# Patient Record
Sex: FEMALE | Race: WHITE | NOT HISPANIC OR LATINO
[De-identification: names, ages, dates, MRNs, and addresses within clinical notes are randomized per-mention and may not be internally consistent; named-entity substitution may affect disease eponyms.]

---

## 2020-03-04 ENCOUNTER — RESULT REVIEW (OUTPATIENT)
Age: 60
End: 2020-03-04

## 2020-03-10 ENCOUNTER — OUTPATIENT (OUTPATIENT)
Dept: OUTPATIENT SERVICES | Facility: HOSPITAL | Age: 60
LOS: 1 days | End: 2020-03-10
Payer: COMMERCIAL

## 2020-03-10 ENCOUNTER — TRANSCRIPTION ENCOUNTER (OUTPATIENT)
Age: 60
End: 2020-03-10

## 2020-03-10 VITALS
WEIGHT: 156.97 LBS | TEMPERATURE: 98 F | HEART RATE: 67 BPM | OXYGEN SATURATION: 99 % | HEIGHT: 63 IN | SYSTOLIC BLOOD PRESSURE: 130 MMHG | RESPIRATION RATE: 16 BRPM | DIASTOLIC BLOOD PRESSURE: 60 MMHG

## 2020-03-10 DIAGNOSIS — Z98.890 OTHER SPECIFIED POSTPROCEDURAL STATES: Chronic | ICD-10-CM

## 2020-03-10 DIAGNOSIS — Z90.89 ACQUIRED ABSENCE OF OTHER ORGANS: Chronic | ICD-10-CM

## 2020-03-10 DIAGNOSIS — Z01.818 ENCOUNTER FOR OTHER PREPROCEDURAL EXAMINATION: ICD-10-CM

## 2020-03-10 DIAGNOSIS — Z90.711 ACQUIRED ABSENCE OF UTERUS WITH REMAINING CERVICAL STUMP: Chronic | ICD-10-CM

## 2020-03-10 DIAGNOSIS — K21.9 GASTRO-ESOPHAGEAL REFLUX DISEASE WITHOUT ESOPHAGITIS: ICD-10-CM

## 2020-03-10 LAB
ANION GAP SERPL CALC-SCNC: 6 MMOL/L — SIGNIFICANT CHANGE UP (ref 5–17)
APTT BLD: 32 SEC — SIGNIFICANT CHANGE UP (ref 27.5–36.3)
BASOPHILS # BLD AUTO: 0.13 K/UL — SIGNIFICANT CHANGE UP (ref 0–0.2)
BASOPHILS NFR BLD AUTO: 1.5 % — SIGNIFICANT CHANGE UP (ref 0–2)
BUN SERPL-MCNC: 15 MG/DL — SIGNIFICANT CHANGE UP (ref 7–23)
CALCIUM SERPL-MCNC: 9.7 MG/DL — SIGNIFICANT CHANGE UP (ref 8.5–10.1)
CHLORIDE SERPL-SCNC: 104 MMOL/L — SIGNIFICANT CHANGE UP (ref 96–108)
CO2 SERPL-SCNC: 29 MMOL/L — SIGNIFICANT CHANGE UP (ref 22–31)
CREAT SERPL-MCNC: 0.67 MG/DL — SIGNIFICANT CHANGE UP (ref 0.5–1.3)
EOSINOPHIL # BLD AUTO: 0.19 K/UL — SIGNIFICANT CHANGE UP (ref 0–0.5)
EOSINOPHIL NFR BLD AUTO: 2.1 % — SIGNIFICANT CHANGE UP (ref 0–6)
GLUCOSE SERPL-MCNC: 91 MG/DL — SIGNIFICANT CHANGE UP (ref 70–99)
HCT VFR BLD CALC: 38.2 % — SIGNIFICANT CHANGE UP (ref 34.5–45)
HGB BLD-MCNC: 12.4 G/DL — SIGNIFICANT CHANGE UP (ref 11.5–15.5)
IMM GRANULOCYTES NFR BLD AUTO: 1.6 % — HIGH (ref 0–1.5)
INR BLD: 1.15 RATIO — SIGNIFICANT CHANGE UP (ref 0.88–1.16)
LYMPHOCYTES # BLD AUTO: 1.95 K/UL — SIGNIFICANT CHANGE UP (ref 1–3.3)
LYMPHOCYTES # BLD AUTO: 21.8 % — SIGNIFICANT CHANGE UP (ref 13–44)
MCHC RBC-ENTMCNC: 29 PG — SIGNIFICANT CHANGE UP (ref 27–34)
MCHC RBC-ENTMCNC: 32.5 GM/DL — SIGNIFICANT CHANGE UP (ref 32–36)
MCV RBC AUTO: 89.5 FL — SIGNIFICANT CHANGE UP (ref 80–100)
MONOCYTES # BLD AUTO: 0.7 K/UL — SIGNIFICANT CHANGE UP (ref 0–0.9)
MONOCYTES NFR BLD AUTO: 7.8 % — SIGNIFICANT CHANGE UP (ref 2–14)
NEUTROPHILS # BLD AUTO: 5.85 K/UL — SIGNIFICANT CHANGE UP (ref 1.8–7.4)
NEUTROPHILS NFR BLD AUTO: 65.2 % — SIGNIFICANT CHANGE UP (ref 43–77)
PLATELET # BLD AUTO: 725 K/UL — HIGH (ref 150–400)
POTASSIUM SERPL-MCNC: 3.7 MMOL/L — SIGNIFICANT CHANGE UP (ref 3.5–5.3)
POTASSIUM SERPL-SCNC: 3.7 MMOL/L — SIGNIFICANT CHANGE UP (ref 3.5–5.3)
PROTHROM AB SERPL-ACNC: 12.8 SEC — SIGNIFICANT CHANGE UP (ref 10–12.9)
RBC # BLD: 4.27 M/UL — SIGNIFICANT CHANGE UP (ref 3.8–5.2)
RBC # FLD: 15.3 % — HIGH (ref 10.3–14.5)
SODIUM SERPL-SCNC: 139 MMOL/L — SIGNIFICANT CHANGE UP (ref 135–145)
WBC # BLD: 8.96 K/UL — SIGNIFICANT CHANGE UP (ref 3.8–10.5)
WBC # FLD AUTO: 8.96 K/UL — SIGNIFICANT CHANGE UP (ref 3.8–10.5)

## 2020-03-10 PROCEDURE — 85610 PROTHROMBIN TIME: CPT

## 2020-03-10 PROCEDURE — 85730 THROMBOPLASTIN TIME PARTIAL: CPT

## 2020-03-10 PROCEDURE — 80048 BASIC METABOLIC PNL TOTAL CA: CPT

## 2020-03-10 PROCEDURE — G0463: CPT | Mod: 25

## 2020-03-10 PROCEDURE — 93010 ELECTROCARDIOGRAM REPORT: CPT

## 2020-03-10 PROCEDURE — 85025 COMPLETE CBC W/AUTO DIFF WBC: CPT

## 2020-03-10 PROCEDURE — 93005 ELECTROCARDIOGRAM TRACING: CPT

## 2020-03-10 PROCEDURE — 36415 COLL VENOUS BLD VENIPUNCTURE: CPT

## 2020-03-10 NOTE — H&P PST ADULT - HISTORY OF PRESENT ILLNESS
59 year old female with GERD c/o abdominal pain, blood in stool, n/v, weight gain and bloating; denies fever and chills; she presents to PST for planned upper endoscopy

## 2020-03-10 NOTE — H&P PST ADULT - RESPIRATORY
Patient asked questions Verbalized Understanding/Simple: Patient demonstrates quick and easy understanding detailed exam
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
## 2020-03-10 NOTE — H&P PST ADULT - NSICDXPASTSURGICALHX_GEN_ALL_CORE_FT
PAST SURGICAL HISTORY:  H/O cardiac radiofrequency ablation 2001    H/O colonoscopy 3/4/2020    H/O endoscopy 2/24/2020 3/4/2020    History of partial hysterectomy due to fibroids 2001    History of tonsillectomy 1970's

## 2020-03-10 NOTE — H&P PST ADULT - HEALTH CARE MAINTENANCE
flu vaccine  current   pt denies traveling out of the country  for the past 14 days denies fever cough

## 2020-03-10 NOTE — H&P PST ADULT - ASSESSMENT
59 year old female presents to [facility], [place] for planned upper endoscopy  1.  Dr Rees   office  will instruct patient regarding bowel prep and  medication regimen on day of procedure.  2. Endoscopy booking will call patient the day before surgery for arrival instructions   3. NPO post midnight  4. CBC BMP EKG as per Dr Rees   5. Patient instructed to have responsible adult accompany/ drive pt home after procedure

## 2020-03-11 DIAGNOSIS — Z01.818 ENCOUNTER FOR OTHER PREPROCEDURAL EXAMINATION: ICD-10-CM

## 2020-03-11 DIAGNOSIS — K21.9 GASTRO-ESOPHAGEAL REFLUX DISEASE WITHOUT ESOPHAGITIS: ICD-10-CM

## 2020-03-13 ENCOUNTER — OUTPATIENT (OUTPATIENT)
Dept: OUTPATIENT SERVICES | Facility: HOSPITAL | Age: 60
LOS: 1 days | Discharge: HOME | End: 2020-03-13
Payer: COMMERCIAL

## 2020-03-13 ENCOUNTER — RESULT REVIEW (OUTPATIENT)
Age: 60
End: 2020-03-13

## 2020-03-13 VITALS
WEIGHT: 156.97 LBS | RESPIRATION RATE: 22 BRPM | DIASTOLIC BLOOD PRESSURE: 67 MMHG | HEIGHT: 63 IN | SYSTOLIC BLOOD PRESSURE: 150 MMHG | TEMPERATURE: 98 F | OXYGEN SATURATION: 99 % | HEART RATE: 72 BPM

## 2020-03-13 DIAGNOSIS — Z90.89 ACQUIRED ABSENCE OF OTHER ORGANS: Chronic | ICD-10-CM

## 2020-03-13 DIAGNOSIS — E66.01 MORBID (SEVERE) OBESITY DUE TO EXCESS CALORIES: ICD-10-CM

## 2020-03-13 DIAGNOSIS — Z98.890 OTHER SPECIFIED POSTPROCEDURAL STATES: Chronic | ICD-10-CM

## 2020-03-13 DIAGNOSIS — K21.9 GASTRO-ESOPHAGEAL REFLUX DISEASE WITHOUT ESOPHAGITIS: ICD-10-CM

## 2020-03-13 DIAGNOSIS — Z90.711 ACQUIRED ABSENCE OF UTERUS WITH REMAINING CERVICAL STUMP: Chronic | ICD-10-CM

## 2020-03-13 PROCEDURE — 88305 TISSUE EXAM BY PATHOLOGIST: CPT | Mod: 26

## 2020-03-13 PROCEDURE — 88312 SPECIAL STAINS GROUP 1: CPT | Mod: 26

## 2020-03-13 PROCEDURE — 88312 SPECIAL STAINS GROUP 1: CPT

## 2020-03-13 PROCEDURE — 88305 TISSUE EXAM BY PATHOLOGIST: CPT

## 2020-03-13 NOTE — ASU PATIENT PROFILE, ADULT - PSH
H/O cardiac radiofrequency ablation  2001  H/O colonoscopy  3/4/2020  H/O endoscopy  2/24/2020 3/4/2020  History of partial hysterectomy  due to fibroids 2001  History of tonsillectomy  1970's

## 2020-03-13 NOTE — ASU PREOP CHECKLIST - ANTIBIOTIC
Adherence to aseptic technique: hand hygiene prior to donning barriers (gown, gloves), don cap and mask, sterile drape over patient/chlorhexidine chlorhexidine n/a
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
## 2020-03-19 DIAGNOSIS — E03.9 HYPOTHYROIDISM, UNSPECIFIED: ICD-10-CM

## 2020-03-19 DIAGNOSIS — M50.20 OTHER CERVICAL DISC DISPLACEMENT, UNSPECIFIED CERVICAL REGION: ICD-10-CM

## 2020-03-19 DIAGNOSIS — R13.10 DYSPHAGIA, UNSPECIFIED: ICD-10-CM

## 2020-03-19 DIAGNOSIS — D68.0 VON WILLEBRAND DISEASE: ICD-10-CM

## 2020-03-19 DIAGNOSIS — Z88.2 ALLERGY STATUS TO SULFONAMIDES: ICD-10-CM

## 2020-03-19 DIAGNOSIS — F32.9 MAJOR DEPRESSIVE DISORDER, SINGLE EPISODE, UNSPECIFIED: ICD-10-CM

## 2020-03-19 DIAGNOSIS — K28.7 CHRONIC GASTROJEJUNAL ULCER WITHOUT HEMORRHAGE OR PERFORATION: ICD-10-CM

## 2020-03-19 DIAGNOSIS — K21.9 GASTRO-ESOPHAGEAL REFLUX DISEASE WITHOUT ESOPHAGITIS: ICD-10-CM

## 2020-03-19 DIAGNOSIS — Z86.718 PERSONAL HISTORY OF OTHER VENOUS THROMBOSIS AND EMBOLISM: ICD-10-CM
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
## 2020-03-19 DIAGNOSIS — Z98.84 BARIATRIC SURGERY STATUS: ICD-10-CM

## 2020-03-19 DIAGNOSIS — K76.0 FATTY (CHANGE OF) LIVER, NOT ELSEWHERE CLASSIFIED: ICD-10-CM

## 2020-03-19 DIAGNOSIS — K91.89 OTHER POSTPROCEDURAL COMPLICATIONS AND DISORDERS OF DIGESTIVE SYSTEM: ICD-10-CM

## 2020-03-19 DIAGNOSIS — Z91.041 RADIOGRAPHIC DYE ALLERGY STATUS: ICD-10-CM

## 2020-03-19 DIAGNOSIS — Z90.711 ACQUIRED ABSENCE OF UTERUS WITH REMAINING CERVICAL STUMP: ICD-10-CM

## 2020-03-19 DIAGNOSIS — K29.50 UNSPECIFIED CHRONIC GASTRITIS WITHOUT BLEEDING: ICD-10-CM

## 2020-03-19 DIAGNOSIS — Z86.711 PERSONAL HISTORY OF PULMONARY EMBOLISM: ICD-10-CM
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
## 2020-04-28 NOTE — ASU PATIENT PROFILE, ADULT - PAIN CHRONIC, PROFILE
no Mart-1 - Negative Histology Text: MART-1 staining demonstrates a normal density and pattern of melanocytes along the dermal-epidermal junction. The surgical margins are negative for tumor cells.

## 2020-06-01 PROBLEM — K21.9 GASTRO-ESOPHAGEAL REFLUX DISEASE WITHOUT ESOPHAGITIS: Chronic | Status: ACTIVE | Noted: 2020-03-10

## 2020-06-01 PROBLEM — K44.9 DIAPHRAGMATIC HERNIA WITHOUT OBSTRUCTION OR GANGRENE: Chronic | Status: ACTIVE | Noted: 2020-03-10

## 2020-06-01 PROBLEM — R42 DIZZINESS AND GIDDINESS: Chronic | Status: ACTIVE | Noted: 2020-03-10

## 2020-06-01 PROBLEM — F31.9 BIPOLAR DISORDER, UNSPECIFIED: Chronic | Status: ACTIVE | Noted: 2020-03-10

## 2020-06-01 PROBLEM — K76.0 FATTY (CHANGE OF) LIVER, NOT ELSEWHERE CLASSIFIED: Chronic | Status: ACTIVE | Noted: 2020-03-10

## 2020-06-01 PROBLEM — M50.20 OTHER CERVICAL DISC DISPLACEMENT, UNSPECIFIED CERVICAL REGION: Chronic | Status: ACTIVE | Noted: 2020-03-10

## 2020-06-01 PROBLEM — I10 ESSENTIAL (PRIMARY) HYPERTENSION: Chronic | Status: ACTIVE | Noted: 2020-03-10

## 2020-06-01 PROBLEM — D47.3 ESSENTIAL (HEMORRHAGIC) THROMBOCYTHEMIA: Chronic | Status: ACTIVE | Noted: 2020-03-10

## 2020-06-01 PROBLEM — R01.1 CARDIAC MURMUR, UNSPECIFIED: Chronic | Status: ACTIVE | Noted: 2020-03-10

## 2020-06-01 PROBLEM — D68.0 VON WILLEBRAND DISEASE: Chronic | Status: ACTIVE | Noted: 2020-03-10

## 2020-06-01 PROBLEM — Z87.81 PERSONAL HISTORY OF (HEALED) TRAUMATIC FRACTURE: Chronic | Status: ACTIVE | Noted: 2020-03-10

## 2020-06-01 PROBLEM — E03.9 HYPOTHYROIDISM, UNSPECIFIED: Chronic | Status: ACTIVE | Noted: 2020-03-10

## 2020-06-01 PROBLEM — I82.409 ACUTE EMBOLISM AND THROMBOSIS OF UNSPECIFIED DEEP VEINS OF UNSPECIFIED LOWER EXTREMITY: Chronic | Status: ACTIVE | Noted: 2020-03-10

## 2020-06-01 PROBLEM — I26.99 OTHER PULMONARY EMBOLISM WITHOUT ACUTE COR PULMONALE: Chronic | Status: ACTIVE | Noted: 2020-03-10

## 2020-06-16 ENCOUNTER — APPOINTMENT (OUTPATIENT)
Dept: INTERNAL MEDICINE | Facility: CLINIC | Age: 60
End: 2020-06-16
Payer: COMMERCIAL

## 2020-06-16 ENCOUNTER — OUTPATIENT (OUTPATIENT)
Dept: OUTPATIENT SERVICES | Facility: HOSPITAL | Age: 60
LOS: 1 days | End: 2020-06-16
Payer: COMMERCIAL

## 2020-06-16 VITALS
DIASTOLIC BLOOD PRESSURE: 62 MMHG | HEIGHT: 63 IN | HEART RATE: 68 BPM | RESPIRATION RATE: 18 BRPM | WEIGHT: 165 LBS | BODY MASS INDEX: 29.23 KG/M2 | TEMPERATURE: 97.6 F | OXYGEN SATURATION: 98 % | SYSTOLIC BLOOD PRESSURE: 146 MMHG

## 2020-06-16 VITALS
HEIGHT: 63 IN | TEMPERATURE: 97 F | DIASTOLIC BLOOD PRESSURE: 62 MMHG | OXYGEN SATURATION: 100 % | HEART RATE: 68 BPM | SYSTOLIC BLOOD PRESSURE: 129 MMHG | RESPIRATION RATE: 20 BRPM | WEIGHT: 164.91 LBS

## 2020-06-16 DIAGNOSIS — Z98.890 OTHER SPECIFIED POSTPROCEDURAL STATES: Chronic | ICD-10-CM

## 2020-06-16 DIAGNOSIS — K21.9 GASTRO-ESOPHAGEAL REFLUX DISEASE W/OUT ESOPHAGITIS: ICD-10-CM

## 2020-06-16 DIAGNOSIS — Z86.2 PERSONAL HISTORY OF DISEASES OF THE BLOOD AND BLOOD-FORMING ORGANS AND CERTAIN DISORDERS INVOLVING THE IMMUNE MECHANISM: ICD-10-CM

## 2020-06-16 DIAGNOSIS — Z87.81 PERSONAL HISTORY OF (HEALED) TRAUMATIC FRACTURE: ICD-10-CM

## 2020-06-16 DIAGNOSIS — Z86.718 PERSONAL HISTORY OF OTHER VENOUS THROMBOSIS AND EMBOLISM: ICD-10-CM

## 2020-06-16 DIAGNOSIS — Z01.811 ENCOUNTER FOR PREPROCEDURAL RESPIRATORY EXAMINATION: ICD-10-CM

## 2020-06-16 DIAGNOSIS — Z90.89 ACQUIRED ABSENCE OF OTHER ORGANS: Chronic | ICD-10-CM

## 2020-06-16 DIAGNOSIS — D47.3 ESSENTIAL (HEMORRHAGIC) THROMBOCYTHEMIA: ICD-10-CM

## 2020-06-16 DIAGNOSIS — Z01.818 ENCOUNTER FOR OTHER PREPROCEDURAL EXAMINATION: ICD-10-CM

## 2020-06-16 DIAGNOSIS — Z29.9 ENCOUNTER FOR PROPHYLACTIC MEASURES, UNSPECIFIED: ICD-10-CM

## 2020-06-16 DIAGNOSIS — Z90.711 ACQUIRED ABSENCE OF UTERUS WITH REMAINING CERVICAL STUMP: Chronic | ICD-10-CM

## 2020-06-16 DIAGNOSIS — Z86.711 PERSONAL HISTORY OF PULMONARY EMBOLISM: ICD-10-CM

## 2020-06-16 DIAGNOSIS — E03.9 HYPOTHYROIDISM, UNSPECIFIED: ICD-10-CM

## 2020-06-16 DIAGNOSIS — Z98.84 BARIATRIC SURGERY STATUS: Chronic | ICD-10-CM

## 2020-06-16 LAB
ALBUMIN SERPL ELPH-MCNC: 4.1 G/DL — SIGNIFICANT CHANGE UP (ref 3.3–5)
ANION GAP SERPL CALC-SCNC: 1 MMOL/L — LOW (ref 5–17)
APPEARANCE UR: CLEAR — SIGNIFICANT CHANGE UP
APTT BLD: 36 SEC — SIGNIFICANT CHANGE UP (ref 27.5–36.3)
BASOPHILS # BLD AUTO: 0.16 K/UL — SIGNIFICANT CHANGE UP (ref 0–0.2)
BASOPHILS NFR BLD AUTO: 2.2 % — HIGH (ref 0–2)
BILIRUB UR-MCNC: NEGATIVE — SIGNIFICANT CHANGE UP
BUN SERPL-MCNC: 19 MG/DL — SIGNIFICANT CHANGE UP (ref 7–23)
CALCIUM SERPL-MCNC: 9.4 MG/DL — SIGNIFICANT CHANGE UP (ref 8.5–10.1)
CHLORIDE SERPL-SCNC: 103 MMOL/L — SIGNIFICANT CHANGE UP (ref 96–108)
CO2 SERPL-SCNC: 29 MMOL/L — SIGNIFICANT CHANGE UP (ref 22–31)
COHGB MFR BLDV: 1.7 % — HIGH (ref 0–1.5)
COLOR SPEC: YELLOW — SIGNIFICANT CHANGE UP
CREAT SERPL-MCNC: 0.72 MG/DL — SIGNIFICANT CHANGE UP (ref 0.5–1.3)
DIFF PNL FLD: ABNORMAL
EOSINOPHIL # BLD AUTO: 0.17 K/UL — SIGNIFICANT CHANGE UP (ref 0–0.5)
EOSINOPHIL NFR BLD AUTO: 2.4 % — SIGNIFICANT CHANGE UP (ref 0–6)
GLUCOSE SERPL-MCNC: 97 MG/DL — SIGNIFICANT CHANGE UP (ref 70–99)
GLUCOSE UR QL: NEGATIVE MG/DL — SIGNIFICANT CHANGE UP
HCT VFR BLD CALC: 44.8 % — SIGNIFICANT CHANGE UP (ref 34.5–45)
HGB BLD-MCNC: 14.3 G/DL — SIGNIFICANT CHANGE UP (ref 11.5–15.5)
IMM GRANULOCYTES NFR BLD AUTO: 1 % — SIGNIFICANT CHANGE UP (ref 0–1.5)
INR BLD: 1.25 RATIO — HIGH (ref 0.88–1.16)
KETONES UR-MCNC: NEGATIVE — SIGNIFICANT CHANGE UP
LEUKOCYTE ESTERASE UR-ACNC: ABNORMAL
LYMPHOCYTES # BLD AUTO: 1.95 K/UL — SIGNIFICANT CHANGE UP (ref 1–3.3)
LYMPHOCYTES # BLD AUTO: 27.4 % — SIGNIFICANT CHANGE UP (ref 13–44)
MCHC RBC-ENTMCNC: 26.2 PG — LOW (ref 27–34)
MCHC RBC-ENTMCNC: 31.9 GM/DL — LOW (ref 32–36)
MCV RBC AUTO: 82.2 FL — SIGNIFICANT CHANGE UP (ref 80–100)
MONOCYTES # BLD AUTO: 0.74 K/UL — SIGNIFICANT CHANGE UP (ref 0–0.9)
MONOCYTES NFR BLD AUTO: 10.4 % — SIGNIFICANT CHANGE UP (ref 2–14)
NEUTROPHILS # BLD AUTO: 4.03 K/UL — SIGNIFICANT CHANGE UP (ref 1.8–7.4)
NEUTROPHILS NFR BLD AUTO: 56.6 % — SIGNIFICANT CHANGE UP (ref 43–77)
NITRITE UR-MCNC: NEGATIVE — SIGNIFICANT CHANGE UP
PH UR: 5 — SIGNIFICANT CHANGE UP (ref 5–8)
PLATELET # BLD AUTO: 673 K/UL — HIGH (ref 150–400)
POTASSIUM SERPL-MCNC: 3.9 MMOL/L — SIGNIFICANT CHANGE UP (ref 3.5–5.3)
POTASSIUM SERPL-SCNC: 3.9 MMOL/L — SIGNIFICANT CHANGE UP (ref 3.5–5.3)
PROT UR-MCNC: NEGATIVE MG/DL — SIGNIFICANT CHANGE UP
PROTHROM AB SERPL-ACNC: 14 SEC — HIGH (ref 10–12.9)
RBC # BLD: 5.45 M/UL — HIGH (ref 3.8–5.2)
RBC # FLD: 23.1 % — HIGH (ref 10.3–14.5)
SODIUM SERPL-SCNC: 133 MMOL/L — LOW (ref 135–145)
SP GR SPEC: 1.01 — SIGNIFICANT CHANGE UP (ref 1.01–1.02)
UROBILINOGEN FLD QL: NEGATIVE MG/DL — SIGNIFICANT CHANGE UP
WBC # BLD: 7.12 K/UL — SIGNIFICANT CHANGE UP (ref 3.8–10.5)
WBC # FLD AUTO: 7.12 K/UL — SIGNIFICANT CHANGE UP (ref 3.8–10.5)

## 2020-06-16 PROCEDURE — 85730 THROMBOPLASTIN TIME PARTIAL: CPT

## 2020-06-16 PROCEDURE — 81001 URINALYSIS AUTO W/SCOPE: CPT

## 2020-06-16 PROCEDURE — 82040 ASSAY OF SERUM ALBUMIN: CPT

## 2020-06-16 PROCEDURE — 36415 COLL VENOUS BLD VENIPUNCTURE: CPT

## 2020-06-16 PROCEDURE — 71046 X-RAY EXAM CHEST 2 VIEWS: CPT | Mod: 26

## 2020-06-16 PROCEDURE — 86900 BLOOD TYPING SEROLOGIC ABO: CPT

## 2020-06-16 PROCEDURE — 82375 ASSAY CARBOXYHB QUANT: CPT

## 2020-06-16 PROCEDURE — 71046 X-RAY EXAM CHEST 2 VIEWS: CPT

## 2020-06-16 PROCEDURE — G0463: CPT | Mod: 25

## 2020-06-16 PROCEDURE — 86901 BLOOD TYPING SEROLOGIC RH(D): CPT

## 2020-06-16 PROCEDURE — 99204 OFFICE O/P NEW MOD 45 MIN: CPT

## 2020-06-16 PROCEDURE — 86850 RBC ANTIBODY SCREEN: CPT

## 2020-06-16 PROCEDURE — 80048 BASIC METABOLIC PNL TOTAL CA: CPT

## 2020-06-16 PROCEDURE — 85025 COMPLETE CBC W/AUTO DIFF WBC: CPT

## 2020-06-16 PROCEDURE — 85610 PROTHROMBIN TIME: CPT

## 2020-06-16 RX ORDER — MULTIVIT-MIN/FERROUS GLUCONATE 9 MG/15 ML
1 LIQUID (ML) ORAL
Qty: 0 | Refills: 0 | DISCHARGE

## 2020-06-16 RX ORDER — CALCIUM CARBONATE 500(1250)
0 TABLET ORAL
Qty: 0 | Refills: 0 | DISCHARGE

## 2020-06-16 RX ORDER — BISMUTH SUBSALICYLATE 525 MG/1
TABLET ORAL
Refills: 0 | Status: ACTIVE | COMMUNITY

## 2020-06-16 RX ORDER — MULTIVITAMIN
TABLET ORAL
Refills: 0 | Status: ACTIVE | COMMUNITY

## 2020-06-16 RX ORDER — LAMOTRIGINE 150 MG/1
150 TABLET ORAL
Refills: 0 | Status: ACTIVE | COMMUNITY

## 2020-06-16 RX ORDER — MODAFINIL 200 MG/1
200 TABLET ORAL TWICE DAILY
Refills: 0 | Status: ACTIVE | COMMUNITY

## 2020-06-16 RX ORDER — BUPROPION HYDROCHLORIDE 300 MG/1
300 TABLET, EXTENDED RELEASE ORAL
Refills: 0 | Status: ACTIVE | COMMUNITY

## 2020-06-16 NOTE — H&P PST ADULT - HISTORY OF PRESENT ILLNESS
59 year old female with GERD c/o abdominal pain, blood in stool, n/v, weight gain and bloating; denies fever and chills; she presents to PST for planned upper endoscopy 59 year old female with PMH of Obesity, Ventricular tachycardia, DVT, PE, GERD. Pt reports history of gastric bypass in 2015 with weight loss of about 80 lbs. Pt c/o abdominal pain, blood in stool, n/v, bloating and weight gain. Pt reports she had upper endoscopy 3 months ago that showed ulceration in previous gastric bypass. She is here for PST for planned Revision of gastric bypass.

## 2020-06-16 NOTE — PHYSICAL EXAM
[No Acute Distress] : no acute distress [Normal Oropharynx] : normal oropharynx [Normal Rate/Rhythm] : normal rate/rhythm [No Neck Mass] : no neck mass [Normal Appearance] : normal appearance [No Murmurs] : no murmurs [Normal S1, S2] : normal s1, s2 [No Resp Distress] : no resp distress [Clear to Auscultation Bilaterally] : clear to auscultation bilaterally [No Abnormalities] : no abnormalities [Benign] : benign [Normal Gait] : normal gait [No Clubbing] : no clubbing [No Cyanosis] : no cyanosis [No Edema] : no edema [Normal Color/ Pigmentation] : normal color/ pigmentation [Oriented x3] : oriented x3 [No Focal Deficits] : no focal deficits [Normal Affect] : normal affect

## 2020-06-16 NOTE — H&P PST ADULT - HEALTH CARE MAINTENANCE
flu vaccine  current Any history of  international or out of state travel in the last 21 days?   NO  Any close contact with a person who is under investigation fo COVID-19 or had close contact with a confirmed COVID -19 person in the last 14 days? NO  Any fever, cough, SOB? NO

## 2020-06-16 NOTE — H&P PST ADULT - NSICDXPASTSURGICALHX_GEN_ALL_CORE_FT
PAST SURGICAL HISTORY:  H/O cardiac radiofrequency ablation 2001    H/O colonoscopy 3/4/2020    H/O endoscopy 2/24/2020 3/4/2020    History of partial hysterectomy due to fibroids 2001    History of tonsillectomy 1970's    S/P gastric bypass 2015, Laparoscopic
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
## 2020-06-16 NOTE — H&P PST ADULT - ASSESSMENT
59 year old female with PMH of Obesity, Ventricular tachycardia, DVT, PE, GERD. Pt reports history of gastric bypass in 2015 with weight loss of about 80 lbs. Pt c/o abdominal pain, blood in stool, n/v, bloating and weight gain. Pt reports she had upper endoscopy 3 months ago that showed ulceration in previous gastric bypass. She is scheduled for Revision of gastric bypass with Dr. Rees.    Plan  1. Stop all NSAIDS, herbal supplements and vitamins for 7 days.  2. NPO as per ASU instructions.  3. Take the following medications ( Lamictal, Levothyroxine, Omeprazole, Wellbutrin, Enalapril ) with small sips of water on the morning of your procedure/surgery.  4. Use EZ sponges as directed  5. Labs, EKG, CXR as per surgeon. COVID test on 2020, pt instructed.  6. PMD/cardio/Pulmo/Hemtologist visit for optimization prior to surgery as per surgeon  7. Advised to quarantine prior to surgery    CAPRINI SCORE [CLOT]    AGE RELATED RISK FACTORS                                                       MOBILITY RELATED FACTORS  [x ] Age 41-60 years                                            (1 Point)                  [ ] Bed rest                                                        (1 Point)  [ ] Age: 61-74 years                                           (2 Points)                 [ ] Plaster cast                                                   (2 Points)  [ ] Age= 75 years                                              (3 Points)                 [ ] Bed bound for more than 72 hours                 (2 Points)    DISEASE RELATED RISK FACTORS                                               GENDER SPECIFIC FACTORS  [ ] Edema in the lower extremities                       (1 Point)                  [ ] Pregnancy                                                     (1 Point)  [ ] Varicose veins                                               (1 Point)                  [ ] Post-partum < 6 weeks                                   (1 Point)             [ x] BMI > 25 Kg/m2                                            (1 Point)                  [ ] Hormonal therapy  or oral contraception          (1 Point)                 [ ] Sepsis (in the previous month)                        (1 Point)                  [ ] History of pregnancy complications                 (1 point)  [ ] Pneumonia or serious lung disease                                               [ ] Unexplained or recurrent                     (1 Point)           (in the previous month)                               (1 Point)  [ ] Abnormal pulmonary function test                     (1 Point)                 SURGERY RELATED RISK FACTORS  [ ] Acute myocardial infarction                              (1 Point)                 [ ]  Section                                             (1 Point)  [ ] Congestive heart failure (in the previous month)  (1 Point)               [ ] Minor surgery                                                  (1 Point)   [ ] Inflammatory bowel disease                             (1 Point)                 [ ] Arthroscopic surgery                                        (2 Points)  [ ] Central venous access                                      (2 Points)                [ x ] General surgery lasting more than 45 minutes   (2 Points)       [ ] Stroke (in the previous month)                          (5 Points)               [ ] Elective arthroplasty                                         (5 Points)     ()  malignancy                                                             (2 points )                                                                                                                                      HEMATOLOGY RELATED FACTORS                                                 TRAUMA RELATED RISK FACTORS  [x ] Prior episodes of VTE                                     (3 Points)                 [ ] Fracture of the hip, pelvis, or leg                       (5 Points)  [ ] Positive family history for VTE                         (3 Points)                 [ ] Acute spinal cord injury (in the previous month)  (5 Points)  [ ] Prothrombin 01358 A                                     (3 Points)                 [ ] Paralysis  (less than 1 month)                             (5 Points)  [ ] Factor V Leiden                                             (3 Points)                  [ ] Multiple Trauma within 1 month                        (5 Points)  [ ] Lupus anticoagulants                                     (3 Points)                                                           [ ] Anticardiolipin antibodies                               (3 Points)                                                       [ ] High homocysteine in the blood                      (3 Points)                                             [ ] Other congenital or acquired thrombophilia      (3 Points)                                                [ ] Heparin induced thrombocytopenia                  (3 Points)    (  ) Malignancy                                        Total Score [     7    ]  The Caprini score indicates that this patient is at high risk for a VTE event (score => 6).    Ssurgical patients in this group will benefit from both pharmacologic prophylaxis and intermittent compression devices.    The surgical team will determine the balance between VTE risk and bleeding risk, and other clinical considerations.

## 2020-06-17 DIAGNOSIS — Z01.818 ENCOUNTER FOR OTHER PREPROCEDURAL EXAMINATION: ICD-10-CM

## 2020-06-17 NOTE — REVIEW OF SYSTEMS
[Fever] : no fever [Chills] : no chills [Cough] : no cough [Wheezing] : no wheezing [Sputum] : no sputum [Hemoptysis] : no hemoptysis [Chest Discomfort] : no chest discomfort [Palpitations] : no palpitations [Syncope] : no syncope [Dizziness] : no dizziness

## 2020-06-17 NOTE — ASSESSMENT
[FreeTextEntry1] : #1.  There is no pulmonary contraindication to the patient having planned revision of bariatric surgery on June 23, 2020. The patient will also have hematology and cardiology preoperative clearances.  I recommend close monitoring of the patient intraoperatively and postoperatively.   Given her history of essential thrombocytosis and von Willebrand's disease, the patient will have hematology clearance and instructions from her hematologist Dr. Zach Urbina.  She will also have cardiology clearance from her cardiologist Dr. Juan Weston.
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
## 2020-06-21 ENCOUNTER — OUTPATIENT (OUTPATIENT)
Dept: OUTPATIENT SERVICES | Facility: HOSPITAL | Age: 60
LOS: 1 days | End: 2020-06-21
Payer: COMMERCIAL

## 2020-06-21 DIAGNOSIS — Z85.9 PERSONAL HISTORY OF MALIGNANT NEOPLASM, UNSPECIFIED: ICD-10-CM

## 2020-06-21 DIAGNOSIS — Z98.84 BARIATRIC SURGERY STATUS: Chronic | ICD-10-CM

## 2020-06-21 DIAGNOSIS — Z98.890 OTHER SPECIFIED POSTPROCEDURAL STATES: Chronic | ICD-10-CM

## 2020-06-21 DIAGNOSIS — Z90.711 ACQUIRED ABSENCE OF UTERUS WITH REMAINING CERVICAL STUMP: Chronic | ICD-10-CM

## 2020-06-21 DIAGNOSIS — Z90.89 ACQUIRED ABSENCE OF OTHER ORGANS: Chronic | ICD-10-CM

## 2020-06-21 DIAGNOSIS — Z11.59 ENCOUNTER FOR SCREENING FOR OTHER VIRAL DISEASES: ICD-10-CM

## 2020-06-21 PROBLEM — I47.2 VENTRICULAR TACHYCARDIA: Chronic | Status: ACTIVE | Noted: 2020-03-10

## 2020-06-21 PROBLEM — E66.9 OBESITY, UNSPECIFIED: Chronic | Status: ACTIVE | Noted: 2020-06-16

## 2020-06-21 PROCEDURE — 83615 LACTATE (LD) (LDH) ENZYME: CPT

## 2020-06-21 PROCEDURE — 83540 ASSAY OF IRON: CPT

## 2020-06-21 PROCEDURE — 85045 AUTOMATED RETICULOCYTE COUNT: CPT

## 2020-06-21 PROCEDURE — 83550 IRON BINDING TEST: CPT

## 2020-06-21 PROCEDURE — U0003: CPT

## 2020-06-21 PROCEDURE — 82728 ASSAY OF FERRITIN: CPT

## 2020-06-21 PROCEDURE — 85027 COMPLETE CBC AUTOMATED: CPT

## 2020-06-21 PROCEDURE — 36415 COLL VENOUS BLD VENIPUNCTURE: CPT

## 2020-06-22 DIAGNOSIS — Z85.9 PERSONAL HISTORY OF MALIGNANT NEOPLASM, UNSPECIFIED: ICD-10-CM

## 2020-06-22 DIAGNOSIS — Z11.59 ENCOUNTER FOR SCREENING FOR OTHER VIRAL DISEASES: ICD-10-CM

## 2020-06-22 LAB — SARS-COV-2 RNA SPEC QL NAA+PROBE: SIGNIFICANT CHANGE UP

## 2020-06-23 ENCOUNTER — INPATIENT (INPATIENT)
Facility: HOSPITAL | Age: 60
LOS: 1 days | Discharge: HOME | DRG: 327 | End: 2020-06-25
Attending: SURGERY | Admitting: SURGERY
Payer: COMMERCIAL

## 2020-06-23 ENCOUNTER — RESULT REVIEW (OUTPATIENT)
Age: 60
End: 2020-06-23

## 2020-06-23 VITALS
HEART RATE: 68 BPM | HEIGHT: 63 IN | WEIGHT: 164.91 LBS | TEMPERATURE: 98 F | SYSTOLIC BLOOD PRESSURE: 138 MMHG | OXYGEN SATURATION: 99 % | RESPIRATION RATE: 16 BRPM | DIASTOLIC BLOOD PRESSURE: 47 MMHG

## 2020-06-23 DIAGNOSIS — Z98.890 OTHER SPECIFIED POSTPROCEDURAL STATES: Chronic | ICD-10-CM

## 2020-06-23 DIAGNOSIS — Z98.84 BARIATRIC SURGERY STATUS: Chronic | ICD-10-CM

## 2020-06-23 DIAGNOSIS — R13.10 DYSPHAGIA, UNSPECIFIED: ICD-10-CM

## 2020-06-23 DIAGNOSIS — Z90.711 ACQUIRED ABSENCE OF UTERUS WITH REMAINING CERVICAL STUMP: Chronic | ICD-10-CM

## 2020-06-23 DIAGNOSIS — Z90.89 ACQUIRED ABSENCE OF OTHER ORGANS: Chronic | ICD-10-CM

## 2020-06-23 PROCEDURE — C9290: CPT

## 2020-06-23 PROCEDURE — 88307 TISSUE EXAM BY PATHOLOGIST: CPT

## 2020-06-23 PROCEDURE — 86769 SARS-COV-2 COVID-19 ANTIBODY: CPT

## 2020-06-23 PROCEDURE — 36415 COLL VENOUS BLD VENIPUNCTURE: CPT

## 2020-06-23 PROCEDURE — C1889: CPT

## 2020-06-23 PROCEDURE — 99253 IP/OBS CNSLTJ NEW/EST LOW 45: CPT

## 2020-06-23 PROCEDURE — 81001 URINALYSIS AUTO W/SCOPE: CPT

## 2020-06-23 PROCEDURE — 82962 GLUCOSE BLOOD TEST: CPT

## 2020-06-23 PROCEDURE — 80048 BASIC METABOLIC PNL TOTAL CA: CPT

## 2020-06-23 PROCEDURE — C9113: CPT

## 2020-06-23 PROCEDURE — 88307 TISSUE EXAM BY PATHOLOGIST: CPT | Mod: 26

## 2020-06-23 PROCEDURE — 85027 COMPLETE CBC AUTOMATED: CPT

## 2020-06-23 PROCEDURE — 85025 COMPLETE CBC W/AUTO DIFF WBC: CPT

## 2020-06-23 RX ORDER — PANTOPRAZOLE SODIUM 20 MG/1
40 TABLET, DELAYED RELEASE ORAL EVERY 24 HOURS
Refills: 0 | Status: DISCONTINUED | OUTPATIENT
Start: 2020-06-23 | End: 2020-06-25

## 2020-06-23 RX ORDER — ONDANSETRON 8 MG/1
4 TABLET, FILM COATED ORAL EVERY 6 HOURS
Refills: 0 | Status: DISCONTINUED | OUTPATIENT
Start: 2020-06-23 | End: 2020-06-25

## 2020-06-23 RX ORDER — OXYCODONE HYDROCHLORIDE 5 MG/1
10 TABLET ORAL EVERY 4 HOURS
Refills: 0 | Status: DISCONTINUED | OUTPATIENT
Start: 2020-06-23 | End: 2020-06-25

## 2020-06-23 RX ORDER — DESMOPRESSIN ACETATE 0.1 MG/1
18 TABLET ORAL ONCE
Refills: 0 | Status: COMPLETED | OUTPATIENT
Start: 2020-06-23 | End: 2020-06-23

## 2020-06-23 RX ORDER — OXYCODONE HYDROCHLORIDE 5 MG/1
10 TABLET ORAL ONCE
Refills: 0 | Status: DISCONTINUED | OUTPATIENT
Start: 2020-06-23 | End: 2020-06-23

## 2020-06-23 RX ORDER — KETOROLAC TROMETHAMINE 30 MG/ML
30 SYRINGE (ML) INJECTION EVERY 6 HOURS
Refills: 0 | Status: DISCONTINUED | OUTPATIENT
Start: 2020-06-23 | End: 2020-06-25

## 2020-06-23 RX ORDER — APREPITANT 80 MG/1
80 CAPSULE ORAL ONCE
Refills: 0 | Status: COMPLETED | OUTPATIENT
Start: 2020-06-23 | End: 2020-06-23

## 2020-06-23 RX ORDER — ACETAMINOPHEN 500 MG
1000 TABLET ORAL EVERY 6 HOURS
Refills: 0 | Status: COMPLETED | OUTPATIENT
Start: 2020-06-23 | End: 2020-06-24

## 2020-06-23 RX ORDER — ENOXAPARIN SODIUM 100 MG/ML
40 INJECTION SUBCUTANEOUS DAILY
Refills: 0 | Status: DISCONTINUED | OUTPATIENT
Start: 2020-06-23 | End: 2020-06-25

## 2020-06-23 RX ORDER — OXYCODONE HYDROCHLORIDE 5 MG/1
5 TABLET ORAL EVERY 4 HOURS
Refills: 0 | Status: DISCONTINUED | OUTPATIENT
Start: 2020-06-23 | End: 2020-06-25

## 2020-06-23 RX ORDER — FENTANYL CITRATE 50 UG/ML
50 INJECTION INTRAVENOUS
Refills: 0 | Status: DISCONTINUED | OUTPATIENT
Start: 2020-06-23 | End: 2020-06-23

## 2020-06-23 RX ORDER — OXYCODONE HYDROCHLORIDE 5 MG/1
5 TABLET ORAL ONCE
Refills: 0 | Status: DISCONTINUED | OUTPATIENT
Start: 2020-06-23 | End: 2020-06-23

## 2020-06-23 RX ORDER — ONDANSETRON 8 MG/1
4 TABLET, FILM COATED ORAL ONCE
Refills: 0 | Status: DISCONTINUED | OUTPATIENT
Start: 2020-06-23 | End: 2020-06-23

## 2020-06-23 RX ORDER — HEPARIN SODIUM 5000 [USP'U]/ML
5000 INJECTION INTRAVENOUS; SUBCUTANEOUS ONCE
Refills: 0 | Status: COMPLETED | OUTPATIENT
Start: 2020-06-23 | End: 2020-06-23

## 2020-06-23 RX ORDER — SODIUM CHLORIDE 9 MG/ML
1000 INJECTION, SOLUTION INTRAVENOUS
Refills: 0 | Status: DISCONTINUED | OUTPATIENT
Start: 2020-06-23 | End: 2020-06-23

## 2020-06-23 RX ORDER — SODIUM CHLORIDE 9 MG/ML
1000 INJECTION, SOLUTION INTRAVENOUS
Refills: 0 | Status: DISCONTINUED | OUTPATIENT
Start: 2020-06-23 | End: 2020-06-25

## 2020-06-23 RX ORDER — ENOXAPARIN SODIUM 100 MG/ML
40 INJECTION SUBCUTANEOUS
Qty: 14 | Refills: 0
Start: 2020-06-23 | End: 2020-07-06

## 2020-06-23 RX ADMIN — Medication 400 MILLIGRAM(S): at 22:47

## 2020-06-23 RX ADMIN — APREPITANT 80 MILLIGRAM(S): 80 CAPSULE ORAL at 06:43

## 2020-06-23 RX ADMIN — DESMOPRESSIN ACETATE 109 MICROGRAM(S): 0.1 TABLET ORAL at 07:26

## 2020-06-23 RX ADMIN — OXYCODONE HYDROCHLORIDE 10 MILLIGRAM(S): 5 TABLET ORAL at 17:57

## 2020-06-23 RX ADMIN — Medication 30 MILLIGRAM(S): at 22:47

## 2020-06-23 RX ADMIN — SODIUM CHLORIDE 150 MILLILITER(S): 9 INJECTION, SOLUTION INTRAVENOUS at 17:07

## 2020-06-23 RX ADMIN — SODIUM CHLORIDE 150 MILLILITER(S): 9 INJECTION, SOLUTION INTRAVENOUS at 12:08

## 2020-06-23 RX ADMIN — HEPARIN SODIUM 5000 UNIT(S): 5000 INJECTION INTRAVENOUS; SUBCUTANEOUS at 06:43

## 2020-06-23 RX ADMIN — PANTOPRAZOLE SODIUM 40 MILLIGRAM(S): 20 TABLET, DELAYED RELEASE ORAL at 11:05

## 2020-06-23 RX ADMIN — SODIUM CHLORIDE 125 MILLILITER(S): 9 INJECTION, SOLUTION INTRAVENOUS at 11:04

## 2020-06-23 RX ADMIN — Medication 400 MILLIGRAM(S): at 17:10

## 2020-06-23 RX ADMIN — FENTANYL CITRATE 50 MICROGRAM(S): 50 INJECTION INTRAVENOUS at 12:00

## 2020-06-23 RX ADMIN — Medication 30 MILLIGRAM(S): at 17:09

## 2020-06-23 RX ADMIN — ENOXAPARIN SODIUM 40 MILLIGRAM(S): 100 INJECTION SUBCUTANEOUS at 17:19

## 2020-06-23 RX ADMIN — OXYCODONE HYDROCHLORIDE 10 MILLIGRAM(S): 5 TABLET ORAL at 23:21

## 2020-06-23 NOTE — CONSULT NOTE ADULT - ASSESSMENT
This is a 59 year old female s/p gastric bypass revision with high risk for VTE due to history of DVT and PE as well as essential thrombocytosis. She is moderate risk for bleeding due to Von Willebrand disease history, watch HH. Recommend starting Lovenox 40- mg Sq daily tonight at 2200 and continue for 7-10 days depending on ambulatory status.     Plan:  ::Lovenox 40mg SQ tonight at 2200 then daily  ::Daily CBC/BMP  ::Venodynes b/l  ::Enc ambulation    Thank you for this consult, will continue to follow. This is a 59 year old female s/p gastric bypass revision with high risk for VTE due to history of DVT and PE as well as essential thrombocytosis. She is moderate risk for bleeding due to Von Willebrand disease history, watch HH. Recommend starting Lovenox 40- mg Sq daily tonight at 2200 and continue for 14 days post-op.    Plan:  ::Lovenox 40mg SQ tonight at 2200 then daily x 14 days  ::Daily CBC/BMP  ::Venodynes b/l  ::Enc ambulation    Thank you for this consult, will continue to follow.    Dispo: Home
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
## 2020-06-23 NOTE — ASU PREOP CHECKLIST - AS BP NONINV SITE
[address] 
[facility] EMERGENCY DEPT 
[address] [id number] 
[phone] Work/School Note Date: 6/28/2019 To Whom It May concern: 
 
Alexandre Frankel was seen and treated today in the emergency room by the following provider(s): 
Attending Provider: Dana Harris MD. Joya Beard {Return to school/sport/work:[id number]} Sincerely, Sonal Velásquez left upper arm

## 2020-06-23 NOTE — BRIEF OPERATIVE NOTE - NSICDXBRIEFPROCEDURE_GEN_ALL_CORE_FT
PROCEDURES:  Bilateral injection of anesthetic agent into tissue plane defined by transversus abdominis muscle 23-Jun-2020 10:57:17  Stephen Brennan  Laparoscopic revision of gastric bypass with esophagogastroduodenoscopy (EGD) 23-Jun-2020 10:57:12  Stephen Brennan

## 2020-06-23 NOTE — BRIEF OPERATIVE NOTE - OPERATION/FINDINGS
Patient underwent laparoscopic of gastro-jejunal anastomosis without any complications. Intraoperative EGD confirmed intact GJ anastomosis that was easily transversed with negative leak test,

## 2020-06-23 NOTE — ASU PREOP CHECKLIST - WEIGHT IN LBS
Date: 2018    To: Rachel Briseno  : 1948    I hope this letter finds you doing well. I am writing to inform you of the following:       The results of your recent lab tests were normal.            Please call the office at [phone] if t [phone]

## 2020-06-24 DIAGNOSIS — D47.3 ESSENTIAL (HEMORRHAGIC) THROMBOCYTHEMIA: ICD-10-CM

## 2020-06-24 DIAGNOSIS — Z86.718 PERSONAL HISTORY OF OTHER VENOUS THROMBOSIS AND EMBOLISM: ICD-10-CM

## 2020-06-24 DIAGNOSIS — D68.0 VON WILLEBRAND DISEASE: ICD-10-CM

## 2020-06-24 LAB
ANION GAP SERPL CALC-SCNC: 6 MMOL/L — SIGNIFICANT CHANGE UP (ref 5–17)
APPEARANCE UR: ABNORMAL
BASOPHILS # BLD AUTO: 0.1 K/UL — SIGNIFICANT CHANGE UP (ref 0–0.2)
BASOPHILS NFR BLD AUTO: 0.9 % — SIGNIFICANT CHANGE UP (ref 0–2)
BILIRUB UR-MCNC: NEGATIVE — SIGNIFICANT CHANGE UP
BUN SERPL-MCNC: 8 MG/DL — SIGNIFICANT CHANGE UP (ref 7–23)
CALCIUM SERPL-MCNC: 8 MG/DL — LOW (ref 8.5–10.1)
CHLORIDE SERPL-SCNC: 100 MMOL/L — SIGNIFICANT CHANGE UP (ref 96–108)
CO2 SERPL-SCNC: 27 MMOL/L — SIGNIFICANT CHANGE UP (ref 22–31)
COLOR SPEC: YELLOW — SIGNIFICANT CHANGE UP
CREAT SERPL-MCNC: 0.6 MG/DL — SIGNIFICANT CHANGE UP (ref 0.5–1.3)
DIFF PNL FLD: ABNORMAL
EOSINOPHIL # BLD AUTO: 0.05 K/UL — SIGNIFICANT CHANGE UP (ref 0–0.5)
EOSINOPHIL NFR BLD AUTO: 0.4 % — SIGNIFICANT CHANGE UP (ref 0–6)
GLUCOSE SERPL-MCNC: 101 MG/DL — HIGH (ref 70–99)
GLUCOSE UR QL: NEGATIVE MG/DL — SIGNIFICANT CHANGE UP
HCT VFR BLD CALC: 35.5 % — SIGNIFICANT CHANGE UP (ref 34.5–45)
HGB BLD-MCNC: 11.8 G/DL — SIGNIFICANT CHANGE UP (ref 11.5–15.5)
IMM GRANULOCYTES NFR BLD AUTO: 0.4 % — SIGNIFICANT CHANGE UP (ref 0–1.5)
KETONES UR-MCNC: ABNORMAL
LEUKOCYTE ESTERASE UR-ACNC: ABNORMAL
LYMPHOCYTES # BLD AUTO: 1.21 K/UL — SIGNIFICANT CHANGE UP (ref 1–3.3)
LYMPHOCYTES # BLD AUTO: 10.7 % — LOW (ref 13–44)
MCHC RBC-ENTMCNC: 27.1 PG — SIGNIFICANT CHANGE UP (ref 27–34)
MCHC RBC-ENTMCNC: 33.2 GM/DL — SIGNIFICANT CHANGE UP (ref 32–36)
MCV RBC AUTO: 81.4 FL — SIGNIFICANT CHANGE UP (ref 80–100)
MONOCYTES # BLD AUTO: 1.04 K/UL — HIGH (ref 0–0.9)
MONOCYTES NFR BLD AUTO: 9.2 % — SIGNIFICANT CHANGE UP (ref 2–14)
NEUTROPHILS # BLD AUTO: 8.82 K/UL — HIGH (ref 1.8–7.4)
NEUTROPHILS NFR BLD AUTO: 78.4 % — HIGH (ref 43–77)
NITRITE UR-MCNC: NEGATIVE — SIGNIFICANT CHANGE UP
PH UR: 7 — SIGNIFICANT CHANGE UP (ref 5–8)
PLATELET # BLD AUTO: 524 K/UL — HIGH (ref 150–400)
POTASSIUM SERPL-MCNC: 4.1 MMOL/L — SIGNIFICANT CHANGE UP (ref 3.5–5.3)
POTASSIUM SERPL-SCNC: 4.1 MMOL/L — SIGNIFICANT CHANGE UP (ref 3.5–5.3)
PROT UR-MCNC: 15 MG/DL
RBC # BLD: 4.36 M/UL — SIGNIFICANT CHANGE UP (ref 3.8–5.2)
RBC # FLD: 23 % — HIGH (ref 10.3–14.5)
SODIUM SERPL-SCNC: 133 MMOL/L — LOW (ref 135–145)
SP GR SPEC: 1.01 — SIGNIFICANT CHANGE UP (ref 1.01–1.02)
UROBILINOGEN FLD QL: NEGATIVE MG/DL — SIGNIFICANT CHANGE UP
WBC # BLD: 11.27 K/UL — HIGH (ref 3.8–10.5)
WBC # FLD AUTO: 11.27 K/UL — HIGH (ref 3.8–10.5)

## 2020-06-24 PROCEDURE — 99231 SBSQ HOSP IP/OBS SF/LOW 25: CPT

## 2020-06-24 RX ORDER — PHENOL/SODIUM PHENOLATE
2 AEROSOL, SPRAY (ML) MUCOUS MEMBRANE
Refills: 0 | Status: DISCONTINUED | OUTPATIENT
Start: 2020-06-24 | End: 2020-06-25

## 2020-06-24 RX ORDER — HYDROXYUREA 500 MG/1
500 CAPSULE ORAL DAILY
Refills: 0 | Status: DISCONTINUED | OUTPATIENT
Start: 2020-06-25 | End: 2020-06-25

## 2020-06-24 RX ORDER — HYDROCHLOROTHIAZIDE 25 MG
12.5 TABLET ORAL ONCE
Refills: 0 | Status: COMPLETED | OUTPATIENT
Start: 2020-06-24 | End: 2020-06-25

## 2020-06-24 RX ADMIN — ENOXAPARIN SODIUM 40 MILLIGRAM(S): 100 INJECTION SUBCUTANEOUS at 10:30

## 2020-06-24 RX ADMIN — OXYCODONE HYDROCHLORIDE 10 MILLIGRAM(S): 5 TABLET ORAL at 03:22

## 2020-06-24 RX ADMIN — Medication 30 MILLIGRAM(S): at 05:40

## 2020-06-24 RX ADMIN — SODIUM CHLORIDE 150 MILLILITER(S): 9 INJECTION, SOLUTION INTRAVENOUS at 20:34

## 2020-06-24 RX ADMIN — Medication 30 MILLIGRAM(S): at 16:28

## 2020-06-24 RX ADMIN — SODIUM CHLORIDE 150 MILLILITER(S): 9 INJECTION, SOLUTION INTRAVENOUS at 00:47

## 2020-06-24 RX ADMIN — Medication 30 MILLIGRAM(S): at 21:46

## 2020-06-24 RX ADMIN — OXYCODONE HYDROCHLORIDE 10 MILLIGRAM(S): 5 TABLET ORAL at 14:19

## 2020-06-24 RX ADMIN — Medication 400 MILLIGRAM(S): at 10:28

## 2020-06-24 RX ADMIN — Medication 30 MILLIGRAM(S): at 10:30

## 2020-06-24 RX ADMIN — Medication 400 MILLIGRAM(S): at 05:39

## 2020-06-24 RX ADMIN — SODIUM CHLORIDE 150 MILLILITER(S): 9 INJECTION, SOLUTION INTRAVENOUS at 07:26

## 2020-06-24 RX ADMIN — PANTOPRAZOLE SODIUM 40 MILLIGRAM(S): 20 TABLET, DELAYED RELEASE ORAL at 10:30

## 2020-06-24 RX ADMIN — OXYCODONE HYDROCHLORIDE 10 MILLIGRAM(S): 5 TABLET ORAL at 21:46

## 2020-06-24 NOTE — CONSULT NOTE ADULT - PROBLEM SELECTOR RECOMMENDATION 9
Platelets somewhat above normal  Recommendation:  Resume hydroxyurea 500 mg daily once the patient can start taking orals.  Follow up with primary hematologist 2 weeks after discharge
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
## 2020-06-24 NOTE — CONSULT NOTE ADULT - PROBLEM SELECTOR RECOMMENDATION 3
I would agree that the patient should be given Lovenox prophylaxis 40 g daily for 14 days as recommended by anti-coagulation team.

## 2020-06-24 NOTE — PROGRESS NOTE ADULT - ASSESSMENT
s/p lap gastric bypass revision  The patient is status post laparoscopic sleeve gastrectomy and doing very well.    The patient will have an upper G.I. series this morning  gastric bypass clears.  Ambulation.  Pain control.  Incentive spirometer.

## 2020-06-24 NOTE — PROGRESS NOTE ADULT - SUBJECTIVE AND OBJECTIVE BOX
Post Op Day#: 1  Procedure:  Laparoscopic Revision of Gastric Bypass    The patient is doing well without complaints.    Vital Signs Last 24 Hrs  T(C): 36.5 (24 Jun 2020 03:34), Max: 37.2 (23 Jun 2020 12:41)  T(F): 97.7 (24 Jun 2020 03:34), Max: 98.9 (23 Jun 2020 12:41)  HR: 66 (24 Jun 2020 03:34) (61 - 70)  BP: 155/48 (24 Jun 2020 03:34) (125/49 - 155/48)  BP(mean): --  RR: 16 (24 Jun 2020 03:34) (14 - 22)  SpO2: 100% (24 Jun 2020 03:34) (97% - 100%)    PHYSICAL EXAM:  General: NAD.  HEENT: no JVD, no jaundice.  LUNGS: CTAB.  Heart: S1 S2 RRR  Abd: soft nt/nd   Wounds: clean dry and intact                          11.8   11.27 )-----------( 524      ( 24 Jun 2020 06:51 )             35.5       06-24    133<L>  |  100  |  8   ----------------------------<  101<H>  4.1   |  27  |  0.60    Ca    8.0<L>      24 Jun 2020 06:51

## 2020-06-24 NOTE — PROGRESS NOTE ADULT - ASSESSMENT
This is a 59 year old female s/p gastric bypass revision with high risk for VTE due to history of DVT and PE as well as essential thrombocytosis. She is moderate risk for bleeding due to Von Willebrand disease history, watch HH. Recommend starting Lovenox 40- mg Sq daily tonight at 2200 and continue for 14 days post-op.    Plan:  ::Lovenox 40mg SQ daily x 14 days, script sent to pt's pharmacy  ::Daily CBC/BMP  ::Venodynes b/l  ::Enc ambulation      Dispo: Home This is a 59 year old female s/p gastric bypass revision with high risk for VTE due to history of DVT and PE as well as essential thrombocytosis. She is moderate risk for bleeding due to Von Willebrand disease history, watch HH. Recommend starting Lovenox 40- mg Sq daily tonight at 2200 and continue for 14 days post-op.    Plan: PLTS 524: to start Hydroxyurea per Heme Dr Donis  ::cont Lovenox 40mg SQ daily x 14 days, script sent to pt's pharmacy  ::Daily CBC/BMP  ::Venodynes b/l  ::Enc ambulation      Dispo: Home

## 2020-06-25 ENCOUNTER — TRANSCRIPTION ENCOUNTER (OUTPATIENT)
Age: 60
End: 2020-06-25

## 2020-06-25 VITALS
DIASTOLIC BLOOD PRESSURE: 62 MMHG | HEART RATE: 91 BPM | OXYGEN SATURATION: 97 % | TEMPERATURE: 99 F | SYSTOLIC BLOOD PRESSURE: 136 MMHG | RESPIRATION RATE: 16 BRPM

## 2020-06-25 DIAGNOSIS — E66.9 OBESITY, UNSPECIFIED: ICD-10-CM

## 2020-06-25 LAB
ANION GAP SERPL CALC-SCNC: 8 MMOL/L — SIGNIFICANT CHANGE UP (ref 5–17)
BUN SERPL-MCNC: 5 MG/DL — LOW (ref 7–23)
CALCIUM SERPL-MCNC: 8.3 MG/DL — LOW (ref 8.5–10.1)
CHLORIDE SERPL-SCNC: 102 MMOL/L — SIGNIFICANT CHANGE UP (ref 96–108)
CO2 SERPL-SCNC: 25 MMOL/L — SIGNIFICANT CHANGE UP (ref 22–31)
CREAT SERPL-MCNC: 0.41 MG/DL — LOW (ref 0.5–1.3)
GLUCOSE SERPL-MCNC: 72 MG/DL — SIGNIFICANT CHANGE UP (ref 70–99)
HCT VFR BLD CALC: 33.2 % — LOW (ref 34.5–45)
HGB BLD-MCNC: 10.9 G/DL — LOW (ref 11.5–15.5)
MCHC RBC-ENTMCNC: 26.7 PG — LOW (ref 27–34)
MCHC RBC-ENTMCNC: 32.8 GM/DL — SIGNIFICANT CHANGE UP (ref 32–36)
MCV RBC AUTO: 81.4 FL — SIGNIFICANT CHANGE UP (ref 80–100)
PLATELET # BLD AUTO: 518 K/UL — HIGH (ref 150–400)
POTASSIUM SERPL-MCNC: 3.8 MMOL/L — SIGNIFICANT CHANGE UP (ref 3.5–5.3)
POTASSIUM SERPL-SCNC: 3.8 MMOL/L — SIGNIFICANT CHANGE UP (ref 3.5–5.3)
RBC # BLD: 4.08 M/UL — SIGNIFICANT CHANGE UP (ref 3.8–5.2)
RBC # FLD: 23.5 % — HIGH (ref 10.3–14.5)
SODIUM SERPL-SCNC: 135 MMOL/L — SIGNIFICANT CHANGE UP (ref 135–145)
WBC # BLD: 7.8 K/UL — SIGNIFICANT CHANGE UP (ref 3.8–10.5)
WBC # FLD AUTO: 7.8 K/UL — SIGNIFICANT CHANGE UP (ref 3.8–10.5)

## 2020-06-25 PROCEDURE — 99231 SBSQ HOSP IP/OBS SF/LOW 25: CPT

## 2020-06-25 RX ADMIN — Medication 30 MILLIGRAM(S): at 05:01

## 2020-06-25 RX ADMIN — HYDROXYUREA 500 MILLIGRAM(S): 500 CAPSULE ORAL at 09:28

## 2020-06-25 RX ADMIN — Medication 30 MILLIGRAM(S): at 09:29

## 2020-06-25 RX ADMIN — Medication 12.5 MILLIGRAM(S): at 00:09

## 2020-06-25 RX ADMIN — ENOXAPARIN SODIUM 40 MILLIGRAM(S): 100 INJECTION SUBCUTANEOUS at 09:25

## 2020-06-25 RX ADMIN — SODIUM CHLORIDE 150 MILLILITER(S): 9 INJECTION, SOLUTION INTRAVENOUS at 02:36

## 2020-06-25 RX ADMIN — PANTOPRAZOLE SODIUM 40 MILLIGRAM(S): 20 TABLET, DELAYED RELEASE ORAL at 09:26

## 2020-06-25 NOTE — DISCHARGE NOTE PROVIDER - NSDCMRMEDTOKEN_GEN_ALL_CORE_FT
enoxaparin 40 mg/0.4 mL injectable solution: Inject syringe into abdomen once daily as directed by [facility] [phone] MDD:40mg  hydroxyurea 500 mg oral capsule: 1  orally once a day  levothyroxine 88 mcg (0.088 mg) oral tablet: 1 tab(s) orally once a day  omeprazole 20 mg oral delayed release capsule: 1 cap(s) orally 2 times a day  Provigil 100 mg oral tablet: 1 tab(s) orally 2x /day   Therapeutic Multiple Vitamins oral tablet: 1 tab(s) orally once a day  Wellbutrin 100 mg oral tablet: 1 tab(s) orally 2 times a day
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
## 2020-06-25 NOTE — DISCHARGE NOTE NURSING/CASE MANAGEMENT/SOCIAL WORK - PATIENT PORTAL LINK FT
You can access the FollowMyHealth Patient Portal offered by [facility] by registering at the following website: [email or web address]. By joining [facility]’s FollowMyHealth portal, you will also be able to view your health information using other applications (apps) compatible with our system.
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
## 2020-06-25 NOTE — PROGRESS NOTE ADULT - ASSESSMENT
This is a 59 year old female s/p gastric bypass revision with high risk for VTE due to history of DVT and PE as well as essential thrombocytosis. She is moderate risk for bleeding due to Von Willebrand disease history, watch HH. Recommend starting Lovenox 40- mg Sq daily tonight at 2200 and continue for 14 days post-op.    Plan :going home today  ::cont Lovenox 40mg SQ daily x 14 days, script sent to pt's pharmacy, rn to instruct and observe pt self inject, lovenox teaching video provided to pt  cbc/bmp next week, script givent o pt for labs  will f/u with pt weekly while on lovenox  ::Enc ambulation      Dispo: Home

## 2020-06-25 NOTE — DISCHARGE NOTE PROVIDER - HOSPITAL COURSE
Patient is an 61 yo F that underwent laparoscopic gastric bypass revision without any complications. Patient is currently doing very well, pain controlled, and is tolerating phase I bariatric diet. Patient has had uncomplicated hospital course and is stable for discharge home with follow-up in the office in 2 weeks.

## 2020-06-25 NOTE — DISCHARGE NOTE PROVIDER - CARE PROVIDER_API CALL
Praful Rees  SURGERY  [address] [place] [address]  Phone: [phone]  Fax: [phone]  Follow Up Time:

## 2020-06-25 NOTE — DISCHARGE NOTE PROVIDER - NSDCCPCAREPLAN_GEN_ALL_CORE_FT
PRINCIPAL DISCHARGE DIAGNOSIS  Diagnosis: Anastomotic ulcer S/P gastric bypass  Assessment and Plan of Treatment:

## 2020-06-25 NOTE — PROGRESS NOTE ADULT - SUBJECTIVE AND OBJECTIVE BOX
Post Op Day#: 2  Procedure:  Laparoscopic gastric bypass revision     The patient is doing well without complaints.    Vital Signs Last 24 Hrs  T(C): 37 (25 Jun 2020 09:25), Max: 37 (25 Jun 2020 09:25)  T(F): 98.6 (25 Jun 2020 09:25), Max: 98.6 (25 Jun 2020 09:25)  HR: 91 (25 Jun 2020 09:25) (77 - 837)  BP: 136/62 (25 Jun 2020 09:25) (136/62 - 151/51)  BP(mean): 79 (25 Jun 2020 09:25) (79 - 79)  RR: 16 (25 Jun 2020 09:25) (16 - 16)  SpO2: 97% (25 Jun 2020 09:25) (97% - 98%)    PHYSICAL EXAM:  General: NAD.  HEENT: no JVD, no jaundice.  LUNGS: CTAB.  Heart: S1 S2 RRR  Abd: soft nt/nd   Wounds: clean dry and intact                          10.9   7.80  )-----------( 518      ( 25 Jun 2020 06:37 )             33.2       06-25    135  |  102  |  5<L>  ----------------------------<  72  3.8   |  25  |  0.41<L>    Ca    8.3<L>      25 Jun 2020 06:37

## 2020-06-25 NOTE — PROGRESS NOTE ADULT - PROBLEM SELECTOR PLAN 1
The patient is s/p lap gastric bypass revision and doing very well.  All discharge instructions were given to the patient, as well as potential signs of complications.  The patient will follow up in 2 weeks.  [facility]

## 2020-06-29 DIAGNOSIS — Z11.59 ENCOUNTER FOR SCREENING FOR OTHER VIRAL DISEASES: ICD-10-CM

## 2020-06-29 DIAGNOSIS — R13.10 DYSPHAGIA, UNSPECIFIED: ICD-10-CM

## 2020-06-29 DIAGNOSIS — D68.0 VON WILLEBRAND DISEASE: ICD-10-CM

## 2020-06-29 DIAGNOSIS — K21.9 GASTRO-ESOPHAGEAL REFLUX DISEASE WITHOUT ESOPHAGITIS: ICD-10-CM

## 2020-06-29 DIAGNOSIS — F31.9 BIPOLAR DISORDER, UNSPECIFIED: ICD-10-CM

## 2020-06-29 DIAGNOSIS — D47.3 ESSENTIAL (HEMORRHAGIC) THROMBOCYTHEMIA: ICD-10-CM

## 2020-06-29 DIAGNOSIS — E66.9 OBESITY, UNSPECIFIED: ICD-10-CM

## 2020-06-29 DIAGNOSIS — K76.0 FATTY (CHANGE OF) LIVER, NOT ELSEWHERE CLASSIFIED: ICD-10-CM

## 2020-06-29 DIAGNOSIS — K28.7 CHRONIC GASTROJEJUNAL ULCER WITHOUT HEMORRHAGE OR PERFORATION: ICD-10-CM

## 2020-06-29 DIAGNOSIS — E03.9 HYPOTHYROIDISM, UNSPECIFIED: ICD-10-CM

## 2020-06-29 DIAGNOSIS — I10 ESSENTIAL (PRIMARY) HYPERTENSION: ICD-10-CM

## 2020-07-11 ENCOUNTER — INPATIENT (INPATIENT)
Facility: HOSPITAL | Age: 60
LOS: 2 days | Discharge: HOME | DRG: 336 | End: 2020-07-14
Attending: SURGERY | Admitting: SURGERY
Payer: COMMERCIAL

## 2020-07-11 VITALS — HEIGHT: 63 IN | WEIGHT: 147.93 LBS

## 2020-07-11 DIAGNOSIS — Z98.890 OTHER SPECIFIED POSTPROCEDURAL STATES: Chronic | ICD-10-CM

## 2020-07-11 DIAGNOSIS — Z90.711 ACQUIRED ABSENCE OF UTERUS WITH REMAINING CERVICAL STUMP: Chronic | ICD-10-CM

## 2020-07-11 DIAGNOSIS — K56.609 UNSPECIFIED INTESTINAL OBSTRUCTION, UNSPECIFIED AS TO PARTIAL VERSUS COMPLETE OBSTRUCTION: ICD-10-CM

## 2020-07-11 DIAGNOSIS — Z90.89 ACQUIRED ABSENCE OF OTHER ORGANS: Chronic | ICD-10-CM

## 2020-07-11 DIAGNOSIS — Z98.84 BARIATRIC SURGERY STATUS: Chronic | ICD-10-CM

## 2020-07-11 LAB
ALBUMIN SERPL ELPH-MCNC: 4.2 G/DL — SIGNIFICANT CHANGE UP (ref 3.3–5)
ALP SERPL-CCNC: 129 U/L — HIGH (ref 40–120)
ALT FLD-CCNC: 27 U/L — SIGNIFICANT CHANGE UP (ref 12–78)
ANION GAP SERPL CALC-SCNC: 6 MMOL/L — SIGNIFICANT CHANGE UP (ref 5–17)
APTT BLD: 38.8 SEC — HIGH (ref 27.5–35.5)
AST SERPL-CCNC: 11 U/L — LOW (ref 15–37)
BASOPHILS # BLD AUTO: 0.22 K/UL — HIGH (ref 0–0.2)
BASOPHILS NFR BLD AUTO: 1.8 % — SIGNIFICANT CHANGE UP (ref 0–2)
BILIRUB SERPL-MCNC: 0.4 MG/DL — SIGNIFICANT CHANGE UP (ref 0.2–1.2)
BUN SERPL-MCNC: 14 MG/DL — SIGNIFICANT CHANGE UP (ref 7–23)
CALCIUM SERPL-MCNC: 9.6 MG/DL — SIGNIFICANT CHANGE UP (ref 8.5–10.1)
CHLORIDE SERPL-SCNC: 104 MMOL/L — SIGNIFICANT CHANGE UP (ref 96–108)
CO2 SERPL-SCNC: 28 MMOL/L — SIGNIFICANT CHANGE UP (ref 22–31)
CREAT SERPL-MCNC: 0.7 MG/DL — SIGNIFICANT CHANGE UP (ref 0.5–1.3)
EOSINOPHIL # BLD AUTO: 0.42 K/UL — SIGNIFICANT CHANGE UP (ref 0–0.5)
EOSINOPHIL NFR BLD AUTO: 3.3 % — SIGNIFICANT CHANGE UP (ref 0–6)
GLUCOSE SERPL-MCNC: 115 MG/DL — HIGH (ref 70–99)
HCT VFR BLD CALC: 46.5 % — HIGH (ref 34.5–45)
HGB BLD-MCNC: 15.1 G/DL — SIGNIFICANT CHANGE UP (ref 11.5–15.5)
IMM GRANULOCYTES NFR BLD AUTO: 1.2 % — SIGNIFICANT CHANGE UP (ref 0–1.5)
INR BLD: 1.25 RATIO — HIGH (ref 0.88–1.16)
LIDOCAIN IGE QN: 126 U/L — SIGNIFICANT CHANGE UP (ref 73–393)
LYMPHOCYTES # BLD AUTO: 17.4 % — SIGNIFICANT CHANGE UP (ref 13–44)
LYMPHOCYTES # BLD AUTO: 2.18 K/UL — SIGNIFICANT CHANGE UP (ref 1–3.3)
MCHC RBC-ENTMCNC: 27.2 PG — SIGNIFICANT CHANGE UP (ref 27–34)
MCHC RBC-ENTMCNC: 32.5 GM/DL — SIGNIFICANT CHANGE UP (ref 32–36)
MCV RBC AUTO: 83.8 FL — SIGNIFICANT CHANGE UP (ref 80–100)
MONOCYTES # BLD AUTO: 0.91 K/UL — HIGH (ref 0–0.9)
MONOCYTES NFR BLD AUTO: 7.3 % — SIGNIFICANT CHANGE UP (ref 2–14)
NEUTROPHILS # BLD AUTO: 8.66 K/UL — HIGH (ref 1.8–7.4)
NEUTROPHILS NFR BLD AUTO: 69 % — SIGNIFICANT CHANGE UP (ref 43–77)
PLATELET # BLD AUTO: 922 K/UL — HIGH (ref 150–400)
POTASSIUM SERPL-MCNC: 3.8 MMOL/L — SIGNIFICANT CHANGE UP (ref 3.5–5.3)
POTASSIUM SERPL-SCNC: 3.8 MMOL/L — SIGNIFICANT CHANGE UP (ref 3.5–5.3)
PROT SERPL-MCNC: 8 GM/DL — SIGNIFICANT CHANGE UP (ref 6–8.3)
PROTHROM AB SERPL-ACNC: 14.5 SEC — HIGH (ref 10.6–13.6)
RBC # BLD: 5.55 M/UL — HIGH (ref 3.8–5.2)
RBC # FLD: 24.5 % — HIGH (ref 10.3–14.5)
SODIUM SERPL-SCNC: 138 MMOL/L — SIGNIFICANT CHANGE UP (ref 135–145)
TROPONIN I SERPL-MCNC: <0.015 NG/ML — SIGNIFICANT CHANGE UP (ref 0.01–0.04)
WBC # BLD: 12.54 K/UL — HIGH (ref 3.8–10.5)
WBC # FLD AUTO: 12.54 K/UL — HIGH (ref 3.8–10.5)

## 2020-07-11 PROCEDURE — 36415 COLL VENOUS BLD VENIPUNCTURE: CPT

## 2020-07-11 PROCEDURE — 93010 ELECTROCARDIOGRAM REPORT: CPT

## 2020-07-11 PROCEDURE — 71045 X-RAY EXAM CHEST 1 VIEW: CPT | Mod: 26

## 2020-07-11 PROCEDURE — C9113: CPT

## 2020-07-11 PROCEDURE — 86769 SARS-COV-2 COVID-19 ANTIBODY: CPT

## 2020-07-11 PROCEDURE — 85027 COMPLETE CBC AUTOMATED: CPT

## 2020-07-11 PROCEDURE — 74176 CT ABD & PELVIS W/O CONTRAST: CPT | Mod: 26

## 2020-07-11 PROCEDURE — 84100 ASSAY OF PHOSPHORUS: CPT

## 2020-07-11 PROCEDURE — 81001 URINALYSIS AUTO W/SCOPE: CPT

## 2020-07-11 PROCEDURE — 86803 HEPATITIS C AB TEST: CPT

## 2020-07-11 PROCEDURE — 80048 BASIC METABOLIC PNL TOTAL CA: CPT

## 2020-07-11 PROCEDURE — 83735 ASSAY OF MAGNESIUM: CPT

## 2020-07-11 RX ORDER — HYDROMORPHONE HYDROCHLORIDE 2 MG/ML
1 INJECTION INTRAMUSCULAR; INTRAVENOUS; SUBCUTANEOUS ONCE
Refills: 0 | Status: DISCONTINUED | OUTPATIENT
Start: 2020-07-11 | End: 2020-07-11

## 2020-07-11 RX ORDER — ONDANSETRON 8 MG/1
8 TABLET, FILM COATED ORAL ONCE
Refills: 0 | Status: COMPLETED | OUTPATIENT
Start: 2020-07-11 | End: 2020-07-11

## 2020-07-11 RX ORDER — SODIUM CHLORIDE 9 MG/ML
2100 INJECTION, SOLUTION INTRAVENOUS ONCE
Refills: 0 | Status: COMPLETED | OUTPATIENT
Start: 2020-07-11 | End: 2020-07-11

## 2020-07-11 RX ORDER — PANTOPRAZOLE SODIUM 20 MG/1
40 TABLET, DELAYED RELEASE ORAL ONCE
Refills: 0 | Status: COMPLETED | OUTPATIENT
Start: 2020-07-11 | End: 2020-07-11

## 2020-07-11 RX ADMIN — SODIUM CHLORIDE 2100 MILLILITER(S): 9 INJECTION, SOLUTION INTRAVENOUS at 21:53

## 2020-07-11 RX ADMIN — ONDANSETRON 8 MILLIGRAM(S): 8 TABLET, FILM COATED ORAL at 21:51

## 2020-07-11 RX ADMIN — HYDROMORPHONE HYDROCHLORIDE 1 MILLIGRAM(S): 2 INJECTION INTRAMUSCULAR; INTRAVENOUS; SUBCUTANEOUS at 22:46

## 2020-07-11 RX ADMIN — PANTOPRAZOLE SODIUM 40 MILLIGRAM(S): 20 TABLET, DELAYED RELEASE ORAL at 21:52

## 2020-07-11 RX ADMIN — HYDROMORPHONE HYDROCHLORIDE 1 MILLIGRAM(S): 2 INJECTION INTRAMUSCULAR; INTRAVENOUS; SUBCUTANEOUS at 21:48

## 2020-07-11 NOTE — ED ADULT NURSE NOTE - NSIMPLEMENTINTERV_GEN_ALL_ED
Implemented All Universal Safety Interventions:  [place] to call system. Call bell, personal items and telephone within reach. Instruct patient to call for assistance. Room bathroom lighting operational. Non-slip footwear when patient is off stretcher. Physically safe environment: no spills, clutter or unnecessary equipment. Stretcher in lowest position, wheels locked, appropriate side rails in place.

## 2020-07-11 NOTE — ED STATDOCS - PSH
H/O cardiac radiofrequency ablation  2001  H/O colonoscopy  3/4/2020  H/O endoscopy  2/24/2020 3/4/2020  History of partial hysterectomy  due to fibroids 2001  History of tonsillectomy  1970's  S/P gastric bypass  2015, Laparoscopic

## 2020-07-11 NOTE — ED ADULT NURSE REASSESSMENT NOTE - NS ED NURSE REASSESS COMMENT FT1
Verbal order received for 1mg Dilaudid administration due to increased pain as per pt's request with oral solution for ct abd, verbal order received and administered as per , safety maintained. will continue to monitor.

## 2020-07-11 NOTE — ED ADULT NURSE REASSESSMENT NOTE - NS ED NURSE REASSESS COMMENT FT1
Pt unable to finish 2nd drink while in ct, pt started vomiting multiple times, ct performed and sent back to pt room,  at bedside and all recommendations considered with pt, plan for OR this evening, safety maintained, will continue to monitor.

## 2020-07-11 NOTE — ED STATDOCS - PROGRESS NOTE DETAILS
Catina ANDERSON for ED attending, Dr. Garza: 61 y/o female with PMHx of gastric bypass in 2015 now post op 2 weeks s/p revision of gastric bypass with Dr. Rees presents to the ED c/o 10/10 +abd pain described as pressure today. Was able to have a BM. Denies SOB or chest pain. Has been experiencing +abd bloating and +nausea the past few days as well. Called Dr. Rees, was aware she was coming to ED. Pt drove herself to the ER. Took Zofran and Ativan today without relief. Will send pt to main ED for further evaluation. JG:  Spoke to Dr. Rees who wants CT abd/pel ordered.  Aware patient is allergic to IV contrast.  Wants patient to take oral contrast; 1/2 a cup 30 min prior to CT and then 1/2 cup on CT table.  Order placed.  Dr. Rees coming to see patient shortly.

## 2020-07-11 NOTE — ED PROVIDER NOTE - CLINICAL SUMMARY MEDICAL DECISION MAKING FREE TEXT BOX
61 y/o female presents with abd pain. Plan: IV with oral contrast, pain control, anti-emetics, labs, CXR, EKG.

## 2020-07-11 NOTE — ED PROVIDER NOTE - OBJECTIVE STATEMENT
59 y/o female with PMHx of obesity, HTN, vertigo, cervical herniated disc, manic depression, fatty liver, GERD, heart murmur, DVT, PE, Von Willebrand disease, hypothyroidism, gastric bypass presents to the ED c/o abd pain. Pt states she took Zofran PTA. Pain described as 10/10, pressure in general abd region. Endorses nausea, bloating, vomiting. GI: Dr. Rees, performed bypass, 6/23/20. Yesterday, pt c/o stomach spasms, vomiting. + bowel movement this morning, pt states she has been having black stools x6 months. 61 y/o female with PMHx of obesity, HTN, vertigo, cervical herniated disc, manic depression, fatty liver, GERD, heart murmur, DVT, PE, Von Willebrand disease, hypothyroidism, ventricular tachycardia, s/p gastric bypass presents to the ED c/o abd pain. Pt states she took Zofran PTA. Pain described as 10/10, pressure in general abd region. Endorses nausea, bloating, vomiting. GI: Dr. Rees, performed bypass, 6/23/20. Yesterday, pt c/o stomach spasms, vomiting. + bowel movement this morning, pt states she has been having black stools x6 months.

## 2020-07-11 NOTE — ED ADULT NURSE NOTE - OBJECTIVE STATEMENT
Pt presents to er with complaints of 10/10 abd pain, pain started yesterday and became significantly worse today, states she had gastric bypass in 2015 and had a revision June 23rd with , pt found in fetal position upon entrance into pt's room, Dilaudid 1mg, [id number] and [id number] administered as per md order, pt states significant relief of pain at this time with administration of pain meds with 0/10 pain to abd, states she was unable to tolerate fluids or food intake throughout the day, safety maintained,  at bedside at this time for pt assessment, will continue to monitor.

## 2020-07-11 NOTE — ED ADULT TRIAGE NOTE - CHIEF COMPLAINT QUOTE
Pt presents to ED c/o abd pain. Pt not willing to give much information in triage due to pain. +NVD. States she took Zofran PTA
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
## 2020-07-12 DIAGNOSIS — K56.609 UNSPECIFIED INTESTINAL OBSTRUCTION, UNSPECIFIED AS TO PARTIAL VERSUS COMPLETE OBSTRUCTION: ICD-10-CM

## 2020-07-12 LAB
ANION GAP SERPL CALC-SCNC: 4 MMOL/L — LOW (ref 5–17)
BUN SERPL-MCNC: 12 MG/DL — SIGNIFICANT CHANGE UP (ref 7–23)
CALCIUM SERPL-MCNC: 8.4 MG/DL — LOW (ref 8.5–10.1)
CHLORIDE SERPL-SCNC: 105 MMOL/L — SIGNIFICANT CHANGE UP (ref 96–108)
CO2 SERPL-SCNC: 27 MMOL/L — SIGNIFICANT CHANGE UP (ref 22–31)
CREAT SERPL-MCNC: 0.67 MG/DL — SIGNIFICANT CHANGE UP (ref 0.5–1.3)
GLUCOSE SERPL-MCNC: 120 MG/DL — HIGH (ref 70–99)
HCT VFR BLD CALC: 39 % — SIGNIFICANT CHANGE UP (ref 34.5–45)
HCV AB S/CO SERPL IA: 0.15 S/CO — SIGNIFICANT CHANGE UP (ref 0–0.99)
HCV AB SERPL-IMP: SIGNIFICANT CHANGE UP
HGB BLD-MCNC: 12.8 G/DL — SIGNIFICANT CHANGE UP (ref 11.5–15.5)
MCHC RBC-ENTMCNC: 27.6 PG — SIGNIFICANT CHANGE UP (ref 27–34)
MCHC RBC-ENTMCNC: 32.8 GM/DL — SIGNIFICANT CHANGE UP (ref 32–36)
MCV RBC AUTO: 84.1 FL — SIGNIFICANT CHANGE UP (ref 80–100)
PLATELET # BLD AUTO: 688 K/UL — HIGH (ref 150–400)
POTASSIUM SERPL-MCNC: 4.3 MMOL/L — SIGNIFICANT CHANGE UP (ref 3.5–5.3)
POTASSIUM SERPL-SCNC: 4.3 MMOL/L — SIGNIFICANT CHANGE UP (ref 3.5–5.3)
RBC # BLD: 4.64 M/UL — SIGNIFICANT CHANGE UP (ref 3.8–5.2)
RBC # FLD: 24 % — HIGH (ref 10.3–14.5)
SARS-COV-2 IGG SERPL QL IA: NEGATIVE — SIGNIFICANT CHANGE UP
SARS-COV-2 IGM SERPL IA-ACNC: 0.09 INDEX — SIGNIFICANT CHANGE UP
SARS-COV-2 RNA SPEC QL NAA+PROBE: SIGNIFICANT CHANGE UP
SODIUM SERPL-SCNC: 136 MMOL/L — SIGNIFICANT CHANGE UP (ref 135–145)
WBC # BLD: 11.66 K/UL — HIGH (ref 3.8–10.5)
WBC # FLD AUTO: 11.66 K/UL — HIGH (ref 3.8–10.5)

## 2020-07-12 PROCEDURE — 99223 1ST HOSP IP/OBS HIGH 75: CPT

## 2020-07-12 RX ORDER — ACETAMINOPHEN 500 MG
1000 TABLET ORAL ONCE
Refills: 0 | Status: COMPLETED | OUTPATIENT
Start: 2020-07-12 | End: 2020-07-12

## 2020-07-12 RX ORDER — SODIUM CHLORIDE 9 MG/ML
1000 INJECTION, SOLUTION INTRAVENOUS
Refills: 0 | Status: DISCONTINUED | OUTPATIENT
Start: 2020-07-12 | End: 2020-07-12

## 2020-07-12 RX ORDER — OXYCODONE AND ACETAMINOPHEN 5; 325 MG/1; MG/1
2 TABLET ORAL EVERY 4 HOURS
Refills: 0 | Status: DISCONTINUED | OUTPATIENT
Start: 2020-07-12 | End: 2020-07-14

## 2020-07-12 RX ORDER — PANTOPRAZOLE SODIUM 20 MG/1
40 TABLET, DELAYED RELEASE ORAL DAILY
Refills: 0 | Status: DISCONTINUED | OUTPATIENT
Start: 2020-07-12 | End: 2020-07-14

## 2020-07-12 RX ORDER — ENOXAPARIN SODIUM 100 MG/ML
40 INJECTION SUBCUTANEOUS
Qty: 14 | Refills: 0
Start: 2020-07-12 | End: 2020-07-25

## 2020-07-12 RX ORDER — FENTANYL CITRATE 50 UG/ML
50 INJECTION INTRAVENOUS
Refills: 0 | Status: DISCONTINUED | OUTPATIENT
Start: 2020-07-12 | End: 2020-07-12

## 2020-07-12 RX ORDER — MORPHINE SULFATE 50 MG/1
2 CAPSULE, EXTENDED RELEASE ORAL EVERY 4 HOURS
Refills: 0 | Status: DISCONTINUED | OUTPATIENT
Start: 2020-07-12 | End: 2020-07-14

## 2020-07-12 RX ORDER — ONDANSETRON 8 MG/1
4 TABLET, FILM COATED ORAL EVERY 6 HOURS
Refills: 0 | Status: DISCONTINUED | OUTPATIENT
Start: 2020-07-12 | End: 2020-07-14

## 2020-07-12 RX ORDER — ACETAMINOPHEN 500 MG
1000 TABLET ORAL ONCE
Refills: 0 | Status: DISCONTINUED | OUTPATIENT
Start: 2020-07-12 | End: 2020-07-12

## 2020-07-12 RX ORDER — PROCHLORPERAZINE MALEATE 5 MG
5 TABLET ORAL ONCE
Refills: 0 | Status: DISCONTINUED | OUTPATIENT
Start: 2020-07-12 | End: 2020-07-12

## 2020-07-12 RX ORDER — SODIUM CHLORIDE 9 MG/ML
1000 INJECTION, SOLUTION INTRAVENOUS
Refills: 0 | Status: DISCONTINUED | OUTPATIENT
Start: 2020-07-12 | End: 2020-07-14

## 2020-07-12 RX ORDER — HYDROMORPHONE HYDROCHLORIDE 2 MG/ML
1 INJECTION INTRAMUSCULAR; INTRAVENOUS; SUBCUTANEOUS
Refills: 0 | Status: DISCONTINUED | OUTPATIENT
Start: 2020-07-12 | End: 2020-07-12

## 2020-07-12 RX ORDER — PROCHLORPERAZINE MALEATE 5 MG
5 TABLET ORAL ONCE
Refills: 0 | Status: COMPLETED | OUTPATIENT
Start: 2020-07-12 | End: 2020-07-12

## 2020-07-12 RX ORDER — CALCIUM GLUCONATE 100 MG/ML
2 VIAL (ML) INTRAVENOUS ONCE
Refills: 0 | Status: COMPLETED | OUTPATIENT
Start: 2020-07-12 | End: 2020-07-12

## 2020-07-12 RX ORDER — BENZOCAINE AND MENTHOL 5; 1 G/100ML; G/100ML
1 LIQUID ORAL
Refills: 0 | Status: DISCONTINUED | OUTPATIENT
Start: 2020-07-12 | End: 2020-07-14

## 2020-07-12 RX ORDER — DESMOPRESSIN ACETATE 0.1 MG/1
20 TABLET ORAL ONCE
Refills: 0 | Status: COMPLETED | OUTPATIENT
Start: 2020-07-12 | End: 2020-07-12

## 2020-07-12 RX ORDER — OXYCODONE AND ACETAMINOPHEN 5; 325 MG/1; MG/1
1 TABLET ORAL EVERY 4 HOURS
Refills: 0 | Status: DISCONTINUED | OUTPATIENT
Start: 2020-07-12 | End: 2020-07-14

## 2020-07-12 RX ORDER — ONDANSETRON 8 MG/1
4 TABLET, FILM COATED ORAL ONCE
Refills: 0 | Status: COMPLETED | OUTPATIENT
Start: 2020-07-12 | End: 2020-07-12

## 2020-07-12 RX ORDER — ACETAMINOPHEN 500 MG
1000 TABLET ORAL EVERY 6 HOURS
Refills: 0 | Status: COMPLETED | OUTPATIENT
Start: 2020-07-12 | End: 2020-07-13

## 2020-07-12 RX ORDER — LEVOTHYROXINE SODIUM 125 MCG
44 TABLET ORAL AT BEDTIME
Refills: 0 | Status: DISCONTINUED | OUTPATIENT
Start: 2020-07-12 | End: 2020-07-14

## 2020-07-12 RX ORDER — HYDROMORPHONE HYDROCHLORIDE 2 MG/ML
1 INJECTION INTRAMUSCULAR; INTRAVENOUS; SUBCUTANEOUS ONCE
Refills: 0 | Status: DISCONTINUED | OUTPATIENT
Start: 2020-07-12 | End: 2020-07-12

## 2020-07-12 RX ORDER — ENOXAPARIN SODIUM 100 MG/ML
40 INJECTION SUBCUTANEOUS DAILY
Refills: 0 | Status: DISCONTINUED | OUTPATIENT
Start: 2020-07-12 | End: 2020-07-14

## 2020-07-12 RX ORDER — MORPHINE SULFATE 50 MG/1
2 CAPSULE, EXTENDED RELEASE ORAL EVERY 6 HOURS
Refills: 0 | Status: DISCONTINUED | OUTPATIENT
Start: 2020-07-12 | End: 2020-07-12

## 2020-07-12 RX ADMIN — BENZOCAINE AND MENTHOL 1 LOZENGE: 5; 1 LIQUID ORAL at 16:00

## 2020-07-12 RX ADMIN — SODIUM CHLORIDE 125 MILLILITER(S): 9 INJECTION, SOLUTION INTRAVENOUS at 12:23

## 2020-07-12 RX ADMIN — SODIUM CHLORIDE 100 MILLILITER(S): 9 INJECTION, SOLUTION INTRAVENOUS at 00:51

## 2020-07-12 RX ADMIN — MORPHINE SULFATE 2 MILLIGRAM(S): 50 CAPSULE, EXTENDED RELEASE ORAL at 04:27

## 2020-07-12 RX ADMIN — ONDANSETRON 4 MILLIGRAM(S): 8 TABLET, FILM COATED ORAL at 00:50

## 2020-07-12 RX ADMIN — HYDROMORPHONE HYDROCHLORIDE 1 MILLIGRAM(S): 2 INJECTION INTRAMUSCULAR; INTRAVENOUS; SUBCUTANEOUS at 00:50

## 2020-07-12 RX ADMIN — Medication 200 GRAM(S): at 12:23

## 2020-07-12 RX ADMIN — Medication 400 MILLIGRAM(S): at 19:38

## 2020-07-12 RX ADMIN — Medication 400 MILLIGRAM(S): at 00:51

## 2020-07-12 RX ADMIN — OXYCODONE AND ACETAMINOPHEN 1 TABLET(S): 5; 325 TABLET ORAL at 23:22

## 2020-07-12 RX ADMIN — ONDANSETRON 4 MILLIGRAM(S): 8 TABLET, FILM COATED ORAL at 03:52

## 2020-07-12 RX ADMIN — ENOXAPARIN SODIUM 40 MILLIGRAM(S): 100 INJECTION SUBCUTANEOUS at 12:22

## 2020-07-12 RX ADMIN — SODIUM CHLORIDE 75 MILLILITER(S): 9 INJECTION, SOLUTION INTRAVENOUS at 23:24

## 2020-07-12 RX ADMIN — Medication 5 MILLIGRAM(S): at 16:16

## 2020-07-12 RX ADMIN — Medication 400 MILLIGRAM(S): at 05:17

## 2020-07-12 RX ADMIN — Medication 44 MICROGRAM(S): at 23:24

## 2020-07-12 RX ADMIN — Medication 400 MILLIGRAM(S): at 12:23

## 2020-07-12 RX ADMIN — MORPHINE SULFATE 2 MILLIGRAM(S): 50 CAPSULE, EXTENDED RELEASE ORAL at 14:19

## 2020-07-12 RX ADMIN — DESMOPRESSIN ACETATE 220 MICROGRAM(S): 0.1 TABLET ORAL at 01:12

## 2020-07-12 RX ADMIN — PANTOPRAZOLE SODIUM 40 MILLIGRAM(S): 20 TABLET, DELAYED RELEASE ORAL at 12:23

## 2020-07-12 RX ADMIN — ONDANSETRON 4 MILLIGRAM(S): 8 TABLET, FILM COATED ORAL at 14:15
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
## 2020-07-12 NOTE — BRIEF OPERATIVE NOTE - OPERATION/FINDINGS
Band adhesion noted between maria g and BP limb  Band freed with Endo cheo   Mesenteric hematoma noted and evacuated   Ran bowel without any other pathology noted  PACU in stable condition

## 2020-07-12 NOTE — ED ADULT NURSE REASSESSMENT NOTE - NS ED NURSE REASSESS COMMENT FT1
Order clarified with  and  for 3rd mg of Dilaudid at this time, both physicians state to give 3rdmg of Dilaudid at this time, safety maintained, respiratory status prior to administration stable, will continue to monitor.

## 2020-07-12 NOTE — H&P ADULT - NSHPPHYSICALEXAM_GEN_ALL_CORE
PHYSICAL EXAM:  Constitutional: NAD, GCS: 15/15  AOX3  Eyes:  WNL  ENMT:  WNL  Neck:  WNL, non tender  Back: Non tender  Respiratory: CTABL  Cardiovascular:  S1+S2+0  Gastrointestinal: Soft, tender to palpation, no rgr  Genitourinary:  WNL  Extremities: NV intact  Vascular:  Intact  Neurological: No focal neurological deficit,  CN, motor and sensory system grossly intact.  Skin: WNL  Musculoskeletal: WNL  Psychiatric: Grossly WNL                          15.1   12.54 )-----------( 922      ( 11 Jul 2020 22:01 )             46.5     07-11    138  |  104  |  14  ----------------------------<  115<H>  3.8   |  28  |  0.70    Ca    9.6      11 Jul 2020 22:01    TPro  8.0  /  Alb  4.2  /  TBili  0.4  /  DBili  x   /  AST  11<L>  /  ALT  27  /  AlkPhos  129<H>  07-11

## 2020-07-12 NOTE — H&P ADULT - ASSESSMENT
Pt is a 60 year old female with multiple medical comorbidities s/p robotic gastric bypass and revision now with likely omega loop obstruction     Admit to Dr. Rees, surgery attending for Dx laparoscopy tonight   Diet npo  ivf  pain control prn  anti emetic prn  obtain pre operative labs  xr chest and ekg  obtain rapid COVID19 PCR  dvt ppx    Discussed plan with Dr. Rees, surgery attending

## 2020-07-12 NOTE — ED ADULT NURSE REASSESSMENT NOTE - NS ED NURSE REASSESS COMMENT FT1
All belongings collected and counted by myself and Elyssa tech, sent to security and labels placed on all belongings.

## 2020-07-12 NOTE — BRIEF OPERATIVE NOTE - NSICDXBRIEFPROCEDURE_GEN_ALL_CORE_FT
PROCEDURES:  Laparoscopic lysis of abdominal adhesions 12-Jul-2020 03:00:25  CO[facility]IN [facility]  Diagnostic laparoscopy 12-Jul-2020 03:00:07  COLLIN HOLCOM[facility]

## 2020-07-12 NOTE — PROGRESS NOTE ADULT - PROBLEM SELECTOR PLAN 1
- Adv. diet to clears  - Pain control  - Anticoagulation consult pending  - Ca repletion   - ambulate  - GI ppx  - DVT ppx

## 2020-07-12 NOTE — CONSULT NOTE ADULT - ASSESSMENT
This is a 60 year old female with pmh of obesity, HTN, vertigo, cervical herniated disc, manic depression, fatty liver, GERD, heart murmur, DVT, PE, Von Willebrand disease, Essential thrombocytosis, hypothyroidism, ventricular tachycardia, s/p REVISION OF gastric bypass ON 6- presents to the ED c/o abd pain.   Pt found to have a Small bowel obstruction with transition point in the left upper quadrant, just inferior to surgical anastomotic sutures, probably related to postoperative adhesions. Mild mesenteric edema and interloop fluid. PT now s/p Laparoscopic lysis of abdominal adhesions  with a Diagnostic laparoscopy on 12-Jul-2020.  Pt has high thrombosis risk  and requires anticoagulation prophylaxis.     7- Spoke with pt and she is agreeable to use Lovenox as her anticoagulation medication.  I discussed case with Dr Kowalski and he agrees with using Lovenox      Plan:  :Lovenox 40mg sq daily for 2 weeks post procedure.  Will monitor h/h  :daily cbc/bmp  :LE Venodynes  : increase mobility as tolerated  :Thanks for consult will f/u
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
## 2020-07-13 LAB
APPEARANCE UR: CLEAR — SIGNIFICANT CHANGE UP
BILIRUB UR-MCNC: NEGATIVE — SIGNIFICANT CHANGE UP
COLOR SPEC: YELLOW — SIGNIFICANT CHANGE UP
DIFF PNL FLD: ABNORMAL
GLUCOSE UR QL: NEGATIVE MG/DL — SIGNIFICANT CHANGE UP
HCT VFR BLD CALC: 35.8 % — SIGNIFICANT CHANGE UP (ref 34.5–45)
HGB BLD-MCNC: 11.8 G/DL — SIGNIFICANT CHANGE UP (ref 11.5–15.5)
KETONES UR-MCNC: ABNORMAL
LEUKOCYTE ESTERASE UR-ACNC: ABNORMAL
MCHC RBC-ENTMCNC: 27.5 PG — SIGNIFICANT CHANGE UP (ref 27–34)
MCHC RBC-ENTMCNC: 33 GM/DL — SIGNIFICANT CHANGE UP (ref 32–36)
MCV RBC AUTO: 83.4 FL — SIGNIFICANT CHANGE UP (ref 80–100)
NITRITE UR-MCNC: NEGATIVE — SIGNIFICANT CHANGE UP
PH UR: 7 — SIGNIFICANT CHANGE UP (ref 5–8)
PLATELET # BLD AUTO: 586 K/UL — HIGH (ref 150–400)
PROT UR-MCNC: NEGATIVE MG/DL — SIGNIFICANT CHANGE UP
RBC # BLD: 4.29 M/UL — SIGNIFICANT CHANGE UP (ref 3.8–5.2)
RBC # FLD: 23.5 % — HIGH (ref 10.3–14.5)
SP GR SPEC: 1.01 — SIGNIFICANT CHANGE UP (ref 1.01–1.02)
UROBILINOGEN FLD QL: NEGATIVE MG/DL — SIGNIFICANT CHANGE UP
WBC # BLD: 11.6 K/UL — HIGH (ref 3.8–10.5)
WBC # FLD AUTO: 11.6 K/UL — HIGH (ref 3.8–10.5)

## 2020-07-13 PROCEDURE — 99231 SBSQ HOSP IP/OBS SF/LOW 25: CPT

## 2020-07-13 RX ORDER — ACETAMINOPHEN 500 MG
1000 TABLET ORAL ONCE
Refills: 0 | Status: COMPLETED | OUTPATIENT
Start: 2020-07-13 | End: 2020-07-13

## 2020-07-13 RX ORDER — KETOROLAC TROMETHAMINE 30 MG/ML
30 SYRINGE (ML) INJECTION EVERY 6 HOURS
Refills: 0 | Status: DISCONTINUED | OUTPATIENT
Start: 2020-07-13 | End: 2020-07-14

## 2020-07-13 RX ORDER — MORPHINE SULFATE 50 MG/1
2 CAPSULE, EXTENDED RELEASE ORAL ONCE
Refills: 0 | Status: DISCONTINUED | OUTPATIENT
Start: 2020-07-13 | End: 2020-07-13

## 2020-07-13 RX ADMIN — MORPHINE SULFATE 2 MILLIGRAM(S): 50 CAPSULE, EXTENDED RELEASE ORAL at 04:20

## 2020-07-13 RX ADMIN — Medication 400 MILLIGRAM(S): at 01:24

## 2020-07-13 RX ADMIN — MORPHINE SULFATE 2 MILLIGRAM(S): 50 CAPSULE, EXTENDED RELEASE ORAL at 05:44

## 2020-07-13 RX ADMIN — MORPHINE SULFATE 2 MILLIGRAM(S): 50 CAPSULE, EXTENDED RELEASE ORAL at 22:06

## 2020-07-13 RX ADMIN — ENOXAPARIN SODIUM 40 MILLIGRAM(S): 100 INJECTION SUBCUTANEOUS at 09:25

## 2020-07-13 RX ADMIN — Medication 400 MILLIGRAM(S): at 06:39

## 2020-07-13 RX ADMIN — PANTOPRAZOLE SODIUM 40 MILLIGRAM(S): 20 TABLET, DELAYED RELEASE ORAL at 09:26

## 2020-07-13 RX ADMIN — Medication 30 MILLIGRAM(S): at 17:35

## 2020-07-13 RX ADMIN — Medication 30 MILLIGRAM(S): at 23:45

## 2020-07-13 RX ADMIN — Medication 44 MICROGRAM(S): at 22:07

## 2020-07-13 RX ADMIN — MORPHINE SULFATE 2 MILLIGRAM(S): 50 CAPSULE, EXTENDED RELEASE ORAL at 09:25

## 2020-07-13 RX ADMIN — ONDANSETRON 4 MILLIGRAM(S): 8 TABLET, FILM COATED ORAL at 04:21

## 2020-07-13 RX ADMIN — SODIUM CHLORIDE 75 MILLILITER(S): 9 INJECTION, SOLUTION INTRAVENOUS at 23:46

## 2020-07-13 RX ADMIN — Medication 30 MILLIGRAM(S): at 11:06

## 2020-07-14 ENCOUNTER — TRANSCRIPTION ENCOUNTER (OUTPATIENT)
Age: 60
End: 2020-07-14

## 2020-07-14 VITALS
SYSTOLIC BLOOD PRESSURE: 137 MMHG | RESPIRATION RATE: 18 BRPM | TEMPERATURE: 97 F | DIASTOLIC BLOOD PRESSURE: 47 MMHG | HEART RATE: 62 BPM | OXYGEN SATURATION: 96 %

## 2020-07-14 LAB
ANION GAP SERPL CALC-SCNC: 7 MMOL/L — SIGNIFICANT CHANGE UP (ref 5–17)
BUN SERPL-MCNC: 6 MG/DL — LOW (ref 7–23)
CALCIUM SERPL-MCNC: 8.7 MG/DL — SIGNIFICANT CHANGE UP (ref 8.5–10.1)
CHLORIDE SERPL-SCNC: 109 MMOL/L — HIGH (ref 96–108)
CO2 SERPL-SCNC: 26 MMOL/L — SIGNIFICANT CHANGE UP (ref 22–31)
CREAT SERPL-MCNC: 0.46 MG/DL — LOW (ref 0.5–1.3)
GLUCOSE SERPL-MCNC: 84 MG/DL — SIGNIFICANT CHANGE UP (ref 70–99)
HCT VFR BLD CALC: 37.6 % — SIGNIFICANT CHANGE UP (ref 34.5–45)
HGB BLD-MCNC: 12.4 G/DL — SIGNIFICANT CHANGE UP (ref 11.5–15.5)
MAGNESIUM SERPL-MCNC: 2.1 MG/DL — SIGNIFICANT CHANGE UP (ref 1.6–2.6)
MCHC RBC-ENTMCNC: 27.9 PG — SIGNIFICANT CHANGE UP (ref 27–34)
MCHC RBC-ENTMCNC: 33 GM/DL — SIGNIFICANT CHANGE UP (ref 32–36)
MCV RBC AUTO: 84.7 FL — SIGNIFICANT CHANGE UP (ref 80–100)
PHOSPHATE SERPL-MCNC: 3.3 MG/DL — SIGNIFICANT CHANGE UP (ref 2.5–4.5)
PLATELET # BLD AUTO: 631 K/UL — HIGH (ref 150–400)
POTASSIUM SERPL-MCNC: 3.7 MMOL/L — SIGNIFICANT CHANGE UP (ref 3.5–5.3)
POTASSIUM SERPL-SCNC: 3.7 MMOL/L — SIGNIFICANT CHANGE UP (ref 3.5–5.3)
RBC # BLD: 4.44 M/UL — SIGNIFICANT CHANGE UP (ref 3.8–5.2)
RBC # FLD: 23.9 % — HIGH (ref 10.3–14.5)
SODIUM SERPL-SCNC: 142 MMOL/L — SIGNIFICANT CHANGE UP (ref 135–145)
WBC # BLD: 6.06 K/UL — SIGNIFICANT CHANGE UP (ref 3.8–10.5)
WBC # FLD AUTO: 6.06 K/UL — SIGNIFICANT CHANGE UP (ref 3.8–10.5)

## 2020-07-14 PROCEDURE — 99231 SBSQ HOSP IP/OBS SF/LOW 25: CPT

## 2020-07-14 RX ORDER — ACETAMINOPHEN 500 MG
1000 TABLET ORAL ONCE
Refills: 0 | Status: COMPLETED | OUTPATIENT
Start: 2020-07-14 | End: 2020-07-14

## 2020-07-14 RX ADMIN — ENOXAPARIN SODIUM 40 MILLIGRAM(S): 100 INJECTION SUBCUTANEOUS at 09:09

## 2020-07-14 RX ADMIN — OXYCODONE AND ACETAMINOPHEN 1 TABLET(S): 5; 325 TABLET ORAL at 09:12

## 2020-07-14 RX ADMIN — Medication 400 MILLIGRAM(S): at 01:04

## 2020-07-14 RX ADMIN — Medication 30 MILLIGRAM(S): at 05:33

## 2020-07-14 RX ADMIN — OXYCODONE AND ACETAMINOPHEN 2 TABLET(S): 5; 325 TABLET ORAL at 13:22

## 2020-07-14 RX ADMIN — OXYCODONE AND ACETAMINOPHEN 1 TABLET(S): 5; 325 TABLET ORAL at 09:08

## 2020-07-14 NOTE — DISCHARGE NOTE PROVIDER - CARE PROVIDER_API CALL
Praful Rees  SURGERY  [address] [place] [address]  Phone: [phone]  Fax: [phone]  Follow Up Time:

## 2020-07-14 NOTE — PROGRESS NOTE ADULT - SUBJECTIVE AND OBJECTIVE BOX
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 24 Hrs  T(C): 36.4 (13 Jul 2020 08:12), Max: 36.7 (12 Jul 2020 16:28)  T(F): 97.6 (13 Jul 2020 08:12), Max: 98 (12 Jul 2020 16:28)  HR: 66 (13 Jul 2020 08:12) (61 - 66)  BP: 146/52 (13 Jul 2020 08:12) (137/47 - 153/51)  BP(mean): --  RR: 16 (13 Jul 2020 08:12) (16 - 18)  SpO2: 95% (13 Jul 2020 08:12) (95% - 98%)    PHYSICAL EXAM:  General: NAD.  HEENT: no JVD, no jaundice.  LUNGS: CTAB.  Heart: S1 S2 RRR  Abd: soft nt/nd   Wounds: clean dry and intact                          12.8   11.66 )-----------( 688      ( 12 Jul 2020 07:12 )             39.0       07-12    136  |  105  |  12  ----------------------------<  120<H>  4.3   |  27  |  0.67    Ca    8.4<L>      12 Jul 2020 07:12    TPro  8.0  /  Alb  4.2  /  TBili  0.4  /  DBili  x   /  AST  11<L>  /  ALT  27  /  AlkPhos  129<H>  07-11      PT/INR - ( 11 Jul 2020 22:01 )   PT: 14.5 sec;   INR: 1.25 ratio         PTT - ( 11 Jul 2020 22:01 )  PTT:38.8 sec
The patient is doing well without complaints.    Tolerating soft food diet very well.    Vital Signs Last 24 Hrs  T(C): 36.2 (14 Jul 2020 08:16), Max: 37.1 (13 Jul 2020 21:37)  T(F): 97.2 (14 Jul 2020 08:16), Max: 98.7 (13 Jul 2020 21:37)  HR: 62 (14 Jul 2020 08:16) (62 - 65)  BP: 137/47 (14 Jul 2020 08:16) (129/40 - 137/47)  BP(mean): --  RR: 18 (14 Jul 2020 08:16) (18 - 18)  SpO2: 96% (14 Jul 2020 08:16) (96% - 97%)    PHYSICAL EXAM:  General: NAD.  HEENT: no JVD, no jaundice.  LUNGS: CTAB.  Heart: S1 S2 RRR  Abd: soft nt/nd   Wounds: clean dry and intact                          12.4   6.06  )-----------( 631      ( 14 Jul 2020 08:04 )             37.6       07-14    142  |  109<H>  |  6<L>  ----------------------------<  84  3.7   |  26  |  0.46<L>    Ca    8.7      14 Jul 2020 08:04  Phos  3.3     07-14  Mg     2.1     07-14
Post Op Day#: 1  Procedure:  Diagnostic laparoscopy with STEPHANY    The patient is doing well without complaints having flatus    Vital Signs Last 24 Hrs  T(C): 36.8 (12 Jul 2020 03:56), Max: 36.9 (12 Jul 2020 02:55)  T(F): 98.2 (12 Jul 2020 03:56), Max: 98.4 (12 Jul 2020 02:55)  HR: 79 (12 Jul 2020 03:56) (73 - 101)  BP: 149/38 (12 Jul 2020 03:56) (149/38 - 187/75)  BP(mean): 94 (12 Jul 2020 00:48) (94 - 105)  RR: 18 (12 Jul 2020 03:56) (13 - 21)  SpO2: 97% (12 Jul 2020 03:56) (90% - 99%)    PHYSICAL EXAM:  General: NAD.  HEENT: no JVD, no jaundice.  LUNGS: CTAB.  Heart: S1 S2 RRR  Abd: soft, nt, mild distention   Wounds: clean dry and intact                          12.8   11.66 )-----------( 688      ( 12 Jul 2020 07:12 )             39.0       07-12    136  |  105  |  12  ----------------------------<  120<H>  4.3   |  27  |  0.67    Ca    8.4<L>      12 Jul 2020 07:12    TPro  8.0  /  Alb  4.2  /  TBili  0.4  /  DBili  x   /  AST  11<L>  /  ALT  27  /  AlkPhos  129<H>  07-11      PT/INR - ( 11 Jul 2020 22:01 )   PT: 14.5 sec;   INR: 1.25 ratio         PTT - ( 11 Jul 2020 22:01 )  PTT:38.8 sec

## 2020-07-14 NOTE — PROGRESS NOTE ADULT - ASSESSMENT
Patient is an 61 yo F s/p Diagnostic laparoscopy with STEPHANY
All discharge instructions were given to the patient, as well as potential signs of complications.  The patient will follow up in 2 weeks.  [facility]
This is a 60 year old female with pmh of obesity, HTN, vertigo, cervical herniated disc, manic depression, fatty liver, GERD, heart murmur, DVT, PE, Von Willebrand disease, Essential thrombocytosis, hypothyroidism, ventricular tachycardia, s/p REVISION OF gastric bypass ON 6- presents to the ED c/o abd pain.   Pt found to have a Small bowel obstruction with transition point in the left upper quadrant, just inferior to surgical anastomotic sutures, probably related to postoperative adhesions. Mild mesenteric edema and interloop fluid. PT now s/p Laparoscopic lysis of abdominal adhesions  with a Diagnostic laparoscopy on 12-Jul-2020.  Pt has high thrombosis risk  and requires anticoagulation prophylaxis.     7- Spoke with pt and she is agreeable to use Lovenox as her anticoagulation medication.  I discussed case with Dr Kowalski and he agrees with using Lovenox      Plan:  ::Lovenox 40mg sq daily for 2 weeks post procedure.  Will monitor h/h  ::Daily cbc/bmp  :: B/l LE Venodynes  ::Increase mobility as tolerated    Will continue to follow.
This is a 60 year old female with pmh of obesity, HTN, vertigo, cervical herniated disc, manic depression, fatty liver, GERD, heart murmur, DVT, PE, Von Willebrand disease, Essential thrombocytosis, hypothyroidism, ventricular tachycardia, s/p REVISION OF gastric bypass ON 6- presents to the ED c/o abd pain.   Pt found to have a Small bowel obstruction with transition point in the left upper quadrant, just inferior to surgical anastomotic sutures, probably related to postoperative adhesions. Mild mesenteric edema and interloop fluid. PT now s/p Laparoscopic lysis of abdominal adhesions  with a Diagnostic laparoscopy on 12-Jul-2020.  Pt has high thrombosis risk  and requires anticoagulation prophylaxis.     7- Spoke with pt and she is agreeable to use Lovenox as her anticoagulation medication.  I discussed case with Dr Kowalski and he agrees with using Lovenox      Plan:  ::Lovenox 40mg sq daily for 2 weeks post procedure.  Will monitor h/h  ::Daily cbc/bmp  :: B/l LE Venodynes  ::Increase mobility as tolerated    Will continue to follow.
start soft food diet  OOB ambulate  pain control  check cbc

## 2020-07-14 NOTE — DISCHARGE NOTE NURSING/CASE MANAGEMENT/SOCIAL WORK - PATIENT PORTAL LINK FT
You can access the FollowMyHealth Patient Portal offered by [facility] by registering at the following website: [email or web address]. By joining [employer]’s FollowMyHealth portal, you will also be able to view your health information using other applications (apps) compatible with our system.

## 2020-07-14 NOTE — DISCHARGE NOTE PROVIDER - NSDCMRMEDTOKEN_GEN_ALL_CORE_FT
enoxaparin 40 mg/0.4 mL injectable solution: Inject syringe into abdomen once daily as directed by  Services [phone] MDD:40mg  hydroxyurea 500 mg oral capsule: 1  orally once a day  levothyroxine 88 mcg (0.088 mg) oral tablet: 1 tab(s) orally once a day  omeprazole 20 mg oral delayed release capsule: 1 cap(s) orally 2 times a day  Provigil 100 mg oral tablet: 1 tab(s) orally 2x /day   Therapeutic Multiple Vitamins oral tablet: 1 tab(s) orally once a day

## 2020-07-16 DIAGNOSIS — Z91.041 RADIOGRAPHIC DYE ALLERGY STATUS: ICD-10-CM

## 2020-07-16 DIAGNOSIS — Z88.2 ALLERGY STATUS TO SULFONAMIDES: ICD-10-CM

## 2020-07-16 DIAGNOSIS — E03.9 HYPOTHYROIDISM, UNSPECIFIED: ICD-10-CM
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
## 2020-07-16 DIAGNOSIS — Z86.711 PERSONAL HISTORY OF PULMONARY EMBOLISM: ICD-10-CM

## 2020-07-16 DIAGNOSIS — E66.9 OBESITY, UNSPECIFIED: ICD-10-CM

## 2020-07-16 DIAGNOSIS — F33.9 MAJOR DEPRESSIVE DISORDER, RECURRENT, UNSPECIFIED: ICD-10-CM

## 2020-07-16 DIAGNOSIS — K21.9 GASTRO-ESOPHAGEAL REFLUX DISEASE WITHOUT ESOPHAGITIS: ICD-10-CM

## 2020-07-16 DIAGNOSIS — Z86.718 PERSONAL HISTORY OF OTHER VENOUS THROMBOSIS AND EMBOLISM: ICD-10-CM

## 2020-07-16 DIAGNOSIS — K95.89 OTHER COMPLICATIONS OF OTHER BARIATRIC PROCEDURE: ICD-10-CM
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
## 2020-07-16 DIAGNOSIS — M50.20 OTHER CERVICAL DISC DISPLACEMENT, UNSPECIFIED CERVICAL REGION: ICD-10-CM

## 2020-07-16 DIAGNOSIS — R01.1 CARDIAC MURMUR, UNSPECIFIED: ICD-10-CM

## 2020-07-16 DIAGNOSIS — K91.30 POSTPROCEDURAL INTESTINAL OBSTRUCTION, UNSPECIFIED AS TO PARTIAL VERSUS COMPLETE: ICD-10-CM

## 2020-07-16 DIAGNOSIS — D68.0 VON WILLEBRAND DISEASE: ICD-10-CM

## 2020-07-16 DIAGNOSIS — K76.0 FATTY (CHANGE OF) LIVER, NOT ELSEWHERE CLASSIFIED: ICD-10-CM

## 2020-07-16 DIAGNOSIS — D47.3 ESSENTIAL (HEMORRHAGIC) THROMBOCYTHEMIA: ICD-10-CM

## 2020-07-16 DIAGNOSIS — I10 ESSENTIAL (PRIMARY) HYPERTENSION: ICD-10-CM

## 2020-12-29 ENCOUNTER — APPOINTMENT (OUTPATIENT)
Dept: DERMATOLOGY | Facility: CLINIC | Age: 60
End: 2020-12-29
Payer: COMMERCIAL

## 2020-12-29 DIAGNOSIS — L82.1 OTHER SEBORRHEIC KERATOSIS: ICD-10-CM

## 2020-12-29 DIAGNOSIS — L81.1 CHLOASMA: ICD-10-CM

## 2020-12-29 PROCEDURE — 99072 ADDL SUPL MATRL&STAF TM PHE: CPT

## 2020-12-29 PROCEDURE — 99202 OFFICE O/P NEW SF 15 MIN: CPT

## 2020-12-29 NOTE — HISTORY OF PRESENT ILLNESS
[FreeTextEntry1] : Growth on face [de-identified] : First visit for 52-year-old white female presenting with a lesion on the right lateral cheek. No history of skin cancer.\par Also complains of discoloration on the forearms.

## 2020-12-29 NOTE — PHYSICAL EXAM
[Alert] : alert [Oriented x 3] : ~L oriented x 3 [Well Nourished] : well nourished [FreeTextEntry3] : Patient declines full skin exam today\par \par Type II skin\par \par Right lateral zygoma: 3 mm tan verrucous papule\par Forearms: Symmetric splotchy faint hyperpigmentation

## 2021-01-19 ENCOUNTER — EMERGENCY (EMERGENCY)
Facility: HOSPITAL | Age: 61
LOS: 0 days | Discharge: HOME | End: 2021-01-19
Attending: STUDENT IN AN ORGANIZED HEALTH CARE EDUCATION/TRAINING PROGRAM
Payer: COMMERCIAL

## 2021-01-19 VITALS
OXYGEN SATURATION: 99 % | SYSTOLIC BLOOD PRESSURE: 140 MMHG | RESPIRATION RATE: 17 BRPM | TEMPERATURE: 98 F | HEART RATE: 67 BPM | DIASTOLIC BLOOD PRESSURE: 60 MMHG

## 2021-01-19 VITALS — HEIGHT: 63 IN | WEIGHT: 145.06 LBS

## 2021-01-19 DIAGNOSIS — R11.0 NAUSEA: ICD-10-CM

## 2021-01-19 DIAGNOSIS — R10.9 UNSPECIFIED ABDOMINAL PAIN: ICD-10-CM

## 2021-01-19 DIAGNOSIS — E03.9 HYPOTHYROIDISM, UNSPECIFIED: ICD-10-CM

## 2021-01-19 DIAGNOSIS — Z90.711 ACQUIRED ABSENCE OF UTERUS WITH REMAINING CERVICAL STUMP: ICD-10-CM

## 2021-01-19 DIAGNOSIS — Z98.890 OTHER SPECIFIED POSTPROCEDURAL STATES: Chronic | ICD-10-CM

## 2021-01-19 DIAGNOSIS — Z86.718 PERSONAL HISTORY OF OTHER VENOUS THROMBOSIS AND EMBOLISM: ICD-10-CM

## 2021-01-19 DIAGNOSIS — K21.9 GASTRO-ESOPHAGEAL REFLUX DISEASE WITHOUT ESOPHAGITIS: ICD-10-CM

## 2021-01-19 DIAGNOSIS — R19.7 DIARRHEA, UNSPECIFIED: ICD-10-CM

## 2021-01-19 DIAGNOSIS — Z86.711 PERSONAL HISTORY OF PULMONARY EMBOLISM: ICD-10-CM

## 2021-01-19 DIAGNOSIS — Z98.84 BARIATRIC SURGERY STATUS: Chronic | ICD-10-CM

## 2021-01-19 DIAGNOSIS — Z98.84 BARIATRIC SURGERY STATUS: ICD-10-CM

## 2021-01-19 DIAGNOSIS — I10 ESSENTIAL (PRIMARY) HYPERTENSION: ICD-10-CM

## 2021-01-19 DIAGNOSIS — Z90.89 ACQUIRED ABSENCE OF OTHER ORGANS: Chronic | ICD-10-CM

## 2021-01-19 DIAGNOSIS — Z88.2 ALLERGY STATUS TO SULFONAMIDES: ICD-10-CM

## 2021-01-19 DIAGNOSIS — Z90.711 ACQUIRED ABSENCE OF UTERUS WITH REMAINING CERVICAL STUMP: Chronic | ICD-10-CM

## 2021-01-19 DIAGNOSIS — Z91.041 RADIOGRAPHIC DYE ALLERGY STATUS: ICD-10-CM

## 2021-01-19 DIAGNOSIS — K52.9 NONINFECTIVE GASTROENTERITIS AND COLITIS, UNSPECIFIED: ICD-10-CM

## 2021-01-19 DIAGNOSIS — D68.0 VON WILLEBRAND DISEASE: ICD-10-CM

## 2021-01-19 LAB
ALBUMIN SERPL ELPH-MCNC: 3.5 G/DL — SIGNIFICANT CHANGE UP (ref 3.3–5)
ALP SERPL-CCNC: 123 U/L — HIGH (ref 40–120)
ALT FLD-CCNC: 38 U/L — SIGNIFICANT CHANGE UP (ref 12–78)
ANION GAP SERPL CALC-SCNC: 4 MMOL/L — LOW (ref 5–17)
APPEARANCE UR: CLEAR — SIGNIFICANT CHANGE UP
APTT BLD: 35.8 SEC — HIGH (ref 27.5–35.5)
AST SERPL-CCNC: 22 U/L — SIGNIFICANT CHANGE UP (ref 15–37)
BASOPHILS # BLD AUTO: 0.19 K/UL — SIGNIFICANT CHANGE UP (ref 0–0.2)
BASOPHILS NFR BLD AUTO: 1.4 % — SIGNIFICANT CHANGE UP (ref 0–2)
BILIRUB SERPL-MCNC: 0.3 MG/DL — SIGNIFICANT CHANGE UP (ref 0.2–1.2)
BILIRUB UR-MCNC: NEGATIVE — SIGNIFICANT CHANGE UP
BUN SERPL-MCNC: 20 MG/DL — SIGNIFICANT CHANGE UP (ref 7–23)
CALCIUM SERPL-MCNC: 9.3 MG/DL — SIGNIFICANT CHANGE UP (ref 8.5–10.1)
CHLORIDE SERPL-SCNC: 107 MMOL/L — SIGNIFICANT CHANGE UP (ref 96–108)
CO2 SERPL-SCNC: 28 MMOL/L — SIGNIFICANT CHANGE UP (ref 22–31)
COLOR SPEC: YELLOW — SIGNIFICANT CHANGE UP
CREAT SERPL-MCNC: 0.73 MG/DL — SIGNIFICANT CHANGE UP (ref 0.5–1.3)
DIFF PNL FLD: ABNORMAL
EOSINOPHIL # BLD AUTO: 0.18 K/UL — SIGNIFICANT CHANGE UP (ref 0–0.5)
EOSINOPHIL NFR BLD AUTO: 1.3 % — SIGNIFICANT CHANGE UP (ref 0–6)
GLUCOSE SERPL-MCNC: 76 MG/DL — SIGNIFICANT CHANGE UP (ref 70–99)
GLUCOSE UR QL: NEGATIVE MG/DL — SIGNIFICANT CHANGE UP
HCT VFR BLD CALC: 48.3 % — HIGH (ref 34.5–45)
HGB BLD-MCNC: 15.9 G/DL — HIGH (ref 11.5–15.5)
IMM GRANULOCYTES NFR BLD AUTO: 1.4 % — SIGNIFICANT CHANGE UP (ref 0–1.5)
INR BLD: 1.23 RATIO — HIGH (ref 0.88–1.16)
KETONES UR-MCNC: NEGATIVE — SIGNIFICANT CHANGE UP
LEUKOCYTE ESTERASE UR-ACNC: ABNORMAL
LYMPHOCYTES # BLD AUTO: 15.6 % — SIGNIFICANT CHANGE UP (ref 13–44)
LYMPHOCYTES # BLD AUTO: 2.16 K/UL — SIGNIFICANT CHANGE UP (ref 1–3.3)
MCHC RBC-ENTMCNC: 32.4 PG — SIGNIFICANT CHANGE UP (ref 27–34)
MCHC RBC-ENTMCNC: 32.9 GM/DL — SIGNIFICANT CHANGE UP (ref 32–36)
MCV RBC AUTO: 98.4 FL — SIGNIFICANT CHANGE UP (ref 80–100)
MONOCYTES # BLD AUTO: 0.99 K/UL — HIGH (ref 0–0.9)
MONOCYTES NFR BLD AUTO: 7.1 % — SIGNIFICANT CHANGE UP (ref 2–14)
NEUTROPHILS # BLD AUTO: 10.17 K/UL — HIGH (ref 1.8–7.4)
NEUTROPHILS NFR BLD AUTO: 73.2 % — SIGNIFICANT CHANGE UP (ref 43–77)
NITRITE UR-MCNC: NEGATIVE — SIGNIFICANT CHANGE UP
PH UR: 6 — SIGNIFICANT CHANGE UP (ref 5–8)
PLATELET # BLD AUTO: 845 K/UL — HIGH (ref 150–400)
POTASSIUM SERPL-MCNC: 4.2 MMOL/L — SIGNIFICANT CHANGE UP (ref 3.5–5.3)
POTASSIUM SERPL-SCNC: 4.2 MMOL/L — SIGNIFICANT CHANGE UP (ref 3.5–5.3)
PROT SERPL-MCNC: 7.1 GM/DL — SIGNIFICANT CHANGE UP (ref 6–8.3)
PROT UR-MCNC: NEGATIVE MG/DL — SIGNIFICANT CHANGE UP
PROTHROM AB SERPL-ACNC: 14.2 SEC — HIGH (ref 10.6–13.6)
RBC # BLD: 4.91 M/UL — SIGNIFICANT CHANGE UP (ref 3.8–5.2)
RBC # FLD: 13 % — SIGNIFICANT CHANGE UP (ref 10.3–14.5)
SODIUM SERPL-SCNC: 139 MMOL/L — SIGNIFICANT CHANGE UP (ref 135–145)
SP GR SPEC: 1.01 — SIGNIFICANT CHANGE UP (ref 1.01–1.02)
UROBILINOGEN FLD QL: NEGATIVE MG/DL — SIGNIFICANT CHANGE UP
WBC # BLD: 13.89 K/UL — HIGH (ref 3.8–10.5)
WBC # FLD AUTO: 13.89 K/UL — HIGH (ref 3.8–10.5)

## 2021-01-19 PROCEDURE — 96374 THER/PROPH/DIAG INJ IV PUSH: CPT

## 2021-01-19 PROCEDURE — 36415 COLL VENOUS BLD VENIPUNCTURE: CPT

## 2021-01-19 PROCEDURE — 87086 URINE CULTURE/COLONY COUNT: CPT

## 2021-01-19 PROCEDURE — 86900 BLOOD TYPING SEROLOGIC ABO: CPT

## 2021-01-19 PROCEDURE — 99285 EMERGENCY DEPT VISIT HI MDM: CPT

## 2021-01-19 PROCEDURE — 74176 CT ABD & PELVIS W/O CONTRAST: CPT

## 2021-01-19 PROCEDURE — 99284 EMERGENCY DEPT VISIT MOD MDM: CPT | Mod: 25

## 2021-01-19 PROCEDURE — 71045 X-RAY EXAM CHEST 1 VIEW: CPT | Mod: 26

## 2021-01-19 PROCEDURE — 85025 COMPLETE CBC W/AUTO DIFF WBC: CPT

## 2021-01-19 PROCEDURE — 80053 COMPREHEN METABOLIC PANEL: CPT

## 2021-01-19 PROCEDURE — 93005 ELECTROCARDIOGRAM TRACING: CPT

## 2021-01-19 PROCEDURE — 74176 CT ABD & PELVIS W/O CONTRAST: CPT | Mod: 26

## 2021-01-19 PROCEDURE — 86901 BLOOD TYPING SEROLOGIC RH(D): CPT

## 2021-01-19 PROCEDURE — 85610 PROTHROMBIN TIME: CPT

## 2021-01-19 PROCEDURE — 96375 TX/PRO/DX INJ NEW DRUG ADDON: CPT

## 2021-01-19 PROCEDURE — 71045 X-RAY EXAM CHEST 1 VIEW: CPT

## 2021-01-19 PROCEDURE — 93010 ELECTROCARDIOGRAM REPORT: CPT

## 2021-01-19 PROCEDURE — 81001 URINALYSIS AUTO W/SCOPE: CPT

## 2021-01-19 PROCEDURE — 85730 THROMBOPLASTIN TIME PARTIAL: CPT

## 2021-01-19 PROCEDURE — 86850 RBC ANTIBODY SCREEN: CPT

## 2021-01-19 RX ORDER — METRONIDAZOLE 500 MG
1 TABLET ORAL
Qty: 21 | Refills: 0
Start: 2021-01-19 | End: 2021-01-25

## 2021-01-19 RX ORDER — ONDANSETRON 8 MG/1
4 TABLET, FILM COATED ORAL ONCE
Refills: 0 | Status: COMPLETED | OUTPATIENT
Start: 2021-01-19 | End: 2021-01-19

## 2021-01-19 RX ORDER — MORPHINE SULFATE 50 MG/1
4 CAPSULE, EXTENDED RELEASE ORAL ONCE
Refills: 0 | Status: DISCONTINUED | OUTPATIENT
Start: 2021-01-19 | End: 2021-01-19

## 2021-01-19 RX ORDER — ONDANSETRON 8 MG/1
1 TABLET, FILM COATED ORAL
Qty: 28 | Refills: 0
Start: 2021-01-19 | End: 2021-01-25

## 2021-01-19 RX ORDER — IOHEXOL 300 MG/ML
30 INJECTION, SOLUTION INTRAVENOUS ONCE
Refills: 0 | Status: COMPLETED | OUTPATIENT
Start: 2021-01-19 | End: 2021-01-19

## 2021-01-19 RX ORDER — SODIUM CHLORIDE 9 MG/ML
1000 INJECTION INTRAMUSCULAR; INTRAVENOUS; SUBCUTANEOUS ONCE
Refills: 0 | Status: COMPLETED | OUTPATIENT
Start: 2021-01-19 | End: 2021-01-19

## 2021-01-19 RX ORDER — CIPROFLOXACIN LACTATE 400MG/40ML
400 VIAL (ML) INTRAVENOUS ONCE
Refills: 0 | Status: COMPLETED | OUTPATIENT
Start: 2021-01-19 | End: 2021-01-19

## 2021-01-19 RX ORDER — METRONIDAZOLE 500 MG
500 TABLET ORAL ONCE
Refills: 0 | Status: COMPLETED | OUTPATIENT
Start: 2021-01-19 | End: 2021-01-19

## 2021-01-19 RX ADMIN — Medication 200 MILLIGRAM(S): at 18:03

## 2021-01-19 RX ADMIN — MORPHINE SULFATE 4 MILLIGRAM(S): 50 CAPSULE, EXTENDED RELEASE ORAL at 13:23

## 2021-01-19 RX ADMIN — IOHEXOL 30 MILLILITER(S): 300 INJECTION, SOLUTION INTRAVENOUS at 13:35

## 2021-01-19 RX ADMIN — SODIUM CHLORIDE 2000 MILLILITER(S): 9 INJECTION INTRAMUSCULAR; INTRAVENOUS; SUBCUTANEOUS at 13:19

## 2021-01-19 RX ADMIN — Medication 100 MILLIGRAM(S): at 16:45

## 2021-01-19 RX ADMIN — SODIUM CHLORIDE 1000 MILLILITER(S): 9 INJECTION INTRAMUSCULAR; INTRAVENOUS; SUBCUTANEOUS at 16:44

## 2021-01-19 RX ADMIN — ONDANSETRON 4 MILLIGRAM(S): 8 TABLET, FILM COATED ORAL at 13:19

## 2021-01-19 NOTE — ED ADULT TRIAGE NOTE - CHIEF COMPLAINT QUOTE
pt presents to ED with complaitns of lower abdominal pain since last night. pt also endorsing n/v/d.

## 2021-01-19 NOTE — ED ADULT NURSE REASSESSMENT NOTE - NS ED NURSE REASSESS COMMENT FT1
Assumed care of patient from RN MIKE Arellano.  Patient A&o, resting in stretcher.  Patient receiving iv abx cipro at this time.  Will reevaluate.  Call bell within reach, safety maintained.

## 2021-01-19 NOTE — ED PROVIDER NOTE - NSFOLLOWUPINSTRUCTIONS_ED_ALL_ED_FT
Colitis    WHAT YOU NEED TO KNOW:    Colitis is swelling and irritation of your colon. Colitis may be caused by ulcers or a problem with your immune system. Bacteria, a virus, or a parasite may also cause colitis. The cause may not be known. You may have diarrhea, abdominal pain, fever, or blood or mucus in your bowel movement.    DISCHARGE INSTRUCTIONS:    Return to the emergency department if:   •You have sudden trouble breathing.      •Your bowel movements are black or have blood in them.      •You have blood in your vomit.      •You have severe abdominal pain or your abdomen is swollen and feels hard.      •You have any of the following signs of dehydration: ?Dizziness or weakness      ?Dry mouth, cracked lips, or severe thirst      ?Fast heartbeat or breathing      ?Urinating very little or not at all        Call your doctor if:   •Your symptoms get worse or do not go away.      •You have a fever, chills, cough, or feel weak and achy.      •You suddenly lose weight without trying.      •You have questions or concerns about your condition or care.      Medicines:   •Medicines may be given to decrease inflammation in your colon and treat diarrhea.      •Take your medicine as directed. Contact your healthcare provider if you think your medicine is not helping or if you have side effects. Tell him of her if you are allergic to any medicine. Keep a list of the medicines, vitamins, and herbs you take. Include the amounts, and when and why you take them. Bring the list or the pill bottles to follow-up visits. Carry your medicine list with you in case of an emergency.      Manage your symptoms:   •Drink liquids as directed to help prevent dehydration. Good liquids to drink include water, juice, and broth. Ask how much liquid to drink each day. You may need to drink an oral rehydration solution (ORS). An ORS contains a balance of water, salt, and sugar to replace body fluids lost during diarrhea.      •Eat a variety of healthy foods. Healthy foods include fruits, vegetables, whole-grain breads, beans, low-fat dairy products, lean meats, and fish. You may need to eat several small meals throughout the day instead of large meals. Avoid spicy foods, caffeine, chocolate, and foods high in fat.      •Talk to your healthcare provider before you take NSAIDs. NSAIDs can cause worsen your symptoms if ulcers are causing your colitis.      •Start to exercise when you feel better. Regular exercise helps your bowels work normally. Ask about the best exercise plan for you.      Prevent the spread of germs:          •Wash your hands often. Wash your hands several times each day. Wash after you use the bathroom, change a child's diaper, and before you prepare or eat food. Use soap and water every time. Rub your soapy hands together, lacing your fingers. Wash the front and back of your hands, and in between your fingers. Use the fingers of one hand to scrub under the fingernails of the other hand. Wash for at least 20 seconds. Rinse with warm, running water for several seconds. Then dry your hands with a clean towel or paper towel. Use hand  that contains alcohol if soap and water are not available. Do not touch your eyes, nose, or mouth without washing your hands first.  Handwashing           •Cover a sneeze or cough. Use a tissue that covers your mouth and nose. Throw the tissue away in a trash can right away. Use the bend of your arm if a tissue is not available. Wash your hands well with soap and water or use a hand .      •Clean surfaces often. Clean doorknobs, countertops, cell phones, and other surfaces that are touched often. Use a disinfecting wipe, a single-use sponge, or a cloth you can wash and reuse. Use disinfecting  if you do not have wipes. You can create a disinfecting  by mixing 1 part bleach with 10 parts water.      •Ask about vaccines you may need. Vaccines help prevent disease caused by some viruses and bacteria. Get the influenza (flu) vaccine as soon as recommended each year. The flu vaccine is usually available starting in September or October. Flu viruses change, so it is important to get a flu vaccine every year. Get the pneumonia vaccine if recommended. This vaccine is usually recommended every 5 years. Your provider will tell you when to get this vaccine, if needed. Your healthcare provider can tell you if you should get other vaccines, and when to get them.      Follow up with your doctor as directed: You may need to return for a colonoscopy or other tests. Write down how often you have a bowel movements and what they look like. Bring this to your follow-up visits. Write down your questions so you remember to ask them during your visits.       © Copyright [employer] 2021           back to top                          © Copyright [employer] 2021

## 2021-01-19 NOTE — ED PROVIDER NOTE - OBJECTIVE STATEMENT
59 y/o F with PMHx of HTN, DVT, PE, Von Willebrand disease, Vtach s/p ablation, intestinal obstruction, GERD, hypothyroid, fatty liver, s/p partial hysterectomy, and s/p gastric bypass presents to the ED c/o +nausea, +abd pain, and +diarrhea beginning last night. Now as of this morning, diarrhea has become bloody so came to the ED for evaluation. No fever. Never smoker. Allergic to IV contrast and sulfa drugs. PCP: Dr. Adama Jeffries.

## 2021-01-19 NOTE — ED PROVIDER NOTE - PATIENT PORTAL LINK FT
You can access the FollowMyHealth Patient Portal offered by [facility] by registering at the following website: [email or web address]. By joining [employer]’s FollowMyHealth portal, you will also be able to view your health information using other applications (apps) compatible with our system.

## 2021-01-19 NOTE — ED PROVIDER NOTE - PROGRESS NOTE DETAILS
Catina ANDERSON for ED attending, Dr. Silva: Stool guaiac performed by Dr. Silva, Lot #: [id number]; Expiration: 2/28/21; Result: brown stool, guaiac POSITIVE ; QC passed. Catina ANDERSON for ED attending, Dr. Silva: Sign out to Dr. Mercado, pending labs, CT, and reeval. Catina ANDERSON for ED attending, Dr. Silva: Pt with hx of thrombocytosis, reports last platelet count in 800s which is consistent with labs from today. Pt with left sided colitis.  Given IV antibiotics.  Feeling well on reeval and wants to go home.  D/c with antibiotics.  Return precautions given.  Discussed with Dr. Rees.  Augustine Mercado D.O.

## 2021-01-19 NOTE — ED ADULT NURSE NOTE - SUICIDE SCREENING DEPRESSION
Discharged to home/self care.    - Condition at discharge: Good  - Mode of Discharge: Ambulatory  - The patient left the ED accompanied by a family member.  - The discharge instructions were discussed with the patient's mother.  - She states an understanding of the discharge instructions.  - Walked pt to the discharge station.    
Negative

## 2021-01-19 NOTE — ED ADULT NURSE REASSESSMENT NOTE - NS ED NURSE REASSESS COMMENT FT1
Pt tolerated eating food and drinking at this time, pt without emesis, pt observed sitting up in stretcher without distress, states her stomach hurts a little, safety maintained, will continue to monitor.

## 2021-01-19 NOTE — ED ADULT NURSE NOTE - OBJECTIVE STATEMENT
Pt presents to er with complaints of lower pelvic pain and states 4 bought of rectal bleeding, pt does not recall if it is bright or dark red in color, denies dizziness, chest pain, sob, skin color appropriate for ethnicity, pink, warm, dry, denies fevers respirations unlabored, tenderness upon light palpation to pelvic region, reports history of bowel obstruction and surgical removal of adhesions status post gastric bypass 6 years ago, nsr on cm, medicated as per md order, reports relief of pain with morphine, safety maintained, call bell at side, instructed to drink oral contrast at this time and will go to ct at 15:00, will continue to monitor.

## 2021-01-19 NOTE — ED STATDOCS - PROGRESS NOTE DETAILS
Catina Luo for attending Dr. Calvillo: 59 y/o female with a PMHx of bowel obstruction, cervical herniated disc, DVT, essential thrombocytosis, fatty liver, GERD, heart murmur, hiatal hernia, HTN, hypothyroid, manic depression, obesity, PE, ventricular tachycardia, vertigo, Von Willebrand disease, h/o gastric bypass, h/o partial hysterectomy presents to the ED c/o abd pain and rectal bleeding since last night. No other complaints at this time. Will send pt to main ED for further evaluation. Caitna Luo for attending Dr. Calvillo: 59 y/o female with a PMHx of bowel obstruction, cervical herniated disc, DVT, essential thrombocytosis, fatty liver, GERD, heart murmur, hiatal hernia, HTN, hypothyroid, manic depression, obesity, PE, ventricular tachycardia, vertigo, Von Willebrand disease, h/o gastric bypass, h/o partial hysterectomy presents to the ED c/o abd pain and rectal bleeding since last night. Allergies: IV contrast, sulfa drugs. No other complaints at this time. Will send pt to main ED for further evaluation.
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
## 2021-01-19 NOTE — ED ADULT NURSE NOTE - SUICIDE SCREENING QUESTION 2
NIMO DE LA ROSA  : 1946  ACCOUNT:  [id number]  [phone]  PCP: Dr. Jerry Singh     03/30/17 *Atorvastatin Calcium 20MG      1 TABLET  DAILY AT BEDTIME               03/30/17 *CloNIDine HCl        0.3MG     1 TABLET EVERY DAY                       03/30/17 *Metoprolol Succinate 50MG      1 TABLET TWICE DAILY                     03/03/17 *Triamterene-HCTZ     75-50MG   1/2 TABLET EVERY DAY IN THE MORNING      01/03/17 *AmLODIPine Besylate  10MG      1 TABLET DAILY.                          01/03/17 *Valsartan            320MG     1 TABLET DAILY.                          04/28/17 Aspirin               81MG      daily                                    04/28/17 Xarelto               20MG      daily                                    12/08/16 Meloxicam             15MG      daily                                    12/08/16 Potassium Chloride [id number]     daily                                    06/02/16 Alpha-Lipoic Acid     100MG     one by mouth daily                       06/02/16 Aspirin Adult Low [id number]      one by mouth daily                       06/02/16 MetFORMIN HCl         500MG     one by mouth twice daily                 10/23/14 Melatonin             1MG       as needed                                10/23/14 Vitamin D3            5000UNIT  1 capsule daily                          08/06/14 Tamsulosin HCl        0.4MG     2 CAPSULES DAILY     MD LIZ Vivar/kirsten D: 04/28/2017 T: 05/01/2017 Job ID [id number]  C: Dr. Jerry Singh               No

## 2021-01-19 NOTE — ED ADULT NURSE NOTE - TEMPLATE LIST FOR HEAD TO TOE ASSESSMENT
ALFIE STEINBERG     General

## 2021-01-19 NOTE — ED PROVIDER NOTE - CLINICAL SUMMARY MEDICAL DECISION MAKING FREE TEXT BOX
61 y/o F with vomiting, diarrhea, now rectal bleeding. PMHx of multiple abd surgeries and SBO. Will get EKG, CXR, CT, urine, reeval .

## 2021-01-20 LAB
CULTURE RESULTS: SIGNIFICANT CHANGE UP
SPECIMEN SOURCE: SIGNIFICANT CHANGE UP

## 2021-02-10 ENCOUNTER — RESULT REVIEW (OUTPATIENT)
Age: 61
End: 2021-02-10

## 2021-02-17 ENCOUNTER — NON-APPOINTMENT (OUTPATIENT)
Age: 61
End: 2021-02-17

## 2021-03-12 PROBLEM — K56.609 UNSPECIFIED INTESTINAL OBSTRUCTION, UNSPECIFIED AS TO PARTIAL VERSUS COMPLETE OBSTRUCTION: Chronic | Status: ACTIVE | Noted: 2021-01-19

## 2021-03-19 ENCOUNTER — APPOINTMENT (OUTPATIENT)
Dept: VASCULAR SURGERY | Facility: CLINIC | Age: 61
End: 2021-03-19
Payer: COMMERCIAL

## 2021-03-19 VITALS
WEIGHT: 143 LBS | OXYGEN SATURATION: 98 % | HEART RATE: 70 BPM | DIASTOLIC BLOOD PRESSURE: 79 MMHG | HEIGHT: 62 IN | BODY MASS INDEX: 26.31 KG/M2 | SYSTOLIC BLOOD PRESSURE: 146 MMHG

## 2021-03-19 DIAGNOSIS — I87.2 VENOUS INSUFFICIENCY (CHRONIC) (PERIPHERAL): ICD-10-CM

## 2021-03-19 DIAGNOSIS — K52.9 NONINFECTIVE GASTROENTERITIS AND COLITIS, UNSPECIFIED: ICD-10-CM

## 2021-03-19 PROCEDURE — 93978 VASCULAR STUDY: CPT

## 2021-03-19 PROCEDURE — 99072 ADDL SUPL MATRL&STAF TM PHE: CPT

## 2021-03-19 PROCEDURE — 93970 EXTREMITY STUDY: CPT

## 2021-03-19 PROCEDURE — 99203 OFFICE O/P NEW LOW 30 MIN: CPT

## 2021-04-18 ENCOUNTER — APPOINTMENT (OUTPATIENT)
Dept: DISASTER EMERGENCY | Facility: CLINIC | Age: 61
End: 2021-04-18

## 2021-04-18 DIAGNOSIS — Z01.818 ENCOUNTER FOR OTHER PREPROCEDURAL EXAMINATION: ICD-10-CM

## 2021-04-19 LAB — SARS-COV-2 N GENE NPH QL NAA+PROBE: NOT DETECTED

## 2021-04-23 ENCOUNTER — APPOINTMENT (OUTPATIENT)
Dept: VASCULAR SURGERY | Facility: CLINIC | Age: 61
End: 2021-04-23
Payer: COMMERCIAL

## 2021-04-23 PROCEDURE — 36475 ENDOVENOUS RF 1ST VEIN: CPT | Mod: RT

## 2021-04-23 PROCEDURE — 99072 ADDL SUPL MATRL&STAF TM PHE: CPT

## 2021-04-26 ENCOUNTER — APPOINTMENT (OUTPATIENT)
Dept: VASCULAR SURGERY | Facility: CLINIC | Age: 61
End: 2021-04-26
Payer: COMMERCIAL

## 2021-04-26 PROCEDURE — 93971 EXTREMITY STUDY: CPT

## 2021-04-26 PROCEDURE — 99072 ADDL SUPL MATRL&STAF TM PHE: CPT

## 2021-05-07 ENCOUNTER — APPOINTMENT (OUTPATIENT)
Dept: VASCULAR SURGERY | Facility: CLINIC | Age: 61
End: 2021-05-07
Payer: COMMERCIAL

## 2021-05-07 VITALS
OXYGEN SATURATION: 98 % | SYSTOLIC BLOOD PRESSURE: 174 MMHG | DIASTOLIC BLOOD PRESSURE: 68 MMHG | HEART RATE: 66 BPM | TEMPERATURE: 97.1 F

## 2021-05-07 PROCEDURE — 99072 ADDL SUPL MATRL&STAF TM PHE: CPT

## 2021-05-07 PROCEDURE — 36471 NJX SCLRSNT MLT INCMPTNT VN: CPT

## 2021-05-07 PROCEDURE — 36470 NJX SCLRSNT 1 INCMPTNT VEIN: CPT | Mod: LT

## 2021-05-07 PROCEDURE — 93970 EXTREMITY STUDY: CPT

## 2021-05-14 ENCOUNTER — APPOINTMENT (OUTPATIENT)
Age: 61
End: 2021-05-14
Payer: COMMERCIAL

## 2021-05-14 VITALS — HEART RATE: 75 BPM | DIASTOLIC BLOOD PRESSURE: 68 MMHG | OXYGEN SATURATION: 97 % | SYSTOLIC BLOOD PRESSURE: 147 MMHG

## 2021-05-14 DIAGNOSIS — I87.2 VENOUS INSUFFICIENCY (CHRONIC) (PERIPHERAL): ICD-10-CM

## 2021-05-14 DIAGNOSIS — Z72.3 LACK OF PHYSICAL EXERCISE: ICD-10-CM

## 2021-05-14 PROCEDURE — 99072 ADDL SUPL MATRL&STAF TM PHE: CPT

## 2021-05-14 PROCEDURE — 99212 OFFICE O/P EST SF 10 MIN: CPT

## 2021-05-15 PROBLEM — Z72.3 DOES NOT EXERCISE: Status: ACTIVE | Noted: 2021-05-15

## 2021-05-15 PROBLEM — I87.2 SAPHENOFEMORAL VENOUS REFLUX: Status: RESOLVED | Noted: 2021-03-19 | Resolved: 2021-05-15

## 2021-05-21 ENCOUNTER — APPOINTMENT (OUTPATIENT)
Dept: VASCULAR SURGERY | Facility: CLINIC | Age: 61
End: 2021-05-21
Payer: COMMERCIAL

## 2021-05-21 VITALS
DIASTOLIC BLOOD PRESSURE: 72 MMHG | HEART RATE: 68 BPM | OXYGEN SATURATION: 96 % | TEMPERATURE: 97.6 F | SYSTOLIC BLOOD PRESSURE: 138 MMHG

## 2021-05-21 PROCEDURE — 29580 STRAPPING UNNA BOOT: CPT | Mod: RT

## 2021-05-21 PROCEDURE — 99072 ADDL SUPL MATRL&STAF TM PHE: CPT

## 2021-06-11 ENCOUNTER — APPOINTMENT (OUTPATIENT)
Dept: VASCULAR SURGERY | Facility: CLINIC | Age: 61
End: 2021-06-11
Payer: COMMERCIAL

## 2021-06-11 VITALS
HEART RATE: 70 BPM | WEIGHT: 144 LBS | SYSTOLIC BLOOD PRESSURE: 151 MMHG | DIASTOLIC BLOOD PRESSURE: 70 MMHG | TEMPERATURE: 97 F | HEIGHT: 63 IN | BODY MASS INDEX: 25.52 KG/M2 | OXYGEN SATURATION: 98 %

## 2021-06-11 PROCEDURE — 36471 NJX SCLRSNT MLT INCMPTNT VN: CPT | Mod: 50

## 2021-06-11 PROCEDURE — 99072 ADDL SUPL MATRL&STAF TM PHE: CPT

## 2021-06-11 NOTE — PROCEDURE
[FreeTextEntry1] : BLE sclerotherapy [FreeTextEntry2] : I83.893, Z98.890 [FreeTextEntry3] : After informed consent obtained, under aseptic technique 2 - 2 mL vials of 1%polidocanol sclerosing solution were used to perform sclerotherapy of varicose veins of bilateral lower extremities with a 30 g needle. A sterile pressure dressing was applied. Pain was well controlled and the patient tolerated the procedure well.\par \par She continues to have chronic pain, edema from thighs through feet with hyperpigmentation, fibrosis and skin changes characteristic of lymphedema. Symptoms persist despite several months use of compression stockings 20-30 mmHg, exercise, frequent ambulation and leg elevation. She is awaiting receipt of Lymphedema pumps.\par \par Plan:\par Sclerotherapy completed today.\par Continue aggressive compression\par Hydrate often\par Ambulation/exercise\par Awaiting lymphedema pumps vended by [employer].\par RTC in 2-3 weeks for Sclerotherapy.\par \par Patient seen Dr Joel Moreno. I personally discussed this patient with Vanesa Powers, Nurse Practitioner who was a scribe for the visit. I agree with the assessment and plan as written by the Nurse Practitioner and agree with the findings and plan as documented in the Nurse Practitioner's note, unless noted below.

## 2021-07-02 ENCOUNTER — APPOINTMENT (OUTPATIENT)
Dept: VASCULAR SURGERY | Facility: CLINIC | Age: 61
End: 2021-07-02
Payer: COMMERCIAL

## 2021-07-02 PROCEDURE — 99072 ADDL SUPL MATRL&STAF TM PHE: CPT

## 2021-07-02 PROCEDURE — 36471 NJX SCLRSNT MLT INCMPTNT VN: CPT | Mod: 50

## 2021-07-14 ENCOUNTER — APPOINTMENT (OUTPATIENT)
Dept: VASCULAR SURGERY | Facility: CLINIC | Age: 61
End: 2021-07-14
Payer: COMMERCIAL

## 2021-07-14 DIAGNOSIS — Z86.79 OTHER SPECIFIED POSTPROCEDURAL STATES: ICD-10-CM

## 2021-07-14 DIAGNOSIS — Z98.890 OTHER SPECIFIED POSTPROCEDURAL STATES: ICD-10-CM

## 2021-07-14 PROCEDURE — 36471 NJX SCLRSNT MLT INCMPTNT VN: CPT

## 2021-07-30 ENCOUNTER — APPOINTMENT (OUTPATIENT)
Dept: VASCULAR SURGERY | Facility: CLINIC | Age: 61
End: 2021-07-30
Payer: COMMERCIAL

## 2021-07-30 PROCEDURE — 36471 NJX SCLRSNT MLT INCMPTNT VN: CPT | Mod: 50

## 2021-08-13 ENCOUNTER — APPOINTMENT (OUTPATIENT)
Dept: VASCULAR SURGERY | Facility: CLINIC | Age: 61
End: 2021-08-13
Payer: COMMERCIAL

## 2021-08-13 VITALS
SYSTOLIC BLOOD PRESSURE: 153 MMHG | OXYGEN SATURATION: 98 % | HEART RATE: 71 BPM | DIASTOLIC BLOOD PRESSURE: 67 MMHG | TEMPERATURE: 97.1 F

## 2021-08-13 DIAGNOSIS — L81.9 DISORDER OF PIGMENTATION, UNSPECIFIED: ICD-10-CM

## 2021-08-13 DIAGNOSIS — Q82.0 HEREDITARY LYMPHEDEMA: ICD-10-CM

## 2021-08-13 DIAGNOSIS — Z98.890 OTHER SPECIFIED POSTPROCEDURAL STATES: ICD-10-CM

## 2021-08-13 PROCEDURE — 36471 NJX SCLRSNT MLT INCMPTNT VN: CPT | Mod: 50

## 2021-08-17 ENCOUNTER — APPOINTMENT (OUTPATIENT)
Dept: COLORECTAL SURGERY | Facility: CLINIC | Age: 61
End: 2021-08-17
Payer: COMMERCIAL

## 2021-08-17 VITALS — TEMPERATURE: 97.1 F | WEIGHT: 144 LBS | BODY MASS INDEX: 25.51 KG/M2

## 2021-08-17 DIAGNOSIS — K55.1 CHRONIC VASCULAR DISORDERS OF INTESTINE: ICD-10-CM

## 2021-08-17 DIAGNOSIS — K55.9 VASCULAR DISORDER OF INTESTINE, UNSPECIFIED: ICD-10-CM

## 2021-08-17 PROCEDURE — 99244 OFF/OP CNSLTJ NEW/EST MOD 40: CPT

## 2021-08-31 PROBLEM — Z98.890 S/P SCLEROTHERAPY OF VARICOSE VEINS: Status: ACTIVE | Noted: 2021-05-07

## 2021-08-31 NOTE — PROCEDURE
[FreeTextEntry1] : BLE sclerotherapy [FreeTextEntry2] : I83.893, Z98.890 [FreeTextEntry3] : After informed consent obtained, under aseptic technique 2 - 2 mL vials of 1%polidocanol sclerosing solution were used to perform sclerotherapy of varicose veins of bilateral lower extremities with a 30 g needle. A sterile pressure dressing was applied. Pain was well controlled and the patient tolerated the procedure well.\par \par She continues to have chronic pain, edema from thighs through feet with hyperpigmentation, fibrosis and skin changes characteristic of lymphedema. Symptoms persist despite several months use of compression stockings 20-30 mmHg, exercise, frequent ambulation and leg elevation. She is awaiting receipt of Lymphedema pumps.\par \par Plan:\par Sclerotherapy completed today.\par Continue aggressive compression\par Hydrate often\par Ambulation/exercise\par Awaiting lymphedema pumps vended by [employer].\par RTC in 2-3 weeks for Sclerotherapy.\par \par Patient seen Dr Joel Moreno. I personally discussed this patient with Vanesa Powers, Nurse Practitioner who was a scribe for the visit. I agree with the assessment and plan as written by the Nurse Practitioner and agree with the findings and plan as documented in the Nurse Practitioner's note, unless noted below.

## 2021-09-07 PROBLEM — K55.9 ISCHEMIC COLITIS: Status: ACTIVE | Noted: 2021-09-07

## 2021-09-07 PROBLEM — K55.1 MESENTERIC ARTERY STENOSIS: Status: ACTIVE | Noted: 2021-03-19

## 2021-09-07 NOTE — REVIEW OF SYSTEMS
[Feeling Poorly] : feeling poorly [As Noted in HPI] : as noted in HPI [Abdominal Pain] : abdominal pain [Constipation] : constipation [Negative] : Heme/Lymph

## 2021-09-07 NOTE — HISTORY OF PRESENT ILLNESS
[FreeTextEntry1] : Dictation requested by Dr. Munoz for ischemic colitis. 61-year-old female with multiple comorbidities with recent attack of ischemic colitis of the left colon. Has developed chronic left lower abdominal pain and difficulties with bowel movements

## 2021-09-07 NOTE — DATA REVIEWED
[FreeTextEntry1] : Review of colonoscopy done by Dr. Brock shows severe ischemic colitis of the left colon\par Appreciate vascular notes

## 2021-09-07 NOTE — PHYSICAL EXAM
[Abdomen Masses] : No abdominal masses [Abdomen Tenderness] : ~T No ~M abdominal tenderness [Tender] : nontender [JVD] : no jugular venous distention  [Normal Breath Sounds] : Normal breath sounds [Normal Heart Sounds] : normal heart sounds [Normal Rate and Rhythm] : normal rate and rhythm [No Rash or Lesion] : No rash or lesion [Alert] : alert [Oriented to Person] : oriented to person [Oriented to Place] : oriented to place [Oriented to Time] : oriented to time [Calm] : calm [de-identified] : Looks well in no distress, of stated age. [de-identified] : pupils equal reactive to light normocephalic atraumatic. [de-identified] : moves all 4 extremities appropriately with 5 over 5 strength

## 2021-09-07 NOTE — CONSULT LETTER
[Dear  ___] : Dear  [unfilled], [Consult Letter:] : I had the pleasure of evaluating your patient, [unfilled]. [Please see my note below.] : Please see my note below. [Consult Closing:] : Thank you very much for allowing me to participate in the care of this patient.  If you have any questions, please do not hesitate to contact me. [Sincerely,] : Sincerely, [FreeTextEntry3] : Praful Dial M.D. FACS, FASCRS

## 2021-09-07 NOTE — ASSESSMENT
[FreeTextEntry1] : 61-year-old female with ischemic colitis and abdominal pain. Recommend laparoscopic possible open left colectomy.Risk and benefits of the surgery have been discussed which include bleeding, infection, sepsis, multiorgan failure, inadvertent injury including hollow viscus, solid organ, ureter neurovascular and neurological structures, DVT PE, heart attack, stroke, hernias, recurrence of ischemia, leakage of anastomosis requiring reoperation possible stoma and death.

## 2021-09-22 ENCOUNTER — APPOINTMENT (OUTPATIENT)
Dept: VASCULAR SURGERY | Facility: CLINIC | Age: 61
End: 2021-09-22

## 2021-09-23 ENCOUNTER — OUTPATIENT (OUTPATIENT)
Dept: OUTPATIENT SERVICES | Facility: HOSPITAL | Age: 61
LOS: 1 days | End: 2021-09-23
Payer: COMMERCIAL

## 2021-09-23 VITALS
TEMPERATURE: 99 F | OXYGEN SATURATION: 98 % | SYSTOLIC BLOOD PRESSURE: 153 MMHG | HEIGHT: 63 IN | DIASTOLIC BLOOD PRESSURE: 61 MMHG | HEART RATE: 67 BPM | WEIGHT: 145.95 LBS | RESPIRATION RATE: 16 BRPM

## 2021-09-23 DIAGNOSIS — K55.059 ACUTE (REVERSIBLE) ISCHEMIA OF INTESTINE, PART AND EXTENT UNSPECIFIED: ICD-10-CM

## 2021-09-23 DIAGNOSIS — Z98.84 BARIATRIC SURGERY STATUS: Chronic | ICD-10-CM

## 2021-09-23 DIAGNOSIS — Z29.9 ENCOUNTER FOR PROPHYLACTIC MEASURES, UNSPECIFIED: ICD-10-CM

## 2021-09-23 DIAGNOSIS — Z90.89 ACQUIRED ABSENCE OF OTHER ORGANS: Chronic | ICD-10-CM

## 2021-09-23 DIAGNOSIS — Z01.818 ENCOUNTER FOR OTHER PREPROCEDURAL EXAMINATION: ICD-10-CM

## 2021-09-23 DIAGNOSIS — Z98.890 OTHER SPECIFIED POSTPROCEDURAL STATES: Chronic | ICD-10-CM

## 2021-09-23 DIAGNOSIS — Z90.711 ACQUIRED ABSENCE OF UTERUS WITH REMAINING CERVICAL STUMP: Chronic | ICD-10-CM

## 2021-09-23 LAB
A1C WITH ESTIMATED AVERAGE GLUCOSE RESULT: 5.3 % — SIGNIFICANT CHANGE UP (ref 4–5.6)
ALBUMIN SERPL ELPH-MCNC: 4 G/DL — SIGNIFICANT CHANGE UP (ref 3.3–5)
ALP SERPL-CCNC: 138 U/L — HIGH (ref 40–120)
ALT FLD-CCNC: 34 U/L — SIGNIFICANT CHANGE UP (ref 12–78)
ANION GAP SERPL CALC-SCNC: 5 MMOL/L — SIGNIFICANT CHANGE UP (ref 5–17)
APPEARANCE UR: ABNORMAL
APTT BLD: 35.9 SEC — HIGH (ref 27.5–35.5)
AST SERPL-CCNC: 23 U/L — SIGNIFICANT CHANGE UP (ref 15–37)
BASOPHILS # BLD AUTO: 0.16 K/UL — SIGNIFICANT CHANGE UP (ref 0–0.2)
BASOPHILS NFR BLD AUTO: 2 % — SIGNIFICANT CHANGE UP (ref 0–2)
BILIRUB DIRECT SERPL-MCNC: <0.1 MG/DL — SIGNIFICANT CHANGE UP (ref 0–0.2)
BILIRUB SERPL-MCNC: 0.3 MG/DL — SIGNIFICANT CHANGE UP (ref 0.2–1.2)
BILIRUB UR-MCNC: NEGATIVE — SIGNIFICANT CHANGE UP
BUN SERPL-MCNC: 20 MG/DL — SIGNIFICANT CHANGE UP (ref 7–23)
CALCIUM SERPL-MCNC: 9 MG/DL — SIGNIFICANT CHANGE UP (ref 8.5–10.1)
CHLORIDE SERPL-SCNC: 108 MMOL/L — SIGNIFICANT CHANGE UP (ref 96–108)
CO2 SERPL-SCNC: 27 MMOL/L — SIGNIFICANT CHANGE UP (ref 22–31)
COLOR SPEC: YELLOW — SIGNIFICANT CHANGE UP
CREAT SERPL-MCNC: 0.76 MG/DL — SIGNIFICANT CHANGE UP (ref 0.5–1.3)
DIFF PNL FLD: ABNORMAL
EOSINOPHIL # BLD AUTO: 0.2 K/UL — SIGNIFICANT CHANGE UP (ref 0–0.5)
EOSINOPHIL NFR BLD AUTO: 2.5 % — SIGNIFICANT CHANGE UP (ref 0–6)
ESTIMATED AVERAGE GLUCOSE: 105 MG/DL — SIGNIFICANT CHANGE UP (ref 68–114)
GLUCOSE SERPL-MCNC: 80 MG/DL — SIGNIFICANT CHANGE UP (ref 70–99)
GLUCOSE UR QL: NEGATIVE MG/DL — SIGNIFICANT CHANGE UP
HCT VFR BLD CALC: 48.4 % — HIGH (ref 34.5–45)
HGB BLD-MCNC: 15.5 G/DL — SIGNIFICANT CHANGE UP (ref 11.5–15.5)
IMM GRANULOCYTES NFR BLD AUTO: 0.9 % — SIGNIFICANT CHANGE UP (ref 0–1.5)
INR BLD: 1.15 RATIO — SIGNIFICANT CHANGE UP (ref 0.88–1.16)
KETONES UR-MCNC: NEGATIVE — SIGNIFICANT CHANGE UP
LEUKOCYTE ESTERASE UR-ACNC: ABNORMAL
LYMPHOCYTES # BLD AUTO: 1.87 K/UL — SIGNIFICANT CHANGE UP (ref 1–3.3)
LYMPHOCYTES # BLD AUTO: 23.1 % — SIGNIFICANT CHANGE UP (ref 13–44)
MCHC RBC-ENTMCNC: 29.2 PG — SIGNIFICANT CHANGE UP (ref 27–34)
MCHC RBC-ENTMCNC: 32 GM/DL — SIGNIFICANT CHANGE UP (ref 32–36)
MCV RBC AUTO: 91.1 FL — SIGNIFICANT CHANGE UP (ref 80–100)
MONOCYTES # BLD AUTO: 0.67 K/UL — SIGNIFICANT CHANGE UP (ref 0–0.9)
MONOCYTES NFR BLD AUTO: 8.3 % — SIGNIFICANT CHANGE UP (ref 2–14)
NEUTROPHILS # BLD AUTO: 5.13 K/UL — SIGNIFICANT CHANGE UP (ref 1.8–7.4)
NEUTROPHILS NFR BLD AUTO: 63.2 % — SIGNIFICANT CHANGE UP (ref 43–77)
NITRITE UR-MCNC: NEGATIVE — SIGNIFICANT CHANGE UP
PH UR: 6 — SIGNIFICANT CHANGE UP (ref 5–8)
PLATELET # BLD AUTO: 776 K/UL — HIGH (ref 150–400)
POTASSIUM SERPL-MCNC: 4.2 MMOL/L — SIGNIFICANT CHANGE UP (ref 3.5–5.3)
POTASSIUM SERPL-SCNC: 4.2 MMOL/L — SIGNIFICANT CHANGE UP (ref 3.5–5.3)
PROT SERPL-MCNC: 7.4 GM/DL — SIGNIFICANT CHANGE UP (ref 6–8.3)
PROT UR-MCNC: 15 MG/DL
PROTHROM AB SERPL-ACNC: 13.3 SEC — SIGNIFICANT CHANGE UP (ref 10.6–13.6)
RBC # BLD: 5.31 M/UL — HIGH (ref 3.8–5.2)
RBC # FLD: 14.6 % — HIGH (ref 10.3–14.5)
SODIUM SERPL-SCNC: 140 MMOL/L — SIGNIFICANT CHANGE UP (ref 135–145)
SP GR SPEC: 1.01 — SIGNIFICANT CHANGE UP (ref 1.01–1.02)
UROBILINOGEN FLD QL: NEGATIVE MG/DL — SIGNIFICANT CHANGE UP
WBC # BLD: 8.1 K/UL — SIGNIFICANT CHANGE UP (ref 3.8–10.5)
WBC # FLD AUTO: 8.1 K/UL — SIGNIFICANT CHANGE UP (ref 3.8–10.5)

## 2021-09-23 PROCEDURE — 86900 BLOOD TYPING SEROLOGIC ABO: CPT

## 2021-09-23 PROCEDURE — 86850 RBC ANTIBODY SCREEN: CPT

## 2021-09-23 PROCEDURE — 80053 COMPREHEN METABOLIC PANEL: CPT

## 2021-09-23 PROCEDURE — 36415 COLL VENOUS BLD VENIPUNCTURE: CPT

## 2021-09-23 PROCEDURE — 93005 ELECTROCARDIOGRAM TRACING: CPT

## 2021-09-23 PROCEDURE — 85610 PROTHROMBIN TIME: CPT

## 2021-09-23 PROCEDURE — 83036 HEMOGLOBIN GLYCOSYLATED A1C: CPT

## 2021-09-23 PROCEDURE — 81001 URINALYSIS AUTO W/SCOPE: CPT

## 2021-09-23 PROCEDURE — 93010 ELECTROCARDIOGRAM REPORT: CPT

## 2021-09-23 PROCEDURE — 85025 COMPLETE CBC W/AUTO DIFF WBC: CPT

## 2021-09-23 PROCEDURE — 82248 BILIRUBIN DIRECT: CPT

## 2021-09-23 PROCEDURE — G0463: CPT | Mod: 25

## 2021-09-23 PROCEDURE — 85730 THROMBOPLASTIN TIME PARTIAL: CPT

## 2021-09-23 PROCEDURE — 86901 BLOOD TYPING SEROLOGIC RH(D): CPT

## 2021-09-23 RX ORDER — OMEPRAZOLE 10 MG/1
1 CAPSULE, DELAYED RELEASE ORAL
Qty: 0 | Refills: 0 | DISCHARGE

## 2021-09-23 NOTE — H&P PST ADULT - NSICDXPASTSURGICALHX_GEN_ALL_CORE_FT
PAST SURGICAL HISTORY:  H/O cardiac radiofrequency ablation 2001    H/O colonoscopy 3/4/2020    H/O endoscopy 2/24/2020 3/4/2020    History of partial hysterectomy due to fibroids 2001    History of surgery vein surgery  to fix valve    History of tonsillectomy 1970's    S/P gastric bypass 2015, Laparoscopic; revision of gastric
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
## 2021-09-23 NOTE — H&P PST ADULT - ASSESSMENT
61  year old female with ischemic colitis ; Pt said she has essential thrombocytosis which caused  ischemic colitis; currently on plavix which will be stopped 5 days prior to surgery; c/o alternating diarrhea with constipation; denies blood in stool ; she presents to PST for planned laparoscopic possible open sigmoid colon resection     Plan:  1. PST instructions given ; NPO status instructions to be given by ASU   2. Pt instructed to take following meds with sip of water : wellburtrin synthroid lamicatal enalapril   3. Pt instructed to take routine evening medications unless indicated   4. Stop NSAIDS ( Aspirin Alev Motrin Mobic Diclofenac), herbal supplements , MVI , Vitamin fish oil 7 days prior to surgery  unless   directed by surgeon or cardiologist;   5. Medical Optimization  with Dr Jeffries cardio Dr Weston and hem Dr Zach Urbina   6. EZ wash instructions given & mupirocin instructions given  7. Labs EKG CXR as per surgeon request   8. Pt instructed to self quarantine after Covid test   9. Covid Testing scheduled Pt notified and aware  10. Pt denies covid symptoms shortness of breath fever cough           CAPRINI SCORE [CLOT]    AGE RELATED RISK FACTORS                                                       MOBILITY RELATED FACTORS  [ x] Age 41-60 years                                            (1 Point)                  [ ] Bed rest                                                        (1 Point)  [ ] Age: 61-74 years                                           (2 Points)                 [ ] Plaster cast                                                   (2 Points)  [ ] Age= 75 years                                              (3 Points)                 [ ] Bed bound for more than 72 hours                 (2 Points)    DISEASE RELATED RISK FACTORS                                               GENDER SPECIFIC FACTORS  [ ] Edema in the lower extremities                       (1 Point)                  [ ] Pregnancy                                                     (1 Point)  [ ] Varicose veins                                               (1 Point)                  [ ] Post-partum < 6 weeks                                   (1 Point)             [x ] BMI > 25 Kg/m2                                            (1 Point)                  [ ] Hormonal therapy  or oral contraception          (1 Point)                 [ ] Sepsis (in the previous month)                        (1 Point)                  [ ] History of pregnancy complications                 (1 point)  [ ] Pneumonia or serious lung disease                                               [ ] Unexplained or recurrent                     (1 Point)           (in the previous month)                               (1 Point)  [ ] Abnormal pulmonary function test                     (1 Point)                 SURGERY RELATED RISK FACTORS  [ ] Acute myocardial infarction                              (1 Point)                 [ ]  Section                                             (1 Point)  [ ] Congestive heart failure (in the previous month)  (1 Point)               [ ] Minor surgery                                                  (1 Point)   [x ] Inflammatory bowel disease                             (1 Point)                 [ ] Arthroscopic surgery                                        (2 Points)  [ ] Central venous access                                      (2 Points)                [x ] General surgery lasting more than 45 minutes   (2 Points)       [ ] Stroke (in the previous month)                          (5 Points)               [ ] Elective arthroplasty                                         (5 Points)            ( x) past and present malignancy                             ( 2 points)                                                                                                                                    HEMATOLOGY RELATED FACTORS                                                 TRAUMA RELATED RISK FACTORS  [ x] Prior episodes of VTE                                     (3 Points)                 [ ] Fracture of the hip, pelvis, or leg                       (5 Points)  [ ] Positive family history for VTE                         (3 Points)                 [ ] Acute spinal cord injury (in the previous month)  (5 Points)  [ ] Prothrombin 11309 A                                     (3 Points)                 [ ] Paralysis  (less than 1 month)                             (5 Points)  [ ] Factor V Leiden                                             (3 Points)                  [ ] Multiple Trauma within 1 month                        (5 Points)  [ ] Lupus anticoagulants                                     (3 Points)                                                           [ ] Anticardiolipin antibodies                               (3 Points)                                                       [ ] High homocysteine in the blood                      (3 Points)                                             [ ] Other congenital or acquired thrombophilia      (3 Points)                                                [ ] Heparin induced thrombocytopenia                  (3 Points)                                          Total Score [    10      ]

## 2021-09-23 NOTE — H&P PST ADULT - PATIENT ON (OXYGEN DELIVERY METHOD)
12/29/2017      TO: Whom it may concern.  RE: Maryan Ronquillo:    Please excuse patient from her missed day of work today, she has notified us she had a problem with her insulin pump tubing getting pulled out, and needed to go home.    Sincerely,    Ajay Ovalle PA-C  Endocrinology  [address] [place] [id number]  [phone]       room air

## 2021-09-23 NOTE — H&P PST ADULT - HISTORY OF PRESENT ILLNESS
59 year old female with PMH of Obesity, Ventricular tachycardia, DVT, PE, GERD. Pt reports history of gastric bypass in 2015 with weight loss of about 80 lbs. Pt c/o abdominal pain, blood in stool, n/v, bloating and weight gain. Pt reports she had upper endoscopy 3 months ago that showed ulceration in previous gastric bypass. She is here for PST for planned Revision of gastric bypass. 61  year old female with ischemic colitis ; Pt said she has essential thrombocytosis which caused  ischemic colitis; currently on plavix which will be stopped 5 days prior to surgery; c/o alternating diarrhea with constipation; denies blood in stool ; she presents to [facility] for planned laparoscopic possible open sigmoid colon resection

## 2021-09-24 DIAGNOSIS — K55.059 ACUTE (REVERSIBLE) ISCHEMIA OF INTESTINE, PART AND EXTENT UNSPECIFIED: ICD-10-CM

## 2021-09-24 DIAGNOSIS — Z01.818 ENCOUNTER FOR OTHER PREPROCEDURAL EXAMINATION: ICD-10-CM

## 2021-09-27 PROBLEM — F31.9 BIPOLAR DISORDER, UNSPECIFIED: Chronic | Status: ACTIVE | Noted: 2021-09-23

## 2021-09-27 PROBLEM — K25.9 GASTRIC ULCER, UNSPECIFIED AS ACUTE OR CHRONIC, WITHOUT HEMORRHAGE OR PERFORATION: Chronic | Status: ACTIVE | Noted: 2021-09-23

## 2021-09-27 PROBLEM — Z92.29 PERSONAL HISTORY OF OTHER DRUG THERAPY: Chronic | Status: ACTIVE | Noted: 2021-09-23

## 2021-09-29 RX ORDER — SODIUM CHLORIDE 9 MG/ML
3 INJECTION INTRAMUSCULAR; INTRAVENOUS; SUBCUTANEOUS EVERY 8 HOURS
Refills: 0 | Status: DISCONTINUED | OUTPATIENT
Start: 2021-10-04 | End: 2021-10-09

## 2021-10-01 ENCOUNTER — APPOINTMENT (OUTPATIENT)
Dept: DISASTER EMERGENCY | Facility: CLINIC | Age: 61
End: 2021-10-01

## 2021-10-01 RX ORDER — SODIUM CHLORIDE 9 MG/ML
1000 INJECTION, SOLUTION INTRAVENOUS
Refills: 0 | Status: DISCONTINUED | OUTPATIENT
Start: 2021-10-04 | End: 2021-10-04

## 2021-10-01 RX ORDER — FENTANYL CITRATE 50 UG/ML
50 INJECTION INTRAVENOUS
Refills: 0 | Status: DISCONTINUED | OUTPATIENT
Start: 2021-10-04 | End: 2021-10-04

## 2021-10-01 RX ORDER — ONDANSETRON 8 MG/1
4 TABLET, FILM COATED ORAL ONCE
Refills: 0 | Status: DISCONTINUED | OUTPATIENT
Start: 2021-10-04 | End: 2021-10-04

## 2021-10-02 LAB — SARS-COV-2 N GENE NPH QL NAA+PROBE: NOT DETECTED

## 2021-10-04 ENCOUNTER — INPATIENT (INPATIENT)
Facility: HOSPITAL | Age: 61
LOS: 4 days | Discharge: HOME | DRG: 329 | End: 2021-10-09
Attending: COLON & RECTAL SURGERY | Admitting: COLON & RECTAL SURGERY
Payer: COMMERCIAL

## 2021-10-04 ENCOUNTER — RESULT REVIEW (OUTPATIENT)
Age: 61
End: 2021-10-04

## 2021-10-04 ENCOUNTER — APPOINTMENT (OUTPATIENT)
Dept: COLORECTAL SURGERY | Facility: HOSPITAL | Age: 61
End: 2021-10-04
Payer: COMMERCIAL

## 2021-10-04 VITALS
HEIGHT: 63 IN | HEART RATE: 62 BPM | WEIGHT: 145.95 LBS | RESPIRATION RATE: 15 BRPM | TEMPERATURE: 98 F | OXYGEN SATURATION: 96 % | DIASTOLIC BLOOD PRESSURE: 62 MMHG | SYSTOLIC BLOOD PRESSURE: 149 MMHG

## 2021-10-04 DIAGNOSIS — Z98.890 OTHER SPECIFIED POSTPROCEDURAL STATES: Chronic | ICD-10-CM

## 2021-10-04 DIAGNOSIS — Z90.89 ACQUIRED ABSENCE OF OTHER ORGANS: Chronic | ICD-10-CM

## 2021-10-04 DIAGNOSIS — Z98.84 BARIATRIC SURGERY STATUS: Chronic | ICD-10-CM

## 2021-10-04 DIAGNOSIS — K55.059 ACUTE (REVERSIBLE) ISCHEMIA OF INTESTINE, PART AND EXTENT UNSPECIFIED: ICD-10-CM

## 2021-10-04 DIAGNOSIS — Z90.711 ACQUIRED ABSENCE OF UTERUS WITH REMAINING CERVICAL STUMP: Chronic | ICD-10-CM

## 2021-10-04 PROCEDURE — 85025 COMPLETE CBC W/AUTO DIFF WBC: CPT

## 2021-10-04 PROCEDURE — 85730 THROMBOPLASTIN TIME PARTIAL: CPT

## 2021-10-04 PROCEDURE — 93016 CV STRESS TEST SUPVJ ONLY: CPT

## 2021-10-04 PROCEDURE — 71275 CT ANGIOGRAPHY CHEST: CPT

## 2021-10-04 PROCEDURE — 84100 ASSAY OF PHOSPHORUS: CPT

## 2021-10-04 PROCEDURE — 93970 EXTREMITY STUDY: CPT

## 2021-10-04 PROCEDURE — 93017 CV STRESS TEST TRACING ONLY: CPT

## 2021-10-04 PROCEDURE — 45300 PROCTOSIGMOIDOSCOPY DX: CPT

## 2021-10-04 PROCEDURE — 99221 1ST HOSP IP/OBS SF/LOW 40: CPT

## 2021-10-04 PROCEDURE — 44213 LAP MOBIL SPLENIC FL ADD-ON: CPT | Mod: AS

## 2021-10-04 PROCEDURE — 90686 IIV4 VACC NO PRSV 0.5 ML IM: CPT

## 2021-10-04 PROCEDURE — C9399: CPT

## 2021-10-04 PROCEDURE — 80053 COMPREHEN METABOLIC PANEL: CPT

## 2021-10-04 PROCEDURE — 36415 COLL VENOUS BLD VENIPUNCTURE: CPT

## 2021-10-04 PROCEDURE — 44207 L COLECTOMY/COLOPROCTOSTOMY: CPT

## 2021-10-04 PROCEDURE — 85610 PROTHROMBIN TIME: CPT

## 2021-10-04 PROCEDURE — 93005 ELECTROCARDIOGRAM TRACING: CPT

## 2021-10-04 PROCEDURE — 93018 CV STRESS TEST I&R ONLY: CPT

## 2021-10-04 PROCEDURE — 88304 TISSUE EXAM BY PATHOLOGIST: CPT | Mod: 26

## 2021-10-04 PROCEDURE — 78452 HT MUSCLE IMAGE SPECT MULT: CPT

## 2021-10-04 PROCEDURE — 93308 TTE F-UP OR LMTD: CPT

## 2021-10-04 PROCEDURE — 83735 ASSAY OF MAGNESIUM: CPT

## 2021-10-04 PROCEDURE — 86850 RBC ANTIBODY SCREEN: CPT

## 2021-10-04 PROCEDURE — 85027 COMPLETE CBC AUTOMATED: CPT

## 2021-10-04 PROCEDURE — A9500: CPT

## 2021-10-04 PROCEDURE — 88304 TISSUE EXAM BY PATHOLOGIST: CPT

## 2021-10-04 PROCEDURE — 86900 BLOOD TYPING SEROLOGIC ABO: CPT

## 2021-10-04 PROCEDURE — 88307 TISSUE EXAM BY PATHOLOGIST: CPT

## 2021-10-04 PROCEDURE — G0008: CPT

## 2021-10-04 PROCEDURE — 80048 BASIC METABOLIC PNL TOTAL CA: CPT

## 2021-10-04 PROCEDURE — 44207 L COLECTOMY/COLOPROCTOSTOMY: CPT | Mod: AS

## 2021-10-04 PROCEDURE — 86901 BLOOD TYPING SEROLOGIC RH(D): CPT

## 2021-10-04 PROCEDURE — 86769 SARS-COV-2 COVID-19 ANTIBODY: CPT

## 2021-10-04 PROCEDURE — 82962 GLUCOSE BLOOD TEST: CPT

## 2021-10-04 PROCEDURE — 84484 ASSAY OF TROPONIN QUANT: CPT

## 2021-10-04 PROCEDURE — 88307 TISSUE EXAM BY PATHOLOGIST: CPT | Mod: 26

## 2021-10-04 PROCEDURE — C1889: CPT

## 2021-10-04 PROCEDURE — 44213 LAP MOBIL SPLENIC FL ADD-ON: CPT

## 2021-10-04 RX ORDER — ATORVASTATIN CALCIUM 80 MG/1
80 TABLET, FILM COATED ORAL AT BEDTIME
Refills: 0 | Status: DISCONTINUED | OUTPATIENT
Start: 2021-10-04 | End: 2021-10-09

## 2021-10-04 RX ORDER — OXYCODONE HYDROCHLORIDE 5 MG/1
10 TABLET ORAL EVERY 4 HOURS
Refills: 0 | Status: DISCONTINUED | OUTPATIENT
Start: 2021-10-04 | End: 2021-10-09

## 2021-10-04 RX ORDER — METHENAMINE MANDELATE 1 G
0 TABLET ORAL
Qty: 0 | Refills: 0 | DISCHARGE

## 2021-10-04 RX ORDER — LAMOTRIGINE 25 MG/1
200 TABLET, ORALLY DISINTEGRATING ORAL DAILY
Refills: 0 | Status: DISCONTINUED | OUTPATIENT
Start: 2021-10-04 | End: 2021-10-09

## 2021-10-04 RX ORDER — LAMOTRIGINE 25 MG/1
1 TABLET, ORALLY DISINTEGRATING ORAL
Qty: 0 | Refills: 0 | DISCHARGE

## 2021-10-04 RX ORDER — HYDROXYUREA 500 MG/1
0 CAPSULE ORAL
Qty: 0 | Refills: 0 | DISCHARGE

## 2021-10-04 RX ORDER — MODAFINIL 200 MG/1
400 TABLET ORAL DAILY
Refills: 0 | Status: DISCONTINUED | OUTPATIENT
Start: 2021-10-04 | End: 2021-10-09

## 2021-10-04 RX ORDER — LEVOTHYROXINE SODIUM 125 MCG
88 TABLET ORAL DAILY
Refills: 0 | Status: DISCONTINUED | OUTPATIENT
Start: 2021-10-04 | End: 2021-10-09

## 2021-10-04 RX ORDER — HYDROMORPHONE HYDROCHLORIDE 2 MG/ML
1 INJECTION INTRAMUSCULAR; INTRAVENOUS; SUBCUTANEOUS EVERY 4 HOURS
Refills: 0 | Status: DISCONTINUED | OUTPATIENT
Start: 2021-10-04 | End: 2021-10-08

## 2021-10-04 RX ORDER — SODIUM CHLORIDE 9 MG/ML
1000 INJECTION, SOLUTION INTRAVENOUS ONCE
Refills: 0 | Status: COMPLETED | OUTPATIENT
Start: 2021-10-04 | End: 2021-10-04

## 2021-10-04 RX ORDER — SODIUM CHLORIDE 9 MG/ML
1000 INJECTION, SOLUTION INTRAVENOUS
Refills: 0 | Status: DISCONTINUED | OUTPATIENT
Start: 2021-10-04 | End: 2021-10-06

## 2021-10-04 RX ORDER — ALVIMOPAN 12 MG/1
12 CAPSULE ORAL ONCE
Refills: 0 | Status: COMPLETED | OUTPATIENT
Start: 2021-10-04 | End: 2021-10-04

## 2021-10-04 RX ORDER — LINACLOTIDE 145 UG/1
0 CAPSULE, GELATIN COATED ORAL
Qty: 0 | Refills: 0 | DISCHARGE

## 2021-10-04 RX ORDER — ONDANSETRON 8 MG/1
4 TABLET, FILM COATED ORAL EVERY 6 HOURS
Refills: 0 | Status: DISCONTINUED | OUTPATIENT
Start: 2021-10-04 | End: 2021-10-09

## 2021-10-04 RX ORDER — DESMOPRESSIN ACETATE 0.1 MG/1
20 TABLET ORAL ONCE
Refills: 0 | Status: DISCONTINUED | OUTPATIENT
Start: 2021-10-04 | End: 2021-10-04

## 2021-10-04 RX ORDER — ACETAMINOPHEN 500 MG
1000 TABLET ORAL EVERY 6 HOURS
Refills: 0 | Status: COMPLETED | OUTPATIENT
Start: 2021-10-04 | End: 2021-10-05

## 2021-10-04 RX ORDER — ACETAMINOPHEN 500 MG
650 TABLET ORAL EVERY 6 HOURS
Refills: 0 | Status: DISCONTINUED | OUTPATIENT
Start: 2021-10-04 | End: 2021-10-08

## 2021-10-04 RX ORDER — ALVIMOPAN 12 MG/1
12 CAPSULE ORAL
Refills: 0 | Status: COMPLETED | OUTPATIENT
Start: 2021-10-04 | End: 2021-10-07

## 2021-10-04 RX ORDER — INFLUENZA VIRUS VACCINE 15; 15; 15; 15 UG/.5ML; UG/.5ML; UG/.5ML; UG/.5ML
0.5 SUSPENSION INTRAMUSCULAR ONCE
Refills: 0 | Status: COMPLETED | OUTPATIENT
Start: 2021-10-04 | End: 2021-10-09

## 2021-10-04 RX ORDER — HEPARIN SODIUM 5000 [USP'U]/ML
5000 INJECTION INTRAVENOUS; SUBCUTANEOUS ONCE
Refills: 0 | Status: COMPLETED | OUTPATIENT
Start: 2021-10-04 | End: 2021-10-04

## 2021-10-04 RX ORDER — BENZOYL PEROXIDE MICRONIZED 5.8 %
1 TOWELETTE (EA) TOPICAL
Qty: 0 | Refills: 0 | DISCHARGE

## 2021-10-04 RX ORDER — MODAFINIL 200 MG/1
1 TABLET ORAL
Qty: 0 | Refills: 0 | DISCHARGE

## 2021-10-04 RX ORDER — OXYCODONE HYDROCHLORIDE 5 MG/1
5 TABLET ORAL EVERY 4 HOURS
Refills: 0 | Status: DISCONTINUED | OUTPATIENT
Start: 2021-10-04 | End: 2021-10-09

## 2021-10-04 RX ORDER — HEPARIN SODIUM 5000 [USP'U]/ML
5000 INJECTION INTRAVENOUS; SUBCUTANEOUS EVERY 8 HOURS
Refills: 0 | Status: DISCONTINUED | OUTPATIENT
Start: 2021-10-05 | End: 2021-10-06

## 2021-10-04 RX ORDER — BUPROPION HYDROCHLORIDE 150 MG/1
300 TABLET, EXTENDED RELEASE ORAL DAILY
Refills: 0 | Status: DISCONTINUED | OUTPATIENT
Start: 2021-10-04 | End: 2021-10-09

## 2021-10-04 RX ORDER — MORPHINE SULFATE 50 MG/1
2 CAPSULE, EXTENDED RELEASE ORAL EVERY 4 HOURS
Refills: 0 | Status: DISCONTINUED | OUTPATIENT
Start: 2021-10-04 | End: 2021-10-08

## 2021-10-04 RX ORDER — CEFOTETAN DISODIUM 1 G
2 VIAL (EA) INJECTION ONCE
Refills: 0 | Status: COMPLETED | OUTPATIENT
Start: 2021-10-04 | End: 2021-10-04

## 2021-10-04 RX ORDER — HYDROMORPHONE HYDROCHLORIDE 2 MG/ML
0.5 INJECTION INTRAMUSCULAR; INTRAVENOUS; SUBCUTANEOUS
Refills: 0 | Status: DISCONTINUED | OUTPATIENT
Start: 2021-10-04 | End: 2021-10-05

## 2021-10-04 RX ORDER — BUPROPION HYDROCHLORIDE 150 MG/1
1 TABLET, EXTENDED RELEASE ORAL
Qty: 0 | Refills: 0 | DISCHARGE

## 2021-10-04 RX ORDER — DESMOPRESSIN ACETATE 0.1 MG/1
18 TABLET ORAL ONCE
Refills: 0 | Status: COMPLETED | OUTPATIENT
Start: 2021-10-04 | End: 2021-10-04

## 2021-10-04 RX ADMIN — ATORVASTATIN CALCIUM 80 MILLIGRAM(S): 80 TABLET, FILM COATED ORAL at 21:17

## 2021-10-04 RX ADMIN — Medication 100 GRAM(S): at 22:43

## 2021-10-04 RX ADMIN — FENTANYL CITRATE 50 MICROGRAM(S): 50 INJECTION INTRAVENOUS at 10:25

## 2021-10-04 RX ADMIN — OXYCODONE HYDROCHLORIDE 10 MILLIGRAM(S): 5 TABLET ORAL at 16:30

## 2021-10-04 RX ADMIN — HYDROMORPHONE HYDROCHLORIDE 1 MILLIGRAM(S): 2 INJECTION INTRAMUSCULAR; INTRAVENOUS; SUBCUTANEOUS at 22:38

## 2021-10-04 RX ADMIN — SODIUM CHLORIDE 1000 MILLILITER(S): 9 INJECTION, SOLUTION INTRAVENOUS at 12:46

## 2021-10-04 RX ADMIN — ALVIMOPAN 12 MILLIGRAM(S): 12 CAPSULE ORAL at 22:43

## 2021-10-04 RX ADMIN — HEPARIN SODIUM 5000 UNIT(S): 5000 INJECTION INTRAVENOUS; SUBCUTANEOUS at 07:14

## 2021-10-04 RX ADMIN — HYDROMORPHONE HYDROCHLORIDE 0.5 MILLIGRAM(S): 2 INJECTION INTRAMUSCULAR; INTRAVENOUS; SUBCUTANEOUS at 11:13

## 2021-10-04 RX ADMIN — HYDROMORPHONE HYDROCHLORIDE 0.5 MILLIGRAM(S): 2 INJECTION INTRAMUSCULAR; INTRAVENOUS; SUBCUTANEOUS at 11:30

## 2021-10-04 RX ADMIN — SODIUM CHLORIDE 125 MILLILITER(S): 9 INJECTION, SOLUTION INTRAVENOUS at 10:26

## 2021-10-04 RX ADMIN — Medication 1000 MILLIGRAM(S): at 15:26

## 2021-10-04 RX ADMIN — FENTANYL CITRATE 50 MICROGRAM(S): 50 INJECTION INTRAVENOUS at 10:45

## 2021-10-04 RX ADMIN — OXYCODONE HYDROCHLORIDE 10 MILLIGRAM(S): 5 TABLET ORAL at 15:39

## 2021-10-04 RX ADMIN — ONDANSETRON 4 MILLIGRAM(S): 8 TABLET, FILM COATED ORAL at 15:40

## 2021-10-04 RX ADMIN — ALVIMOPAN 12 MILLIGRAM(S): 12 CAPSULE ORAL at 07:15

## 2021-10-04 RX ADMIN — HYDROMORPHONE HYDROCHLORIDE 1 MILLIGRAM(S): 2 INJECTION INTRAMUSCULAR; INTRAVENOUS; SUBCUTANEOUS at 22:53

## 2021-10-04 RX ADMIN — FENTANYL CITRATE 50 MICROGRAM(S): 50 INJECTION INTRAVENOUS at 10:46

## 2021-10-04 RX ADMIN — FENTANYL CITRATE 50 MICROGRAM(S): 50 INJECTION INTRAVENOUS at 11:05

## 2021-10-04 RX ADMIN — HYDROMORPHONE HYDROCHLORIDE 0.5 MILLIGRAM(S): 2 INJECTION INTRAMUSCULAR; INTRAVENOUS; SUBCUTANEOUS at 12:41

## 2021-10-04 RX ADMIN — DESMOPRESSIN ACETATE 218 MICROGRAM(S): 0.1 TABLET ORAL at 07:53

## 2021-10-04 RX ADMIN — HYDROMORPHONE HYDROCHLORIDE 0.5 MILLIGRAM(S): 2 INJECTION INTRAMUSCULAR; INTRAVENOUS; SUBCUTANEOUS at 13:30

## 2021-10-04 RX ADMIN — Medication 1000 MILLIGRAM(S): at 21:32

## 2021-10-04 RX ADMIN — Medication 400 MILLIGRAM(S): at 15:12

## 2021-10-04 RX ADMIN — Medication 400 MILLIGRAM(S): at 21:17

## 2021-10-04 RX ADMIN — Medication 10 MILLIGRAM(S): at 22:43

## 2021-10-04 NOTE — BRIEF OPERATIVE NOTE - NSICDXBRIEFPOSTOP_GEN_ALL_CORE_FT
POST-OP DIAGNOSIS:  Colon stricture 04-Oct-2021 10:20:17  Boris Robin  Ischemic colitis 04-Oct-2021 10:20:13  Boris Robin

## 2021-10-04 NOTE — BRIEF OPERATIVE NOTE - NSICDXBRIEFPREOP_GEN_ALL_CORE_FT
PRE-OP DIAGNOSIS:  Ischemic colitis 04-Oct-2021 10:19:37  Boris Robin  Colon stricture 04-Oct-2021 10:19:46  Boris Robin

## 2021-10-04 NOTE — CONSULT NOTE ADULT - ATTENDING COMMENTS
Patient is seen and examined at bedside with ARNAV Nathan  62yo/F presented for lap sigmoidectomy for management of ischemic bowel  Seen postop, lethargic from pain meds  Maya with clear urine  Pervena+  Diet per CRS team  OOb to ambulet  Resume Hydroxyurea per hem notes  DVT proph heparin SQ  Agree with above assessment and plan.

## 2021-10-04 NOTE — PROVIDER CONTACT NOTE (OTHER) - BACKGROUND
POD 1, PMH HTN, GERD, DVT, bipolar disorder, von willebrand disease POD 0, PMH HTN, GERD, DVT, bipolar disorder, von willebrand disease

## 2021-10-04 NOTE — BRIEF OPERATIVE NOTE - NSICDXBRIEFPROCEDURE_GEN_ALL_CORE_FT
PROCEDURES:  Hand assisted laparoscopic left colectomy 04-Oct-2021 10:18:33  Boris Robin  Laparoscopic resection of left or sigmoid colon 04-Oct-2021 10:19:02  Boris Robin  Mobilization of splenic flexure 04-Oct-2021 10:19:12  Boris Robin  Rigid proctosigmoidoscpy 04-Oct-2021 10:19:22  Boris Robin
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
## 2021-10-04 NOTE — PROVIDER CONTACT NOTE (OTHER) - ACTION/TREATMENT ORDERED:
medicated with IV tylenol and Dilaudid, pt seen at bedside by MD at 2230, no further interventions at this time, MD will return to re-evaulate pt around , pericare provided, pt repositioned

## 2021-10-04 NOTE — CONSULT NOTE ADULT - ASSESSMENT
Hx of VT- no arrythmias on tele.  She denies any active cardiac symptoms now.  Close monitoring of the electrolytes , Goal K of 4 and Mag 2.  Check 2  Decho.    HTN- close monitoring of the BP.    Other medical issues- Management per primary team.   Thank you for allowing me to participate in the care of this patient. Please feel free to contact me with any questions.

## 2021-10-04 NOTE — PROVIDER CONTACT NOTE (OTHER) - ASSESSMENT
pt c/o pain 8/10, copious bright red bloody discharge noted on pillow underneath pt, VSS, pt anxious c/o of trouble breathing and chest discomfort, denies nausea/vomiting, abdomen tender but non-distended
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
## 2021-10-04 NOTE — ASU PREOP CHECKLIST - ADVANCE DIRECTIVE ADDRESSED/READDRESSED
Referred To Oculoplastics For Closure Text (Leave Blank If You Do Not Want): After obtaining clear surgical margins the patient was sent to oculoplastics for surgical repair.  The patient understands they will receive post-surgical care and follow-up from the referring physician's office. done

## 2021-10-05 LAB
ANION GAP SERPL CALC-SCNC: 5 MMOL/L — SIGNIFICANT CHANGE UP (ref 5–17)
BASOPHILS # BLD AUTO: 0.14 K/UL — SIGNIFICANT CHANGE UP (ref 0–0.2)
BASOPHILS NFR BLD AUTO: 1.3 % — SIGNIFICANT CHANGE UP (ref 0–2)
BUN SERPL-MCNC: 9 MG/DL — SIGNIFICANT CHANGE UP (ref 7–23)
CALCIUM SERPL-MCNC: 8.5 MG/DL — SIGNIFICANT CHANGE UP (ref 8.5–10.1)
CHLORIDE SERPL-SCNC: 102 MMOL/L — SIGNIFICANT CHANGE UP (ref 96–108)
CO2 SERPL-SCNC: 28 MMOL/L — SIGNIFICANT CHANGE UP (ref 22–31)
COVID-19 SPIKE DOMAIN AB INTERP: POSITIVE
COVID-19 SPIKE DOMAIN ANTIBODY RESULT: >250 U/ML — HIGH
CREAT SERPL-MCNC: 0.64 MG/DL — SIGNIFICANT CHANGE UP (ref 0.5–1.3)
EOSINOPHIL # BLD AUTO: 0.19 K/UL — SIGNIFICANT CHANGE UP (ref 0–0.5)
EOSINOPHIL NFR BLD AUTO: 1.8 % — SIGNIFICANT CHANGE UP (ref 0–6)
GLUCOSE SERPL-MCNC: 106 MG/DL — HIGH (ref 70–99)
HCT VFR BLD CALC: 41 % — SIGNIFICANT CHANGE UP (ref 34.5–45)
HGB BLD-MCNC: 13.1 G/DL — SIGNIFICANT CHANGE UP (ref 11.5–15.5)
IMM GRANULOCYTES NFR BLD AUTO: 0.5 % — SIGNIFICANT CHANGE UP (ref 0–1.5)
LYMPHOCYTES # BLD AUTO: 1.34 K/UL — SIGNIFICANT CHANGE UP (ref 1–3.3)
LYMPHOCYTES # BLD AUTO: 12.9 % — LOW (ref 13–44)
MCHC RBC-ENTMCNC: 29.4 PG — SIGNIFICANT CHANGE UP (ref 27–34)
MCHC RBC-ENTMCNC: 32 GM/DL — SIGNIFICANT CHANGE UP (ref 32–36)
MCV RBC AUTO: 92.1 FL — SIGNIFICANT CHANGE UP (ref 80–100)
MONOCYTES # BLD AUTO: 1.09 K/UL — HIGH (ref 0–0.9)
MONOCYTES NFR BLD AUTO: 10.5 % — SIGNIFICANT CHANGE UP (ref 2–14)
NEUTROPHILS # BLD AUTO: 7.58 K/UL — HIGH (ref 1.8–7.4)
NEUTROPHILS NFR BLD AUTO: 73 % — SIGNIFICANT CHANGE UP (ref 43–77)
PLATELET # BLD AUTO: 599 K/UL — HIGH (ref 150–400)
POTASSIUM SERPL-MCNC: 4.4 MMOL/L — SIGNIFICANT CHANGE UP (ref 3.5–5.3)
POTASSIUM SERPL-SCNC: 4.4 MMOL/L — SIGNIFICANT CHANGE UP (ref 3.5–5.3)
RBC # BLD: 4.45 M/UL — SIGNIFICANT CHANGE UP (ref 3.8–5.2)
RBC # FLD: 15.3 % — HIGH (ref 10.3–14.5)
SARS-COV-2 IGG+IGM SERPL QL IA: >250 U/ML — HIGH
SARS-COV-2 IGG+IGM SERPL QL IA: POSITIVE
SODIUM SERPL-SCNC: 135 MMOL/L — SIGNIFICANT CHANGE UP (ref 135–145)
WBC # BLD: 10.39 K/UL — SIGNIFICANT CHANGE UP (ref 3.8–10.5)
WBC # FLD AUTO: 10.39 K/UL — SIGNIFICANT CHANGE UP (ref 3.8–10.5)

## 2021-10-05 PROCEDURE — 99232 SBSQ HOSP IP/OBS MODERATE 35: CPT

## 2021-10-05 RX ORDER — HYDROXYUREA 500 MG/1
500 CAPSULE ORAL EVERY OTHER DAY
Refills: 0 | Status: DISCONTINUED | OUTPATIENT
Start: 2021-10-06 | End: 2021-10-09

## 2021-10-05 RX ORDER — HYDROXYUREA 500 MG/1
1000 CAPSULE ORAL EVERY OTHER DAY
Refills: 0 | Status: DISCONTINUED | OUTPATIENT
Start: 2021-10-05 | End: 2021-10-09

## 2021-10-05 RX ORDER — HYDROXYUREA 500 MG/1
1000 CAPSULE ORAL DAILY
Refills: 0 | Status: DISCONTINUED | OUTPATIENT
Start: 2021-10-05 | End: 2021-10-05

## 2021-10-05 RX ADMIN — HEPARIN SODIUM 5000 UNIT(S): 5000 INJECTION INTRAVENOUS; SUBCUTANEOUS at 21:05

## 2021-10-05 RX ADMIN — HYDROMORPHONE HYDROCHLORIDE 1 MILLIGRAM(S): 2 INJECTION INTRAMUSCULAR; INTRAVENOUS; SUBCUTANEOUS at 03:40

## 2021-10-05 RX ADMIN — OXYCODONE HYDROCHLORIDE 10 MILLIGRAM(S): 5 TABLET ORAL at 20:07

## 2021-10-05 RX ADMIN — SODIUM CHLORIDE 125 MILLILITER(S): 9 INJECTION, SOLUTION INTRAVENOUS at 07:33

## 2021-10-05 RX ADMIN — BUPROPION HYDROCHLORIDE 300 MILLIGRAM(S): 150 TABLET, EXTENDED RELEASE ORAL at 09:42

## 2021-10-05 RX ADMIN — OXYCODONE HYDROCHLORIDE 10 MILLIGRAM(S): 5 TABLET ORAL at 11:27

## 2021-10-05 RX ADMIN — HYDROXYUREA 1000 MILLIGRAM(S): 500 CAPSULE ORAL at 21:06

## 2021-10-05 RX ADMIN — Medication 10 MILLIGRAM(S): at 21:05

## 2021-10-05 RX ADMIN — Medication 400 MILLIGRAM(S): at 03:30

## 2021-10-05 RX ADMIN — Medication 1000 MILLIGRAM(S): at 09:43

## 2021-10-05 RX ADMIN — HEPARIN SODIUM 5000 UNIT(S): 5000 INJECTION INTRAVENOUS; SUBCUTANEOUS at 13:30

## 2021-10-05 RX ADMIN — MORPHINE SULFATE 2 MILLIGRAM(S): 50 CAPSULE, EXTENDED RELEASE ORAL at 16:20

## 2021-10-05 RX ADMIN — HYDROMORPHONE HYDROCHLORIDE 1 MILLIGRAM(S): 2 INJECTION INTRAMUSCULAR; INTRAVENOUS; SUBCUTANEOUS at 12:52

## 2021-10-05 RX ADMIN — Medication 10 MILLIGRAM(S): at 09:42

## 2021-10-05 RX ADMIN — SODIUM CHLORIDE 3 MILLILITER(S): 9 INJECTION INTRAMUSCULAR; INTRAVENOUS; SUBCUTANEOUS at 05:09

## 2021-10-05 RX ADMIN — HEPARIN SODIUM 5000 UNIT(S): 5000 INJECTION INTRAVENOUS; SUBCUTANEOUS at 05:18

## 2021-10-05 RX ADMIN — OXYCODONE HYDROCHLORIDE 10 MILLIGRAM(S): 5 TABLET ORAL at 12:20

## 2021-10-05 RX ADMIN — Medication 400 MILLIGRAM(S): at 09:43

## 2021-10-05 RX ADMIN — HYDROMORPHONE HYDROCHLORIDE 1 MILLIGRAM(S): 2 INJECTION INTRAMUSCULAR; INTRAVENOUS; SUBCUTANEOUS at 17:09

## 2021-10-05 RX ADMIN — MORPHINE SULFATE 2 MILLIGRAM(S): 50 CAPSULE, EXTENDED RELEASE ORAL at 16:35

## 2021-10-05 RX ADMIN — HYDROMORPHONE HYDROCHLORIDE 1 MILLIGRAM(S): 2 INJECTION INTRAMUSCULAR; INTRAVENOUS; SUBCUTANEOUS at 16:54

## 2021-10-05 RX ADMIN — SODIUM CHLORIDE 125 MILLILITER(S): 9 INJECTION, SOLUTION INTRAVENOUS at 16:15

## 2021-10-05 RX ADMIN — Medication 1000 MILLIGRAM(S): at 03:45

## 2021-10-05 RX ADMIN — ALVIMOPAN 12 MILLIGRAM(S): 12 CAPSULE ORAL at 09:42

## 2021-10-05 RX ADMIN — HYDROMORPHONE HYDROCHLORIDE 1 MILLIGRAM(S): 2 INJECTION INTRAMUSCULAR; INTRAVENOUS; SUBCUTANEOUS at 07:54

## 2021-10-05 RX ADMIN — HYDROMORPHONE HYDROCHLORIDE 1 MILLIGRAM(S): 2 INJECTION INTRAMUSCULAR; INTRAVENOUS; SUBCUTANEOUS at 03:25

## 2021-10-05 RX ADMIN — MODAFINIL 400 MILLIGRAM(S): 200 TABLET ORAL at 11:42

## 2021-10-05 RX ADMIN — ATORVASTATIN CALCIUM 80 MILLIGRAM(S): 80 TABLET, FILM COATED ORAL at 21:05

## 2021-10-05 RX ADMIN — HYDROMORPHONE HYDROCHLORIDE 1 MILLIGRAM(S): 2 INJECTION INTRAMUSCULAR; INTRAVENOUS; SUBCUTANEOUS at 13:06

## 2021-10-05 RX ADMIN — HYDROMORPHONE HYDROCHLORIDE 1 MILLIGRAM(S): 2 INJECTION INTRAMUSCULAR; INTRAVENOUS; SUBCUTANEOUS at 21:32

## 2021-10-05 RX ADMIN — SODIUM CHLORIDE 3 MILLILITER(S): 9 INJECTION INTRAMUSCULAR; INTRAVENOUS; SUBCUTANEOUS at 14:22

## 2021-10-05 RX ADMIN — OXYCODONE HYDROCHLORIDE 10 MILLIGRAM(S): 5 TABLET ORAL at 20:37

## 2021-10-05 RX ADMIN — Medication 1 TABLET(S): at 09:46

## 2021-10-05 RX ADMIN — HYDROMORPHONE HYDROCHLORIDE 1 MILLIGRAM(S): 2 INJECTION INTRAMUSCULAR; INTRAVENOUS; SUBCUTANEOUS at 08:10

## 2021-10-05 RX ADMIN — SODIUM CHLORIDE 3 MILLILITER(S): 9 INJECTION INTRAMUSCULAR; INTRAVENOUS; SUBCUTANEOUS at 21:16

## 2021-10-05 RX ADMIN — LAMOTRIGINE 200 MILLIGRAM(S): 25 TABLET, ORALLY DISINTEGRATING ORAL at 09:43

## 2021-10-05 RX ADMIN — ALVIMOPAN 12 MILLIGRAM(S): 12 CAPSULE ORAL at 21:05

## 2021-10-05 RX ADMIN — HYDROMORPHONE HYDROCHLORIDE 1 MILLIGRAM(S): 2 INJECTION INTRAMUSCULAR; INTRAVENOUS; SUBCUTANEOUS at 21:01

## 2021-10-05 RX ADMIN — Medication 88 MICROGRAM(S): at 05:18

## 2021-10-06 LAB
ALBUMIN SERPL ELPH-MCNC: 2.9 G/DL — LOW (ref 3.3–5)
ALP SERPL-CCNC: 98 U/L — SIGNIFICANT CHANGE UP (ref 40–120)
ALT FLD-CCNC: 25 U/L — SIGNIFICANT CHANGE UP (ref 12–78)
ANION GAP SERPL CALC-SCNC: 7 MMOL/L — SIGNIFICANT CHANGE UP (ref 5–17)
APTT BLD: 33.9 SEC — SIGNIFICANT CHANGE UP (ref 27.5–35.5)
AST SERPL-CCNC: 20 U/L — SIGNIFICANT CHANGE UP (ref 15–37)
BILIRUB SERPL-MCNC: 0.4 MG/DL — SIGNIFICANT CHANGE UP (ref 0.2–1.2)
BUN SERPL-MCNC: 7 MG/DL — SIGNIFICANT CHANGE UP (ref 7–23)
CALCIUM SERPL-MCNC: 8.4 MG/DL — LOW (ref 8.5–10.1)
CHLORIDE SERPL-SCNC: 100 MMOL/L — SIGNIFICANT CHANGE UP (ref 96–108)
CO2 SERPL-SCNC: 28 MMOL/L — SIGNIFICANT CHANGE UP (ref 22–31)
CREAT SERPL-MCNC: 0.39 MG/DL — LOW (ref 0.5–1.3)
GLUCOSE SERPL-MCNC: 102 MG/DL — HIGH (ref 70–99)
HCT VFR BLD CALC: 40.6 % — SIGNIFICANT CHANGE UP (ref 34.5–45)
HGB BLD-MCNC: 13.2 G/DL — SIGNIFICANT CHANGE UP (ref 11.5–15.5)
INR BLD: 1.37 RATIO — HIGH (ref 0.88–1.16)
MAGNESIUM SERPL-MCNC: 1.7 MG/DL — SIGNIFICANT CHANGE UP (ref 1.6–2.6)
MCHC RBC-ENTMCNC: 29.5 PG — SIGNIFICANT CHANGE UP (ref 27–34)
MCHC RBC-ENTMCNC: 32.5 GM/DL — SIGNIFICANT CHANGE UP (ref 32–36)
MCV RBC AUTO: 90.6 FL — SIGNIFICANT CHANGE UP (ref 80–100)
PHOSPHATE SERPL-MCNC: 2.6 MG/DL — SIGNIFICANT CHANGE UP (ref 2.5–4.5)
PLATELET # BLD AUTO: 629 K/UL — HIGH (ref 150–400)
POTASSIUM SERPL-MCNC: 3.6 MMOL/L — SIGNIFICANT CHANGE UP (ref 3.5–5.3)
POTASSIUM SERPL-SCNC: 3.6 MMOL/L — SIGNIFICANT CHANGE UP (ref 3.5–5.3)
PROT SERPL-MCNC: 6 GM/DL — SIGNIFICANT CHANGE UP (ref 6–8.3)
PROTHROM AB SERPL-ACNC: 15.8 SEC — HIGH (ref 10.6–13.6)
RBC # BLD: 4.48 M/UL — SIGNIFICANT CHANGE UP (ref 3.8–5.2)
RBC # FLD: 15.2 % — HIGH (ref 10.3–14.5)
SODIUM SERPL-SCNC: 135 MMOL/L — SIGNIFICANT CHANGE UP (ref 135–145)
TROPONIN I, HIGH SENSITIVITY RESULT: 130.6 NG/L — HIGH
TROPONIN I, HIGH SENSITIVITY RESULT: 142.2 NG/L — HIGH
TROPONIN I, HIGH SENSITIVITY RESULT: 86.3 NG/L — HIGH
TROPONIN I, HIGH SENSITIVITY RESULT: 93.1 NG/L — HIGH
WBC # BLD: 14.8 K/UL — HIGH (ref 3.8–10.5)
WBC # FLD AUTO: 14.8 K/UL — HIGH (ref 3.8–10.5)

## 2021-10-06 PROCEDURE — 93010 ELECTROCARDIOGRAM REPORT: CPT

## 2021-10-06 PROCEDURE — 93970 EXTREMITY STUDY: CPT | Mod: 26

## 2021-10-06 PROCEDURE — 99233 SBSQ HOSP IP/OBS HIGH 50: CPT

## 2021-10-06 PROCEDURE — 71275 CT ANGIOGRAPHY CHEST: CPT | Mod: 26

## 2021-10-06 PROCEDURE — 93308 TTE F-UP OR LMTD: CPT | Mod: 26

## 2021-10-06 RX ORDER — AMLODIPINE BESYLATE 2.5 MG/1
5 TABLET ORAL ONCE
Refills: 0 | Status: COMPLETED | OUTPATIENT
Start: 2021-10-06 | End: 2021-10-06

## 2021-10-06 RX ORDER — SIMETHICONE 80 MG/1
80 TABLET, CHEWABLE ORAL DAILY
Refills: 0 | Status: DISCONTINUED | OUTPATIENT
Start: 2021-10-06 | End: 2021-10-09

## 2021-10-06 RX ORDER — KETOROLAC TROMETHAMINE 30 MG/ML
15 SYRINGE (ML) INJECTION ONCE
Refills: 0 | Status: DISCONTINUED | OUTPATIENT
Start: 2021-10-06 | End: 2021-10-08

## 2021-10-06 RX ORDER — HEPARIN SODIUM 5000 [USP'U]/ML
INJECTION INTRAVENOUS; SUBCUTANEOUS
Qty: 25000 | Refills: 0 | Status: DISCONTINUED | OUTPATIENT
Start: 2021-10-06 | End: 2021-10-08

## 2021-10-06 RX ORDER — DIPHENHYDRAMINE HCL 50 MG
50 CAPSULE ORAL ONCE
Refills: 0 | Status: COMPLETED | OUTPATIENT
Start: 2021-10-06 | End: 2021-10-06

## 2021-10-06 RX ORDER — CLOPIDOGREL BISULFATE 75 MG/1
75 TABLET, FILM COATED ORAL DAILY
Refills: 0 | Status: DISCONTINUED | OUTPATIENT
Start: 2021-10-06 | End: 2021-10-09

## 2021-10-06 RX ORDER — METOPROLOL TARTRATE 50 MG
25 TABLET ORAL
Refills: 0 | Status: DISCONTINUED | OUTPATIENT
Start: 2021-10-06 | End: 2021-10-09

## 2021-10-06 RX ADMIN — HYDROMORPHONE HYDROCHLORIDE 1 MILLIGRAM(S): 2 INJECTION INTRAMUSCULAR; INTRAVENOUS; SUBCUTANEOUS at 01:31

## 2021-10-06 RX ADMIN — HYDROMORPHONE HYDROCHLORIDE 1 MILLIGRAM(S): 2 INJECTION INTRAMUSCULAR; INTRAVENOUS; SUBCUTANEOUS at 20:11

## 2021-10-06 RX ADMIN — Medication 1 TABLET(S): at 11:00

## 2021-10-06 RX ADMIN — HYDROXYUREA 500 MILLIGRAM(S): 500 CAPSULE ORAL at 22:46

## 2021-10-06 RX ADMIN — ALVIMOPAN 12 MILLIGRAM(S): 12 CAPSULE ORAL at 21:17

## 2021-10-06 RX ADMIN — HYDROMORPHONE HYDROCHLORIDE 1 MILLIGRAM(S): 2 INJECTION INTRAMUSCULAR; INTRAVENOUS; SUBCUTANEOUS at 05:15

## 2021-10-06 RX ADMIN — Medication 50 MILLIGRAM(S): at 14:10

## 2021-10-06 RX ADMIN — HYDROMORPHONE HYDROCHLORIDE 1 MILLIGRAM(S): 2 INJECTION INTRAMUSCULAR; INTRAVENOUS; SUBCUTANEOUS at 05:00

## 2021-10-06 RX ADMIN — Medication 40 MILLIGRAM(S): at 11:00

## 2021-10-06 RX ADMIN — HEPARIN SODIUM 5000 UNIT(S): 5000 INJECTION INTRAVENOUS; SUBCUTANEOUS at 14:10

## 2021-10-06 RX ADMIN — AMLODIPINE BESYLATE 5 MILLIGRAM(S): 2.5 TABLET ORAL at 01:40

## 2021-10-06 RX ADMIN — BUPROPION HYDROCHLORIDE 300 MILLIGRAM(S): 150 TABLET, EXTENDED RELEASE ORAL at 13:33

## 2021-10-06 RX ADMIN — Medication 10 MILLIGRAM(S): at 08:51

## 2021-10-06 RX ADMIN — MODAFINIL 400 MILLIGRAM(S): 200 TABLET ORAL at 11:00

## 2021-10-06 RX ADMIN — Medication 40 MILLIGRAM(S): at 14:57

## 2021-10-06 RX ADMIN — Medication 25 MILLIGRAM(S): at 11:00

## 2021-10-06 RX ADMIN — HEPARIN SODIUM 5000 UNIT(S): 5000 INJECTION INTRAVENOUS; SUBCUTANEOUS at 05:00

## 2021-10-06 RX ADMIN — LAMOTRIGINE 200 MILLIGRAM(S): 25 TABLET, ORALLY DISINTEGRATING ORAL at 13:33

## 2021-10-06 RX ADMIN — SODIUM CHLORIDE 3 MILLILITER(S): 9 INJECTION INTRAMUSCULAR; INTRAVENOUS; SUBCUTANEOUS at 13:34

## 2021-10-06 RX ADMIN — HYDROMORPHONE HYDROCHLORIDE 1 MILLIGRAM(S): 2 INJECTION INTRAMUSCULAR; INTRAVENOUS; SUBCUTANEOUS at 11:00

## 2021-10-06 RX ADMIN — HYDROMORPHONE HYDROCHLORIDE 1 MILLIGRAM(S): 2 INJECTION INTRAMUSCULAR; INTRAVENOUS; SUBCUTANEOUS at 16:35

## 2021-10-06 RX ADMIN — HYDROMORPHONE HYDROCHLORIDE 1 MILLIGRAM(S): 2 INJECTION INTRAMUSCULAR; INTRAVENOUS; SUBCUTANEOUS at 01:01

## 2021-10-06 RX ADMIN — ATORVASTATIN CALCIUM 80 MILLIGRAM(S): 80 TABLET, FILM COATED ORAL at 21:17

## 2021-10-06 RX ADMIN — HYDROMORPHONE HYDROCHLORIDE 1 MILLIGRAM(S): 2 INJECTION INTRAMUSCULAR; INTRAVENOUS; SUBCUTANEOUS at 20:26

## 2021-10-06 RX ADMIN — OXYCODONE HYDROCHLORIDE 10 MILLIGRAM(S): 5 TABLET ORAL at 00:36

## 2021-10-06 RX ADMIN — Medication 25 MILLIGRAM(S): at 21:17

## 2021-10-06 RX ADMIN — HYDROMORPHONE HYDROCHLORIDE 1 MILLIGRAM(S): 2 INJECTION INTRAMUSCULAR; INTRAVENOUS; SUBCUTANEOUS at 11:30

## 2021-10-06 RX ADMIN — Medication 88 MICROGRAM(S): at 05:00

## 2021-10-06 RX ADMIN — Medication 10 MILLIGRAM(S): at 21:17

## 2021-10-06 RX ADMIN — AMLODIPINE BESYLATE 5 MILLIGRAM(S): 2.5 TABLET ORAL at 03:17

## 2021-10-06 RX ADMIN — SIMETHICONE 80 MILLIGRAM(S): 80 TABLET, CHEWABLE ORAL at 06:40

## 2021-10-06 RX ADMIN — ONDANSETRON 4 MILLIGRAM(S): 8 TABLET, FILM COATED ORAL at 02:37

## 2021-10-06 RX ADMIN — SODIUM CHLORIDE 3 MILLILITER(S): 9 INJECTION INTRAMUSCULAR; INTRAVENOUS; SUBCUTANEOUS at 05:12

## 2021-10-06 RX ADMIN — SODIUM CHLORIDE 3 MILLILITER(S): 9 INJECTION INTRAMUSCULAR; INTRAVENOUS; SUBCUTANEOUS at 21:15

## 2021-10-06 RX ADMIN — HEPARIN SODIUM 800 UNIT(S)/HR: 5000 INJECTION INTRAVENOUS; SUBCUTANEOUS at 18:40

## 2021-10-06 RX ADMIN — CLOPIDOGREL BISULFATE 75 MILLIGRAM(S): 75 TABLET, FILM COATED ORAL at 11:00

## 2021-10-06 RX ADMIN — OXYCODONE HYDROCHLORIDE 10 MILLIGRAM(S): 5 TABLET ORAL at 00:05

## 2021-10-06 RX ADMIN — ALVIMOPAN 12 MILLIGRAM(S): 12 CAPSULE ORAL at 13:33

## 2021-10-06 NOTE — PROVIDER CONTACT NOTE (CHANGE IN STATUS NOTIFICATION) - SITUATION
Pt complaint of chest pressure this am, cardiologist on unit assessed pt (Bert). BP elevated at 175/58, , temp 100.1, O2 of 93% on room air. Hospitalist notified and made aware of pts status.

## 2021-10-06 NOTE — PROVIDER CONTACT NOTE (CRITICAL VALUE NOTIFICATION) - SITUATION
Following prolonged period of sustained elevated BP, not alleviated with medication administration, pt complaining of chest pressure and discomfort - EKG ordered with no significant findings, as per MD. Stat troponin levels ordered, resulting in critical value of 61.6.

## 2021-10-06 NOTE — PROVIDER CONTACT NOTE (OTHER) - SITUATION
Spoke with Vanesa at office to inform  that patient is in [facility].  Please fax discharge papers to [phone].
pt s/p lap sigmoid colon resection with lysis of adhesions 10/4

## 2021-10-06 NOTE — CHART NOTE - NSCHARTNOTEFT_GEN_A_CORE
CT chest negative for PE  Doppler being done to r/o DVT  D/w cardio team- ruled in for NSTEMI, will treat conservatively with Plavix, BB, statin and IV heparin for 48h  D/w CRS team- patient is OK to start on IV heparin without bolus  Advance diet to full liquids  Encourage incentive spirometry

## 2021-10-06 NOTE — PROVIDER CONTACT NOTE (CRITICAL VALUE NOTIFICATION) - ACTION/TREATMENT ORDERED:
Spoke with Dr. Pineda, no further orders or interventions received at this time considering patient status and EKG results. MD will discuss situation with day team. Spoke with Dr. Pineda - ordered entered for additional troponin level draw. No immediate interventions implemented at this time considering patient status and EKG results as per MD. MD will discuss situation with day team.

## 2021-10-06 NOTE — CHART NOTE - NSCHARTNOTEFT_GEN_A_CORE
Completed pre-medication and going for CTA chest  Troponin 142, repeat 130. Still with chest pain on/off. Repeat EKG- T wave inversions in V5-V6 resolved  Reviewed with DR Galindo  WIll get b/l LE doppler as well to r/o DVT  Will decide on the need for card cath after imaging is resulted  D/w pt

## 2021-10-07 LAB
ANION GAP SERPL CALC-SCNC: 5 MMOL/L — SIGNIFICANT CHANGE UP (ref 5–17)
APTT BLD: 28.5 SEC — SIGNIFICANT CHANGE UP (ref 27.5–35.5)
APTT BLD: 40 SEC — HIGH (ref 27.5–35.5)
APTT BLD: 45 SEC — HIGH (ref 27.5–35.5)
BASOPHILS # BLD AUTO: 0.1 K/UL — SIGNIFICANT CHANGE UP (ref 0–0.2)
BASOPHILS NFR BLD AUTO: 0.8 % — SIGNIFICANT CHANGE UP (ref 0–2)
BUN SERPL-MCNC: 15 MG/DL — SIGNIFICANT CHANGE UP (ref 7–23)
CALCIUM SERPL-MCNC: 9 MG/DL — SIGNIFICANT CHANGE UP (ref 8.5–10.1)
CHLORIDE SERPL-SCNC: 100 MMOL/L — SIGNIFICANT CHANGE UP (ref 96–108)
CO2 SERPL-SCNC: 32 MMOL/L — HIGH (ref 22–31)
CREAT SERPL-MCNC: 0.64 MG/DL — SIGNIFICANT CHANGE UP (ref 0.5–1.3)
EOSINOPHIL # BLD AUTO: 0.09 K/UL — SIGNIFICANT CHANGE UP (ref 0–0.5)
EOSINOPHIL NFR BLD AUTO: 0.7 % — SIGNIFICANT CHANGE UP (ref 0–6)
GLUCOSE SERPL-MCNC: 119 MG/DL — HIGH (ref 70–99)
HCT VFR BLD CALC: 38.1 % — SIGNIFICANT CHANGE UP (ref 34.5–45)
HCT VFR BLD CALC: 39.5 % — SIGNIFICANT CHANGE UP (ref 34.5–45)
HGB BLD-MCNC: 12.7 G/DL — SIGNIFICANT CHANGE UP (ref 11.5–15.5)
HGB BLD-MCNC: 12.8 G/DL — SIGNIFICANT CHANGE UP (ref 11.5–15.5)
IMM GRANULOCYTES NFR BLD AUTO: 0.7 % — SIGNIFICANT CHANGE UP (ref 0–1.5)
LYMPHOCYTES # BLD AUTO: 1.41 K/UL — SIGNIFICANT CHANGE UP (ref 1–3.3)
LYMPHOCYTES # BLD AUTO: 10.6 % — LOW (ref 13–44)
MAGNESIUM SERPL-MCNC: 2.2 MG/DL — SIGNIFICANT CHANGE UP (ref 1.6–2.6)
MCHC RBC-ENTMCNC: 29.4 PG — SIGNIFICANT CHANGE UP (ref 27–34)
MCHC RBC-ENTMCNC: 29.9 PG — SIGNIFICANT CHANGE UP (ref 27–34)
MCHC RBC-ENTMCNC: 32.4 GM/DL — SIGNIFICANT CHANGE UP (ref 32–36)
MCHC RBC-ENTMCNC: 33.3 GM/DL — SIGNIFICANT CHANGE UP (ref 32–36)
MCV RBC AUTO: 89.6 FL — SIGNIFICANT CHANGE UP (ref 80–100)
MCV RBC AUTO: 90.8 FL — SIGNIFICANT CHANGE UP (ref 80–100)
MONOCYTES # BLD AUTO: 1.21 K/UL — HIGH (ref 0–0.9)
MONOCYTES NFR BLD AUTO: 9.1 % — SIGNIFICANT CHANGE UP (ref 2–14)
NEUTROPHILS # BLD AUTO: 10.42 K/UL — HIGH (ref 1.8–7.4)
NEUTROPHILS NFR BLD AUTO: 78.1 % — HIGH (ref 43–77)
PHOSPHATE SERPL-MCNC: 2.9 MG/DL — SIGNIFICANT CHANGE UP (ref 2.5–4.5)
PLATELET # BLD AUTO: 637 K/UL — HIGH (ref 150–400)
PLATELET # BLD AUTO: 658 K/UL — HIGH (ref 150–400)
POTASSIUM SERPL-MCNC: 3.7 MMOL/L — SIGNIFICANT CHANGE UP (ref 3.5–5.3)
POTASSIUM SERPL-SCNC: 3.7 MMOL/L — SIGNIFICANT CHANGE UP (ref 3.5–5.3)
RBC # BLD: 4.25 M/UL — SIGNIFICANT CHANGE UP (ref 3.8–5.2)
RBC # BLD: 4.35 M/UL — SIGNIFICANT CHANGE UP (ref 3.8–5.2)
RBC # FLD: 15.4 % — HIGH (ref 10.3–14.5)
RBC # FLD: 15.5 % — HIGH (ref 10.3–14.5)
SODIUM SERPL-SCNC: 137 MMOL/L — SIGNIFICANT CHANGE UP (ref 135–145)
WBC # BLD: 13.32 K/UL — HIGH (ref 3.8–10.5)
WBC # BLD: 14.49 K/UL — HIGH (ref 3.8–10.5)
WBC # FLD AUTO: 13.32 K/UL — HIGH (ref 3.8–10.5)
WBC # FLD AUTO: 14.49 K/UL — HIGH (ref 3.8–10.5)

## 2021-10-07 PROCEDURE — 93010 ELECTROCARDIOGRAM REPORT: CPT

## 2021-10-07 PROCEDURE — 99233 SBSQ HOSP IP/OBS HIGH 50: CPT

## 2021-10-07 RX ADMIN — MODAFINIL 400 MILLIGRAM(S): 200 TABLET ORAL at 10:20

## 2021-10-07 RX ADMIN — HYDROMORPHONE HYDROCHLORIDE 1 MILLIGRAM(S): 2 INJECTION INTRAMUSCULAR; INTRAVENOUS; SUBCUTANEOUS at 07:00

## 2021-10-07 RX ADMIN — Medication 10 MILLIGRAM(S): at 21:33

## 2021-10-07 RX ADMIN — HYDROMORPHONE HYDROCHLORIDE 1 MILLIGRAM(S): 2 INJECTION INTRAMUSCULAR; INTRAVENOUS; SUBCUTANEOUS at 10:19

## 2021-10-07 RX ADMIN — SODIUM CHLORIDE 3 MILLILITER(S): 9 INJECTION INTRAMUSCULAR; INTRAVENOUS; SUBCUTANEOUS at 21:15

## 2021-10-07 RX ADMIN — ATORVASTATIN CALCIUM 80 MILLIGRAM(S): 80 TABLET, FILM COATED ORAL at 21:33

## 2021-10-07 RX ADMIN — HYDROMORPHONE HYDROCHLORIDE 1 MILLIGRAM(S): 2 INJECTION INTRAMUSCULAR; INTRAVENOUS; SUBCUTANEOUS at 15:51

## 2021-10-07 RX ADMIN — Medication 25 MILLIGRAM(S): at 21:34

## 2021-10-07 RX ADMIN — Medication 10 MILLIGRAM(S): at 10:20

## 2021-10-07 RX ADMIN — HEPARIN SODIUM 950 UNIT(S)/HR: 5000 INJECTION INTRAVENOUS; SUBCUTANEOUS at 01:24

## 2021-10-07 RX ADMIN — OXYCODONE HYDROCHLORIDE 10 MILLIGRAM(S): 5 TABLET ORAL at 17:19

## 2021-10-07 RX ADMIN — HYDROMORPHONE HYDROCHLORIDE 1 MILLIGRAM(S): 2 INJECTION INTRAMUSCULAR; INTRAVENOUS; SUBCUTANEOUS at 10:35

## 2021-10-07 RX ADMIN — HYDROMORPHONE HYDROCHLORIDE 1 MILLIGRAM(S): 2 INJECTION INTRAMUSCULAR; INTRAVENOUS; SUBCUTANEOUS at 21:30

## 2021-10-07 RX ADMIN — OXYCODONE HYDROCHLORIDE 10 MILLIGRAM(S): 5 TABLET ORAL at 04:30

## 2021-10-07 RX ADMIN — HEPARIN SODIUM 1300 UNIT(S)/HR: 5000 INJECTION INTRAVENOUS; SUBCUTANEOUS at 17:12

## 2021-10-07 RX ADMIN — HYDROMORPHONE HYDROCHLORIDE 1 MILLIGRAM(S): 2 INJECTION INTRAMUSCULAR; INTRAVENOUS; SUBCUTANEOUS at 06:25

## 2021-10-07 RX ADMIN — HYDROMORPHONE HYDROCHLORIDE 1 MILLIGRAM(S): 2 INJECTION INTRAMUSCULAR; INTRAVENOUS; SUBCUTANEOUS at 00:59

## 2021-10-07 RX ADMIN — SIMETHICONE 80 MILLIGRAM(S): 80 TABLET, CHEWABLE ORAL at 15:24

## 2021-10-07 RX ADMIN — CLOPIDOGREL BISULFATE 75 MILLIGRAM(S): 75 TABLET, FILM COATED ORAL at 10:20

## 2021-10-07 RX ADMIN — HYDROMORPHONE HYDROCHLORIDE 1 MILLIGRAM(S): 2 INJECTION INTRAMUSCULAR; INTRAVENOUS; SUBCUTANEOUS at 21:08

## 2021-10-07 RX ADMIN — SODIUM CHLORIDE 3 MILLILITER(S): 9 INJECTION INTRAMUSCULAR; INTRAVENOUS; SUBCUTANEOUS at 13:01

## 2021-10-07 RX ADMIN — SODIUM CHLORIDE 3 MILLILITER(S): 9 INJECTION INTRAMUSCULAR; INTRAVENOUS; SUBCUTANEOUS at 06:17

## 2021-10-07 RX ADMIN — HYDROXYUREA 1000 MILLIGRAM(S): 500 CAPSULE ORAL at 21:37

## 2021-10-07 RX ADMIN — OXYCODONE HYDROCHLORIDE 10 MILLIGRAM(S): 5 TABLET ORAL at 14:00

## 2021-10-07 RX ADMIN — HYDROMORPHONE HYDROCHLORIDE 1 MILLIGRAM(S): 2 INJECTION INTRAMUSCULAR; INTRAVENOUS; SUBCUTANEOUS at 15:23

## 2021-10-07 RX ADMIN — Medication 88 MICROGRAM(S): at 05:45

## 2021-10-07 RX ADMIN — LAMOTRIGINE 200 MILLIGRAM(S): 25 TABLET, ORALLY DISINTEGRATING ORAL at 10:20

## 2021-10-07 RX ADMIN — OXYCODONE HYDROCHLORIDE 10 MILLIGRAM(S): 5 TABLET ORAL at 13:05

## 2021-10-07 RX ADMIN — Medication 1 TABLET(S): at 10:20

## 2021-10-07 RX ADMIN — BUPROPION HYDROCHLORIDE 300 MILLIGRAM(S): 150 TABLET, EXTENDED RELEASE ORAL at 10:20

## 2021-10-07 RX ADMIN — OXYCODONE HYDROCHLORIDE 10 MILLIGRAM(S): 5 TABLET ORAL at 03:33

## 2021-10-07 RX ADMIN — ALVIMOPAN 12 MILLIGRAM(S): 12 CAPSULE ORAL at 10:20

## 2021-10-07 RX ADMIN — HEPARIN SODIUM 1100 UNIT(S)/HR: 5000 INJECTION INTRAVENOUS; SUBCUTANEOUS at 10:21

## 2021-10-08 LAB
ANION GAP SERPL CALC-SCNC: 5 MMOL/L — SIGNIFICANT CHANGE UP (ref 5–17)
APTT BLD: 53.7 SEC — HIGH (ref 27.5–35.5)
APTT BLD: 70.8 SEC — HIGH (ref 27.5–35.5)
BUN SERPL-MCNC: 15 MG/DL — SIGNIFICANT CHANGE UP (ref 7–23)
CALCIUM SERPL-MCNC: 8.6 MG/DL — SIGNIFICANT CHANGE UP (ref 8.5–10.1)
CHLORIDE SERPL-SCNC: 106 MMOL/L — SIGNIFICANT CHANGE UP (ref 96–108)
CO2 SERPL-SCNC: 29 MMOL/L — SIGNIFICANT CHANGE UP (ref 22–31)
CREAT SERPL-MCNC: 0.49 MG/DL — LOW (ref 0.5–1.3)
GLUCOSE SERPL-MCNC: 99 MG/DL — SIGNIFICANT CHANGE UP (ref 70–99)
HCT VFR BLD CALC: 37.2 % — SIGNIFICANT CHANGE UP (ref 34.5–45)
HGB BLD-MCNC: 12 G/DL — SIGNIFICANT CHANGE UP (ref 11.5–15.5)
INR BLD: 1.26 RATIO — HIGH (ref 0.88–1.16)
MAGNESIUM SERPL-MCNC: 2 MG/DL — SIGNIFICANT CHANGE UP (ref 1.6–2.6)
MCHC RBC-ENTMCNC: 29.3 PG — SIGNIFICANT CHANGE UP (ref 27–34)
MCHC RBC-ENTMCNC: 32.3 GM/DL — SIGNIFICANT CHANGE UP (ref 32–36)
MCV RBC AUTO: 91 FL — SIGNIFICANT CHANGE UP (ref 80–100)
PHOSPHATE SERPL-MCNC: 3.3 MG/DL — SIGNIFICANT CHANGE UP (ref 2.5–4.5)
PLATELET # BLD AUTO: 585 K/UL — HIGH (ref 150–400)
POTASSIUM SERPL-MCNC: 3.8 MMOL/L — SIGNIFICANT CHANGE UP (ref 3.5–5.3)
POTASSIUM SERPL-SCNC: 3.8 MMOL/L — SIGNIFICANT CHANGE UP (ref 3.5–5.3)
PROTHROM AB SERPL-ACNC: 14.6 SEC — HIGH (ref 10.6–13.6)
RBC # BLD: 4.09 M/UL — SIGNIFICANT CHANGE UP (ref 3.8–5.2)
RBC # FLD: 15.2 % — HIGH (ref 10.3–14.5)
SODIUM SERPL-SCNC: 140 MMOL/L — SIGNIFICANT CHANGE UP (ref 135–145)
WBC # BLD: 9.39 K/UL — SIGNIFICANT CHANGE UP (ref 3.8–10.5)
WBC # FLD AUTO: 9.39 K/UL — SIGNIFICANT CHANGE UP (ref 3.8–10.5)

## 2021-10-08 PROCEDURE — 78452 HT MUSCLE IMAGE SPECT MULT: CPT | Mod: 26

## 2021-10-08 PROCEDURE — 99232 SBSQ HOSP IP/OBS MODERATE 35: CPT

## 2021-10-08 RX ORDER — AMINOPHYLLINE 100 MG
100 TABLET ORAL ONCE
Refills: 0 | Status: DISCONTINUED | OUTPATIENT
Start: 2021-10-08 | End: 2021-10-09

## 2021-10-08 RX ORDER — ACETAMINOPHEN 500 MG
975 TABLET ORAL EVERY 6 HOURS
Refills: 0 | Status: DISCONTINUED | OUTPATIENT
Start: 2021-10-08 | End: 2021-10-09

## 2021-10-08 RX ORDER — REGADENOSON 0.08 MG/ML
0.4 INJECTION, SOLUTION INTRAVENOUS ONCE
Refills: 0 | Status: COMPLETED | OUTPATIENT
Start: 2021-10-08 | End: 2021-10-08

## 2021-10-08 RX ORDER — ENOXAPARIN SODIUM 100 MG/ML
40 INJECTION SUBCUTANEOUS DAILY
Refills: 0 | Status: DISCONTINUED | OUTPATIENT
Start: 2021-10-08 | End: 2021-10-09

## 2021-10-08 RX ORDER — KETOROLAC TROMETHAMINE 30 MG/ML
15 SYRINGE (ML) INJECTION EVERY 6 HOURS
Refills: 0 | Status: DISCONTINUED | OUTPATIENT
Start: 2021-10-08 | End: 2021-10-09

## 2021-10-08 RX ORDER — POTASSIUM CHLORIDE 20 MEQ
40 PACKET (EA) ORAL ONCE
Refills: 0 | Status: COMPLETED | OUTPATIENT
Start: 2021-10-08 | End: 2021-10-08

## 2021-10-08 RX ORDER — POTASSIUM CHLORIDE 20 MEQ
10 PACKET (EA) ORAL ONCE
Refills: 0 | Status: DISCONTINUED | OUTPATIENT
Start: 2021-10-08 | End: 2021-10-08

## 2021-10-08 RX ADMIN — HYDROXYUREA 500 MILLIGRAM(S): 500 CAPSULE ORAL at 21:42

## 2021-10-08 RX ADMIN — SODIUM CHLORIDE 3 MILLILITER(S): 9 INJECTION INTRAMUSCULAR; INTRAVENOUS; SUBCUTANEOUS at 21:40

## 2021-10-08 RX ADMIN — HEPARIN SODIUM 1300 UNIT(S)/HR: 5000 INJECTION INTRAVENOUS; SUBCUTANEOUS at 07:18

## 2021-10-08 RX ADMIN — HEPARIN SODIUM 1300 UNIT(S)/HR: 5000 INJECTION INTRAVENOUS; SUBCUTANEOUS at 00:28

## 2021-10-08 RX ADMIN — SODIUM CHLORIDE 3 MILLILITER(S): 9 INJECTION INTRAMUSCULAR; INTRAVENOUS; SUBCUTANEOUS at 12:48

## 2021-10-08 RX ADMIN — OXYCODONE HYDROCHLORIDE 10 MILLIGRAM(S): 5 TABLET ORAL at 16:53

## 2021-10-08 RX ADMIN — HYDROMORPHONE HYDROCHLORIDE 1 MILLIGRAM(S): 2 INJECTION INTRAMUSCULAR; INTRAVENOUS; SUBCUTANEOUS at 03:00

## 2021-10-08 RX ADMIN — ATORVASTATIN CALCIUM 80 MILLIGRAM(S): 80 TABLET, FILM COATED ORAL at 21:42

## 2021-10-08 RX ADMIN — SIMETHICONE 80 MILLIGRAM(S): 80 TABLET, CHEWABLE ORAL at 13:24

## 2021-10-08 RX ADMIN — OXYCODONE HYDROCHLORIDE 10 MILLIGRAM(S): 5 TABLET ORAL at 15:37

## 2021-10-08 RX ADMIN — OXYCODONE HYDROCHLORIDE 10 MILLIGRAM(S): 5 TABLET ORAL at 01:00

## 2021-10-08 RX ADMIN — REGADENOSON 0.4 MILLIGRAM(S): 0.08 INJECTION, SOLUTION INTRAVENOUS at 10:50

## 2021-10-08 RX ADMIN — Medication 25 MILLIGRAM(S): at 12:48

## 2021-10-08 RX ADMIN — HYDROMORPHONE HYDROCHLORIDE 1 MILLIGRAM(S): 2 INJECTION INTRAMUSCULAR; INTRAVENOUS; SUBCUTANEOUS at 06:35

## 2021-10-08 RX ADMIN — CLOPIDOGREL BISULFATE 75 MILLIGRAM(S): 75 TABLET, FILM COATED ORAL at 12:48

## 2021-10-08 RX ADMIN — LAMOTRIGINE 200 MILLIGRAM(S): 25 TABLET, ORALLY DISINTEGRATING ORAL at 12:48

## 2021-10-08 RX ADMIN — HYDROMORPHONE HYDROCHLORIDE 1 MILLIGRAM(S): 2 INJECTION INTRAMUSCULAR; INTRAVENOUS; SUBCUTANEOUS at 12:41

## 2021-10-08 RX ADMIN — BUPROPION HYDROCHLORIDE 300 MILLIGRAM(S): 150 TABLET, EXTENDED RELEASE ORAL at 12:48

## 2021-10-08 RX ADMIN — HYDROMORPHONE HYDROCHLORIDE 1 MILLIGRAM(S): 2 INJECTION INTRAMUSCULAR; INTRAVENOUS; SUBCUTANEOUS at 02:35

## 2021-10-08 RX ADMIN — HYDROMORPHONE HYDROCHLORIDE 1 MILLIGRAM(S): 2 INJECTION INTRAMUSCULAR; INTRAVENOUS; SUBCUTANEOUS at 07:00

## 2021-10-08 RX ADMIN — Medication 10 MILLIGRAM(S): at 21:42

## 2021-10-08 RX ADMIN — Medication 975 MILLIGRAM(S): at 19:00

## 2021-10-08 RX ADMIN — Medication 1 TABLET(S): at 12:48

## 2021-10-08 RX ADMIN — ENOXAPARIN SODIUM 40 MILLIGRAM(S): 100 INJECTION SUBCUTANEOUS at 21:42

## 2021-10-08 RX ADMIN — Medication 975 MILLIGRAM(S): at 23:53

## 2021-10-08 RX ADMIN — Medication 10 MILLIGRAM(S): at 12:48

## 2021-10-08 RX ADMIN — Medication 40 MILLIEQUIVALENT(S): at 12:48

## 2021-10-08 RX ADMIN — Medication 15 MILLIGRAM(S): at 23:53

## 2021-10-08 RX ADMIN — SODIUM CHLORIDE 3 MILLILITER(S): 9 INJECTION INTRAMUSCULAR; INTRAVENOUS; SUBCUTANEOUS at 06:00

## 2021-10-08 RX ADMIN — Medication 15 MILLIGRAM(S): at 18:24

## 2021-10-08 RX ADMIN — HYDROMORPHONE HYDROCHLORIDE 1 MILLIGRAM(S): 2 INJECTION INTRAMUSCULAR; INTRAVENOUS; SUBCUTANEOUS at 11:45

## 2021-10-08 RX ADMIN — Medication 15 MILLIGRAM(S): at 17:25

## 2021-10-08 RX ADMIN — MODAFINIL 400 MILLIGRAM(S): 200 TABLET ORAL at 12:47

## 2021-10-08 RX ADMIN — OXYCODONE HYDROCHLORIDE 10 MILLIGRAM(S): 5 TABLET ORAL at 00:39

## 2021-10-08 RX ADMIN — Medication 975 MILLIGRAM(S): at 18:28

## 2021-10-08 NOTE — PROGRESS NOTE ADULT - NSPROGADDITIONALINFOA_GEN_ALL_CORE
Pt seen and examined with PGY-III Dr. Pride. A&P reviewed.   Stress test is neg, IV UFH stopped, plan for outpt exercise induced stress test as pt had EKG changes - cont plavix, BBL, statin   Discussed with Dr. Desai cardiology. negative...

## 2021-10-09 ENCOUNTER — TRANSCRIPTION ENCOUNTER (OUTPATIENT)
Age: 61
End: 2021-10-09

## 2021-10-09 VITALS
DIASTOLIC BLOOD PRESSURE: 57 MMHG | HEART RATE: 75 BPM | OXYGEN SATURATION: 98 % | SYSTOLIC BLOOD PRESSURE: 142 MMHG | RESPIRATION RATE: 15 BRPM

## 2021-10-09 DIAGNOSIS — K56.699 OTHER INTESTINAL OBSTRUCTION UNSPECIFIED AS TO PARTIAL VERSUS COMPLETE OBSTRUCTION: ICD-10-CM

## 2021-10-09 LAB
ANION GAP SERPL CALC-SCNC: 6 MMOL/L — SIGNIFICANT CHANGE UP (ref 5–17)
APTT BLD: 31.7 SEC — SIGNIFICANT CHANGE UP (ref 27.5–35.5)
BUN SERPL-MCNC: 16 MG/DL — SIGNIFICANT CHANGE UP (ref 7–23)
CALCIUM SERPL-MCNC: 8.5 MG/DL — SIGNIFICANT CHANGE UP (ref 8.5–10.1)
CHLORIDE SERPL-SCNC: 109 MMOL/L — HIGH (ref 96–108)
CO2 SERPL-SCNC: 25 MMOL/L — SIGNIFICANT CHANGE UP (ref 22–31)
CREAT SERPL-MCNC: 0.5 MG/DL — SIGNIFICANT CHANGE UP (ref 0.5–1.3)
GLUCOSE SERPL-MCNC: 77 MG/DL — SIGNIFICANT CHANGE UP (ref 70–99)
HCT VFR BLD CALC: 38 % — SIGNIFICANT CHANGE UP (ref 34.5–45)
HGB BLD-MCNC: 12.1 G/DL — SIGNIFICANT CHANGE UP (ref 11.5–15.5)
MAGNESIUM SERPL-MCNC: 2.2 MG/DL — SIGNIFICANT CHANGE UP (ref 1.6–2.6)
MCHC RBC-ENTMCNC: 29.1 PG — SIGNIFICANT CHANGE UP (ref 27–34)
MCHC RBC-ENTMCNC: 31.8 GM/DL — LOW (ref 32–36)
MCV RBC AUTO: 91.3 FL — SIGNIFICANT CHANGE UP (ref 80–100)
PHOSPHATE SERPL-MCNC: 3.9 MG/DL — SIGNIFICANT CHANGE UP (ref 2.5–4.5)
PLATELET # BLD AUTO: 599 K/UL — HIGH (ref 150–400)
POTASSIUM SERPL-MCNC: 4.1 MMOL/L — SIGNIFICANT CHANGE UP (ref 3.5–5.3)
POTASSIUM SERPL-SCNC: 4.1 MMOL/L — SIGNIFICANT CHANGE UP (ref 3.5–5.3)
RBC # BLD: 4.16 M/UL — SIGNIFICANT CHANGE UP (ref 3.8–5.2)
RBC # FLD: 15.2 % — HIGH (ref 10.3–14.5)
SODIUM SERPL-SCNC: 140 MMOL/L — SIGNIFICANT CHANGE UP (ref 135–145)
WBC # BLD: 7.02 K/UL — SIGNIFICANT CHANGE UP (ref 3.8–10.5)
WBC # FLD AUTO: 7.02 K/UL — SIGNIFICANT CHANGE UP (ref 3.8–10.5)

## 2021-10-09 RX ORDER — METOPROLOL TARTRATE 50 MG
1 TABLET ORAL
Qty: 0 | Refills: 0 | DISCHARGE
Start: 2021-10-09

## 2021-10-09 RX ORDER — METOPROLOL TARTRATE 50 MG
1 TABLET ORAL
Qty: 60 | Refills: 0
Start: 2021-10-09

## 2021-10-09 RX ORDER — OXYCODONE AND ACETAMINOPHEN 5; 325 MG/1; MG/1
1 TABLET ORAL
Qty: 20 | Refills: 0
Start: 2021-10-09

## 2021-10-09 RX ADMIN — Medication 15 MILLIGRAM(S): at 00:15

## 2021-10-09 RX ADMIN — MODAFINIL 400 MILLIGRAM(S): 200 TABLET ORAL at 10:12

## 2021-10-09 RX ADMIN — Medication 975 MILLIGRAM(S): at 07:15

## 2021-10-09 RX ADMIN — BUPROPION HYDROCHLORIDE 300 MILLIGRAM(S): 150 TABLET, EXTENDED RELEASE ORAL at 10:12

## 2021-10-09 RX ADMIN — Medication 15 MILLIGRAM(S): at 05:43

## 2021-10-09 RX ADMIN — LAMOTRIGINE 200 MILLIGRAM(S): 25 TABLET, ORALLY DISINTEGRATING ORAL at 10:12

## 2021-10-09 RX ADMIN — Medication 10 MILLIGRAM(S): at 10:12

## 2021-10-09 RX ADMIN — CLOPIDOGREL BISULFATE 75 MILLIGRAM(S): 75 TABLET, FILM COATED ORAL at 10:12

## 2021-10-09 RX ADMIN — Medication 975 MILLIGRAM(S): at 00:15

## 2021-10-09 RX ADMIN — Medication 1 TABLET(S): at 10:12

## 2021-10-09 RX ADMIN — Medication 975 MILLIGRAM(S): at 12:12

## 2021-10-09 RX ADMIN — Medication 975 MILLIGRAM(S): at 06:57

## 2021-10-09 RX ADMIN — Medication 15 MILLIGRAM(S): at 07:15

## 2021-10-09 RX ADMIN — SODIUM CHLORIDE 3 MILLILITER(S): 9 INJECTION INTRAMUSCULAR; INTRAVENOUS; SUBCUTANEOUS at 05:43

## 2021-10-09 RX ADMIN — Medication 88 MICROGRAM(S): at 06:58

## 2021-10-09 RX ADMIN — INFLUENZA VIRUS VACCINE 0.5 MILLILITER(S): 15; 15; 15; 15 SUSPENSION INTRAMUSCULAR at 12:11

## 2021-10-09 NOTE — DISCHARGE NOTE PROVIDER - NSDCCPTREATMENT_GEN_ALL_CORE_FT
PRINCIPAL PROCEDURE  Procedure: Hand assisted laparoscopic left colectomy  Findings and Treatment:

## 2021-10-09 NOTE — DISCHARGE NOTE PROVIDER - NSDCMRMEDTOKEN_GEN_ALL_CORE_FT
Crestor 20 mg oral tablet: 1 tab(s) orally once a day  enalapril 10 mg oral tablet: 1 tab(s) orally 2x a day  hydroxyurea 500 mg oral capsule: 2 orally alternating with 1 tablet daily  LaMICtal 200 mg oral tablet: 1 tab(s) orally 1 times a day  levothyroxine 88 mcg (0.088 mg) oral tablet: 1 tab(s) orally once a day  Linzess: 1 tab(s) orally once a day  Macrobid 100 mg oral capsule: 1 cap(s) orally 2 times a day  metoprolol tartrate 25 mg oral tablet: 1 tab(s) orally 2 times a day  Plavix 75 mg oral tablet: 1 tab(s) orally once a day-stop 5 days prior to surgery   Provigil 200 mg oral tablet: 1 tab(s) orally 2x/day  Therapeutic Multiple Vitamins oral tablet: 1 tab(s) orally once a day  Wellbutrin  mg/24 hours oral tablet, extended release: 1 tab(s) orally every 24 hours   Crestor 20 mg oral tablet: 1 tab(s) orally once a day  enalapril 10 mg oral tablet: 1 tab(s) orally 2x a day  hydroxyurea 500 mg oral capsule: 2 orally alternating with 1 tablet daily  LaMICtal 200 mg oral tablet: 1 tab(s) orally 1 times a day  levothyroxine 88 mcg (0.088 mg) oral tablet: 1 tab(s) orally once a day  Linzess: 1 tab(s) orally once a day  Macrobid 100 mg oral capsule: 1 cap(s) orally 2 times a day  metoprolol tartrate 25 mg oral tablet: 1 tab(s) orally 2 times a day  metoprolol tartrate 25 mg oral tablet: 1 tab(s) orally 2 times a day  oxycodone-acetaminophen 5 mg-325 mg oral tablet: 1 tab(s) orally every 6 hours, As Needed -for moderate pain [id number]  Plavix 75 mg oral tablet: 1 tab(s) orally once a day-stop 5 days prior to surgery   Provigil 200 mg oral tablet: 1 tab(s) orally 2x/day  Therapeutic Multiple Vitamins oral tablet: 1 tab(s) orally once a day  Wellbutrin  mg/24 hours oral tablet, extended release: 1 tab(s) orally every 24 hours

## 2021-10-09 NOTE — DISCHARGE NOTE PROVIDER - HOSPITAL COURSE
Patient was admitted under the colorectal surgery service. Patient underwent a laparoscopic sigmoidectomy. Patient tolerated the procedure well. Diet was advanced as tolerated. Serial abdominal exams, bowel function was monitored throughout. Vital signs and blood work were monitored. Cardiology and hospitalist services were on board, recommendations were appreciated. Patient underwent a nuclear stress test, which was negative. Pain was controlled. Physical therapy was on board for mobilization.

## 2021-10-09 NOTE — PROGRESS NOTE ADULT - ATTENDING COMMENTS
Pt seen while undergoing urgent bedside echo.  Events overnight noted.  Currently being ruled out for MI and for PE.  Plavix and hydroxyurea resumed for essential thrombocytosis.  Echo results as noted by Dr. Noel reviewed.  CTA to r/o PE pending. Awaiting completion of IV contrast allergy protocol.  Transferred to SD for continued monitoring.  Will make NPO until results of above studies available.
Seen and examined earlier this morning, late entry.  Reports continued incisional pain impairing ability to get up and ambulate.  Twin minimal amounts of po without N/V. Passing flatus, but no substantial stool.  AFVSS  NAD  prevena in place, soft, approp tender, ND  Labs reviewed  POD 4 Lap left colectomy with postop NSTEMI  Minimize narcotics and adjust pain regimen.  Cont low fiber diet.  NST negative on chart review, heparin gtt discontinued.  Cont OOB and ambulating.  Possible d/c tomorrow.
Seen and examined.  No complaints. NST negative.  Twin po, passing gas and stool. Pain controlled.  AFVSS  NAD  incision CDI, soft, approp tender, ND  Labs reviewed  A/P - Lap left colectomy with postop NSTEMI, recovering  Cont Plavix, Bblocker and statin per cards.  Low fiber diet.  HLIV.  D/C IV and D/C home today.
Mild pain.  good pain control.  no nausea.  reported small amount of blood per rectum.  freda gas rumbles across abdomen,    AVSS  Uo 1100    Abd Nd/soft/mild incisional tenderness L side    labs reviewed Hgb 13.1, plt 599    POD1 s/p lap sigmoid resection  --doing ok.  awaiting bowel function.  on entereg  --ful liquid diet  --OOB, ambulate  --cards/medicine to evaluate given comorbidities

## 2021-10-09 NOTE — PROGRESS NOTE ADULT - PROVIDER SPECIALTY LIST ADULT
Cardiology
Colorectal Surgery
Hospitalist
Colorectal Surgery
Hospitalist
Cardiology
Colorectal Surgery
Hospitalist
Cardiology
Hospitalist
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
## 2021-10-09 NOTE — DISCHARGE NOTE PROVIDER - CARE PROVIDER_API CALL
Praful Dial)  ColonRectal Surgery; Surgery  [address] [address]  Phone: [phone]  Fax: [phone]  Follow Up Time: 1 week

## 2021-10-09 NOTE — DISCHARGE NOTE PROVIDER - NSDCCPCAREPLAN_GEN_ALL_CORE_FT
PRINCIPAL DISCHARGE DIAGNOSIS  Diagnosis: Colonic stricture  Assessment and Plan of Treatment:

## 2021-10-15 DIAGNOSIS — D68.0 VON WILLEBRAND DISEASE: ICD-10-CM

## 2021-10-15 DIAGNOSIS — Z91.041 RADIOGRAPHIC DYE ALLERGY STATUS: ICD-10-CM

## 2021-10-15 DIAGNOSIS — E03.9 HYPOTHYROIDISM, UNSPECIFIED: ICD-10-CM

## 2021-10-15 DIAGNOSIS — E78.5 HYPERLIPIDEMIA, UNSPECIFIED: ICD-10-CM

## 2021-10-15 DIAGNOSIS — K56.609 UNSPECIFIED INTESTINAL OBSTRUCTION, UNSPECIFIED AS TO PARTIAL VERSUS COMPLETE OBSTRUCTION: ICD-10-CM

## 2021-10-15 DIAGNOSIS — K21.9 GASTRO-ESOPHAGEAL REFLUX DISEASE WITHOUT ESOPHAGITIS: ICD-10-CM

## 2021-10-15 DIAGNOSIS — Z86.718 PERSONAL HISTORY OF OTHER VENOUS THROMBOSIS AND EMBOLISM: ICD-10-CM

## 2021-10-15 DIAGNOSIS — D75.839 THROMBOCYTOSIS, UNSPECIFIED: ICD-10-CM

## 2021-10-15 DIAGNOSIS — R00.0 TACHYCARDIA, UNSPECIFIED: ICD-10-CM

## 2021-10-15 DIAGNOSIS — Z88.2 ALLERGY STATUS TO SULFONAMIDES: ICD-10-CM

## 2021-10-15 DIAGNOSIS — I10 ESSENTIAL (PRIMARY) HYPERTENSION: ICD-10-CM

## 2021-10-15 DIAGNOSIS — Z79.890 HORMONE REPLACEMENT THERAPY: ICD-10-CM

## 2021-10-15 DIAGNOSIS — I21.4 NON-ST ELEVATION (NSTEMI) MYOCARDIAL INFARCTION: ICD-10-CM

## 2021-10-15 DIAGNOSIS — Z86.711 PERSONAL HISTORY OF PULMONARY EMBOLISM: ICD-10-CM

## 2021-10-15 DIAGNOSIS — K55.059 ACUTE (REVERSIBLE) ISCHEMIA OF INTESTINE, PART AND EXTENT UNSPECIFIED: ICD-10-CM

## 2021-10-20 ENCOUNTER — APPOINTMENT (OUTPATIENT)
Dept: COLORECTAL SURGERY | Facility: CLINIC | Age: 61
End: 2021-10-20
Payer: COMMERCIAL

## 2021-10-20 VITALS
RESPIRATION RATE: 14 BRPM | DIASTOLIC BLOOD PRESSURE: 71 MMHG | SYSTOLIC BLOOD PRESSURE: 144 MMHG | HEIGHT: 63 IN | BODY MASS INDEX: 25.87 KG/M2 | WEIGHT: 146 LBS | HEART RATE: 68 BPM | TEMPERATURE: 96.7 F

## 2021-10-20 DIAGNOSIS — Z09 ENCOUNTER FOR FOLLOW-UP EXAMINATION AFTER COMPLETED TREATMENT FOR CONDITIONS OTHER THAN MALIGNANT NEOPLASM: ICD-10-CM

## 2021-10-20 PROCEDURE — 99024 POSTOP FOLLOW-UP VISIT: CPT

## 2021-10-20 RX ORDER — METRONIDAZOLE 500 MG/1
500 TABLET ORAL
Qty: 3 | Refills: 0 | Status: COMPLETED | COMMUNITY
Start: 2021-09-23 | End: 2021-10-20

## 2021-10-20 RX ORDER — NEOMYCIN SULFATE 500 MG/1
500 TABLET ORAL
Qty: 6 | Refills: 0 | Status: COMPLETED | COMMUNITY
Start: 2021-09-23 | End: 2021-10-20

## 2021-10-20 NOTE — REVIEW OF SYSTEMS
[Feeling Poorly] : feeling poorly [As Noted in HPI] : as noted in HPI [Abdominal Pain] : no abdominal pain [Constipation] : no constipation [Negative] : Heme/Lymph

## 2021-10-20 NOTE — ASSESSMENT
[FreeTextEntry1] : 61-year-old female with ischemic colitis and abdominal pain. Recommend laparoscopic possible open left colectomy.Risk and benefits of the surgery have been discussed which include bleeding, infection, sepsis, multiorgan failure, inadvertent injury including hollow viscus, solid organ, ureter neurovascular and neurological structures, DVT PE, heart attack, stroke, hernias, recurrence of ischemia, leakage of anastomosis requiring reoperation possible stoma and death.\par \par 10/20/21 Ms. Corona returns to the office for a postoperative visit.  Overall, doing quite well.  Eating without difficulties and having bowel function.  Staples removed at bedside today and incision is intact without signs of infection.  Overall, reassured of her progress.  Gradually return to a high-fiber diet and slowly increase activity.  Continue using abdominal binder or equivalent to prevent possible hernia formation.  Follow-up as needed.

## 2021-10-20 NOTE — PHYSICAL EXAM
[Abdomen Masses] : No abdominal masses [Abdomen Tenderness] : ~T No ~M abdominal tenderness [Tender] : nontender [JVD] : no jugular venous distention  [Normal Breath Sounds] : Normal breath sounds [Normal Heart Sounds] : normal heart sounds [Normal Rate and Rhythm] : normal rate and rhythm [No Rash or Lesion] : No rash or lesion [Alert] : alert [Oriented to Person] : oriented to person [Oriented to Place] : oriented to place [Oriented to Time] : oriented to time [Calm] : calm [de-identified] : incision CDI, soft, NTND [de-identified] : Looks well in no distress, of stated age. [de-identified] : pupils equal reactive to light normocephalic atraumatic. [de-identified] : moves all 4 extremities appropriately with 5 over 5 strength

## 2021-10-20 NOTE — HISTORY OF PRESENT ILLNESS
[FreeTextEntry1] : Dictation requested by Dr. Munoz for ischemic colitis. 61-year-old female with multiple comorbidities with recent attack of ischemic colitis of the left colon. Has developed chronic left lower abdominal pain and difficulties with bowel movements\par \par 10/20/21 Ms. Corona returns to the office for a postoperative visit after undergoing a laparoscopic left colectomy and sigmoid colectomy on 10/4/2021.  Since discharge, she is doing quite well and is eating without any nausea or vomiting.  She has minimal appetite as expected in the postoperative setting.  She is passing stools daily without incident.  Energy levels are not at baseline but gradually improving.

## 2022-01-28 ENCOUNTER — APPOINTMENT (OUTPATIENT)
Dept: VASCULAR SURGERY | Facility: CLINIC | Age: 62
End: 2022-01-28
Payer: COMMERCIAL

## 2022-01-28 VITALS — OXYGEN SATURATION: 97 % | DIASTOLIC BLOOD PRESSURE: 74 MMHG | HEART RATE: 84 BPM | SYSTOLIC BLOOD PRESSURE: 155 MMHG

## 2022-01-28 DIAGNOSIS — I83.893 VARICOSE VEINS OF BILATERAL LOWER EXTREMITIES WITH OTHER COMPLICATIONS: ICD-10-CM

## 2022-01-28 PROCEDURE — 36471 NJX SCLRSNT MLT INCMPTNT VN: CPT

## 2022-01-31 PROBLEM — I83.893 VARICOSE VEINS OF BILATERAL LOWER EXTREMITIES WITH OTHER COMPLICATIONS: Status: ACTIVE | Noted: 2021-05-07

## 2022-01-31 NOTE — PROCEDURE
[FreeTextEntry1] : BLE sclerotherapy [FreeTextEntry2] : I83.893, Z98.890 [FreeTextEntry3] : After informed consent obtained, under aseptic technique 2 - 2 mL vials of 1%polidocanol sclerosing solution were used to perform sclerotherapy of varicose veins of bilateral lower extremities with a 30 g needle. A sterile pressure dressing was applied. Pain was well controlled and the patient tolerated the procedure well.\par \par She continues to have chronic pain, edema from thighs through feet with hyperpigmentation, fibrosis and skin changes characteristic of lymphedema. Symptoms persist despite several months use of compression stockings 20-30 mmHg, exercise, frequent ambulation and leg elevation. She is awaiting receipt of Lymphedema pumps.\par \par Plan:\par Sclerotherapy completed today.\par Continue aggressive compression\par Hydrate often\par Ambulation/exercise\par Awaiting lymphedema pumps vended by [facility].\par RTC in 2-3 weeks for Sclerotherapy.\par

## 2022-04-15 ENCOUNTER — APPOINTMENT (OUTPATIENT)
Dept: VASCULAR SURGERY | Facility: CLINIC | Age: 62
End: 2022-04-15

## 2022-04-22 ENCOUNTER — APPOINTMENT (OUTPATIENT)
Dept: VASCULAR SURGERY | Facility: CLINIC | Age: 62
End: 2022-04-22

## 2023-02-23 NOTE — H&P PST ADULT - NS HEP C RISK YEAR OPTION
[facility] [place] [facility] Office Follow Up Visit     Office Follow Up      Date: 02/23/2023   Patient Name: Cass Wilkins  MRN: [id number]  YOB: 1973    Referring Physician: No ref. provider found     Chief Complaint:   Chief Complaint   Patient presents with   • Follow-up     1 month f/u--Pain in right acromioclavicular joint        History of Present Illness: Cass Wilkins is a 49 y.o. female who is here today for follow up on right shoulder pain.  She had pain in her right AC joint into the rotator cuff at last visit.  She received a subacromial corticosteroid injection.  Symptoms greatly improved.  Her pain is now 1/10.  She does still note stiffness in both arms especially at night when trying to sleep.    Subjective   Review of Systems: Review of Systems   Constitutional: Negative for chills, fever, unexpected weight gain and unexpected weight loss.   HENT: Negative for congestion, postnasal drip and rhinorrhea.    Eyes: Negative for blurred vision.   Respiratory: Negative for shortness of breath.    Cardiovascular: Negative for leg swelling.   Gastrointestinal: Negative for abdominal pain, nausea and vomiting.   Genitourinary: Negative for difficulty urinating.   Musculoskeletal: Positive for arthralgias. Negative for gait problem, joint swelling and myalgias.   Skin: Negative for skin lesions and wound.   Neurological: Negative for dizziness, weakness, light-headedness and numbness.   Hematological: Does not bruise/bleed easily.   Psychiatric/Behavioral: Negative for depressed mood.        Medications:   Current Outpatient Medications:   •  Cyanocobalamin (VITAMIN B-12 CR PO), Take  by mouth Daily., Disp: , Rfl:   •  meloxicam (Mobic) 7.5 MG tablet, Take 1 tablet by mouth Daily., Disp: 30 tablet, Rfl: 1  •  Multiple Vitamin (MULTI-VITAMIN DAILY PO), Take  by mouth., Disp: , Rfl:   •  Sodium Fluoride 5000 Sensitive 1.1-5 % gel, See Admin Instructions., Disp: ,  "Rfl:     Allergies:   Allergies   Allergen Reactions   • Aspirin Swelling   • Amoxicillin Itching       I have reviewed and updated the patient's chief complaint, history of present illness, review of systems, past medical history, surgical history, family history, social history, medications and allergy list as appropriate.     Objective    Vital Signs:   Vitals:    02/23/23 0923   BP: 110/78   Weight: 52.2 kg (115 lb 1.3 oz)   Height: 149.9 cm (59.02\")     Body mass index is 23.23 kg/m².  BMI is within normal parameters. No other follow-up for BMI required.    Patient reports that she is a non-smoker and has not ever been a smoker.  This behavior was applauded and she was encouraged to continue in smoking cessation.  We will continue to monitor at subsequent visits.    Ortho Exam:  General: Well-appearing, no acute distress  Right shoulder: No erythema ecchymosis or swelling.  No tenderness palpation of the anterior lateral shoulder today.  Has full range of motion in flexion extension abduction and internal rotation.  No pain with those motions.  2+ radial pulse.  Sensation intact to light touch.  Right elbow: Tender over the medial epicondyles.  Negative Tinel tap at the cubital tunnel.  Pain with resisted wrist and middle finger flexion.  Cervical spine: Nontender to palpation over the midline.  Crepitus felt with all motions.  5/5  strength.    Results Review:   Imaging Results (Last 24 Hours)     ** No results found for the last 24 hours. **      XR Spine Cervical 2 or 3 View (02/15/2022 11:51)  I personally reviewed the radiographs of the cervical spine.  No acute fracture or dislocation.  Minimal disc space height loss but there is facet arthropathy at C4-C5 and C5-C6.    Procedures    Assessment / Plan    Assessment/Plan:   Diagnoses and all orders for this visit:    1. Right rotator cuff tendinitis (Primary)    2. Chronic neck pain    3. Medial epicondylitis of right elbow      Follow-up right " shoulder pain.  At last visit, she received subacromial corticosteroid injection.  Symptoms greatly improved with that modality as well as with meloxicam.  She has full range of motion and strength without any pain.  Tender today over the medial epicondyle.  Often feels stiffness especially after long day of work with repetitive motion of the elbow.  She has no numbness or tingling.  We discussed possible treatment options for this but first I would have her be fully worked up for her cervical spine.  Her radiographs showed facet arthropathy at multiple levels.  Imaging studies have been ordered by the primary care physician but I will go ahead and arrange for referral to Dr. Kulkarni today for further treatment.  We discussed home treatments for the elbow and if the symptoms persist over the next 1 month, I will be glad to see her back at that point to discuss interventions.  We can also discuss her neck referral as well in light of her elbow pain. Can continue with meloxicam prn. I will see her back on as-needed basis at this point but I am happy to see her at any point for other orthopedic issues.    Previous imaging studies reviewed: 2/15/2022-radiographs of the cervical spine.    Previous lab results reviewed: 1/26/2023-TSH 2.370, hemoglobin A1c 5.50%.    Follow Up:   Return if symptoms worsen or fail to improve.      Bryn Dolan MD  [facility] [place] [facility]   Patient Refused

## 2023-03-03 ENCOUNTER — EMERGENCY (EMERGENCY)
Facility: HOSPITAL | Age: 63
LOS: 0 days | Discharge: HOME | End: 2023-03-03
Attending: EMERGENCY MEDICINE
Payer: COMMERCIAL

## 2023-03-03 VITALS
OXYGEN SATURATION: 96 % | TEMPERATURE: 98 F | RESPIRATION RATE: 18 BRPM | SYSTOLIC BLOOD PRESSURE: 134 MMHG | HEART RATE: 64 BPM | DIASTOLIC BLOOD PRESSURE: 50 MMHG

## 2023-03-03 VITALS — HEIGHT: 63 IN | WEIGHT: 139.99 LBS

## 2023-03-03 DIAGNOSIS — D68.04 ACQUIRED VON WILLEBRAND DISEASE: ICD-10-CM

## 2023-03-03 DIAGNOSIS — Z87.19 PERSONAL HISTORY OF OTHER DISEASES OF THE DIGESTIVE SYSTEM: ICD-10-CM

## 2023-03-03 DIAGNOSIS — Z90.711 ACQUIRED ABSENCE OF UTERUS WITH REMAINING CERVICAL STUMP: Chronic | ICD-10-CM

## 2023-03-03 DIAGNOSIS — F31.9 BIPOLAR DISORDER, UNSPECIFIED: ICD-10-CM

## 2023-03-03 DIAGNOSIS — Z98.890 OTHER SPECIFIED POSTPROCEDURAL STATES: Chronic | ICD-10-CM

## 2023-03-03 DIAGNOSIS — R11.2 NAUSEA WITH VOMITING, UNSPECIFIED: ICD-10-CM

## 2023-03-03 DIAGNOSIS — Z90.89 ACQUIRED ABSENCE OF OTHER ORGANS: ICD-10-CM

## 2023-03-03 DIAGNOSIS — E03.9 HYPOTHYROIDISM, UNSPECIFIED: ICD-10-CM

## 2023-03-03 DIAGNOSIS — Z90.711 ACQUIRED ABSENCE OF UTERUS WITH REMAINING CERVICAL STUMP: ICD-10-CM

## 2023-03-03 DIAGNOSIS — R19.7 DIARRHEA, UNSPECIFIED: ICD-10-CM

## 2023-03-03 DIAGNOSIS — R53.1 WEAKNESS: ICD-10-CM

## 2023-03-03 DIAGNOSIS — N28.9 DISORDER OF KIDNEY AND URETER, UNSPECIFIED: ICD-10-CM

## 2023-03-03 DIAGNOSIS — Z86.718 PERSONAL HISTORY OF OTHER VENOUS THROMBOSIS AND EMBOLISM: ICD-10-CM

## 2023-03-03 DIAGNOSIS — Z98.84 BARIATRIC SURGERY STATUS: Chronic | ICD-10-CM

## 2023-03-03 DIAGNOSIS — I10 ESSENTIAL (PRIMARY) HYPERTENSION: ICD-10-CM

## 2023-03-03 DIAGNOSIS — Z88.2 ALLERGY STATUS TO SULFONAMIDES: ICD-10-CM

## 2023-03-03 DIAGNOSIS — Z86.711 PERSONAL HISTORY OF PULMONARY EMBOLISM: ICD-10-CM

## 2023-03-03 DIAGNOSIS — Z98.84 BARIATRIC SURGERY STATUS: ICD-10-CM

## 2023-03-03 DIAGNOSIS — Z91.041 RADIOGRAPHIC DYE ALLERGY STATUS: ICD-10-CM

## 2023-03-03 DIAGNOSIS — K59.00 CONSTIPATION, UNSPECIFIED: ICD-10-CM

## 2023-03-03 DIAGNOSIS — Z90.89 ACQUIRED ABSENCE OF OTHER ORGANS: Chronic | ICD-10-CM

## 2023-03-03 DIAGNOSIS — I70.90 UNSPECIFIED ATHEROSCLEROSIS: ICD-10-CM

## 2023-03-03 DIAGNOSIS — Z86.2 PERSONAL HISTORY OF DISEASES OF THE BLOOD AND BLOOD-FORMING ORGANS AND CERTAIN DISORDERS INVOLVING THE IMMUNE MECHANISM: ICD-10-CM

## 2023-03-03 DIAGNOSIS — Z79.02 LONG TERM (CURRENT) USE OF ANTITHROMBOTICS/ANTIPLATELETS: ICD-10-CM

## 2023-03-03 LAB
ALBUMIN SERPL ELPH-MCNC: 3.8 G/DL — SIGNIFICANT CHANGE UP (ref 3.3–5)
ALP SERPL-CCNC: 141 U/L — HIGH (ref 40–120)
ALT FLD-CCNC: 26 U/L — SIGNIFICANT CHANGE UP (ref 12–78)
ANION GAP SERPL CALC-SCNC: 5 MMOL/L — SIGNIFICANT CHANGE UP (ref 5–17)
ANISOCYTOSIS BLD QL: SLIGHT — SIGNIFICANT CHANGE UP
APPEARANCE UR: CLEAR — SIGNIFICANT CHANGE UP
AST SERPL-CCNC: 16 U/L — SIGNIFICANT CHANGE UP (ref 15–37)
BACTERIA # UR AUTO: ABNORMAL
BASOPHILS # BLD AUTO: 0.21 K/UL — HIGH (ref 0–0.2)
BASOPHILS NFR BLD AUTO: 3 % — HIGH (ref 0–2)
BILIRUB SERPL-MCNC: 0.3 MG/DL — SIGNIFICANT CHANGE UP (ref 0.2–1.2)
BILIRUB UR-MCNC: NEGATIVE — SIGNIFICANT CHANGE UP
BUN SERPL-MCNC: 15 MG/DL — SIGNIFICANT CHANGE UP (ref 7–23)
CALCIUM SERPL-MCNC: 8.9 MG/DL — SIGNIFICANT CHANGE UP (ref 8.5–10.1)
CHLORIDE SERPL-SCNC: 104 MMOL/L — SIGNIFICANT CHANGE UP (ref 96–108)
CO2 SERPL-SCNC: 28 MMOL/L — SIGNIFICANT CHANGE UP (ref 22–31)
COLOR SPEC: YELLOW — SIGNIFICANT CHANGE UP
COMMENT - URINE: SIGNIFICANT CHANGE UP
CREAT SERPL-MCNC: 0.67 MG/DL — SIGNIFICANT CHANGE UP (ref 0.5–1.3)
DIFF PNL FLD: ABNORMAL
EGFR: 100 ML/MIN/1.73M2 — SIGNIFICANT CHANGE UP
EOSINOPHIL # BLD AUTO: 0.21 K/UL — SIGNIFICANT CHANGE UP (ref 0–0.5)
EOSINOPHIL NFR BLD AUTO: 3 % — SIGNIFICANT CHANGE UP (ref 0–6)
EPI CELLS # UR: SIGNIFICANT CHANGE UP
GIANT PLATELETS BLD QL SMEAR: PRESENT — SIGNIFICANT CHANGE UP
GLUCOSE SERPL-MCNC: 82 MG/DL — SIGNIFICANT CHANGE UP (ref 70–99)
GLUCOSE UR QL: NEGATIVE — SIGNIFICANT CHANGE UP
HCT VFR BLD CALC: 43.4 % — SIGNIFICANT CHANGE UP (ref 34.5–45)
HGB BLD-MCNC: 14.3 G/DL — SIGNIFICANT CHANGE UP (ref 11.5–15.5)
KETONES UR-MCNC: NEGATIVE — SIGNIFICANT CHANGE UP
LEUKOCYTE ESTERASE UR-ACNC: ABNORMAL
LG PLATELETS BLD QL AUTO: SLIGHT — SIGNIFICANT CHANGE UP
LIDOCAIN IGE QN: 107 U/L — SIGNIFICANT CHANGE UP (ref 73–393)
LYMPHOCYTES # BLD AUTO: 1.34 K/UL — SIGNIFICANT CHANGE UP (ref 1–3.3)
LYMPHOCYTES # BLD AUTO: 19 % — SIGNIFICANT CHANGE UP (ref 13–44)
MACROCYTES BLD QL: SLIGHT — SIGNIFICANT CHANGE UP
MANUAL SMEAR VERIFICATION: SIGNIFICANT CHANGE UP
MCHC RBC-ENTMCNC: 32.9 GM/DL — SIGNIFICANT CHANGE UP (ref 32–36)
MCHC RBC-ENTMCNC: 34 PG — SIGNIFICANT CHANGE UP (ref 27–34)
MCV RBC AUTO: 103.1 FL — HIGH (ref 80–100)
MONOCYTES # BLD AUTO: 0.84 K/UL — SIGNIFICANT CHANGE UP (ref 0–0.9)
MONOCYTES NFR BLD AUTO: 12 % — SIGNIFICANT CHANGE UP (ref 2–14)
NEUTROPHILS # BLD AUTO: 4.29 K/UL — SIGNIFICANT CHANGE UP (ref 1.8–7.4)
NEUTROPHILS NFR BLD AUTO: 61 % — SIGNIFICANT CHANGE UP (ref 43–77)
NITRITE UR-MCNC: NEGATIVE — SIGNIFICANT CHANGE UP
NRBC # BLD: 0 /100 — SIGNIFICANT CHANGE UP (ref 0–0)
NRBC # BLD: SIGNIFICANT CHANGE UP /100 WBCS (ref 0–0)
OVALOCYTES BLD QL SMEAR: SLIGHT — SIGNIFICANT CHANGE UP
PH UR: 5 — SIGNIFICANT CHANGE UP (ref 5–8)
PLAT MORPH BLD: ABNORMAL
PLATELET # BLD AUTO: 1091 K/UL — CRITICAL HIGH (ref 150–400)
POIKILOCYTOSIS BLD QL AUTO: SLIGHT — SIGNIFICANT CHANGE UP
POTASSIUM SERPL-MCNC: 3.7 MMOL/L — SIGNIFICANT CHANGE UP (ref 3.5–5.3)
POTASSIUM SERPL-SCNC: 3.7 MMOL/L — SIGNIFICANT CHANGE UP (ref 3.5–5.3)
PROT SERPL-MCNC: 7.1 GM/DL — SIGNIFICANT CHANGE UP (ref 6–8.3)
PROT UR-MCNC: NEGATIVE — SIGNIFICANT CHANGE UP
RBC # BLD: 4.21 M/UL — SIGNIFICANT CHANGE UP (ref 3.8–5.2)
RBC # FLD: 14.2 % — SIGNIFICANT CHANGE UP (ref 10.3–14.5)
RBC BLD AUTO: ABNORMAL
RBC CASTS # UR COMP ASSIST: SIGNIFICANT CHANGE UP /HPF (ref 0–4)
SODIUM SERPL-SCNC: 137 MMOL/L — SIGNIFICANT CHANGE UP (ref 135–145)
SP GR SPEC: 1.02 — SIGNIFICANT CHANGE UP (ref 1.01–1.02)
UROBILINOGEN FLD QL: NEGATIVE — SIGNIFICANT CHANGE UP
VARIANT LYMPHS # BLD: 2 % — SIGNIFICANT CHANGE UP (ref 0–6)
WBC # BLD: 7.04 K/UL — SIGNIFICANT CHANGE UP (ref 3.8–10.5)
WBC # FLD AUTO: 7.04 K/UL — SIGNIFICANT CHANGE UP (ref 3.8–10.5)
WBC UR QL: SIGNIFICANT CHANGE UP /HPF (ref 0–5)

## 2023-03-03 PROCEDURE — 74177 CT ABD & PELVIS W/CONTRAST: CPT | Mod: ME

## 2023-03-03 PROCEDURE — 83690 ASSAY OF LIPASE: CPT

## 2023-03-03 PROCEDURE — 85025 COMPLETE CBC W/AUTO DIFF WBC: CPT

## 2023-03-03 PROCEDURE — 99284 EMERGENCY DEPT VISIT MOD MDM: CPT | Mod: 25

## 2023-03-03 PROCEDURE — 81001 URINALYSIS AUTO W/SCOPE: CPT

## 2023-03-03 PROCEDURE — 99285 EMERGENCY DEPT VISIT HI MDM: CPT

## 2023-03-03 PROCEDURE — G1004: CPT

## 2023-03-03 PROCEDURE — 36415 COLL VENOUS BLD VENIPUNCTURE: CPT

## 2023-03-03 PROCEDURE — 85246 CLOT FACTOR VIII VW ANTIGEN: CPT

## 2023-03-03 PROCEDURE — 74177 CT ABD & PELVIS W/CONTRAST: CPT | Mod: 26,ME

## 2023-03-03 PROCEDURE — 80053 COMPREHEN METABOLIC PANEL: CPT

## 2023-03-03 PROCEDURE — 99255 IP/OBS CONSLTJ NEW/EST HI 80: CPT

## 2023-03-03 PROCEDURE — 85240 CLOT FACTOR VIII AHG 1 STAGE: CPT

## 2023-03-03 PROCEDURE — 85245 CLOT FACTOR VIII VW RISTOCTN: CPT

## 2023-03-03 RX ORDER — DIPHENHYDRAMINE HCL 50 MG
50 CAPSULE ORAL EVERY 6 HOURS
Refills: 0 | Status: DISCONTINUED | OUTPATIENT
Start: 2023-03-03 | End: 2023-03-03

## 2023-03-03 RX ADMIN — Medication 50 MILLIGRAM(S): at 14:01

## 2023-03-03 RX ADMIN — Medication 0.5 MILLIGRAM(S): at 11:01

## 2023-03-03 RX ADMIN — Medication 40 MILLIGRAM(S): at 10:42

## 2023-03-03 NOTE — ED STATDOCS - ATTENDING APP SHARED VISIT CONTRIBUTION OF CARE
I personally saw the patient with the MARISA, and completed the key components of the history and physical exam. I then discussed the management plan with the MARISA.

## 2023-03-03 NOTE — ED ADULT TRIAGE NOTE - CHIEF COMPLAINT QUOTE
patient ambulatory to ED c/o abdominal distention, vomiting.  patient reports x 2 weeks of nausea, episodes of vomiting, worsening abdominal distention and some bloody stools.  hx SBO, colitis.  previous surgeries performed by Dr. Rees.

## 2023-03-03 NOTE — ED STATDOCS - PATIENT PORTAL LINK FT
You can access the FollowMyHealth Patient Portal offered by [facility] by registering at the following website: [email or web address]. By joining [employer]’s FollowMyHealth portal, you will also be able to view your health information using other applications (apps) compatible with our system. You can access the FollowMyHealth Patient Portal offered by [facility] by registering at the following website: [email or web address]. By joining [employer]s FollowMyHealth portal, you will also be able to view your health information using other applications (apps) compatible with our system.

## 2023-03-03 NOTE — CONSULT NOTE ADULT - ASSESSMENT
63 y/o F with a PMhx of essential thrombocytosis with acquired VWD, follows with Heme/Onc Dr Zach Urbina through [facility]. Found to have thrombocytosis in ED.    # Abdominal Pain with Distension, Nausea    - PMHx SBO; PSHx Bariatric surgery 2015 with Dr Rees  - CT A/P with contrast imaging pending  - Continue supportive measures      # Essential Thrombocytosis    - Plts 1091K today in the ED  - She is being followed by Heme/Onc Dr Urbina over the past 20+ years since being dx with Essential Thrombocytosis with acquired VWD  - Conversation held with attending Dr Black and patient's primary Heme/Onc Dr Urbina  - He noted that her Plts have been running higher than her baseline (500-700K) for the past 2-3 months since about 11/2022 when her lab work then revealed Plts 1300K; He had adjusted her Hydrea dosing with no real improvement of her counts (ranging 1000 - 1200K); noted she was started on low dose Anagrelide approx 1 year ago but she did not tolerate after 7-10 days of taking it; he advised to reconsider at this time.  - As per Dr Urbina; start Hydrea 500mg BID & Anagrelide 0.5mg QD  - Will send off repeat VWD panel tests (ordered)  - Continue supportive measures  - Continue to trend serial CBCs 61 y/o F with a PMhx of primary essential thrombocytosis with acquired VWD, follows with Heme/Onc Dr Zach Urbina through [facility]. Found to have thrombocytosis in ED.    # Abdominal Pain with Distension, Nausea    - PMHx SBO; PSHx Bariatric surgery 2015 with Dr Rees  - CT A/P with contrast imaging pending  - Continue supportive measures      # Essential Thrombocytosis    - discussed with Dr. Urbina  - Plts 1091K today in the ED; baseline past 3 months has ranged 1.3-1.4 million s/t poor compliance with hydrea and anagralide  - She is being followed by Heme/Onc Dr Urbina over the past 20+ years since being dx with Essential Thrombocytosis with acquired VWD  - Conversation held with attending Dr Black and patient's primary Heme/Onc Dr Urbina  - He noted that her Plts have been running higher than her baseline (500-700K) for the past 2-3 months since about 11/2022 when her lab work then revealed Plts 1300K; He had adjusted her Hydrea dosing with no real improvement of her counts (ranging 1000 - 1200K); noted she was started on low dose Anagrelide approx 1 year ago but she did not tolerate after 7-10 days of taking it; he advised to reconsider at this time.  - As per Dr Urbina; start Hydrea 500mg BID & Anagrelide 0.5mg QD  - Will send off repeat VWD panel tests (ordered)  - Continue supportive measures  - Continue to trend serial CBCs

## 2023-03-03 NOTE — ED STATDOCS - NSFOLLOWUPINSTRUCTIONS_ED_ALL_ED_FT
Abdominal Pain    Many things can cause abdominal pain. Many times, abdominal pain is not caused by a disease and will improve without treatment. Your health care provider will do a physical exam to determine if there is a dangerous cause of your pain; blood tests and imaging may help determine the cause of your pain. However, in many cases, no cause may be found and you may need further testing as an outpatient. Monitor your abdominal pain for any changes.     SEEK IMMEDIATE MEDICAL CARE IF YOU HAVE ANY OF THE FOLLOWING SYMPTOMS: worsening abdominal pain, uncontrollable vomiting, profuse diarrhea, inability to have bowel movements or pass gas, black or bloody stools, fever accompanying chest pain or back pain, or fainting. These symptoms may represent a serious problem that is an emergency. Do not wait to see if the symptoms will go away. Get medical help right away. Call 911 and do not drive yourself to the hospital.    Constipation    Constipation is when a person has fewer than three bowel movements a week, has difficulty having a bowel movement, or has stools that are dry, hard, or larger than normal. Other symptoms can include abdominal pain or bloating. As people grow older, constipation is more common. A low-fiber diet, not taking in enough fluids, and taking certain medicines, including opioid painkillers, may make constipation worse. Treatment varies but may include dietary modifications (more fiber-rich foods), lifestyle modifications, and possible medications.     SEEK IMMEDIATE MEDICAL CARE IF YOU HAVE ANY OF THE FOLLOWING SYMPTOMS: bright red blood in your stool, constipation for longer than 4 days, abdominal or rectal pain, unexplained weight loss, or inability to pass gas. recommend outpt US for kidney lesion.       Abdominal Pain    Many things can cause abdominal pain. Many times, abdominal pain is not caused by a disease and will improve without treatment. Your health care provider will do a physical exam to determine if there is a dangerous cause of your pain; blood tests and imaging may help determine the cause of your pain. However, in many cases, no cause may be found and you may need further testing as an outpatient. Monitor your abdominal pain for any changes.     SEEK IMMEDIATE MEDICAL CARE IF YOU HAVE ANY OF THE FOLLOWING SYMPTOMS: worsening abdominal pain, uncontrollable vomiting, profuse diarrhea, inability to have bowel movements or pass gas, black or bloody stools, fever accompanying chest pain or back pain, or fainting. These symptoms may represent a serious problem that is an emergency. Do not wait to see if the symptoms will go away. Get medical help right away. Call 911 and do not drive yourself to the hospital.    Constipation    Constipation is when a person has fewer than three bowel movements a week, has difficulty having a bowel movement, or has stools that are dry, hard, or larger than normal. Other symptoms can include abdominal pain or bloating. As people grow older, constipation is more common. A low-fiber diet, not taking in enough fluids, and taking certain medicines, including opioid painkillers, may make constipation worse. Treatment varies but may include dietary modifications (more fiber-rich foods), lifestyle modifications, and possible medications.     SEEK IMMEDIATE MEDICAL CARE IF YOU HAVE ANY OF THE FOLLOWING SYMPTOMS: bright red blood in your stool, constipation for longer than 4 days, abdominal or rectal pain, unexplained weight loss, or inability to pass gas.

## 2023-03-03 NOTE — ED STATDOCS - NS ED ATTENDING STATEMENT MOD
This was a shared visit with the MARISA. I reviewed and verified the documentation and independently performed the documented:
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
## 2023-03-03 NOTE — ED STATDOCS - OBJECTIVE STATEMENT
61 y/o female w/ a PMHx of gastric ulcer, bipolar, intestinal obstruction, obesity, HTN, vertigo, cervical herniated disc, manic depression, GERD, hiatal hernia, heart murmur, ventricular tachycardia, PE, essential thrombocytosis, Von Williebran, and hypothyroid presents to the ED  c/o n/v, abd distension, and generalized weakness x2 days. Pt states pain in similar to her SBO so came to the ED for eval. No other complaints at this time. Allergies: IV contrast and Sulfa. GI: Dr. Rees.

## 2023-03-03 NOTE — ED STATDOCS - PROGRESS NOTE DETAILS
paged hematology for platelet level, pt reeval offers no complaints currently. awaiting call back Dr. Negrita montanez in the OR currently.  -Fanta Jane PA-C pt hematologist is Dr. Young affiliated with [facility]. -Fanta Jane PA-C Catina Choi for attending Dr. Meredith:  Spoke w/ Kristine Song, will come and see pt. Also spoke w/ Dr. Rees who will also come and see pt. pt aware of her ct and lab results, pt closely followed by her hematologist at [facility]. Dr. Rees aware of ct results.  will dc pt home. -Fanta Jane PA-C pt aware of her ct and lab results, pt closely followed by her hematologist at [facility]. Dr. Rees aware of ct results.  will dc pt home.  pt states she is aware of her kidney lesion it was there before and will fu with pmd for outpt US -Fanta Jane PA-C

## 2023-03-06 LAB
FACT VIII ACT/NOR PPP: 52 % — LOW (ref 60–125)
VWF AG ACT/NOR PPP IA: 56 % — LOW (ref 63–170)
VWF:RCO ACT/NOR PPP PL AGG: 34 % — LOW (ref 45–133)

## 2023-03-16 ENCOUNTER — OUTPATIENT (OUTPATIENT)
Dept: OUTPATIENT SERVICES | Facility: HOSPITAL | Age: 63
LOS: 1 days | Discharge: HOME | End: 2023-03-16
Payer: COMMERCIAL

## 2023-03-16 ENCOUNTER — APPOINTMENT (OUTPATIENT)
Dept: GASTROENTEROLOGY | Facility: HOSPITAL | Age: 63
End: 2023-03-16
Payer: COMMERCIAL

## 2023-03-16 ENCOUNTER — RESULT REVIEW (OUTPATIENT)
Age: 63
End: 2023-03-16

## 2023-03-16 VITALS
TEMPERATURE: 98 F | HEIGHT: 63 IN | SYSTOLIC BLOOD PRESSURE: 142 MMHG | DIASTOLIC BLOOD PRESSURE: 53 MMHG | HEART RATE: 66 BPM | RESPIRATION RATE: 26 BRPM | OXYGEN SATURATION: 100 % | WEIGHT: 139.99 LBS

## 2023-03-16 DIAGNOSIS — Z98.890 OTHER SPECIFIED POSTPROCEDURAL STATES: Chronic | ICD-10-CM

## 2023-03-16 DIAGNOSIS — K21.9 GASTRO-ESOPHAGEAL REFLUX DISEASE WITHOUT ESOPHAGITIS: ICD-10-CM

## 2023-03-16 DIAGNOSIS — Z98.84 BARIATRIC SURGERY STATUS: Chronic | ICD-10-CM

## 2023-03-16 DIAGNOSIS — Z90.711 ACQUIRED ABSENCE OF UTERUS WITH REMAINING CERVICAL STUMP: Chronic | ICD-10-CM

## 2023-03-16 DIAGNOSIS — R11.0 NAUSEA: ICD-10-CM

## 2023-03-16 DIAGNOSIS — Z90.89 ACQUIRED ABSENCE OF OTHER ORGANS: Chronic | ICD-10-CM

## 2023-03-16 LAB — SARS-COV-2 RNA SPEC QL NAA+PROBE: SIGNIFICANT CHANGE UP

## 2023-03-16 PROCEDURE — 88312 SPECIAL STAINS GROUP 1: CPT

## 2023-03-16 PROCEDURE — 43239 EGD BIOPSY SINGLE/MULTIPLE: CPT | Mod: GC

## 2023-03-16 PROCEDURE — 88305 TISSUE EXAM BY PATHOLOGIST: CPT

## 2023-03-16 PROCEDURE — 88305 TISSUE EXAM BY PATHOLOGIST: CPT | Mod: 26

## 2023-03-16 PROCEDURE — 76700 US EXAM ABDOM COMPLETE: CPT | Mod: 26

## 2023-03-16 PROCEDURE — 76700 US EXAM ABDOM COMPLETE: CPT

## 2023-03-16 PROCEDURE — 88312 SPECIAL STAINS GROUP 1: CPT | Mod: 26

## 2023-03-16 PROCEDURE — 87635 SARS-COV-2 COVID-19 AMP PRB: CPT

## 2023-03-16 RX ORDER — MODAFINIL 200 MG/1
1 TABLET ORAL
Qty: 0 | Refills: 0 | DISCHARGE

## 2023-03-16 RX ORDER — CLOPIDOGREL BISULFATE 75 MG/1
1 TABLET, FILM COATED ORAL
Qty: 0 | Refills: 0 | DISCHARGE

## 2023-03-16 RX ORDER — NITROFURANTOIN MACROCRYSTAL 50 MG
1 CAPSULE ORAL
Qty: 0 | Refills: 0 | DISCHARGE

## 2023-03-16 RX ORDER — ROSUVASTATIN CALCIUM 5 MG/1
1 TABLET ORAL
Qty: 0 | Refills: 0 | DISCHARGE

## 2023-03-16 RX ORDER — ONDANSETRON 4 MG/1
4 TABLET, ORALLY DISINTEGRATING ORAL
Qty: 42 | Refills: 0 | Status: ACTIVE | COMMUNITY
Start: 2023-03-16 | End: 1900-01-01

## 2023-03-16 RX ORDER — LINACLOTIDE 145 UG/1
1 CAPSULE, GELATIN COATED ORAL
Qty: 0 | Refills: 0 | DISCHARGE

## 2023-03-16 RX ORDER — HYDROXYUREA 500 MG/1
1 CAPSULE ORAL
Qty: 0 | Refills: 0 | DISCHARGE

## 2023-03-16 RX ORDER — ONDANSETRON 8 MG/1
4 TABLET, FILM COATED ORAL ONCE
Refills: 0 | Status: COMPLETED | OUTPATIENT
Start: 2023-03-16 | End: 2023-03-16

## 2023-03-16 RX ORDER — LEVOTHYROXINE SODIUM 125 MCG
1 TABLET ORAL
Qty: 0 | Refills: 0 | DISCHARGE

## 2023-03-16 RX ORDER — ANAGRELIDE HCL 0.5 MG
0 CAPSULE ORAL
Qty: 0 | Refills: 0 | DISCHARGE

## 2023-03-16 RX ORDER — LAMOTRIGINE 25 MG/1
1 TABLET, ORALLY DISINTEGRATING ORAL
Qty: 0 | Refills: 0 | DISCHARGE

## 2023-03-16 RX ORDER — BUPROPION HYDROCHLORIDE 150 MG/1
1 TABLET, EXTENDED RELEASE ORAL
Qty: 0 | Refills: 0 | DISCHARGE

## 2023-03-16 RX ADMIN — ONDANSETRON 4 MILLIGRAM(S): 8 TABLET, FILM COATED ORAL at 10:48

## 2023-03-16 NOTE — ASU PATIENT PROFILE, ADULT - FALL HARM RISK - UNIVERSAL INTERVENTIONS
Bed in lowest position, wheels locked, appropriate side rails in place/Call bell, personal items and telephone in reach/Instruct patient to call for assistance before getting out of bed or chair/Non-slip footwear when patient is out of bed/[place] to call system/Physically safe environment - no spills, clutter or unnecessary equipment/Purposeful Proactive Rounding/Room/bathroom lighting operational, light cord in reach

## 2023-03-16 NOTE — ASU PREOP CHECKLIST - BLOOD AVAILABLE
History of Present Illness


Date of examination: 01/16/22


Date of admission: 


01/16/22


Chief complaint: 





Shortness of breath


Wheezing


History of present illness: 





66-year-old male with  history of COPD (no home oxygen), CHF, and TIA without 

residual deficits presents to the hospital complains of sudden onset of 

shortness of breath that started upon waking this morning.  Patient states he 

went to bed asymptomatic woke up with significant wheezing and shortness of silvino

th.  Patient tried a home nebulizer treatment without significant improvement 

therefore called EMS.  EMS reports that her O2 saturation in the high 80s upon 

their arrival.  He received albuterol 5 mg and Solu-Medrol 125 mg with 

improvement in symptoms.  Patient states he is feeling much better.  He denies 

pain, cough, fever, orthopnea, PND, leg edema, calf tenderness, or previous 

intubations room air saturation currently 94%.  He states he has been vaccinated

for COVID








Patient having recurrent wheezing and hypoxia despite ED treatment and therefore

will be admitted for further treatment.  Chest x-ray labs unremarkable.





Past History


Past Medical History: COPD, heart failure, hypertension, stroke, other (To vocab

use)


Past Surgical History: Other (Internal defibrillator)





Medications and Allergies


                                    Allergies











Allergy/AdvReac Type Severity Reaction Status Date / Time


 


No Known Allergies Allergy   Verified 01/16/22 03:00











                                Home Medications











 Medication  Instructions  Recorded  Confirmed  Last Taken  Type


 


Aspirin 325 mg PO QDAY 11/07/13 02/17/14 02/17/14 07:30 History


 


Furosemide [Furosemide ORAL LIQ] 20 mg PO BID 11/07/13 02/17/14 02/17/14 07:30 

History


 


Hydrocodone Bit/Acetaminophen 1 each PO Q6H PRN #10 tablet 11/07/13 02/17/14 

Unknown Rx





[Lortab 5-500 Tablet]     


 


Nitroglycerin [Nitrostat] 0.4 mg SL Q5M PRN 11/07/13 02/17/14 Unknown History


 


Simvastatin 20 mg PO QDAY 11/07/13 02/17/14 02/16/14 20:00 History


 


carvediloL [Coreg] 3.125 mg PO BID 11/07/13 02/17/14 02/17/14 07:30 History


 


lisinopriL [Zestril TAB] 5 mg PO QDAY 11/07/13 02/17/14 02/17/14 07:30 History


 


Warfarin [Coumadin] 7.5 mg PO QDAY #30 11/10/13 02/17/14 02/17/14 07:30 Rx


 


Ciprofloxacin 0.2%(Nf) 0.25 ml OTIC BID #5 ml 02/17/14  Unknown Rx





[Ciprofloxacin HCl 0.2%]     


 


Ciprofloxacin HCl [Cipro] 500 mg PO Q12H #14 tab 04/28/14  Unknown Rx











Active Meds: 


Active Medications





Acetaminophen (Acetaminophen 325 Mg Tab)  650 mg PO Q4H PRN


   PRN Reason: Pain MILD(1-3)/Fever >100.5/HA


Albuterol (Albuterol 2.5 Mg/3 Ml Nebu)  2.5 mg IH Q4HRT PRN


   PRN Reason: Shortness Of Breath


Albuterol/Ipratropium (Ipratropium/Albuterol Sulfate 3 Ml Ampul.Neb)  1 ampul IH

 Q6HRT [facility]


Aspirin (Aspirin 325 Mg Tab)  325 mg PO QDAY [facility]


Carvedilol (Carvedilol 3.125 Mg Tab)  3.125 mg PO BID [facility]


Famotidine (Famotidine 20 Mg Tab)  20 mg PO BID [facility]


Hydromorphone HCl (Hydromorphone 1 Mg/1 Ml Inj)  0.5 mg IV Q3H PRN


   PRN Reason: Pain , Severe (7-10)


Lisinopril (Lisinopril 5 Mg Tab)  5 mg PO QDAY [facility]


Methylprednisolone Sodium Succinate (Methylprednisolone Sod Succinate 40 Mg/1 Ml

 Inj)  40 mg IV Q12HR [facility]


Miscellaneous Medication (Ciprofloxacin 0.2%(Nf))  0.25 ml OTIC BID [facility]


Miscellaneous Medication (Ciprofloxacin Hcl [Ciprofloxacin Tab])  500 mg PO Q12H

[facility]


Miscellaneous Medication (Furosemide [Furosemide Oral Liq])  20 mg PO BID [facility]


Miscellaneous Medication (Simvastatin [Simvastatin])  20 mg PO QDAY [facility]


Montelukast Sodium (Montelukast 10 Mg Tab)  10 mg PO QHS [facility]


Morphine Sulfate (Morphine 2 Mg/1 Ml Inj)  2 mg IV Q4H PRN


   PRN Reason: Pain, Moderate (4-6)


Nitroglycerin (Nitroglycerin 0.4 Mg Tab Subl)  0.4 mg SL Q5M PRN


   PRN Reason: Chest Pain


Ondansetron HCl (Ondansetron 4 Mg/2 Ml Inj)  4 mg IV Q8H PRN


   PRN Reason: Nausea And Vomiting


Sodium Chloride (Sodium Chloride 0.9% 10 Ml Flush Syringe)  10 ml IV BID [facility]


Sodium Chloride (Sodium Chloride 0.9% 10 Ml Flush Syringe)  10 ml IV PRN PRN


   PRN Reason: LINE FLUSH


Warfarin Sodium (Warfarin 7.5 Mg Tab)  7.5 mg PO QDAY [facility]; Protocol











Review of Systems


All systems: negative


Cardiovascular: dyspnea on exertion


Respiratory: shortness of breath, dyspnea on exertion, wheezing





Exam





- Constitutional


Vitals: 


                                        











Temp Pulse Resp BP Pulse Ox


 


 98.1 F   83   19   139/88   96 


 


 01/16/22 03:05  01/16/22 04:15  01/16/22 04:15  01/16/22 04:15  01/16/22 04:15











General appearance: Present: no acute distress, well-nourished





- EENT


Eyes: Present: PERRL


ENT: hearing intact, clear oral mucosa





- Neck


Neck: Present: supple, normal ROM





- Respiratory


Respiratory effort: normal


Respiratory: bilateral: wheezing





- Cardiovascular


Heart Sounds: Present: S1 & S2.  Absent: rub, click





- Extremities


Extremities: pulses symmetrical, No edema


Peripheral Pulses: within normal limits





- Abdominal


General gastrointestinal: Present: soft, non-tender, non-distended, normal bowel

 sounds


Male genitourinary: Present: normal





- Integumentary


Integumentary: Present: clear, warm, dry





- Musculoskeletal


Musculoskeletal: gait normal, strength equal bilaterally





- Psychiatric


Psychiatric: appropriate mood/affect, intact judgment & insight





- Neurologic


Neurologic: CNII-XII intact, moves all extremities





Results





- Labs


CBC & Chem 7: 


                                 01/16/22 03:12





                                 01/16/22 03:12


Labs: 


                             Laboratory Last Values











WBC  6.1 K/mm3 (4.5-11.0)   01/16/22  03:12    


 


RBC  4.89 M/mm3 (3.65-5.03)   01/16/22  03:12    


 


Hgb  12.6 gm/dl (11.8-15.2)   01/16/22  03:12    


 


Hct  40.7 % (35.5-45.6)   01/16/22  03:12    


 


MCV  83 fl (84-94)  L  01/16/22  03:12    


 


MCH  26 pg (28-32)  L  01/16/22  03:12    


 


MCHC  31 % (32-34)  L  01/16/22  03:12    


 


RDW  15.7 % (13.2-15.2)  H  01/16/22  03:12    


 


Plt Count  195 K/mm3 (140-440)   01/16/22  03:12    


 


Baso % (Auto)  Np   01/16/22  03:12    


 


Add Manual Diff  Complete   01/16/22  03:12    


 


Total Counted  100   01/16/22  03:12    


 


Seg Neuts % (Manual)  56.0 % (40.0-70.0)   01/16/22  03:12    


 


Band Neutrophils %  0 %  01/16/22  03:12    


 


Lymphocytes % (Manual)  21.0 % (13.4-35.0)   01/16/22  03:12    


 


Reactive Lymphs % (Man)  0 %  01/16/22  03:12    


 


Monocytes % (Manual)  14.0 % (0.0-7.3)  H  01/16/22  03:12    


 


Eosinophils % (Manual)  9.0 % (0.0-4.3)  H  01/16/22  03:12    


 


Basophils % (Manual)  0 % (0.0-1.8)   01/16/22  03:12    


 


Metamyelocytes %  0 %  01/16/22  03:12    


 


Myelocytes %  0 %  01/16/22  03:12    


 


Promyelocytes %  0 %  01/16/22  03:12    


 


Blast Cells %  0 %  01/16/22  03:12    


 


Nucleated RBC %  Not Reportable   01/16/22  03:12    


 


Seg Neutrophils # Man  3.4 K/mm3 (1.8-7.7)   01/16/22  03:12    


 


Band Neutrophils #  0.0 K/mm3  01/16/22  03:12    


 


Lymphocytes # (Manual)  1.3 K/mm3 (1.2-5.4)   01/16/22  03:12    


 


Abs React Lymphs (Man)  0.0 K/mm3  01/16/22  03:12    


 


Monocytes # (Manual)  0.9 K/mm3 (0.0-0.8)  H  01/16/22  03:12    


 


Eosinophils # (Manual)  0.5 K/mm3 (0.0-0.4)  H  01/16/22  03:12    


 


Basophils # (Manual)  0.0 K/mm3 (0.0-0.1)   01/16/22  03:12    


 


Metamyelocytes #  0.0 K/mm3  01/16/22  03:12    


 


Myelocytes #  0.0 K/mm3  01/16/22  03:12    


 


Promyelocytes #  0.0 K/mm3  01/16/22  03:12    


 


Blast Cells #  0.0 K/mm3  01/16/22  03:12    


 


WBC Morphology  Not Reportable   01/16/22  03:12    


 


Hypersegmented Neuts  Not Reportable   01/16/22  03:12    


 


Hyposegmented Neuts  Not Reportable   01/16/22  03:12    


 


Hypogranular Neuts  Not Reportable   01/16/22  03:12    


 


Smudge Cells  Not Reportable   01/16/22  03:12    


 


Toxic Granulation  Not Reportable   01/16/22  03:12    


 


Toxic Vacuolation  Not Reportable   01/16/22  03:12    


 


Dohle Bodies  Not Reportable   01/16/22  03:12    


 


Pelger-Huet Anomaly  Not Reportable   01/16/22  03:12    


 


Russ Rods  Not Reportable   01/16/22  03:12    


 


Platelet Estimate  Consistent w auto   01/16/22  03:12    


 


Clumped Platelets  Not Reportable   01/16/22  03:12    


 


Plt Clumps, EDTA  Not Reportable   01/16/22  03:12    


 


Large Platelets  Not Reportable   01/16/22  03:12    


 


Giant Platelets  Not Reportable   01/16/22  03:12    


 


Platelet Satelliting  Not Reportable   01/16/22  03:12    


 


Plt Morphology Comment  Not Reportable   01/16/22  03:12    


 


RBC Morphology  Not Reportable   01/16/22  03:12    


 


Dimorphic RBCs  Not Reportable   01/16/22  03:12    


 


Polychromasia  Not Reportable   01/16/22  03:12    


 


Hypochromasia  Not Reportable   01/16/22  03:12    


 


Poikilocytosis  Not Reportable   01/16/22  03:12    


 


Anisocytosis  1+   01/16/22  03:12    


 


Microcytosis  Not Reportable   01/16/22  03:12    


 


Macrocytosis  Not Reportable   01/16/22  03:12    


 


Spherocytes  Not Reportable   01/16/22  03:12    


 


Pappenheimer Bodies  Not Reportable   01/16/22  03:12    


 


Sickle Cells  Not Reportable   01/16/22  03:12    


 


Target Cells  Not Reportable   01/16/22  03:12    


 


Tear Drop Cells  Not Reportable   01/16/22  03:12    


 


Ovalocytes  Not Reportable   01/16/22  03:12    


 


Helmet Cells  Not Reportable   01/16/22  03:12    


 


[place] Bodies  Not Reportable   01/16/22  03:12    


 


Cabot Rings  Not Reportable   01/16/22  03:12    


 


[place] Cells  Not Reportable   01/16/22  03:12    


 


Bite Cells  Not Reportable   01/16/22  03:12    


 


Crenated Cell  Not Reportable   01/16/22  03:12    


 


Elliptocytes  Not Reportable   01/16/22  03:12    


 


Acanthocytes (Spur)  Not Reportable   01/16/22  03:12    


 


Rouleaux  Not Reportable   01/16/22  03:12    


 


Hemoglobin C Crystals  Not Reportable   01/16/22  03:12    


 


Schistocytes  Not Reportable   01/16/22  03:12    


 


Malaria parasites  Not Reportable   01/16/22  03:12    


 


[place] Bodies  Not Reportable   01/16/22  03:12    


 


Hem Pathologist Commnt  No   01/16/22  03:12    


 


Sodium  140 mmol/L (137-145)   01/16/22  03:12    


 


Potassium  4.2 mmol/L (3.6-5.0)   01/16/22  03:12    


 


Chloride  102.3 mmol/L ()   01/16/22  03:12    


 


Carbon Dioxide  29 mmol/L (22-30)   01/16/22  03:12    


 


Anion Gap  13 mmol/L  01/16/22  03:12    


 


BUN  16 mg/dL (9-20)   01/16/22  03:12    


 


Creatinine  1.2 mg/dL (0.8-1.3)   01/16/22  03:12    


 


Estimated GFR  > 60 ml/min  01/16/22  03:12    


 


BUN/Creatinine Ratio  13 %  01/16/22  03:12    


 


Glucose  140 mg/dL ()  H  01/16/22  03:12    


 


Calcium  9.2 mg/dL (8.4-10.2)   01/16/22  03:12    














- Imaging and Cardiology


Chest x-ray: report reviewed





Assessment and Plan


VTE prophylaxis?: Chemical


Plan of care discussed with patient/family: Yes





- Patient Problems


(1) Acute exacerbation of COPD with asthma


Current Visit: Yes   Status: Acute   


Plan to address problem: 


Admit the patient to the medical floor.  Oxygen via nasal concentrator per 

minute.  DuoNeb nebulizer every 4 hours.  Albuterol by nebulizer every 4 hours 

as needed.  Solu-Medrol 40 mg IV every 12 hours.  Cipro 500 mg p.o. twice daily.

  Singular 10 mg p.o. daily.  We will consult pulmonary if needed








(2) CHF (congestive heart failure)


Current Visit: Yes   Status: Acute   


Plan to address problem: 


Stable.  Avoid fluid overload.  Fluid restriction.  Daily weight.  Continue home

 medication.  Lasix 20 mg p.o. bid








(3) Hypertension


Current Visit: Yes   Status: Acute   


Plan to address problem: 


Lisinopril 5 mg p.o. daily.  Coreg 3.125 mg p.o. twice daily.  Lasix 20 mg p.o. 

twice daily








(4) CVA (cerebral vascular accident)


Current Visit: Yes   Status: Acute   


Plan to address problem: 


Stable.  Continue aspirin 325 mg p.o. daily.  Simvastatin 20 mg p.o. daily








(5) Hypoxia


Current Visit: Yes   Status: Acute   


Plan to address problem: 


Oxygen via nasal concentrator per minute.  DuoNeb nebulizer every 4 hours.  

Albuterol by nebulizer every 4 hours as needed.  Solu-Medrol 40 mg IV every 12 

hours.  Cipro 500 mg p.o. twice daily.  Singular 10 mg p.o. daily.  We will 

consult pulmonary if needed








(6) DVT prophylaxis


Current Visit: Yes   Status: Acute   


Plan to address problem: 


Heparin 5000 units subcu every 8 hours for DVT prophylaxis.  Pepcid 20 mg p.o. 

twice daily for GI prophylaxis.  Patient is a full code
n/a

## 2023-03-16 NOTE — ASU PATIENT PROFILE, ADULT - FALL HARM RISK - PATIENT NEEDS ASSISTANCE
Render Post-Care Instructions In Note?: no Medical Necessity Clause: This procedure was medically necessary because the lesions that were treated were: irritated Medical Necessity Information: It is in your best interest to select a reason for this procedure from the list below. All of these items fulfill various CMS LCD requirements except the new and changing color options. Post-Care Instructions: I reviewed with the patient in detail post-care instructions. Patient is to wear sunprotection, and avoid picking at any of the treated lesions. Pt may apply Vaseline to crusted or scabbing areas. Detail Level: Detailed Anesthesia Volume In Cc: 0 Consent: The patient's consent was obtained including but not limited to risks of crusting, scabbing, blistering, scarring, darker or lighter pigmentary change, recurrence, incomplete removal and infection. No assistance needed

## 2023-03-20 LAB — SURGICAL PATHOLOGY STUDY: SIGNIFICANT CHANGE UP

## 2023-03-21 DIAGNOSIS — Z98.84 BARIATRIC SURGERY STATUS: ICD-10-CM

## 2023-03-21 DIAGNOSIS — R10.13 EPIGASTRIC PAIN: ICD-10-CM

## 2023-03-21 DIAGNOSIS — D75.839 THROMBOCYTOSIS, UNSPECIFIED: ICD-10-CM

## 2023-03-21 DIAGNOSIS — I10 ESSENTIAL (PRIMARY) HYPERTENSION: ICD-10-CM

## 2023-03-21 DIAGNOSIS — K21.9 GASTRO-ESOPHAGEAL REFLUX DISEASE WITHOUT ESOPHAGITIS: ICD-10-CM

## 2023-03-21 DIAGNOSIS — Z88.2 ALLERGY STATUS TO SULFONAMIDES: ICD-10-CM

## 2023-03-21 DIAGNOSIS — F41.9 ANXIETY DISORDER, UNSPECIFIED: ICD-10-CM

## 2023-03-21 DIAGNOSIS — F32.A DEPRESSION, UNSPECIFIED: ICD-10-CM

## 2023-03-21 DIAGNOSIS — E03.9 HYPOTHYROIDISM, UNSPECIFIED: ICD-10-CM

## 2023-03-21 DIAGNOSIS — Z91.041 RADIOGRAPHIC DYE ALLERGY STATUS: ICD-10-CM

## 2023-03-27 ENCOUNTER — APPOINTMENT (OUTPATIENT)
Dept: DERMATOLOGY | Facility: CLINIC | Age: 63
End: 2023-03-27

## 2023-04-20 ENCOUNTER — APPOINTMENT (OUTPATIENT)
Dept: DERMATOLOGY | Facility: CLINIC | Age: 63
End: 2023-04-20
Payer: COMMERCIAL

## 2023-04-20 DIAGNOSIS — D48.5 NEOPLASM OF UNCERTAIN BEHAVIOR OF SKIN: ICD-10-CM

## 2023-04-20 PROCEDURE — 11102 TANGNTL BX SKIN SINGLE LES: CPT

## 2023-04-20 NOTE — PHYSICAL EXAM
[Alert] : alert [Oriented x 3] : ~L oriented x 3 [Well Nourished] : well nourished [FreeTextEntry3] : Mid chest on right: 3 x 3 mm purple–red protuberant smooth papule

## 2023-04-20 NOTE — HISTORY OF PRESENT ILLNESS
[FreeTextEntry1] : Growth on chest [de-identified] : Follow-up visit for 62-year-old white female last seen by me on December 29, 2020, with a 5-month history of a lesion on the chest.\par No history of skin cancer.

## 2023-05-03 ENCOUNTER — APPOINTMENT (OUTPATIENT)
Dept: CARDIOLOGY | Facility: CLINIC | Age: 63
End: 2023-05-03

## 2023-05-03 ENCOUNTER — NON-APPOINTMENT (OUTPATIENT)
Age: 63
End: 2023-05-03

## 2023-05-17 NOTE — H&P PST ADULT - DOES THIS PATIENT HAVE A HISTORY OF OR HAS BEEN DX WITH HEART FAILURE?
Doxycycline Pregnancy And Lactation Text: This medication is Pregnancy Category D and not consider safe during pregnancy. It is also excreted in breast milk but is considered safe for shorter treatment courses. unknown

## 2023-07-25 DIAGNOSIS — Z23 ENCOUNTER FOR IMMUNIZATION: ICD-10-CM

## 2023-07-25 RX ORDER — LINACLOTIDE 145 UG/1
145 CAPSULE, GELATIN COATED ORAL
Refills: 0 | Status: ACTIVE | COMMUNITY
Start: 2023-07-25

## 2023-07-25 RX ORDER — ROSUVASTATIN CALCIUM 20 MG/1
20 TABLET, FILM COATED ORAL DAILY
Refills: 0 | Status: ACTIVE | COMMUNITY
Start: 2023-07-25

## 2023-07-25 RX ORDER — FAMOTIDINE 20 MG/1
20 TABLET, FILM COATED ORAL
Qty: 30 | Refills: 0 | Status: DISCONTINUED | COMMUNITY
Start: 2020-06-16 | End: 2023-07-25

## 2023-07-26 ENCOUNTER — NON-APPOINTMENT (OUTPATIENT)
Age: 63
End: 2023-07-26

## 2023-07-26 ENCOUNTER — APPOINTMENT (OUTPATIENT)
Dept: CARDIOLOGY | Facility: CLINIC | Age: 63
End: 2023-07-26
Payer: COMMERCIAL

## 2023-07-26 VITALS
DIASTOLIC BLOOD PRESSURE: 68 MMHG | HEIGHT: 63 IN | WEIGHT: 147 LBS | BODY MASS INDEX: 26.05 KG/M2 | HEART RATE: 80 BPM | OXYGEN SATURATION: 97 % | SYSTOLIC BLOOD PRESSURE: 148 MMHG

## 2023-07-26 DIAGNOSIS — E78.5 HYPERLIPIDEMIA, UNSPECIFIED: ICD-10-CM

## 2023-07-26 PROCEDURE — 99204 OFFICE O/P NEW MOD 45 MIN: CPT | Mod: 25

## 2023-07-26 PROCEDURE — 93000 ELECTROCARDIOGRAM COMPLETE: CPT

## 2023-07-26 RX ORDER — ENALAPRIL MALEATE 20 MG/1
20 TABLET ORAL
Qty: 60 | Refills: 1 | Status: ACTIVE | COMMUNITY
Start: 2020-06-16

## 2023-07-26 RX ORDER — HYDROCHLOROTHIAZIDE 12.5 MG/1
12.5 CAPSULE ORAL TWICE DAILY
Refills: 0 | Status: ACTIVE | COMMUNITY

## 2023-07-26 RX ORDER — PANTOPRAZOLE 40 MG/1
40 TABLET, DELAYED RELEASE ORAL
Refills: 0 | Status: DISCONTINUED | COMMUNITY
End: 2023-07-26

## 2023-07-26 NOTE — HISTORY OF PRESENT ILLNESS
[FreeTextEntry1] : Anne-Marie Corona is a 63-year-old woman with multiple medical problems, including a history of right ventricular outflow tract ventricular tachycardia s/p remote ablation (2001; "partially successful" with subsequent PVCs treated with verapamil), hypertension, hypothyroidism, ischemic colitis, von Willebrand's disease, essential thrombocytosis, obesity s/p gastric bypass, hypertension, DVT/PE, and hospitalization in March 2023 for respiratory failure, who presents for cardiology consultation.\par \par She describes no recent cardiac care and generally has been feeling well.  However, she experiences intermittent palpitations and says that she had "atrial fibrillation" a few months ago while taking anagrelide for her essential thrombocytosis (she reports [employer] strips); palpitations resolved with d/c of Rx.\par \par She has been feeling well but describes feeling out of shape with mild dyspnea with exertion but no angina.

## 2023-07-26 NOTE — REVIEW OF SYSTEMS
[Weight Gain (___ Lbs)] : [unfilled] ~Ulb weight gain [Dyspnea on exertion] : dyspnea during exertion [Chest Discomfort] : no chest discomfort [Palpitations] : palpitations [Under Stress] : under stress [Negative] : Heme/Lymph

## 2023-07-26 NOTE — CARDIOLOGY SUMMARY
[de-identified] : 7/26/2023.  Sinus  Rhythm.  Left atrial enlargement.  Poor R-wave progression. [de-identified] : 10/2021.  Normal nuclear perfusion SPECT scan [de-identified] : 10/6/2021.  Normal left ventricular size and function with estimated ejection fraction greater than 70%.  Intracavitary gradient attributed to hyperdynamic left ventricular function.  Mild left atrial enlargement.  Mild tricuspid regurgitation.  Mild pulmonary hypertension.

## 2023-07-26 NOTE — PHYSICAL EXAM
[Normal S1, S2] : normal S1, S2 [No Murmur] : no murmur [Clear Lung Fields] : clear lung fields [Normal Bowel Sounds] : normal bowel sounds [Normal Gait] : normal gait [No Edema] : no edema [Alert and Oriented] : alert and oriented [de-identified] :   Overweight [de-identified] :  extraocular muscles intact [de-identified] :  normocephalic [de-identified] :  no JVD [de-identified] :  skin is warm and dry

## 2023-07-26 NOTE — DISCUSSION/SUMMARY
[With Me] : with me [___ Month(s)] : in [unfilled] month(s) [FreeTextEntry1] : \par Hypertension: Suboptimal control.  I offered to add an additional agent in an effort to lower blood pressure but she declined––she wants to work on diet, exercise, and weight loss; she agreed to return to see me later this year to reassess blood pressure and potentially add another agent if needed.  I recommended a low-sodium diet and continued use of enalapril and HCTZ.\par \par Ventricular tachycardia: History of ventricular tachycardia status post "partially successful" ablation many years ago;  appears stable.\par \par Atrial fibrillation: Currently in sinus rhythm.  I reviewed several [employer] recordings --  possible atrial fibrillation but not certain based on available data; palpitations have now resolved.  I recommended a Holter monitor but she says that she wore 1 for a primary care physician recently and we will try to obtain report for me to review.\par \par   Hyperlipidemia: Tolerating rosuvastatin.\par \par *   I reviewed reports of an echocardiogram and nuclear stress test from October 2021––normal   Nuclear stress test; echocardiogram with hyperdynamic LV function and mild pulmonary hypertension.  Ms. Corona thinks that she had an echocardiogram more recently and will also try to obtain report for me to review at next visit.

## 2023-08-04 NOTE — CONSULT NOTE ADULT - PROVIDER SPECIALTY LIST ADULT
This may be related to metformin.  No alarm symptoms.    Negative colonoscopy 2021.  Recommended using psyllium on a daily basis.   Heme/Onc

## 2023-09-17 ENCOUNTER — EMERGENCY (EMERGENCY)
Facility: HOSPITAL | Age: 63
LOS: 0 days | Discharge: HOME | End: 2023-09-17
Attending: STUDENT IN AN ORGANIZED HEALTH CARE EDUCATION/TRAINING PROGRAM
Payer: COMMERCIAL

## 2023-09-17 VITALS
RESPIRATION RATE: 17 BRPM | DIASTOLIC BLOOD PRESSURE: 82 MMHG | OXYGEN SATURATION: 98 % | TEMPERATURE: 98 F | SYSTOLIC BLOOD PRESSURE: 142 MMHG | HEART RATE: 68 BPM

## 2023-09-17 VITALS — HEIGHT: 63 IN | WEIGHT: 145.06 LBS

## 2023-09-17 DIAGNOSIS — I10 ESSENTIAL (PRIMARY) HYPERTENSION: ICD-10-CM

## 2023-09-17 DIAGNOSIS — Z98.890 OTHER SPECIFIED POSTPROCEDURAL STATES: Chronic | ICD-10-CM

## 2023-09-17 DIAGNOSIS — Z98.84 BARIATRIC SURGERY STATUS: Chronic | ICD-10-CM
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
## 2023-09-17 DIAGNOSIS — Z88.2 ALLERGY STATUS TO SULFONAMIDES: ICD-10-CM

## 2023-09-17 DIAGNOSIS — Z87.19 PERSONAL HISTORY OF OTHER DISEASES OF THE DIGESTIVE SYSTEM: ICD-10-CM

## 2023-09-17 DIAGNOSIS — F31.9 BIPOLAR DISORDER, UNSPECIFIED: ICD-10-CM

## 2023-09-17 DIAGNOSIS — D47.3 ESSENTIAL (HEMORRHAGIC) THROMBOCYTHEMIA: ICD-10-CM

## 2023-09-17 DIAGNOSIS — Z20.3 CONTACT WITH AND (SUSPECTED) EXPOSURE TO RABIES: ICD-10-CM

## 2023-09-17 DIAGNOSIS — Z90.89 ACQUIRED ABSENCE OF OTHER ORGANS: ICD-10-CM

## 2023-09-17 DIAGNOSIS — Z90.89 ACQUIRED ABSENCE OF OTHER ORGANS: Chronic | ICD-10-CM

## 2023-09-17 DIAGNOSIS — S61.031A PUNCTURE WOUND WITHOUT FOREIGN BODY OF RIGHT THUMB WITHOUT DAMAGE TO NAIL, INITIAL ENCOUNTER: ICD-10-CM

## 2023-09-17 DIAGNOSIS — E03.9 HYPOTHYROIDISM, UNSPECIFIED: ICD-10-CM
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
## 2023-09-17 DIAGNOSIS — Z91.041 RADIOGRAPHIC DYE ALLERGY STATUS: ICD-10-CM

## 2023-09-17 DIAGNOSIS — Z79.02 LONG TERM (CURRENT) USE OF ANTITHROMBOTICS/ANTIPLATELETS: ICD-10-CM

## 2023-09-17 DIAGNOSIS — Z98.84 BARIATRIC SURGERY STATUS: ICD-10-CM

## 2023-09-17 DIAGNOSIS — Z86.711 PERSONAL HISTORY OF PULMONARY EMBOLISM: ICD-10-CM
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
## 2023-09-17 DIAGNOSIS — Z86.718 PERSONAL HISTORY OF OTHER VENOUS THROMBOSIS AND EMBOLISM: ICD-10-CM

## 2023-09-17 DIAGNOSIS — Z90.711 ACQUIRED ABSENCE OF UTERUS WITH REMAINING CERVICAL STUMP: Chronic | ICD-10-CM

## 2023-09-17 DIAGNOSIS — D72.829 ELEVATED WHITE BLOOD CELL COUNT, UNSPECIFIED: ICD-10-CM

## 2023-09-17 DIAGNOSIS — Z23 ENCOUNTER FOR IMMUNIZATION: ICD-10-CM

## 2023-09-17 DIAGNOSIS — Z90.711 ACQUIRED ABSENCE OF UTERUS WITH REMAINING CERVICAL STUMP: ICD-10-CM

## 2023-09-17 DIAGNOSIS — D68.00 VON WILLEBRAND DISEASE, UNSPECIFIED: ICD-10-CM

## 2023-09-17 LAB
ALBUMIN SERPL ELPH-MCNC: 3.8 G/DL — SIGNIFICANT CHANGE UP (ref 3.3–5)
ALP SERPL-CCNC: 175 U/L — HIGH (ref 40–120)
ALT FLD-CCNC: 31 U/L — SIGNIFICANT CHANGE UP (ref 12–78)
ANION GAP SERPL CALC-SCNC: 4 MMOL/L — LOW (ref 5–17)
AST SERPL-CCNC: 22 U/L — SIGNIFICANT CHANGE UP (ref 15–37)
BASOPHILS # BLD AUTO: 0.21 K/UL — HIGH (ref 0–0.2)
BASOPHILS NFR BLD AUTO: 2 % — SIGNIFICANT CHANGE UP (ref 0–2)
BILIRUB SERPL-MCNC: 0.3 MG/DL — SIGNIFICANT CHANGE UP (ref 0.2–1.2)
BUN SERPL-MCNC: 14 MG/DL — SIGNIFICANT CHANGE UP (ref 7–23)
CALCIUM SERPL-MCNC: 9.2 MG/DL — SIGNIFICANT CHANGE UP (ref 8.5–10.1)
CHLORIDE SERPL-SCNC: 106 MMOL/L — SIGNIFICANT CHANGE UP (ref 96–108)
CO2 SERPL-SCNC: 28 MMOL/L — SIGNIFICANT CHANGE UP (ref 22–31)
CREAT SERPL-MCNC: 0.64 MG/DL — SIGNIFICANT CHANGE UP (ref 0.5–1.3)
EGFR: 100 ML/MIN/1.73M2 — SIGNIFICANT CHANGE UP
EOSINOPHIL # BLD AUTO: 0.11 K/UL — SIGNIFICANT CHANGE UP (ref 0–0.5)
EOSINOPHIL NFR BLD AUTO: 1 % — SIGNIFICANT CHANGE UP (ref 0–6)
GLUCOSE SERPL-MCNC: 87 MG/DL — SIGNIFICANT CHANGE UP (ref 70–99)
HCT VFR BLD CALC: 45.9 % — HIGH (ref 34.5–45)
HGB BLD-MCNC: 15.3 G/DL — SIGNIFICANT CHANGE UP (ref 11.5–15.5)
LYMPHOCYTES # BLD AUTO: 1.06 K/UL — SIGNIFICANT CHANGE UP (ref 1–3.3)
LYMPHOCYTES # BLD AUTO: 10 % — LOW (ref 13–44)
MCHC RBC-ENTMCNC: 32.5 PG — SIGNIFICANT CHANGE UP (ref 27–34)
MCHC RBC-ENTMCNC: 33.3 GM/DL — SIGNIFICANT CHANGE UP (ref 32–36)
MCV RBC AUTO: 97.5 FL — SIGNIFICANT CHANGE UP (ref 80–100)
MONOCYTES # BLD AUTO: 1.27 K/UL — HIGH (ref 0–0.9)
MONOCYTES NFR BLD AUTO: 12 % — SIGNIFICANT CHANGE UP (ref 2–14)
NEUTROPHILS # BLD AUTO: 7.93 K/UL — HIGH (ref 1.8–7.4)
NEUTROPHILS NFR BLD AUTO: 75 % — SIGNIFICANT CHANGE UP (ref 43–77)
NRBC # BLD: SIGNIFICANT CHANGE UP /100 WBCS (ref 0–0)
PLATELET # BLD AUTO: 1061 K/UL — CRITICAL HIGH (ref 150–400)
POTASSIUM SERPL-MCNC: 3.5 MMOL/L — SIGNIFICANT CHANGE UP (ref 3.5–5.3)
POTASSIUM SERPL-SCNC: 3.5 MMOL/L — SIGNIFICANT CHANGE UP (ref 3.5–5.3)
PROT SERPL-MCNC: 7 GM/DL — SIGNIFICANT CHANGE UP (ref 6–8.3)
RBC # BLD: 4.71 M/UL — SIGNIFICANT CHANGE UP (ref 3.8–5.2)
RBC # FLD: 12.9 % — SIGNIFICANT CHANGE UP (ref 10.3–14.5)
SODIUM SERPL-SCNC: 138 MMOL/L — SIGNIFICANT CHANGE UP (ref 135–145)
WBC # BLD: 10.57 K/UL — HIGH (ref 3.8–10.5)
WBC # FLD AUTO: 10.57 K/UL — HIGH (ref 3.8–10.5)

## 2023-09-17 PROCEDURE — 99284 EMERGENCY DEPT VISIT MOD MDM: CPT | Mod: 25

## 2023-09-17 PROCEDURE — 99285 EMERGENCY DEPT VISIT HI MDM: CPT

## 2023-09-17 PROCEDURE — 87040 BLOOD CULTURE FOR BACTERIA: CPT

## 2023-09-17 PROCEDURE — 90675 RABIES VACCINE IM: CPT

## 2023-09-17 PROCEDURE — 80053 COMPREHEN METABOLIC PANEL: CPT

## 2023-09-17 PROCEDURE — 85025 COMPLETE CBC W/AUTO DIFF WBC: CPT

## 2023-09-17 PROCEDURE — 73130 X-RAY EXAM OF HAND: CPT | Mod: RT

## 2023-09-17 PROCEDURE — 96374 THER/PROPH/DIAG INJ IV PUSH: CPT

## 2023-09-17 PROCEDURE — 90471 IMMUNIZATION ADMIN: CPT

## 2023-09-17 PROCEDURE — 36415 COLL VENOUS BLD VENIPUNCTURE: CPT

## 2023-09-17 PROCEDURE — 73130 X-RAY EXAM OF HAND: CPT | Mod: 26,RT

## 2023-09-17 RX ORDER — AMPICILLIN SODIUM AND SULBACTAM SODIUM 250; 125 MG/ML; MG/ML
3 INJECTION, POWDER, FOR SUSPENSION INTRAMUSCULAR; INTRAVENOUS ONCE
Refills: 0 | Status: COMPLETED | OUTPATIENT
Start: 2023-09-17 | End: 2023-09-17

## 2023-09-17 RX ORDER — RABIES VACC, HUMAN DIPLOID/PF 2.5 UNIT
1 VIAL (EA) INTRAMUSCULAR ONCE
Refills: 0 | Status: COMPLETED | OUTPATIENT
Start: 2023-09-17 | End: 2023-09-17

## 2023-09-17 RX ORDER — HUMAN RABIES VIRUS IMMUNE GLOBULIN 150 [IU]/ML
1300 INJECTION, SOLUTION INTRAMUSCULAR ONCE
Refills: 0 | Status: DISCONTINUED | OUTPATIENT
Start: 2023-09-17 | End: 2023-09-17

## 2023-09-17 RX ADMIN — AMPICILLIN SODIUM AND SULBACTAM SODIUM 200 GRAM(S): 250; 125 INJECTION, POWDER, FOR SUSPENSION INTRAMUSCULAR; INTRAVENOUS at 14:25

## 2023-09-17 RX ADMIN — Medication 1 MILLILITER(S): at 14:26

## 2023-09-17 NOTE — ED ADULT NURSE NOTE - CCCP TRG CHIEF CMPLNT
Follow up with your PCP this week. Call for an appointment on Monday. You also need to follow up with your Urologist. We are treating you for a UTI. We sent your urine today for another culture and we might call you to update your antibiotics we need to. Otherwise return to the ER immediately if you experience any fever, chills, back pain, dizziness, shortness of breath, or you feel like you might pass out  
bite, animal

## 2023-09-17 NOTE — ED STATDOCS - NSFOLLOWUPINSTRUCTIONS_ED_ALL_ED_FT
Return on 9/20 for 2nd dose of rabies vaccine, 9/24 for 3rd dose, and 10/1 for last dose.    Animal Bite, Adult  Animal bites range from mild to serious. An animal bite can result in any of these injuries:  A scratch.  A deep, open cut.  Broken (punctured) or torn skin.  A crush injury.  A bone injury.  A small bite from a house pet is usually less serious than a bite from a stray or wild animal. Cat bites can be more serious because their long, thin teeth can cause deep puncture wounds that close fast, trapping bacteria inside.    Stray or wild animals, such as a raccoon, almeida, skunk, or bat, are at higher risk of carrying a serious infection called rabies, which they can pass to a human through a bite. A bite from one of these animals needs medical care right away and, sometimes, rabies vaccination.    What increases the risk?  You are more likely to be bitten by an animal if:  You are around unfamiliar pets.  You disturb an animal when it is eating, sleeping, or caring for its babies.  You are outdoors in a place where small, wild animals roam freely.  What are the signs or symptoms?  Common symptoms of an animal bite include:  Pain.  Bleeding.  Swelling.  Bruising.  How is this diagnosed?  This condition may be diagnosed based on a physical exam and medical history. Your health care provider will examine your wound and ask for details about the animal and how the bite happened. You may also have tests, such as:  Blood tests to check for infection.  X-rays to check for damage to bones or joints.  Taking a fluid sample from your wound and checking it for infection (culture test).  How is this treated?  Treatment depends on the type of animal, where the bite is on your body, and your medical history. Treatment may include:  Wound care. This often includes cleaning the wound and rinsing it out (flushing it) with saline solution, which is made of salt and water. A bandage (dressing) is also often applied. In rare cases, the wound may be closed with stitches (sutures), staples, skin glue, or adhesive strips.  Antibiotic medicine to prevent or treat infection. This medicine may be prescribed in pill or ointment form. If the bite area gets infected, the medicine may be given through an IV.  A tetanus shot to prevent tetanus infection.  Rabies treatment to prevent rabies infection, if the animal could have rabies.  Surgery. This may be done if a bite gets infected or causes damage that needs to be repaired.  Follow these instructions at home:  Medicines    Take or apply over-the-counter and prescription medicines only as told by your health care provider.  If you were prescribed an antibiotic medicine, take or apply it as told by your health care provider. Do not stop using the antibiotic even if you start to feel better.  Wound care    Two stitched wounds. One is normal. The other is red with pus and infected.  Follow instructions from your health care provider about how to take care of your wound. Make sure you:  Wash your hands with soap and water for at least 20 seconds before and after you change your dressing. If soap and water are not available, use hand .  Change your dressing as told by your health care provider.  Leave sutures, skin glue, or adhesive strips in place. These skin closures may need to stay in place for 2 weeks or longer. If adhesive strip edges start to loosen and curl up, you may trim the loose edges. Do not remove adhesive strips completely unless your health care provider tells you to do that.  Check your wound every day for signs of infection. Check for:  More redness, swelling, or pain.  More fluid or blood.  Warmth.  Pus or a bad smell.  General instructions    Bag of ice on a towel on the skin.   Raise (elevate) the injured area above the level of your heart while you are sitting or lying down, if this is possible.  If directed, put ice on the injured area. To do this:  Put ice in a plastic bag.  Place a towel between your skin and the bag.  Leave the ice on for 20 minutes, 2–3 times per day.  Remove the ice if your skin turns bright red. This is very important. If you cannot feel pain, heat, or cold, you have a greater risk of damage to the area.  Keep all follow-up visits. This is important.  Contact a health care provider if:  You have more redness, swelling, or pain around your wound.  Your wound feels warm to the touch.  You have a fever or chills.  You have a general feeling of sickness (malaise).  You feel nauseous or you vomit.  You have pain that does not get better.  Get help right away if:  You have a red streak that leads away from your wound.  You have non-clear fluid or more blood coming from your wound.  There is pus or a bad smell coming from your wound.  You have trouble moving your injured area.  You have numbness or tingling that spreads beyond your wound.  Summary  Animal bites can range from mild to serious. An animal bite can cause a scratch on the skin, a deep and open cut, torn or punctured skin, a crush injury, or a bone injury.  A bite from a stray or wild animal needs medical care right away and, sometimes, rabies vaccination.  Your health care provider will examine your wound and ask for details about the animal and how the bite happened.  Treatment may include wound care, antibiotic medicine, a tetanus shot, and rabies treatment if the animal could have rabies.  This information is not intended to replace advice given to you by your health care provider. Make sure you discuss any questions you have with your health care provider.

## 2023-09-17 NOTE — ED STATDOCS - CLINICAL SUMMARY MEDICAL DECISION MAKING FREE TEXT BOX
Pt presenting for almeida bite, animal is captured however pt is immunocompromised. Will give rabies vaccine, immunoglobin, as well as protractive ABx.

## 2023-09-17 NOTE — ED STATDOCS - PATIENT PORTAL LINK FT
You can access the FollowMyHealth Patient Portal offered by [facility] by registering at the following website: [email or web address]. By joining [facility]’s FollowMyHealth portal, you will also be able to view your health information using other applications (apps) compatible with our system.

## 2023-09-17 NOTE — ED ADULT NURSE NOTE - OBJECTIVE STATEMENT
62 y/o female  presents to the ED sent in by ED s/p animal bite. 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 Pt states she has had worsening tenderness and swelling to the area. Pt has access to the almeida, no known rabies.

## 2023-09-17 NOTE — ED STATDOCS - PROGRESS NOTE DETAILS
64 y/o female with a PMH of gastric ulcer, bipolar, intestinal obstruction, obesity, HTN, vertigo, cervical herniated disc, manic depression, GERD, hiatal hernia, heart murmur, ventricular tachycardia, PE, essential thrombocytosis on Hydroxyurea, Von Willebrand, and hypothyroid presents to ED c/o almeida bite to left hand approx 24 hours ago. 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 Pt went to urgent care today, was sent here for further eval, has not started abx. Endorses pain to bite wound with some swelling. Denies fevers or chills, no other complaints. On exam patient well appearing in NAD, puncture wound to dorsum of left hand at base of left thumb with surrounding swelling and tenderness, neurovascularly intact, no discharge or streaking up arm. As patient is immunocompromised, will start IV abx, get labs, give rabies vaccine, and consult ortho hand. - Clotilde Zafar PA-C Spoke to [facility] on the phone. Recommends pt gets rabies vaccine as she is a frequent volunteer, but base on current guidelines they do not recommend pt get rabies immunoglobulin, as the almeida can be observed. Pt made aware, understands and agrees to plan. - Clotilde Zafar PA-C 62 y/o female with a PMH of gastric ulcer, bipolar, intestinal obstruction, obesity, HTN, vertigo, cervical herniated disc, manic depression, GERD, hiatal hernia, heart murmur, ventricular tachycardia, PE, essential thrombocytosis on Hydroxyurea, Von Willebrand, and hypothyroid presents to ED c/o almeida bite to right hand approx 24 hours ago. 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 Pt went to urgent care today, was sent here for further eval, has not started abx. Endorses pain to bite wound with some swelling. Denies fevers or chills, no other complaints. On exam patient well appearing in NAD, puncture wound to dorsum of left hand at base of right thumb with surrounding swelling and tenderness, neurovascularly intact, no discharge or streaking up arm. As patient is immunocompromised, will start IV abx, get labs, give rabies vaccine, and consult ortho hand. - Clotilde Zafar PA-C Spoke to [facility] on the phone. Recommends pt gets rabies vaccine as she is a frequent volunteer, but base on current guidelines they do not recommend pt get rabies immunoglobulin, as the almeida can be observed. Pt made aware, understands and agrees to plan. Animal bite form faxed to [facility]. - Clotilde Zafar PA-C Labs and imaging reviewed. Thrombocytosis 1,061, pt with known h/o thrombocytosis. Has appt with doctor tm. Mildly elevated white count 10.57. Rest of labs WNL. XR negative for fx or fb. Hand specialist Dr. Warren consulted patient, believes she can be dc with oral abx and f/u with him in office. Discussed all findings with pt, understands and agrees to plan. Discussed when to return for following doses of vaccine. Strict return precautions were given. All questions and concerns were addressed. - Cloitlde Zafar PA-C

## 2023-09-17 NOTE — ED STATDOCS - ATTENDING APP SHARED VISIT CONTRIBUTION OF CARE
I, John Paul Alfodr MD,  performed the initial face to face bedside interview with this patient regarding history of present illness, review of symptoms and relevant past medical, social and family history.  I completed an independent physical examination.  I was the initial provider who evaluated this patient.   I personally saw the patient and performed a substantive portion of the visit including all aspects of the medical decision making.  I have signed out the follow up of any pending tests (i.e. labs, radiological studies) to the MARISA.  I have communicated the patient’s plan of care and disposition with the MARISA.  The history, relevant review of systems, past medical and surgical history, medical decision making, and physical examination was documented by the scribe in my presence and I attest to the accuracy of the documentation.

## 2023-09-17 NOTE — ED STATDOCS - OBJECTIVE STATEMENT
62 y/o female w/ a PMHx of gastric ulcer, bipolar, intestinal obstruction, obesity, HTN, vertigo, cervical herniated disc, manic depression, GERD, hiatal hernia, heart murmur, ventricular tachycardia, PE, essential thrombocytosis, Von Willebrand, and hypothyroid presents to the ED sent in by ED s/p animal bite. 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 Pt states she has had worsening tenderness and swelling to the area. Pt has access to the almeida, no known rabies. 62 y/o female w/ a PMHx of gastric ulcer, bipolar, intestinal obstruction, obesity, HTN, vertigo, cervical herniated disc, manic depression, GERD, hiatal hernia, heart murmur, ventricular tachycardia, PE, essential thrombocytosis, Von Willebrand, and hypothyroid presents to the ED sent in by ED s/p animal bite. 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 Pt states she has had worsening tenderness and swelling to the area. Pt has access to the almeida, no known rabies.

## 2023-09-17 NOTE — ED STATDOCS - CARE PROVIDER_API CALL
Louie Warren.  Orthopaedic Surgery  [address] [place] [address]  Phone: [phone]  Fax: [phone]  Follow Up Time:

## 2023-09-17 NOTE — ED STATDOCS - CHIEF COMPLAINT
The patient is a 63y year old Female complaining of bite, animal. General: well appearing, no acute distress, AOx3  Skin: no rash, no pallor  Head: normocephalic, atraumatic  Eyes: clear conjunctiva, EOMI  ENMT: airway patent, no nasal discharge  Cardiovascular: normal rate, normal rhythm, S1/S2, no edema   Pulmonary: clear to auscultation bilaterally, no rales, rhonchi, or wheeze  Abdomen: soft, nontender  Musculoskeletal: moving extremities well, no deformity  Psych: normal mood, normal affect

## 2023-09-17 NOTE — ED STATDOCS - NS ED ATTENDING STATEMENT MOD
Cardiac Monitor/Defib/ACLS/Rescue Kit/O2/BVM This was a shared visit with the MARISA. I reviewed and verified the documentation and independently performed the documented: Impaired

## 2023-09-17 NOTE — ED STATDOCS - PHYSICAL EXAMINATION
GENERAL: A&Ox4, non-toxic appearing, no acute distress  HEENT: NCAT, EOMI, oral mucosa moist, normal conjunctiva  RESP: CTAB, no respiratory distress, no wheezes/rhonchi/rales, speaking in full sentences  CV: RRR, no murmurs/rubs/gallops  ABDOMEN: soft, non-tender, non-distended, no guarding, no CVA tenderness  MSK: no visible deformities  NEURO: no focal sensory or motor deficits, CN 2-12 grossly intact  SKIN: warm, normal color, well perfused, puncture wound to the base of right thumb, swelling and tenderness to thenar eminence.   PSYCH: normal affect

## 2023-09-17 NOTE — ED ADULT TRIAGE NOTE - CHIEF COMPLAINT QUOTE
Pt presents to ED c/o animal bite. pt states she was bite by almeida last night. hand becoming swollen and tender, sent in by urgent care

## 2023-09-22 LAB
CULTURE RESULTS: SIGNIFICANT CHANGE UP
CULTURE RESULTS: SIGNIFICANT CHANGE UP
SPECIMEN SOURCE: SIGNIFICANT CHANGE UP
SPECIMEN SOURCE: SIGNIFICANT CHANGE UP

## 2023-12-07 NOTE — H&P PST ADULT - PULMONARY EMBOLUS
Fluids: 500cc LR  Electrolytes: Replete as needed  Nutrition: regular diet  Dvt ppx: Lovenox 40mg q24  Code status: FULL code  Dispo: 7 [facility]
Fluids: 500cc LR  Electrolytes: Replete as needed  Nutrition: regular diet  Dvt ppx: Lovenox 40mg q24  Code status: FULL code  Dispo: 7 [facility]
no

## 2023-12-27 ENCOUNTER — APPOINTMENT (OUTPATIENT)
Dept: CARDIOLOGY | Facility: CLINIC | Age: 63
End: 2023-12-27
Payer: COMMERCIAL

## 2023-12-27 ENCOUNTER — NON-APPOINTMENT (OUTPATIENT)
Age: 63
End: 2023-12-27

## 2023-12-27 VITALS — SYSTOLIC BLOOD PRESSURE: 133 MMHG | DIASTOLIC BLOOD PRESSURE: 67 MMHG

## 2023-12-27 VITALS
HEART RATE: 70 BPM | BODY MASS INDEX: 25.69 KG/M2 | SYSTOLIC BLOOD PRESSURE: 136 MMHG | WEIGHT: 145 LBS | OXYGEN SATURATION: 98 % | DIASTOLIC BLOOD PRESSURE: 78 MMHG | HEIGHT: 63 IN

## 2023-12-27 DIAGNOSIS — I10 ESSENTIAL (PRIMARY) HYPERTENSION: ICD-10-CM

## 2023-12-27 DIAGNOSIS — I47.29 OTHER VENTRICULAR TACHYCARDIA: ICD-10-CM

## 2023-12-27 DIAGNOSIS — R94.31 ABNORMAL ELECTROCARDIOGRAM [ECG] [EKG]: ICD-10-CM

## 2023-12-27 DIAGNOSIS — I48.0 PAROXYSMAL ATRIAL FIBRILLATION: ICD-10-CM

## 2023-12-27 PROCEDURE — 93000 ELECTROCARDIOGRAM COMPLETE: CPT

## 2023-12-27 PROCEDURE — 99214 OFFICE O/P EST MOD 30 MIN: CPT | Mod: 25

## 2023-12-27 RX ORDER — CLOPIDOGREL 75 MG/1
75 TABLET, FILM COATED ORAL
Refills: 0 | Status: COMPLETED | COMMUNITY
Start: 2023-07-25 | End: 2023-12-27

## 2023-12-27 NOTE — REVIEW OF SYSTEMS
[Under Stress] : under stress [Negative] : Heme/Lymph [Feeling Fatigued] : not feeling fatigued [Weight Loss (___ Lbs)] : no recent weight loss [SOB] : no shortness of breath [Chest Discomfort] : no chest discomfort [Palpitations] : no palpitations

## 2023-12-27 NOTE — CARDIOLOGY SUMMARY
[de-identified] : 12/27/2023. Sinus Rhythm. Poor R-wave progression. [de-identified] : 10/2021.  Normal nuclear perfusion SPECT scan [de-identified] : 10/6/2021.  Normal left ventricular size and function with estimated ejection fraction greater than 70%.  Intracavitary gradient attributed to hyperdynamic left ventricular function.  Mild left atrial enlargement.  Mild tricuspid regurgitation.  Mild pulmonary hypertension.

## 2023-12-27 NOTE — DISCUSSION/SUMMARY
[With Me] : with me [___ Month(s)] : in [unfilled] month(s) [FreeTextEntry1] :  Hypertension: Improved; I again encouraged diet and exercise; continue present doses of enalapril and hydrochlorothiazide.  Ventricular tachycardia: Remote ablation; today's ECG reveals sinus rhythm; no recent symptoms suggestive of arrhythmia recurrence.  Atrial fibrillation: Vague history of remote atrial fibrillation--no recent documentation of arrhythmia; Currently in sinus rhythm.  Hyperlipidemia: Tolerating rosuvastatin.  Abnormal ECG: Echocardiogram from 2021 with mild abnormalities; given the presence of poor precordial R wave progression and history of arrhythmia, I recommend echocardiography.

## 2023-12-27 NOTE — PHYSICAL EXAM
[Normal S1, S2] : normal S1, S2 [Clear Lung Fields] : clear lung fields [No Edema] : no edema [Alert and Oriented] : alert and oriented [No Acute Distress] : no acute distress [Appears Anxious] : appears anxious [de-identified] : Wearing eyeglasses [de-identified] :  no JVD

## 2023-12-27 NOTE — HISTORY OF PRESENT ILLNESS
[FreeTextEntry1] : Anne-Marie Corona is a 63-year-old woman with multiple medical problems, including a history of right ventricular outflow tract ventricular tachycardia s/p remote ablation (2001; "partially successful" with subsequent PVCs treated with verapamil), hypertension, hypothyroidism, ischemic colitis, von Willebrand's disease, essential thrombocytosis, obesity s/p gastric bypass, hypertension, DVT/PE, and hospitalization in March 2023 for respiratory failure, who returns for cardiac examination after establishing care with me in July.  At that time her blood pressure was poorly controlled, and she requested several month trial of lifestyle modification.  She admits to not making any significant lifestyle changes since I last saw her, but she continues to feel well.  She has not been experiencing palpitations or dyspnea.  She has been tolerating prescribed pharmacotherapy.

## 2024-02-12 ENCOUNTER — EMERGENCY (EMERGENCY)
Facility: HOSPITAL | Age: 64
LOS: 0 days | Discharge: HOME | End: 2024-02-12
Attending: STUDENT IN AN ORGANIZED HEALTH CARE EDUCATION/TRAINING PROGRAM
Payer: COMMERCIAL

## 2024-02-12 VITALS
WEIGHT: 139.99 LBS | SYSTOLIC BLOOD PRESSURE: 147 MMHG | RESPIRATION RATE: 18 BRPM | DIASTOLIC BLOOD PRESSURE: 48 MMHG | TEMPERATURE: 98 F | OXYGEN SATURATION: 99 % | HEIGHT: 63 IN | HEART RATE: 67 BPM

## 2024-02-12 VITALS
SYSTOLIC BLOOD PRESSURE: 154 MMHG | TEMPERATURE: 98 F | RESPIRATION RATE: 16 BRPM | OXYGEN SATURATION: 98 % | DIASTOLIC BLOOD PRESSURE: 54 MMHG | HEART RATE: 74 BPM

## 2024-02-12 DIAGNOSIS — Z90.711 ACQUIRED ABSENCE OF UTERUS WITH REMAINING CERVICAL STUMP: Chronic | ICD-10-CM

## 2024-02-12 DIAGNOSIS — Z90.89 ACQUIRED ABSENCE OF OTHER ORGANS: Chronic | ICD-10-CM

## 2024-02-12 DIAGNOSIS — D68.00 VON WILLEBRAND DISEASE, UNSPECIFIED: ICD-10-CM

## 2024-02-12 DIAGNOSIS — F07.81 POSTCONCUSSIONAL SYNDROME: ICD-10-CM

## 2024-02-12 DIAGNOSIS — Z86.39 PERSONAL HISTORY OF OTHER ENDOCRINE, NUTRITIONAL AND METABOLIC DISEASE: ICD-10-CM

## 2024-02-12 DIAGNOSIS — E03.9 HYPOTHYROIDISM, UNSPECIFIED: ICD-10-CM

## 2024-02-12 DIAGNOSIS — R51.9 HEADACHE, UNSPECIFIED: ICD-10-CM

## 2024-02-12 DIAGNOSIS — Z91.041 RADIOGRAPHIC DYE ALLERGY STATUS: ICD-10-CM

## 2024-02-12 DIAGNOSIS — Z98.890 OTHER SPECIFIED POSTPROCEDURAL STATES: Chronic | ICD-10-CM

## 2024-02-12 DIAGNOSIS — D47.3 ESSENTIAL (HEMORRHAGIC) THROMBOCYTHEMIA: ICD-10-CM

## 2024-02-12 DIAGNOSIS — Z87.19 PERSONAL HISTORY OF OTHER DISEASES OF THE DIGESTIVE SYSTEM: ICD-10-CM

## 2024-02-12 DIAGNOSIS — Z79.02 LONG TERM (CURRENT) USE OF ANTITHROMBOTICS/ANTIPLATELETS: ICD-10-CM

## 2024-02-12 DIAGNOSIS — Z86.718 PERSONAL HISTORY OF OTHER VENOUS THROMBOSIS AND EMBOLISM: ICD-10-CM

## 2024-02-12 DIAGNOSIS — I10 ESSENTIAL (PRIMARY) HYPERTENSION: ICD-10-CM

## 2024-02-12 DIAGNOSIS — F31.9 BIPOLAR DISORDER, UNSPECIFIED: ICD-10-CM

## 2024-02-12 DIAGNOSIS — K44.9 DIAPHRAGMATIC HERNIA WITHOUT OBSTRUCTION OR GANGRENE: ICD-10-CM

## 2024-02-12 DIAGNOSIS — Z79.82 LONG TERM (CURRENT) USE OF ASPIRIN: ICD-10-CM

## 2024-02-12 DIAGNOSIS — K21.9 GASTRO-ESOPHAGEAL REFLUX DISEASE WITHOUT ESOPHAGITIS: ICD-10-CM

## 2024-02-12 DIAGNOSIS — K76.0 FATTY (CHANGE OF) LIVER, NOT ELSEWHERE CLASSIFIED: ICD-10-CM

## 2024-02-12 DIAGNOSIS — Z86.711 PERSONAL HISTORY OF PULMONARY EMBOLISM: ICD-10-CM

## 2024-02-12 DIAGNOSIS — Z88.2 ALLERGY STATUS TO SULFONAMIDES: ICD-10-CM

## 2024-02-12 DIAGNOSIS — Z98.84 BARIATRIC SURGERY STATUS: Chronic | ICD-10-CM

## 2024-02-12 LAB
ALBUMIN SERPL ELPH-MCNC: 3.6 G/DL — SIGNIFICANT CHANGE UP (ref 3.3–5)
ALP SERPL-CCNC: 150 U/L — HIGH (ref 40–120)
ALT FLD-CCNC: 28 U/L — SIGNIFICANT CHANGE UP (ref 12–78)
ANION GAP SERPL CALC-SCNC: 1 MMOL/L — LOW (ref 5–17)
AST SERPL-CCNC: 17 U/L — SIGNIFICANT CHANGE UP (ref 15–37)
BASOPHILS # BLD AUTO: 0.24 K/UL — HIGH (ref 0–0.2)
BASOPHILS NFR BLD AUTO: 4 % — HIGH (ref 0–2)
BILIRUB SERPL-MCNC: 0.3 MG/DL — SIGNIFICANT CHANGE UP (ref 0.2–1.2)
BUN SERPL-MCNC: 24 MG/DL — HIGH (ref 7–23)
CALCIUM SERPL-MCNC: 9 MG/DL — SIGNIFICANT CHANGE UP (ref 8.5–10.1)
CHLORIDE SERPL-SCNC: 107 MMOL/L — SIGNIFICANT CHANGE UP (ref 96–108)
CO2 SERPL-SCNC: 30 MMOL/L — SIGNIFICANT CHANGE UP (ref 22–31)
CREAT SERPL-MCNC: 0.77 MG/DL — SIGNIFICANT CHANGE UP (ref 0.5–1.3)
EGFR: 88 ML/MIN/1.73M2 — SIGNIFICANT CHANGE UP
EOSINOPHIL # BLD AUTO: 0.2 K/UL — SIGNIFICANT CHANGE UP (ref 0–0.5)
EOSINOPHIL NFR BLD AUTO: 3.3 % — SIGNIFICANT CHANGE UP (ref 0–6)
GIANT PLATELETS BLD QL SMEAR: PRESENT — SIGNIFICANT CHANGE UP
GLUCOSE SERPL-MCNC: 90 MG/DL — SIGNIFICANT CHANGE UP (ref 70–99)
HCT VFR BLD CALC: 44.3 % — SIGNIFICANT CHANGE UP (ref 34.5–45)
HGB BLD-MCNC: 14.5 G/DL — SIGNIFICANT CHANGE UP (ref 11.5–15.5)
IMM GRANULOCYTES NFR BLD AUTO: 0.8 % — SIGNIFICANT CHANGE UP (ref 0–0.9)
LYMPHOCYTES # BLD AUTO: 1.47 K/UL — SIGNIFICANT CHANGE UP (ref 1–3.3)
LYMPHOCYTES # BLD AUTO: 24.5 % — SIGNIFICANT CHANGE UP (ref 13–44)
MANUAL SMEAR VERIFICATION: SIGNIFICANT CHANGE UP
MCHC RBC-ENTMCNC: 31.7 PG — SIGNIFICANT CHANGE UP (ref 27–34)
MCHC RBC-ENTMCNC: 32.7 GM/DL — SIGNIFICANT CHANGE UP (ref 32–36)
MCV RBC AUTO: 96.7 FL — SIGNIFICANT CHANGE UP (ref 80–100)
MONOCYTES # BLD AUTO: 0.8 K/UL — SIGNIFICANT CHANGE UP (ref 0–0.9)
MONOCYTES NFR BLD AUTO: 13.3 % — SIGNIFICANT CHANGE UP (ref 2–14)
NEUTROPHILS # BLD AUTO: 3.25 K/UL — SIGNIFICANT CHANGE UP (ref 1.8–7.4)
NEUTROPHILS NFR BLD AUTO: 54.1 % — SIGNIFICANT CHANGE UP (ref 43–77)
OVALOCYTES BLD QL SMEAR: SLIGHT — SIGNIFICANT CHANGE UP
PLAT MORPH BLD: ABNORMAL
PLATELET # BLD AUTO: 829 K/UL — HIGH (ref 150–400)
POIKILOCYTOSIS BLD QL AUTO: SLIGHT — SIGNIFICANT CHANGE UP
POTASSIUM SERPL-MCNC: 4 MMOL/L — SIGNIFICANT CHANGE UP (ref 3.5–5.3)
POTASSIUM SERPL-SCNC: 4 MMOL/L — SIGNIFICANT CHANGE UP (ref 3.5–5.3)
PROT SERPL-MCNC: 6.7 GM/DL — SIGNIFICANT CHANGE UP (ref 6–8.3)
RBC # BLD: 4.58 M/UL — SIGNIFICANT CHANGE UP (ref 3.8–5.2)
RBC # FLD: 14.6 % — HIGH (ref 10.3–14.5)
RBC BLD AUTO: ABNORMAL
SODIUM SERPL-SCNC: 138 MMOL/L — SIGNIFICANT CHANGE UP (ref 135–145)
STOMATOCYTES BLD QL SMEAR: SLIGHT — SIGNIFICANT CHANGE UP
WBC # BLD: 6.01 K/UL — SIGNIFICANT CHANGE UP (ref 3.8–10.5)
WBC # FLD AUTO: 6.01 K/UL — SIGNIFICANT CHANGE UP (ref 3.8–10.5)

## 2024-02-12 PROCEDURE — 80053 COMPREHEN METABOLIC PANEL: CPT

## 2024-02-12 PROCEDURE — 99285 EMERGENCY DEPT VISIT HI MDM: CPT

## 2024-02-12 PROCEDURE — 99284 EMERGENCY DEPT VISIT MOD MDM: CPT | Mod: 25

## 2024-02-12 PROCEDURE — 96374 THER/PROPH/DIAG INJ IV PUSH: CPT | Mod: XU

## 2024-02-12 PROCEDURE — 85025 COMPLETE CBC W/AUTO DIFF WBC: CPT

## 2024-02-12 PROCEDURE — 70496 CT ANGIOGRAPHY HEAD: CPT | Mod: MA

## 2024-02-12 PROCEDURE — 70496 CT ANGIOGRAPHY HEAD: CPT | Mod: 26,MA

## 2024-02-12 PROCEDURE — 36415 COLL VENOUS BLD VENIPUNCTURE: CPT

## 2024-02-12 PROCEDURE — 96375 TX/PRO/DX INJ NEW DRUG ADDON: CPT | Mod: XU

## 2024-02-12 RX ORDER — DIPHENHYDRAMINE HCL 50 MG
50 CAPSULE ORAL ONCE
Refills: 0 | Status: COMPLETED | OUTPATIENT
Start: 2024-02-12 | End: 2024-02-12

## 2024-02-12 RX ORDER — MORPHINE SULFATE 50 MG/1
4 CAPSULE, EXTENDED RELEASE ORAL ONCE
Refills: 0 | Status: DISCONTINUED | OUTPATIENT
Start: 2024-02-12 | End: 2024-02-12

## 2024-02-12 RX ORDER — KETOROLAC TROMETHAMINE 30 MG/ML
30 SYRINGE (ML) INJECTION ONCE
Refills: 0 | Status: DISCONTINUED | OUTPATIENT
Start: 2024-02-12 | End: 2024-02-12

## 2024-02-12 RX ADMIN — Medication 125 MILLIGRAM(S): at 14:26

## 2024-02-12 RX ADMIN — MORPHINE SULFATE 4 MILLIGRAM(S): 50 CAPSULE, EXTENDED RELEASE ORAL at 14:58

## 2024-02-12 RX ADMIN — Medication 50 MILLIGRAM(S): at 14:26

## 2024-02-12 RX ADMIN — Medication 30 MILLIGRAM(S): at 16:15

## 2024-02-12 NOTE — ED STATDOCS - NSFOLLOWUPINSTRUCTIONS_ED_ALL_ED_FT
Post-Concussion Syndrome  Three rear views of the head showing how quick, sudden head movements injure the brain.  A concussion is a brain injury from a direct hit to the head or body. This hit causes the brain to shake back and forth fast inside the skull. The shaking can damage brain cells and cause chemical changes in the brain. Concussions are normally not life-threatening but can cause serious symptoms.    Post-concussion syndrome is when symptoms that happen after a concussion last longer than normal. These symptoms can last from weeks to months.    What are the causes?  The cause of this condition is not known. It can happen whether your head injury was mild or severe.    What increases the risk?  You are more likely to get this condition if:  You are female.  You are a child, teen, or young adult.  You have had a head injury before.  You have a history of headaches.  You have depression or anxiety.  You have more than one symptom or severe symptoms when your concussion occurs.  You faint or cannot remember the event (have amnesia of the event).  What are the signs or symptoms?  Symptoms of this condition include:  Physical symptoms, such as:  Headaches.  Tiredness.  Dizziness and weakness.  Blurred vision and sensitivity to light.  Trouble hearing.  Problems with balance.  Mental and emotional symptoms, such as:  Memory problems and trouble focusing.  Trouble falling asleep or staying asleep (insomnia).  Feeling irritable.  Anxiety or depression.  Trouble learning new things.  How is this diagnosed?  This condition may be diagnosed based on:  Your symptoms.  A description of your injury.  Your medical history.  Testing your strength, balance, and nerve function (neurological exam).  Your health care provider may order other tests. These may include:  Brain imaging, such as a CT scan or an MRI.  Memory testing (neuropsychological testing).  How is this treated?  Treatment for this condition may depend on your symptoms. Symptoms normally go away on their own with time. If you need treatment, it may include:  Medicines for headaches, anxiety, depression, and insomnia.  Resting your brain and body for a few days after your injury.  Rehab therapy, such as:  Physical or occupational therapy. This may include exercises to help with balance and dizziness.  Mental health counseling. A form of talk therapy called cognitive behavioral therapy (CBT) can be especially helpful. This therapy helps you set goals and follow up on the changes that you make.  Speech therapy.  Vision therapy. A brain and eye specialist can recommend treatments for vision problems.  Follow these instructions at home:  Medicines    Take over-the-counter and prescription medicines only as told by your health care provider.  Avoid opioid prescription pain medicines when getting over a concussion.  Activity    Limit your mental activities for the first few days after your injury. This may include not doing these things:  Homework or work for your job.  Complex thinking.  Watching TV.  Using a computer or phone.  Playing memory games and puzzles.  Slowly return to your normal activity level. If a certain activity brings on your symptoms, stop or slow down until you can do the activity without getting symptoms again.  Limit physical activity, such as sports or strenuous activities, for the first few days after a concussion. Slowly return to normal activity as told by your health care provider. Light exercise may be helpful.  Rest helps your brain heal. Make sure you:  Get plenty of sleep at night. Most adults should get at least 7–9 hours of sleep each night.  Rest during the day. Take naps or rest breaks when you feel tired.  Do not do high-risk activities that could cause a second concussion, such as riding a bike or playing sports. Having another concussion before the first one has healed can be harmful.  General instructions    A sign showing that a person should not drink alcohol.  Do not drink alcohol until your health care provider says that you can.  Keep track of how severe your symptoms are and how often they happen. Give this information to your health care provider.  Keep all follow-up visits. Your health care provider may need to check you for new or serious symptoms.  Where to find more information  [employer]: concussionfoundation.org  Contact a health care provider if:  Your symptoms do not improve.  You get injured again.  You have unusual behavior changes.  Get help right away if:  You have a severe or worsening headache.  You have weakness or numbness in any part of your body.  You vomit repeatedly.  You have mental status changes, such as:  Confusion.  Trouble speaking.  Trouble staying awake.  Fainting.  You have a seizure.  These symptoms may be an emergency. Get help right away. Call 911.  Do not wait to see if the symptoms will go away.  Do not drive yourself to the hospital.  Also, get help right away if:  You think about hurting yourself or others.  Take one of these steps if you feel like you may hurt yourself or others, or have thoughts about taking your own life:  Go to your nearest emergency room.  Call 911.  Call the National Suicide Prevention Lifeline at 1-751.285.6934 or 358. This is open 24 hours a day.  Text the Crisis Text Line at 693281.  This information is not intended to replace advice given to you by your health care provider. Make sure you discuss any questions you have with your health care provider.

## 2024-02-12 NOTE — ED STATDOCS - NSICDXFAMILYHX_GEN_ALL_CORE_FT
Render Number Of Lesions Treated: yes
Acne Type: Comedonal Lesions
Hemostasis: Pressure
Prep Text (Optional): Prior to removal the treatment areas were prepped in the usual fashion.
Render Post-Care Instructions In Note?: no
Consent was obtained and risks were reviewed including but not limited to scarring, infection, bleeding, scabbing, incomplete removal, and allergy to anesthesia.
Post-Care Instructions: I reviewed with the patient in detail post-care instructions. Patient is to keep the treatment areaas dry overnight, and then apply bacitracin twice daily until healed. Patient may apply hydrogen peroxide soaks to remove any crusting.
Extraction Method: 20 gauge needle and comedo extractor
Detail Level: Detailed
FAMILY HISTORY:  FH: heart disease, father  brother  FHx: congenital heart disease, sister

## 2024-02-12 NOTE — ED ADULT TRIAGE NOTE - CHIEF COMPLAINT QUOTE
pt presents to ED for headache, light sensitivity, nausea, fatigue, neck pain. hit head on door 1 week ago. -LOC. takes ASA and anti platelet medication. hx essential thrombocytosis.

## 2024-02-12 NOTE — ED STATDOCS - OBJECTIVE STATEMENT
64 y/o female with a PMHx of bipolar, cervical herniated disc, DVT, essential thrombocytosis, fatty liver, gastric ulcer, GERD, fracture of skull, heart murmur, hiatal hernia, HTN, hypothyroid, intestional obstruction, manic depression, obesity, PE, Vtach, vertigo, Von Willebrand presents to the ED c/o HA s/p head injury. Pt reports 1 week ago she hit her head on a door. Pt has had a ANNA since. Pt reports currently her platelets are high and she is on ASA and anti platelet medication. Pt took Extra Strength Tylenol at home. No other complaints at this time. 64 y/o female with a PMHx of bipolar, cervical herniated disc, DVT, essential thrombocytosis, fatty liver, gastric ulcer, GERD, fracture of skull, heart murmur, hiatal hernia, HTN, hypothyroid, intestinal obstruction, manic depression, obesity, PE, Vtach, vertigo, Von Willebrand presents to the ED c/o HA s/p head injury. Pt reports 1 week ago she hit her head on a door. Pt has had a HA since. +photosensitivity. Pt reports currently her platelets are high and she is on ASA and Plavix. Pt took Extra Strength Tylenol at home. Allergies: IV contrast. No other complaints at this time.

## 2024-02-12 NOTE — ED STATDOCS - NEUROLOGICAL, MLM
sensation is normal and strength is normal. sensation is normal and strength is normal. Finger to nose normal. Romberg negative.

## 2024-02-12 NOTE — ED STATDOCS - CLINICAL SUMMARY MEDICAL DECISION MAKING FREE TEXT BOX
62 y/o female hx of essential thrombocytosis/p head injury 7 days ago with HA. Vitals normal. Pt neuro intact. Will CT venogram given hx and HA is more severe than usual. Pt also having vertiginous symptoms. Will give migraine cocktail and reassess. 62 y/o female hx of essential thrombocytosis/p head injury 7 days ago with HA. Vitals normal. Pt n        Platelet levels decreased from last visit however still elevated.  CT without significant findings only a small lesion on pituitary.  PT. cleared for discharge with neurosurgery.  Return for any concerns.  Karen Dubon PA-C

## 2024-02-12 NOTE — ED STATDOCS - CARE PROVIDER_API CALL
Julito Javed  Neurosurgery  [address] [place] [id number]  Phone: [phone]  Fax: [phone]  Follow Up Time:

## 2024-02-12 NOTE — ED ADULT NURSE NOTE - OBJECTIVE STATEMENT
Pt presents to ED w/ headache, nausea, light sensitivity and neck pain s/p hitting head on door 1 week ago. -LOC. Pt on ASA.

## 2024-02-12 NOTE — ED STATDOCS - PATIENT PORTAL LINK FT
You can access the FollowMyHealth Patient Portal offered by [facility] by registering at the following website: [email or web address]. By joining [facility]’s FollowMyHealth portal, you will also be able to view your health information using other applications (apps) compatible with our system.

## 2024-02-12 NOTE — ED STATDOCS - PROGRESS NOTE DETAILS
Pt. is a 63 year old female Hx bipolar, cervical herniated disc, DVT, essential thrombocytosis, GERD, Hypothyroid, PE, Vtach, vertigo, Von Willebrand presents with headache after a minor head injury.  Pt. hit the top of her head on a door one week ago.  Pt. developed headache the day after with associated photosensitivity.  Pt. currently on ASA and Plavix and states her platelets are high.  Tylenol taken at home with mild relief.  Karen Dubon PA-C Platelet levels decreased from last visit however still elevated.  CT without significant findings only a small lesion on pituitary.  PT. cleared for discharge with neurosurgery.  Return for any concerns.  Karen Dubon PA-C

## 2024-02-29 ENCOUNTER — APPOINTMENT (OUTPATIENT)
Dept: CARDIOLOGY | Facility: CLINIC | Age: 64
End: 2024-02-29
Payer: COMMERCIAL

## 2024-02-29 PROCEDURE — 93306 TTE W/DOPPLER COMPLETE: CPT

## 2024-05-14 NOTE — DISCHARGE NOTE NURSING/CASE MANAGEMENT/SOCIAL WORK - PATIENT PORTAL LINK FT
Is This A New Presentation, Or A Follow-Up?: Wart
How Severe Are Your Warts?: mild
Treatment Number (Optional): 1
You can access the FollowMyHealth Patient Portal offered by [facility] by registering at the following website: [email or web address]. By joining [facility]’s FollowMyHealth portal, you will also be able to view your health information using other applications (apps) compatible with our system.

## 2024-05-20 ENCOUNTER — RX ONLY (RX ONLY)
Age: 64
End: 2024-05-20

## 2024-05-20 ENCOUNTER — OFFICE (OUTPATIENT)
Dept: URBAN - METROPOLITAN AREA CLINIC 12 | Facility: CLINIC | Age: 64
Setting detail: OPHTHALMOLOGY
End: 2024-05-20
Payer: COMMERCIAL

## 2024-05-20 DIAGNOSIS — H40.013: ICD-10-CM

## 2024-05-20 DIAGNOSIS — H25.13: ICD-10-CM

## 2024-05-20 PROBLEM — H25.12 CATARACT SENILE NUCLEAR SCLEROSIS; RIGHT EYE, LEFT EYE, BOTH EYES: Status: ACTIVE | Noted: 2024-05-20

## 2024-05-20 PROBLEM — H25.11 CATARACT SENILE NUCLEAR SCLEROSIS; RIGHT EYE, LEFT EYE, BOTH EYES: Status: ACTIVE | Noted: 2024-05-20

## 2024-05-20 PROCEDURE — 92004 COMPRE OPH EXAM NEW PT 1/>: CPT | Performed by: OPHTHALMOLOGY

## 2024-05-20 PROCEDURE — 92250 FUNDUS PHOTOGRAPHY W/I&R: CPT | Performed by: OPHTHALMOLOGY

## 2024-05-20 ASSESSMENT — CONFRONTATIONAL VISUAL FIELD TEST (CVF)
OD_FINDINGS: FULL
OS_FINDINGS: FULL

## 2024-06-03 NOTE — PROGRESS NOTE ADULT - REASON FOR ADMISSION
Critical Care
Critical Care
Surgery
MICU
Plastic Surgery
Plastic Surgery
Surgery
Surgery
colon ischemia and abdominal pain
ischemic colitis
surgery
Ischemic colitis
ischemic colitis
surgery

## 2024-06-14 ENCOUNTER — NON-APPOINTMENT (OUTPATIENT)
Age: 64
End: 2024-06-14

## 2024-06-19 ENCOUNTER — NON-APPOINTMENT (OUTPATIENT)
Age: 64
End: 2024-06-19

## 2024-06-19 ENCOUNTER — APPOINTMENT (OUTPATIENT)
Dept: ORTHOPEDIC SURGERY | Facility: CLINIC | Age: 64
End: 2024-06-19
Payer: COMMERCIAL

## 2024-06-19 DIAGNOSIS — M75.32 CALCIFIC TENDINITIS OF LEFT SHOULDER: ICD-10-CM

## 2024-06-19 PROCEDURE — 99203 OFFICE O/P NEW LOW 30 MIN: CPT

## 2024-06-19 NOTE — PHYSICAL EXAM
[de-identified] : Physical Exam:  General: Well appearing, no acute distress  Neurologic: A&Ox3, No focal deficits  Head: NCAT without abrasions, lacerations, or ecchymosis to head, face, or scalp  Eyes: No scleral icterus, no gross abnormalities  Respiratory: Equal chest wall expansion bilaterally, no accessory muscle use  Lymphatic: No lymphadenopathy palpated  Skin: Warm and dry  Psychiatric: Normal mood and affect  Examination of the Left shoulder shows no obvious deformity, swelling or erythema. Mild tenderness to palpation over the anterior shoulder. No AC joint tenderness. The patient demonstrates active/passive ROM of Forward Flexion to 145 degrees, External Rotation to 40 degrees and Internal Rotation to a mid lumbar level. The patient has a positive Muniz and Neers test. No pain with cross body adduction, lift off testing, AC compression testing or Yergason testing. The patient has 4/5 strength to forward flexion with pronation, internal and external rotation. Compartments are soft and nontender. The patient has 2+ cap refill and sensation is intact in the hand.   Right shoulder shows no deformity. No tenderness to palpation over the biceps or AC joint. The patient has Forward Flexion to 170 degrees, External Rotation to 45 degrees and Internal Rotation to a mid lumbar level. 5/5 strength to forward flexion with pronation, internal and external rotation. Compartments are soft and nontender. 2+ cap refill. Sensation intact distally.  [de-identified] : [facility]  3 views of left shoulder were reviewed today from an outside medical facility. Results were discussed with the patient. They demonstrate calcific tendinitis of the left shoulder.

## 2024-06-19 NOTE — HISTORY OF PRESENT ILLNESS
[Worsening] : worsening [___ wks] : [unfilled] week(s) ago [de-identified] : KITTY JIMENEZ is a 63 year old female being seen for atraumatic left shoulder pain. She notes her pain has occurred for 2 weeks. She states she went to [facility] two days ago due to her pain where she received X-Rays of her shoulder and a referral for orthopedic evaluation. She is here today with complaints of lateral shoulder pain. She states she has been taking Naprosyn and Aleve for her pain with minimal relief of symptoms. She states she is right hand dominant. She denies any numbness/tingling in the involved extremity.  She is very active and is a golfer.  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

## 2024-06-19 NOTE — DISCUSSION/SUMMARY
[de-identified] : I had a lengthy discussion with the patient regarding their current condition. We discussed the treatment options including operative and nonoperative management. At this time I recommended a barbotage procedure for her left shoulder.  I referred her to a colleague of mine to have this performed.  She will follow-up with me as needed.  All questions were answered.

## 2024-07-22 ENCOUNTER — NON-APPOINTMENT (OUTPATIENT)
Age: 64
End: 2024-07-22

## 2024-07-22 ENCOUNTER — APPOINTMENT (OUTPATIENT)
Dept: ORTHOPEDIC SURGERY | Facility: CLINIC | Age: 64
End: 2024-07-22
Payer: COMMERCIAL

## 2024-07-22 ENCOUNTER — APPOINTMENT (OUTPATIENT)
Dept: MRI IMAGING | Facility: CLINIC | Age: 64
End: 2024-07-22
Payer: COMMERCIAL

## 2024-07-22 DIAGNOSIS — M75.32 CALCIFIC TENDINITIS OF LEFT SHOULDER: ICD-10-CM

## 2024-07-22 PROCEDURE — 99204 OFFICE O/P NEW MOD 45 MIN: CPT

## 2024-07-22 PROCEDURE — 73221 MRI JOINT UPR EXTREM W/O DYE: CPT | Mod: 26,LT

## 2024-07-22 NOTE — CONSULT LETTER
[Dear  ___] : Dear  [unfilled], [Consult Letter:] : I had the pleasure of evaluating your patient, [unfilled]. [Please see my note below.] : Please see my note below. [Sincerely,] : Sincerely, [FreeTextEntry3] : Dr. Julito Ledezma

## 2024-07-22 NOTE — DISCUSSION/SUMMARY
[de-identified] : We had a thorough discussion regarding the nature of her pain, the pathophysiology, as well as all treatment options. I discussed operative and non-operative treatment modalities. Considering the patient's current presentation of pain, physical exam, and radiographs an MRI is indicated at this time. A prescription for this was given to the patient. We will go over the MRI results with her upon obtaining the results in the office and advise her of further treatment options. She agrees with the above plan and all questions were answered.

## 2024-07-22 NOTE — PHYSICAL EXAM
[de-identified] : Physical Exam:  General: Well appearing, no acute distress  Neurologic: A&Ox3, No focal deficits  Head: NCAT without abrasions, lacerations, or ecchymosis to head, face, or scalp  Eyes: No scleral icterus, no gross abnormalities  Respiratory: Equal chest wall expansion bilaterally, no accessory muscle use  Lymphatic: No lymphadenopathy palpated  Skin: Warm and dry  Psychiatric: Normal mood and affect  Examination of the Left shoulder shows no obvious deformity, swelling or erythema. Mild tenderness to palpation over the anterior shoulder and biceps groove. No AC joint tenderness. Hitching at the left shoulder. The patient demonstrates active/passive ROM of Forward Flexion to 150 degrees, external rotation to 65 degrees and Internal Rotation to L1. The patient has a positive Muniz and Neers test. No pain with cross body adduction, lift off testing, AC compression testing or Yergason testing. The patient has 4/5 strength to forward flexion with pronation, internal and external rotation. Compartments are soft and nontender. The patient has 2+ cap refill and sensation is intact in the hand. POS Speeds test. Negative Uppercut test.   Right shoulder shows no deformity. No tenderness to palpation over the biceps or AC joint. The patient has Forward Flexion to 170 degrees, External Rotation to 45 degrees and Internal Rotation to a mid lumbar level. 5/5 strength to forward flexion with pronation, internal and external rotation. Compartments are soft and nontender. 2+ cap refill. Sensation intact distally. [de-identified] : [facility] 6/16/24 3 views of left shoulder were reviewed today from an outside medical facility. Results were discussed with the patient. They demonstrate calcific tendinitis of the left shoulder.

## 2024-07-22 NOTE — HISTORY OF PRESENT ILLNESS
[de-identified] : KITTY JIMENEZ is a 64 year old female being seen for follow up of atraumatic left shoulder pain. She went to [facility] 6/15/6/24 and received X-Rays of her shoulder. She is here today with complaints of lateral shoulder pain. She states she is right hand dominant. She denies any numbness/tingling in the involved extremity.  She is very active and is a golfer. 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 Of note, at last visit 6/19/2024, patient was referred to Dr. Lugo for Barbotage injection and corticosteroid injection.

## 2024-07-22 NOTE — ADDENDUM
[FreeTextEntry1] : Documented by Elysia Marquez acting as a scribe for Dr. Ledezma on 07/22/2024. All medical record entries made by the Scribe were at my direction and personally dictated by me on 07/22/2024. I have reviewed the chart and agree that the record accurately reflects my personal performance of the history, physical exam, procedure and imaging.

## 2024-07-23 ENCOUNTER — NON-APPOINTMENT (OUTPATIENT)
Age: 64
End: 2024-07-23

## 2024-07-25 RX ORDER — HYALURONATE SODIUM, STABILIZED 60 MG/3 ML
60 SYRINGE (ML) INTRAARTICULAR
Qty: 1 | Refills: 0 | Status: ACTIVE | COMMUNITY
Start: 2024-07-25

## 2024-07-31 ENCOUNTER — APPOINTMENT (OUTPATIENT)
Dept: ORTHOPEDIC SURGERY | Facility: CLINIC | Age: 64
End: 2024-07-31
Payer: COMMERCIAL

## 2024-07-31 DIAGNOSIS — M19.012 PRIMARY OSTEOARTHRITIS, LEFT SHOULDER: ICD-10-CM

## 2024-07-31 PROCEDURE — 20611 DRAIN/INJ JOINT/BURSA W/US: CPT | Mod: LT

## 2024-07-31 NOTE — HISTORY OF PRESENT ILLNESS
[de-identified] : KITTY JIMENEZ is a 64 year old female being seen for left shoulder Durolane Injection.  She denies fevers or chills.  She sees hematology and reports her platelets have been high.

## 2024-07-31 NOTE — PROCEDURE
[de-identified] : Injection: US guided Left glenohumeral joint . Indication: Osteoarthritis.  A discussion was had with the patient regarding this procedure and all questions were answered. All risks, benefits and alternatives were discussed. These included but were not limited to bleeding, infection, and allergic reaction. Alcohol was used to clean the skin, and chlorhexidine was used to sterilize and prep the area in the posterior aspect of the left shoulder. Ethyl chloride spray was then used as a topical anesthetic. A 22-gauge needle was used to inject 3 cc of Durolane into the Left shoulder glenohumeral joint.  Ultrasound guidance was used for adequate placement of needle. A sterile bandaid was then applied. The patient tolerated the procedure well and there were no complications. Post injection instructions were given.

## 2024-07-31 NOTE — DISCUSSION/SUMMARY
[de-identified] : I had a lengthy discussion with the patient regarding their condition.  Postinjection instructions were given.  The patient will monitor for signs of infection and can apply cryotherapy or moist heat.  The patient will follow-up as needed. They will call us with any issues or concerns.

## 2024-07-31 NOTE — PHYSICAL EXAM
[de-identified] : Physical Exam:  General: Well appearing, no acute distress  Neurologic: A&Ox3, No focal deficits  Head: NCAT without abrasions, lacerations, or ecchymosis to head, face, or scalp  Eyes: No scleral icterus, no gross abnormalities  Respiratory: Equal chest wall expansion bilaterally, no accessory muscle use  Lymphatic: No lymphadenopathy palpated  Skin: Warm and dry  Psychiatric: Normal mood and affect  The Left shoulder is examined and reveals no swelling, erythema, or ecchymosis.  The compartments are soft and nontender.  There is 2+ capillary refill and sensations intact distally.

## 2024-11-27 ENCOUNTER — APPOINTMENT (OUTPATIENT)
Dept: CARDIOLOGY | Facility: CLINIC | Age: 64
End: 2024-11-27

## 2024-11-30 ENCOUNTER — INPATIENT (INPATIENT)
Facility: HOSPITAL | Age: 64
LOS: 11 days | Discharge: NURSING, REHAB OR LONG-TERM CARE | DRG: 314 | End: 2024-12-12
Attending: FAMILY MEDICINE | Admitting: STUDENT IN AN ORGANIZED HEALTH CARE EDUCATION/TRAINING PROGRAM
Payer: COMMERCIAL

## 2024-11-30 VITALS
TEMPERATURE: 97 F | SYSTOLIC BLOOD PRESSURE: 155 MMHG | HEART RATE: 82 BPM | RESPIRATION RATE: 18 BRPM | DIASTOLIC BLOOD PRESSURE: 47 MMHG | OXYGEN SATURATION: 93 %

## 2024-11-30 DIAGNOSIS — Z98.890 OTHER SPECIFIED POSTPROCEDURAL STATES: Chronic | ICD-10-CM

## 2024-11-30 DIAGNOSIS — Z90.89 ACQUIRED ABSENCE OF OTHER ORGANS: Chronic | ICD-10-CM

## 2024-11-30 DIAGNOSIS — Z90.711 ACQUIRED ABSENCE OF UTERUS WITH REMAINING CERVICAL STUMP: Chronic | ICD-10-CM

## 2024-11-30 DIAGNOSIS — I31.39 OTHER PERICARDIAL EFFUSION (NONINFLAMMATORY): ICD-10-CM

## 2024-11-30 DIAGNOSIS — Z98.84 BARIATRIC SURGERY STATUS: Chronic | ICD-10-CM

## 2024-11-30 LAB
ALBUMIN SERPL ELPH-MCNC: 2.7 G/DL — LOW (ref 3.3–5)
ALP SERPL-CCNC: 154 U/L — HIGH (ref 40–120)
ALT FLD-CCNC: 28 U/L — SIGNIFICANT CHANGE UP (ref 12–78)
ANION GAP SERPL CALC-SCNC: 7 MMOL/L — SIGNIFICANT CHANGE UP (ref 5–17)
APPEARANCE UR: CLEAR — SIGNIFICANT CHANGE UP
APTT BLD: 37.3 SEC — HIGH (ref 24.5–35.6)
AST SERPL-CCNC: 15 U/L — SIGNIFICANT CHANGE UP (ref 15–37)
BACTERIA # UR AUTO: NEGATIVE /HPF — SIGNIFICANT CHANGE UP
BASOPHILS # BLD AUTO: 0 K/UL — SIGNIFICANT CHANGE UP (ref 0–0.2)
BASOPHILS NFR BLD AUTO: 0 % — SIGNIFICANT CHANGE UP (ref 0–2)
BILIRUB SERPL-MCNC: 0.5 MG/DL — SIGNIFICANT CHANGE UP (ref 0.2–1.2)
BILIRUB UR-MCNC: NEGATIVE — SIGNIFICANT CHANGE UP
BUN SERPL-MCNC: 13 MG/DL — SIGNIFICANT CHANGE UP (ref 7–23)
CALCIUM SERPL-MCNC: 8.7 MG/DL — SIGNIFICANT CHANGE UP (ref 8.5–10.1)
CAST: 2 /LPF — SIGNIFICANT CHANGE UP (ref 0–4)
CHLORIDE SERPL-SCNC: 104 MMOL/L — SIGNIFICANT CHANGE UP (ref 96–108)
CO2 SERPL-SCNC: 25 MMOL/L — SIGNIFICANT CHANGE UP (ref 22–31)
COLOR SPEC: YELLOW — SIGNIFICANT CHANGE UP
CREAT SERPL-MCNC: 0.76 MG/DL — SIGNIFICANT CHANGE UP (ref 0.5–1.3)
DACRYOCYTES BLD QL SMEAR: SLIGHT — SIGNIFICANT CHANGE UP
DIFF PNL FLD: NEGATIVE — SIGNIFICANT CHANGE UP
EGFR: 89 ML/MIN/1.73M2 — SIGNIFICANT CHANGE UP
EOSINOPHIL # BLD AUTO: 0 K/UL — SIGNIFICANT CHANGE UP (ref 0–0.5)
EOSINOPHIL NFR BLD AUTO: 0 % — SIGNIFICANT CHANGE UP (ref 0–6)
ERYTHROCYTE [SEDIMENTATION RATE] IN BLOOD: 20 MM/HR — SIGNIFICANT CHANGE UP (ref 0–20)
GLUCOSE SERPL-MCNC: 93 MG/DL — SIGNIFICANT CHANGE UP (ref 70–99)
GLUCOSE UR QL: NEGATIVE MG/DL — SIGNIFICANT CHANGE UP
HCT VFR BLD CALC: 28.7 % — LOW (ref 34.5–45)
HGB BLD-MCNC: 8.8 G/DL — LOW (ref 11.5–15.5)
INR BLD: 2.01 RATIO — HIGH (ref 0.85–1.16)
KETONES UR-MCNC: NEGATIVE MG/DL — SIGNIFICANT CHANGE UP
LACTATE SERPL-SCNC: 1.2 MMOL/L — SIGNIFICANT CHANGE UP (ref 0.7–2)
LEUKOCYTE ESTERASE UR-ACNC: NEGATIVE — SIGNIFICANT CHANGE UP
LG PLATELETS BLD QL AUTO: SLIGHT — SIGNIFICANT CHANGE UP
LYMPHOCYTES # BLD AUTO: 0.92 K/UL — LOW (ref 1–3.3)
LYMPHOCYTES # BLD AUTO: 5 % — LOW (ref 13–44)
MANUAL SMEAR VERIFICATION: SIGNIFICANT CHANGE UP
MCHC RBC-ENTMCNC: 25.2 PG — LOW (ref 27–34)
MCHC RBC-ENTMCNC: 30.7 G/DL — LOW (ref 32–36)
MCV RBC AUTO: 82.2 FL — SIGNIFICANT CHANGE UP (ref 80–100)
METAMYELOCYTES # FLD: 1 % — HIGH (ref 0–0)
MONOCYTES # BLD AUTO: 1.29 K/UL — HIGH (ref 0–0.9)
MONOCYTES NFR BLD AUTO: 7 % — SIGNIFICANT CHANGE UP (ref 2–14)
NEUTROPHILS # BLD AUTO: 16 K/UL — HIGH (ref 1.8–7.4)
NEUTROPHILS NFR BLD AUTO: 85 % — HIGH (ref 43–77)
NEUTS BAND # BLD: 2 % — SIGNIFICANT CHANGE UP (ref 0–8)
NITRITE UR-MCNC: NEGATIVE — SIGNIFICANT CHANGE UP
NRBC # BLD: 2 /100 WBCS — HIGH (ref 0–0)
NRBC # BLD: SIGNIFICANT CHANGE UP /100 WBCS (ref 0–0)
NT-PROBNP SERPL-SCNC: 3214 PG/ML — HIGH (ref 0–125)
OVALOCYTES BLD QL SMEAR: SLIGHT — SIGNIFICANT CHANGE UP
PH UR: 6.5 — SIGNIFICANT CHANGE UP (ref 5–8)
PLAT MORPH BLD: ABNORMAL
PLATELET # BLD AUTO: 760 K/UL — HIGH (ref 150–400)
POLYCHROMASIA BLD QL SMEAR: SLIGHT — SIGNIFICANT CHANGE UP
POTASSIUM SERPL-MCNC: 3.9 MMOL/L — SIGNIFICANT CHANGE UP (ref 3.5–5.3)
POTASSIUM SERPL-SCNC: 3.9 MMOL/L — SIGNIFICANT CHANGE UP (ref 3.5–5.3)
PROT SERPL-MCNC: 6.9 GM/DL — SIGNIFICANT CHANGE UP (ref 6–8.3)
PROT UR-MCNC: 30 MG/DL
PROTHROM AB SERPL-ACNC: 23.6 SEC — HIGH (ref 9.9–13.4)
RBC # BLD: 3.49 M/UL — LOW (ref 3.8–5.2)
RBC # FLD: 18.6 % — HIGH (ref 10.3–14.5)
RBC BLD AUTO: ABNORMAL
RBC CASTS # UR COMP ASSIST: 2 /HPF — SIGNIFICANT CHANGE UP (ref 0–4)
SMUDGE CELLS # BLD: PRESENT — SIGNIFICANT CHANGE UP
SODIUM SERPL-SCNC: 136 MMOL/L — SIGNIFICANT CHANGE UP (ref 135–145)
SP GR SPEC: 1.01 — SIGNIFICANT CHANGE UP (ref 1–1.03)
SQUAMOUS # UR AUTO: 1 /HPF — SIGNIFICANT CHANGE UP (ref 0–5)
TROPONIN I, HIGH SENSITIVITY RESULT: 17.62 NG/L — SIGNIFICANT CHANGE UP
UROBILINOGEN FLD QL: 0.2 MG/DL — SIGNIFICANT CHANGE UP (ref 0.2–1)
WBC # BLD: 18.39 K/UL — HIGH (ref 3.8–10.5)
WBC # FLD AUTO: 18.39 K/UL — HIGH (ref 3.8–10.5)
WBC UR QL: 1 /HPF — SIGNIFICANT CHANGE UP (ref 0–5)

## 2024-11-30 PROCEDURE — 86901 BLOOD TYPING SEROLOGIC RH(D): CPT

## 2024-11-30 PROCEDURE — 97116 GAIT TRAINING THERAPY: CPT | Mod: GP

## 2024-11-30 PROCEDURE — 86850 RBC ANTIBODY SCREEN: CPT

## 2024-11-30 PROCEDURE — 88341 IMHCHEM/IMCYTCHM EA ADD ANTB: CPT

## 2024-11-30 PROCEDURE — 71045 X-RAY EXAM CHEST 1 VIEW: CPT

## 2024-11-30 PROCEDURE — 97166 OT EVAL MOD COMPLEX 45 MIN: CPT | Mod: GO

## 2024-11-30 PROCEDURE — 83010 ASSAY OF HAPTOGLOBIN QUANT: CPT

## 2024-11-30 PROCEDURE — 88305 TISSUE EXAM BY PATHOLOGIST: CPT

## 2024-11-30 PROCEDURE — 83540 ASSAY OF IRON: CPT

## 2024-11-30 PROCEDURE — 88108 CYTOPATH CONCENTRATE TECH: CPT

## 2024-11-30 PROCEDURE — 0241U: CPT

## 2024-11-30 PROCEDURE — 87640 STAPH A DNA AMP PROBE: CPT

## 2024-11-30 PROCEDURE — 82746 ASSAY OF FOLIC ACID SERUM: CPT

## 2024-11-30 PROCEDURE — 87070 CULTURE OTHR SPECIMN AEROBIC: CPT

## 2024-11-30 PROCEDURE — 93308 TTE F-UP OR LMTD: CPT

## 2024-11-30 PROCEDURE — 82945 GLUCOSE OTHER FLUID: CPT

## 2024-11-30 PROCEDURE — 81455 SO/HL 51/>GSAP DNA/DNA&RNA: CPT

## 2024-11-30 PROCEDURE — 82728 ASSAY OF FERRITIN: CPT

## 2024-11-30 PROCEDURE — 84145 PROCALCITONIN (PCT): CPT

## 2024-11-30 PROCEDURE — 81450 HL NEO GSAP 5-50DNA/DNA&RNA: CPT

## 2024-11-30 PROCEDURE — C1769: CPT

## 2024-11-30 PROCEDURE — 88342 IMHCHEM/IMCYTCHM 1ST ANTB: CPT

## 2024-11-30 PROCEDURE — 97530 THERAPEUTIC ACTIVITIES: CPT | Mod: GP

## 2024-11-30 PROCEDURE — 88264 CHROMOSOME ANALYSIS 20-25: CPT

## 2024-11-30 PROCEDURE — 85730 THROMBOPLASTIN TIME PARTIAL: CPT

## 2024-11-30 PROCEDURE — 71250 CT THORAX DX C-: CPT | Mod: 26,MC

## 2024-11-30 PROCEDURE — 33019 PERQ PRCRD DRG INSJ CATH CT: CPT

## 2024-11-30 PROCEDURE — 87102 FUNGUS ISOLATION CULTURE: CPT

## 2024-11-30 PROCEDURE — 86038 ANTINUCLEAR ANTIBODIES: CPT

## 2024-11-30 PROCEDURE — 77012 CT SCAN FOR NEEDLE BIOPSY: CPT

## 2024-11-30 PROCEDURE — 93010 ELECTROCARDIOGRAM REPORT: CPT

## 2024-11-30 PROCEDURE — 93306 TTE W/DOPPLER COMPLETE: CPT

## 2024-11-30 PROCEDURE — 84157 ASSAY OF PROTEIN OTHER: CPT

## 2024-11-30 PROCEDURE — 76376 3D RENDER W/INTRP POSTPROCES: CPT

## 2024-11-30 PROCEDURE — 86140 C-REACTIVE PROTEIN: CPT

## 2024-11-30 PROCEDURE — 82607 VITAMIN B-12: CPT

## 2024-11-30 PROCEDURE — 97163 PT EVAL HIGH COMPLEX 45 MIN: CPT | Mod: GP

## 2024-11-30 PROCEDURE — 99285 EMERGENCY DEPT VISIT HI MDM: CPT

## 2024-11-30 PROCEDURE — 85045 AUTOMATED RETICULOCYTE COUNT: CPT

## 2024-11-30 PROCEDURE — 84100 ASSAY OF PHOSPHORUS: CPT

## 2024-11-30 PROCEDURE — 87075 CULTR BACTERIA EXCEPT BLOOD: CPT

## 2024-11-30 PROCEDURE — 71045 X-RAY EXAM CHEST 1 VIEW: CPT | Mod: 26

## 2024-11-30 PROCEDURE — 86225 DNA ANTIBODY NATIVE: CPT

## 2024-11-30 PROCEDURE — 85097 BONE MARROW INTERPRETATION: CPT

## 2024-11-30 PROCEDURE — 83735 ASSAY OF MAGNESIUM: CPT

## 2024-11-30 PROCEDURE — 32557 INSERT CATH PLEURA W/ IMAGE: CPT | Mod: 50

## 2024-11-30 PROCEDURE — 81451 HL NEO GSAP 5-50 RNA ALYS: CPT

## 2024-11-30 PROCEDURE — 86431 RHEUMATOID FACTOR QUANT: CPT

## 2024-11-30 PROCEDURE — 89051 BODY FLUID CELL COUNT: CPT

## 2024-11-30 PROCEDURE — 80053 COMPREHEN METABOLIC PANEL: CPT

## 2024-11-30 PROCEDURE — 86738 MYCOPLASMA ANTIBODY: CPT

## 2024-11-30 PROCEDURE — 83615 LACTATE (LD) (LDH) ENZYME: CPT

## 2024-11-30 PROCEDURE — 87899 AGENT NOS ASSAY W/OPTIC: CPT

## 2024-11-30 PROCEDURE — C1729: CPT

## 2024-11-30 PROCEDURE — 88280 CHROMOSOME KARYOTYPE STUDY: CPT

## 2024-11-30 PROCEDURE — 83986 ASSAY PH BODY FLUID NOS: CPT

## 2024-11-30 PROCEDURE — 83550 IRON BINDING TEST: CPT

## 2024-11-30 PROCEDURE — 88237 TISSUE CULTURE BONE MARROW: CPT

## 2024-11-30 PROCEDURE — 87015 SPECIMEN INFECT AGNT CONCNTJ: CPT

## 2024-11-30 PROCEDURE — 87641 MR-STAPH DNA AMP PROBE: CPT

## 2024-11-30 PROCEDURE — 38222 DX BONE MARROW BX & ASPIR: CPT | Mod: RT

## 2024-11-30 PROCEDURE — 93321 DOPPLER ECHO F-UP/LMTD STD: CPT

## 2024-11-30 PROCEDURE — 88313 SPECIAL STAINS GROUP 2: CPT

## 2024-11-30 PROCEDURE — 84443 ASSAY THYROID STIM HORMONE: CPT

## 2024-11-30 PROCEDURE — 85610 PROTHROMBIN TIME: CPT

## 2024-11-30 PROCEDURE — 88360 TUMOR IMMUNOHISTOCHEM/MANUAL: CPT

## 2024-11-30 PROCEDURE — 85025 COMPLETE CBC W/AUTO DIFF WBC: CPT

## 2024-11-30 PROCEDURE — 86900 BLOOD TYPING SEROLOGIC ABO: CPT

## 2024-11-30 PROCEDURE — 99223 1ST HOSP IP/OBS HIGH 75: CPT

## 2024-11-30 PROCEDURE — 82042 OTHER SOURCE ALBUMIN QUAN EA: CPT

## 2024-11-30 PROCEDURE — 87205 SMEAR GRAM STAIN: CPT

## 2024-11-30 PROCEDURE — 36415 COLL VENOUS BLD VENIPUNCTURE: CPT

## 2024-11-30 PROCEDURE — 88184 FLOWCYTOMETRY/ TC 1 MARKER: CPT

## 2024-11-30 PROCEDURE — 93005 ELECTROCARDIOGRAM TRACING: CPT

## 2024-11-30 PROCEDURE — 88185 FLOWCYTOMETRY/TC ADD-ON: CPT

## 2024-11-30 PROCEDURE — 87449 NOS EACH ORGANISM AG IA: CPT

## 2024-11-30 PROCEDURE — 94640 AIRWAY INHALATION TREATMENT: CPT

## 2024-11-30 PROCEDURE — 85027 COMPLETE CBC AUTOMATED: CPT

## 2024-11-30 PROCEDURE — 80048 BASIC METABOLIC PNL TOTAL CA: CPT

## 2024-11-30 RX ORDER — ROSUVASTATIN CALCIUM 5 MG/1
20 TABLET, FILM COATED ORAL AT BEDTIME
Refills: 0 | Status: DISCONTINUED | OUTPATIENT
Start: 2024-11-30 | End: 2024-12-12

## 2024-11-30 RX ORDER — HYDROCHLOROTHIAZIDE 50 MG
1 TABLET ORAL
Refills: 0 | DISCHARGE

## 2024-11-30 RX ORDER — ENALAPRIL MALEATE 2.5 MG/1
1 TABLET ORAL
Refills: 0 | DISCHARGE

## 2024-11-30 RX ORDER — BUPROPION HCL 100 MG
1 TABLET ORAL
Refills: 0 | DISCHARGE

## 2024-11-30 RX ORDER — CYANOCOBALAMIN/FOLIC AC/VIT B6 1-2.2-25MG
1 TABLET ORAL DAILY
Refills: 0 | Status: DISCONTINUED | OUTPATIENT
Start: 2024-11-30 | End: 2024-12-12

## 2024-11-30 RX ORDER — AZELASTINE HCL 205.5 UG/1
1 SPRAY NASAL
Refills: 0 | DISCHARGE

## 2024-11-30 RX ORDER — APIXABAN 2.5 MG/1
5 TABLET, FILM COATED ORAL EVERY 12 HOURS
Refills: 0 | Status: DISCONTINUED | OUTPATIENT
Start: 2024-11-30 | End: 2024-12-01

## 2024-11-30 RX ORDER — CYCLOBENZAPRINE HCL 10 MG
10 TABLET ORAL THREE TIMES A DAY
Refills: 0 | Status: DISCONTINUED | OUTPATIENT
Start: 2024-11-30 | End: 2024-12-12

## 2024-11-30 RX ORDER — LEVOTHYROXINE SODIUM 150 MCG
1 TABLET ORAL
Refills: 0 | DISCHARGE

## 2024-11-30 RX ORDER — LAMOTRIGINE 50 MG/1
1 TABLET, EXTENDED RELEASE ORAL
Refills: 0 | DISCHARGE

## 2024-11-30 RX ORDER — FLUTICASONE PROPIONATE 50 MCG
2 SPRAY, SUSPENSION NASAL
Refills: 0 | DISCHARGE

## 2024-11-30 RX ORDER — COLCHICINE 0.6 MG
0.5 TABLET ORAL
Refills: 0 | DISCHARGE

## 2024-11-30 RX ORDER — LINACLOTIDE 72 UG/1
1 CAPSULE, GELATIN COATED ORAL
Refills: 0 | DISCHARGE

## 2024-11-30 RX ORDER — PANTOPRAZOLE SODIUM 40 MG/1
1 TABLET, DELAYED RELEASE ORAL
Refills: 0 | DISCHARGE

## 2024-11-30 RX ORDER — CYANOCOBALAMIN/FOLIC AC/VIT B6 1-2.2-25MG
1 TABLET ORAL
Refills: 0 | DISCHARGE

## 2024-11-30 RX ORDER — COLCHICINE 0.6 MG
0.3 TABLET ORAL DAILY
Refills: 0 | Status: DISCONTINUED | OUTPATIENT
Start: 2024-11-30 | End: 2024-12-09

## 2024-11-30 RX ORDER — POLYETHYLENE GLYCOL 3350 17 G/17G
17 POWDER, FOR SOLUTION ORAL
Refills: 0 | DISCHARGE

## 2024-11-30 RX ORDER — HYDROCORTISONE BUTYRATE 0.1 %
1 CREAM (GRAM) TOPICAL
Refills: 0 | DISCHARGE

## 2024-11-30 RX ORDER — MECLIZINE HCL 12.5 MG
25 TABLET ORAL
Refills: 0 | Status: DISCONTINUED | OUTPATIENT
Start: 2024-11-30 | End: 2024-12-12

## 2024-11-30 RX ORDER — AMIODARONE HYDROCHLORIDE 200 MG/1
2 TABLET ORAL
Refills: 0 | DISCHARGE

## 2024-11-30 RX ORDER — ENALAPRIL MALEATE 2.5 MG/1
20 TABLET ORAL DAILY
Refills: 0 | Status: DISCONTINUED | OUTPATIENT
Start: 2024-11-30 | End: 2024-12-01

## 2024-11-30 RX ORDER — CYCLOBENZAPRINE HCL 10 MG
1 TABLET ORAL
Refills: 0 | DISCHARGE

## 2024-11-30 RX ORDER — AMLODIPINE BESYLATE 10 MG/1
1 TABLET ORAL
Refills: 0 | DISCHARGE

## 2024-11-30 RX ORDER — LAMOTRIGINE 50 MG/1
200 TABLET, EXTENDED RELEASE ORAL DAILY
Refills: 0 | Status: DISCONTINUED | OUTPATIENT
Start: 2024-11-30 | End: 2024-12-12

## 2024-11-30 RX ORDER — SENNOSIDES 8.6 MG
2 TABLET ORAL
Refills: 0 | DISCHARGE

## 2024-11-30 RX ORDER — AMLODIPINE BESYLATE 10 MG/1
5 TABLET ORAL DAILY
Refills: 0 | Status: DISCONTINUED | OUTPATIENT
Start: 2024-11-30 | End: 2024-12-01

## 2024-11-30 RX ORDER — LIRAGLUTIDE 6 MG/ML
0.6 INJECTION SUBCUTANEOUS
Refills: 0 | DISCHARGE

## 2024-11-30 RX ORDER — HYDROCORTISONE BUTYRATE 0.1 %
1 CREAM (GRAM) TOPICAL
Refills: 0 | Status: DISCONTINUED | OUTPATIENT
Start: 2024-11-30 | End: 2024-12-12

## 2024-11-30 RX ORDER — FLUTICASONE PROPIONATE 50 MCG
1 SPRAY, SUSPENSION NASAL
Refills: 0 | Status: DISCONTINUED | OUTPATIENT
Start: 2024-11-30 | End: 2024-12-12

## 2024-11-30 RX ORDER — METOPROLOL TARTRATE 100 MG/1
1 TABLET, FILM COATED ORAL
Refills: 0 | DISCHARGE

## 2024-11-30 RX ORDER — ONDANSETRON HYDROCHLORIDE 4 MG/1
1 TABLET, FILM COATED ORAL
Refills: 0 | DISCHARGE

## 2024-11-30 RX ORDER — BUPROPION HCL 100 MG
150 TABLET ORAL DAILY
Refills: 0 | Status: DISCONTINUED | OUTPATIENT
Start: 2024-11-30 | End: 2024-12-12

## 2024-11-30 RX ORDER — ROSUVASTATIN CALCIUM 5 MG/1
1 TABLET, FILM COATED ORAL
Refills: 0 | DISCHARGE

## 2024-11-30 RX ORDER — METOPROLOL TARTRATE 100 MG/1
25 TABLET, FILM COATED ORAL DAILY
Refills: 0 | Status: DISCONTINUED | OUTPATIENT
Start: 2024-11-30 | End: 2024-12-12

## 2024-11-30 RX ORDER — AMIODARONE HYDROCHLORIDE 200 MG/1
400 TABLET ORAL
Refills: 0 | Status: COMPLETED | OUTPATIENT
Start: 2024-11-30 | End: 2024-12-01

## 2024-11-30 RX ORDER — MECLIZINE HCL 12.5 MG
1 TABLET ORAL
Refills: 0 | DISCHARGE

## 2024-11-30 RX ORDER — ACETAMINOPHEN 500MG 500 MG/1
1000 TABLET, COATED ORAL ONCE
Refills: 0 | Status: COMPLETED | OUTPATIENT
Start: 2024-11-30 | End: 2024-11-30

## 2024-11-30 RX ORDER — APIXABAN 2.5 MG/1
1 TABLET, FILM COATED ORAL
Refills: 0 | DISCHARGE

## 2024-11-30 RX ORDER — LEVOTHYROXINE SODIUM 150 MCG
88 TABLET ORAL DAILY
Refills: 0 | Status: DISCONTINUED | OUTPATIENT
Start: 2024-11-30 | End: 2024-12-12

## 2024-11-30 RX ORDER — ACETAMINOPHEN 500MG 500 MG/1
650 TABLET, COATED ORAL ONCE
Refills: 0 | Status: DISCONTINUED | OUTPATIENT
Start: 2024-11-30 | End: 2024-12-03

## 2024-11-30 RX ORDER — ONDANSETRON HYDROCHLORIDE 4 MG/1
4 TABLET, FILM COATED ORAL ONCE
Refills: 0 | Status: COMPLETED | OUTPATIENT
Start: 2024-11-30 | End: 2024-11-30

## 2024-11-30 RX ORDER — PANTOPRAZOLE SODIUM 40 MG/1
40 TABLET, DELAYED RELEASE ORAL
Refills: 0 | Status: DISCONTINUED | OUTPATIENT
Start: 2024-11-30 | End: 2024-12-12

## 2024-11-30 RX ADMIN — ACETAMINOPHEN 500MG 400 MILLIGRAM(S): 500 TABLET, COATED ORAL at 18:54

## 2024-11-30 RX ADMIN — Medication 4 MILLIGRAM(S): at 18:21

## 2024-11-30 RX ADMIN — ONDANSETRON HYDROCHLORIDE 4 MILLIGRAM(S): 4 TABLET, FILM COATED ORAL at 17:02

## 2024-11-30 RX ADMIN — Medication 2 MILLIGRAM(S): at 17:49

## 2024-11-30 RX ADMIN — Medication 2 MILLIGRAM(S): at 17:02

## 2024-11-30 RX ADMIN — Medication 4 MILLIGRAM(S): at 17:56

## 2024-11-30 NOTE — ED ADULT NURSE REASSESSMENT NOTE - NS ED NURSE REASSESS COMMENT FT1
Received bedside report from previous RN Flavia Marino. Patient is lying on stretcher on semi-fowlers position. No signs of distress noted, pt verbalizes 6/10 upper back pain but states shes okay with the pain level at this time, does not need meds. Pt's call bell within reach, bed in lowest position, safety and comfort maintained.

## 2024-11-30 NOTE — CHART NOTE - NSCHARTNOTEFT_GEN_A_CORE
I introduced myself and explained my role as ER RN CM to the pt, we discussed her recent hospitalizations at [facility].  She was able to clarify that she was recently diagnosis with CHF and currently has PNA (not a known [facility] pt).  She receives [facility] services through [facility], the visiting nurse told her to return to the hospital.  She informed me that the [facility] nurse did educate her on CHF, she has a scale at home and the nurse was ordering her a bp cuff that would be able to send the information electronically to the doctor's office for closer monitoring.  She told me that she also has pericarditis. She has recently retired. We will continue to follow for discharge needs.

## 2024-11-30 NOTE — ED ADULT NURSE REASSESSMENT NOTE - NS ED NURSE REASSESS COMMENT FT1
Patient's oxygen saturation dropped to 88% on RA, placed on 2L NC, MORENO Foster and MD Frazier made aware.

## 2024-11-30 NOTE — ED PROVIDER NOTE - PHYSICAL EXAMINATION
EKATERINA Frazier MD:   Gen'l: Thin older WF in mild respiratory discomfort, no sentence shortening, ill-appearing, non-toxic  Head: NC/AT  Eyes: PERRL, EOMI  ENT: O/P clear, mm mildly dry  CV: RRR, normal radial pulse  Lungs: mild tachypnea, decreased BS B/L bases, 91 % R/A  GI: soft, NT, BS+  : Deferred, no flank nor CVAT  Neck: NT, supple w/o pain  MSK: NOLAN x 4, no focal extremity swelling nor tenderness, B/L SLR 35 degrees w/o pain, normal motor  Skin: no tactile warmth, no rash  Neuro: A+O x 4, CN 2 -12 intact, normal speech, no focal motor/sensory deficits

## 2024-11-30 NOTE — ED ADULT NURSE NOTE - OBJECTIVE STATEMENT
Pt coming into the Emergent department with multiple complaints. Pt recently dx from [facility] with recent dx of pneumonia, shortness of breath on exertion, pericarditis, CHF and AFIB. Pt endorses worsening chest pressure, sob and upper back pain. IV access obtained, EKG obtained and labs sent. Pt aware of urine sample. MD Frazier made aware of pain and to place orders. Side rails up and call bell light within reach. Pt doesn't appear to be in any distress. Pt noted to have a cough and reports it went away and has come back.

## 2024-11-30 NOTE — ED PROVIDER NOTE - CARE PLAN
Principal Discharge DX:	Pericardial effusion  Secondary Diagnosis:	Congestive heart failure (CHF)  Secondary Diagnosis:	Compressive atelectasis  Secondary Diagnosis:	Anticoagulated by anticoagulation treatment   1

## 2024-11-30 NOTE — ED PROVIDER NOTE - PROGRESS NOTE DETAILS
PA: Patient is a 64-year-old female with PMHx of central thrombocytosis, HTN, HLD, hypothyroid, primary lymphedema, ischemic colitis, GERD, past bariatric surgery, left hemicolectomy, paroxysmal A-fib and DVT/PE on Eliquis, CHF, 2  inpatient hospitalizations at [facility] in past month for pleural pericarditis and pleural effusion, CHF pneumonia, dc'd home yesterday from [facility], presents to ED BIB pvt car c/o SOB, back pain. Pt reports her symptoms are similar to when she was readmitted to [facility] w/ Dx pericarditis/CHF/PNA. Currently DENIES fever, chills, abd pain, N/V. ~Cristian Fortune PA-C spoke with hospitalist dr. Heart, wants CT chest before accepting admission. ~Cristian Fortune PA-C

## 2024-11-30 NOTE — ED PROVIDER NOTE - CLINICAL SUMMARY MEDICAL DECISION MAKING FREE TEXT BOX
64-year-old WF, multiple PMH including HTN, HLD, hypothyroid, primary lymphedema, central thrombocytosis, ischemic colitis, GERD, past bariatric surgery, left hemicolectomy, paroxysmal A-fib and DVT/PE on Eliquis, CHF, 2  inpatient hospitalizations at [facility] in past mo. regarding short of breath, back/chest pain, diagnosis pericarditis, CHF pneumonia, last DC yesterday to home, now BIB pvt car after visiting RN shantell today noted pt w/ back > chest pain, SOB at rest aggravated by only walking few feet.  Pt reports symptoms similar to why/when she was readmitted to [facility] w/ Dx pericarditis/CHF/PNA.    Differential is broad, but denisse. concern re; partially treated pericarditis, CHF, PNA, infection.    Plan: EKG, CXR, labs incl. BCs, lactate, BNP, Troponin, U/A; cautios IVF, pain med.  Monitor, observe, reassess. Expect admit. 64-year-old WF, multiple PMH including HTN, HLD, hypothyroid, primary lymphedema, central thrombocytosis, ischemic colitis, GERD, past bariatric surgery, left hemicolectomy, paroxysmal A-fib and DVT/PE on Eliquis, CHF, 2  inpatient hospitalizations at [facility] in past mo. regarding short of breath, back/chest pain, diagnosis pericarditis, CHF pneumonia, last DC yesterday to home, now BIB pvt car after visiting RN shantell today noted pt w/ back > chest pain, SOB at rest aggravated by only walking few feet.  Pt reports symptoms similar to why/when she was readmitted to [facility] w/ Dx pericarditis/CHF/PNA.    Differential is broad, but denisse. concern re; partially treated pericarditis, CHF, PNA, infection.    Plan: EKG, CXR, labs incl. BCs, lactate, BNP, Troponin, U/A, pain med.  Monitor, observe, reassess. Expect admit.  Given recent CHF Dx, & pt not febrile, will hold IVF pending CXR & labs results, + concern for volume overload/CHF. 64-year-old WF, multiple PMH including HTN, HLD, hypothyroid, primary lymphedema, central thrombocytosis, ischemic colitis, GERD, past bariatric surgery, left hemicolectomy, paroxysmal A-fib and DVT/PE on Eliquis, CHF, 2  inpatient hospitalizations at [facility] in past mo. regarding short of breath, back/chest pain, diagnosis pericarditis, CHF pneumonia, last DC yesterday to home, now BIB pvt car after visiting RN shantell today noted pt w/ back > chest pain, SOB at rest aggravated by only walking few feet.  Pt reports symptoms similar to why/when she was readmitted to [facility] w/ Dx pericarditis/CHF/PNA.    Differential is broad, but denisse. concern re; partially treated pericarditis, CHF, PNA, infection.    Plan: EKG, CXR, labs incl. BCs, lactate, BNP, Troponin, U/A, pain med.  Monitor, observe, reassess. Expect admit.  Given recent CHF Dx, & pt not febrile, will hold IVF pending CXR & labs results, + concern for volume overload/CHF.    22:45, EKATERINA Frazier MD:  Labs notable for elevated WBC of 18, ESR normal,  lactate normal, CMP normal, troponin normal, elevated BNP 3200 (improved from when  at [facility]), INR 2 (+ Eliquis), U/A negative.   CT chest Noncon report, as requested by admit hospitalist prior to admission: Moderately large pericardial effusion, associated near complete compressive atelectasis of LLL.   All studies discussed with patient's brother, Dr. Brendan Corona: reports pericardial effusion without change from this past week when at [facility], agrees with Telemetry admission, no indication for emergent transfer, requests no Abx unless pt spikes a F.  Will need official echo tomorrow.  Case discussed with admit hospitalist Dr. Heart and Telemetry admit accepted. 64-year-old WF, multiple PMH including HTN, HLD, hypothyroid, primary lymphedema, central thrombocytosis, ischemic colitis, GERD, past bariatric surgery, left hemicolectomy, paroxysmal A-fib and DVT/PE on Eliquis, CHF, 2  inpatient hospitalizations at [facility] in past mo. regarding short of breath, back/chest pain, diagnosis acute pericarditis, CHF, pneumonia, last DC yesterday to home, now BIB pvt car after visiting RN shantell today noted pt w/ back > chest pain, SOB at rest aggravated by only walking few feet.  Pt reports symptoms similar to why/when she was readmitted to [facility] w/ Dx pericarditis/CHF/PNA.    Differential is broad, but denisse. concern re: partially treated pericarditis, CHF, PNA, infection.    Plan: EKG, CXR, labs incl. BCs, lactate, BNP, Troponin, U/A, pain med.  Monitor, observe, reassess. Expect admit.  Given recent CHF Dx, & pt not febrile, will hold IVF pending CXR & labs results, + concern for volume overload/CHF.     19:36:  spoke with hospitalist dr. Heart, wants CT chest before accepting admission. ~Cristian Fortune PA-C    22:45, EKATERINA Frazier MD:  Labs notable for elevated WBC of 18, ESR normal,  lactate normal, CMP normal, Troponin normal, elevated BNP 3200 (improved from when  at [facility]), INR 2 (+ Eliquis), U/A negative.   CT chest noncon report, as requested by admit hospitalist prior to admission: Moderately large pericardial effusion, associated near complete compressive atelectasis of LLL.   All studies discussed with patient's brother, Dr. Alex Corona: reports pericardial effusion without change from this past week when at [facility], agrees with Telemetry admission, no indication for emergent transfer, requests no Abx unless pt spikes a F.  Will need official echo tomorrow.  Case discussed with admit hospitalist Dr. Heart and Telemetry admit accepted.

## 2024-11-30 NOTE — H&P ADULT - ASSESSMENT
64-year-old F with PMH of HTN, HLD, hypothyroid, primary lymphedema, central thrombocytosis, GERD post bariatric surgery, ischemic colitis s/p left hemicolectomy, pAF and DVT/PE on Eliquis, HFpEF, and recently 2 hospitalizations at [facility] in the past 2 months for CAP, and also diagnosed with pericarditis, and HFpEF. Was last discharged home on 11/29 on PO ATBs (Doxycycline). Now BIB pvt car on 11/30 after visiting RN recommended  further assessment of back & chest pain, SOB, and productive cough. Pt reports symptoms similar to why/when she was readmitted to [facility] w/ Dx of pericarditis/CHF/PNA. No F/C, abd pain, N/V.  Recent LE edema, + improved today. Pt upset w/ care rendered at [facility] despite her doctors all there, so came to  ED foe eval/management. 64-year-old F with PMH of HTN, HLD, hypothyroid, primary lymphedema, central thrombocytosis, GERD post bariatric surgery, ischemic colitis s/p left hemicolectomy, pAF and DVT/PE on Eliquis, HFpEF, and recently 2 hospitalizations at [facility] in the past 2 months for CAP, and also diagnosed with pericarditis, and HFpEF.    Labs and imaging reviewed.     Assessment and Plan:    # Pneumonia:  Initial episode of CAP treated at [facility] then readmitted in November for persistent symptoms and discharged home on 11/29 on oral Doxycycline.   Being admitted now with worsening symptoms of back and chest pain, SOB, and productive cough.   Imaging as above with evidence of B/L pleural effusions with superimposed PNA.   Clinically hypoxemic on RA, resolves with supplementation of O2 via NC @ 3 L/min. Lung auscultation as described above.   Patient received interferon therapy around 2 months ago for management of thrombocytosis.   Given recurrent/worsening symptoms despite previous management, will start broad spectrum ATBs (Vancomycin and Cefepime). Consult ID and Pulmonology for further recommendations.   O2 supplementation for goal SpO2 > 92%.   Pain management.     # Acute hypoxic respiratory failure:  2/2 PNA. Medical management as above.     # Pericarditis with pericardial effusion:  Echo report from [facility] reviewed, and several serial limited echos there showed unchanged size of effusion without tamponade physiology.   Re-demonstrated on CT here.   HD stable without clinical signs of tamponade.   Echo in the AM. Cardiology evaluation. Resume Colchicine and NSAIDs. No IV fluids as patient has hypoxemia and there was suspected CHF during previous admission, with elevated BNP.    # HFpEF:  Echo as above with preserved EF, however patient was told she had heart failure. Labs from [facility] reviewed, last BNP around 2K, now 3200 here. Clinically euvolemic on exam, and will avoid IV diuresis as patient has moderate to large pericardial effusion and decreasing preload may cause hemodynamic compromise. Cardiology evaluation.     # HTN:  Controlled. Resume Enalapril 20 mg daily, HTCZ 12.5 mg q12h, Toprol XL 25 mg qD. DASH diet.     #  64-year-old F with PMH of HTN, HLD, hypothyroid, primary lymphedema, central thrombocytosis, GERD post bariatric surgery, ischemic colitis s/p left hemicolectomy, pAF and DVT/PE on Eliquis, HFpEF, and recently 2 hospitalizations at [facility] in the past 2 months for CAP, and also diagnosed with pericarditis, and HFpEF.    Labs and imaging reviewed.     Assessment and Plan:    # Pneumonia:  Initial episode of CAP treated at [facility] then readmitted in November for persistent symptoms and discharged home on 11/29 on oral Doxycycline.   Being admitted now with worsening symptoms of back and chest pain, SOB, and productive cough.   Imaging as above with evidence of B/L pleural effusions with superimposed PNA.   Clinically hypoxemic on RA, resolves with supplementation of O2 via NC @ 3 L/min. Lung auscultation as described above.   Patient received interferon therapy around 2 months ago for management of thrombocytosis.   Given recurrent/worsening symptoms despite previous management, will start broad spectrum ATBs (Vancomycin and Cefepime). Consult ID and Pulmonology for further recommendations.   O2 supplementation for goal SpO2 > 92%.   Pain management.     # Acute hypoxic respiratory failure:  2/2 PNA. Medical management as above.     # Pericarditis with pericardial effusion:  Echo report from [facility] reviewed, and several serial limited echos there showed unchanged size of effusion without tamponade physiology.   Re-demonstrated on CT here.   HD stable without clinical signs of tamponade.   Echo in the AM. Cardiology evaluation. Resume Colchicine and NSAIDs. No IV fluids as patient has hypoxemia and there was suspected CHF during previous admission, with elevated BNP.    # HFpEF:  Echo as above with preserved EF, however patient was told she had heart failure. Labs from [facility] reviewed, last BNP around 2K, now 3200 here. Clinically euvolemic on exam, and will avoid IV diuresis as patient has moderate to large pericardial effusion and decreasing preload may cause hemodynamic compromise. Cardiology evaluation.     # Paroxysmal atrial fibrillation:  NSR on admission. Continue Amiodarone 400 mg q12h (until 12/1, then 400 mg daily), and Toprol XL 25 mg daily. Elevated [id number], on Eliquis for stroke risk-reduction, continue.    # HTN:  Controlled. Resume Enalapril 20 mg daily, HTCZ 12.5 mg q12h, Toprol XL 25 mg qD. DASH diet.     # DLD:  Resume statin therapy.     # Primary thrombocytosis:  PLTs appear to be at baseline. Hematology evaluation. Trend CBC.    # Mood disorder:  Resume Lamictal 200 mg daily, Wellbutrin 150 mg daily.    # Hypothyroidism:  Resume LT4 88 mcg daily.     # History of DVT:  AC as above.     # Healthcare maintenance:  VTE prophylaxis: on full AC.   Disposition: from home. Pending clinical progress and PT evaluation.   Nutritional assessment for possible protein malnutrition.  64-year-old F with PMH of HTN, HLD, hypothyroid, primary lymphedema, central thrombocytosis, GERD post bariatric surgery, ischemic colitis s/p left hemicolectomy, pAF and DVT/PE on Eliquis, HFpEF, and recently 2 hospitalizations at [facility] in the past 2 months for CAP, and also diagnosed with pericarditis, and HFpEF.    Labs and imaging reviewed.     Assessment and Plan:    # Pneumonia:  Initial episode of CAP treated at [facility] then readmitted in November for persistent symptoms and discharged home on 11/29 on oral Doxycycline.   Being admitted now with worsening symptoms of back and chest pain, SOB, and productive cough.   Imaging as above with evidence of B/L pleural effusions with superimposed PNA.   Clinically hypoxemic on RA, resolves with supplementation of O2 via NC @ 3 L/min. Lung auscultation as described above.   Patient received interferon therapy around 2 months ago for management of thrombocytosis.   Given recurrent/worsening symptoms despite previous management, will start broad spectrum ATBs (Vancomycin and Cefepime). Consult ID and Pulmonology for further recommendations.   O2 supplementation for goal SpO2 > 92%.   Pain management.     # Acute hypoxic respiratory failure:  2/2 PNA. Medical management as above.     # Pericarditis with pericardial effusion:  Echo report from [facility] reviewed, and several serial limited echos there showed unchanged size of effusion without tamponade physiology.   Re-demonstrated on CT here.   HD stable without clinical signs of tamponade.   Echo in the AM. Cardiology evaluation. Resume Colchicine and NSAIDs. No IV fluids as patient has hypoxemia and there was suspected CHF during previous admission, with elevated BNP.    # HFpEF:  Echo as above with preserved EF, however patient was told she had heart failure. Labs from [facility] reviewed, BNP was up to 10K there and last before admission was 2200, now 3200 here. Clinically euvolemic on exam, and will avoid IV diuresis as patient has moderate to large pericardial effusion and decreasing preload may cause hemodynamic compromise. Cardiology evaluation.     # Paroxysmal atrial fibrillation:  NSR on admission. Continue Amiodarone 400 mg q12h (until 12/1, then 400 mg daily), and Toprol XL 25 mg daily. Elevated [id number], on Eliquis for stroke risk-reduction, continue.    # HTN:  Controlled. Resume Enalapril 20 mg daily, HTCZ 12.5 mg q12h, Toprol XL 25 mg qD. DASH diet.     # DLD:  Resume statin therapy.     # Primary thrombocytosis:  PLTs appear to be at baseline. Hematology evaluation. Trend CBC.    # Mood disorder:  Resume Lamictal 200 mg daily, Wellbutrin 150 mg daily.    # Hypothyroidism:  Resume LT4 88 mcg daily.     # History of DVT:  AC as above.     # Moderate normocytic anemia:  Hb 8.8, appears stable from recent admission's lab at [facility]. Trend CBC and transfuse PRN for Hb < 7 g/dL. Monitor signs of bleeding.     # Healthcare maintenance:  VTE prophylaxis: on full AC.   Disposition: from home. Pending clinical progress and PT evaluation.   Nutritional assessment for possible protein malnutrition.

## 2024-11-30 NOTE — ED ADULT TRIAGE NOTE - CHIEF COMPLAINT QUOTE
Pt comes to the ED complaining of pain. Pt states that she was just discharged from [facility] yesterday. Pt states that she has pericarditis, CHF, AFib and Pneumonia. Patient was in [place] twice for same symptoms and was told to go back today by the visiting nurse, but she does not want to go to [place].

## 2024-11-30 NOTE — ED ADULT NURSE REASSESSMENT NOTE - NS ED NURSE REASSESS COMMENT FT1
Pt's O2 sating at 90-91% on 2L NC, occasionally dipping down to 88-89%. MORENO Foster made aware, raised O2 to 4L NC at this time.

## 2024-11-30 NOTE — ED PROVIDER NOTE - OBJECTIVE STATEMENT
64-year-old WF, multiple PMH including HTN, HLD, hypothyroid, primary lymphedema, central thrombocytosis, ischemic colitis, GERD, past bariatric surgery, left hemicolectomy, paroxysmal A-fib and DVT/PE on Eliquis, CHF, 2  inpatient hospitalizations at [facility] in past mo. regarding short of breath, back/chest pain, diagnosis pericarditis, CHF pneumonia, last DC yesterday to home, now BIB pvt car after visiting RN eval today noted pt w/ back > chest pain, SOB at rest aggravated by only walking few feet.  Pt reports symptoms similar to why/when she was readmitted to [facility] w/ Dx pericarditis/CHF/PNA. No F/C, abd pain, N/V.  Recent LE edema, + improved today, no LE pain.  Pt upset w/ care rendered at [facility] despite her doctors all there, so came to  ED foe shantell/management incl. admit. 64-year-old WF, multiple PMH including HTN, HLD, hypothyroid, primary lymphedema, central thrombocytosis, ischemic colitis, GERD, past bariatric surgery, left hemicolectomy, paroxysmal A-Fib and DVT/PE on Eliquis, CHF, 2  inpatient hospitalizations at [facility] in past mo. regarding short of breath, back/chest pain, diagnosis + acute pericarditis, CHF, & pneumonia, last DC yesterday to home, now BIB pvt car after visiting RN eval today at home noted pt c/o increased back > chest pain, SOB at rest aggravated by only walking few feet.  Pt reports symptoms similar to why/when she was readmitted to [facility] w/ Dx pericarditis/CHF/PNA. No F/C, abd pain, N/V.  Recent LE edema, + improved today, no LE pain.  Pt upset w/ care rendered at [facility] despite her doctors all there, so came to  ED for eval/management incl. admit.

## 2024-11-30 NOTE — ED ADULT NURSE NOTE - NSFALLHARMRISKINTERV_ED_ALL_ED
Assistance OOB with selected safe patient handling equipment if applicable/Communicate risk of Fall with Harm to all staff, patient, and family/Provide visual cue: red socks, yellow wristband, yellow gown, etc/Reinforce activity limits and safety measures with patient and family/Bed in lowest position, wheels locked, appropriate side rails in place/Call bell, personal items and telephone in reach/Instruct patient to call for assistance before getting out of bed/chair/stretcher/Non-slip footwear applied when patient is off stretcher/[place] to call system/Physically safe environment - no spills, clutter or unnecessary equipment/Purposeful Proactive Rounding/Room/bathroom lighting operational, light cord in reach

## 2024-11-30 NOTE — H&P ADULT - NSHPPHYSICALEXAM_GEN_ALL_CORE
T(C): 36.6 (12-01-24 @ 02:02), Max: 37.6 (11-30-24 @ 18:50)  HR: 64 (12-01-24 @ 02:02) (64 - 82)  BP: 131/54 (12-01-24 @ 02:02) (122/41 - 163/63)  RR: 18 (12-01-24 @ 02:02) (18 - 30)  SpO2: 95% (12-01-24 @ 02:02) (87% - 97%)    CONSTITUTIONAL: Well groomed, no apparent distress,   EYES: PERRLA and symmetric, EOMI, No conjunctival or scleral injection, non-icteric  ENMT: Oral mucosa with moist membranes.   NECK: Supple, symmetric and without tracheal deviation   RESP: No respiratory distress, no use of accessory muscles; decreased breath sounds in both lung bases, with mild bibasilar rales (L>R).   CV: RRR, +S1S2, no MRG; no JVD; no peripheral edema.  GI: Soft, NT, ND, no rebound, no guarding; no palpable masses.  MSK: normal muscle strength/tone.  SKIN: No rashes or ulcers noted.  NEURO: A+O x 3, no focal deficits.

## 2024-11-30 NOTE — ED PROVIDER NOTE - ATTENDING APP SHARED VISIT CONTRIBUTION OF CARE
DERICK Frazier MD, ED Attending physician:  This was a shared visit with MARISA.  I reviewed and verified the documentation and independently performed the documented history/exam/mdm.

## 2024-11-30 NOTE — ED ADULT NURSE NOTE - CHIEF COMPLAINT QUOTE
Pt comes to the ED complaining of pain. Pt states that she was just discharged from [facility] yesterday. Pt states that she has pericarditis, CHF, AFib and Pneumonia. Patient was in [place] twice for same symptoms and was told to go back today by the visiting nurse, but she does not want to go to [place].

## 2024-11-30 NOTE — H&P ADULT - NSHPLABSRESULTS_GEN_ALL_CORE
LABS:                        8.8    18.39 )-----------( 760      ( 2024 16:37 )             28.7         136  |  104  |  13  ----------------------------<  93  3.9   |  25  |  0.76    Ca    8.7      2024 16:37    TPro  6.9  /  Alb  2.7[L]  /  TBili  0.5  /  DBili  x   /  AST  15  /  ALT  28  /  AlkPhos  154[H]      PT/INR - ( 2024 16:37 )   PT: 23.6 sec;   INR: 2.01 ratio      PTT - ( 2024 16:37 )  PTT:37.3 sec    Urinalysis Basic - ( 2024 18:49 )    Color: Yellow / Appearance: Clear / S.015 / pH: x  Gluc: x / Ketone: Negative mg/dL  / Bili: Negative / Urobili: 0.2 mg/dL   Blood: x / Protein: 30 mg/dL / Nitrite: Negative   Leuk Esterase: Negative / RBC: 2 /HPF / WBC 1 /HPF   Sq Epi: x / Non Sq Epi: 1 /HPF / Bacteria: Negative /HPF    RADIOLOGY:    - CT Chest No Cont (24 @ 20:34):  Moderately large simple pericardial effusion. Small to moderate right and   small left pleural effusions with associatedcompressive atelectasis.   Superimposed pneumonia should be excluded on a clinical basis.    - Xray Chest 1 View- PORTABLE-Urgent (24 @ 17:45):  Small bilateral pleural effusions with adjacent atelectasis. Retrocardiac   opacity. Cannot exclude underlying pneumonia.  Heart is not well assessed in this projection, however appears enlarged   when compared to prior exam.    CARDIOLOGY:    - TTE (24 @ [facility]): LVEF 65-70%. Normal RV. Medium to large inferoposterolaterally located pericardial effusion. No evidence of tamponade physiology unchanged from prior study dated 24.     EKG: NSR, VR 79 bpm, low voltage in precordial leads, poor R wave progression, TWI in lateral and anterolateral leads.

## 2024-11-30 NOTE — H&P ADULT - HISTORY OF PRESENT ILLNESS
64-year-old F with PMH of HTN, HLD, hypothyroid, primary lymphedema, central thrombocytosis, GERD post bariatric surgery, ischemic colitis s/p left hemicolectomy, pAF and DVT/PE on Eliquis, HFpEF, and recently 2 hospitalizations at [facility] in the past 2 months for CAP, and also diagnosed with pericarditis, and HFpEF. Was last discharged home on 11/29 on PO ATBs (Doxycycline). Now BIB pvt car on 11/30 after visiting RN recommended  further assessment of back & chest pain, SOB, and productive cough. Pt reports symptoms similar to why/when she was readmitted to [facility] w/ Dx of pericarditis/CHF/PNA. No F/C, abd pain, N/V.  Recent LE edema, + improved today. Pt upset w/ care rendered at [facility] despite her doctors all there, so came to  ED foe eval/management.

## 2024-11-30 NOTE — ED ADULT NURSE REASSESSMENT NOTE - NS ED NURSE REASSESS COMMENT FT1
Pt was provided with emesis bag at this time. Pt states "I was sitting too upright, it was pressing down on my stomach." Pt reports not needing any medication for nausea at this time. Staff RN lowered head of bed for comfort as requested. Pt's call bell within reach.

## 2024-12-01 DIAGNOSIS — D64.9 ANEMIA, UNSPECIFIED: ICD-10-CM

## 2024-12-01 DIAGNOSIS — I10 ESSENTIAL (PRIMARY) HYPERTENSION: ICD-10-CM

## 2024-12-01 DIAGNOSIS — D47.3 ESSENTIAL (HEMORRHAGIC) THROMBOCYTHEMIA: ICD-10-CM

## 2024-12-01 DIAGNOSIS — I48.0 PAROXYSMAL ATRIAL FIBRILLATION: ICD-10-CM

## 2024-12-01 DIAGNOSIS — I50.32 CHRONIC DIASTOLIC (CONGESTIVE) HEART FAILURE: ICD-10-CM

## 2024-12-01 DIAGNOSIS — J18.9 PNEUMONIA, UNSPECIFIED ORGANISM: ICD-10-CM

## 2024-12-01 DIAGNOSIS — F39 UNSPECIFIED MOOD [AFFECTIVE] DISORDER: ICD-10-CM

## 2024-12-01 DIAGNOSIS — J96.01 ACUTE RESPIRATORY FAILURE WITH HYPOXIA: ICD-10-CM

## 2024-12-01 DIAGNOSIS — I50.30 UNSPECIFIED DIASTOLIC (CONGESTIVE) HEART FAILURE: ICD-10-CM

## 2024-12-01 DIAGNOSIS — I31.9 DISEASE OF PERICARDIUM, UNSPECIFIED: ICD-10-CM

## 2024-12-01 DIAGNOSIS — E03.9 HYPOTHYROIDISM, UNSPECIFIED: ICD-10-CM

## 2024-12-01 DIAGNOSIS — E78.5 HYPERLIPIDEMIA, UNSPECIFIED: ICD-10-CM

## 2024-12-01 DIAGNOSIS — J98.11 ATELECTASIS: ICD-10-CM

## 2024-12-01 DIAGNOSIS — I31.39 OTHER PERICARDIAL EFFUSION (NONINFLAMMATORY): ICD-10-CM

## 2024-12-01 DIAGNOSIS — Z86.718 PERSONAL HISTORY OF OTHER VENOUS THROMBOSIS AND EMBOLISM: ICD-10-CM

## 2024-12-01 DIAGNOSIS — J90 PLEURAL EFFUSION, NOT ELSEWHERE CLASSIFIED: ICD-10-CM

## 2024-12-01 LAB
ADD ON TEST-SPECIMEN IN LAB: SIGNIFICANT CHANGE UP
ALBUMIN SERPL ELPH-MCNC: 2.5 G/DL — LOW (ref 3.3–5)
ALP SERPL-CCNC: 135 U/L — HIGH (ref 40–120)
ALT FLD-CCNC: 24 U/L — SIGNIFICANT CHANGE UP (ref 12–78)
ANION GAP SERPL CALC-SCNC: 4 MMOL/L — LOW (ref 5–17)
AST SERPL-CCNC: 14 U/L — LOW (ref 15–37)
BILIRUB SERPL-MCNC: 0.5 MG/DL — SIGNIFICANT CHANGE UP (ref 0.2–1.2)
BUN SERPL-MCNC: 12 MG/DL — SIGNIFICANT CHANGE UP (ref 7–23)
CALCIUM SERPL-MCNC: 9.1 MG/DL — SIGNIFICANT CHANGE UP (ref 8.5–10.1)
CHLORIDE SERPL-SCNC: 105 MMOL/L — SIGNIFICANT CHANGE UP (ref 96–108)
CO2 SERPL-SCNC: 26 MMOL/L — SIGNIFICANT CHANGE UP (ref 22–31)
CREAT SERPL-MCNC: 0.71 MG/DL — SIGNIFICANT CHANGE UP (ref 0.5–1.3)
EGFR: 96 ML/MIN/1.73M2 — SIGNIFICANT CHANGE UP
FLUAV AG NPH QL: SIGNIFICANT CHANGE UP
FLUBV AG NPH QL: SIGNIFICANT CHANGE UP
GLUCOSE SERPL-MCNC: 104 MG/DL — HIGH (ref 70–99)
HCT VFR BLD CALC: 28.2 % — LOW (ref 34.5–45)
HGB BLD-MCNC: 8.5 G/DL — LOW (ref 11.5–15.5)
MAGNESIUM SERPL-MCNC: 1.9 MG/DL — SIGNIFICANT CHANGE UP (ref 1.6–2.6)
MCHC RBC-ENTMCNC: 25.2 PG — LOW (ref 27–34)
MCHC RBC-ENTMCNC: 30.1 G/DL — LOW (ref 32–36)
MCV RBC AUTO: 83.7 FL — SIGNIFICANT CHANGE UP (ref 80–100)
MRSA PCR RESULT.: SIGNIFICANT CHANGE UP
NRBC # BLD: 1 /100 WBCS — HIGH (ref 0–0)
PHOSPHATE SERPL-MCNC: 5 MG/DL — HIGH (ref 2.5–4.5)
PLATELET # BLD AUTO: 729 K/UL — HIGH (ref 150–400)
POTASSIUM SERPL-MCNC: 4.3 MMOL/L — SIGNIFICANT CHANGE UP (ref 3.5–5.3)
POTASSIUM SERPL-SCNC: 4.3 MMOL/L — SIGNIFICANT CHANGE UP (ref 3.5–5.3)
PROCALCITONIN SERPL-MCNC: 0.1 NG/ML — SIGNIFICANT CHANGE UP (ref 0.02–0.1)
PROT SERPL-MCNC: 6.3 GM/DL — SIGNIFICANT CHANGE UP (ref 6–8.3)
RBC # BLD: 3.37 M/UL — LOW (ref 3.8–5.2)
RBC # FLD: 18.8 % — HIGH (ref 10.3–14.5)
RSV RNA NPH QL NAA+NON-PROBE: SIGNIFICANT CHANGE UP
S AUREUS DNA NOSE QL NAA+PROBE: SIGNIFICANT CHANGE UP
SARS-COV-2 RNA SPEC QL NAA+PROBE: SIGNIFICANT CHANGE UP
SODIUM SERPL-SCNC: 135 MMOL/L — SIGNIFICANT CHANGE UP (ref 135–145)
WBC # BLD: 13.66 K/UL — HIGH (ref 3.8–10.5)
WBC # FLD AUTO: 13.66 K/UL — HIGH (ref 3.8–10.5)

## 2024-12-01 PROCEDURE — 99255 IP/OBS CONSLTJ NEW/EST HI 80: CPT

## 2024-12-01 PROCEDURE — 99291 CRITICAL CARE FIRST HOUR: CPT

## 2024-12-01 PROCEDURE — 93306 TTE W/DOPPLER COMPLETE: CPT | Mod: 26

## 2024-12-01 PROCEDURE — 99254 IP/OBS CNSLTJ NEW/EST MOD 60: CPT

## 2024-12-01 PROCEDURE — 99223 1ST HOSP IP/OBS HIGH 75: CPT

## 2024-12-01 PROCEDURE — 76376 3D RENDER W/INTRP POSTPROCES: CPT | Mod: 26

## 2024-12-01 RX ORDER — CEFEPIME 2 G/1
2000 INJECTION, POWDER, FOR SOLUTION INTRAVENOUS EVERY 8 HOURS
Refills: 0 | Status: COMPLETED | OUTPATIENT
Start: 2024-12-01 | End: 2024-12-05

## 2024-12-01 RX ORDER — IBUPROFEN 200 MG
400 TABLET ORAL EVERY 6 HOURS
Refills: 0 | Status: DISCONTINUED | OUTPATIENT
Start: 2024-12-01 | End: 2024-12-03

## 2024-12-01 RX ORDER — POLYETHYLENE GLYCOL 3350 17 G/17G
17 POWDER, FOR SOLUTION ORAL DAILY
Refills: 0 | Status: DISCONTINUED | OUTPATIENT
Start: 2024-12-01 | End: 2024-12-12

## 2024-12-01 RX ORDER — ACETAMINOPHEN, DIPHENHYDRAMINE HCL, PHENYLEPHRINE HCL 325; 25; 5 MG/1; MG/1; MG/1
3 TABLET ORAL AT BEDTIME
Refills: 0 | Status: DISCONTINUED | OUTPATIENT
Start: 2024-12-01 | End: 2024-12-12

## 2024-12-01 RX ORDER — ACETAMINOPHEN 500MG 500 MG/1
1000 TABLET, COATED ORAL ONCE
Refills: 0 | Status: COMPLETED | OUTPATIENT
Start: 2024-12-01 | End: 2024-12-01

## 2024-12-01 RX ORDER — HYDROMORPHONE HYDROCHLORIDE 2 MG/1
0.2 TABLET ORAL EVERY 4 HOURS
Refills: 0 | Status: DISCONTINUED | OUTPATIENT
Start: 2024-12-01 | End: 2024-12-03

## 2024-12-01 RX ORDER — HYDROMORPHONE HYDROCHLORIDE 2 MG/1
0.5 TABLET ORAL EVERY 4 HOURS
Refills: 0 | Status: DISCONTINUED | OUTPATIENT
Start: 2024-12-01 | End: 2024-12-07

## 2024-12-01 RX ORDER — ONDANSETRON HYDROCHLORIDE 4 MG/1
4 TABLET, FILM COATED ORAL EVERY 8 HOURS
Refills: 0 | Status: DISCONTINUED | OUTPATIENT
Start: 2024-12-01 | End: 2024-12-12

## 2024-12-01 RX ORDER — VANCOMYCIN HCL 900 MCG/MG
1250 POWDER (GRAM) MISCELLANEOUS ONCE
Refills: 0 | Status: COMPLETED | OUTPATIENT
Start: 2024-12-01 | End: 2024-12-01

## 2024-12-01 RX ORDER — CEFEPIME 2 G/1
2000 INJECTION, POWDER, FOR SOLUTION INTRAVENOUS EVERY 8 HOURS
Refills: 0 | Status: DISCONTINUED | OUTPATIENT
Start: 2024-12-01 | End: 2024-12-01

## 2024-12-01 RX ORDER — GUAIFENESIN/DEXTROMETHORPHAN 100-10MG/5
10 SYRUP ORAL EVERY 4 HOURS
Refills: 0 | Status: DISCONTINUED | OUTPATIENT
Start: 2024-12-01 | End: 2024-12-12

## 2024-12-01 RX ORDER — SENNOSIDES 8.6 MG
2 TABLET ORAL AT BEDTIME
Refills: 0 | Status: DISCONTINUED | OUTPATIENT
Start: 2024-12-01 | End: 2024-12-12

## 2024-12-01 RX ORDER — ACETAMINOPHEN 500MG 500 MG/1
1000 TABLET, COATED ORAL EVERY 8 HOURS
Refills: 0 | Status: DISCONTINUED | OUTPATIENT
Start: 2024-12-01 | End: 2024-12-07

## 2024-12-01 RX ORDER — NALOXONE HCL 0.4 MG/ML
0.4 AMPUL (ML) INJECTION ONCE
Refills: 0 | Status: DISCONTINUED | OUTPATIENT
Start: 2024-12-01 | End: 2024-12-12

## 2024-12-01 RX ORDER — MAGNESIUM, ALUMINUM HYDROXIDE 200-225/5
30 SUSPENSION, ORAL (FINAL DOSE FORM) ORAL EVERY 4 HOURS
Refills: 0 | Status: DISCONTINUED | OUTPATIENT
Start: 2024-12-01 | End: 2024-12-12

## 2024-12-01 RX ORDER — SODIUM CHLORIDE 9 MG/ML
1000 INJECTION, SOLUTION INTRAMUSCULAR; INTRAVENOUS; SUBCUTANEOUS
Refills: 0 | Status: DISCONTINUED | OUTPATIENT
Start: 2024-12-01 | End: 2024-12-03

## 2024-12-01 RX ORDER — VANCOMYCIN HCL 900 MCG/MG
1250 POWDER (GRAM) MISCELLANEOUS EVERY 12 HOURS
Refills: 0 | Status: DISCONTINUED | OUTPATIENT
Start: 2024-12-01 | End: 2024-12-02

## 2024-12-01 RX ORDER — VANCOMYCIN HCL 900 MCG/MG
POWDER (GRAM) MISCELLANEOUS
Refills: 0 | Status: DISCONTINUED | OUTPATIENT
Start: 2024-12-01 | End: 2024-12-02

## 2024-12-01 RX ADMIN — AMIODARONE HYDROCHLORIDE 400 MILLIGRAM(S): 200 TABLET ORAL at 21:17

## 2024-12-01 RX ADMIN — ACETAMINOPHEN 500MG 400 MILLIGRAM(S): 500 TABLET, COATED ORAL at 09:40

## 2024-12-01 RX ADMIN — Medication 88 MICROGRAM(S): at 06:37

## 2024-12-01 RX ADMIN — Medication 400 MILLIGRAM(S): at 13:21

## 2024-12-01 RX ADMIN — CEFEPIME 2000 MILLIGRAM(S): 2 INJECTION, POWDER, FOR SOLUTION INTRAVENOUS at 21:17

## 2024-12-01 RX ADMIN — CEFEPIME 2000 MILLIGRAM(S): 2 INJECTION, POWDER, FOR SOLUTION INTRAVENOUS at 06:37

## 2024-12-01 RX ADMIN — Medication 400 MILLIGRAM(S): at 19:26

## 2024-12-01 RX ADMIN — ACETAMINOPHEN 500MG 1000 MILLIGRAM(S): 500 TABLET, COATED ORAL at 10:05

## 2024-12-01 RX ADMIN — Medication 400 MILLIGRAM(S): at 18:26

## 2024-12-01 RX ADMIN — METOPROLOL TARTRATE 25 MILLIGRAM(S): 100 TABLET, FILM COATED ORAL at 09:38

## 2024-12-01 RX ADMIN — PANTOPRAZOLE SODIUM 40 MILLIGRAM(S): 40 TABLET, DELAYED RELEASE ORAL at 06:37

## 2024-12-01 RX ADMIN — Medication 1 TABLET(S): at 09:37

## 2024-12-01 RX ADMIN — Medication 166.67 MILLIGRAM(S): at 01:14

## 2024-12-01 RX ADMIN — ROSUVASTATIN CALCIUM 20 MILLIGRAM(S): 5 TABLET, FILM COATED ORAL at 21:18

## 2024-12-01 RX ADMIN — LAMOTRIGINE 200 MILLIGRAM(S): 50 TABLET, EXTENDED RELEASE ORAL at 09:38

## 2024-12-01 RX ADMIN — Medication 166.67 MILLIGRAM(S): at 18:26

## 2024-12-01 RX ADMIN — Medication 2 TABLET(S): at 21:18

## 2024-12-01 RX ADMIN — ACETAMINOPHEN 500MG 1000 MILLIGRAM(S): 500 TABLET, COATED ORAL at 17:30

## 2024-12-01 RX ADMIN — Medication 400 MILLIGRAM(S): at 12:21

## 2024-12-01 RX ADMIN — ONDANSETRON HYDROCHLORIDE 4 MILLIGRAM(S): 4 TABLET, FILM COATED ORAL at 15:05

## 2024-12-01 RX ADMIN — ACETAMINOPHEN 500MG 400 MILLIGRAM(S): 500 TABLET, COATED ORAL at 17:14

## 2024-12-01 RX ADMIN — HYDROMORPHONE HYDROCHLORIDE 0.2 MILLIGRAM(S): 2 TABLET ORAL at 00:41

## 2024-12-01 RX ADMIN — AMIODARONE HYDROCHLORIDE 400 MILLIGRAM(S): 200 TABLET ORAL at 09:38

## 2024-12-01 RX ADMIN — ONDANSETRON HYDROCHLORIDE 4 MILLIGRAM(S): 4 TABLET, FILM COATED ORAL at 02:11

## 2024-12-01 RX ADMIN — SODIUM CHLORIDE 75 MILLILITER(S): 9 INJECTION, SOLUTION INTRAMUSCULAR; INTRAVENOUS; SUBCUTANEOUS at 12:20

## 2024-12-01 RX ADMIN — Medication 400 MILLIGRAM(S): at 06:37

## 2024-12-01 RX ADMIN — ACETAMINOPHEN 500MG 400 MILLIGRAM(S): 500 TABLET, COATED ORAL at 23:17

## 2024-12-01 RX ADMIN — ONDANSETRON HYDROCHLORIDE 4 MILLIGRAM(S): 4 TABLET, FILM COATED ORAL at 23:22

## 2024-12-01 RX ADMIN — Medication 150 MILLIGRAM(S): at 09:37

## 2024-12-01 RX ADMIN — CEFEPIME 2000 MILLIGRAM(S): 2 INJECTION, POWDER, FOR SOLUTION INTRAVENOUS at 14:10

## 2024-12-01 RX ADMIN — Medication 0.3 MILLIGRAM(S): at 09:37

## 2024-12-01 RX ADMIN — Medication 1 TABLET(S): at 21:17

## 2024-12-01 RX ADMIN — Medication 1 SPRAY(S): at 21:17

## 2024-12-01 RX ADMIN — Medication 400 MILLIGRAM(S): at 23:16
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
## 2024-12-01 NOTE — CONSULT NOTE ADULT - PROBLEM SELECTOR RECOMMENDATION 4
Pro-Brain Natriuretic Peptide: 3214 and effusions suggestive of HFpEF in setting of multiple comorbidities; suggest cautious diuresis.
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
## 2024-12-01 NOTE — CONSULT NOTE ADULT - PROBLEM SELECTOR RECOMMENDATION 2
CT report indicated moderately large effusion; I reviewed images -- recommend TTE.  Hemodynamically stable and with last /54; clinically no tampoande.

## 2024-12-01 NOTE — CONSULT NOTE ADULT - PROBLEM SELECTOR RECOMMENDATION 9
Acute pericarditis diagnosed in November and treated with ibuprofen and colchicine -- continue same; check CRP.

## 2024-12-01 NOTE — CONSULT NOTE ADULT - ASSESSMENT
63 y/o female with PMHx of HTN, Hypothyroidism, Essential thrombocythemia, vWD,  GERD, ischemic colitis s/p L hemicolectomy, GERD s/p bariatric surgery, DVT/PE > 15 yrs ago   , PAF on Amiodarone and Eliquis, recent hospitalization at [facility] for pneumonia and pericarditis admitted with persistent cough and pleuritic chest pain. CT showed bilat pleural effusions and complete atelectasis LLL, large pericardial effusion.   Evaluated by ID cardiology and pulmonary On iv antibiotics. Might need thoracentesis/ pericardiocentesis.    Hematology:  She was diagnosed with Essential  thrombocythemia  almost 20 years ago. Hematologist Dr Mann Urbina at [facility]. Treated  in the past with anagrelide ( did not tolerate due to A fib) , on Hydrea for several years but no longer working, in 2023 started on pegylated IFN alpha ( Besremi). Did not tolerate Besremi ( profound fatigue, not able to function ) Stopped Besremi two months ago.   She was supposed to have bone marrow biopsy/ consideration to start Jakafi- but on hold due to recent hospitalizations.   She also was diagnosed with von Willebrand disease around the same time when diagnosed with ET ( I presume acquired vWD 2/2 ET) She  received DDAVP prior to invasive procedures- no bleeding complications.    reviewed prior counts at [facility]  In MArch 2024  Ht 45  plts 779    In sept 2023  plts 700 K- 1000 K     Her platelets are elevated but consistent with her recent baseline in  last year.  Could be also reactive component due to infection/ inflammation.  Anemia- new compared to March 2024- component of ACD in setting of infection/ inflammatory condition ( pericarditis) , versus 2/2 MPD with  developing myelofibrosis ( bone marrow biopsy was planned by her hematologist).  R/o occult blood loss- check stool OB.    No indication to start cytoreductive therapy to lower platelets at present time. Infection has to be treated first.    Will check iron studies , B12 and folate.     Hx of vWD ( probably acquired). Would repeat vWD panel- but it will be falsely abnormal on Eliquis. Now Eliquis on hold for possible procedure ( thoracentesis/ pericardiocentesis).    For now- based on her history- she will need DDAVP before invasive procedures.

## 2024-12-01 NOTE — CONSULT NOTE ADULT - ASSESSMENT
Assessment:  64F with HTN, HLD, hypothyroid, primary lymphedema, central thrombocytosis, GERD post bariatric surgery, ischemic colitis s/p left hemicolectomy, pAF and DVT/PE on Eliquis, HFpEF presents 11/30 with worsening dyspnea and chest pain, also has intermittent cough  Recently discharged home from [facility] for pericarditis and heart failure exacerbation also with presumed superimposed pneumonia  Despite antibiotic including Doxy at home, symptoms of dyspnea and chest discomfort still continued   Afebrile on admission, on 4L NC  WBC 18 > 13  Cr 0.76  UA negative  Chest CT with Moderately large simple pericardial effusion. Small to moderate right and small left pleural effusions with associatedcompressive atelectasis. Superimposed pneumonia should be excluded on a clinical basis.    Antimicrobials:  cefepime  Injectable. 2000 every 8 hours (12/1 --- )  vancomycin  IVPB 1250 every 12 hours (12/1 --- )    Impression:   #Acute Hypoxic Perspiratory Failure  #Moderate Pericardial Effusion, Hx Recent Pericarditis  #Bilateral Pleural Effusion, Possible superimposed Pneumonia  #Leukocytosis  #CHF  - leukocytosis likely due to multifactorial process ?reactive ?CHF ?Pericardial Effusion ?superimposed pneumonia    Recommendations:  - continue empiric Vancomycin 1250 q12  - please check Vancomycin trough before 4th sequential dose  - continue empiric Cefepime 2G q8  - monitor temperature curve  - trend WBC  - side effects of antibiotic discussed, tolerating abx well so far  - check Sputum Cx  - check Legionella/Strep Uag  - check MRSA swab  - check RVP swab  - folow up BCx x2, Mycoplasma IgM  - Cardio following; pending TTE, could this be cardiac related? CHF bilateral effusion and pericardial effusion?  - rest per primary team    Clinical team may change from intravenous to oral antibiotics when the following criteria are met:   1. Patient is clinically improving/stable       a)	Improved signs and symptoms of infection from initial presentation       b)	Afebrile for 24 hours       c)	Leukocytosis trending towards normal range   2. Patient is tolerating oral intake   3. Initial/repeat blood cultures are negative OR do not need to wait for preliminary blood cultures to result    Cannot advise changing to oral antibiotic therapy until culture sensitivity is available.   Assessment:  64F with HTN, HLD, hypothyroid, primary lymphedema, central thrombocytosis, GERD post bariatric surgery, ischemic colitis s/p left hemicolectomy, pAF and DVT/PE on Eliquis, HFpEF presents 11/30 with worsening dyspnea and chest pain, also has intermittent cough  Recently discharged home from [facility] for pericarditis and heart failure exacerbation also with presumed superimposed pneumonia  Despite antibiotic including Doxy at home, symptoms of dyspnea and chest discomfort still continued   Afebrile on admission, on 4L NC  WBC 18 > 13  Cr 0.76  UA negative  Chest CT with Moderately large simple pericardial effusion. Small to moderate right and small left pleural effusions with associatedcompressive atelectasis. Superimposed pneumonia should be excluded on a clinical basis.    Antimicrobials:  cefepime  Injectable. 2000 every 8 hours (12/1 --- )  vancomycin  IVPB 1250 every 12 hours (12/1 --- )    Impression:   #Acute Hypoxic Perspiratory Failure  #Moderate Pericardial Effusion, Hx Recent Pericarditis  #Bilateral Pleural Effusion, Possible superimposed Pneumonia  #Leukocytosis  #CHF  - leukocytosis likely due to multifactorial process ?reactive ?CHF ?Pericardial Effusion ?superimposed pneumonia    Recommendations:  - continue empiric Vancomycin 1250 q12  - please check Vancomycin trough before 4th sequential dose  - continue empiric Cefepime 2G q8  - monitor temperature curve  - trend WBC  - side effects of antibiotic discussed, tolerating abx well so far  - check Sputum Cx  - check Legionella/Strep Uag  - check MRSA swab  - check RVP swab  - add-on Procal  - folow up BCx x2, Mycoplasma IgM  - Cardio following; pending TTE, could this be cardiac related? CHF bilateral effusion and pericardial effusion?  - rest per primary team    Clinical team may change from intravenous to oral antibiotics when the following criteria are met:   1. Patient is clinically improving/stable       a)	Improved signs and symptoms of infection from initial presentation       b)	Afebrile for 24 hours       c)	Leukocytosis trending towards normal range   2. Patient is tolerating oral intake   3. Initial/repeat blood cultures are negative OR do not need to wait for preliminary blood cultures to result    Cannot advise changing to oral antibiotic therapy until culture sensitivity is available.

## 2024-12-01 NOTE — CONSULT NOTE ADULT - SUBJECTIVE AND OBJECTIVE BOX
CHIEF COMPLAINT: Patient is a 64y old  Female who presents with a chief complaint of chest pain.    HPI:  * I know Mrs. Corona from the outpatient setting but haven't seen her since 12/2023.   Anne-Marie Corona is a 64-year-old woman with a history of right ventricular outflow tract ventricular tachycardia s/p remote ablation (2001; "partially successful" with subsequent PVCs treated with verapamil), hypertension, hypothyroidism, ischemic colitis, von Willebrand's disease, essential thrombocytosis, obesity s/p gastric bypass, hypertension, DVT/PE, and hospitalization in March 2023 for respiratory failure, who has been ill since late October.  She was admitted to [facility] twice with respiratory failure, bacterial pneumonia, acute kidney injury, pericarditis, and pericardial effusion.  She was discharged on 11/29 but felt poor on 11/30 and came to the [facility] ED.  Her primary complaint was chest pain and shortness of breath.  No clear exacerbating or alleviating factors.  Chest pain waxes/wanes; she does not describe typical angina.  A CT coronary was performed at [facility] in November -- results not available for me to review but patient reports "normal."  Her pericarditis was treated with colchicine and ibuprofen; PAF treated with amiodarone and Eliquis.    PAST MEDICAL & SURGICAL HISTORY:  As above in HPI    SOCIAL HISTORY:   Alcohol: Denied  Smoking: Nonsmoker  Retired teacher.    FAMILY HISTORY: N/C to presenting problem    MEDICATIONS  (STANDING):  aMIOdarone    Tablet 400 milliGRAM(s) Oral two times a day  amLODIPine   Tablet 5 milliGRAM(s) Oral daily  apixaban 5 milliGRAM(s) Oral every 12 hours  buPROPion XL (24-Hour) . 150 milliGRAM(s) Oral daily  cefepime  Injectable. 2000 milliGRAM(s) IV Push every 8 hours  colchicine 0.3 milliGRAM(s) Oral daily  enalapril 20 milliGRAM(s) Oral daily  fluticasone propionate 50 MICROgram(s)/spray Nasal Spray 1 Spray(s) Both Nostrils two times a day  hydrochlorothiazide 12.5 milliGRAM(s) Oral two times a day  ibuprofen  Tablet. 400 milliGRAM(s) Oral every 6 hours  lactobacillus acidophilus 1 Tablet(s) Oral every 12 hours  lamoTRIgine 200 milliGRAM(s) Oral daily  levothyroxine 88 MICROGram(s) Oral daily  metoprolol succinate ER 25 milliGRAM(s) Oral daily  multivitamin 1 Tablet(s) Oral daily  naloxone Injectable 0.4 milliGRAM(s) IV Push once  pantoprazole    Tablet 40 milliGRAM(s) Oral before breakfast  polyethylene glycol 3350 17 Gram(s) Oral daily  rosuvastatin 20 milliGRAM(s) Oral at bedtime  senna 2 Tablet(s) Oral at bedtime  vancomycin  IVPB      vancomycin  IVPB 1250 milliGRAM(s) IV Intermittent every 12 hours    MEDICATIONS  (PRN):  acetaminophen     Tablet .. 650 milliGRAM(s) Oral once PRN Temp greater or equal to 38C (100.4F), Mild Pain (1 - 3)  acetaminophen     Tablet .. 1000 milliGRAM(s) Oral every 8 hours PRN Mild Pain (1 - 3)  aluminum hydroxide/magnesium hydroxide/simethicone Suspension 30 milliLiter(s) Oral every 4 hours PRN Dyspepsia  bisacodyl 5 milliGRAM(s) Oral daily PRN Constipation  cyclobenzaprine 10 milliGRAM(s) Oral three times a day PRN Muscle Spasm  guaifenesin/dextromethorphan Oral Liquid 10 milliLiter(s) Oral every 4 hours PRN Cough  hydrocortisone hemorrhoidal Suppository 1 Suppository(s) Rectal two times a day PRN pain  HYDROmorphone  Injectable 0.2 milliGRAM(s) IV Push every 4 hours PRN Moderate Pain (4 - 6)  HYDROmorphone  Injectable 0.5 milliGRAM(s) IV Push every 4 hours PRN Severe Pain (7 - 10)  loperamide 2 milliGRAM(s) Oral every 2 hours PRN Diarrhea  meclizine 25 milliGRAM(s) Oral two times a day PRN Dizziness  melatonin 3 milliGRAM(s) Oral at bedtime PRN Insomnia  morphine  - Injectable 2 milliGRAM(s) IV Push once PRN Moderate Pain (4 - 6)  ondansetron Injectable 4 milliGRAM(s) IV Push every 8 hours PRN Nausea and/or Vomiting    Allergies:   sulfa drugs (Vomiting; Nausea)  IV Contrast (Hives)    REVIEW OF SYSTEMS:  CONSTITUTIONAL: No fevers or chills  Eyes: No visual changes  NECK: No pain or stiffness  RESPIRATORY: + shortness of breath  CARDIOVASCULAR: + chest pain   GASTROINTESTINAL: No abdominal pain.   GENITOURINARY: No dysuria  NEUROLOGICAL: No numbness.  SKIN: No itching or rash  All other review of systems is negative unless indicated above    VITAL SIGNS:   Vital Signs Last 24 Hrs  T(C): 36.6 (01 Dec 2024 02:02), Max: 37.6 (30 Nov 2024 18:50)  T(F): 97.9 (01 Dec 2024 02:02), Max: 99.7 (30 Nov 2024 18:50)  HR: 64 (01 Dec 2024 02:02) (64 - 82)  BP: 131/54 (01 Dec 2024 02:02) (122/41 - 163/63)  BP(mean): 70 (30 Nov 2024 23:00) (66 - 90)  RR: 18 (01 Dec 2024 02:02) (18 - 30)  SpO2: 95% (01 Dec 2024 02:02) (87% - 97%)  Patient On (Oxygen Delivery Method): nasal cannula  O2 Flow (L/min): 4    PHYSICAL EXAM:  Constitutional: NAD, awake and alert, appears comfortable  HEENT:  No oral cyanosis.  Pulmonary: Non-labored, breath sounds are clear but with decreased breath sounds mid to lower lung fields  Cardiovascular: S1 and S2, regular rate and rhythm  Gastrointestinal: Bowel Sounds present, soft, nontender.   Lymph: No edema.  Neurological: Alert, no focal deficits  Skin: No rashes.  Psych:  Mood & affect appropriate    LABS:             8.8    18.39 )-----------( 760      ( 30 Nov 2024 16:37 )             28.7     136    |  104    |  13     ----------------------------<  93     3.9     |  25     |  0.76     Ca    8.7        30 Nov 2024 16:37    TPro  6.9    /  Alb  2.7    /  TBili  0.5    /  DBili  x      /  AST  15     /  ALT  28     /  AlkPhos  154    30 Nov 2024 16:37    PT/INR - ( 30 Nov 2024 16:37 )   PT: 23.6 sec;   INR: 2.01 ratio    PTT - ( 30 Nov 2024 16:37 )  PTT:37.3 sec    CT Chest No Cont (11.30.24 @ 20:34): Moderately large simple pericardial effusion. Small to moderate right and small left pleural effusions with associatedcompressive atelectasis. Superimposed pneumonia should be excluded on a clinical basis.    ECG: Sinus rhythm, low QRS voltage, nonspecific ST/T abnormality

## 2024-12-01 NOTE — CONSULT NOTE ADULT - ASSESSMENT
- cont O2  - CT scan reviewed. Bilateral pleural effussion L>R. Complete atelectasis LLL, however medial LLL parenchyma extends to pleura. Left effusion is loculated. Also lingular atelctasis.  - large pericardial effussion  - on eliquis for afib; ? hemmorhagic effussion  - for transfer to CCU for pericardiocentesis in am  - thoracic evaluation for left thoracentesis. Need fluid studies to r/o complicated parapneumonic effussion. Doubt empyema  - mar need pleuroscopy  - has been on abx since admission to [place] over 3 weeks ago. Cultures likely to be negative.  - eliquis stopped. Would put on iv heparin if needs to restart anticoagulation.

## 2024-12-01 NOTE — PROGRESS NOTE ADULT - ASSESSMENT
A/P: 64 female presenting presenting with a moderate pericardial effusion  Recent Pericarditis  AFIB  HFpEF not acute  HTN  Gastric Ulder(remote)  NSVT  Essential Thrombocytosis  Von Willebrand's  DVT/PE  Hypothyroidism    Plan:  CCU    PULM: B/L Effusions, L with a loculated appearance, would likely need IR to place a pigtail, NC O2    Cardio: Continue Colchicine and ibuprofen  for pericarditis Interventional cardio fr Drainage in AM, NS at 75 for now.  No signs of acute heart failure, Hold DOAC, Continue Amio and metoprolol    Renal: NS at 75    GI: NPO after MN, PPI    ENDO: Continue Synthroid, TSH in AM    RHEUM: W/U at [facility] negative, ?seronegative disorder    HEM: Any surgical procedures Hem may need to get involved because of Hx of Von Willebrand's      Venodynes for DVT Prophylaxis, Hold Chemical for possible procedure tomorow    D/W Patient, Cardio, Pulm

## 2024-12-02 ENCOUNTER — RESULT REVIEW (OUTPATIENT)
Age: 64
End: 2024-12-02

## 2024-12-02 LAB
ANION GAP SERPL CALC-SCNC: 4 MMOL/L — LOW (ref 5–17)
APTT BLD: 32.7 SEC — SIGNIFICANT CHANGE UP (ref 24.5–35.6)
BUN SERPL-MCNC: 19 MG/DL — SIGNIFICANT CHANGE UP (ref 7–23)
CALCIUM SERPL-MCNC: 8.6 MG/DL — SIGNIFICANT CHANGE UP (ref 8.5–10.1)
CHLORIDE SERPL-SCNC: 106 MMOL/L — SIGNIFICANT CHANGE UP (ref 96–108)
CO2 SERPL-SCNC: 23 MMOL/L — SIGNIFICANT CHANGE UP (ref 22–31)
CREAT SERPL-MCNC: 0.83 MG/DL — SIGNIFICANT CHANGE UP (ref 0.5–1.3)
CRP SERPL-MCNC: 18.6 MG/ML — HIGH (ref 0–5)
EGFR: 80 ML/MIN/1.73M2 — SIGNIFICANT CHANGE UP
FERRITIN SERPL-MCNC: 216 NG/ML — SIGNIFICANT CHANGE UP (ref 13–330)
FOLATE SERPL-MCNC: 15.9 NG/ML — SIGNIFICANT CHANGE UP
GLUCOSE SERPL-MCNC: 97 MG/DL — SIGNIFICANT CHANGE UP (ref 70–99)
GRAM STN FLD: SIGNIFICANT CHANGE UP
HAPTOGLOB SERPL-MCNC: 287 MG/DL — HIGH (ref 34–200)
HCT VFR BLD CALC: 26.4 % — LOW (ref 34.5–45)
HGB BLD-MCNC: 7.9 G/DL — LOW (ref 11.5–15.5)
INR BLD: 1.42 RATIO — HIGH (ref 0.85–1.16)
IRON SATN MFR SERPL: 20 UG/DL — LOW (ref 30–160)
IRON SATN MFR SERPL: 9 % — LOW (ref 14–50)
LDH SERPL L TO P-CCNC: 309 U/L — HIGH (ref 84–241)
LEGIONELLA AG UR QL: NEGATIVE — SIGNIFICANT CHANGE UP
MAGNESIUM SERPL-MCNC: 2.1 MG/DL — SIGNIFICANT CHANGE UP (ref 1.6–2.6)
MCHC RBC-ENTMCNC: 25.2 PG — LOW (ref 27–34)
MCHC RBC-ENTMCNC: 29.9 G/DL — LOW (ref 32–36)
MCV RBC AUTO: 84.1 FL — SIGNIFICANT CHANGE UP (ref 80–100)
PHOSPHATE SERPL-MCNC: 4.8 MG/DL — HIGH (ref 2.5–4.5)
PLATELET # BLD AUTO: 679 K/UL — HIGH (ref 150–400)
POTASSIUM SERPL-MCNC: 4.4 MMOL/L — SIGNIFICANT CHANGE UP (ref 3.5–5.3)
POTASSIUM SERPL-SCNC: 4.4 MMOL/L — SIGNIFICANT CHANGE UP (ref 3.5–5.3)
PROTHROM AB SERPL-ACNC: 16.7 SEC — HIGH (ref 9.9–13.4)
RBC # BLD: 3.14 M/UL — LOW (ref 3.8–5.2)
RBC # BLD: 3.14 M/UL — LOW (ref 3.8–5.2)
RBC # FLD: 18.8 % — HIGH (ref 10.3–14.5)
RETICS #: 124.9 K/UL — SIGNIFICANT CHANGE UP (ref 25–125)
RETICS/RBC NFR: 3.9 % — HIGH (ref 0.5–2.5)
S PNEUM AG UR QL: NEGATIVE — SIGNIFICANT CHANGE UP
SODIUM SERPL-SCNC: 133 MMOL/L — LOW (ref 135–145)
SPECIMEN SOURCE: SIGNIFICANT CHANGE UP
TIBC SERPL-MCNC: 231 UG/DL — SIGNIFICANT CHANGE UP (ref 220–430)
TSH SERPL-MCNC: 1.66 UU/ML — SIGNIFICANT CHANGE UP (ref 0.34–4.82)
UIBC SERPL-MCNC: 211 UG/DL — SIGNIFICANT CHANGE UP (ref 110–370)
VIT B12 SERPL-MCNC: 440 PG/ML — SIGNIFICANT CHANGE UP (ref 232–1245)
WBC # BLD: 12.7 K/UL — HIGH (ref 3.8–10.5)
WBC # FLD AUTO: 12.7 K/UL — HIGH (ref 3.8–10.5)

## 2024-12-02 PROCEDURE — 88108 CYTOPATH CONCENTRATE TECH: CPT | Mod: 26

## 2024-12-02 PROCEDURE — 99233 SBSQ HOSP IP/OBS HIGH 50: CPT

## 2024-12-02 PROCEDURE — 99254 IP/OBS CNSLTJ NEW/EST MOD 60: CPT

## 2024-12-02 PROCEDURE — 71045 X-RAY EXAM CHEST 1 VIEW: CPT | Mod: 26

## 2024-12-02 PROCEDURE — 88305 TISSUE EXAM BY PATHOLOGIST: CPT | Mod: 26

## 2024-12-02 PROCEDURE — 99291 CRITICAL CARE FIRST HOUR: CPT

## 2024-12-02 PROCEDURE — 33019 PERQ PRCRD DRG INSJ CATH CT: CPT

## 2024-12-02 PROCEDURE — 32557 INSERT CATH PLEURA W/ IMAGE: CPT | Mod: 50

## 2024-12-02 RX ORDER — ACETAMINOPHEN 500MG 500 MG/1
1000 TABLET, COATED ORAL ONCE
Refills: 0 | Status: COMPLETED | OUTPATIENT
Start: 2024-12-02 | End: 2024-12-02

## 2024-12-02 RX ORDER — SODIUM CHLORIDE 9 MG/ML
1000 INJECTION, SOLUTION INTRAMUSCULAR; INTRAVENOUS; SUBCUTANEOUS
Refills: 0 | Status: DISCONTINUED | OUTPATIENT
Start: 2024-12-02 | End: 2024-12-03

## 2024-12-02 RX ORDER — LIDOCAINE 40 MG/G
1 CREAM TOPICAL DAILY
Refills: 0 | Status: DISCONTINUED | OUTPATIENT
Start: 2024-12-02 | End: 2024-12-09

## 2024-12-02 RX ORDER — ONDANSETRON HYDROCHLORIDE 4 MG/1
4 TABLET, FILM COATED ORAL ONCE
Refills: 0 | Status: COMPLETED | OUTPATIENT
Start: 2024-12-02 | End: 2024-12-03

## 2024-12-02 RX ORDER — METOCLOPRAMIDE HYDROCHLORIDE 10 MG/1
10 TABLET ORAL ONCE
Refills: 0 | Status: COMPLETED | OUTPATIENT
Start: 2024-12-02 | End: 2024-12-02

## 2024-12-02 RX ORDER — OXYCODONE HYDROCHLORIDE 30 MG/1
5 TABLET ORAL ONCE
Refills: 0 | Status: DISCONTINUED | OUTPATIENT
Start: 2024-12-02 | End: 2024-12-03

## 2024-12-02 RX ORDER — FENTANYL 12 UG/H
25 PATCH, EXTENDED RELEASE TRANSDERMAL
Refills: 0 | Status: DISCONTINUED | OUTPATIENT
Start: 2024-12-02 | End: 2024-12-03

## 2024-12-02 RX ORDER — DESMOPRESSIN ACETATE 4 UG/ML
20 SOLUTION INTRAVENOUS ONCE
Refills: 0 | Status: COMPLETED | OUTPATIENT
Start: 2024-12-02 | End: 2024-12-02

## 2024-12-02 RX ORDER — CHLORHEXIDINE GLUCONATE 1.2 MG/ML
1 RINSE ORAL
Refills: 0 | Status: DISCONTINUED | OUTPATIENT
Start: 2024-12-02 | End: 2024-12-12

## 2024-12-02 RX ORDER — HYDROMORPHONE HYDROCHLORIDE 2 MG/1
1 TABLET ORAL ONCE
Refills: 0 | Status: DISCONTINUED | OUTPATIENT
Start: 2024-12-02 | End: 2024-12-02

## 2024-12-02 RX ORDER — ONDANSETRON HYDROCHLORIDE 4 MG/1
4 TABLET, FILM COATED ORAL ONCE
Refills: 0 | Status: COMPLETED | OUTPATIENT
Start: 2024-12-02 | End: 2024-12-02

## 2024-12-02 RX ORDER — KETOROLAC TROMETHAMINE 30 MG/ML
30 INJECTION INTRAMUSCULAR; INTRAVENOUS ONCE
Refills: 0 | Status: DISCONTINUED | OUTPATIENT
Start: 2024-12-02 | End: 2024-12-02

## 2024-12-02 RX ORDER — DESMOPRESSIN ACETATE 4 UG/ML
20 SOLUTION INTRAVENOUS ONCE
Refills: 0 | Status: DISCONTINUED | OUTPATIENT
Start: 2024-12-02 | End: 2024-12-02

## 2024-12-02 RX ADMIN — Medication 2 TABLET(S): at 21:36

## 2024-12-02 RX ADMIN — CEFEPIME 2000 MILLIGRAM(S): 2 INJECTION, POWDER, FOR SOLUTION INTRAVENOUS at 05:46

## 2024-12-02 RX ADMIN — HYDROMORPHONE HYDROCHLORIDE 1 MILLIGRAM(S): 2 TABLET ORAL at 14:23

## 2024-12-02 RX ADMIN — HYDROMORPHONE HYDROCHLORIDE 0.5 MILLIGRAM(S): 2 TABLET ORAL at 03:15

## 2024-12-02 RX ADMIN — KETOROLAC TROMETHAMINE 30 MILLIGRAM(S): 30 INJECTION INTRAMUSCULAR; INTRAVENOUS at 19:14

## 2024-12-02 RX ADMIN — HYDROMORPHONE HYDROCHLORIDE 0.5 MILLIGRAM(S): 2 TABLET ORAL at 03:45

## 2024-12-02 RX ADMIN — ONDANSETRON HYDROCHLORIDE 4 MILLIGRAM(S): 4 TABLET, FILM COATED ORAL at 07:23

## 2024-12-02 RX ADMIN — FENTANYL 25 MICROGRAM(S): 12 PATCH, EXTENDED RELEASE TRANSDERMAL at 15:45

## 2024-12-02 RX ADMIN — Medication 400 MILLIGRAM(S): at 17:41

## 2024-12-02 RX ADMIN — ONDANSETRON HYDROCHLORIDE 4 MILLIGRAM(S): 4 TABLET, FILM COATED ORAL at 22:17

## 2024-12-02 RX ADMIN — Medication 150 MILLIGRAM(S): at 16:41

## 2024-12-02 RX ADMIN — ACETAMINOPHEN 500MG 400 MILLIGRAM(S): 500 TABLET, COATED ORAL at 08:19

## 2024-12-02 RX ADMIN — METOPROLOL TARTRATE 25 MILLIGRAM(S): 100 TABLET, FILM COATED ORAL at 16:41

## 2024-12-02 RX ADMIN — LIDOCAINE 1 PATCH: 40 CREAM TOPICAL at 16:39

## 2024-12-02 RX ADMIN — METOCLOPRAMIDE HYDROCHLORIDE 10 MILLIGRAM(S): 10 TABLET ORAL at 10:07

## 2024-12-02 RX ADMIN — CEFEPIME 2000 MILLIGRAM(S): 2 INJECTION, POWDER, FOR SOLUTION INTRAVENOUS at 16:27

## 2024-12-02 RX ADMIN — LIDOCAINE 1 PATCH: 40 CREAM TOPICAL at 19:30

## 2024-12-02 RX ADMIN — Medication 1 TABLET(S): at 21:36

## 2024-12-02 RX ADMIN — Medication 0.3 MILLIGRAM(S): at 16:42

## 2024-12-02 RX ADMIN — Medication 1 SPRAY(S): at 21:36

## 2024-12-02 RX ADMIN — ROSUVASTATIN CALCIUM 20 MILLIGRAM(S): 5 TABLET, FILM COATED ORAL at 21:36

## 2024-12-02 RX ADMIN — DESMOPRESSIN ACETATE 220 MICROGRAM(S): 4 SOLUTION INTRAVENOUS at 11:18

## 2024-12-02 RX ADMIN — HYDROMORPHONE HYDROCHLORIDE 1 MILLIGRAM(S): 2 TABLET ORAL at 17:02

## 2024-12-02 RX ADMIN — Medication 1 SPRAY(S): at 10:14

## 2024-12-02 RX ADMIN — HYDROMORPHONE HYDROCHLORIDE 0.5 MILLIGRAM(S): 2 TABLET ORAL at 14:12

## 2024-12-02 RX ADMIN — HYDROMORPHONE HYDROCHLORIDE 0.5 MILLIGRAM(S): 2 TABLET ORAL at 14:22

## 2024-12-02 RX ADMIN — HYDROMORPHONE HYDROCHLORIDE 1 MILLIGRAM(S): 2 TABLET ORAL at 14:33

## 2024-12-02 RX ADMIN — LIDOCAINE 1 PATCH: 40 CREAM TOPICAL at 16:38

## 2024-12-02 RX ADMIN — HYDROMORPHONE HYDROCHLORIDE 1 MILLIGRAM(S): 2 TABLET ORAL at 17:17

## 2024-12-02 RX ADMIN — ACETAMINOPHEN 500MG 1000 MILLIGRAM(S): 500 TABLET, COATED ORAL at 08:34

## 2024-12-02 RX ADMIN — ACETAMINOPHEN 500MG 400 MILLIGRAM(S): 500 TABLET, COATED ORAL at 14:34

## 2024-12-02 RX ADMIN — Medication 166.67 MILLIGRAM(S): at 05:46

## 2024-12-02 RX ADMIN — CHLORHEXIDINE GLUCONATE 1 APPLICATION(S): 1.2 RINSE ORAL at 11:59

## 2024-12-02 RX ADMIN — FENTANYL 25 MICROGRAM(S): 12 PATCH, EXTENDED RELEASE TRANSDERMAL at 16:00

## 2024-12-02 RX ADMIN — ACETAMINOPHEN 500MG 400 MILLIGRAM(S): 500 TABLET, COATED ORAL at 22:17

## 2024-12-02 RX ADMIN — ACETAMINOPHEN 500MG 1000 MILLIGRAM(S): 500 TABLET, COATED ORAL at 22:50

## 2024-12-02 RX ADMIN — KETOROLAC TROMETHAMINE 30 MILLIGRAM(S): 30 INJECTION INTRAMUSCULAR; INTRAVENOUS at 18:59

## 2024-12-02 RX ADMIN — Medication 400 MILLIGRAM(S): at 16:41

## 2024-12-02 RX ADMIN — ACETAMINOPHEN 500MG 1000 MILLIGRAM(S): 500 TABLET, COATED ORAL at 14:50

## 2024-12-02 RX ADMIN — ONDANSETRON HYDROCHLORIDE 4 MILLIGRAM(S): 4 TABLET, FILM COATED ORAL at 04:29

## 2024-12-02 NOTE — PROGRESS NOTE ADULT - SUBJECTIVE AND OBJECTIVE BOX
Interval History:      Patient with headache some nausea, c/o pleuritic pain    HPI:   64-year-old F with PMH of HTN, HLD, hypothyroid,   thrombocytosis, GERDischemic colitis s/p left hemicolectomy, pAF and DVT/PE on Eliquis,and recently 2 hospitalizations at [facility] in the past 2 months for CAP, and also diagnosed with pericarditis, and HFpEF. Was last discharged home on 11/29 on PO ATBs (Doxycycline). Now BIB pvt car on 11/30 after visiting RN recommended  further assessment of back & chest pain, SOB, and productive cough. Pt reports symptoms similar to why/when she was readmitted to [facility] w/ Dx of pericarditis/CHF/PNA.  Pt upset w/ care rendered at [facility] despite her doctors all there, so came to  ED foe eval/management.   CT chest and echo shows bibasilar effusions, and pericardial effusion with rv collapse  Patient had recently had treatment for HCAP at [facility]  had been on interferon   all rheum labs at [facility] were negative  was being treated with colchicine for pericarditis    ROS Headache, no fevers, no chills, no abdominal pain, pleuritic chest pain     PMH:  as above     MEDICATIONS  (STANDING):  buPROPion XL (24-Hour) . 150 milliGRAM(s) Oral daily  cefepime  Injectable. 2000 milliGRAM(s) IV Push every 8 hours  chlorhexidine 4% Liquid 1 Application(s) Topical <User Schedule>  colchicine 0.3 milliGRAM(s) Oral daily  desmopressin Injectable 20 MICROGram(s) IV Push once  fluticasone propionate 50 MICROgram(s)/spray Nasal Spray 1 Spray(s) Both Nostrils two times a day  ibuprofen  Tablet. 400 milliGRAM(s) Oral every 6 hours  lactobacillus acidophilus 1 Tablet(s) Oral every 12 hours  lamoTRIgine 200 milliGRAM(s) Oral daily  levothyroxine 88 MICROGram(s) Oral daily  metoprolol succinate ER 25 milliGRAM(s) Oral daily  multivitamin 1 Tablet(s) Oral daily  naloxone Injectable 0.4 milliGRAM(s) IV Push once  pantoprazole    Tablet 40 milliGRAM(s) Oral before breakfast  polyethylene glycol 3350 17 Gram(s) Oral daily  rosuvastatin 20 milliGRAM(s) Oral at bedtime  senna 2 Tablet(s) Oral at bedtime  sodium chloride 0.9%. 1000 milliLiter(s) (75 mL/Hr) IV Continuous <Continuous>    MEDICATIONS  (PRN):  acetaminophen     Tablet .. 650 milliGRAM(s) Oral once PRN Temp greater or equal to 38C (100.4F), Mild Pain (1 - 3)  acetaminophen     Tablet .. 1000 milliGRAM(s) Oral every 8 hours PRN Mild Pain (1 - 3)  aluminum hydroxide/magnesium hydroxide/simethicone Suspension 30 milliLiter(s) Oral every 4 hours PRN Dyspepsia  bisacodyl 5 milliGRAM(s) Oral daily PRN Constipation  cyclobenzaprine 10 milliGRAM(s) Oral three times a day PRN Muscle Spasm  guaifenesin/dextromethorphan Oral Liquid 10 milliLiter(s) Oral every 4 hours PRN Cough  hydrocortisone hemorrhoidal Suppository 1 Suppository(s) Rectal two times a day PRN pain  HYDROmorphone  Injectable 0.2 milliGRAM(s) IV Push every 4 hours PRN Moderate Pain (4 - 6)  HYDROmorphone  Injectable 0.5 milliGRAM(s) IV Push every 4 hours PRN Severe Pain (7 - 10)  loperamide 2 milliGRAM(s) Oral every 2 hours PRN Diarrhea  meclizine 25 milliGRAM(s) Oral two times a day PRN Dizziness  melatonin 3 milliGRAM(s) Oral at bedtime PRN Insomnia  morphine  - Injectable 2 milliGRAM(s) IV Push once PRN Moderate Pain (4 - 6)  ondansetron Injectable 4 milliGRAM(s) IV Push every 8 hours PRN Nausea and/or Vomiting    ICU Vital Signs Last 24 Hrs  T(C): 35.9 (02 Dec 2024 04:05), Max: 36.5 (01 Dec 2024 15:34)  T(F): 96.6 (02 Dec 2024 04:05), Max: 97.7 (01 Dec 2024 15:34)  HR: 54 (02 Dec 2024 09:00) (53 - 66)  BP: 140/52 (02 Dec 2024 09:00) (117/62 - 163/58)  BP(mean): 76 (02 Dec 2024 09:00) (67 - 86)  ABP: --  ABP(mean): --  RR: 20 (02 Dec 2024 09:00) (14 - 29)  SpO2: 98% (02 Dec 2024 09:00) (93% - 99%)    O2 Parameters below as of 01 Dec 2024 20:00  Patient On (Oxygen Delivery Method): nasal cannula  O2 Flow (L/min): 3    Physical Exam    General - no distress  HEENT - nc/at  neck supple  lungs - dec bs at bases  cv rrr, distant breathe sounds  renal voiding  extremteieis 1+ bilateral edema  neuro awake, alert          I&O's Summary    01 Dec 2024 07:01  -  02 Dec 2024 07:00  --------------------------------------------------------  IN: 2386.4 mL / OUT: 500 mL / NET: 1886.4 mL    02 Dec 2024 07:01  -  02 Dec 2024 10:34  --------------------------------------------------------  IN: 100 mL / OUT: 525 mL / NET: -425 mL                                       7.9    12.70 )-----------( 679      ( 02 Dec 2024 05:12 )             26.4       12-02    133[L]  |  106  |  19  ----------------------------<  97  4.4   |  23  |  0.83    Ca    8.6      02 Dec 2024 05:12  Phos  4.8     12-02  Mg     2.1     12-02    TPro  6.3  /  Alb  2.5[L]  /  TBili  0.5  /  DBili  x   /  AST  14[L]  /  ALT  24  /  AlkPhos  135[H]  12-01                Urinalysis Basic - ( 02 Dec 2024 05:12 )    Color: x / Appearance: x / SG: x / pH: x  Gluc: 97 mg/dL / Ketone: x  / Bili: x / Urobili: x   Blood: x / Protein: x / Nitrite: x   Leuk Esterase: x / RBC: x / WBC x   Sq Epi: x / Non Sq Epi: x / Bacteria: x        I personally reviewed the CXR:    DVT Prophylaxis:                                                                 Advanced Directives:         Labs, Notes, Orders, radiologic studies reviewed and care coordinated with multidisciplinary team

## 2024-12-02 NOTE — PROGRESS NOTE ADULT - ASSESSMENT
64 female presenting presenting with a moderate pericardial effusion & b/l pleural effusion & atelectasis    PROBLEMS:    Recent Pericarditis-Pericardial effusion  Bilateral pleural effusion-Compressive atelectasi  Acute hypoxic respiratory failure  Pneumonia  AFIB  HFpEF not acute  HTN  Gastric Ulder(remote)  NSVT  Essential Thrombocytosis  Von Willebrand's-History of bleeding/acquired vW disease- receives DDAVP prior to procedures.  DVT/PE-History of DVT/PE (after birth control), PAF on amio/eliquis  Hypothyroidism    Plan:     B/L Effusions, L with a loculated appearance, IR-4F thoracostomies placed BL with US and CT guidance. Left clear yellow. Right cloudy yellow. To [id number] pericardial drain placed with US and CT guidance. Dark bloody output. To waterse+S of right pleural fluidaL  Continue Colchicine and ibuprofen  for pericarditis    Hold DOAC, Continue Amio and metoprolol, Paitent with signs of tamponade on echo  NS at 75  IV CEFEPIME  HEM ddavp prior to procedure  D/W Brendan clifford & cardio in detail  Venodynes for DVT Prophylaxis-Hold Chemical for  procedure

## 2024-12-02 NOTE — CONSULT NOTE ADULT - ASSESSMENT
64y Female history of right ventricular outflow tract ventricular tachycardia s/p remote ablation (2001; "partially successful" with subsequent PVCs treated with verapamil), hypertension, hypothyroidism, ischemic colitis, von Willebrand's disease, essential thrombocytosis, obesity s/p gastric bypass, hypertension, DVT/PE, recently admitted to [facility] twice with respiratory failure, bacterial pneumonia, acute kidney injury, pericarditis, and pericardial effusion, discharged on 11/29 but returned to ER on 11/30 w CP and SOB. CT report indicated moderately large pericardial effusion, b/l pleural effusions, TTE confirmed with large pericardial effusion.    interventional cardiology and radiology consulted for pericardiocentesis  will need DDAVP prior to procedure as per heme note  If IR to do procedure will ask for chest tube placements at same time and pleural fluid also sent for analysis  thoracic surgery to follow all tubes  Dr. Beck to review for possible pericardial window if required in future for tissue diagnosis 64y Female history of right ventricular outflow tract ventricular tachycardia s/p remote ablation (2001; "partially successful" with subsequent PVCs treated with verapamil), hypertension, hypothyroidism, ischemic colitis, von Willebrand's disease, essential thrombocytosis, obesity s/p gastric bypass, hypertension, DVT/PE, recently admitted to [facility] twice with respiratory failure, bacterial pneumonia, acute kidney injury, pericarditis, and pericardial effusion, discharged on 11/29 but returned to ER on 11/30 w CP and SOB. CT report indicated moderately large pericardial effusion, b/l pleural effusions, TTE confirmed with large pericardial effusion.    interventional cardiology and radiology consulted for pericardiocentesis  will need DDAVP prior to procedure as per heme note  If IR to do procedure will ask for chest tube placements at same time and pleural fluid also sent for analysis  thoracic surgery to follow all tubes  pain control  IS  Dr. Beck to review for possible pericardial window if required in future for tissue diagnosis

## 2024-12-02 NOTE — PROGRESS NOTE ADULT - ASSESSMENT
Assessment:  64F with HTN, HLD, hypothyroid, primary lymphedema, central thrombocytosis, GERD post bariatric surgery, ischemic colitis s/p left hemicolectomy, pAF and DVT/PE on Eliquis, HFpEF presents 11/30 with worsening dyspnea and chest pain, also has intermittent cough  Recently discharged home from [facility] for pericarditis and heart failure exacerbation also with presumed superimposed pneumonia  Despite antibiotic including Doxy at home, symptoms of dyspnea and chest discomfort still continued   Afebrile on admission, on 4L NC  WBC 18 > 13  Cr 0.76  UA negative  Chest CT with Moderately large simple pericardial effusion. Small to moderate right and small left pleural effusions with associatedcompressive atelectasis. Superimposed pneumonia should be excluded on a clinical basis.  RVP negative  MRSA swab negative  Strep UAg negative  Procal 0.10  BCx 11/30 negative    Antimicrobials:  vancomycin  IVPB 1250 every 12 hours (12/1 --- 12/2)  cefepime  Injectable. 2000 every 8 hours (12/1 --- )    Impression:   #Acute Hypoxic Perspiratory Failure  #Moderate Pericardial Effusion, Hx Recent Pericarditis  #Bilateral Pleural Effusion, Possible superimposed Pneumonia  #Leukocytosis  #CHF  - leukocytosis likely due to multifactorial process ?reactive ?CHF ?Pericardial Effusion ?superimposed pneumonia  - Procal essential normal, BCx negative, MRSA swab negative, so far rest of infectious work up negative  - remains afebrile    Recommendations:  - discontinue empiric Vanco  - continue empiric Cefepime 2G q8 (Day #2)  - monitor temperature curve  - trend WBC  - side effects of antibiotic discussed, tolerating abx well so far  - check Sputum Cx if able  - follow up Legionella, Mycoplasma IgM  - Cardio following  - ICU    Clinical team may change from intravenous to oral antibiotics when the following criteria are met:   1. Patient is clinically improving/stable       a)	Improved signs and symptoms of infection from initial presentation       b)	Afebrile for 24 hours       c)	Leukocytosis trending towards normal range   2. Patient is tolerating oral intake   3. Initial/repeat blood cultures are negative OR do not need to wait for preliminary blood cultures to result    Cannot advise changing to oral antibiotic therapy until culture sensitivity is available.
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
## 2024-12-02 NOTE — PROGRESS NOTE ADULT - SUBJECTIVE AND OBJECTIVE BOX
HPI:  CHIEF COMPLAINT: Patient is a 64y old  Female who presents with a chief complaint of chest pain.    HPI:  * I know Mrs. Jimenez from the outpatient setting but haven't seen her since 2023.   Kitty Jimenez is a 64-year-old woman with a history of right ventricular outflow tract ventricular tachycardia s/p remote ablation (; "partially successful" with subsequent PVCs treated with verapamil), hypertension, hypothyroidism, ischemic colitis, von Willebrand's disease, essential thrombocytosis, obesity s/p gastric bypass, hypertension, DVT/PE, and hospitalization in 2023 for respiratory failure, who has been ill since late October.  She was admitted to [facility] twice with respiratory failure, bacterial pneumonia, acute kidney injury, pericarditis, and pericardial effusion.  She was discharged on  but felt poor on  and came to the [facility] ED.  Her primary complaint was chest pain and shortness of breath.  No clear exacerbating or alleviating factors.  Chest pain waxes/wanes; she does not describe typical angina.  A CT coronary was performed at [facility] in November -- results not available for me to review but patient reports "normal."  Her pericarditis was treated with colchicine and ibuprofen; PAF treated with amiodarone and Eliquis.    : some improvement in symptoms overnight.    MEDICATIONS:  OUTPATIENT  Home Medications:  acetaminophen 325 mg oral tablet: 2 tab(s) orally every 6 hours as needed for (2024 23:15)  acetaminophen 325 mg oral tablet: 2 tab(s) orally every 6 hours as needed for (2024 23:15)  acetaminophen 325 mg oral tablet: 2 tab(s) orally every 6 hours as needed for (2024 23:15)  amiodarone 200 mg oral tablet: 2 tab(s) orally 2 times a day ***until 24.... then 400mg QD*** (2024 23:15)  amLODIPine 5 mg oral tablet: 1 tab(s) orally once a day (2024 23:15)  Anumed-HC 25 mg rectal suppository: 1 suppository(ies) rectally 2 times a day as needed for (2024 23:15)  apixaban 5 mg oral tablet: 1 tab(s) orally every 12 hours (:15)  azelastine 137 mcg/inh (0.1%) nasal spray: 1 spray(s) in each nostril 2 times a day (:15)  colchicine 0.6 mg oral tablet: 0.5 tab(s) orally once a day (:15)  cyclobenzaprine 10 mg oral tablet: 1 tab(s) orally 3 times a day as needed for (:15)  enalapril 20 mg oral tablet: 1 tab(s) orally once a day (in the morning) (:15)  fluticasone 50 mcg/inh nasal spray: 2 spray(s) in each nostril once a day as needed for (15)  hydroCHLOROthiazide 12.5 mg oral tablet: 1 tab(s) orally 2 times a day as needed for (:15)  HYDROmorphone 2 mg oral tablet: 1 tab(s) orally every 6 hours as needed for (:15)  HYDROmorphone 2 mg oral tablet: 1 tab(s) orally every 6 hours as needed for (:15)  HYDROmorphone 2 mg oral tablet: 1 tab(s) orally every 6 hours as needed for (:15)  ibuprofen 200 mg oral capsule: 2 cap(s) orally every 8 hours as needed for (:15)  ibuprofen 200 mg oral capsule: 2 cap(s) orally every 8 hours as needed for (:15)  ibuprofen 200 mg oral capsule: 2 cap(s) orally every 8 hours as needed for (:15)  lamoTRIgine 200 mg oral tablet: 1 tab(s) orally once a day (:15)  levothyroxine 88 mcg (0.088 mg) oral tablet: 1 tab(s) orally once a day (:15)  linaclotide 72 mcg oral capsule: 1 cap(s) orally once a day as needed for (:15)  liraglutide 18 mg/3 mL subcutaneous solution: 0.6 milligram(s) subcutaneously once a day as needed for (2024 23:15)  loperamide 2 mg oral capsule: 2 cap(s) orally every 2 hours as needed for (2024 23:15)  meclizine 25 mg oral tablet: 1 tab(s) orally 2 times a day as needed for (2024 23:15)  Metoprolol Succinate ER 25 mg oral tablet, extended release: 1 tab(s) orally once a day (2024 23:15)  Multiple Vitamins oral tablet: 1 tab(s) orally once a day (2024 23:15)  ondansetron 4 mg oral tablet, disintegratin tab(s) orally 3 times a day as needed for (2024 23:15)  pantoprazole 40 mg oral delayed release tablet: 1 tab(s) orally once a day (:15)  rosuvastatin 20 mg oral tablet: 1 tab(s) orally once a day (2024 23:15)  senna (sennosides) 8.6 mg oral tablet: 2 tab(s) orally once a day (at bedtime) as needed for (:15)  Wellbutrin  mg/24 hours oral tablet, extended release: 1 tab(s) orally once a day (2024 23:15)      INPATIENT  MEDICATIONS  (STANDING):  buPROPion XL (24-Hour) . 150 milliGRAM(s) Oral daily  cefepime  Injectable. 2000 milliGRAM(s) IV Push every 8 hours  colchicine 0.3 milliGRAM(s) Oral daily  fluticasone propionate 50 MICROgram(s)/spray Nasal Spray 1 Spray(s) Both Nostrils two times a day  ibuprofen  Tablet. 400 milliGRAM(s) Oral every 6 hours  lactobacillus acidophilus 1 Tablet(s) Oral every 12 hours  lamoTRIgine 200 milliGRAM(s) Oral daily  levothyroxine 88 MICROGram(s) Oral daily  metoprolol succinate ER 25 milliGRAM(s) Oral daily  multivitamin 1 Tablet(s) Oral daily  naloxone Injectable 0.4 milliGRAM(s) IV Push once  pantoprazole    Tablet 40 milliGRAM(s) Oral before breakfast  polyethylene glycol 3350 17 Gram(s) Oral daily  rosuvastatin 20 milliGRAM(s) Oral at bedtime  senna 2 Tablet(s) Oral at bedtime  sodium chloride 0.9%. 1000 milliLiter(s) (75 mL/Hr) IV Continuous <Continuous>    MEDICATIONS  (PRN):  acetaminophen     Tablet .. 650 milliGRAM(s) Oral once PRN Temp greater or equal to 38C (100.4F), Mild Pain (1 - 3)  acetaminophen     Tablet .. 1000 milliGRAM(s) Oral every 8 hours PRN Mild Pain (1 - 3)  aluminum hydroxide/magnesium hydroxide/simethicone Suspension 30 milliLiter(s) Oral every 4 hours PRN Dyspepsia  bisacodyl 5 milliGRAM(s) Oral daily PRN Constipation  cyclobenzaprine 10 milliGRAM(s) Oral three times a day PRN Muscle Spasm  guaifenesin/dextromethorphan Oral Liquid 10 milliLiter(s) Oral every 4 hours PRN Cough  hydrocortisone hemorrhoidal Suppository 1 Suppository(s) Rectal two times a day PRN pain  HYDROmorphone  Injectable 0.2 milliGRAM(s) IV Push every 4 hours PRN Moderate Pain (4 - 6)  HYDROmorphone  Injectable 0.5 milliGRAM(s) IV Push every 4 hours PRN Severe Pain (7 - 10)  loperamide 2 milliGRAM(s) Oral every 2 hours PRN Diarrhea  meclizine 25 milliGRAM(s) Oral two times a day PRN Dizziness  melatonin 3 milliGRAM(s) Oral at bedtime PRN Insomnia  morphine  - Injectable 2 milliGRAM(s) IV Push once PRN Moderate Pain (4 - 6)  ondansetron Injectable 4 milliGRAM(s) IV Push every 8 hours PRN Nausea and/or Vomiting          Vital Signs Last 24 Hrs  T(C): 35.9 (02 Dec 2024 04:05), Max: 36.5 (01 Dec 2024 15:34)  T(F): 96.6 (02 Dec 2024 04:05), Max: 97.7 (01 Dec 2024 15:34)  HR: 54 (02 Dec 2024 09:00) (53 - 66)  BP: 140/52 (02 Dec 2024 09:00) (117/62 - 163/58)  BP(mean): 76 (02 Dec 2024 09:00) (67 - 86)  RR: 20 (02 Dec 2024 09:00) (14 - 29)  SpO2: 98% (02 Dec 2024 09:00) (93% - 99%)    Parameters below as of 01 Dec 2024 20:00  Patient On (Oxygen Delivery Method): nasal cannula  O2 Flow (L/min): 3  Daily     Daily I&O's Summary    01 Dec 2024 07:01  -  02 Dec 2024 07:00  --------------------------------------------------------  IN: 2386.4 mL / OUT: 500 mL / NET: 1886.4 mL    02 Dec 2024 07:  -  02 Dec 2024 10:11  --------------------------------------------------------  IN: 100 mL / OUT: 525 mL / NET: -425 mL        I&O's Detail    01 Dec 2024 07:  -  02 Dec 2024 07:00  --------------------------------------------------------  IN:    IV PiggyBack: 450 mL    Oral Fluid: 600 mL    sodium chloride 0.9%: 1336.4 mL  Total IN: 2386.4 mL    OUT:    Voided (mL): 500 mL  Total OUT: 500 mL    Total NET: 1886.4 mL      02 Dec 2024 07:  -  02 Dec 2024 10:11  --------------------------------------------------------  IN:    IV PiggyBack: 100 mL  Total IN: 100 mL    OUT:    Voided (mL): 525 mL  Total OUT: 525 mL    Total NET: -425 mL          I&O's Summary    01 Dec 2024 07:  -  02 Dec 2024 07:00  --------------------------------------------------------  IN: 2386.4 mL / OUT: 500 mL / NET: 1886.4 mL    02 Dec 2024 07:01  -  02 Dec 2024 10:11  --------------------------------------------------------  IN: 100 mL / OUT: 525 mL / NET: -425 mL        PHYSICAL EXAM:    Constitutional: NAD, awake and alert, appears comfortable  HEENT:  No oral cyanosis.  Pulmonary: Non-labored, breath sounds are clear   but with decreased breath sounds mid to lower lung fields  Cardiovascular: S1 and S2, regular rate and rhythm  Gastrointestinal: Bowel Sounds present, soft, nontender.   Lymph: No edema.  Neurological: Alert, no focal deficits  Skin: No rashes.  Psych:  Mood & affect appropriate    ===============================  ===============================  LABS:                         7.9    12.70 )-----------( 679      ( 02 Dec 2024 05:12 )             26.4                         8.5    13.66 )-----------( 729      ( 01 Dec 2024 08:38 )             28.2                         8.8    18.39 )-----------( 760      ( 2024 16:37 )             28.7     02 Dec 2024 05:12    133    |  106    |  19     ----------------------------<  97     4.4     |  23     |  0.83   01 Dec 2024 08:38    135    |  105    |  12     ----------------------------<  104    4.3     |  26     |  0.71   2024 16:37    136    |  104    |  13     ----------------------------<  93     3.9     |  25     |  0.76     Ca    8.6        02 Dec 2024 05:12  Ca    9.1        01 Dec 2024 08:38  Ca    8.7        2024 16:37  Phos  4.8       02 Dec 2024 05:12  Phos  5.0       01 Dec 2024 08:38  Mg     2.1       02 Dec 2024 05:12  Mg     1.9       01 Dec 2024 08:38    TPro  6.3    /  Alb  2.5    /  TBili  0.5    /  DBili  x      /  AST  14     /  ALT  24     /  AlkPhos  135    01 Dec 2024 08:38  TPro  6.9    /  Alb  2.7    /  TBili  0.5    /  DBili  x      /  AST  15     /  ALT  28     /  AlkPhos  154    2024 16:37    PT/INR - ( 2024 16:37 )   PT: 23.6 sec;   INR: 2.01 ratio         PTT - ( 2024 16:37 )  PTT:37.3 sec  ===============================  ===============================  CARDIAC BIOMARKERS:  BNP  Pro-Brain Natriuretic Peptide: 3214 pg/mL (24 @ 16:37)     TROPONIN  Troponin I, High Sensitivity Result: 17.62 ng/L (24 @ 16:37)    Troponin I, High Sensitivity Result: 86.3 ng/L *H* (10-06-21 @ 19:08)  Troponin I, High Sensitivity Result: 130.6 ng/L *H* (10-06-21 @ 14:12)  Troponin I, High Sensitivity Result: 142.2 ng/L *H* (10-06-21 @ 11:35)  Troponin I, High Sensitivity Result: 93.1 ng/L *H* (10-06-21 @ 08:41)  Troponin I, High Sensitivity Result: 61.6 ng/L *H* (10-06-21 @ 05:46)  Troponin I, Serum: <0.015 ng/mL [0.015 - 0.045] (20 @ 22:01)    ===============================  ===============================  CT Chest No Cont (24 @ 20:34): Moderately large simple pericardial effusion.   Small to moderate right and small left pleural effusions with associated   compressive atelectasis. Superimposed pneumonia should be excluded on a clinical basis.  ------------------------------  ECG: Sinus rhythm, low QRS voltage, nonspecific ST/T abnormality  ------------------------------  TRANSTHORACIC ECHOCARDIOGRAM REPORT  Pt. Name:       KITTY JIMENEZ Study Date:    2024     CONCLUSIONS:      1. Left ventricular systolic function is normal with an ejection fraction of 67 %   by Jeffers's method of disks.   2. Large left pleural effusion noted.   3. Large pericardial effusion with evidence of hemodynamic compromise   (or echocardiographic evidence of cardiac tamponade).   4. The left ventricular diastolic function is indeterminate.   5. Normal right ventricular cavity size and normal right ventricular systolic function.   6. Left atrium is moderately dilated.   7. The right atrium is normal in size.   8. Mild pulmonic regurgitation.   9. Trace tricuspid regurgitation.  10. The inferior vena cava is dilated measuring 2.50 cm in diameter, (dilated >2.1cm)  with abnormal inspiratory collapse (abnormal <50%) consistent   with elevated right atrial pressure (~15, range 10-20mmHg).  11. Mild left ventricular hypertrophy.  12. Trileaflet aortic valve with normal systolic excursion.  13. Structurally normal mitral valve with normal leaflet excursion.    ________________________________________________________________________________________  FINDINGS:     Left Ventricle:  The left ventricular cavity is small. Left ventricular systolic function is normal with a   calculated ejection fraction of 67 % by the Jeffers's biplane method of disks.   The left ventricular diastolic function is indeterminate.     Right Ventricle:  The right ventricular cavity is normal in size and right ventricular systolic function is normal.   Tricuspid annular plane systolic excursion (TAPSE) is 2.1 cm (normal >=1.7 cm).     Left Atrium:  The left atrium is moderately dilated with an indexed volume of 44.06 ml/m². The pulmonary venous flow pattern is normal.  ............................  Pericardium:  Organized fibrinous vs coagulant material is seen in the pericardial space.  There is a large pericardial effusion measuring 4.00 cm   with evidence of hemodynamic compromise (or echocardiographic evidence of cardiac tamponade).     Pleura:  Large left pleural effusion noted.     Systemic Veins:  The inferior vena cava is dilated measuring 2.50 cm in diameter, (dilated >2.1cm) with   abnormal inspiratory collapse (abnormal <50%) consistent with elevated right atrial pressure (~15, range 10-20mmHg).  ____________________________________________________________________  ________________________________________________________________________________________  Electronically signed on 2024 at 8:55:40 AM by Sacha Galindo  ==============================  ===============================

## 2024-12-02 NOTE — PROGRESS NOTE ADULT - SUBJECTIVE AND OBJECTIVE BOX
Subjective:    pat lying in bed, c/o backach & chest discomfort, pat with b/l pleural effusion & pericardial effusion s/p IR   1) 14F thoracostomies placed BL with US and CT guidance. Left clear yellow. Right cloudy yellow. To waterseal  2) 10F pericardial drain placed with US and CT guidance. Dark bloody output. To waterseal.  C+S of right pleural fluid  2 milliLiter(s)    Home Medications:  acetaminophen 325 mg oral tablet: 2 tab(s) orally every 6 hours as needed for (2024 23:15)  acetaminophen 325 mg oral tablet: 2 tab(s) orally every 6 hours as needed for (:15)  acetaminophen 325 mg oral tablet: 2 tab(s) orally every 6 hours as needed for (2024 23:15)  amiodarone 200 mg oral tablet: 2 tab(s) orally 2 times a day ***until 24.... then 400mg QD*** (2024 23:15)  amLODIPine 5 mg oral tablet: 1 tab(s) orally once a day (2024 23:15)  Anumed-HC 25 mg rectal suppository: 1 suppository(ies) rectally 2 times a day as needed for (2024 23:15)  apixaban 5 mg oral tablet: 1 tab(s) orally every 12 hours (2024 23:15)  azelastine 137 mcg/inh (0.1%) nasal spray: 1 spray(s) in each nostril 2 times a day (2024 23:15)  colchicine 0.6 mg oral tablet: 0.5 tab(s) orally once a day (2024 23:15)  cyclobenzaprine 10 mg oral tablet: 1 tab(s) orally 3 times a day as needed for (2024 23:15)  enalapril 20 mg oral tablet: 1 tab(s) orally once a day (in the morning) (:15)  fluticasone 50 mcg/inh nasal spray: 2 spray(s) in each nostril once a day as needed for (2024 23:15)  hydroCHLOROthiazide 12.5 mg oral tablet: 1 tab(s) orally 2 times a day as needed for (:15)  HYDROmorphone 2 mg oral tablet: 1 tab(s) orally every 6 hours as needed for (:15)  HYDROmorphone 2 mg oral tablet: 1 tab(s) orally every 6 hours as needed for (:15)  HYDROmorphone 2 mg oral tablet: 1 tab(s) orally every 6 hours as needed for (:15)  ibuprofen 200 mg oral capsule: 2 cap(s) orally every 8 hours as needed for (15)  ibuprofen 200 mg oral capsule: 2 cap(s) orally every 8 hours as needed for (:15)  ibuprofen 200 mg oral capsule: 2 cap(s) orally every 8 hours as needed for (:15)  lamoTRIgine 200 mg oral tablet: 1 tab(s) orally once a day (:15)  levothyroxine 88 mcg (0.088 mg) oral tablet: 1 tab(s) orally once a day (:15)  linaclotide 72 mcg oral capsule: 1 cap(s) orally once a day as needed for (:15)  liraglutide 18 mg/3 mL subcutaneous solution: 0.6 milligram(s) subcutaneously once a day as needed for (:15)  loperamide 2 mg oral capsule: 2 cap(s) orally every 2 hours as needed for (:15)  meclizine 25 mg oral tablet: 1 tab(s) orally 2 times a day as needed for (:15)  Metoprolol Succinate ER 25 mg oral tablet, extended release: 1 tab(s) orally once a day (:15)  Multiple Vitamins oral tablet: 1 tab(s) orally once a day (:15)  ondansetron 4 mg oral tablet, disintegratin tab(s) orally 3 times a day as needed for (:15)  pantoprazole 40 mg oral delayed release tablet: 1 tab(s) orally once a day (:15)  rosuvastatin 20 mg oral tablet: 1 tab(s) orally once a day (2024 23:15)  senna (sennosides) 8.6 mg oral tablet: 2 tab(s) orally once a day (at bedtime) as needed for (2024 23:15)  Wellbutrin  mg/24 hours oral tablet, extended release: 1 tab(s) orally once a day (2024 23:15)    MEDICATIONS  (STANDING):  buPROPion XL (24-Hour) . 150 milliGRAM(s) Oral daily  cefepime  Injectable. 2000 milliGRAM(s) IV Push every 8 hours  chlorhexidine 4% Liquid 1 Application(s) Topical <User Schedule>  colchicine 0.3 milliGRAM(s) Oral daily  fluticasone propionate 50 MICROgram(s)/spray Nasal Spray 1 Spray(s) Both Nostrils two times a day  ibuprofen  Tablet. 400 milliGRAM(s) Oral every 6 hours  lactobacillus acidophilus 1 Tablet(s) Oral every 12 hours  lamoTRIgine 200 milliGRAM(s) Oral daily  levothyroxine 88 MICROGram(s) Oral daily  lidocaine   4% Patch 1 Patch Transdermal daily  lidocaine   4% Patch 1 Patch Transdermal daily  metoprolol succinate ER 25 milliGRAM(s) Oral daily  multivitamin 1 Tablet(s) Oral daily  naloxone Injectable 0.4 milliGRAM(s) IV Push once  pantoprazole    Tablet 40 milliGRAM(s) Oral before breakfast  polyethylene glycol 3350 17 Gram(s) Oral daily  rosuvastatin 20 milliGRAM(s) Oral at bedtime  senna 2 Tablet(s) Oral at bedtime  sodium chloride 0.9%. 1000 milliLiter(s) (75 mL/Hr) IV Continuous <Continuous>  sodium chloride 0.9%. 1000 milliLiter(s) (75 mL/Hr) IV Continuous <Continuous>    MEDICATIONS  (PRN):  acetaminophen     Tablet .. 650 milliGRAM(s) Oral once PRN Temp greater or equal to 38C (100.4F), Mild Pain (1 - 3)  acetaminophen     Tablet .. 1000 milliGRAM(s) Oral every 8 hours PRN Mild Pain (1 - 3)  aluminum hydroxide/magnesium hydroxide/simethicone Suspension 30 milliLiter(s) Oral every 4 hours PRN Dyspepsia  bisacodyl 5 milliGRAM(s) Oral daily PRN Constipation  cyclobenzaprine 10 milliGRAM(s) Oral three times a day PRN Muscle Spasm  fentaNYL    Injectable 25 MICROGram(s) IV Push every 5 minutes PRN Moderate Pain (4 - 6)  guaifenesin/dextromethorphan Oral Liquid 10 milliLiter(s) Oral every 4 hours PRN Cough  hydrocortisone hemorrhoidal Suppository 1 Suppository(s) Rectal two times a day PRN pain  HYDROmorphone  Injectable 0.2 milliGRAM(s) IV Push every 4 hours PRN Moderate Pain (4 - 6)  HYDROmorphone  Injectable 0.5 milliGRAM(s) IV Push every 4 hours PRN Severe Pain (7 - 10)  loperamide 2 milliGRAM(s) Oral every 2 hours PRN Diarrhea  meclizine 25 milliGRAM(s) Oral two times a day PRN Dizziness  melatonin 3 milliGRAM(s) Oral at bedtime PRN Insomnia  morphine  - Injectable 2 milliGRAM(s) IV Push once PRN Moderate Pain (4 - 6)  ondansetron Injectable 4 milliGRAM(s) IV Push every 8 hours PRN Nausea and/or Vomiting  ondansetron Injectable 4 milliGRAM(s) IV Push once PRN Nausea and/or Vomiting  oxyCODONE    IR 5 milliGRAM(s) Oral once PRN Mild Pain (1 - 3)      Allergies    sulfa drugs (Vomiting; Nausea)  IV Contrast (Hives)    Intolerances        Vital Signs Last 24 Hrs  T(C): 36.2 (02 Dec 2024 14:42), Max: 36.3 (02 Dec 2024 11:23)  T(F): 97.1 (02 Dec 2024 14:42), Max: 97.4 (02 Dec 2024 11:23)  HR: 64 (02 Dec 2024 18:00) (53 - 71)  BP: 147/51 (02 Dec 2024 18:00) (117/62 - 165/61)  BP(mean): 78 (02 Dec 2024 18:00) (67 - 93)  RR: 18 (02 Dec 2024 18:00) (13 - 34)  SpO2: 91% (02 Dec 2024 18:00) (90% - 99%)    Parameters below as of 02 Dec 2024 14:42  Patient On (Oxygen Delivery Method): nasal cannula  O2 Flow (L/min): 5        PHYSICAL EXAMINATION:    NECK:  Supple. No lymphadenopathy. Jugular venous pressure not elevated. Carotids equal.   HEART:   The cardiac impulse has a normal quality. Reg., Nl S1 and S2.  There are no murmurs, rubs or gallops noted  CHEST:  Chest crackles to auscultation. Normal respiratory effort.  ABDOMEN:  Soft and nontender.   EXTREMITIES:  There is no edema.       LABS:                        7.9    12.70 )-----------( 679      ( 02 Dec 2024 05:12 )             26.4     12-    133[L]  |  106  |  19  ----------------------------<  97  4.4   |  23  |  0.83    Ca    8.6      02 Dec 2024 05:12  Phos  4.8     12  Mg     2.1     12-    TPro  6.3  /  Alb  2.5[L]  /  TBili  0.5  /  DBili  x   /  AST  14[L]  /  ALT  24  /  AlkPhos  135[H]  12-    PT/INR - ( 02 Dec 2024 10:43 )   PT: 16.7 sec;   INR: 1.42 ratio         PTT - ( 02 Dec 2024 10:43 )  PTT:32.7 sec  Urinalysis Basic - ( 02 Dec 2024 05:12 )    Color: x / Appearance: x / SG: x / pH: x  Gluc: 97 mg/dL / Ketone: x  / Bili: x / Urobili: x   Blood: x / Protein: x / Nitrite: x   Leuk Esterase: x / RBC: x / WBC x   Sq Epi: x / Non Sq Epi: x / Bacteria: x

## 2024-12-02 NOTE — PROGRESS NOTE ADULT - ASSESSMENT
64 female presenting presenting with a moderate pericardial effusion  Recent Pericarditis  AFIB  HFpEF not acute  HTN  Gastric Ulder(remote)  NSVT  Essential Thrombocytosis  Von Willebrand's  DVT/PE  Hypothyroidism    Plan:       PULM: B/L Effusions, L with a loculated appearance, IR to place IR  Cardio: Continue Colchicine and ibuprofen  for pericarditis  Drainage of pericardial fluid NS at 75 for now.  No signs of acute heart failure, Hold DOAC, Continue Amio and metoprolol, Paitent with signs of tamponade on echo  Renal: NS at 75  GI: NPO for procedure  ENDO: Continue Synthroid, TSH [id number]  RHEUM: W/U at [facility] negative,   HEM:  heme input appreciated, will give ddavp prior to procedure  Venodynes for DVT Prophylaxis, Hold Chemical for  procedure

## 2024-12-02 NOTE — PROGRESS NOTE ADULT - SUBJECTIVE AND OBJECTIVE BOX
Date of Service:12-02-24 @ 09:20  Interval History/ROS: Afebrile overnight remains on 3L NC, WBC 12, BCx remain negative, RVP negative, procal negative, reports no fever or chills      REVIEW OF SYSTEMS  [  ] ROS unobtainable because:    [ x ] All other systems negative except as noted below    Constitutional:  [ ] fever [ ] chills  [ ] weight loss  [ ]night sweat  [ ]poor appetite/PO intake [ ]fatigue   Skin:  [ ] rash [ ] phlebitis	  Eyes: [ ] icterus [ ] pain  [ ] discharge	  ENMT: [ ] sore throat  [ ] thrush [ ] ulcers [ ] exudates [ ]anosmia  Respiratory: [ ] dyspnea [ ] hemoptysis [ ] cough [ ] sputum	  Cardiovascular:  [ ] chest pain [ ] palpitations [ ] edema	  Gastrointestinal:  [ ] nausea [ ] vomiting [ ] diarrhea [ ] constipation [ ] pain	  Genitourinary:  [ ] dysuria [ ] frequency [ ] hematuria [ ] discharge [ ] flank pain  [ ] incontinence  Musculoskeletal:  [ ] myalgias [ ] arthralgias [ ] arthritis  [ ] back pain  Neurological:  [ ] headache [ ] weakness [ ] seizures  [ ] confusion/altered mental status    Allergies  sulfa drugs (Vomiting; Nausea)  IV Contrast (Hives)        ANTIMICROBIALS:    cefepime  Injectable. 2000 every 8 hours  vancomycin  IVPB 1250 every 12 hours        OTHER MEDS: MEDICATIONS  (STANDING):  acetaminophen     Tablet .. 650 once PRN  acetaminophen     Tablet .. 1000 every 8 hours PRN  aluminum hydroxide/magnesium hydroxide/simethicone Suspension 30 every 4 hours PRN  bisacodyl 5 daily PRN  buPROPion XL (24-Hour) . 150 daily  colchicine 0.3 daily  cyclobenzaprine 10 three times a day PRN  guaifenesin/dextromethorphan Oral Liquid 10 every 4 hours PRN  HYDROmorphone  Injectable 0.2 every 4 hours PRN  HYDROmorphone  Injectable 0.5 every 4 hours PRN  ibuprofen  Tablet. 400 every 6 hours  lamoTRIgine 200 daily  levothyroxine 88 daily  loperamide 2 every 2 hours PRN  meclizine 25 two times a day PRN  melatonin 3 at bedtime PRN  metoclopramide Injectable 10 once  metoprolol succinate ER 25 daily  morphine  - Injectable 2 once PRN  ondansetron Injectable 4 every 8 hours PRN  pantoprazole    Tablet 40 before breakfast  polyethylene glycol 3350 17 daily  rosuvastatin 20 at bedtime  senna 2 at bedtime      Vital Signs Last 24 Hrs  T(F): 96.6 (12-02-24 @ 04:05), Max: 99.7 (11-30-24 @ 18:50)    Vital Signs Last 24 Hrs  HR: 58 (12-02-24 @ 08:00) (53 - 76)  BP: 159/51 (12-02-24 @ 08:00) (117/62 - 163/58)  RR: 22 (12-02-24 @ 08:00)  SpO2: 94% (12-02-24 @ 08:00) (93% - 99%)  Wt(kg): --    EXAM:    Constitutional:  NAD  Head/Eyes: no icterus  LUNGS:  Crackles  CVS:  regular rhythm  Abd:  soft, non-tender; non-distended  Ext:  no edema  Vascular:  IV site no erythema tenderness or discharge  Neuro: AAO X 3, non- focal    Labs:                        7.9    12.70 )-----------( 679      ( 02 Dec 2024 05:12 )             26.4     12-02    133[L]  |  106  |  19  ----------------------------<  97  4.4   |  23  |  0.83    Ca    8.6      02 Dec 2024 05:12  Phos  4.8     12-02  Mg     2.1     12-02    TPro  6.3  /  Alb  2.5[L]  /  TBili  0.5  /  DBili  x   /  AST  14[L]  /  ALT  24  /  AlkPhos  135[H]  12-01      WBC Trend:  WBC Count: 12.70 (12-02-24 @ 05:12)  WBC Count: 13.66 (12-01-24 @ 08:38)  WBC Count: 18.39 (11-30-24 @ 16:37)      Creatine Trend:  Creatinine: 0.83 (12-02)  Creatinine: 0.71 (12-01)  Creatinine: 0.76 (11-30)      Liver Biochemical Testing Trend:  Alanine Aminotransferase (ALT/SGPT): 24 (12-01)  Alanine Aminotransferase (ALT/SGPT): 28 (11-30)  Alanine Aminotransferase (ALT/SGPT): 28 (02-12)  Aspartate Aminotransferase (AST/SGOT): 14 (12-01-24 @ 08:38)  Aspartate Aminotransferase (AST/SGOT): 15 (11-30-24 @ 16:37)  Aspartate Aminotransferase (AST/SGOT): 17 (02-12-24 @ 14:12)  Bilirubin Total: 0.5 (12-01)  Bilirubin Total: 0.5 (11-30)  Bilirubin Total: 0.3 (02-12)      Trend LDH  12-02-24 @ 05:12  309[H]      Urinalysis Basic - ( 02 Dec 2024 05:12 )    Color: x / Appearance: x / SG: x / pH: x  Gluc: 97 mg/dL / Ketone: x  / Bili: x / Urobili: x   Blood: x / Protein: x / Nitrite: x   Leuk Esterase: x / RBC: x / WBC x   Sq Epi: x / Non Sq Epi: x / Bacteria: x        MICROBIOLOGY:    MRSA PCR Result.: NotDetec (12-01-24 @ 12:00)      Urinalysis with Rflx Culture (collected 30 Nov 2024 18:49)    Culture - Blood (collected 30 Nov 2024 16:37)  Source: .Blood BLOOD  Preliminary Report:    No growth at 24 hours    Culture - Blood (collected 30 Nov 2024 16:28)  Source: .Blood BLOOD  Preliminary Report:    No growth at 24 hours    Culture - Blood (collected 17 Sep 2023 14:13)  Source: .Blood None  Final Report:    No growth at 5 days    Culture - Blood (collected 17 Sep 2023 14:13)  Source: .Blood None  Final Report:    No growth at 5 days      Procalcitonin: 0.10 (12-01)      Ferritin: 216 (12-02)      Lactate Dehydrogenase, Serum: 309 (12-02)        Lactate, Blood: 1.2 (11-30 @ 16:37)    Sedimentation Rate, Erythrocyte: 20 mm/hr (11-30-24 @ 16:37)      RADIOLOGY:  imaging below personally reviewed    Xray Chest 1 View- PORTABLE-Urgent:  (30 Nov 2024 17:45)              CT Chest No Cont:   ACC: [id number] EXAM:  CT CHEST   ORDERED BY: LUCILA KOWALSKI     PROCEDURE DATE:  11/30/2024          INTERPRETATION:  CLINICAL INFORMATION: Pericardial effusion.    COMPARISON: CT chest 3/20/2023    CONTRAST/COMPLICATIONS:  IV Contrast: NONE  Oral Contrast: NONE      PROCEDURE:  CT of the Chest was performed.  Sagittal and coronal reformats were performed.    FINDINGS:    LUNGS AND LARGE AIRWAYS: Patent central airways. Partial compressive   atelectasis in the left upper and right lower lobes. Near complete   compressive atelectasis in the left lower lobe is associated with air   bronchograms. No suspicious nodule in the aerated portions of lung.  PLEURA: Small to moderate right and small left pleural effusions.  VESSELS: Within normal limits.  HEART: Heart is upper limits of normal in size. There is a new moderately   large pericardial effusion measuring simple fluid attenuation by   Hounsfield units. No pericardial wall thickening.  MEDIASTINUM AND ALEXANDRA: No lymphadenopathy.  CHEST WALL AND LOWER NECK: Within normal limits.  VISUALIZED UPPER ABDOMEN: Gastric bypass surgery.  BONES: Within normal limits.    IMPRESSION:  Moderately large simple pericardial effusion. Small to moderate right and   small left pleural effusions with associatedcompressive atelectasis.   Superimposed pneumonia should be excluded on a clinical basis.        --- End of Report ---            CAROL ANN KATHLEEN MD; Attending Radiologist  This document has been electronically signed. Nov 30 2024  9:59PM (11-30-24 @ 20:34)

## 2024-12-02 NOTE — PRE PROCEDURE NOTE - PRE PROCEDURE EVALUATION
Interventional Radiology    Reason for consult: pericardial effusion and pleural effusion.     HPI:64y Female history of right ventricular outflow tract ventricular tachycardia s/p remote ablation (2001; "partially successful" with subsequent PVCs treated with verapamil), hypertension, hypothyroidism, ischemic colitis, von Willebrand's disease, essential thrombocytosis, obesity s/p gastric bypass, hypertension, DVT/PE, recently admitted to [facility] twice with respiratory failure, bacterial pneumonia, acute kidney injury, pericarditis, and pericardial effusion, discharged on 11/29 but returned to ER on 11/30 w CP and SOB. CT report indicated moderately large pericardial effusion, b/l pleural effusions, TTE confirmed with large pericardial effusion. IR  consulted for left chest tube and pericardial drain.     Allergies: sulfa drugs (Vomiting; Nausea)  IV Contrast (Hives)    Medications (Abx/Cardiac/Anticoagulation/Blood Products)  aMIOdarone    Tablet: 400 milliGRAM(s) Oral (12-01 @ 21:17)  cefepime  Injectable.: 2000 milliGRAM(s) IV Push (12-02 @ 05:46)  hydrochlorothiazide: 12.5 milliGRAM(s) Oral (12-01 @ 06:37)  metoprolol succinate ER: 25 milliGRAM(s) Oral (12-01 @ 09:38)  vancomycin  IVPB: 166.67 mL/Hr IV Intermittent (12-01 @ 01:14)  vancomycin  IVPB: 166.67 mL/Hr IV Intermittent (12-02 @ 05:46)    Data:  160  70  T(C): 36.3  HR: 62  BP: 134/44  RR: 22  SpO2: 98%    -WBC 12.70 / HgB 7.9 / Hct 26.4 / Plt 679  -Na 133 / Cl 106 / BUN 19 / Glucose 97  -K 4.4 / CO2 23 / Cr 0.83  -ALT -- / Alk Phos -- / T.Bili --  -INR 1.42 / PTT 32.7          Radiology: < from: CT Chest No Cont (11.30.24 @ 20:34) >    IMPRESSION:  Moderately large simple pericardial effusion. Small to moderate right and   small left pleural effusions with associatedcompressive atelectasis.   Superimposed pneumonia should be excluded on a clinical basis.    < end of copied text >      Assessment/Plan: 64y Female history of right ventricular outflow tract ventricular tachycardia s/p remote ablation (2001; "partially successful" with subsequent PVCs treated with verapamil), hypertension, hypothyroidism, ischemic colitis, von Willebrand's disease, essential thrombocytosis, obesity s/p gastric bypass, hypertension, DVT/PE, recently admitted to [facility] twice with respiratory failure, bacterial pneumonia, acute kidney injury, pericarditis, and pericardial effusion, discharged on 11/29 but returned to ER on 11/30 w CP and SOB. CT report indicated moderately large pericardial effusion, b/l pleural effusions, TTE confirmed with large pericardial effusion. IR  consulted for left chest tube and pericardial drain.     - Per discussion between Dr. Crowell, the cardiology team and [facility] team will plan for pericardial effusion drainage and left chest tube today.   - INR 1.4.   - Eliquis on hold since 11/30.  - Maintain NPO status

## 2024-12-02 NOTE — PROGRESS NOTE ADULT - ASSESSMENT
This is a 64 year old female with a history of essential thrombocythemia for the past 23 years, followed by Dr. Mann Urbina at [facility].  Intolerant to anagrelide due to afib, on hydrea until about 6-12 months ago with no response, most recently on pegylated IFN alpha (Besrema) for the past 6 months- intolerant as well.  Stopped 2 months ago.   Was planned on a marrow and to begin Jakafi- not yet.     History of DVT/PE (after birth control), PAF on amio/eliquis- last dose yesterday am.  History of bleeding/acquired vW disease- receives DDAVP prior to procedures.     She has had multiple hospitalizations in the last month due to pneumonia/pericarditis, readmitted for the third time- cough/pleuritic chest pain.   CT showed bilat pleural effusions and complete atelectasis LLL, large pericardial effusion.     Planned for pericardiocentesis today.  Plan for a dose of DDAVP 0.3mcg/kg 30min prior to procedures.   PT 16.7/PTT 32.7 today.     Her plt are stable/better. (Prior counts in HIE  In March 2024- Ht 45  plts 779    In sept 2023  plts 700 K- 1000 K)  No splenomegaly on exam.   When she is more stable, planned for a bone marrow biopsy and then treatment with Jakafi. Hold off for now as her counts are stable.  Resume eliquis/anticoagulation when ok per CCU/cardiology.     Worsening anemia in the setting of infection/multiple hospitalizations/blood draws. Iron deficiency +/- suppression from acute infection.   Agree with checking occult blood.   Transfusional support as needed.    D/w Dr. Ojeda.      Shelby Dailey D.O.   Hematology/Oncology  [facility]

## 2024-12-02 NOTE — PROGRESS NOTE ADULT - SUBJECTIVE AND OBJECTIVE BOX
INTERVAL HPI/OVERNIGHT EVENTS:  Planned for pericardiocentesis today.       VITAL SIGNS:  T(F): 97.4 (12-02-24 @ 11:23)  HR: 62 (12-02-24 @ 11:23)  BP: 134/44 (12-02-24 @ 11:23)  RR: 22 (12-02-24 @ 11:23)  SpO2: 98% (12-02-24 @ 11:23)  Wt(kg): --    PHYSICAL EXAM:    Constitutional: NAD  Eyes: EOMI, sclera non-icteric  Neck: supple  Respiratory: depressed BS bases b/l  Cardiovascular: RRR  Gastrointestinal: soft, NTND, no splenomegaly  Extremities: no edema  Neurological: AAOx3      MEDICATIONS  (STANDING):  buPROPion XL (24-Hour) . 150 milliGRAM(s) Oral daily  cefepime  Injectable. 2000 milliGRAM(s) IV Push every 8 hours  chlorhexidine 4% Liquid 1 Application(s) Topical <User Schedule>  colchicine 0.3 milliGRAM(s) Oral daily  fluticasone propionate 50 MICROgram(s)/spray Nasal Spray 1 Spray(s) Both Nostrils two times a day  ibuprofen  Tablet. 400 milliGRAM(s) Oral every 6 hours  lactobacillus acidophilus 1 Tablet(s) Oral every 12 hours  lamoTRIgine 200 milliGRAM(s) Oral daily  levothyroxine 88 MICROGram(s) Oral daily  metoprolol succinate ER 25 milliGRAM(s) Oral daily  multivitamin 1 Tablet(s) Oral daily  naloxone Injectable 0.4 milliGRAM(s) IV Push once  pantoprazole    Tablet 40 milliGRAM(s) Oral before breakfast  polyethylene glycol 3350 17 Gram(s) Oral daily  rosuvastatin 20 milliGRAM(s) Oral at bedtime  senna 2 Tablet(s) Oral at bedtime  sodium chloride 0.9%. 1000 milliLiter(s) (75 mL/Hr) IV Continuous <Continuous>    MEDICATIONS  (PRN):  acetaminophen     Tablet .. 650 milliGRAM(s) Oral once PRN Temp greater or equal to 38C (100.4F), Mild Pain (1 - 3)  acetaminophen     Tablet .. 1000 milliGRAM(s) Oral every 8 hours PRN Mild Pain (1 - 3)  aluminum hydroxide/magnesium hydroxide/simethicone Suspension 30 milliLiter(s) Oral every 4 hours PRN Dyspepsia  bisacodyl 5 milliGRAM(s) Oral daily PRN Constipation  cyclobenzaprine 10 milliGRAM(s) Oral three times a day PRN Muscle Spasm  guaifenesin/dextromethorphan Oral Liquid 10 milliLiter(s) Oral every 4 hours PRN Cough  hydrocortisone hemorrhoidal Suppository 1 Suppository(s) Rectal two times a day PRN pain  HYDROmorphone  Injectable 0.2 milliGRAM(s) IV Push every 4 hours PRN Moderate Pain (4 - 6)  HYDROmorphone  Injectable 0.5 milliGRAM(s) IV Push every 4 hours PRN Severe Pain (7 - 10)  loperamide 2 milliGRAM(s) Oral every 2 hours PRN Diarrhea  meclizine 25 milliGRAM(s) Oral two times a day PRN Dizziness  melatonin 3 milliGRAM(s) Oral at bedtime PRN Insomnia  morphine  - Injectable 2 milliGRAM(s) IV Push once PRN Moderate Pain (4 - 6)  ondansetron Injectable 4 milliGRAM(s) IV Push every 8 hours PRN Nausea and/or Vomiting      Allergies    sulfa drugs (Vomiting; Nausea)  IV Contrast (Hives)    Intolerances        LABS:                        7.9    12.70 )-----------( 679      ( 02 Dec 2024 05:12 )             26.4     12-02    133[L]  |  106  |  19  ----------------------------<  97  4.4   |  23  |  0.83    Ca    8.6      02 Dec 2024 05:12  Phos  4.8     12-02  Mg     2.1     12-02    TPro  6.3  /  Alb  2.5[L]  /  TBili  0.5  /  DBili  x   /  AST  14[L]  /  ALT  24  /  AlkPhos  135[H]  12-01    PT/INR - ( 02 Dec 2024 10:43 )   PT: 16.7 sec;   INR: 1.42 ratio         PTT - ( 02 Dec 2024 10:43 )  PTT:32.7 sec  Urinalysis Basic - ( 02 Dec 2024 05:12 )    Color: x / Appearance: x / SG: x / pH: x  Gluc: 97 mg/dL / Ketone: x  / Bili: x / Urobili: x   Blood: x / Protein: x / Nitrite: x   Leuk Esterase: x / RBC: x / WBC x   Sq Epi: x / Non Sq Epi: x / Bacteria: x        RADIOLOGY & ADDITIONAL TESTS:  Studies reviewed.

## 2024-12-03 ENCOUNTER — RESULT REVIEW (OUTPATIENT)
Age: 64
End: 2024-12-03

## 2024-12-03 LAB
ALBUMIN FLD-MCNC: 1.6 G/DL — SIGNIFICANT CHANGE UP
ALBUMIN FLD-MCNC: 1.7 G/DL — SIGNIFICANT CHANGE UP
ALBUMIN SERPL ELPH-MCNC: 2 G/DL — LOW (ref 3.3–5)
ALP SERPL-CCNC: 103 U/L — SIGNIFICANT CHANGE UP (ref 40–120)
ALT FLD-CCNC: 14 U/L — SIGNIFICANT CHANGE UP (ref 12–78)
ANION GAP SERPL CALC-SCNC: 5 MMOL/L — SIGNIFICANT CHANGE UP (ref 5–17)
AST SERPL-CCNC: 6 U/L — LOW (ref 15–37)
B PERT IGG+IGM PNL SER: ABNORMAL
B PERT IGG+IGM PNL SER: ABNORMAL
BILIRUB SERPL-MCNC: 0.3 MG/DL — SIGNIFICANT CHANGE UP (ref 0.2–1.2)
BUN SERPL-MCNC: 16 MG/DL — SIGNIFICANT CHANGE UP (ref 7–23)
CALCIUM SERPL-MCNC: 8 MG/DL — LOW (ref 8.5–10.1)
CHLORIDE SERPL-SCNC: 107 MMOL/L — SIGNIFICANT CHANGE UP (ref 96–108)
CO2 SERPL-SCNC: 22 MMOL/L — SIGNIFICANT CHANGE UP (ref 22–31)
COLOR FLD: SIGNIFICANT CHANGE UP
COLOR FLD: YELLOW — SIGNIFICANT CHANGE UP
CREAT SERPL-MCNC: 0.69 MG/DL — SIGNIFICANT CHANGE UP (ref 0.5–1.3)
CRP SERPL-MCNC: 48.2 MG/ML — HIGH (ref 0–5)
EGFR: 98 ML/MIN/1.73M2 — SIGNIFICANT CHANGE UP
EOSINOPHIL # FLD: 0 % — SIGNIFICANT CHANGE UP
EOSINOPHIL # FLD: 0 % — SIGNIFICANT CHANGE UP
FLUID INTAKE SUBSTANCE CLASS: SIGNIFICANT CHANGE UP
FLUID INTAKE SUBSTANCE CLASS: SIGNIFICANT CHANGE UP
FOLATE+VIT B12 SERBLD-IMP: 0 % — SIGNIFICANT CHANGE UP
FOLATE+VIT B12 SERBLD-IMP: 0 % — SIGNIFICANT CHANGE UP
GLUCOSE FLD-MCNC: 70 MG/DL — SIGNIFICANT CHANGE UP
GLUCOSE FLD-MCNC: 91 MG/DL — SIGNIFICANT CHANGE UP
GLUCOSE SERPL-MCNC: 115 MG/DL — HIGH (ref 70–99)
GRAM STN FLD: SIGNIFICANT CHANGE UP
HCT VFR BLD CALC: 29.2 % — LOW (ref 34.5–45)
HGB BLD-MCNC: 8.8 G/DL — LOW (ref 11.5–15.5)
LDH SERPL L TO P-CCNC: 287 U/L — SIGNIFICANT CHANGE UP
LDH SERPL L TO P-CCNC: 807 U/L — SIGNIFICANT CHANGE UP
LYMPHOCYTES # FLD: 2 % — SIGNIFICANT CHANGE UP
LYMPHOCYTES # FLD: 37 % — SIGNIFICANT CHANGE UP
M PNEUMO IGM SER-ACNC: 0.38 INDEX — SIGNIFICANT CHANGE UP (ref 0–0.9)
MAGNESIUM SERPL-MCNC: 1.8 MG/DL — SIGNIFICANT CHANGE UP (ref 1.6–2.6)
MCHC RBC-ENTMCNC: 25.4 PG — LOW (ref 27–34)
MCHC RBC-ENTMCNC: 30.1 G/DL — LOW (ref 32–36)
MCV RBC AUTO: 84.1 FL — SIGNIFICANT CHANGE UP (ref 80–100)
MESOTHL CELL # FLD: 0 % — SIGNIFICANT CHANGE UP
MESOTHL CELL # FLD: 0 % — SIGNIFICANT CHANGE UP
MONOS+MACROS # FLD: 14 % — SIGNIFICANT CHANGE UP
MONOS+MACROS # FLD: 55 % — SIGNIFICANT CHANGE UP
MYCOPLASMA AG SPEC QL: NEGATIVE — SIGNIFICANT CHANGE UP
NEUTROPHILS-BODY FLUID: 8 % — SIGNIFICANT CHANGE UP
NEUTROPHILS-BODY FLUID: 84 % — SIGNIFICANT CHANGE UP
NRBC # BLD: 1 /100 WBCS — HIGH (ref 0–0)
NRBC # FLD: 0 % — SIGNIFICANT CHANGE UP
NRBC # FLD: 0 % — SIGNIFICANT CHANGE UP
OTHER CELLS FLD MANUAL: 0 % — SIGNIFICANT CHANGE UP
OTHER CELLS FLD MANUAL: 0 % — SIGNIFICANT CHANGE UP
PH FLD: 7.6 — SIGNIFICANT CHANGE UP
PHOSPHATE SERPL-MCNC: 3.9 MG/DL — SIGNIFICANT CHANGE UP (ref 2.5–4.5)
PLATELET # BLD AUTO: 754 K/UL — HIGH (ref 150–400)
POTASSIUM SERPL-MCNC: 4 MMOL/L — SIGNIFICANT CHANGE UP (ref 3.5–5.3)
POTASSIUM SERPL-SCNC: 4 MMOL/L — SIGNIFICANT CHANGE UP (ref 3.5–5.3)
PROT FLD-MCNC: 2.7 G/DL — SIGNIFICANT CHANGE UP
PROT FLD-MCNC: 3.4 G/DL — SIGNIFICANT CHANGE UP
PROT SERPL-MCNC: 5.4 GM/DL — LOW (ref 6–8.3)
RBC # BLD: 3.47 M/UL — LOW (ref 3.8–5.2)
RBC # FLD: 19.1 % — HIGH (ref 10.3–14.5)
RCV VOL RI: 3000 CELLS/UL — SIGNIFICANT CHANGE UP
RCV VOL RI: SIGNIFICANT CHANGE UP CELLS/UL
SODIUM SERPL-SCNC: 134 MMOL/L — LOW (ref 135–145)
SPECIMEN SOURCE: SIGNIFICANT CHANGE UP
TOTAL NUCLEATED CELL COUNT, BODY FLUID: 1986 CELLS/UL — SIGNIFICANT CHANGE UP
TOTAL NUCLEATED CELL COUNT, BODY FLUID: SIGNIFICANT CHANGE UP CELLS/UL
TUBE TYPE: SIGNIFICANT CHANGE UP
TUBE TYPE: SIGNIFICANT CHANGE UP
WBC # BLD: 18.29 K/UL — HIGH (ref 3.8–10.5)
WBC # FLD AUTO: 18.29 K/UL — HIGH (ref 3.8–10.5)
WBC COUNT.: 1798 CELLS/UL — SIGNIFICANT CHANGE UP
WBC COUNT.: SIGNIFICANT CHANGE UP CELLS/UL

## 2024-12-03 PROCEDURE — 93010 ELECTROCARDIOGRAM REPORT: CPT | Mod: 76

## 2024-12-03 PROCEDURE — 99232 SBSQ HOSP IP/OBS MODERATE 35: CPT

## 2024-12-03 PROCEDURE — 93308 TTE F-UP OR LMTD: CPT | Mod: 26

## 2024-12-03 PROCEDURE — 99233 SBSQ HOSP IP/OBS HIGH 50: CPT

## 2024-12-03 PROCEDURE — 99231 SBSQ HOSP IP/OBS SF/LOW 25: CPT

## 2024-12-03 PROCEDURE — 71045 X-RAY EXAM CHEST 1 VIEW: CPT | Mod: 26

## 2024-12-03 RX ORDER — ACETAMINOPHEN 500MG 500 MG/1
2 TABLET, COATED ORAL
Refills: 0 | DISCHARGE

## 2024-12-03 RX ORDER — ACETAMINOPHEN 500MG 500 MG/1
1000 TABLET, COATED ORAL ONCE
Refills: 0 | Status: COMPLETED | OUTPATIENT
Start: 2024-12-03 | End: 2024-12-03

## 2024-12-03 RX ORDER — KETOROLAC TROMETHAMINE 30 MG/ML
15 INJECTION INTRAMUSCULAR; INTRAVENOUS EVERY 6 HOURS
Refills: 0 | Status: DISCONTINUED | OUTPATIENT
Start: 2024-12-03 | End: 2024-12-03

## 2024-12-03 RX ORDER — HEPARIN SODIUM,PORCINE 1000/ML
5000 VIAL (ML) INJECTION EVERY 8 HOURS
Refills: 0 | Status: COMPLETED | OUTPATIENT
Start: 2024-12-03 | End: 2024-12-10

## 2024-12-03 RX ORDER — HYDROMORPHONE HYDROCHLORIDE 2 MG/1
1 TABLET ORAL
Refills: 0 | DISCHARGE

## 2024-12-03 RX ORDER — LIRAGLUTIDE 6 MG/ML
0.6 INJECTION SUBCUTANEOUS
Refills: 0 | DISCHARGE

## 2024-12-03 RX ORDER — IBUPROFEN 200 MG
2 TABLET ORAL
Refills: 0 | DISCHARGE

## 2024-12-03 RX ORDER — AMIODARONE HYDROCHLORIDE 200 MG/1
200 TABLET ORAL DAILY
Refills: 0 | Status: DISCONTINUED | OUTPATIENT
Start: 2024-12-03 | End: 2024-12-12

## 2024-12-03 RX ORDER — CYANOCOBALAMIN/FOLIC AC/VIT B6 1-2.2-25MG
1 TABLET ORAL
Refills: 0 | DISCHARGE

## 2024-12-03 RX ORDER — LIDOCAINE 40 MG/G
1 CREAM TOPICAL EVERY 24 HOURS
Refills: 0 | Status: DISCONTINUED | OUTPATIENT
Start: 2024-12-03 | End: 2024-12-05

## 2024-12-03 RX ADMIN — CEFEPIME 2000 MILLIGRAM(S): 2 INJECTION, POWDER, FOR SOLUTION INTRAVENOUS at 00:01

## 2024-12-03 RX ADMIN — CEFEPIME 2000 MILLIGRAM(S): 2 INJECTION, POWDER, FOR SOLUTION INTRAVENOUS at 07:05

## 2024-12-03 RX ADMIN — KETOROLAC TROMETHAMINE 15 MILLIGRAM(S): 30 INJECTION INTRAMUSCULAR; INTRAVENOUS at 11:49

## 2024-12-03 RX ADMIN — KETOROLAC TROMETHAMINE 15 MILLIGRAM(S): 30 INJECTION INTRAMUSCULAR; INTRAVENOUS at 17:32

## 2024-12-03 RX ADMIN — LIDOCAINE 1 PATCH: 40 CREAM TOPICAL at 18:14

## 2024-12-03 RX ADMIN — LIDOCAINE 1 PATCH: 40 CREAM TOPICAL at 22:30

## 2024-12-03 RX ADMIN — Medication 88 MICROGRAM(S): at 06:34

## 2024-12-03 RX ADMIN — Medication 2 MILLIGRAM(S): at 10:13

## 2024-12-03 RX ADMIN — Medication 10 MILLIGRAM(S): at 02:21

## 2024-12-03 RX ADMIN — SODIUM CHLORIDE 75 MILLILITER(S): 9 INJECTION, SOLUTION INTRAMUSCULAR; INTRAVENOUS; SUBCUTANEOUS at 03:56

## 2024-12-03 RX ADMIN — Medication 1 SPRAY(S): at 21:28

## 2024-12-03 RX ADMIN — KETOROLAC TROMETHAMINE 15 MILLIGRAM(S): 30 INJECTION INTRAMUSCULAR; INTRAVENOUS at 04:25

## 2024-12-03 RX ADMIN — Medication 400 MILLIGRAM(S): at 00:02

## 2024-12-03 RX ADMIN — Medication 150 MILLIGRAM(S): at 10:17

## 2024-12-03 RX ADMIN — AMIODARONE HYDROCHLORIDE 200 MILLIGRAM(S): 200 TABLET ORAL at 10:17

## 2024-12-03 RX ADMIN — Medication 2 MILLIGRAM(S): at 14:13

## 2024-12-03 RX ADMIN — CEFEPIME 2000 MILLIGRAM(S): 2 INJECTION, POWDER, FOR SOLUTION INTRAVENOUS at 21:21

## 2024-12-03 RX ADMIN — ACETAMINOPHEN 500MG 400 MILLIGRAM(S): 500 TABLET, COATED ORAL at 07:12

## 2024-12-03 RX ADMIN — Medication 1 SPRAY(S): at 10:24

## 2024-12-03 RX ADMIN — LIDOCAINE 1 PATCH: 40 CREAM TOPICAL at 07:00

## 2024-12-03 RX ADMIN — Medication 2 MILLIGRAM(S): at 21:20

## 2024-12-03 RX ADMIN — ONDANSETRON HYDROCHLORIDE 4 MILLIGRAM(S): 4 TABLET, FILM COATED ORAL at 20:56

## 2024-12-03 RX ADMIN — Medication 2 MILLIGRAM(S): at 09:13

## 2024-12-03 RX ADMIN — Medication 2 MILLIGRAM(S): at 22:00

## 2024-12-03 RX ADMIN — Medication 1 TABLET(S): at 21:21

## 2024-12-03 RX ADMIN — POLYETHYLENE GLYCOL 3350 17 GRAM(S): 17 POWDER, FOR SOLUTION ORAL at 10:19

## 2024-12-03 RX ADMIN — LIDOCAINE 1 PATCH: 40 CREAM TOPICAL at 10:18

## 2024-12-03 RX ADMIN — METOPROLOL TARTRATE 25 MILLIGRAM(S): 100 TABLET, FILM COATED ORAL at 10:45

## 2024-12-03 RX ADMIN — Medication 0.3 MILLIGRAM(S): at 10:17

## 2024-12-03 RX ADMIN — Medication 1 TABLET(S): at 10:18

## 2024-12-03 RX ADMIN — Medication 2 MILLIGRAM(S): at 13:17

## 2024-12-03 RX ADMIN — PANTOPRAZOLE SODIUM 40 MILLIGRAM(S): 40 TABLET, DELAYED RELEASE ORAL at 06:35

## 2024-12-03 RX ADMIN — Medication 5000 UNIT(S): at 23:22

## 2024-12-03 RX ADMIN — KETOROLAC TROMETHAMINE 15 MILLIGRAM(S): 30 INJECTION INTRAMUSCULAR; INTRAVENOUS at 21:34

## 2024-12-03 RX ADMIN — LAMOTRIGINE 200 MILLIGRAM(S): 50 TABLET, EXTENDED RELEASE ORAL at 10:17

## 2024-12-03 RX ADMIN — ONDANSETRON HYDROCHLORIDE 4 MILLIGRAM(S): 4 TABLET, FILM COATED ORAL at 09:23

## 2024-12-03 RX ADMIN — Medication 10 MILLIGRAM(S): at 16:42

## 2024-12-03 RX ADMIN — ROSUVASTATIN CALCIUM 20 MILLIGRAM(S): 5 TABLET, FILM COATED ORAL at 21:21

## 2024-12-03 RX ADMIN — KETOROLAC TROMETHAMINE 15 MILLIGRAM(S): 30 INJECTION INTRAMUSCULAR; INTRAVENOUS at 21:04

## 2024-12-03 RX ADMIN — LIDOCAINE 1 PATCH: 40 CREAM TOPICAL at 02:21

## 2024-12-03 RX ADMIN — Medication 400 MILLIGRAM(S): at 00:35

## 2024-12-03 RX ADMIN — LIDOCAINE 1 PATCH: 40 CREAM TOPICAL at 19:30

## 2024-12-03 RX ADMIN — KETOROLAC TROMETHAMINE 15 MILLIGRAM(S): 30 INJECTION INTRAMUSCULAR; INTRAVENOUS at 03:55

## 2024-12-03 RX ADMIN — KETOROLAC TROMETHAMINE 15 MILLIGRAM(S): 30 INJECTION INTRAMUSCULAR; INTRAVENOUS at 10:16

## 2024-12-03 RX ADMIN — Medication 2 TABLET(S): at 21:21

## 2024-12-03 RX ADMIN — Medication 1 TABLET(S): at 10:16

## 2024-12-03 RX ADMIN — CEFEPIME 2000 MILLIGRAM(S): 2 INJECTION, POWDER, FOR SOLUTION INTRAVENOUS at 14:52

## 2024-12-03 RX ADMIN — KETOROLAC TROMETHAMINE 15 MILLIGRAM(S): 30 INJECTION INTRAMUSCULAR; INTRAVENOUS at 16:28

## 2024-12-03 RX ADMIN — ACETAMINOPHEN 500MG 1000 MILLIGRAM(S): 500 TABLET, COATED ORAL at 08:34

## 2024-12-03 NOTE — DIETITIAN INITIAL EVALUATION ADULT - PERTINENT MEDS FT
MEDICATIONS  (STANDING):  aMIOdarone    Tablet 200 milliGRAM(s) Oral daily  buPROPion XL (24-Hour) . 150 milliGRAM(s) Oral daily  cefepime  Injectable. 2000 milliGRAM(s) IV Push every 8 hours  chlorhexidine 4% Liquid 1 Application(s) Topical <User Schedule>  colchicine 0.3 milliGRAM(s) Oral daily  fluticasone propionate 50 MICROgram(s)/spray Nasal Spray 1 Spray(s) Both Nostrils two times a day  ketorolac   Injectable 15 milliGRAM(s) IV Push every 6 hours  lactobacillus acidophilus 1 Tablet(s) Oral every 12 hours  lamoTRIgine 200 milliGRAM(s) Oral daily  levothyroxine 88 MICROGram(s) Oral daily  lidocaine   4% Patch 1 Patch Transdermal every 24 hours  lidocaine   4% Patch 1 Patch Transdermal daily  lidocaine   4% Patch 1 Patch Transdermal daily  metoprolol succinate ER 25 milliGRAM(s) Oral daily  multivitamin 1 Tablet(s) Oral daily  naloxone Injectable 0.4 milliGRAM(s) IV Push once  pantoprazole    Tablet 40 milliGRAM(s) Oral before breakfast  polyethylene glycol 3350 17 Gram(s) Oral daily  rosuvastatin 20 milliGRAM(s) Oral at bedtime  senna 2 Tablet(s) Oral at bedtime    MEDICATIONS  (PRN):  acetaminophen     Tablet .. 650 milliGRAM(s) Oral once PRN Temp greater or equal to 38C (100.4F), Mild Pain (1 - 3)  acetaminophen     Tablet .. 1000 milliGRAM(s) Oral every 8 hours PRN Mild Pain (1 - 3)  aluminum hydroxide/magnesium hydroxide/simethicone Suspension 30 milliLiter(s) Oral every 4 hours PRN Dyspepsia  bisacodyl 5 milliGRAM(s) Oral daily PRN Constipation  cyclobenzaprine 10 milliGRAM(s) Oral three times a day PRN Muscle Spasm  fentaNYL    Injectable 25 MICROGram(s) IV Push every 5 minutes PRN Moderate Pain (4 - 6)  guaifenesin/dextromethorphan Oral Liquid 10 milliLiter(s) Oral every 4 hours PRN Cough  hydrocortisone hemorrhoidal Suppository 1 Suppository(s) Rectal two times a day PRN pain  HYDROmorphone  Injectable 0.5 milliGRAM(s) IV Push every 4 hours PRN Severe Pain (7 - 10)  loperamide 2 milliGRAM(s) Oral every 2 hours PRN Diarrhea  meclizine 25 milliGRAM(s) Oral two times a day PRN Dizziness  melatonin 3 milliGRAM(s) Oral at bedtime PRN Insomnia  morphine  - Injectable 2 milliGRAM(s) IV Push once PRN Moderate Pain (4 - 6)  morphine  - Injectable 2 milliGRAM(s) IV Push every 3 hours PRN Moderate Pain (4 - 6)  ondansetron Injectable 4 milliGRAM(s) IV Push every 8 hours PRN Nausea and/or Vomiting  oxyCODONE    IR 5 milliGRAM(s) Oral once PRN Mild Pain (1 - 3)
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
## 2024-12-03 NOTE — PROGRESS NOTE ADULT - SUBJECTIVE AND OBJECTIVE BOX
Subjective:  awake and alert  s/p pericardiocentesis; bilat chest tubes  c/o pain  less sob    MEDICATIONS  (STANDING):  buPROPion XL (24-Hour) . 150 milliGRAM(s) Oral daily  cefepime  Injectable. 2000 milliGRAM(s) IV Push every 8 hours  chlorhexidine 4% Liquid 1 Application(s) Topical <User Schedule>  colchicine 0.3 milliGRAM(s) Oral daily  fluticasone propionate 50 MICROgram(s)/spray Nasal Spray 1 Spray(s) Both Nostrils two times a day  ketorolac   Injectable 15 milliGRAM(s) IV Push every 6 hours  lactobacillus acidophilus 1 Tablet(s) Oral every 12 hours  lamoTRIgine 200 milliGRAM(s) Oral daily  levothyroxine 88 MICROGram(s) Oral daily  lidocaine   4% Patch 1 Patch Transdermal every 24 hours  lidocaine   4% Patch 1 Patch Transdermal daily  lidocaine   4% Patch 1 Patch Transdermal daily  metoprolol succinate ER 25 milliGRAM(s) Oral daily  multivitamin 1 Tablet(s) Oral daily  naloxone Injectable 0.4 milliGRAM(s) IV Push once  pantoprazole    Tablet 40 milliGRAM(s) Oral before breakfast  polyethylene glycol 3350 17 Gram(s) Oral daily  rosuvastatin 20 milliGRAM(s) Oral at bedtime  senna 2 Tablet(s) Oral at bedtime  sodium chloride 0.9%. 1000 milliLiter(s) (75 mL/Hr) IV Continuous <Continuous>  sodium chloride 0.9%. 1000 milliLiter(s) (75 mL/Hr) IV Continuous <Continuous>    MEDICATIONS  (PRN):  acetaminophen     Tablet .. 650 milliGRAM(s) Oral once PRN Temp greater or equal to 38C (100.4F), Mild Pain (1 - 3)  acetaminophen     Tablet .. 1000 milliGRAM(s) Oral every 8 hours PRN Mild Pain (1 - 3)  aluminum hydroxide/magnesium hydroxide/simethicone Suspension 30 milliLiter(s) Oral every 4 hours PRN Dyspepsia  bisacodyl 5 milliGRAM(s) Oral daily PRN Constipation  cyclobenzaprine 10 milliGRAM(s) Oral three times a day PRN Muscle Spasm  fentaNYL    Injectable 25 MICROGram(s) IV Push every 5 minutes PRN Moderate Pain (4 - 6)  guaifenesin/dextromethorphan Oral Liquid 10 milliLiter(s) Oral every 4 hours PRN Cough  hydrocortisone hemorrhoidal Suppository 1 Suppository(s) Rectal two times a day PRN pain  HYDROmorphone  Injectable 0.2 milliGRAM(s) IV Push every 4 hours PRN Moderate Pain (4 - 6)  HYDROmorphone  Injectable 0.5 milliGRAM(s) IV Push every 4 hours PRN Severe Pain (7 - 10)  loperamide 2 milliGRAM(s) Oral every 2 hours PRN Diarrhea  meclizine 25 milliGRAM(s) Oral two times a day PRN Dizziness  melatonin 3 milliGRAM(s) Oral at bedtime PRN Insomnia  morphine  - Injectable 2 milliGRAM(s) IV Push once PRN Moderate Pain (4 - 6)  ondansetron Injectable 4 milliGRAM(s) IV Push every 8 hours PRN Nausea and/or Vomiting  ondansetron Injectable 4 milliGRAM(s) IV Push once PRN Nausea and/or Vomiting  oxyCODONE    IR 5 milliGRAM(s) Oral once PRN Mild Pain (1 - 3)      Allergies    sulfa drugs (Vomiting; Nausea)  IV Contrast (Hives)    Intolerances        REVIEW OF SYSTEMS:    CONSTITUTIONAL:  As per HPI.  HEENT:  Eyes:  No diplopia or blurred vision. ENT:  No earache, sore throat or runny nose.  CARDIOVASCULAR:  No pressure, squeezing, tightness, heaviness or aching about the chest, neck, axilla or epigastrium.  RESPIRATORY:  dyspnea  GASTROINTESTINAL:  No nausea, vomiting or diarrhea.  GENITOURINARY:  No dysuria, frequency or urgency.  MUSCULOSKELETAL:  no joint pain, deformity, tenderness  EXTREMITIES: no clubbing cyanosis,edema  SKIN:  No change in skin, hair or nails.  NEUROLOGIC:  No paresthesias, fasciculations, seizures or weakness.  PSYCHIATRIC:  No disorder of thought or mood.  ENDOCRINE:  No heat or cold intolerance, polyuria or polydipsia.  HEMATOLOGICAL:  No easy bruising or bleedings:    Vital Signs Last 24 Hrs  T(C): 36 (03 Dec 2024 00:00), Max: 36.3 (02 Dec 2024 11:23)  T(F): 96.8 (03 Dec 2024 00:00), Max: 97.4 (02 Dec 2024 11:23)  HR: 67 (03 Dec 2024 07:08) (53 - 71)  BP: 157/48 (03 Dec 2024 07:08) (106/79 - 165/61)  BP(mean): 80 (03 Dec 2024 07:08) (61 - 93)  RR: 26 (03 Dec 2024 07:08) (13 - 34)  SpO2: 94% (03 Dec 2024 07:08) (90% - 99%)    Parameters below as of 03 Dec 2024 07:08  Patient On (Oxygen Delivery Method): nasal cannula  O2 Flow (L/min): 5      PHYSICAL EXAMINATION:  SKIN: no rashes  HEAD: NC/AT  EYES: PERRLA, EOMI  NECK:  Supple. No lymphadenopathy. Jugular venous pressure not elevated. Carotids equal.   HEART:   S1S2 reg  CHEST:  bilat ronchi; improved bs bilat; bilat chest tubes  ABDOMEN:  Soft and nontender.   EXTREMITIES:  no C/C/E  NEURO: AAO x 3, no focal deficts       LABS:                        8.8    18.29 )-----------( 754      ( 03 Dec 2024 05:29 )             29.2     12-03    134[L]  |  107  |  16  ----------------------------<  115[H]  4.0   |  22  |  0.69    Ca    8.0[L]      03 Dec 2024 05:29  Phos  3.9     12-03  Mg     1.8     12-03    TPro  5.4[L]  /  Alb  2.0[L]  /  TBili  0.3  /  DBili  x   /  AST  6[L]  /  ALT  14  /  AlkPhos  103  12-03    PT/INR - ( 02 Dec 2024 10:43 )   PT: 16.7 sec;   INR: 1.42 ratio         PTT - ( 02 Dec 2024 10:43 )  PTT:32.7 sec  Urinalysis Basic - ( 03 Dec 2024 05:29 )    Color: x / Appearance: x / SG: x / pH: x  Gluc: 115 mg/dL / Ketone: x  / Bili: x / Urobili: x   Blood: x / Protein: x / Nitrite: x   Leuk Esterase: x / RBC: x / WBC x   Sq Epi: x / Non Sq Epi: x / Bacteria: x        RADIOLOGY & ADDITIONAL TESTS:

## 2024-12-03 NOTE — PROGRESS NOTE ADULT - SUBJECTIVE AND OBJECTIVE BOX
INTERVAL HPI/OVERNIGHT EVENTS:    S/p bilateral chest tubes and pericardiocentesis; significant pain with receiving bedside TTE       VITAL SIGNS:  T(F): 97.4 (12-02-24 @ 11:23)  HR: 62 (12-02-24 @ 11:23)  BP: 134/44 (12-02-24 @ 11:23)  RR: 22 (12-02-24 @ 11:23)  SpO2: 98% (12-02-24 @ 11:23)  Wt(kg): --    PHYSICAL EXAM:    Constitutional: NAD  Eyes: EOMI, sclera non-icteric  Neck: supple  Respiratory: depressed BS bases b/l  Cardiovascular: RRR  Gastrointestinal: soft, NTND, no splenomegaly  Extremities: no edema  MSK: bilateral chest tubes in place; s/p pericardiocentesis with catheter in place   Neurological: AAOx3                Allergies    sulfa drugs (Vomiting; Nausea)  IV Contrast (Hives)    Intolerances        LABS:                                      8.8    18.29 )-----------( 754      ( 03 Dec 2024 05:29 )             29.2     12-03    134[L]  |  107  |  16  ----------------------------<  115[H]  4.0   |  22  |  0.69    Ca    8.0[L]      03 Dec 2024 05:29  Phos  3.9     12-03  Mg     1.8     12-03    TPro  5.4[L]  /  Alb  2.0[L]  /  TBili  0.3  /  DBili  x   /  AST  6[L]  /  ALT  14  /  AlkPhos  103  12-03    PT/INR - ( 02 Dec 2024 10:43 )   PT: 16.7 sec;   INR: 1.42 ratio         PTT - ( 02 Dec 2024 10:43 )  PTT:32.7 sec        RADIOLOGY & ADDITIONAL TESTS:  Studies reviewed.

## 2024-12-03 NOTE — PROGRESS NOTE ADULT - NS ATTEND AMEND GEN_ALL_CORE FT
Exudative effusions, chest tubes/pericardial drains in place.  Cytology, gm stain negative but on empiric cefepime.   Thrombocytosis likely reactive, hold on any therapy for now.    Eventual plan for bone marrow/treatment with jakafi.  Anemia worsened due to hospitalization/suppression- Hct 45 in September.   Continue care per CCU/cardiology/ID.

## 2024-12-03 NOTE — DIETITIAN INITIAL EVALUATION ADULT - ADD RECOMMEND
1. Recommend to liberalize diet to regular to maximize caloric and nutrient intake.   2. Add ensure plus high protein BID to optimize PO intake (provides 350 kcal, 20g protein/ shake)  3. C/w MVI w/ minerals daily to ensure 100% RDA met  4. Monitor bowel movements, if no BM for >3 days, consider implementing stronger bowel regimen.   5. Monitor daily lytes/min and replete prn  6. Encourage protein-rich foods, maximize food preferences  7. Monitor daily wt to track/trend changes; strict I/Os  8. Confirm goals of care regarding nutrition support  RD will continue to monitor PO intake, labs, hydration, and wt prn.

## 2024-12-03 NOTE — PROGRESS NOTE ADULT - ASSESSMENT
- cont O2  - s/p pericardiocentesis and bilat thoracentesis/chest tubes  - cxr shows significant improvement in pleural effussions  - pleural fluid consistent w lymphocytic/monocytic exudate  - pericadial fluid bloody but no studies sent  - has pleuropericarditis; extensive rheumatologic w/u at [facility] which was negative; had mildly positive ALEXY  - on cefepime  - would repeat ct scan in 2-3 days  - pain control

## 2024-12-03 NOTE — DIETITIAN INITIAL EVALUATION ADULT - OTHER INFO
63 y/o F with PMHx of HTN, HLD, hypothyroid, primary lymphedema, central thrombocytosis, GERD post bariatric surgery, ischemic colitis s/p left hemicolectomy, pAF and DVT/PE on Eliquis, HFpEF, and recently 2 hospitalizations at [facility] in the past 2 months for CAP, and also diagnosed with pericarditis, and HFpEF. Was last discharged home on 11/29 on PO ATBs (Doxycycline). Now BIB pvt car on 11/30 after visiting RN recommended  further assessment of back & chest pain, SOB, and productive cough. Pt reports symptoms similar to why/when she was readmitted to [facility] w/ Dx of pericarditis/CHF/PNA. Imaging w/ evidence of BL pleural effusions with superimposed PNA. Pericarditis with pericardial effusion. Transferred to CCU for pericardiocentesis 12/2.  s/p IR pericardiocentesis; bilat chest tube. CXR showing sig improvement in pleural effusions. Admitting diagnosis: pericardial effusion, BL pleural effusion. HFpEF    Pt unarousable at time of RD visit. RD obtained bed scale wt 153# on 12/3/24. No wt history to review. Unable to identify any pertinent wt changes at this time. Partial NFPE 2/2 pt unarousable a w/ glasses on - revealed only mild muscle wasting at this time. Repeat NFPE when possible; at risk for PCM. Recommend to liberalize diet to regular to maximize caloric and nutrient intake. Will add ensure plus high protein BID to optimize PO intake (provides 350 kcal, 20g protein/ shake). Please see additional recommendations below.

## 2024-12-03 NOTE — PROGRESS NOTE ADULT - SUBJECTIVE AND OBJECTIVE BOX
Interval History:     Terrence had bilateral pigtail catheters placed yesterday     and pericardial drain with bloody drainage     BP improved     c/o severe pain     Pericardial drain, drained 525 cc initially minimally since     1200 from right tube     1000 from left tube     cx negative    HPI:  64-year-old F with PMH of HTN, HLD, hypothyroid,   thrombocytosis, GERD, ischemic colitis s/p left hemicolectomy, pAF and DVT/PE on Eliquis,and recently 2 hospitalizations at [facility] in the past 2 months for CAP, and also diagnosed with pericarditis, and HFpEF. Was last discharged home on 11/29  was BIB pvt car on 11/30 after visiting RN recommended  further assessment of back & chest pain, SOB, and productive cough. Pt reports symptoms similar to why/when she was readmitted to [facility] w/ Dx of pericarditis/CHF/PNA.  Pt upset w/ care rendered at [facility] despite her doctors all there, so came to  ED foe eval/management.   CT chest and echo shows bibasilar effusions, and pericardial effusion with rv collapse  Patient had recently had treatment for HCAP at [facility]  had been on interferon   all rheum labs at [facility] were negative was seen by rheumatology not felt to be rheumatolgic  was being treated with colchicine for pericarditis    ROS  complaining of pleuritic pain, no fevers, no chills no abdominal pain, still slightly sob     MEDICATIONS  (STANDING):  aMIOdarone    Tablet 200 milliGRAM(s) Oral daily  buPROPion XL (24-Hour) . 150 milliGRAM(s) Oral daily  cefepime  Injectable. 2000 milliGRAM(s) IV Push every 8 hours  chlorhexidine 4% Liquid 1 Application(s) Topical <User Schedule>  colchicine 0.3 milliGRAM(s) Oral daily  fluticasone propionate 50 MICROgram(s)/spray Nasal Spray 1 Spray(s) Both Nostrils two times a day  ketorolac   Injectable 15 milliGRAM(s) IV Push every 6 hours  lactobacillus acidophilus 1 Tablet(s) Oral every 12 hours  lamoTRIgine 200 milliGRAM(s) Oral daily  levothyroxine 88 MICROGram(s) Oral daily  lidocaine   4% Patch 1 Patch Transdermal every 24 hours  lidocaine   4% Patch 1 Patch Transdermal daily  lidocaine   4% Patch 1 Patch Transdermal daily  metoprolol succinate ER 25 milliGRAM(s) Oral daily  multivitamin 1 Tablet(s) Oral daily  naloxone Injectable 0.4 milliGRAM(s) IV Push once  pantoprazole    Tablet 40 milliGRAM(s) Oral before breakfast  polyethylene glycol 3350 17 Gram(s) Oral daily  rosuvastatin 20 milliGRAM(s) Oral at bedtime  senna 2 Tablet(s) Oral at bedtime    MEDICATIONS  (PRN):  acetaminophen     Tablet .. 650 milliGRAM(s) Oral once PRN Temp greater or equal to 38C (100.4F), Mild Pain (1 - 3)  acetaminophen     Tablet .. 1000 milliGRAM(s) Oral every 8 hours PRN Mild Pain (1 - 3)  aluminum hydroxide/magnesium hydroxide/simethicone Suspension 30 milliLiter(s) Oral every 4 hours PRN Dyspepsia  bisacodyl 5 milliGRAM(s) Oral daily PRN Constipation  cyclobenzaprine 10 milliGRAM(s) Oral three times a day PRN Muscle Spasm  fentaNYL    Injectable 25 MICROGram(s) IV Push every 5 minutes PRN Moderate Pain (4 - 6)  guaifenesin/dextromethorphan Oral Liquid 10 milliLiter(s) Oral every 4 hours PRN Cough  hydrocortisone hemorrhoidal Suppository 1 Suppository(s) Rectal two times a day PRN pain  HYDROmorphone  Injectable 0.5 milliGRAM(s) IV Push every 4 hours PRN Severe Pain (7 - 10)  loperamide 2 milliGRAM(s) Oral every 2 hours PRN Diarrhea  meclizine 25 milliGRAM(s) Oral two times a day PRN Dizziness  melatonin 3 milliGRAM(s) Oral at bedtime PRN Insomnia  morphine  - Injectable 2 milliGRAM(s) IV Push once PRN Moderate Pain (4 - 6)  morphine  - Injectable 2 milliGRAM(s) IV Push every 3 hours PRN Moderate Pain (4 - 6)  ondansetron Injectable 4 milliGRAM(s) IV Push every 8 hours PRN Nausea and/or Vomiting  oxyCODONE    IR 5 milliGRAM(s) Oral once PRN Mild Pain (1 - 3)    ICU Vital Signs Last 24 Hrs  T(C): 36 (03 Dec 2024 00:00), Max: 36.2 (02 Dec 2024 14:42)  T(F): 96.8 (03 Dec 2024 00:00), Max: 97.1 (02 Dec 2024 14:42)  HR: 70 (03 Dec 2024 11:00) (53 - 72)  BP: 156/48 (03 Dec 2024 09:00) (106/79 - 165/61)  BP(mean): 81 (03 Dec 2024 09:00) (61 - 93)  ABP: --  ABP(mean): --  RR: 23 (03 Dec 2024 11:00) (13 - 34)  SpO2: 93% (03 Dec 2024 11:00) (90% - 99%)    O2 Parameters below as of 03 Dec 2024 07:08  Patient On (Oxygen Delivery Method): nasal cannula  O2 Flow (L/min): 5    Physical Exxam    General - uncomfortable  Neuro awake and alert  heent nc/at  neck supple  lungs dec bs bases bilaterally   cv rrr, tubes in pancho ce  abdomen soft, non tender  extremities warm    I&O's Summary    02 Dec 2024 07:01  -  03 Dec 2024 07:00  --------------------------------------------------------  IN: 2043.4 mL / OUT: 4173 mL / NET: -2129.6 mL                          8.8    18.29 )-----------( 754      ( 03 Dec 2024 05:29 )             29.2       12-03    134[L]  |  107  |  16  ----------------------------<  115[H]  4.0   |  22  |  0.69    Ca    8.0[L]      03 Dec 2024 05:29  Phos  3.9     12-03  Mg     1.8     12-03    TPro  5.4[L]  /  Alb  2.0[L]  /  TBili  0.3  /  DBili  x   /  AST  6[L]  /  ALT  14  /  AlkPhos  103  12-03    Urinalysis Basic - ( 03 Dec 2024 05:29 )    Color: x / Appearance: x / SG: x / pH: x  Gluc: 115 mg/dL / Ketone: x  / Bili: x / Urobili: x   Blood: x / Protein: x / Nitrite: x   Leuk Esterase: x / RBC: x / WBC x   Sq Epi: x / Non Sq Epi: x / Bacteria: x    I personally reviewed the CXR: improved aeration of lung    DVT Prophylaxis:  venodynes, start hep sq                                                               Advanced Directives: Full Code         Labs, Notes, Orders, radiologic studies reviewed and care coordinated with multidisciplinary team

## 2024-12-03 NOTE — PROGRESS NOTE ADULT - ASSESSMENT
This is a 64 year old female with a history of essential thrombocythemia for the past 23 years, followed by Dr. Mann Urbina at [facility].  Intolerant to anagrelide due to afib, on hydrea until about 6-12 months ago with no response, most recently on pegylated IFN alpha (Besrema) for the past 6 months- intolerant as well.  Stopped 2 months ago.   Was planned on a marrow and to begin Jakafi- not yet.     History of DVT/PE (after birth control), PAF on amio/eliquis- last dose yesterday am.  History of bleeding/acquired vW disease- receives DDAVP prior to procedures.     She has had multiple hospitalizations in the last month due to pneumonia/pericarditis, readmitted for the third time- cough/pleuritic chest pain.   CT showed bilat pleural effusions and complete atelectasis LLL, large pericardial effusion.     S/p pericardiocentesis 12/02; s/p DDAVP 0.3mcg/kg 30min prior to procedures in setting of vWD  PT 16.7/PTT 32.7     Her plt are stable/better. (Prior counts in HIE  In March 2024- Ht 45  plts 779    In sept 2023  plts 700 K- 1000 K)  No splenomegaly on exam.   Continue with plan for repeat BM bx and then treatment with Jakafi once she is more stable.  Resume Eliquis / AC therapy per CCU / Cardio teams    Worsening anemia in the setting of infection/multiple hospitalizations/blood draws. Iron deficiency +/- suppression from acute infection.   Agree with checking occult blood.   Transfusional support as needed.  Pain management         Duncan Head PA-C  Hematology Oncology  [facility] [place]  [phone]

## 2024-12-03 NOTE — DIETITIAN INITIAL EVALUATION ADULT - NSFNSGIIOFT_GEN_A_CORE
12-02-24 @ 07:01  -  12-03-24 @ 07:00  --------------------------------------------------------  OUT:    Chest Tube (mL): 1238 mL    Chest Tube (mL): 1085 mL  Total OUT: 2323 mL    Total NET: -2323 mL

## 2024-12-03 NOTE — PROGRESS NOTE ADULT - ASSESSMENT
64 female presenting presenting with a moderate pericardial effusion  Recent Pericarditis  AFIB  HFpEF not acute  HTN  Gastric Ulder(remote)  NSVT  Essential Thrombocytosis  Von Willebrand's  DVT/PE  Hypothyroidism    Plan:       PULM: B/L Effusions,  s/p bilateral pigtails, cultures negative, check fluid studies  Cardio: Continue Colchicine  for pericarditis  Drainage of pericardial fluid bloody,   No signs of acute heart failure, Hold DOAC, Continue Amio and metoprolol,     Renal: d.c iv fluids  GI:  diet as tolerated  ENDO: Continue Synthroid, TSH [id number]  RHEUM: W/U at [facility] negative,   HEM:  heme input appreciated, was given ddavp prior to procedure  Venodynes for DVT Prophylaxis,Heparin subq

## 2024-12-03 NOTE — DIETITIAN INITIAL EVALUATION ADULT - PERTINENT LABORATORY DATA
12-03    134[L]  |  107  |  16  ----------------------------<  115[H]  4.0   |  22  |  0.69    Ca    8.0[L]      03 Dec 2024 05:29  Phos  3.9     12-03  Mg     1.8     12-03    TPro  5.4[L]  /  Alb  2.0[L]  /  TBili  0.3  /  DBili  x   /  AST  6[L]  /  ALT  14  /  AlkPhos  103  12-03

## 2024-12-03 NOTE — PROGRESS NOTE ADULT - SUBJECTIVE AND OBJECTIVE BOX
HPI:  CHIEF COMPLAINT: Patient is a 64y old  Female who presents with a chief complaint of chest pain.    HPI:  * I know Mrs. Jimenez from the outpatient setting but haven't seen her since 2023.   Kitty Jimenez is a 64-year-old woman with a history of right ventricular outflow tract ventricular tachycardia s/p remote ablation (; "partially successful" with subsequent PVCs treated with verapamil), hypertension, hypothyroidism, ischemic colitis, von Willebrand's disease, essential thrombocytosis, obesity s/p gastric bypass, hypertension, DVT/PE, and hospitalization in 2023 for respiratory failure, who has been ill since late October.  She was admitted to [facility] twice with respiratory failure, bacterial pneumonia, acute kidney injury, pericarditis, and pericardial effusion.  She was discharged on  but felt poor on  and came to the [facility] ED.  Her primary complaint was chest pain and shortness of breath.  No clear exacerbating or alleviating factors.  Chest pain waxes/wanes; she does not describe typical angina.  A CT coronary was performed at [facility] in November -- results not available for me to review but patient reports "normal."  Her pericarditis was treated with colchicine and ibuprofen; PAF treated with amiodarone and Eliquis.    : some improvement in symptoms overnight.  12/3/24  Patient is complaining of having sharp chest pain left sided chest , increase motion ,deep breathing ,  started last night in addition to prior chest pain , patient had bilateral CT placed , and pericardial drain in place ,   patient was treated for pleuropericarditis [facility] [place]     MEDICATIONS:  OUTPATIENT  Home Medications:  acetaminophen 325 mg oral tablet: 2 tab(s) orally every 6 hours as needed for (2024 23:15)  acetaminophen 325 mg oral tablet: 2 tab(s) orally every 6 hours as needed for (2024 23:15)  acetaminophen 325 mg oral tablet: 2 tab(s) orally every 6 hours as needed for (2024 23:15)  amiodarone 200 mg oral tablet: 2 tab(s) orally 2 times a day ***until 24.... then 400mg QD*** (2024 23:15)  amLODIPine 5 mg oral tablet: 1 tab(s) orally once a day (:15)  Anumed-HC 25 mg rectal suppository: 1 suppository(ies) rectally 2 times a day as needed for (15)  apixaban 5 mg oral tablet: 1 tab(s) orally every 12 hours (15)  azelastine 137 mcg/inh (0.1%) nasal spray: 1 spray(s) in each nostril 2 times a day (2024 23:15)  colchicine 0.6 mg oral tablet: 0.5 tab(s) orally once a day (:15)  cyclobenzaprine 10 mg oral tablet: 1 tab(s) orally 3 times a day as needed for (2024 23:15)  enalapril 20 mg oral tablet: 1 tab(s) orally once a day (in the morning) (:15)  fluticasone 50 mcg/inh nasal spray: 2 spray(s) in each nostril once a day as needed for (15)  hydroCHLOROthiazide 12.5 mg oral tablet: 1 tab(s) orally 2 times a day as needed for (15)  HYDROmorphone 2 mg oral tablet: 1 tab(s) orally every 6 hours as needed for (15)  HYDROmorphone 2 mg oral tablet: 1 tab(s) orally every 6 hours as needed for (:15)  HYDROmorphone 2 mg oral tablet: 1 tab(s) orally every 6 hours as needed for (15)  ibuprofen 200 mg oral capsule: 2 cap(s) orally every 8 hours as needed for (:15)  ibuprofen 200 mg oral capsule: 2 cap(s) orally every 8 hours as needed for (:15)  ibuprofen 200 mg oral capsule: 2 cap(s) orally every 8 hours as needed for (:15)  lamoTRIgine 200 mg oral tablet: 1 tab(s) orally once a day (:15)  levothyroxine 88 mcg (0.088 mg) oral tablet: 1 tab(s) orally once a day (2024 23:15)  linaclotide 72 mcg oral capsule: 1 cap(s) orally once a day as needed for (2024 23:15)  liraglutide 18 mg/3 mL subcutaneous solution: 0.6 milligram(s) subcutaneously once a day as needed for (2024 23:15)  loperamide 2 mg oral capsule: 2 cap(s) orally every 2 hours as needed for (2024 23:15)  meclizine 25 mg oral tablet: 1 tab(s) orally 2 times a day as needed for (2024 23:15)  Metoprolol Succinate ER 25 mg oral tablet, extended release: 1 tab(s) orally once a day (2024 23:15)  Multiple Vitamins oral tablet: 1 tab(s) orally once a day (2024 23:15)  ondansetron 4 mg oral tablet, disintegratin tab(s) orally 3 times a day as needed for (2024 23:15)  pantoprazole 40 mg oral delayed release tablet: 1 tab(s) orally once a day (2024 23:15)  rosuvastatin 20 mg oral tablet: 1 tab(s) orally once a day (2024 23:15)  senna (sennosides) 8.6 mg oral tablet: 2 tab(s) orally once a day (at bedtime) as needed for (2024 23:15)  Wellbutrin  mg/24 hours oral tablet, extended release: 1 tab(s) orally once a day (2024 23:15)    MEDICATIONS  (STANDING):  buPROPion XL (24-Hour) . 150 milliGRAM(s) Oral daily  cefepime  Injectable. 2000 milliGRAM(s) IV Push every 8 hours  chlorhexidine 4% Liquid 1 Application(s) Topical <User Schedule>  colchicine 0.3 milliGRAM(s) Oral daily  fluticasone propionate 50 MICROgram(s)/spray Nasal Spray 1 Spray(s) Both Nostrils two times a day  ketorolac   Injectable 15 milliGRAM(s) IV Push every 6 hours  lactobacillus acidophilus 1 Tablet(s) Oral every 12 hours  lamoTRIgine 200 milliGRAM(s) Oral daily  levothyroxine 88 MICROGram(s) Oral daily  lidocaine   4% Patch 1 Patch Transdermal every 24 hours  lidocaine   4% Patch 1 Patch Transdermal daily  lidocaine   4% Patch 1 Patch Transdermal daily  metoprolol succinate ER 25 milliGRAM(s) Oral daily  multivitamin 1 Tablet(s) Oral daily  naloxone Injectable 0.4 milliGRAM(s) IV Push once  pantoprazole    Tablet 40 milliGRAM(s) Oral before breakfast  polyethylene glycol 3350 17 Gram(s) Oral daily  rosuvastatin 20 milliGRAM(s) Oral at bedtime  senna 2 Tablet(s) Oral at bedtime  sodium chloride 0.9%. 1000 milliLiter(s) (75 mL/Hr) IV Continuous <Continuous>  sodium chloride 0.9%. 1000 milliLiter(s) (75 mL/Hr) IV Continuous <Continuous>    MEDICATIONS  (PRN):  acetaminophen     Tablet .. 650 milliGRAM(s) Oral once PRN Temp greater or equal to 38C (100.4F), Mild Pain (1 - 3)  acetaminophen     Tablet .. 1000 milliGRAM(s) Oral every 8 hours PRN Mild Pain (1 - 3)  aluminum hydroxide/magnesium hydroxide/simethicone Suspension 30 milliLiter(s) Oral every 4 hours PRN Dyspepsia  bisacodyl 5 milliGRAM(s) Oral daily PRN Constipation  cyclobenzaprine 10 milliGRAM(s) Oral three times a day PRN Muscle Spasm  fentaNYL    Injectable 25 MICROGram(s) IV Push every 5 minutes PRN Moderate Pain (4 - 6)  guaifenesin/dextromethorphan Oral Liquid 10 milliLiter(s) Oral every 4 hours PRN Cough  hydrocortisone hemorrhoidal Suppository 1 Suppository(s) Rectal two times a day PRN pain  HYDROmorphone  Injectable 0.2 milliGRAM(s) IV Push every 4 hours PRN Moderate Pain (4 - 6)  HYDROmorphone  Injectable 0.5 milliGRAM(s) IV Push every 4 hours PRN Severe Pain (7 - 10)  loperamide 2 milliGRAM(s) Oral every 2 hours PRN Diarrhea  meclizine 25 milliGRAM(s) Oral two times a day PRN Dizziness  melatonin 3 milliGRAM(s) Oral at bedtime PRN Insomnia  morphine  - Injectable 2 milliGRAM(s) IV Push once PRN Moderate Pain (4 - 6)  ondansetron Injectable 4 milliGRAM(s) IV Push every 8 hours PRN Nausea and/or Vomiting  ondansetron Injectable 4 milliGRAM(s) IV Push once PRN Nausea and/or Vomiting  oxyCODONE    IR 5 milliGRAM(s) Oral once PRN Mild Pain (1 - 3)    Vital Signs Last 24 Hrs  T(C): 36 (03 Dec 2024 00:00), Max: 36.3 (02 Dec 2024 11:23)  T(F): 96.8 (03 Dec 2024 00:00), Max: 97.4 (02 Dec 2024 11:23)  HR: 67 (03 Dec 2024 07:08) (53 - 71)  BP: 157/48 (03 Dec 2024 07:08) (106/79 - 165/61)  BP(mean): 80 (03 Dec 2024 07:08) (61 - 93)  RR: 26 (03 Dec 2024 07:08) (13 - 34)  SpO2: 94% (03 Dec 2024 07:08) (90% - 99%)    Parameters below as of 03 Dec 2024 07:08  Patient On (Oxygen Delivery Method): nasal cannula  O2 Flow (L/min): 5      I&O's Summary    02 Dec 2024 07:01  -  03 Dec 2024 07:00  --------------------------------------------------------  IN: 2043.4 mL / OUT: 4173 mL / NET: -2129.6 mL        PHYSICAL EXAM:    Constitutional: NAD, awake and alert, appears comfortable  HEENT:  No oral cyanosis.  Pulmonary: Non-labored, breath sounds are clear   but with decreased breath sounds mid to lower lung fields , now bilateral chest tube   Cardiovascular: S1 and S2, regular rate and rhythm , pericardial drain in place ,   Gastrointestinal: Bowel Sounds present, soft, nontender.   Lymph: No edema.  Neurological: Alert, no focal deficits  Skin: No rashes.  Psych:  Mood & affect appropriate    ===============================  ===============================  LABS:                                      8.8    18.29 )-----------( 754      ( 03 Dec 2024 05:29 )             29.2         134[L]  |  107  |  16  ----------------------------<  115[H]  4.0   |  22  |  0.69    Ca    8.0[L]      03 Dec 2024 05:29  Phos  3.9       Mg     1.8         TPro  5.4[L]  /  Alb  2.0[L]  /  TBili  0.3  /  DBili  x   /  AST  6[L]  /  ALT  14  /  AlkPhos  103          LIVER FUNCTIONS - ( 03 Dec 2024 05:29 )  Alb: 2.0 g/dL / Pro: 5.4 gm/dL / ALK PHOS: 103 U/L / ALT: 14 U/L / AST: 6 U/L / GGT: x           PT/INR - ( 02 Dec 2024 10:43 )   PT: 16.7 sec;   INR: 1.42 ratio         PTT - ( 02 Dec 2024 10:43 )  PTT:32.7 sec  Urinalysis Basic - ( 03 Dec 2024 05:29 )    Color: x / Appearance: x / SG: x / pH: x  Gluc: 115 mg/dL / Ketone: x  / Bili: x / Urobili: x   Blood: x / Protein: x / Nitrite: x   Leuk Esterase: x / RBC: x / WBC x   Sq Epi: x / Non Sq Epi: x / Bacteria: x      - TroponinI hsT: <-17.62  Culture Results:   Testing in progress (24 @ 13:40)  Culture Results:   No growth at 48 Hours (24 @ 16:37)  Culture Results:   No growth at 48 Hours (24 @ 16:28)  CARDIAC BIOMARKERS:  BNP  Pro-Brain Natriuretic Peptide: 3214 pg/mL (24 @ 16:37)     TROPONIN  Troponin I, High Sensitivity Result: 17.62 ng/L (24 @ 16:37)    Troponin I, High Sensitivity Result: 86.3 ng/L *H* (10-06-21 @ 19:08)  Troponin I, High Sensitivity Result: 130.6 ng/L *H* (10-06-21 @ 14:12)  Troponin I, High Sensitivity Result: 142.2 ng/L *H* (10-06-21 @ 11:35)  Troponin I, High Sensitivity Result: 93.1 ng/L *H* (10-06-21 @ 08:41)  Troponin I, High Sensitivity Result: 61.6 ng/L *H* (10-06-21 @ 05:46)  Troponin I, Serum: <0.015 ng/mL [0.015 - 0.045] (20 @ 22:01)    ===============================  ===============================  CT Chest No Cont (24 @ 20:34): Moderately large simple pericardial effusion.   Small to moderate right and small left pleural effusions with associated   compressive atelectasis. Superimposed pneumonia should be excluded on a clinical basis.  ------------------------------  ECG: Sinus rhythm, low QRS voltage, nonspecific ST/T abnormality  ------------------------------  TRANSTHORACIC ECHOCARDIOGRAM REPORT  Pt. Name:       KITTY JIMENEZ Study Date:    2024     CONCLUSIONS:      1. Left ventricular systolic function is normal with an ejection fraction of 67 %   by Jeffers's method of disks.   2. Large left pleural effusion noted.   3. Large pericardial effusion with evidence of hemodynamic compromise   (or echocardiographic evidence of cardiac tamponade).   4. The left ventricular diastolic function is indeterminate.   5. Normal right ventricular cavity size and normal right ventricular systolic function.   6. Left atrium is moderately dilated.   7. The right atrium is normal in size.   8. Mild pulmonic regurgitation.   9. Trace tricuspid regurgitation.  10. The inferior vena cava is dilated measuring 2.50 cm in diameter, (dilated >2.1cm)  with abnormal inspiratory collapse (abnormal <50%) consistent   with elevated right atrial pressure (~15, range 10-20mmHg).  11. Mild left ventricular hypertrophy.  12. Trileaflet aortic valve with normal systolic excursion.  13. Structurally normal mitral valve with normal leaflet excursion.    ________________________________________________________________________________________  FINDINGS:     Left Ventricle:  The left ventricular cavity is small. Left ventricular systolic function is normal with a   calculated ejection fraction of 67 % by the Jeffers's biplane method of disks.   The left ventricular diastolic function is indeterminate.     Right Ventricle:  The right ventricular cavity is normal in size and right ventricular systolic function is normal.   Tricuspid annular plane systolic excursion (TAPSE) is 2.1 cm (normal >=1.7 cm).     Left Atrium:  The left atrium is moderately dilated with an indexed volume of 44.06 ml/m². The pulmonary venous flow pattern is normal.  ............................  Pericardium:  Organized fibrinous vs coagulant material is seen in the pericardial space.  There is a large pericardial effusion measuring 4.00 cm   with evidence of hemodynamic compromise (or echocardiographic evidence of cardiac tamponade).     Pleura:  Large left pleural effusion noted.     Systemic Veins:  The inferior vena cava is dilated measuring 2.50 cm in diameter, (dilated >2.1cm) with   abnormal inspiratory collapse (abnormal <50%) consistent with elevated right atrial pressure (~15, range 10-20mmHg).  ____________________________________________________________________  ________________________________________________________________________________________  Electronically signed on 2024 at 8:55:40 AM by Sacha Galindo  ==============================    < from: 12 Lead ECG (24 @ 16:19) >  Normal sinus rhythm  Low voltage QRS  Septal infarct , age undetermined  T wave abnormality, consider lateral ischemia  Abnormal ECG  Confirmed by Harsh Gracia () on 2024 10:30:55 AM    < end of copied text >  < from: Xray Chest 1 View- PORTABLE-Urgent (Xray Chest 1 View- PORTABLE-Urgent .) (24 @ 15:20) >  IMPRESSION:  New bilateral pigtail catheters.    New small right pneumothorax.    Continued patchy right lower lung opacity, possibly subsegmental   atelectasis or infection.    Slightly improved aeration associated with left lower/retrocardiac   opacity, possibly a left pleural effusion with associated passive   atelectasis as on the CT scan. Other underlying pathology, including   infection, is not excluded.      < end of copied text >    ===============================

## 2024-12-03 NOTE — PROGRESS NOTE ADULT - ASSESSMENT
63yo F s/p IR bilateral chest tubes and pericardial drain  - R chest tube 1,238cc out overnight  - L Chest tube 1,085cc out overnight  - Pericardial drain 550cc out overnight, currently clamped to try to obtain a sample for analysis

## 2024-12-03 NOTE — PROGRESS NOTE ADULT - SUBJECTIVE AND OBJECTIVE BOX
64-year-old F with PMH of HTN, HLD, hypothyroid, primary lymphedema, central thrombocytosis, GERD post bariatric surgery, ischemic colitis s/p left hemicolectomy, pAF and DVT/PE on Eliquis, HFpEF, and recently 2 hospitalizations at [facility] in the past 2 months for CAP, and also diagnosed with pericarditis, and HFpEF. Was last discharged home on 11/29 on PO ATBs (Doxycycline). Now BIB pvt car on 11/30 after visiting RN recommended  further assessment of back & chest pain, SOB, and productive cough. Pt reports symptoms similar to why/when she was readmitted to [facility] w/ Dx of pericarditis/CHF/PNA. No F/C, abd pain, N/V.  Recent LE edema, + improved today. Pt upset w/ care rendered at [facility] despite her doctors all there, so came to  ED foe eval/management. Pt now s/p IR b/l chest tubes and pericardial drain. Pt seen at bedside, reporting back pain.       Vital Signs Last 24 Hrs  T(C): 36.1 (03 Dec 2024 16:00), Max: 36.2 (02 Dec 2024 20:45)  T(F): 96.9 (03 Dec 2024 16:00), Max: 97.1 (02 Dec 2024 20:45)  HR: 70 (03 Dec 2024 11:00) (53 - 72)  BP: 156/48 (03 Dec 2024 09:00) (106/79 - 157/48)  BP(mean): 81 (03 Dec 2024 09:00) (61 - 87)  RR: 23 (03 Dec 2024 11:00) (13 - 30)  SpO2: 93% (03 Dec 2024 11:00) (91% - 99%)    Parameters below as of 03 Dec 2024 07:08  Patient On (Oxygen Delivery Method): nasal cannula  O2 Flow (L/min): 5      GENERAL APPEARANCE: Well developed, experiencing back pain    ABDOMEN: Soft and nontender with normal bowel sounds.     NEUROLOGIC: Alert and oriented x 3. Normal affect.       CBC                        8.8    18.29 )-----------( 754      ( 03 Dec 2024 05:29 )             29.2       Chemistry  12-03    134[L]  |  107  |  16  ----------------------------<  115[H]  4.0   |  22  |  0.69    Ca    8.0[L]      03 Dec 2024 05:29  Phos  3.9     12-03  Mg     1.8     12-03    TPro  5.4[L]  /  Alb  2.0[L]  /  TBili  0.3  /  DBili  x   /  AST  6[L]  /  ALT  14  /  AlkPhos  103  12-03      Coags  PT/INR - ( 02 Dec 2024 10:43 )   PT: 16.7 sec;   INR: 1.42 ratio    PTT - ( 02 Dec 2024 10:43 )  PTT:32.7 sec

## 2024-12-04 LAB — NON-GYNECOLOGICAL CYTOLOGY STUDY: SIGNIFICANT CHANGE UP

## 2024-12-04 PROCEDURE — 99232 SBSQ HOSP IP/OBS MODERATE 35: CPT

## 2024-12-04 PROCEDURE — 71045 X-RAY EXAM CHEST 1 VIEW: CPT | Mod: 26

## 2024-12-04 PROCEDURE — 99233 SBSQ HOSP IP/OBS HIGH 50: CPT

## 2024-12-04 RX ORDER — KETOROLAC TROMETHAMINE 30 MG/ML
15 INJECTION INTRAMUSCULAR; INTRAVENOUS EVERY 6 HOURS
Refills: 0 | Status: DISCONTINUED | OUTPATIENT
Start: 2024-12-04 | End: 2024-12-05

## 2024-12-04 RX ORDER — KETOROLAC TROMETHAMINE 30 MG/ML
15 INJECTION INTRAMUSCULAR; INTRAVENOUS EVERY 6 HOURS
Refills: 0 | Status: DISCONTINUED | OUTPATIENT
Start: 2024-12-04 | End: 2024-12-04

## 2024-12-04 RX ADMIN — Medication 1 SPRAY(S): at 11:16

## 2024-12-04 RX ADMIN — HYDROMORPHONE HYDROCHLORIDE 0.5 MILLIGRAM(S): 2 TABLET ORAL at 23:16

## 2024-12-04 RX ADMIN — HYDROMORPHONE HYDROCHLORIDE 0.5 MILLIGRAM(S): 2 TABLET ORAL at 20:12

## 2024-12-04 RX ADMIN — Medication 150 MILLIGRAM(S): at 10:50

## 2024-12-04 RX ADMIN — AMIODARONE HYDROCHLORIDE 200 MILLIGRAM(S): 200 TABLET ORAL at 10:54

## 2024-12-04 RX ADMIN — Medication 2 MILLIGRAM(S): at 06:45

## 2024-12-04 RX ADMIN — PANTOPRAZOLE SODIUM 40 MILLIGRAM(S): 40 TABLET, DELAYED RELEASE ORAL at 06:18

## 2024-12-04 RX ADMIN — CEFEPIME 2000 MILLIGRAM(S): 2 INJECTION, POWDER, FOR SOLUTION INTRAVENOUS at 22:50

## 2024-12-04 RX ADMIN — LIDOCAINE 1 PATCH: 40 CREAM TOPICAL at 19:00

## 2024-12-04 RX ADMIN — LAMOTRIGINE 200 MILLIGRAM(S): 50 TABLET, EXTENDED RELEASE ORAL at 10:51

## 2024-12-04 RX ADMIN — Medication 1 TABLET(S): at 22:50

## 2024-12-04 RX ADMIN — Medication 10 MILLILITER(S): at 18:32

## 2024-12-04 RX ADMIN — Medication 2 MILLIGRAM(S): at 01:33

## 2024-12-04 RX ADMIN — KETOROLAC TROMETHAMINE 15 MILLIGRAM(S): 30 INJECTION INTRAMUSCULAR; INTRAVENOUS at 17:48

## 2024-12-04 RX ADMIN — Medication 2 TABLET(S): at 22:49

## 2024-12-04 RX ADMIN — CHLORHEXIDINE GLUCONATE 1 APPLICATION(S): 1.2 RINSE ORAL at 06:18

## 2024-12-04 RX ADMIN — CEFEPIME 2000 MILLIGRAM(S): 2 INJECTION, POWDER, FOR SOLUTION INTRAVENOUS at 06:16

## 2024-12-04 RX ADMIN — Medication 1 TABLET(S): at 10:51

## 2024-12-04 RX ADMIN — Medication 10 MILLIGRAM(S): at 23:16

## 2024-12-04 RX ADMIN — HYDROMORPHONE HYDROCHLORIDE 0.5 MILLIGRAM(S): 2 TABLET ORAL at 23:37

## 2024-12-04 RX ADMIN — LIDOCAINE 1 PATCH: 40 CREAM TOPICAL at 23:27

## 2024-12-04 RX ADMIN — Medication 5000 UNIT(S): at 06:16

## 2024-12-04 RX ADMIN — LIDOCAINE 1 PATCH: 40 CREAM TOPICAL at 11:00

## 2024-12-04 RX ADMIN — Medication 88 MICROGRAM(S): at 06:18

## 2024-12-04 RX ADMIN — CEFEPIME 2000 MILLIGRAM(S): 2 INJECTION, POWDER, FOR SOLUTION INTRAVENOUS at 13:23

## 2024-12-04 RX ADMIN — Medication 2 MILLIGRAM(S): at 06:16

## 2024-12-04 RX ADMIN — KETOROLAC TROMETHAMINE 15 MILLIGRAM(S): 30 INJECTION INTRAMUSCULAR; INTRAVENOUS at 11:01

## 2024-12-04 RX ADMIN — LIDOCAINE 1 PATCH: 40 CREAM TOPICAL at 23:26

## 2024-12-04 RX ADMIN — Medication 2 MILLIGRAM(S): at 02:00

## 2024-12-04 RX ADMIN — METOPROLOL TARTRATE 25 MILLIGRAM(S): 100 TABLET, FILM COATED ORAL at 10:54

## 2024-12-04 RX ADMIN — Medication 5000 UNIT(S): at 13:24

## 2024-12-04 RX ADMIN — Medication 5000 UNIT(S): at 22:50

## 2024-12-04 RX ADMIN — POLYETHYLENE GLYCOL 3350 17 GRAM(S): 17 POWDER, FOR SOLUTION ORAL at 10:58

## 2024-12-04 RX ADMIN — Medication 1 TABLET(S): at 10:54

## 2024-12-04 RX ADMIN — HYDROMORPHONE HYDROCHLORIDE 0.5 MILLIGRAM(S): 2 TABLET ORAL at 14:34

## 2024-12-04 RX ADMIN — Medication 10 MILLIGRAM(S): at 07:45

## 2024-12-04 RX ADMIN — LIDOCAINE 1 PATCH: 40 CREAM TOPICAL at 11:01

## 2024-12-04 RX ADMIN — KETOROLAC TROMETHAMINE 15 MILLIGRAM(S): 30 INJECTION INTRAMUSCULAR; INTRAVENOUS at 23:29

## 2024-12-04 RX ADMIN — HYDROMORPHONE HYDROCHLORIDE 0.5 MILLIGRAM(S): 2 TABLET ORAL at 20:30

## 2024-12-04 RX ADMIN — KETOROLAC TROMETHAMINE 15 MILLIGRAM(S): 30 INJECTION INTRAMUSCULAR; INTRAVENOUS at 04:12

## 2024-12-04 RX ADMIN — Medication 0.3 MILLIGRAM(S): at 10:52

## 2024-12-04 RX ADMIN — KETOROLAC TROMETHAMINE 15 MILLIGRAM(S): 30 INJECTION INTRAMUSCULAR; INTRAVENOUS at 04:45

## 2024-12-04 RX ADMIN — HYDROMORPHONE HYDROCHLORIDE 0.5 MILLIGRAM(S): 2 TABLET ORAL at 08:26

## 2024-12-04 RX ADMIN — ROSUVASTATIN CALCIUM 20 MILLIGRAM(S): 5 TABLET, FILM COATED ORAL at 22:49

## 2024-12-04 NOTE — PROGRESS NOTE ADULT - PROBLEM SELECTOR PLAN 4
-Pro-Brain Natriuretic Peptide: 3214 and effusions suggestive of HFpEF in setting of multiple comorbidities;   -Cont. cautious diuresis.

## 2024-12-04 NOTE — PROGRESS NOTE ADULT - SUBJECTIVE AND OBJECTIVE BOX
Date of Service:12-04-24 @ 10:48  Interval History/ROS: Afebrile overnight, comfortable on 5L, infectious work up so far negative, pericardial and pleural fluid culture both no growth, denies any fever or chills      REVIEW OF SYSTEMS  [  ] ROS unobtainable because:    [ x ] All other systems negative except as noted below    Constitutional:  [ ] fever [ ] chills  [ ] weight loss  [ ]night sweat  [ ]poor appetite/PO intake [ ]fatigue   Skin:  [ ] rash [ ] phlebitis	  Eyes: [ ] icterus [ ] pain  [ ] discharge	  ENMT: [ ] sore throat  [ ] thrush [ ] ulcers [ ] exudates [ ]anosmia  Respiratory: [ ] dyspnea [ ] hemoptysis [ ] cough [ ] sputum	  Cardiovascular:  [ ] chest pain [ ] palpitations [ ] edema	  Gastrointestinal:  [ ] nausea [ ] vomiting [ ] diarrhea [ ] constipation [ ] pain	  Genitourinary:  [ ] dysuria [ ] frequency [ ] hematuria [ ] discharge [ ] flank pain  [ ] incontinence  Musculoskeletal:  [ ] myalgias [ ] arthralgias [ ] arthritis  [ ] back pain  Neurological:  [ ] headache [ ] weakness [ ] seizures  [ ] confusion/altered mental status    Allergies  sulfa drugs (Vomiting; Nausea)  IV Contrast (Hives)        ANTIMICROBIALS:    cefepime  Injectable. 2000 every 8 hours        OTHER MEDS: MEDICATIONS  (STANDING):  acetaminophen     Tablet .. 1000 every 8 hours PRN  aluminum hydroxide/magnesium hydroxide/simethicone Suspension 30 every 4 hours PRN  aMIOdarone    Tablet 200 daily  bisacodyl 5 daily PRN  buPROPion XL (24-Hour) . 150 daily  colchicine 0.3 daily  cyclobenzaprine 10 three times a day PRN  guaifenesin/dextromethorphan Oral Liquid 10 every 4 hours PRN  heparin   Injectable 5000 every 8 hours  HYDROmorphone  Injectable 0.5 every 4 hours PRN  ketorolac   Injectable 15 every 6 hours  lamoTRIgine 200 daily  levothyroxine 88 daily  loperamide 2 every 2 hours PRN  meclizine 25 two times a day PRN  melatonin 3 at bedtime PRN  metoprolol succinate ER 25 daily  morphine  - Injectable 2 every 3 hours PRN  ondansetron Injectable 4 every 8 hours PRN  pantoprazole    Tablet 40 before breakfast  polyethylene glycol 3350 17 daily  rosuvastatin 20 at bedtime  senna 2 at bedtime      Vital Signs Last 24 Hrs  T(F): 97.6 (12-04-24 @ 04:00), Max: 99.7 (11-30-24 @ 18:50)    Vital Signs Last 24 Hrs  HR: 60 (12-04-24 @ 09:00) (56 - 70)  BP: 135/41 (12-04-24 @ 09:00) (128/47 - 152/57)  RR: 26 (12-04-24 @ 08:00)  SpO2: 97% (12-04-24 @ 07:00) (92% - 100%)  Wt(kg): --    EXAM:    Constitutional:  NAD  Head/Eyes: no icterus  LUNGS:  Crackles  CVS:  regular rhythm  Abd:  soft, non-tender; non-distended  Ext:  no edema  Vascular:  IV site no erythema tenderness or discharge  Neuro: AAO X 3, non- focal    Labs:                        8.8    18.29 )-----------( 754      ( 03 Dec 2024 05:29 )             29.2     12-03    134[L]  |  107  |  16  ----------------------------<  115[H]  4.0   |  22  |  0.69    Ca    8.0[L]      03 Dec 2024 05:29  Phos  3.9     12-03  Mg     1.8     12-03    TPro  5.4[L]  /  Alb  2.0[L]  /  TBili  0.3  /  DBili  x   /  AST  6[L]  /  ALT  14  /  AlkPhos  103  12-03      WBC Trend:  WBC Count: 18.29 (12-03-24 @ 05:29)  WBC Count: 12.70 (12-02-24 @ 05:12)  WBC Count: 13.66 (12-01-24 @ 08:38)  WBC Count: 18.39 (11-30-24 @ 16:37)      Creatine Trend:  Creatinine: 0.69 (12-03)  Creatinine: 0.83 (12-02)  Creatinine: 0.71 (12-01)  Creatinine: 0.76 (11-30)      Liver Biochemical Testing Trend:  Alanine Aminotransferase (ALT/SGPT): 14 (12-03)  Alanine Aminotransferase (ALT/SGPT): 24 (12-01)  Alanine Aminotransferase (ALT/SGPT): 28 (11-30)  Alanine Aminotransferase (ALT/SGPT): 28 (02-12)  Aspartate Aminotransferase (AST/SGOT): 6 (12-03-24 @ 05:29)  Aspartate Aminotransferase (AST/SGOT): 14 (12-01-24 @ 08:38)  Aspartate Aminotransferase (AST/SGOT): 15 (11-30-24 @ 16:37)  Aspartate Aminotransferase (AST/SGOT): 17 (02-12-24 @ 14:12)  Bilirubin Total: 0.3 (12-03)  Bilirubin Total: 0.5 (12-01)  Bilirubin Total: 0.5 (11-30)  Bilirubin Total: 0.3 (02-12)      Trend LDH  12-02-24 @ 05:12  309[H]      Urinalysis Basic - ( 03 Dec 2024 05:29 )    Color: x / Appearance: x / SG: x / pH: x  Gluc: 115 mg/dL / Ketone: x  / Bili: x / Urobili: x   Blood: x / Protein: x / Nitrite: x   Leuk Esterase: x / RBC: x / WBC x   Sq Epi: x / Non Sq Epi: x / Bacteria: x        MICROBIOLOGY:    MRSA PCR Result.: NotDetec (12-01-24 @ 12:00)      Culture - Fungal, Body Fluid (collected 03 Dec 2024 09:30)  Source: Pleural Fl  Preliminary Report:    Testing in progress    Culture - Body Fluid with Gram Stain (collected 03 Dec 2024 09:30)  Source: Pleural Fl    Culture - Fungal, Body Fluid (collected 02 Dec 2024 13:40)  Source: Pleural Fl  Preliminary Report:    No growth    Culture - Body Fluid with Gram Stain (collected 02 Dec 2024 13:40)  Source: Body Fluid  Preliminary Report:    No growth    Urinalysis with Rflx Culture (collected 30 Nov 2024 18:49)    Culture - Blood (collected 30 Nov 2024 16:37)  Source: .Blood BLOOD  Preliminary Report:    No growth at 72 Hours    Culture - Blood (collected 30 Nov 2024 16:28)  Source: .Blood BLOOD  Preliminary Report:    No growth at 72 Hours    Culture - Blood (collected 17 Sep 2023 14:13)  Source: .Blood None  Final Report:    No growth at 5 days    Culture - Blood (collected 17 Sep 2023 14:13)  Source: .Blood None  Final Report:    No growth at 5 days    Legionella Antigen, Urine: Negative (12-01 @ 12:11)        Procalcitonin: 0.10 (12-01)    C-Reactive Protein: 48.2 (12-03)  C-Reactive Protein: 18.6 (12-02)    Ferritin: 216 (12-02)      Lactate Dehydrogenase, Serum: 309 (12-02)          Sedimentation Rate, Erythrocyte: 20 mm/hr (11-30-24 @ 16:37)      RADIOLOGY:  imaging below personally reviewed    Xray Chest 1 View- PORTABLE-Urgent:  (02 Dec 2024 15:20)

## 2024-12-04 NOTE — PROGRESS NOTE ADULT - ASSESSMENT
A:    64yFemale    Here for:    1. pleural effusions B/L  2. pericardial effusion    This patient requires a moderate level of care due to the complexity and severity of their condition which increases the amount and complexity of data to be reviewed and analyzed in addition to the risk of complications, morbidity and mortality of patient management    P:    Unclear etiology for pericardial and pleural effusions  w/u in progress    Maintain drains and document output; pericardial drain output scant, d/c when able  f/u cell counts, chemistry and cytology  Cont multi modal pain relief. toradol seems most effective so d/c standing motrin and give some ATC toradol 15mg IV q8h x 3 doses, monitor for bloody output; continue optimizing  OOB, IS if paramount importance  f/u labs  VTE ppx  Bowel regimen  Synthroid  Med rec  BG < 180  f/u labs, replete lytes PRN    Dispo: Cont care.    Time spent on this patient encounter: 35+ minutes    Date of entry of this note is equal to the date of services rendered.    This includes assessment of the presenting problems with associated risks, reviewing the medical record to prepare for the encounter and meeting face to face with the patient to obtain additional history and conduct a focused exmaination. I have also performed an appropiate physical exam, made interventions listed and ordered and interpreted appropiate diagnostic studies as documented. This also included communication and coordination of care with the multidisciplinary team including the bedside nurse, appropriate attending of record and consultants as needed.

## 2024-12-04 NOTE — PROGRESS NOTE ADULT - ASSESSMENT
Assessment:  64F with HTN, HLD, hypothyroid, primary lymphedema, central thrombocytosis, GERD post bariatric surgery, ischemic colitis s/p left hemicolectomy, pAF and DVT/PE on Eliquis, HFpEF presents 11/30 with worsening dyspnea and chest pain, also has intermittent cough  Recently discharged home from [facility] for pericarditis and heart failure exacerbation also with presumed superimposed pneumonia  Despite antibiotic including Doxy at home, symptoms of dyspnea and chest discomfort still continued   Afebrile on admission, on 4L NC  WBC 18 > 13  Cr 0.76  UA negative  Chest CT with Moderately large simple pericardial effusion. Small to moderate right and small left pleural effusions with associatedcompressive atelectasis. Superimposed pneumonia should be excluded on a clinical basis.  RVP negative  MRSA swab negative  Strep UAg negative, Legionella, Mycoplasma negative  Procal 0.10  BCx 11/30 negative  Pericardial Drain culture 12/2 no growth  Pleural Fluid Culture 12/3 no growth    Antimicrobials:  vancomycin  IVPB 1250 every 12 hours (12/1 --- 12/2)  cefepime  Injectable. 2000 every 8 hours (12/1 --- )    Impression:   #Acute Hypoxic Perspiratory Failure  #Moderate Pericardial Effusion, Hx Recent Pericarditis  #Bilateral Pleural Effusion, Possible superimposed Pneumonia  #Leukocytosis  #CHF  - leukocytosis likely due to multifactorial process ?reactive ?CHF ?Pericardial Effusion ?superimposed pneumonia  - Procal essential normal, BCx negative, MRSA swab negative, Pericardial drain and Pleural Fluid culture without growth  - remains afebrile, so far rest of infectious work up negative    Recommendations:  - continue empiric Cefepime 2G q8 (Day #4)  - monitor temperature curve  - trend WBC  - side effects of antibiotic discussed, tolerating abx well so far  - complete empiric antibiotic with 5-7 days course  - Cardio following  - ICU  - ID service will sign off. Please reconsult/call as needed    HH Token not applicable     Assessment:  64F with HTN, HLD, hypothyroid, primary lymphedema, central thrombocytosis, GERD post bariatric surgery, ischemic colitis s/p left hemicolectomy, pAF and DVT/PE on Eliquis, HFpEF presents 11/30 with worsening dyspnea and chest pain, also has intermittent cough  Recently discharged home from [facility] for pericarditis and heart failure exacerbation also with presumed superimposed pneumonia  Despite antibiotic including Doxy at home, symptoms of dyspnea and chest discomfort still continued   Afebrile on admission, on 4L NC  WBC 18 > 13  Cr 0.76  UA negative  Chest CT with Moderately large simple pericardial effusion. Small to moderate right and small left pleural effusions with associatedcompressive atelectasis. Superimposed pneumonia should be excluded on a clinical basis.  RVP negative  MRSA swab negative  Strep UAg negative, Legionella, Mycoplasma negative  Procal 0.10  BCx 11/30 negative  Pericardial Drain culture 12/2 no growth  Pleural Fluid Culture 12/3 no growth    Antimicrobials:  vancomycin  IVPB 1250 every 12 hours (12/1 --- 12/2)  cefepime  Injectable. 2000 every 8 hours (12/1 --- )    Impression:   #Acute Hypoxic Perspiratory Failure  #Moderate Pericardial Effusion, Hx Recent Pericarditis  #Bilateral Pleural Effusion, Possible superimposed Pneumonia  #Leukocytosis  #CHF  - leukocytosis likely due to multifactorial process ?reactive ?CHF ?Pericardial Effusion ?superimposed pneumonia  - Procal essential normal, BCx negative, MRSA swab negative, Pericardial drain and Pleural Fluid culture without growth  - remains afebrile, so far rest of infectious work up negative    Recommendations:  - continue empiric Cefepime 2G q8 (Day #4)  - monitor temperature curve  - trend WBC  - side effects of antibiotic discussed, tolerating abx well so far  - complete empiric antibiotic tomorrow for 5-day course  - Cardio following  - ICU  - ID service will sign off. Please reconsult/call as needed    HH Token not applicable
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
## 2024-12-04 NOTE — PROGRESS NOTE ADULT - SUBJECTIVE AND OBJECTIVE BOX
Interval History:      Patient still with pain from chest tubes      Effusion exudative per Lights criteria      right pigtail drained 225      left sided 720 pigtail      Pericardial drain with minimal drainage      repeat echo moderate residual blood  HPI:      64-year-old F with PMH of HTN, HLD, hypothyroid,   thrombocytosis, GERD, ischemic colitis s/p left hemicolectomy, pAF and DVT/PE on Eliquis,and recently 2 hospitalizations at [facility] in the past 2 months for CAP, and also diagnosed with pericarditis, and HFpEF. Was last discharged home on 11/29  was BIB pvt car on 11/30 after visiting RN recommended  further assessment of back & chest pain, SOB, and productive cough. Pt reports symptoms similar to why/when she was readmitted to [facility] w/ Dx of pericarditis/CHF/PNA.  Pt upset w/ care rendered at [facility] despite her doctors all there, so came to  ED foe eval/management.   CT chest and echo shows bibasilar effusions, and pericardial effusion with rv collapse  Patient had recently had treatment for HCAP at [facility]  had been on interferon   all rheum labs at [facility] were negative was seen by rheumatology not felt to be rheumatolgic  was being treated with colchicine for pericarditis    ROS  complaining of pleuritic pain, no fevers, no chills no abdominal pain, still slightly sob       MEDICATIONS  (STANDING):  aMIOdarone    Tablet 200 milliGRAM(s) Oral daily  buPROPion XL (24-Hour) . 150 milliGRAM(s) Oral daily  cefepime  Injectable. 2000 milliGRAM(s) IV Push every 8 hours  chlorhexidine 4% Liquid 1 Application(s) Topical <User Schedule>  colchicine 0.3 milliGRAM(s) Oral daily  fluticasone propionate 50 MICROgram(s)/spray Nasal Spray 1 Spray(s) Both Nostrils two times a day  heparin   Injectable 5000 Unit(s) SubCutaneous every 8 hours  ketorolac   Injectable 15 milliGRAM(s) IV Push every 6 hours  lactobacillus acidophilus 1 Tablet(s) Oral every 12 hours  lamoTRIgine 200 milliGRAM(s) Oral daily  levothyroxine 88 MICROGram(s) Oral daily  lidocaine   4% Patch 1 Patch Transdermal every 24 hours  lidocaine   4% Patch 1 Patch Transdermal daily  lidocaine   4% Patch 1 Patch Transdermal daily  metoprolol succinate ER 25 milliGRAM(s) Oral daily  multivitamin 1 Tablet(s) Oral daily  naloxone Injectable 0.4 milliGRAM(s) IV Push once  pantoprazole    Tablet 40 milliGRAM(s) Oral before breakfast  polyethylene glycol 3350 17 Gram(s) Oral daily  rosuvastatin 20 milliGRAM(s) Oral at bedtime  senna 2 Tablet(s) Oral at bedtime    MEDICATIONS  (PRN):  acetaminophen     Tablet .. 1000 milliGRAM(s) Oral every 8 hours PRN Mild Pain (1 - 3)  aluminum hydroxide/magnesium hydroxide/simethicone Suspension 30 milliLiter(s) Oral every 4 hours PRN Dyspepsia  bisacodyl 5 milliGRAM(s) Oral daily PRN Constipation  cyclobenzaprine 10 milliGRAM(s) Oral three times a day PRN Muscle Spasm  guaifenesin/dextromethorphan Oral Liquid 10 milliLiter(s) Oral every 4 hours PRN Cough  hydrocortisone hemorrhoidal Suppository 1 Suppository(s) Rectal two times a day PRN pain  HYDROmorphone  Injectable 0.5 milliGRAM(s) IV Push every 4 hours PRN Severe Pain (7 - 10)  loperamide 2 milliGRAM(s) Oral every 2 hours PRN Diarrhea  meclizine 25 milliGRAM(s) Oral two times a day PRN Dizziness  melatonin 3 milliGRAM(s) Oral at bedtime PRN Insomnia  morphine  - Injectable 2 milliGRAM(s) IV Push every 3 hours PRN Moderate Pain (4 - 6)  ondansetron Injectable 4 milliGRAM(s) IV Push every 8 hours PRN Nausea and/or Vomiting    ICU Vital Signs Last 24 Hrs  T(C): 36.4 (04 Dec 2024 04:00), Max: 36.4 (04 Dec 2024 00:00)  T(F): 97.6 (04 Dec 2024 04:00), Max: 97.6 (04 Dec 2024 00:00)  HR: 72 (04 Dec 2024 11:00) (56 - 72)  BP: 150/47 (04 Dec 2024 11:00) (128/47 - 152/57)  BP(mean): 77 (04 Dec 2024 11:00) (68 - 95)  ABP: --  ABP(mean): --  RR: 26 (04 Dec 2024 08:00) (17 - 36)  SpO2: 97% (04 Dec 2024 07:00) (92% - 100%)    O2 Parameters below as of 04 Dec 2024 04:00  Patient On (Oxygen Delivery Method): nasal cannula  O2 Flow (L/min): 5    Physical Exam    General - uncomfortable  Neuro awake and alert, appropriate  heent nc/at  neck supple  lungs dec bs bases bilaterally   cv rrr, tubes in pancho ce  abdomen soft, non tender  extremities warm      I&O's Summary    03 Dec 2024 07:01  -  04 Dec 2024 07:00  --------------------------------------------------------  IN: 900 mL / OUT: 1895 mL / NET: -995 mL                          8.8    18.29 )-----------( 754      ( 03 Dec 2024 05:29 )             29.2       12-03    134[L]  |  107  |  16  ----------------------------<  115[H]  4.0   |  22  |  0.69    Ca    8.0[L]      03 Dec 2024 05:29  Phos  3.9     12-03  Mg     1.8     12-03    TPro  5.4[L]  /  Alb  2.0[L]  /  TBili  0.3  /  DBili  x   /  AST  6[L]  /  ALT  14  /  AlkPhos  103  12-03      Urinalysis Basic - ( 03 Dec 2024 05:29 )    Color: x / Appearance: x / SG: x / pH: x  Gluc: 115 mg/dL / Ketone: x  / Bili: x / Urobili: x   Blood: x / Protein: x / Nitrite: x   Leuk Esterase: x / RBC: x / WBC x   Sq Epi: x / Non Sq Epi: x / Bacteria: x    I personally reviewed the CXR: faint right effusion pigtails in place    DVT Prophylaxis:   Heparin subq                                                               Advanced Directives: Full Code         Labs, Notes, Orders, radiologic studies reviewed and care coordinated with multidisciplinary team

## 2024-12-04 NOTE — PROGRESS NOTE ADULT - PROBLEM SELECTOR PROBLEM 4
(HFpEF) heart failure with preserved ejection fraction
(HFpEF) heart failure with preserved ejection fraction
Pericarditis
(HFpEF) heart failure with preserved ejection fraction

## 2024-12-04 NOTE — PROGRESS NOTE ADULT - ASSESSMENT
64 female presenting presenting with a moderate pericardial effusion  Recent Pericarditis  AFIB  HFpEF not acute  HTN  Gastric Ulder(remote)  NSVT  Essential Thrombocytosis  Von Willebrand's  DVT/PE  Hypothyroidism    Plan:     Neuro awake and alert - analgesia  PULM: B/L Effusions,  s/p bilateral pigtails, cultures negative, exudate per lights criteria, pigtails to remain today  Cardio: Continue Colchicine  for pericarditis  Drainage of pericardial fluid bloody,   No signs of acute heart failure, Hold DOAC, Continue Amio and metoprolol     repeat echo function ok,     Renal: d.c iv fluids  GI:  diet as tolerated  ENDO: Continue Synthroid, TSH [id number]  RHEUM: W/U at [facility] negative,   HEM:  heme input appreciated, was given ddavp prior to procedure, No AC yet for paf  Venodynes for DVT Prophylaxis,Heparin subq  ID on cefepime empiric will complete 1 more day

## 2024-12-05 ENCOUNTER — RESULT REVIEW (OUTPATIENT)
Age: 64
End: 2024-12-05

## 2024-12-05 LAB
ALBUMIN SERPL ELPH-MCNC: 1.6 G/DL — LOW (ref 3.3–5)
ALP SERPL-CCNC: 90 U/L — SIGNIFICANT CHANGE UP (ref 40–120)
ALT FLD-CCNC: 10 U/L — LOW (ref 12–78)
ANION GAP SERPL CALC-SCNC: 4 MMOL/L — LOW (ref 5–17)
AST SERPL-CCNC: 7 U/L — LOW (ref 15–37)
BILIRUB SERPL-MCNC: 0.3 MG/DL — SIGNIFICANT CHANGE UP (ref 0.2–1.2)
BUN SERPL-MCNC: 14 MG/DL — SIGNIFICANT CHANGE UP (ref 7–23)
CALCIUM SERPL-MCNC: 8.2 MG/DL — LOW (ref 8.5–10.1)
CHLORIDE SERPL-SCNC: 104 MMOL/L — SIGNIFICANT CHANGE UP (ref 96–108)
CO2 SERPL-SCNC: 24 MMOL/L — SIGNIFICANT CHANGE UP (ref 22–31)
CREAT SERPL-MCNC: 0.44 MG/DL — LOW (ref 0.5–1.3)
CULTURE RESULTS: SIGNIFICANT CHANGE UP
CULTURE RESULTS: SIGNIFICANT CHANGE UP
EGFR: 109 ML/MIN/1.73M2 — SIGNIFICANT CHANGE UP
GLUCOSE SERPL-MCNC: 99 MG/DL — SIGNIFICANT CHANGE UP (ref 70–99)
HCT VFR BLD CALC: 28.4 % — LOW (ref 34.5–45)
HGB BLD-MCNC: 8.5 G/DL — LOW (ref 11.5–15.5)
MCHC RBC-ENTMCNC: 24.6 PG — LOW (ref 27–34)
MCHC RBC-ENTMCNC: 29.9 G/DL — LOW (ref 32–36)
MCV RBC AUTO: 82.3 FL — SIGNIFICANT CHANGE UP (ref 80–100)
MRSA PCR RESULT.: SIGNIFICANT CHANGE UP
NON-GYNECOLOGICAL CYTOLOGY STUDY: SIGNIFICANT CHANGE UP
PLATELET # BLD AUTO: 727 K/UL — HIGH (ref 150–400)
POTASSIUM SERPL-MCNC: 4.2 MMOL/L — SIGNIFICANT CHANGE UP (ref 3.5–5.3)
POTASSIUM SERPL-SCNC: 4.2 MMOL/L — SIGNIFICANT CHANGE UP (ref 3.5–5.3)
PROT SERPL-MCNC: 5.2 GM/DL — LOW (ref 6–8.3)
RBC # BLD: 3.45 M/UL — LOW (ref 3.8–5.2)
RBC # FLD: 19.2 % — HIGH (ref 10.3–14.5)
S AUREUS DNA NOSE QL NAA+PROBE: SIGNIFICANT CHANGE UP
SODIUM SERPL-SCNC: 132 MMOL/L — LOW (ref 135–145)
SPECIMEN SOURCE: SIGNIFICANT CHANGE UP
SPECIMEN SOURCE: SIGNIFICANT CHANGE UP
WBC # BLD: 15.61 K/UL — HIGH (ref 3.8–10.5)
WBC # FLD AUTO: 15.61 K/UL — HIGH (ref 3.8–10.5)

## 2024-12-05 PROCEDURE — 99233 SBSQ HOSP IP/OBS HIGH 50: CPT

## 2024-12-05 PROCEDURE — 71045 X-RAY EXAM CHEST 1 VIEW: CPT | Mod: 26,77

## 2024-12-05 PROCEDURE — 99232 SBSQ HOSP IP/OBS MODERATE 35: CPT

## 2024-12-05 PROCEDURE — 93308 TTE F-UP OR LMTD: CPT | Mod: 26

## 2024-12-05 PROCEDURE — 93321 DOPPLER ECHO F-UP/LMTD STD: CPT | Mod: 26

## 2024-12-05 PROCEDURE — 76376 3D RENDER W/INTRP POSTPROCES: CPT | Mod: 26

## 2024-12-05 PROCEDURE — 71045 X-RAY EXAM CHEST 1 VIEW: CPT | Mod: 26

## 2024-12-05 RX ORDER — ACETAMINOPHEN 500MG 500 MG/1
1000 TABLET, COATED ORAL ONCE
Refills: 0 | Status: COMPLETED | OUTPATIENT
Start: 2024-12-05 | End: 2024-12-05

## 2024-12-05 RX ORDER — SODIUM CHLORIDE 9 MG/ML
500 INJECTION, SOLUTION INTRAMUSCULAR; INTRAVENOUS; SUBCUTANEOUS ONCE
Refills: 0 | Status: DISCONTINUED | OUTPATIENT
Start: 2024-12-05 | End: 2024-12-05

## 2024-12-05 RX ORDER — KETOROLAC TROMETHAMINE 30 MG/ML
15 INJECTION INTRAMUSCULAR; INTRAVENOUS EVERY 6 HOURS
Refills: 0 | Status: DISCONTINUED | OUTPATIENT
Start: 2024-12-05 | End: 2024-12-07

## 2024-12-05 RX ORDER — FUROSEMIDE 40 MG/1
10 TABLET ORAL ONCE
Refills: 0 | Status: COMPLETED | OUTPATIENT
Start: 2024-12-05 | End: 2024-12-05

## 2024-12-05 RX ADMIN — KETOROLAC TROMETHAMINE 15 MILLIGRAM(S): 30 INJECTION INTRAMUSCULAR; INTRAVENOUS at 00:00

## 2024-12-05 RX ADMIN — HYDROMORPHONE HYDROCHLORIDE 0.5 MILLIGRAM(S): 2 TABLET ORAL at 04:00

## 2024-12-05 RX ADMIN — FUROSEMIDE 10 MILLIGRAM(S): 40 TABLET ORAL at 10:19

## 2024-12-05 RX ADMIN — LIDOCAINE 1 PATCH: 40 CREAM TOPICAL at 10:21

## 2024-12-05 RX ADMIN — PANTOPRAZOLE SODIUM 40 MILLIGRAM(S): 40 TABLET, DELAYED RELEASE ORAL at 06:13

## 2024-12-05 RX ADMIN — Medication 1 TABLET(S): at 22:12

## 2024-12-05 RX ADMIN — HYDROMORPHONE HYDROCHLORIDE 0.5 MILLIGRAM(S): 2 TABLET ORAL at 03:35

## 2024-12-05 RX ADMIN — Medication 1 SPRAY(S): at 12:18

## 2024-12-05 RX ADMIN — Medication 5000 UNIT(S): at 05:57

## 2024-12-05 RX ADMIN — LIDOCAINE 1 PATCH: 40 CREAM TOPICAL at 20:13

## 2024-12-05 RX ADMIN — HYDROMORPHONE HYDROCHLORIDE 0.5 MILLIGRAM(S): 2 TABLET ORAL at 10:19

## 2024-12-05 RX ADMIN — CEFEPIME 2000 MILLIGRAM(S): 2 INJECTION, POWDER, FOR SOLUTION INTRAVENOUS at 05:57

## 2024-12-05 RX ADMIN — CEFEPIME 2000 MILLIGRAM(S): 2 INJECTION, POWDER, FOR SOLUTION INTRAVENOUS at 13:12

## 2024-12-05 RX ADMIN — LIDOCAINE 1 PATCH: 40 CREAM TOPICAL at 22:25

## 2024-12-05 RX ADMIN — Medication 150 MILLIGRAM(S): at 10:20

## 2024-12-05 RX ADMIN — KETOROLAC TROMETHAMINE 15 MILLIGRAM(S): 30 INJECTION INTRAMUSCULAR; INTRAVENOUS at 16:54

## 2024-12-05 RX ADMIN — KETOROLAC TROMETHAMINE 15 MILLIGRAM(S): 30 INJECTION INTRAMUSCULAR; INTRAVENOUS at 06:30

## 2024-12-05 RX ADMIN — ONDANSETRON HYDROCHLORIDE 4 MILLIGRAM(S): 4 TABLET, FILM COATED ORAL at 13:12

## 2024-12-05 RX ADMIN — ACETAMINOPHEN 500MG 1000 MILLIGRAM(S): 500 TABLET, COATED ORAL at 05:15

## 2024-12-05 RX ADMIN — LAMOTRIGINE 200 MILLIGRAM(S): 50 TABLET, EXTENDED RELEASE ORAL at 10:20

## 2024-12-05 RX ADMIN — ROSUVASTATIN CALCIUM 20 MILLIGRAM(S): 5 TABLET, FILM COATED ORAL at 22:12

## 2024-12-05 RX ADMIN — Medication 1 SPRAY(S): at 22:13

## 2024-12-05 RX ADMIN — Medication 1 TABLET(S): at 10:20

## 2024-12-05 RX ADMIN — LIDOCAINE 1 PATCH: 40 CREAM TOPICAL at 00:19

## 2024-12-05 RX ADMIN — POLYETHYLENE GLYCOL 3350 17 GRAM(S): 17 POWDER, FOR SOLUTION ORAL at 12:20

## 2024-12-05 RX ADMIN — LIDOCAINE 1 PATCH: 40 CREAM TOPICAL at 20:14

## 2024-12-05 RX ADMIN — Medication 10 MILLILITER(S): at 06:12

## 2024-12-05 RX ADMIN — Medication 10 MILLIGRAM(S): at 23:31

## 2024-12-05 RX ADMIN — HYDROMORPHONE HYDROCHLORIDE 0.5 MILLIGRAM(S): 2 TABLET ORAL at 21:00

## 2024-12-05 RX ADMIN — ONDANSETRON HYDROCHLORIDE 4 MILLIGRAM(S): 4 TABLET, FILM COATED ORAL at 21:06

## 2024-12-05 RX ADMIN — Medication 88 MICROGRAM(S): at 05:58

## 2024-12-05 RX ADMIN — KETOROLAC TROMETHAMINE 15 MILLIGRAM(S): 30 INJECTION INTRAMUSCULAR; INTRAVENOUS at 05:57

## 2024-12-05 RX ADMIN — METOPROLOL TARTRATE 25 MILLIGRAM(S): 100 TABLET, FILM COATED ORAL at 10:20

## 2024-12-05 RX ADMIN — Medication 5000 UNIT(S): at 22:12

## 2024-12-05 RX ADMIN — Medication 5000 UNIT(S): at 13:12

## 2024-12-05 RX ADMIN — CHLORHEXIDINE GLUCONATE 1 APPLICATION(S): 1.2 RINSE ORAL at 06:13

## 2024-12-05 RX ADMIN — Medication 2 TABLET(S): at 22:12

## 2024-12-05 RX ADMIN — Medication 0.3 MILLIGRAM(S): at 10:19

## 2024-12-05 RX ADMIN — HYDROMORPHONE HYDROCHLORIDE 0.5 MILLIGRAM(S): 2 TABLET ORAL at 21:30

## 2024-12-05 RX ADMIN — ACETAMINOPHEN 500MG 400 MILLIGRAM(S): 500 TABLET, COATED ORAL at 04:45

## 2024-12-05 RX ADMIN — CEFEPIME 2000 MILLIGRAM(S): 2 INJECTION, POWDER, FOR SOLUTION INTRAVENOUS at 22:13

## 2024-12-05 RX ADMIN — AMIODARONE HYDROCHLORIDE 200 MILLIGRAM(S): 200 TABLET ORAL at 10:20

## 2024-12-05 NOTE — PROGRESS NOTE ADULT - SUBJECTIVE AND OBJECTIVE BOX
Interval History:       Pericardial drain out       in NSR       still with some pleuritic pain       BP ok       cxr still right sided effusion    HPI:  64-year-old F with PMH of HTN, HLD, hypothyroid,   thrombocytosis, GERD, ischemic colitis s/p left hemicolectomy, pAF and DVT/PE on Eliquis,and recently 2 hospitalizations at [facility] in the past 2 months for CAP, and also diagnosed with pericarditis, and HFpEF. Was last discharged home on 11/29  was BIB pvt car on 11/30 after visiting RN recommended  further assessment of back & chest pain, SOB, and productive cough. Pt reports symptoms similar to why/when she was readmitted to [facility] w/ Dx of pericarditis/CHF/PNA.  Pt upset w/ care rendered at [facility] despite her doctors all there, so came to  ED foe eval/management.   CT chest and echo shows bibasilar effusions, and pericardial effusion with rv collapse  Patient had recently had treatment for HCAP at [facility]  had been on interferon   all rheum labs at [facility] were negative was seen by rheumatology not felt to be rheumatolgic  was being treated with colchicine for pericarditis  has bilateral pigtails, had drainage of bloody pericardial drain  fluid was a transudate    ROS  complaining of pleuritic pain, no fevers, no chills no abdominal pain,   sob pain better    MEDICATIONS  (STANDING):  aMIOdarone    Tablet 200 milliGRAM(s) Oral daily  buPROPion XL (24-Hour) . 150 milliGRAM(s) Oral daily  cefepime  Injectable. 2000 milliGRAM(s) IV Push every 8 hours  chlorhexidine 4% Liquid 1 Application(s) Topical <User Schedule>  colchicine 0.3 milliGRAM(s) Oral daily  fluticasone propionate 50 MICROgram(s)/spray Nasal Spray 1 Spray(s) Both Nostrils two times a day  heparin   Injectable 5000 Unit(s) SubCutaneous every 8 hours  lactobacillus acidophilus 1 Tablet(s) Oral every 12 hours  lamoTRIgine 200 milliGRAM(s) Oral daily  levothyroxine 88 MICROGram(s) Oral daily  lidocaine   4% Patch 1 Patch Transdermal every 24 hours  lidocaine   4% Patch 1 Patch Transdermal daily  lidocaine   4% Patch 1 Patch Transdermal daily  metoprolol succinate ER 25 milliGRAM(s) Oral daily  multivitamin 1 Tablet(s) Oral daily  naloxone Injectable 0.4 milliGRAM(s) IV Push once  pantoprazole    Tablet 40 milliGRAM(s) Oral before breakfast  polyethylene glycol 3350 17 Gram(s) Oral daily  rosuvastatin 20 milliGRAM(s) Oral at bedtime  senna 2 Tablet(s) Oral at bedtime    MEDICATIONS  (PRN):  acetaminophen     Tablet .. 1000 milliGRAM(s) Oral every 8 hours PRN Mild Pain (1 - 3)  aluminum hydroxide/magnesium hydroxide/simethicone Suspension 30 milliLiter(s) Oral every 4 hours PRN Dyspepsia  bisacodyl 5 milliGRAM(s) Oral daily PRN Constipation  cyclobenzaprine 10 milliGRAM(s) Oral three times a day PRN Muscle Spasm  guaifenesin/dextromethorphan Oral Liquid 10 milliLiter(s) Oral every 4 hours PRN Cough  hydrocortisone hemorrhoidal Suppository 1 Suppository(s) Rectal two times a day PRN pain  HYDROmorphone  Injectable 0.5 milliGRAM(s) IV Push every 4 hours PRN Severe Pain (7 - 10)  ketorolac   Injectable 15 milliGRAM(s) IV Push every 6 hours PRN Moderate Pain (4 - 6)  loperamide 2 milliGRAM(s) Oral every 2 hours PRN Diarrhea  meclizine 25 milliGRAM(s) Oral two times a day PRN Dizziness  melatonin 3 milliGRAM(s) Oral at bedtime PRN Insomnia  morphine  - Injectable 2 milliGRAM(s) IV Push every 3 hours PRN Moderate Pain (4 - 6)  ondansetron Injectable 4 milliGRAM(s) IV Push every 8 hours PRN Nausea and/or Vomiting    ICU Vital Signs Last 24 Hrs  T(C): 36.7 (05 Dec 2024 04:00), Max: 36.9 (04 Dec 2024 16:24)  T(F): 98.1 (05 Dec 2024 04:00), Max: 98.4 (04 Dec 2024 16:24)  HR: 67 (05 Dec 2024 09:00) (55 - 71)  BP: 137/45 (05 Dec 2024 08:00) (121/37 - 150/49)  BP(mean): 72 (05 Dec 2024 08:00) (61 - 82)  ABP: --  ABP(mean): --  RR: 24 (05 Dec 2024 09:00) (14 - 26)  SpO2: 94% (05 Dec 2024 09:00) (94% - 99%)    O2 Parameters below as of 05 Dec 2024 06:00  Patient On (Oxygen Delivery Method): nasal cannula  O2 Flow (L/min): 3    I&O's Summary    04 Dec 2024 07:01  -  05 Dec 2024 07:00  --------------------------------------------------------  IN: 1600 mL / OUT: 1373 mL / NET: 227 mL    Physical Exam    General - no distress  HEENT - nc/at  neck supple  lungs clear, bilateral pigtails  cv RRR  abdomen soft,   extremeties bilateral edema  neuro awake and alert                        8.5    15.61 )-----------( 727      ( 05 Dec 2024 05:48 )             28.4       12-05    132[L]  |  104  |  14  ----------------------------<  99  4.2   |  24  |  0.44[L]    Ca    8.2[L]      05 Dec 2024 05:48    TPro  5.2[L]  /  Alb  1.6[L]  /  TBili  0.3  /  DBili  x   /  AST  7[L]  /  ALT  10[L]  /  AlkPhos  90  12-05    Urinalysis Basic - ( 05 Dec 2024 05:48 )    Color: x / Appearance: x / SG: x / pH: x  Gluc: 99 mg/dL / Ketone: x  / Bili: x / Urobili: x   Blood: x / Protein: x / Nitrite: x   Leuk Esterase: x / RBC: x / WBC x   Sq Epi: x / Non Sq Epi: x / Bacteria: x    DVT Prophylaxis:  Heparin subq                                                              Advanced Directives: Full COde         Labs, Notes, Orders, radiologic studies reviewed and care coordinated with multidisciplinary team

## 2024-12-05 NOTE — PROGRESS NOTE ADULT - ASSESSMENT
64 female presenting presenting with a moderate pericardial effusion  Recent Pericarditis  AFIB  HFpEF not acute  HTN  Gastric Ulder(remote)  NSVT  Essential Thrombocytosis  Von Willebrand's  DVT/PE  Hypothyroidism    Plan:     Neuro awake and alert - analgesia  PULM: B/L Effusions,  s/p bilateral pigtails, cultures negative, exudate per lights criteria, possible drainage right tube today  Cardio: Continue Colchicine  for pericarditis  Drainage of pericardial fluid bloody,   No signs of acute heart failure, Hold DOAC, Continue Amio and metoprolol     now in normal sinus rhythm, will give low dose lasix for peripheral edema, repeat echo, minimal effusion  Renal: d.c iv fluids  GI:  diet as tolerated  ENDO: Continue Synthroid, TSH [id number]  RHEUM: W/U at [facility] negative,   HEM:  heme input appreciated, was given ddavp prior to procedure, No AC yet for paf given bloody effusion  Venodynes for DVT Prophylaxis,Heparin subq  ID  completed course of cefepime

## 2024-12-05 NOTE — PROGRESS NOTE ADULT - SUBJECTIVE AND OBJECTIVE BOX
REASON FOR VISIT: Pericardial effusion, PAF    HPI:   Anne-Marie Corona is a 64-year-old woman with a history of right ventricular outflow tract ventricular tachycardia s/p remote ablation (2001; "partially successful" with subsequent PVCs treated with verapamil), hypertension, hypothyroidism, ischemic colitis, von Willebrand's disease, essential thrombocytosis, obesity s/p gastric bypass, hypertension, DVT/PE, and hospitalization in March 2023 for respiratory failure, who has been ill since late October.  She was admitted to [facility] twice with respiratory failure, bacterial pneumonia, acute kidney injury, pericarditis, and pericardial effusion.  She was discharged on 11/29 but felt poor on 11/30 and came to the [facility] ED. She was found to have a large pericardial effusion, pleural effusion.     12/2/'24: some improvement in symptoms overnight.  12/3/24  Patient is complaining of having sharp chest pain left sided chest , increase motion ,deep breathing ,  started last night in addition to prior chest pain , patient had bilateral CT placed , and pericardial drain in place ,   patient was treated for pleuropericarditis [facility]   12/4/24: Pt feels improved. Right sided atypical chest pain.   12/5/24: Pleuritic chest pressure.  Overall, feeling "a little better."    CV MEDICATIONS  (STANDING):  aMIOdarone    Tablet 200 milliGRAM(s) Oral daily  colchicine 0.3 milliGRAM(s) Oral daily  heparin   Injectable 5000 Unit(s) SubCutaneous every 8 hours  metoprolol succinate ER 25 milliGRAM(s) Oral daily  rosuvastatin 20 milliGRAM(s) Oral at bedtime    Vital Signs Last 24 Hrs  T(C): 36.7 (05 Dec 2024 04:00), Max: 36.9 (04 Dec 2024 16:24)  T(F): 98.1 (05 Dec 2024 04:00), Max: 98.4 (04 Dec 2024 16:24)  HR: 58 (05 Dec 2024 07:00) (55 - 72)  BP: 133/45 (05 Dec 2024 07:00) (121/37 - 151/44)  BP(mean): 70 (05 Dec 2024 07:00) (61 - 82)  RR: 20 (05 Dec 2024 07:00) (14 - 26)  SpO2: 96% (05 Dec 2024 07:00) (95% - 99%)  Patient On (Oxygen Delivery Method): nasal cannula  O2 Flow (L/min): 3    PHYSICAL EXAM:  Constitutional: NAD, awake and alert, seated in bed  Respiratory: Decreased breath sounds at bases, L chest tube draining serosanguinous fluid  Cardiovascular: S1 and S2, regular  Gastrointestinal: Bowel Sounds present, soft, nontender.   Extremities: No edema.     LABS:                     8.5    15.61 )-----------( 727      ( 05 Dec 2024 05:48 )             28.4     132[L]  |  104  |  14  ----------------------------<  99  4.2   |  24  |  0.44[L]    Ca    8.2[L]      05 Dec 2024 05:48    TPro  5.2[L]  /  Alb  1.6[L]  /  TBili  0.3  /  DBili  x   /  AST  7[L]  /  ALT  10[L]  /  AlkPhos  90  12-05    TroponinI hsT: <-17.62    TTE Limited W or WO Ultrasound Enhancing Agent (12.03.24 @ 10:06) >   1. Moderate pericardial effusion noted adjacent to the posterior left ventricle with no evidence of hemodynamic compromise (or echocardiographic evidence of cardiac tamponade).   2. Left ventricular systolic function is hyperdynamic with an ejection fraction visually estimated at 65 to 70 %.   3. Normal right ventricular cavity size and normal right ventricular systolic function.   4. Moderate left pleural effusion noted.   5. Compared to the transthoracic echocardiogram performed on 12/1/2024, decreased pericardial effusion than before.    CT Chest No Cont (11.30.24 @ 20:34):  Moderately large simple pericardial effusion. Small to moderate right and small left pleural effusions with associatedcompressive atelectasis.  Superimposed pneumonia should be excluded on a clinical basis.

## 2024-12-05 NOTE — PROGRESS NOTE ADULT - SUBJECTIVE AND OBJECTIVE BOX
Subjective:    pat lying in bed, c/o chest pain s/p removal of pericarial drain yesterday, today repeat echo no pericardial fluid, possible of removal of R pigtail cath today, Left pleural effusion exuadative with neg cultures & neg cytology.    Home Medications:  acetaminophen 325 mg oral tablet: 2 tab(s) orally every 6 hours as needed for (2024 23:15)  amiodarone 200 mg oral tablet: 2 tab(s) orally 2 times a day ***until 24.... then 400mg QD*** (2024 23:15)  amLODIPine 5 mg oral tablet: 1 tab(s) orally once a day (:15)  Anumed-HC 25 mg rectal suppository: 1 suppository(ies) rectally 2 times a day as needed for (:15)  apixaban 5 mg oral tablet: 1 tab(s) orally every 12 hours (:15)  azelastine 137 mcg/inh (0.1%) nasal spray: 1 spray(s) in each nostril 2 times a day (:15)  colchicine 0.6 mg oral tablet: 0.5 tab(s) orally once a day (:15)  cyclobenzaprine 10 mg oral tablet: 1 tab(s) orally 3 times a day as needed for (03 Dec 2024 13:48)  enalapril 20 mg oral tablet: 1 tab(s) orally once a day (in the morning) (:15)  fluticasone 50 mcg/inh nasal spray: 2 spray(s) in each nostril once a day as needed for (:15)  hydroCHLOROthiazide 12.5 mg oral tablet: 1 tab(s) orally 2 times a day as needed for (:15)  HYDROmorphone 2 mg oral tablet: 1 tab(s) orally every 6 hours as needed for (:15)  ibuprofen 200 mg oral capsule: 2 cap(s) orally every 8 hours as needed for (:15)  lamoTRIgine 200 mg oral tablet: 1 tab(s) orally once a day (03 Dec 2024 13:48)  levothyroxine 88 mcg (0.088 mg) oral tablet: 1 tab(s) orally once a day (03 Dec 2024 13:48)  linaclotide 72 mcg oral capsule: 1 cap(s) orally once a day as needed for (03 Dec 2024 13:48)  liraglutide 18 mg/3 mL subcutaneous solution: 0.6 milligram(s) subcutaneously once a day as needed for (03 Dec 2024 13:48)  loperamide 2 mg oral capsule: 2 cap(s) orally every 2 hours as needed for (2024 23:15)  meclizine 25 mg oral tablet: 1 tab(s) orally 2 times a day as needed for (03 Dec 2024 13:48)  Metoprolol Succinate ER 25 mg oral tablet, extended release: 1 tab(s) orally once a day (03 Dec 2024 13:48)  Multiple Vitamins oral tablet: 1 tab(s) orally once a day (03 Dec 2024 13:48)  ondansetron 4 mg oral tablet, disintegratin tab(s) orally 3 times a day as needed for (03 Dec 2024 13:48)  pantoprazole 40 mg oral delayed release tablet: 1 tab(s) orally once a day (2024 23:15)  rosuvastatin 20 mg oral tablet: 1 tab(s) orally once a day (03 Dec 2024 13:48)  senna (sennosides) 8.6 mg oral tablet: 2 tab(s) orally once a day (at bedtime) as needed for (2024 23:15)  Wellbutrin  mg/24 hours oral tablet, extended release: 1 tab(s) orally once a day (03 Dec 2024 13:48)    MEDICATIONS  (STANDING):  aMIOdarone    Tablet 200 milliGRAM(s) Oral daily  buPROPion XL (24-Hour) . 150 milliGRAM(s) Oral daily  cefepime  Injectable. 2000 milliGRAM(s) IV Push every 8 hours  chlorhexidine 4% Liquid 1 Application(s) Topical <User Schedule>  colchicine 0.3 milliGRAM(s) Oral daily  fluticasone propionate 50 MICROgram(s)/spray Nasal Spray 1 Spray(s) Both Nostrils two times a day  heparin   Injectable 5000 Unit(s) SubCutaneous every 8 hours  lactobacillus acidophilus 1 Tablet(s) Oral every 12 hours  lamoTRIgine 200 milliGRAM(s) Oral daily  levothyroxine 88 MICROGram(s) Oral daily  lidocaine   4% Patch 1 Patch Transdermal daily  lidocaine   4% Patch 1 Patch Transdermal daily  metoprolol succinate ER 25 milliGRAM(s) Oral daily  multivitamin 1 Tablet(s) Oral daily  naloxone Injectable 0.4 milliGRAM(s) IV Push once  pantoprazole    Tablet 40 milliGRAM(s) Oral before breakfast  polyethylene glycol 3350 17 Gram(s) Oral daily  rosuvastatin 20 milliGRAM(s) Oral at bedtime  senna 2 Tablet(s) Oral at bedtime    MEDICATIONS  (PRN):  acetaminophen     Tablet .. 1000 milliGRAM(s) Oral every 8 hours PRN Mild Pain (1 - 3)  aluminum hydroxide/magnesium hydroxide/simethicone Suspension 30 milliLiter(s) Oral every 4 hours PRN Dyspepsia  bisacodyl 5 milliGRAM(s) Oral daily PRN Constipation  cyclobenzaprine 10 milliGRAM(s) Oral three times a day PRN Muscle Spasm  guaifenesin/dextromethorphan Oral Liquid 10 milliLiter(s) Oral every 4 hours PRN Cough  hydrocortisone hemorrhoidal Suppository 1 Suppository(s) Rectal two times a day PRN pain  HYDROmorphone  Injectable 0.5 milliGRAM(s) IV Push every 4 hours PRN Severe Pain (7 - 10)  ketorolac   Injectable 15 milliGRAM(s) IV Push every 6 hours PRN Moderate Pain (4 - 6)  loperamide 2 milliGRAM(s) Oral every 2 hours PRN Diarrhea  meclizine 25 milliGRAM(s) Oral two times a day PRN Dizziness  melatonin 3 milliGRAM(s) Oral at bedtime PRN Insomnia  morphine  - Injectable 2 milliGRAM(s) IV Push every 3 hours PRN Moderate Pain (4 - 6)  ondansetron Injectable 4 milliGRAM(s) IV Push every 8 hours PRN Nausea and/or Vomiting      Allergies    sulfa drugs (Vomiting; Nausea)  IV Contrast (Hives)    Intolerances        Vital Signs Last 24 Hrs  T(C): 36.4 (05 Dec 2024 17:01), Max: 36.7 (05 Dec 2024 04:00)  T(F): 97.5 (05 Dec 2024 17:01), Max: 98.1 (05 Dec 2024 04:00)  HR: 62 (05 Dec 2024 18:19) (55 - 72)  BP: 133/46 (05 Dec 2024 18:19) (121/41 - 150/39)  BP(mean): 68 (05 Dec 2024 18:19) (65 - 82)  RR: 18 (05 Dec 2024 18:19) (14 - 24)  SpO2: 100% (05 Dec 2024 18:19) (93% - 100%)    Parameters below as of 05 Dec 2024 18:19  Patient On (Oxygen Delivery Method): nasal cannula  O2 Flow (L/min): 2        PHYSICAL EXAMINATION:    NECK:  Supple. No lymphadenopathy. Jugular venous pressure not elevated. Carotids equal.   HEART:   The cardiac impulse has a normal quality. Reg., Nl S1 and S2.  There are no murmurs, rubs or gallops noted  CHEST:  Chest crackles to auscultation. Normal respiratory effort.  ABDOMEN:  Soft and nontender.   EXTREMITIES:  There is no edema.       LABS:                        8.5    15.61 )-----------( 727      ( 05 Dec 2024 05:48 )             28.4     12-    132[L]  |  104  |  14  ----------------------------<  99  4.2   |  24  |  0.44[L]    Ca    8.2[L]      05 Dec 2024 05:48    TPro  5.2[L]  /  Alb  1.6[L]  /  TBili  0.3  /  DBili  x   /  AST  7[L]  /  ALT  10[L]  /  AlkPhos  90  12-05      Urinalysis Basic - ( 05 Dec 2024 05:48 )    Color: x / Appearance: x / SG: x / pH: x  Gluc: 99 mg/dL / Ketone: x  / Bili: x / Urobili: x   Blood: x / Protein: x / Nitrite: x   Leuk Esterase: x / RBC: x / WBC x   Sq Epi: x / Non Sq Epi: x / Bacteria: x      Xray Chest 1 View- PORTABLE-Routine (Xray Chest 1 View- PORTABLE-Routine in AM.) (24 @ 07:32) >  IMPRESSION:  Enlarged cardiac silhouette.    Bibasilar pleural pigtail catheters and a pericardial pigtail catheter,   not significantly changed in position.    Small right apical pneumothorax, minimally decreased in size.    Mild pulmonary vascular congestion, not significantly changed.    New hazy opacity projecting over the lower right hemithorax, possibly   layering right pleural fluid which is either increased or redistributed   due to different patient position.    Unchanged left lower/retrocardiac opacity which could be due to a left   pleural effusion with associated passive atelectasis. Atelectasis of   other cause and/or other pathology including, but not limited to,   pneumonia is not excluded.

## 2024-12-05 NOTE — PROGRESS NOTE ADULT - ASSESSMENT
64 female presenting presenting with a moderate pericardial effusion & b/l pleural effusion & atelectasis    PROBLEMS:    Recent Pericarditis-Pericardial effusion  Bilateral pleural effusion-Compressive atelectasi  Acute hypoxic respiratory failure  Pneumonia  AFIB  HFpEF not acute  HTN  Gastric Ulder(remote)  NSVT  Essential Thrombocytosis  Von Willebrand's-History of bleeding/acquired vW disease- receives DDAVP prior to procedures.  DVT/PE-History of DVT/PE (after birth control), PAF on amio/eliquis  Hypothyroidism    Plan:     S/p removal of pericarial drain yesterday, today repeat echo no pericardial fluid, possible of removal of R pigtail cath today, Left pleural effusion exuadative with neg cultures & neg cytology.  B/L Effusions, L with a loculated appearance, IR-4F thoracostomies placed BL with US and CT guidance. Left clear yellow. Right cloudy yellow. To [id number] pericardial drain placed with US and CT guidance. Dark bloody output. To waterse+S of right pleural fluidaL  Continue Colchicine and ibuprofen  for pericarditis    Hold DOAC, Continue Amio and metoprolol, Paitent with signs of tamponade on echo  NS at 75  IV CEFEPIME  HEM ddavp prior to procedure  Cardio & CT surgery fu  Venodynes for DVT Prophylaxis-Hold Chemical for  procedure

## 2024-12-05 NOTE — PROGRESS NOTE ADULT - SUBJECTIVE AND OBJECTIVE BOX
INTERVAL HPI/OVERNIGHT EVENTS:  Patient seen earlier in the day.    Sitting in chair, still with bilateral chest tubes.   pericardial drain removed.       VITAL SIGNS:  T(F): 97.5 (12-05-24 @ 17:01)  HR: 62 (12-05-24 @ 18:19)  BP: 133/46 (12-05-24 @ 18:19)  RR: 18 (12-05-24 @ 18:19)  SpO2: 100% (12-05-24 @ 18:19)  Wt(kg): --    PHYSICAL EXAM:    Constitutional: appears uncomfortable  Eyes: EOMI, sclera non-icteric  Neck: supple  Respiratory: b/l chest tubes  Cardiovascular: RRR  Gastrointestinal: soft, no splenomegaly  Extremities: no edema  Neurological: AAOx3      MEDICATIONS  (STANDING):  aMIOdarone    Tablet 200 milliGRAM(s) Oral daily  buPROPion XL (24-Hour) . 150 milliGRAM(s) Oral daily  cefepime  Injectable. 2000 milliGRAM(s) IV Push every 8 hours  chlorhexidine 4% Liquid 1 Application(s) Topical <User Schedule>  colchicine 0.3 milliGRAM(s) Oral daily  fluticasone propionate 50 MICROgram(s)/spray Nasal Spray 1 Spray(s) Both Nostrils two times a day  heparin   Injectable 5000 Unit(s) SubCutaneous every 8 hours  lactobacillus acidophilus 1 Tablet(s) Oral every 12 hours  lamoTRIgine 200 milliGRAM(s) Oral daily  levothyroxine 88 MICROGram(s) Oral daily  lidocaine   4% Patch 1 Patch Transdermal daily  lidocaine   4% Patch 1 Patch Transdermal daily  metoprolol succinate ER 25 milliGRAM(s) Oral daily  multivitamin 1 Tablet(s) Oral daily  naloxone Injectable 0.4 milliGRAM(s) IV Push once  pantoprazole    Tablet 40 milliGRAM(s) Oral before breakfast  polyethylene glycol 3350 17 Gram(s) Oral daily  rosuvastatin 20 milliGRAM(s) Oral at bedtime  senna 2 Tablet(s) Oral at bedtime    MEDICATIONS  (PRN):  acetaminophen     Tablet .. 1000 milliGRAM(s) Oral every 8 hours PRN Mild Pain (1 - 3)  aluminum hydroxide/magnesium hydroxide/simethicone Suspension 30 milliLiter(s) Oral every 4 hours PRN Dyspepsia  bisacodyl 5 milliGRAM(s) Oral daily PRN Constipation  cyclobenzaprine 10 milliGRAM(s) Oral three times a day PRN Muscle Spasm  guaifenesin/dextromethorphan Oral Liquid 10 milliLiter(s) Oral every 4 hours PRN Cough  hydrocortisone hemorrhoidal Suppository 1 Suppository(s) Rectal two times a day PRN pain  HYDROmorphone  Injectable 0.5 milliGRAM(s) IV Push every 4 hours PRN Severe Pain (7 - 10)  ketorolac   Injectable 15 milliGRAM(s) IV Push every 6 hours PRN Moderate Pain (4 - 6)  loperamide 2 milliGRAM(s) Oral every 2 hours PRN Diarrhea  meclizine 25 milliGRAM(s) Oral two times a day PRN Dizziness  melatonin 3 milliGRAM(s) Oral at bedtime PRN Insomnia  morphine  - Injectable 2 milliGRAM(s) IV Push every 3 hours PRN Moderate Pain (4 - 6)  ondansetron Injectable 4 milliGRAM(s) IV Push every 8 hours PRN Nausea and/or Vomiting      Allergies    sulfa drugs (Vomiting; Nausea)  IV Contrast (Hives)    Intolerances        LABS:                        8.5    15.61 )-----------( 727      ( 05 Dec 2024 05:48 )             28.4     12-05    132[L]  |  104  |  14  ----------------------------<  99  4.2   |  24  |  0.44[L]    Ca    8.2[L]      05 Dec 2024 05:48    TPro  5.2[L]  /  Alb  1.6[L]  /  TBili  0.3  /  DBili  x   /  AST  7[L]  /  ALT  10[L]  /  AlkPhos  90  12-05      Urinalysis Basic - ( 05 Dec 2024 05:48 )    Color: x / Appearance: x / SG: x / pH: x  Gluc: 99 mg/dL / Ketone: x  / Bili: x / Urobili: x   Blood: x / Protein: x / Nitrite: x   Leuk Esterase: x / RBC: x / WBC x   Sq Epi: x / Non Sq Epi: x / Bacteria: x        RADIOLOGY & ADDITIONAL TESTS:  Studies reviewed.

## 2024-12-05 NOTE — PROGRESS NOTE ADULT - PROBLEM SELECTOR PROBLEM 3
Paroxysmal atrial fibrillation
Pericardial effusion

## 2024-12-05 NOTE — PROGRESS NOTE ADULT - ASSESSMENT
64y Female history of right ventricular outflow tract ventricular tachycardia s/p remote ablation (2001; "partially successful" with subsequent PVCs treated with verapamil), hypertension, hypothyroidism, ischemic colitis, von Willebrand's disease, essential thrombocytosis, obesity s/p gastric bypass, hypertension, DVT/PE, recently admitted to [facility] twice with respiratory failure, bacterial pneumonia, acute kidney injury, pericarditis, and pericardial effusion, discharged on 11/29 but returned to ER on 11/30 w CP and SOB. CT report indicated moderately large pericardial effusion, b/l pleural effusions, TTE confirmed with large pericardial effusion. S/p IR placement of pericardial drain, b/l pigtail chest tubes. Left pleural fluid exudative.    Plan   f/u pleural studies  b/l exudative   pericardial drain removed yesterday   ECHO with moderate pericardial effusion with no tamponade physiology 12/3  pt with central discomfort , repeat ECHO this am P  daily CXR w tubes in place  pain control  IS  OOB as tolerated  plan to remove Rt pigtail later today     Discussed with Cardiothoracic Team at AM rounds.

## 2024-12-05 NOTE — PROGRESS NOTE ADULT - ASSESSMENT
This is a 64 year old female with a history of essential thrombocythemia for the past 23 years, followed by Dr. Mann Urbina at [facility].  Intolerant to anagrelide due to afib, on hydrea until about 6-12 months ago with no response, most recently on pegylated IFN alpha (Besrema) for the past 6 months- intolerant as well.  Stopped 2 months ago.   Was planned on a marrow and to begin Jakafi- not yet.     History of DVT/PE (after birth control), PAF on amio/eliquis- last dose yesterday am.  History of bleeding/acquired vW disease- receives DDAVP prior to procedures.     She has had multiple hospitalizations in the last month due to pneumonia/pericarditis, readmitted for the third time- cough/pleuritic chest pain.   CT showed bilat pleural effusions and complete atelectasis LLL, large pericardial effusion.  s/p CT/pericardial drain- exudative effusion.   Cytology, cultures are negative.     ?pericarditis/effusions have been seen with INF alpha but rare.  She is has been unwell for some time, better on steroids.  If her effusions reaccumulate, would strongly consider empiric steroids.  Cardiology concerned because steroids cause increased risk of recurrence of pericarditis.     Worsening anemia in the setting of infection/multiple hospitalizations/blood draws. Iron deficiency +/- suppression from acute infection.   Agree with checking occult blood.   Transfusional support as needed.    d/w Dr. Ojeda.

## 2024-12-05 NOTE — PROGRESS NOTE ADULT - PROBLEM SELECTOR PLAN 3
-Currently in sinus rhythm; continue telemetry monitoring and amiodarone --  -anticoagulation on hold s/p pericardial drainage sanguinous effusion , drain in place in sinus rhythm  resume amiodarone 200 mg po daily ,
-Currently in sinus rhythm; continue telemetry monitoring and amiodarone -- load in progress.  -anticoagulation on hold pending possible pericardial drainage procedure.
Remains in sinus rhythm. I recommend continuing to hold anticoagulation given sinus rhythm and hemorraghic pericardial effusion; continue amiodarone -- ideally, short-term duration while recovering from acute illness.
-Currently in sinus rhythm; continue telemetry monitoring and amiodarone --  -anticoagulation on hold s/p pericardial drainage sanguinous effusion , drain in place in sinus rhythm  resume amiodarone 200 mg po daily ,
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
## 2024-12-06 LAB
ALBUMIN SERPL ELPH-MCNC: 1.7 G/DL — LOW (ref 3.3–5)
ALP SERPL-CCNC: 89 U/L — SIGNIFICANT CHANGE UP (ref 40–120)
ALT FLD-CCNC: 13 U/L — SIGNIFICANT CHANGE UP (ref 12–78)
ANION GAP SERPL CALC-SCNC: 4 MMOL/L — LOW (ref 5–17)
AST SERPL-CCNC: 10 U/L — LOW (ref 15–37)
BILIRUB SERPL-MCNC: 0.3 MG/DL — SIGNIFICANT CHANGE UP (ref 0.2–1.2)
BUN SERPL-MCNC: 15 MG/DL — SIGNIFICANT CHANGE UP (ref 7–23)
CALCIUM SERPL-MCNC: 8.5 MG/DL — SIGNIFICANT CHANGE UP (ref 8.5–10.1)
CHLORIDE SERPL-SCNC: 102 MMOL/L — SIGNIFICANT CHANGE UP (ref 96–108)
CO2 SERPL-SCNC: 27 MMOL/L — SIGNIFICANT CHANGE UP (ref 22–31)
CREAT SERPL-MCNC: 0.47 MG/DL — LOW (ref 0.5–1.3)
EGFR: 108 ML/MIN/1.73M2 — SIGNIFICANT CHANGE UP
GLUCOSE SERPL-MCNC: 94 MG/DL — SIGNIFICANT CHANGE UP (ref 70–99)
HCT VFR BLD CALC: 29.3 % — LOW (ref 34.5–45)
HGB BLD-MCNC: 8.7 G/DL — LOW (ref 11.5–15.5)
MCHC RBC-ENTMCNC: 24.6 PG — LOW (ref 27–34)
MCHC RBC-ENTMCNC: 29.7 G/DL — LOW (ref 32–36)
MCV RBC AUTO: 82.8 FL — SIGNIFICANT CHANGE UP (ref 80–100)
PLATELET # BLD AUTO: 763 K/UL — HIGH (ref 150–400)
POTASSIUM SERPL-MCNC: 4.2 MMOL/L — SIGNIFICANT CHANGE UP (ref 3.5–5.3)
POTASSIUM SERPL-SCNC: 4.2 MMOL/L — SIGNIFICANT CHANGE UP (ref 3.5–5.3)
PROT SERPL-MCNC: 5.5 GM/DL — LOW (ref 6–8.3)
RBC # BLD: 3.54 M/UL — LOW (ref 3.8–5.2)
RBC # FLD: 19.3 % — HIGH (ref 10.3–14.5)
SODIUM SERPL-SCNC: 133 MMOL/L — LOW (ref 135–145)
WBC # BLD: 14.6 K/UL — HIGH (ref 3.8–10.5)
WBC # FLD AUTO: 14.6 K/UL — HIGH (ref 3.8–10.5)

## 2024-12-06 PROCEDURE — 99233 SBSQ HOSP IP/OBS HIGH 50: CPT

## 2024-12-06 PROCEDURE — 99232 SBSQ HOSP IP/OBS MODERATE 35: CPT

## 2024-12-06 PROCEDURE — 71045 X-RAY EXAM CHEST 1 VIEW: CPT | Mod: 26

## 2024-12-06 RX ORDER — LACTULOSE 10 G/15ML
15 SOLUTION ORAL DAILY
Refills: 0 | Status: DISCONTINUED | OUTPATIENT
Start: 2024-12-06 | End: 2024-12-12

## 2024-12-06 RX ADMIN — LAMOTRIGINE 200 MILLIGRAM(S): 50 TABLET, EXTENDED RELEASE ORAL at 09:10

## 2024-12-06 RX ADMIN — Medication 1 TABLET(S): at 09:12

## 2024-12-06 RX ADMIN — HYDROMORPHONE HYDROCHLORIDE 0.5 MILLIGRAM(S): 2 TABLET ORAL at 15:20

## 2024-12-06 RX ADMIN — Medication 1 TABLET(S): at 09:09

## 2024-12-06 RX ADMIN — Medication 5000 UNIT(S): at 21:54

## 2024-12-06 RX ADMIN — KETOROLAC TROMETHAMINE 15 MILLIGRAM(S): 30 INJECTION INTRAMUSCULAR; INTRAVENOUS at 05:24

## 2024-12-06 RX ADMIN — LIDOCAINE 1 PATCH: 40 CREAM TOPICAL at 12:29

## 2024-12-06 RX ADMIN — AMIODARONE HYDROCHLORIDE 200 MILLIGRAM(S): 200 TABLET ORAL at 09:10

## 2024-12-06 RX ADMIN — Medication 5000 UNIT(S): at 14:40

## 2024-12-06 RX ADMIN — LIDOCAINE 1 PATCH: 40 CREAM TOPICAL at 19:47

## 2024-12-06 RX ADMIN — KETOROLAC TROMETHAMINE 15 MILLIGRAM(S): 30 INJECTION INTRAMUSCULAR; INTRAVENOUS at 05:54

## 2024-12-06 RX ADMIN — Medication 0.3 MILLIGRAM(S): at 09:09

## 2024-12-06 RX ADMIN — Medication 88 MICROGRAM(S): at 05:24

## 2024-12-06 RX ADMIN — HYDROMORPHONE HYDROCHLORIDE 0.5 MILLIGRAM(S): 2 TABLET ORAL at 23:37

## 2024-12-06 RX ADMIN — POLYETHYLENE GLYCOL 3350 17 GRAM(S): 17 POWDER, FOR SOLUTION ORAL at 09:11

## 2024-12-06 RX ADMIN — Medication 1 TABLET(S): at 21:54

## 2024-12-06 RX ADMIN — HYDROMORPHONE HYDROCHLORIDE 0.5 MILLIGRAM(S): 2 TABLET ORAL at 08:34

## 2024-12-06 RX ADMIN — HYDROMORPHONE HYDROCHLORIDE 0.5 MILLIGRAM(S): 2 TABLET ORAL at 02:25

## 2024-12-06 RX ADMIN — CHLORHEXIDINE GLUCONATE 1 APPLICATION(S): 1.2 RINSE ORAL at 05:44

## 2024-12-06 RX ADMIN — Medication 5000 UNIT(S): at 05:24

## 2024-12-06 RX ADMIN — Medication 150 MILLIGRAM(S): at 09:10

## 2024-12-06 RX ADMIN — Medication 2 TABLET(S): at 21:54

## 2024-12-06 RX ADMIN — ROSUVASTATIN CALCIUM 20 MILLIGRAM(S): 5 TABLET, FILM COATED ORAL at 21:54

## 2024-12-06 RX ADMIN — PANTOPRAZOLE SODIUM 40 MILLIGRAM(S): 40 TABLET, DELAYED RELEASE ORAL at 09:09

## 2024-12-06 RX ADMIN — HYDROMORPHONE HYDROCHLORIDE 0.5 MILLIGRAM(S): 2 TABLET ORAL at 23:07

## 2024-12-06 RX ADMIN — Medication 10 MILLILITER(S): at 09:11

## 2024-12-06 RX ADMIN — Medication 1 SPRAY(S): at 21:55

## 2024-12-06 RX ADMIN — HYDROMORPHONE HYDROCHLORIDE 0.5 MILLIGRAM(S): 2 TABLET ORAL at 02:55

## 2024-12-06 RX ADMIN — LACTULOSE 15 GRAM(S): 10 SOLUTION ORAL at 15:19

## 2024-12-06 RX ADMIN — METOPROLOL TARTRATE 25 MILLIGRAM(S): 100 TABLET, FILM COATED ORAL at 09:10

## 2024-12-06 NOTE — PHYSICAL THERAPY INITIAL EVALUATION ADULT - GENERAL OBSERVATIONS, REHAB EVAL
PT UPGRADED TO CCU NOT SEEN FOR THIS EVAL> PLEASE REORDER WHEN Pt STABLE thank you
Patient was lying supine with HOB elevated, +Tele, + VCD, +Permifit
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
## 2024-12-06 NOTE — PROGRESS NOTE ADULT - SUBJECTIVE AND OBJECTIVE BOX
Interval History:        Right chest tube d/c yesterday        left chest tube drained 200 cc.        ct shows improvement in effusion small fibrinous exudate        awake alert, still with pleuritic pain    HPI:       64-year-old F with PMH of HTN, HLD, hypothyroid,   thrombocytosis, GERD, ischemic colitis s/p left hemicolectomy, pAF and DVT/PE on Eliquis,and recently 2 hospitalizations at [facility] in the past 2 months for CAP, and also diagnosed with pericarditis, and HFpEF. Was last discharged home on 11/29  was BIB pvt car on 11/30 after visiting RN recommended  further assessment of back & chest pain, SOB, and productive cough. Pt reports symptoms similar to why/when she was readmitted to [facility] w/ Dx of pericarditis/CHF/PNA.  Pt upset w/ care rendered at [facility] despite her doctors all there, so came to  ED foe eval/management.   CT chest and echo shows bibasilar effusions, and pericardial effusion with rv collapse  Patient had recently had treatment for HCAP at [facility]  had been on interferon , ? related   all rheum labs at [facility] were negative was seen by rheumatology not felt to be rheumatolgic  being treated with colchicine for pericarditis  has bilateral pigtails, had drainage of bloody pericardial drain  fluid was a transudate    ROS  complaining of pleuritic pain, no fevers, no chills no abdominal pain,   sob pain better     MEDICATIONS  (STANDING):  aMIOdarone    Tablet 200 milliGRAM(s) Oral daily  buPROPion XL (24-Hour) . 150 milliGRAM(s) Oral daily  chlorhexidine 4% Liquid 1 Application(s) Topical <User Schedule>  colchicine 0.3 milliGRAM(s) Oral daily  fluticasone propionate 50 MICROgram(s)/spray Nasal Spray 1 Spray(s) Both Nostrils two times a day  heparin   Injectable 5000 Unit(s) SubCutaneous every 8 hours  lactobacillus acidophilus 1 Tablet(s) Oral every 12 hours  lamoTRIgine 200 milliGRAM(s) Oral daily  levothyroxine 88 MICROGram(s) Oral daily  lidocaine   4% Patch 1 Patch Transdermal daily  lidocaine   4% Patch 1 Patch Transdermal daily  metoprolol succinate ER 25 milliGRAM(s) Oral daily  multivitamin 1 Tablet(s) Oral daily  naloxone Injectable 0.4 milliGRAM(s) IV Push once  pantoprazole    Tablet 40 milliGRAM(s) Oral before breakfast  polyethylene glycol 3350 17 Gram(s) Oral daily  rosuvastatin 20 milliGRAM(s) Oral at bedtime  senna 2 Tablet(s) Oral at bedtime    MEDICATIONS  (PRN):  acetaminophen     Tablet .. 1000 milliGRAM(s) Oral every 8 hours PRN Mild Pain (1 - 3)  aluminum hydroxide/magnesium hydroxide/simethicone Suspension 30 milliLiter(s) Oral every 4 hours PRN Dyspepsia  bisacodyl 5 milliGRAM(s) Oral daily PRN Constipation  cyclobenzaprine 10 milliGRAM(s) Oral three times a day PRN Muscle Spasm  guaifenesin/dextromethorphan Oral Liquid 10 milliLiter(s) Oral every 4 hours PRN Cough  hydrocortisone hemorrhoidal Suppository 1 Suppository(s) Rectal two times a day PRN pain  HYDROmorphone  Injectable 0.5 milliGRAM(s) IV Push every 4 hours PRN Severe Pain (7 - 10)  ketorolac   Injectable 15 milliGRAM(s) IV Push every 6 hours PRN Moderate Pain (4 - 6)  loperamide 2 milliGRAM(s) Oral every 2 hours PRN Diarrhea  meclizine 25 milliGRAM(s) Oral two times a day PRN Dizziness  melatonin 3 milliGRAM(s) Oral at bedtime PRN Insomnia  morphine  - Injectable 2 milliGRAM(s) IV Push every 3 hours PRN Moderate Pain (4 - 6)  ondansetron Injectable 4 milliGRAM(s) IV Push every 8 hours PRN Nausea and/or Vomiting    ICU Vital Signs Last 24 Hrs  T(C): 36.4 (06 Dec 2024 08:06), Max: 36.4 (05 Dec 2024 17:01)  T(F): 97.6 (06 Dec 2024 08:06), Max: 97.6 (06 Dec 2024 08:06)  HR: 58 (06 Dec 2024 09:00) (56 - 72)  BP: 140/41 (06 Dec 2024 08:27) (123/45 - 144/48)  BP(mean): 69 (06 Dec 2024 08:27) (66 - 75)  ABP: --  ABP(mean): --  RR: 18 (06 Dec 2024 09:00) (15 - 24)  SpO2: 94% (06 Dec 2024 09:00) (93% - 100%)    O2 Parameters below as of 06 Dec 2024 08:27  Patient On (Oxygen Delivery Method): room air    General - no distress  HEENT - nc/at  neck supple  lungs clear, left sided pigtail  cv RRR  abdomen soft,   extremeties 1+ bilateral edema  neuro awake and alert        I&O's Summary    05 Dec 2024 07:01  -  06 Dec 2024 07:00  --------------------------------------------------------  IN: 960 mL / OUT: 2450 mL / NET: -1490 mL                       8.7    14.60 )-----------( 763      ( 06 Dec 2024 05:10 )             29.3       12-06    133[L]  |  102  |  15  ----------------------------<  94  4.2   |  27  |  0.47[L]    Ca    8.5      06 Dec 2024 05:10    TPro  5.5[L]  /  Alb  1.7[L]  /  TBili  0.3  /  DBili  x   /  AST  10[L]  /  ALT  13  /  AlkPhos  89  12-06    Urinalysis Basic - ( 06 Dec 2024 05:10 )    Color: x / Appearance: x / SG: x / pH: x  Gluc: 94 mg/dL / Ketone: x  / Bili: x / Urobili: x   Blood: x / Protein: x / Nitrite: x   Leuk Esterase: x / RBC: x / WBC x   Sq Epi: x / Non Sq Epi: x / Bacteria: x    DVT Prophylaxis:  Heparin subq                                                               Advanced Directives: Full Code         Labs, Notes, Orders, radiologic studies reviewed and care coordinated with multidisciplinary team

## 2024-12-06 NOTE — PROGRESS NOTE ADULT - ASSESSMENT
A:    64yFemale    Here for:    1. pleural effusions B/L  2. pericardial effusion    This patient requires a moderate level of care due to the complexity and severity of their condition which increases the amount and complexity of data to be reviewed and analyzed in addition to the risk of complications, morbidity and mortality of patient management    P:    Unclear etiology for pericardial and pleural effusions  w/u in progress    Drains removed, monitor dressings  f/u cell counts, chemistry and cytology  Cont multi modal pain relief. toradol seems most effective so ATC toradol 15mg IV q6h PRNt; continue optimizing  OOB, IS if paramount importance  f/u labs  VTE ppx  Bowel regimen  Synthroid  Med rec  BG < 180  f/u labs, replete lytes PRN    Dispo: Cont care.    Time spent on this patient encounter: 35+ minutes    Date of entry of this note is equal to the date of services rendered.    This includes assessment of the presenting problems with associated risks, reviewing the medical record to prepare for the encounter and meeting face to face with the patient to obtain additional history and conduct a focused exmaination. I have also performed an appropiate physical exam, made interventions listed and ordered and interpreted appropiate diagnostic studies as documented. This also included communication and coordination of care with the multidisciplinary team including the bedside nurse, appropriate attending of record and consultants as needed.

## 2024-12-06 NOTE — PROGRESS NOTE ADULT - SUBJECTIVE AND OBJECTIVE BOX
REASON FOR VISIT: Pericardial effusion, PAF    HPI:   Anne-Marie Corona is a 64-year-old woman with a history of right ventricular outflow tract ventricular tachycardia s/p remote ablation (2001; "partially successful" with subsequent PVCs treated with verapamil), hypertension, hypothyroidism, ischemic colitis, von Willebrand's disease, essential thrombocytosis, obesity s/p gastric bypass, hypertension, DVT/PE, and hospitalization in March 2023 for respiratory failure, who has been ill since late October.  She was admitted to [facility] twice with respiratory failure, bacterial pneumonia, acute kidney injury, pericarditis, and pericardial effusion.  She was discharged on 11/29 but felt poor on 11/30 and came to the [facility] ED. She was found to have a large pericardial effusion, pleural effusion.     12/2/'24: some improvement in symptoms overnight.  12/3/24  Patient is complaining of having sharp chest pain left sided chest , increase motion ,deep breathing ,  started last night in addition to prior chest pain , patient had bilateral CT placed , and pericardial drain in place ,   patient was treated for pleuropericarditis [facility]   12/4/24: Pt feels improved. Right sided atypical chest pain.   12/5/24: Pleuritic chest pressure.  Overall, feeling "a little better."  12/6/24: s/p drain removal with persistent pleuritic chest pain     CV MEDICATIONS  (STANDING):  aMIOdarone    Tablet 200 milliGRAM(s) Oral daily  colchicine 0.3 milliGRAM(s) Oral daily  heparin   Injectable 5000 Unit(s) SubCutaneous every 8 hours  metoprolol succinate ER 25 milliGRAM(s) Oral daily  rosuvastatin 20 milliGRAM(s) Oral at bedtime    Vital Signs Last 24 Hrs  T(C): 36.7 (05 Dec 2024 04:00), Max: 36.9 (04 Dec 2024 16:24)  T(F): 98.1 (05 Dec 2024 04:00), Max: 98.4 (04 Dec 2024 16:24)  HR: 58 (05 Dec 2024 07:00) (55 - 72)  BP: 133/45 (05 Dec 2024 07:00) (121/37 - 151/44)  BP(mean): 70 (05 Dec 2024 07:00) (61 - 82)  RR: 20 (05 Dec 2024 07:00) (14 - 26)  SpO2: 96% (05 Dec 2024 07:00) (95% - 99%)  Patient On (Oxygen Delivery Method): nasal cannula  O2 Flow (L/min): 3    PHYSICAL EXAM:  Constitutional: NAD, awake and alert sitting up in bed   Respiratory:CTA b/l without wheezing or rhonchi   Cardiovascular: S1 and S2, regular, +rub   Gastrointestinal:, soft, nontender.   Extremities: No edema.     LABS:                     8.5    15.61 )-----------( 727      ( 05 Dec 2024 05:48 )             28.4     132[L]  |  104  |  14  ----------------------------<  99  4.2   |  24  |  0.44[L]    Ca    8.2[L]      05 Dec 2024 05:48    TPro  5.2[L]  /  Alb  1.6[L]  /  TBili  0.3  /  DBili  x   /  AST  7[L]  /  ALT  10[L]  /  AlkPhos  90  12-05    TroponinI hsT: <-17.62    TTE Limited W or WO Ultrasound Enhancing Agent (12.03.24 @ 10:06) >   1. Moderate pericardial effusion noted adjacent to the posterior left ventricle with no evidence of hemodynamic compromise (or echocardiographic evidence of cardiac tamponade).   2. Left ventricular systolic function is hyperdynamic with an ejection fraction visually estimated at 65 to 70 %.   3. Normal right ventricular cavity size and normal right ventricular systolic function.   4. Moderate left pleural effusion noted.   5. Compared to the transthoracic echocardiogram performed on 12/1/2024, decreased pericardial effusion than before.    CT Chest No Cont (11.30.24 @ 20:34):  Moderately large simple pericardial effusion. Small to moderate right and small left pleural effusions with associatedcompressive atelectasis.  Superimposed pneumonia should be excluded on a clinical basis.
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
## 2024-12-06 NOTE — PHYSICAL THERAPY INITIAL EVALUATION ADULT - PERTINENT HX OF CURRENT PROBLEM, REHAB EVAL
64-year-old F with PMH of HTN, HLD, hypothyroid, primary lymphedema, central thrombocytosis, GERD post bariatric surgery, ischemic colitis s/p left hemicolectomy, pAF and DVT/PE on Eliquis, HFpEF, and recently 2 hospitalizations at [facility] in the past 2 months for CAP, and also diagnosed with pericarditis, and HFpEF. Was last discharged home on 11/29 on PO ATBs (Doxycycline). Now BIB pvt car on 11/30 after visiting RN recommended  further assessment of back & chest pain, SOB, and productive cough. Pt reports symptoms similar to why/when she was readmitted to [facility] w/ Dx of pericarditis/CHF/PNA. No F/C, abd pain, N/V.  Recent LE edema, + improved today. Pt upset w/ care rendered at [facility] despite her doctors all there, so came to  ED for eval/management.
S/P Pericardial effusion

## 2024-12-06 NOTE — PROGRESS NOTE ADULT - ASSESSMENT
64y Female history of right ventricular outflow tract ventricular tachycardia s/p remote ablation (2001; "partially successful" with subsequent PVCs treated with verapamil), hypertension, hypothyroidism, ischemic colitis, von Willebrand's disease, essential thrombocytosis, obesity s/p gastric bypass, hypertension, DVT/PE, recently admitted to [facility] twice with respiratory failure, bacterial pneumonia, acute kidney injury, pericarditis, and pericardial effusion, discharged on 11/29 but returned to ER on 11/30 w CP and SOB. CT report indicated moderately large pericardial effusion, b/l pleural effusions, TTE confirmed with large pericardial effusion. S/p IR placement of pericardial drain, b/l pigtail chest tubes. Left pleural fluid exudative.    Plan   f/u pleural studies  b/l exudative   repeat echo yesterday with thickened pericardium- most likely inflammatory - cont Toradol and colchicine   pain control  IS  OOB as tolerated  cont care as per primary team   will follow   Discussed with Cardiothoracic Team at AM rounds.

## 2024-12-06 NOTE — PHYSICAL THERAPY INITIAL EVALUATION ADULT - NSPTDMEREC_GEN_A_CORE
Pt will require a rolling walker at home due to the dignosis of difficulty walking to help complete their MRADL's./rolling walker/bathing

## 2024-12-06 NOTE — PROGRESS NOTE ADULT - ASSESSMENT
64 female presenting presenting with a moderate pericardial effusion & b/l pleural effusion & atelectasis    PROBLEMS:    Recent Pericarditis-Pericardial effusion  Bilateral pleural effusion-Compressive atelectasi  Acute hypoxic respiratory failure  Pneumonia  AFIB  HFpEF not acute  HTN  Gastric Ulder(remote)  NSVT  Essential Thrombocytosis  Von Willebrand's-History of bleeding/acquired vW disease- receives DDAVP prior to procedures.  DVT/PE-History of DVT/PE (after birth control), PAF on amio/eliquis  Hypothyroidism    Plan:     pulmonary stable  S/p removal of pericarial drain, b/l chest tube & with neg cultures & neg cytology.  Continue Colchicine and ibuprofen  for pericarditis    Continue Amio and metoprolol  Off abx  Cardio & CT surgery fu  Venodynes for DVT Prophylaxis-s/q heparin

## 2024-12-06 NOTE — PHYSICAL THERAPY INITIAL EVALUATION ADULT - DIAGNOSIS, PT EVAL
Pneumonia, Acute hypoxic respiratory failure, Pericarditis with pericardial effusion, HFpEF. Afib
Difficulty walking S/P Pericardial effusion

## 2024-12-06 NOTE — PROGRESS NOTE ADULT - ASSESSMENT
64 female presenting presenting with a moderate pericardial effusion  Recent Pericarditis  AFIB  HFpEF not acute  HTN  Gastric Ulder(remote)  NSVT  Essential Thrombocytosis  Von Willebrand's  DVT/PE  Hypothyroidism    Plan:     Neuro awake and alert - analgesia  PULM: B/L Effusions,  s/p bilateral pigtails, cultures negative, exudate per lights criteria, possible d/c left pigtail today  Cardio: Continue Colchicine  for pericarditis  Drainage of pericardial fluid bloody,   No signs of acute heart failure, Hold DOAC, Continue Amio and metoprolol     now in normal sinus rhythm, will give low dose lasix for peripheral edema, repeat echo, minimal effusion  Renal: d.c iv fluids  GI:  diet as tolerated  ENDO: Continue Synthroid, TSH [id number]  RHEUM: W/U at [facility] negative,   HEM:  heme input appreciated, was given ddavp prior to procedure, No AC yet for paf given bloody effusion  Venodynes for DVT Prophylaxis,Heparin subq  ID  completed course of cefepime  needs PT

## 2024-12-06 NOTE — PROGRESS NOTE ADULT - SUBJECTIVE AND OBJECTIVE BOX
Subjective:    pat better, pain improved, s/p removal of R pigtail cath, possible removal of left tube today.    Home Medications:  acetaminophen 325 mg oral tablet: 2 tab(s) orally every 6 hours as needed for (15)  amiodarone 200 mg oral tablet: 2 tab(s) orally 2 times a day ***until 24.... then 400mg QD*** (:15)  amLODIPine 5 mg oral tablet: 1 tab(s) orally once a day (:15)  Anumed-HC 25 mg rectal suppository: 1 suppository(ies) rectally 2 times a day as needed for (:15)  apixaban 5 mg oral tablet: 1 tab(s) orally every 12 hours (2024 23:15)  azelastine 137 mcg/inh (0.1%) nasal spray: 1 spray(s) in each nostril 2 times a day (2024 23:15)  colchicine 0.6 mg oral tablet: 0.5 tab(s) orally once a day (2024 23:15)  cyclobenzaprine 10 mg oral tablet: 1 tab(s) orally 3 times a day as needed for (03 Dec 2024 13:48)  enalapril 20 mg oral tablet: 1 tab(s) orally once a day (in the morning) (:15)  fluticasone 50 mcg/inh nasal spray: 2 spray(s) in each nostril once a day as needed for (15)  hydroCHLOROthiazide 12.5 mg oral tablet: 1 tab(s) orally 2 times a day as needed for (2024 23:15)  HYDROmorphone 2 mg oral tablet: 1 tab(s) orally every 6 hours as needed for (:15)  ibuprofen 200 mg oral capsule: 2 cap(s) orally every 8 hours as needed for (2024 23:15)  lamoTRIgine 200 mg oral tablet: 1 tab(s) orally once a day (03 Dec 2024 13:48)  levothyroxine 88 mcg (0.088 mg) oral tablet: 1 tab(s) orally once a day (03 Dec 2024 13:48)  linaclotide 72 mcg oral capsule: 1 cap(s) orally once a day as needed for (03 Dec 2024 13:48)  liraglutide 18 mg/3 mL subcutaneous solution: 0.6 milligram(s) subcutaneously once a day as needed for (03 Dec 2024 13:48)  loperamide 2 mg oral capsule: 2 cap(s) orally every 2 hours as needed for (2024 23:15)  meclizine 25 mg oral tablet: 1 tab(s) orally 2 times a day as needed for (03 Dec 2024 13:48)  Metoprolol Succinate ER 25 mg oral tablet, extended release: 1 tab(s) orally once a day (03 Dec 2024 13:48)  Multiple Vitamins oral tablet: 1 tab(s) orally once a day (03 Dec 2024 13:48)  ondansetron 4 mg oral tablet, disintegratin tab(s) orally 3 times a day as needed for (03 Dec 2024 13:48)  pantoprazole 40 mg oral delayed release tablet: 1 tab(s) orally once a day (2024 23:15)  rosuvastatin 20 mg oral tablet: 1 tab(s) orally once a day (03 Dec 2024 13:48)  senna (sennosides) 8.6 mg oral tablet: 2 tab(s) orally once a day (at bedtime) as needed for (2024 23:15)  Wellbutrin  mg/24 hours oral tablet, extended release: 1 tab(s) orally once a day (03 Dec 2024 13:48)    MEDICATIONS  (STANDING):  aMIOdarone    Tablet 200 milliGRAM(s) Oral daily  buPROPion XL (24-Hour) . 150 milliGRAM(s) Oral daily  chlorhexidine 4% Liquid 1 Application(s) Topical <User Schedule>  colchicine 0.3 milliGRAM(s) Oral daily  fluticasone propionate 50 MICROgram(s)/spray Nasal Spray 1 Spray(s) Both Nostrils two times a day  heparin   Injectable 5000 Unit(s) SubCutaneous every 8 hours  lactobacillus acidophilus 1 Tablet(s) Oral every 12 hours  lamoTRIgine 200 milliGRAM(s) Oral daily  levothyroxine 88 MICROGram(s) Oral daily  lidocaine   4% Patch 1 Patch Transdermal daily  lidocaine   4% Patch 1 Patch Transdermal daily  metoprolol succinate ER 25 milliGRAM(s) Oral daily  multivitamin 1 Tablet(s) Oral daily  naloxone Injectable 0.4 milliGRAM(s) IV Push once  pantoprazole    Tablet 40 milliGRAM(s) Oral before breakfast  polyethylene glycol 3350 17 Gram(s) Oral daily  rosuvastatin 20 milliGRAM(s) Oral at bedtime  senna 2 Tablet(s) Oral at bedtime    MEDICATIONS  (PRN):  acetaminophen     Tablet .. 1000 milliGRAM(s) Oral every 8 hours PRN Mild Pain (1 - 3)  aluminum hydroxide/magnesium hydroxide/simethicone Suspension 30 milliLiter(s) Oral every 4 hours PRN Dyspepsia  bisacodyl 5 milliGRAM(s) Oral daily PRN Constipation  cyclobenzaprine 10 milliGRAM(s) Oral three times a day PRN Muscle Spasm  guaifenesin/dextromethorphan Oral Liquid 10 milliLiter(s) Oral every 4 hours PRN Cough  hydrocortisone hemorrhoidal Suppository 1 Suppository(s) Rectal two times a day PRN pain  HYDROmorphone  Injectable 0.5 milliGRAM(s) IV Push every 4 hours PRN Severe Pain (7 - 10)  ketorolac   Injectable 15 milliGRAM(s) IV Push every 6 hours PRN Moderate Pain (4 - 6)  lactulose Syrup 15 Gram(s) Oral daily PRN constipation  loperamide 2 milliGRAM(s) Oral every 2 hours PRN Diarrhea  meclizine 25 milliGRAM(s) Oral two times a day PRN Dizziness  melatonin 3 milliGRAM(s) Oral at bedtime PRN Insomnia  morphine  - Injectable 2 milliGRAM(s) IV Push every 3 hours PRN Moderate Pain (4 - 6)  ondansetron Injectable 4 milliGRAM(s) IV Push every 8 hours PRN Nausea and/or Vomiting      Allergies    sulfa drugs (Vomiting; Nausea)  IV Contrast (Hives)    Intolerances        Vital Signs Last 24 Hrs  T(C): 36.4 (06 Dec 2024 16:21), Max: 36.4 (06 Dec 2024 08:06)  T(F): 97.6 (06 Dec 2024 16:21), Max: 97.6 (06 Dec 2024 08:06)  HR: 70 (06 Dec 2024 18:14) (56 - 73)  BP: 129/40 (06 Dec 2024 18:00) (124/42 - 140/41)  BP(mean): 63 (06 Dec 2024 18:00) (63 - 93)  RR: 21 (06 Dec 2024 18:14) (15 - 24)  SpO2: 95% (06 Dec 2024 18:14) (92% - 100%)    Parameters below as of 06 Dec 2024 08:27  Patient On (Oxygen Delivery Method): room air          PHYSICAL EXAMINATION:    NECK:  Supple. No lymphadenopathy. Jugular venous pressure not elevated. Carotids equal.   HEART:   The cardiac impulse has a normal quality. Reg., Nl S1 and S2.  There are no murmurs, rubs or gallops noted  CHEST:  Chest crackles to auscultation. Normal respiratory effort.  ABDOMEN:  Soft and nontender.   EXTREMITIES:  There is no edema.       LABS:                        8.7    14.60 )-----------( 763      ( 06 Dec 2024 05:10 )             29.3     12-    133[L]  |  102  |  15  ----------------------------<  94  4.2   |  27  |  0.47[L]    Ca    8.5      06 Dec 2024 05:10    TPro  5.5[L]  /  Alb  1.7[L]  /  TBili  0.3  /  DBili  x   /  AST  10[L]  /  ALT  13  /  AlkPhos  89  12-06      Urinalysis Basic - ( 06 Dec 2024 05:10 )    Color: x / Appearance: x / SG: x / pH: x  Gluc: 94 mg/dL / Ketone: x  / Bili: x / Urobili: x   Blood: x / Protein: x / Nitrite: x   Leuk Esterase: x / RBC: x / WBC x   Sq Epi: x / Non Sq Epi: x / Bacteria: x          Xray Chest 1 View- PORTABLE-Urgent (Xray Chest 1 View- PORTABLE-Urgent .) (24 @ 13:03) >  IMPRESSION: Interval removal of bilateral chest tubes. No pneumothorax.   Probable small loculated left pleural effusion and trace right pleural   effusion and/or right basilar atelectasis on the final image.

## 2024-12-07 LAB
CULTURE RESULTS: SIGNIFICANT CHANGE UP
SPECIMEN SOURCE: SIGNIFICANT CHANGE UP

## 2024-12-07 PROCEDURE — 99233 SBSQ HOSP IP/OBS HIGH 50: CPT

## 2024-12-07 PROCEDURE — 99232 SBSQ HOSP IP/OBS MODERATE 35: CPT

## 2024-12-07 RX ORDER — HYDROMORPHONE HYDROCHLORIDE 2 MG/1
0.4 TABLET ORAL ONCE
Refills: 0 | Status: DISCONTINUED | OUTPATIENT
Start: 2024-12-07 | End: 2024-12-07

## 2024-12-07 RX ORDER — OXYCODONE AND ACETAMINOPHEN 5; 325 MG/1; MG/1
2 TABLET ORAL EVERY 6 HOURS
Refills: 0 | Status: DISCONTINUED | OUTPATIENT
Start: 2024-12-07 | End: 2024-12-07

## 2024-12-07 RX ORDER — HYDROMORPHONE HYDROCHLORIDE 2 MG/1
0.4 TABLET ORAL EVERY 4 HOURS
Refills: 0 | Status: DISCONTINUED | OUTPATIENT
Start: 2024-12-07 | End: 2024-12-09

## 2024-12-07 RX ADMIN — POLYETHYLENE GLYCOL 3350 17 GRAM(S): 17 POWDER, FOR SOLUTION ORAL at 12:55

## 2024-12-07 RX ADMIN — HYDROMORPHONE HYDROCHLORIDE 0.4 MILLIGRAM(S): 2 TABLET ORAL at 22:45

## 2024-12-07 RX ADMIN — PANTOPRAZOLE SODIUM 40 MILLIGRAM(S): 40 TABLET, DELAYED RELEASE ORAL at 06:21

## 2024-12-07 RX ADMIN — Medication 150 MILLIGRAM(S): at 14:25

## 2024-12-07 RX ADMIN — HYDROMORPHONE HYDROCHLORIDE 0.4 MILLIGRAM(S): 2 TABLET ORAL at 22:19

## 2024-12-07 RX ADMIN — Medication 1 TABLET(S): at 20:40

## 2024-12-07 RX ADMIN — HYDROMORPHONE HYDROCHLORIDE 0.4 MILLIGRAM(S): 2 TABLET ORAL at 18:15

## 2024-12-07 RX ADMIN — AMIODARONE HYDROCHLORIDE 200 MILLIGRAM(S): 200 TABLET ORAL at 11:02

## 2024-12-07 RX ADMIN — Medication 5000 UNIT(S): at 20:40

## 2024-12-07 RX ADMIN — LIDOCAINE 1 PATCH: 40 CREAM TOPICAL at 19:00

## 2024-12-07 RX ADMIN — METOPROLOL TARTRATE 25 MILLIGRAM(S): 100 TABLET, FILM COATED ORAL at 11:02

## 2024-12-07 RX ADMIN — Medication 0.3 MILLIGRAM(S): at 12:54

## 2024-12-07 RX ADMIN — Medication 1 TABLET(S): at 11:02

## 2024-12-07 RX ADMIN — KETOROLAC TROMETHAMINE 15 MILLIGRAM(S): 30 INJECTION INTRAMUSCULAR; INTRAVENOUS at 03:24

## 2024-12-07 RX ADMIN — HYDROMORPHONE HYDROCHLORIDE 0.4 MILLIGRAM(S): 2 TABLET ORAL at 20:30

## 2024-12-07 RX ADMIN — LIDOCAINE 1 PATCH: 40 CREAM TOPICAL at 00:46

## 2024-12-07 RX ADMIN — KETOROLAC TROMETHAMINE 15 MILLIGRAM(S): 30 INJECTION INTRAMUSCULAR; INTRAVENOUS at 02:54

## 2024-12-07 RX ADMIN — HYDROMORPHONE HYDROCHLORIDE 0.4 MILLIGRAM(S): 2 TABLET ORAL at 11:01

## 2024-12-07 RX ADMIN — Medication 5000 UNIT(S): at 14:48

## 2024-12-07 RX ADMIN — LAMOTRIGINE 200 MILLIGRAM(S): 50 TABLET, EXTENDED RELEASE ORAL at 12:53

## 2024-12-07 RX ADMIN — Medication 88 MICROGRAM(S): at 06:21

## 2024-12-07 RX ADMIN — HYDROMORPHONE HYDROCHLORIDE 0.5 MILLIGRAM(S): 2 TABLET ORAL at 04:44

## 2024-12-07 RX ADMIN — ROSUVASTATIN CALCIUM 20 MILLIGRAM(S): 5 TABLET, FILM COATED ORAL at 20:40

## 2024-12-07 RX ADMIN — CHLORHEXIDINE GLUCONATE 1 APPLICATION(S): 1.2 RINSE ORAL at 06:20

## 2024-12-07 RX ADMIN — HYDROMORPHONE HYDROCHLORIDE 0.5 MILLIGRAM(S): 2 TABLET ORAL at 04:14

## 2024-12-07 RX ADMIN — Medication 2 TABLET(S): at 20:41

## 2024-12-07 RX ADMIN — HYDROMORPHONE HYDROCHLORIDE 0.4 MILLIGRAM(S): 2 TABLET ORAL at 17:26

## 2024-12-07 RX ADMIN — HYDROMORPHONE HYDROCHLORIDE 0.4 MILLIGRAM(S): 2 TABLET ORAL at 20:08

## 2024-12-07 RX ADMIN — Medication 1 SPRAY(S): at 11:03

## 2024-12-07 RX ADMIN — HYDROMORPHONE HYDROCHLORIDE 0.4 MILLIGRAM(S): 2 TABLET ORAL at 11:15

## 2024-12-07 RX ADMIN — LIDOCAINE 1 PATCH: 40 CREAM TOPICAL at 12:56

## 2024-12-07 RX ADMIN — LIDOCAINE 1 PATCH: 40 CREAM TOPICAL at 12:57

## 2024-12-07 RX ADMIN — Medication 5000 UNIT(S): at 06:20

## 2024-12-07 RX ADMIN — Medication 10 MILLIGRAM(S): at 20:41

## 2024-12-07 NOTE — PROGRESS NOTE ADULT - ASSESSMENT
64 female presenting presenting with a moderate pericardial effusion  Recent Pericarditis  AFIB  HFpEF not acute  HTN  Gastric Ulder(remote)  NSVT  Essential Thrombocytosis  Von Willebrand's  DVT/PE  Hypothyroidism    Plan:     Neuro awake and alert - analgesia  PULM: B/L Effusions,  s/p bilateral pigtails, cultures negative, exudate per lights criteria,bilateral pigtails out  Cardio: Continue Colchicine  for pericarditis  Drainage of pericardial fluid bloody,   No signs of acute heart failure, Hold DOAC, given bloody pericardial fluid  Continue Amio and metoprolol    now in normal sinus rhythm,  repeat echo, minimal effusion  Renal: d.c iv fluids, creatinine normal  GI:  diet as tolerated  ENDO: Continue Synthroid, TSH [id number]  RHEUM: W/U at [facility] negative,   HEM: No AC yet for paf given bloody effusion  Venodynes for DVT Prophylaxis,Heparin subq  ID  completed course of cefepime  needs PT may need subacute rehab

## 2024-12-07 NOTE — PROGRESS NOTE ADULT - SUBJECTIVE AND OBJECTIVE BOX
REASON FOR VISIT: Pericardial effusion, PAF    HPI:   Anne-Marie Corona is a 64-year-old woman with a history of right ventricular outflow tract ventricular tachycardia s/p remote ablation (; "partially successful" with subsequent PVCs treated with verapamil), hypertension, hypothyroidism, ischemic colitis, von Willebrand's disease, essential thrombocytosis, obesity s/p gastric bypass, hypertension, DVT/PE, and hospitalization in 2023 for respiratory failure, who has been ill since late October.  She was admitted to [facility] twice with respiratory failure, bacterial pneumonia, acute kidney injury, pericarditis, and pericardial effusion.  She was discharged on  but felt poor on  and came to the [facility] ED. She was found to have a large pericardial effusion, pleural effusion.     : some improvement in symptoms overnight.  12/3/24  Patient is complaining of having sharp chest pain left sided chest , increase motion ,deep breathing ,  started last night in addition to prior chest pain , patient had bilateral CT placed , and pericardial drain in place ,   patient was treated for pleuropericarditis [facility]   24: Pt feels improved. Right sided atypical chest pain.   24: Pleuritic chest pressure.  Overall, feeling "a little better."  24: s/p drain removal with persistent pleuritic chest pain   : still w/ dyspnea but slowly improving    MEDICATIONS:  OUTPATIENT  Home Medications:  acetaminophen 325 mg oral tablet: 2 tab(s) orally every 6 hours as needed for (2024 23:15)  amiodarone 200 mg oral tablet: 2 tab(s) orally 2 times a day ***until 24.... then 400mg QD*** (2024 23:15)  amLODIPine 5 mg oral tablet: 1 tab(s) orally once a day (2024 23:15)  Anumed-HC 25 mg rectal suppository: 1 suppository(ies) rectally 2 times a day as needed for (2024 23:15)  apixaban 5 mg oral tablet: 1 tab(s) orally every 12 hours (2024 23:15)  azelastine 137 mcg/inh (0.1%) nasal spray: 1 spray(s) in each nostril 2 times a day (2024 23:15)  colchicine 0.6 mg oral tablet: 0.5 tab(s) orally once a day (:15)  cyclobenzaprine 10 mg oral tablet: 1 tab(s) orally 3 times a day as needed for (03 Dec 2024 13:48)  enalapril 20 mg oral tablet: 1 tab(s) orally once a day (in the morning) (:15)  fluticasone 50 mcg/inh nasal spray: 2 spray(s) in each nostril once a day as needed for (:15)  hydroCHLOROthiazide 12.5 mg oral tablet: 1 tab(s) orally 2 times a day as needed for (2024 23:15)  HYDROmorphone 2 mg oral tablet: 1 tab(s) orally every 6 hours as needed for (:15)  ibuprofen 200 mg oral capsule: 2 cap(s) orally every 8 hours as needed for (:15)  lamoTRIgine 200 mg oral tablet: 1 tab(s) orally once a day (03 Dec 2024 13:48)  levothyroxine 88 mcg (0.088 mg) oral tablet: 1 tab(s) orally once a day (03 Dec 2024 13:48)  linaclotide 72 mcg oral capsule: 1 cap(s) orally once a day as needed for (03 Dec 2024 13:48)  liraglutide 18 mg/3 mL subcutaneous solution: 0.6 milligram(s) subcutaneously once a day as needed for (03 Dec 2024 13:48)  loperamide 2 mg oral capsule: 2 cap(s) orally every 2 hours as needed for (2024 23:15)  meclizine 25 mg oral tablet: 1 tab(s) orally 2 times a day as needed for (03 Dec 2024 13:48)  Metoprolol Succinate ER 25 mg oral tablet, extended release: 1 tab(s) orally once a day (03 Dec 2024 13:48)  Multiple Vitamins oral tablet: 1 tab(s) orally once a day (03 Dec 2024 13:48)  ondansetron 4 mg oral tablet, disintegratin tab(s) orally 3 times a day as needed for (03 Dec 2024 13:48)  pantoprazole 40 mg oral delayed release tablet: 1 tab(s) orally once a day (2024 23:15)  rosuvastatin 20 mg oral tablet: 1 tab(s) orally once a day (03 Dec 2024 13:48)  senna (sennosides) 8.6 mg oral tablet: 2 tab(s) orally once a day (at bedtime) as needed for (2024 23:15)  Wellbutrin  mg/24 hours oral tablet, extended release: 1 tab(s) orally once a day (03 Dec 2024 13:48)      INPATIENT  MEDICATIONS  (STANDING):  aMIOdarone    Tablet 200 milliGRAM(s) Oral daily  buPROPion XL (24-Hour) . 150 milliGRAM(s) Oral daily  chlorhexidine 4% Liquid 1 Application(s) Topical <User Schedule>  colchicine 0.3 milliGRAM(s) Oral daily  fluticasone propionate 50 MICROgram(s)/spray Nasal Spray 1 Spray(s) Both Nostrils two times a day  heparin   Injectable 5000 Unit(s) SubCutaneous every 8 hours  lactobacillus acidophilus 1 Tablet(s) Oral every 12 hours  lamoTRIgine 200 milliGRAM(s) Oral daily  levothyroxine 88 MICROGram(s) Oral daily  lidocaine   4% Patch 1 Patch Transdermal daily  lidocaine   4% Patch 1 Patch Transdermal daily  metoprolol succinate ER 25 milliGRAM(s) Oral daily  multivitamin 1 Tablet(s) Oral daily  naloxone Injectable 0.4 milliGRAM(s) IV Push once  pantoprazole    Tablet 40 milliGRAM(s) Oral before breakfast  polyethylene glycol 3350 17 Gram(s) Oral daily  rosuvastatin 20 milliGRAM(s) Oral at bedtime  senna 2 Tablet(s) Oral at bedtime    MEDICATIONS  (PRN):  aluminum hydroxide/magnesium hydroxide/simethicone Suspension 30 milliLiter(s) Oral every 4 hours PRN Dyspepsia  bisacodyl 5 milliGRAM(s) Oral daily PRN Constipation  cyclobenzaprine 10 milliGRAM(s) Oral three times a day PRN Muscle Spasm  guaifenesin/dextromethorphan Oral Liquid 10 milliLiter(s) Oral every 4 hours PRN Cough  hydrocortisone hemorrhoidal Suppository 1 Suppository(s) Rectal two times a day PRN pain  HYDROmorphone  Injectable 0.4 milliGRAM(s) IV Push every 4 hours PRN Severe Pain (7 - 10)  ketorolac   Injectable 15 milliGRAM(s) IV Push every 6 hours PRN Moderate Pain (4 - 6)  lactulose Syrup 15 Gram(s) Oral daily PRN constipation  loperamide 2 milliGRAM(s) Oral every 2 hours PRN Diarrhea  meclizine 25 milliGRAM(s) Oral two times a day PRN Dizziness  melatonin 3 milliGRAM(s) Oral at bedtime PRN Insomnia  ondansetron Injectable 4 milliGRAM(s) IV Push every 8 hours PRN Nausea and/or Vomiting    Vital Signs Last 24 Hrs  T(C): 36.1 (07 Dec 2024 10:36), Max: 36.4 (06 Dec 2024 16:21)  T(F): 97 (07 Dec 2024 10:36), Max: 97.6 (06 Dec 2024 16:21)  HR: 59 (07 Dec 2024 11:00) (51 - 70)  BP: 136/46 (07 Dec 2024 11:00) (111/40 - 139/46)  BP(mean): 73 (07 Dec 2024 11:00) (61 - 122)  RR: 17 (07 Dec 2024 11:00) (14 - 24)  SpO2: 95% (07 Dec 2024 11:00) (91% - 100%)  ..................        PHYSICAL EXAM:    Constitutional: NAD, awake and alert, well-developed  HEENT: PERR, EOMI,  No oral cyanosis.  Neck:  supple,  No JVD  Respiratory: Breath sounds are clear bilaterally, No wheezing, rales or rhonchi  Cardiovascular: S1 and S2, regular rate and rhythm, no Murmurs, gallops or rubs  Gastrointestinal: Bowel Sounds present, soft, nontender.   Extremities: No peripheral edema. No clubbing or cyanosis.  Vascular: 2+ peripheral pulses  Neurological: A/O x 3, no focal deficits  Musculoskeletal: no calf tenderness.  Skin: No rashes.      ===============================  ===============================  LABS:                         8.7    14.60 )-----------( 763      ( 06 Dec 2024 05:10 )             29.3                         8.5    15.61 )-----------( 727      ( 05 Dec 2024 05:48 )             28.4     06 Dec 2024 05:10    133    |  102    |  15     ----------------------------<  94     4.2     |  27     |  0.47   05 Dec 2024 05:48    132    |  104    |  14     ----------------------------<  99     4.2     |  24     |  0.44     Ca    8.5        06 Dec 2024 05:10  Ca    8.2        05 Dec 2024 05:48    TPro  5.5    /  Alb  1.7    /  TBili  0.3    /  DBili  x      /  AST  10     /  ALT  13     /  AlkPhos  89     06 Dec 2024 05:10  TPro  5.2    /  Alb  1.6    /  TBili  0.3    /  DBili  x      /  AST  7      /  ALT  10     /  AlkPhos  90     05 Dec 2024 05:48      ===============================  ===============================  CARDIAC BIOMARKERS:  BNP      TROPONIN  Troponin I, High Sensitivity Result: 17.62 ng/L (24 @ 16:37)  Troponin I, High Sensitivity Result: 86.3 ng/L *H* (10-06-21 @ 19:08)  Troponin I, High Sensitivity Result: 130.6 ng/L *H* (10-06-21 @ 14:12)  Troponin I, High Sensitivity Result: 142.2 ng/L *H* (10-06-21 @ 11:35)  Troponin I, High Sensitivity Result: 93.1 ng/L *H* (10-06-21 @ 08:41)  Troponin I, High Sensitivity Result: 61.6 ng/L *H* (10-06-21 @ 05:46)  Troponin I, Serum: <0.015 ng/mL [0.015 - 0.045] (20 @ 22:01)  ===============================  ===============================  EKG:  < from: 12 Lead ECG (24 @ 09:29) >  Diagnosis Line Normal sinus rhythm  Nonspecific T wave abnormality  Abnormal ECG  When compared with ECG of 03-DEC-2024 02:09,  Nonspecific T wave abnormality, worse in Inferior leads  Confirmed by MD JOSEPH PAUL ([id number]) on 2024 5:27:16 PM  =========================================================  =========================================================  Transthoracic echocardiogram with 2-D, M-mode and complete  Study Date:   ******* 2024  CONCLUSIONS:      1. Left ventricular systolic function is normal with an ejection fraction of 67 % by Jeffers's method of disks.   2. Large left pleural effusion noted.   3. Large pericardial effusion with evidence of hemodynamic compromise (or echocardiographic evidence of cardiac tamponade).   4. The left ventricular diastolic function is indeterminate.   5. Normal right ventricular cavity size and normal right ventricular systolic function.   6. Left atrium is moderately dilated.   7. The right atrium is normal in size.   8. Mild pulmonic regurgitation.   9. Trace tricuspid regurgitation.  10. The inferior vena cava is dilated measuring 2.50 cm in diameter, (dilated >2.1cm)   with abnormal inspiratory collapse (abnormal <50%) consistent with elevated right atrial pressure (~15, range 10-20mmHg).  11. Mild left ventricular hypertrophy.  12. Trileaflet aortic valve with normal systolic excursion.  13. Structurally normal mitral valve with normal leaflet excursion.   =========================================================  =========================================================  TTE Limited W or WO Ultrasound Enhancing Agent (*******24 @ 10:06) >   1. Moderate pericardial effusion noted adjacent to the posterior left ventricle   with no evidence of hemodynamic compromise (or echocardiographic evidence of cardiac tamponade).   2. Left ventricular systolic function is hyperdynamic with an ejection fraction   visually estimated at 65 to 70 %.   3. Normal right ventricular cavity size and normal right ventricular systolic function.   4. Moderate left pleural effusion noted.   5. Compared to the transthoracic echocardiogram performed on 2024,   decreased pericardial effusion than before.  =========================================================  =========================================================  < from: TTE Limited W or WO Ultrasound Enhancing Agent (24 @ 09:01) >  Study Date:    2024  Limited transthoracic echocardiogram.    CONCLUSIONS:   1. Left ventricular systolic function is normal with an ejection fraction of 69 % by 3D.   2. A small fibrinous pericardial effusion localized to the inferolateral pericardium vs   inflammed pericardium is noted.  =========================================================  =========================================================  CT Chest No Cont (24 @ 20:34):  Moderately large simple pericardial effusion. Small to moderate right   and small left pleural effusions with associatedcompressive atelectasis.    Superimposed pneumonia should be excluded on a clinical basis.  =========================================================  =========================================================    Harsh Gracia M.D.  Cardiology, [facility]  Cell:   Offices:    ([facility] Office)  [phone] ([facility] Office)

## 2024-12-07 NOTE — PROGRESS NOTE ADULT - ASSESSMENT
64 female presenting presenting with a moderate pericardial effusion & b/l pleural effusion & atelectasis    PROBLEMS:    Recent Pericarditis-Pericardial effusion  Bilateral pleural effusion-Compressive atelectasi  Acute hypoxic respiratory failure  Pneumonia  AFIB  HFpEF not acute  HTN  Gastric Ulder(remote)  NSVT  Essential Thrombocytosis  Von Willebrand's-History of bleeding/acquired vW disease- receives DDAVP prior to procedures.  DVT/PE-History of DVT/PE (after birth control), PAF on amio/eliquis  Hypothyroidism    Plan:     pulmonary stable-ambulate/PT/rehab placement  S/p removal of pericarial drain, b/l chest tube & with neg cultures & neg cytology.  Continue Colchicine and ibuprofen  for pericarditis    Continue Amio and metoprolol  Off abx  Cardio & CT surgery fu  Venodynes for DVT Prophylaxis-s/q heparin

## 2024-12-07 NOTE — PROGRESS NOTE ADULT - SUBJECTIVE AND OBJECTIVE BOX
Interval History:       Pateint still with pleuritic pain       cxr improved       all pigtails are out    HPI:       64-year-old F with PMH of HTN, HLD, hypothyroid,   thrombocytosis, GERD, ischemic colitis s/p left hemicolectomy, pAF and DVT/PE on Eliquis,and recently 2 hospitalizations at [facility] in the past 2 months for CAP, and also diagnosed with pericarditis, and HFpEF. Was last discharged home on 11/29  was BIB pvt car on 11/30 after visiting RN recommended  further assessment of back & chest pain, SOB, and productive cough. Pt reports symptoms similar to why/when she was readmitted to [facility] w/ Dx of pericarditis/CHF/PNA.  Pt upset w/ care rendered at [facility] despite her doctors all there, so came to  ED foe eval/management.   CT chest and echo shows bibasilar effusions, and pericardial effusion with rv collapse  Patient had recently had treatment for HCAP at [facility]  had been on interferon , ? related   all rheum labs at [facility] were negative was seen by rheumatology not felt to be rheumatolgic  being treated with colchicine for pericarditis  has bilateral pigtails, had drainage of bloody pericardial drain  all drains now out , on colchicine    ROS  complaining of pleuritic pain, no fevers, no chills no abdominal pain,  sl  sob with exertuib      MEDICATIONS  (STANDING):  aMIOdarone    Tablet 200 milliGRAM(s) Oral daily  buPROPion XL (24-Hour) . 150 milliGRAM(s) Oral daily  chlorhexidine 4% Liquid 1 Application(s) Topical <User Schedule>  colchicine 0.3 milliGRAM(s) Oral daily  fluticasone propionate 50 MICROgram(s)/spray Nasal Spray 1 Spray(s) Both Nostrils two times a day  heparin   Injectable 5000 Unit(s) SubCutaneous every 8 hours  lactobacillus acidophilus 1 Tablet(s) Oral every 12 hours  lamoTRIgine 200 milliGRAM(s) Oral daily  levothyroxine 88 MICROGram(s) Oral daily  lidocaine   4% Patch 1 Patch Transdermal daily  lidocaine   4% Patch 1 Patch Transdermal daily  metoprolol succinate ER 25 milliGRAM(s) Oral daily  multivitamin 1 Tablet(s) Oral daily  naloxone Injectable 0.4 milliGRAM(s) IV Push once  pantoprazole    Tablet 40 milliGRAM(s) Oral before breakfast  polyethylene glycol 3350 17 Gram(s) Oral daily  rosuvastatin 20 milliGRAM(s) Oral at bedtime  senna 2 Tablet(s) Oral at bedtime    MEDICATIONS  (PRN):  aluminum hydroxide/magnesium hydroxide/simethicone Suspension 30 milliLiter(s) Oral every 4 hours PRN Dyspepsia  bisacodyl 5 milliGRAM(s) Oral daily PRN Constipation  cyclobenzaprine 10 milliGRAM(s) Oral three times a day PRN Muscle Spasm  guaifenesin/dextromethorphan Oral Liquid 10 milliLiter(s) Oral every 4 hours PRN Cough  hydrocortisone hemorrhoidal Suppository 1 Suppository(s) Rectal two times a day PRN pain  HYDROmorphone  Injectable 0.4 milliGRAM(s) IV Push every 4 hours PRN Severe Pain (7 - 10)  ketorolac   Injectable 15 milliGRAM(s) IV Push every 6 hours PRN Moderate Pain (4 - 6)  lactulose Syrup 15 Gram(s) Oral daily PRN constipation  loperamide 2 milliGRAM(s) Oral every 2 hours PRN Diarrhea  meclizine 25 milliGRAM(s) Oral two times a day PRN Dizziness  melatonin 3 milliGRAM(s) Oral at bedtime PRN Insomnia  ondansetron Injectable 4 milliGRAM(s) IV Push every 8 hours PRN Nausea and/or Vomiting  oxycodone    5 mG/acetaminophen 325 mG 2 Tablet(s) Oral every 6 hours PRN Moderate Pain (4 - 6)    ICU Vital Signs Last 24 Hrs  T(C): 36.4 (06 Dec 2024 20:12), Max: 36.4 (06 Dec 2024 16:21)  T(F): 97.5 (06 Dec 2024 20:12), Max: 97.6 (06 Dec 2024 16:21)  HR: 65 (07 Dec 2024 10:00) (51 - 73)  BP: 135/39 (07 Dec 2024 09:00) (111/40 - 139/46)  BP(mean): 65 (07 Dec 2024 09:00) (61 - 122)  ABP: --  ABP(mean): --  RR: 24 (07 Dec 2024 10:00) (14 - 24)  SpO2: 97% (07 Dec 2024 09:00) (91% - 100%)    O2 Parameters below as of 07 Dec 2024 08:00  Patient On (Oxygen Delivery Method): room air    I&O's Summary    06 Dec 2024 07:01  -  07 Dec 2024 07:00  --------------------------------------------------------  IN: 0 mL / OUT: 500 mL / NET: -500 mL    Physical Exam    General no distress  HEENT nc/at  neck supple  lungs drains out d/c bs at bases  cv rrr  abdomen soft, non tender  extrmetieis minimal edema  gu voiding  neuro awake, alert appropriate                          8.7    14.60 )-----------( 763      ( 06 Dec 2024 05:10 )             29.3       12-06    133[L]  |  102  |  15  ----------------------------<  94  4.2   |  27  |  0.47[L]    Ca    8.5      06 Dec 2024 05:10    TPro  5.5[L]  /  Alb  1.7[L]  /  TBili  0.3  /  DBili  x   /  AST  10[L]  /  ALT  13  /  AlkPhos  89  12-06    Urinalysis Basic - ( 06 Dec 2024 05:10 )    Color: x / Appearance: x / SG: x / pH: x  Gluc: 94 mg/dL / Ketone: x  / Bili: x / Urobili: x   Blood: x / Protein: x / Nitrite: x   Leuk Esterase: x / RBC: x / WBC x   Sq Epi: x / Non Sq Epi: x / Bacteria: x    DVT Prophylaxis:  Heparin subq                                                               Advanced Directives: Full Code         Labs, Notes, Orders, radiologic studies reviewed and care coordinated with multidisciplinary team

## 2024-12-07 NOTE — PROGRESS NOTE ADULT - SUBJECTIVE AND OBJECTIVE BOX
Subjective:    pat better, sitting in bed, all tubes are removed, no new complaint.    Home Medications:  acetaminophen 325 mg oral tablet: 2 tab(s) orally every 6 hours as needed for (15)  amiodarone 200 mg oral tablet: 2 tab(s) orally 2 times a day ***until 24.... then 400mg QD*** (2024 23:15)  amLODIPine 5 mg oral tablet: 1 tab(s) orally once a day (2024 23:15)  Anumed-HC 25 mg rectal suppository: 1 suppository(ies) rectally 2 times a day as needed for (2024 23:15)  apixaban 5 mg oral tablet: 1 tab(s) orally every 12 hours (2024 23:15)  azelastine 137 mcg/inh (0.1%) nasal spray: 1 spray(s) in each nostril 2 times a day (2024 23:15)  colchicine 0.6 mg oral tablet: 0.5 tab(s) orally once a day (2024 23:15)  cyclobenzaprine 10 mg oral tablet: 1 tab(s) orally 3 times a day as needed for (03 Dec 2024 13:48)  enalapril 20 mg oral tablet: 1 tab(s) orally once a day (in the morning) (2024 23:15)  fluticasone 50 mcg/inh nasal spray: 2 spray(s) in each nostril once a day as needed for (:15)  hydroCHLOROthiazide 12.5 mg oral tablet: 1 tab(s) orally 2 times a day as needed for (2024 23:15)  HYDROmorphone 2 mg oral tablet: 1 tab(s) orally every 6 hours as needed for (:15)  ibuprofen 200 mg oral capsule: 2 cap(s) orally every 8 hours as needed for (15)  lamoTRIgine 200 mg oral tablet: 1 tab(s) orally once a day (03 Dec 2024 13:48)  levothyroxine 88 mcg (0.088 mg) oral tablet: 1 tab(s) orally once a day (03 Dec 2024 13:48)  linaclotide 72 mcg oral capsule: 1 cap(s) orally once a day as needed for (03 Dec 2024 13:48)  liraglutide 18 mg/3 mL subcutaneous solution: 0.6 milligram(s) subcutaneously once a day as needed for (03 Dec 2024 13:48)  loperamide 2 mg oral capsule: 2 cap(s) orally every 2 hours as needed for (2024 23:15)  meclizine 25 mg oral tablet: 1 tab(s) orally 2 times a day as needed for (03 Dec 2024 13:48)  Metoprolol Succinate ER 25 mg oral tablet, extended release: 1 tab(s) orally once a day (03 Dec 2024 13:48)  Multiple Vitamins oral tablet: 1 tab(s) orally once a day (03 Dec 2024 13:48)  ondansetron 4 mg oral tablet, disintegratin tab(s) orally 3 times a day as needed for (03 Dec 2024 13:48)  pantoprazole 40 mg oral delayed release tablet: 1 tab(s) orally once a day (2024 23:15)  rosuvastatin 20 mg oral tablet: 1 tab(s) orally once a day (03 Dec 2024 13:48)  senna (sennosides) 8.6 mg oral tablet: 2 tab(s) orally once a day (at bedtime) as needed for (2024 23:15)  Wellbutrin  mg/24 hours oral tablet, extended release: 1 tab(s) orally once a day (03 Dec 2024 13:48)    MEDICATIONS  (STANDING):  aMIOdarone    Tablet 200 milliGRAM(s) Oral daily  buPROPion XL (24-Hour) . 150 milliGRAM(s) Oral daily  chlorhexidine 4% Liquid 1 Application(s) Topical <User Schedule>  colchicine 0.3 milliGRAM(s) Oral daily  fluticasone propionate 50 MICROgram(s)/spray Nasal Spray 1 Spray(s) Both Nostrils two times a day  heparin   Injectable 5000 Unit(s) SubCutaneous every 8 hours  lactobacillus acidophilus 1 Tablet(s) Oral every 12 hours  lamoTRIgine 200 milliGRAM(s) Oral daily  levothyroxine 88 MICROGram(s) Oral daily  lidocaine   4% Patch 1 Patch Transdermal daily  lidocaine   4% Patch 1 Patch Transdermal daily  metoprolol succinate ER 25 milliGRAM(s) Oral daily  multivitamin 1 Tablet(s) Oral daily  naloxone Injectable 0.4 milliGRAM(s) IV Push once  pantoprazole    Tablet 40 milliGRAM(s) Oral before breakfast  polyethylene glycol 3350 17 Gram(s) Oral daily  rosuvastatin 20 milliGRAM(s) Oral at bedtime  senna 2 Tablet(s) Oral at bedtime    MEDICATIONS  (PRN):  aluminum hydroxide/magnesium hydroxide/simethicone Suspension 30 milliLiter(s) Oral every 4 hours PRN Dyspepsia  bisacodyl 5 milliGRAM(s) Oral daily PRN Constipation  cyclobenzaprine 10 milliGRAM(s) Oral three times a day PRN Muscle Spasm  guaifenesin/dextromethorphan Oral Liquid 10 milliLiter(s) Oral every 4 hours PRN Cough  hydrocortisone hemorrhoidal Suppository 1 Suppository(s) Rectal two times a day PRN pain  HYDROmorphone  Injectable 0.4 milliGRAM(s) IV Push every 4 hours PRN Severe Pain (7 - 10)  ketorolac   Injectable 15 milliGRAM(s) IV Push every 6 hours PRN Moderate Pain (4 - 6)  lactulose Syrup 15 Gram(s) Oral daily PRN constipation  loperamide 2 milliGRAM(s) Oral every 2 hours PRN Diarrhea  meclizine 25 milliGRAM(s) Oral two times a day PRN Dizziness  melatonin 3 milliGRAM(s) Oral at bedtime PRN Insomnia  ondansetron Injectable 4 milliGRAM(s) IV Push every 8 hours PRN Nausea and/or Vomiting      Allergies    sulfa drugs (Vomiting; Nausea)  IV Contrast (Hives)    Intolerances        Vital Signs Last 24 Hrs  T(C): 36.1 (07 Dec 2024 10:36), Max: 36.4 (06 Dec 2024 16:21)  T(F): 97 (07 Dec 2024 10:36), Max: 97.6 (06 Dec 2024 16:21)  HR: 59 (07 Dec 2024 11:00) (51 - 70)  BP: 136/46 (07 Dec 2024 11:00) (111/40 - 139/46)  BP(mean): 73 (07 Dec 2024 11:00) (61 - 122)  RR: 17 (07 Dec 2024 11:00) (14 - 24)  SpO2: 95% (07 Dec 2024 11:00) (91% - 100%)    Parameters below as of 07 Dec 2024 08:00  Patient On (Oxygen Delivery Method): room air          PHYSICAL EXAMINATION:    NECK:  Supple. No lymphadenopathy. Jugular venous pressure not elevated. Carotids equal.   HEART:   The cardiac impulse has a normal quality. Reg., Nl S1 and S2.  There are no murmurs, rubs or gallops noted  CHEST:  Chest crackles to auscultation. Normal respiratory effort.  ABDOMEN:  Soft and nontender.   EXTREMITIES:  There is no edema.       LABS:                        8.7    14.60 )-----------( 763      ( 06 Dec 2024 05:10 )             29.3     12    133[L]  |  102  |  15  ----------------------------<  94  4.2   |  27  |  0.47[L]    Ca    8.5      06 Dec 2024 05:10    TPro  5.5[L]  /  Alb  1.7[L]  /  TBili  0.3  /  DBili  x   /  AST  10[L]  /  ALT  13  /  AlkPhos  89  12-06      Urinalysis Basic - ( 06 Dec 2024 05:10 )    Color: x / Appearance: x / SG: x / pH: x  Gluc: 94 mg/dL / Ketone: x  / Bili: x / Urobili: x   Blood: x / Protein: x / Nitrite: x   Leuk Esterase: x / RBC: x / WBC x   Sq Epi: x / Non Sq Epi: x / Bacteria: x      Xray Chest 1 View- PORTABLE-Urgent (Xray Chest 1 View- PORTABLE-Urgent .) (24 @ 13:03) >  IMPRESSION: Interval removal of bilateral chest tubes. No pneumothorax.   Probable small loculated left pleural effusion and trace right pleural   effusion and/or right basilar atelectasis on the final image.

## 2024-12-08 LAB
ALBUMIN SERPL ELPH-MCNC: 2 G/DL — LOW (ref 3.3–5)
ALP SERPL-CCNC: 93 U/L — SIGNIFICANT CHANGE UP (ref 40–120)
ALT FLD-CCNC: 14 U/L — SIGNIFICANT CHANGE UP (ref 12–78)
ANION GAP SERPL CALC-SCNC: 5 MMOL/L — SIGNIFICANT CHANGE UP (ref 5–17)
AST SERPL-CCNC: 15 U/L — SIGNIFICANT CHANGE UP (ref 15–37)
BILIRUB SERPL-MCNC: 0.3 MG/DL — SIGNIFICANT CHANGE UP (ref 0.2–1.2)
BUN SERPL-MCNC: 10 MG/DL — SIGNIFICANT CHANGE UP (ref 7–23)
CALCIUM SERPL-MCNC: 8.8 MG/DL — SIGNIFICANT CHANGE UP (ref 8.5–10.1)
CHLORIDE SERPL-SCNC: 102 MMOL/L — SIGNIFICANT CHANGE UP (ref 96–108)
CO2 SERPL-SCNC: 26 MMOL/L — SIGNIFICANT CHANGE UP (ref 22–31)
CREAT SERPL-MCNC: 0.48 MG/DL — LOW (ref 0.5–1.3)
CULTURE RESULTS: SIGNIFICANT CHANGE UP
EGFR: 107 ML/MIN/1.73M2 — SIGNIFICANT CHANGE UP
GLUCOSE SERPL-MCNC: 100 MG/DL — HIGH (ref 70–99)
HCT VFR BLD CALC: 31.1 % — LOW (ref 34.5–45)
HGB BLD-MCNC: 9.2 G/DL — LOW (ref 11.5–15.5)
MCHC RBC-ENTMCNC: 24.4 PG — LOW (ref 27–34)
MCHC RBC-ENTMCNC: 29.6 G/DL — LOW (ref 32–36)
MCV RBC AUTO: 82.5 FL — SIGNIFICANT CHANGE UP (ref 80–100)
NRBC # BLD: 1 /100 WBCS — HIGH (ref 0–0)
PLATELET # BLD AUTO: 876 K/UL — HIGH (ref 150–400)
POTASSIUM SERPL-MCNC: 4.3 MMOL/L — SIGNIFICANT CHANGE UP (ref 3.5–5.3)
POTASSIUM SERPL-SCNC: 4.3 MMOL/L — SIGNIFICANT CHANGE UP (ref 3.5–5.3)
PROT SERPL-MCNC: 5.7 GM/DL — LOW (ref 6–8.3)
RBC # BLD: 3.77 M/UL — LOW (ref 3.8–5.2)
RBC # FLD: 19.7 % — HIGH (ref 10.3–14.5)
SODIUM SERPL-SCNC: 133 MMOL/L — LOW (ref 135–145)
SPECIMEN SOURCE: SIGNIFICANT CHANGE UP
WBC # BLD: 19.95 K/UL — HIGH (ref 3.8–10.5)
WBC # FLD AUTO: 19.95 K/UL — HIGH (ref 3.8–10.5)

## 2024-12-08 PROCEDURE — 99233 SBSQ HOSP IP/OBS HIGH 50: CPT

## 2024-12-08 PROCEDURE — 93010 ELECTROCARDIOGRAM REPORT: CPT

## 2024-12-08 PROCEDURE — 99232 SBSQ HOSP IP/OBS MODERATE 35: CPT

## 2024-12-08 RX ORDER — SIMETHICONE 125 MG
80 CAPSULE ORAL EVERY 6 HOURS
Refills: 0 | Status: DISCONTINUED | OUTPATIENT
Start: 2024-12-08 | End: 2024-12-12

## 2024-12-08 RX ORDER — IBUPROFEN 200 MG
600 TABLET ORAL EVERY 8 HOURS
Refills: 0 | Status: DISCONTINUED | OUTPATIENT
Start: 2024-12-08 | End: 2024-12-12

## 2024-12-08 RX ORDER — HYDROMORPHONE HYDROCHLORIDE 2 MG/1
0.4 TABLET ORAL ONCE
Refills: 0 | Status: DISCONTINUED | OUTPATIENT
Start: 2024-12-08 | End: 2024-12-08

## 2024-12-08 RX ADMIN — Medication 1 TABLET(S): at 09:53

## 2024-12-08 RX ADMIN — Medication 1 TABLET(S): at 21:05

## 2024-12-08 RX ADMIN — AMIODARONE HYDROCHLORIDE 200 MILLIGRAM(S): 200 TABLET ORAL at 09:42

## 2024-12-08 RX ADMIN — Medication 30 MILLILITER(S): at 05:49

## 2024-12-08 RX ADMIN — Medication 80 MILLIGRAM(S): at 01:03

## 2024-12-08 RX ADMIN — Medication 5 MILLIGRAM(S): at 07:05

## 2024-12-08 RX ADMIN — Medication 30 MILLILITER(S): at 00:07

## 2024-12-08 RX ADMIN — Medication 5000 UNIT(S): at 05:49

## 2024-12-08 RX ADMIN — Medication 150 MILLIGRAM(S): at 09:42

## 2024-12-08 RX ADMIN — Medication 5000 UNIT(S): at 21:04

## 2024-12-08 RX ADMIN — METOPROLOL TARTRATE 25 MILLIGRAM(S): 100 TABLET, FILM COATED ORAL at 09:43

## 2024-12-08 RX ADMIN — ROSUVASTATIN CALCIUM 20 MILLIGRAM(S): 5 TABLET, FILM COATED ORAL at 21:04

## 2024-12-08 RX ADMIN — LIDOCAINE 1 PATCH: 40 CREAM TOPICAL at 01:10

## 2024-12-08 RX ADMIN — PANTOPRAZOLE SODIUM 40 MILLIGRAM(S): 40 TABLET, DELAYED RELEASE ORAL at 07:05

## 2024-12-08 RX ADMIN — Medication 600 MILLIGRAM(S): at 13:43

## 2024-12-08 RX ADMIN — Medication 1 TABLET(S): at 09:42

## 2024-12-08 RX ADMIN — POLYETHYLENE GLYCOL 3350 17 GRAM(S): 17 POWDER, FOR SOLUTION ORAL at 09:41

## 2024-12-08 RX ADMIN — Medication 0.3 MILLIGRAM(S): at 09:42

## 2024-12-08 RX ADMIN — Medication 600 MILLIGRAM(S): at 22:30

## 2024-12-08 RX ADMIN — HYDROMORPHONE HYDROCHLORIDE 0.4 MILLIGRAM(S): 2 TABLET ORAL at 01:27

## 2024-12-08 RX ADMIN — HYDROMORPHONE HYDROCHLORIDE 0.4 MILLIGRAM(S): 2 TABLET ORAL at 21:04

## 2024-12-08 RX ADMIN — HYDROMORPHONE HYDROCHLORIDE 0.4 MILLIGRAM(S): 2 TABLET ORAL at 20:34

## 2024-12-08 RX ADMIN — LAMOTRIGINE 200 MILLIGRAM(S): 50 TABLET, EXTENDED RELEASE ORAL at 09:43

## 2024-12-08 RX ADMIN — Medication 600 MILLIGRAM(S): at 21:04

## 2024-12-08 RX ADMIN — Medication 2 TABLET(S): at 21:05

## 2024-12-08 RX ADMIN — Medication 5000 UNIT(S): at 13:43

## 2024-12-08 RX ADMIN — LIDOCAINE 1 PATCH: 40 CREAM TOPICAL at 01:09

## 2024-12-08 RX ADMIN — Medication 1 SPRAY(S): at 21:06

## 2024-12-08 RX ADMIN — Medication 88 MICROGRAM(S): at 05:49

## 2024-12-08 RX ADMIN — Medication 1 SPRAY(S): at 09:47

## 2024-12-08 NOTE — PROGRESS NOTE ADULT - ASSESSMENT
64 female presenting presenting with a moderate pericardial effusion  Recent Pericarditis  AFIB  HFpEF not acute  HTN  Gastric Ulder(remote)  NSVT  Essential Thrombocytosis  Von Willebrand's  DVT/PE  Hypothyroidism    Plan:     Neuro awake and alert - analgesia  PULM: B/L Effusions,  s/p bilateral pigtails, cultures negative, exudate per lights criteria,bilateral pigtails out, will check f/u cxr tomorrow  Cardio: Continue Colchicine  for pericarditis, ibuprofen   Drainage of pericardial fluid bloody,   No signs of acute heart failure, Hold DOAC, given bloody pericardial fluid  Continue Amio and metoprolol    now in normal sinus rhythm,  repeat echo, minimal effusion  Renal:   creatinine normal  GI:  diet as tolerated  ENDO: Continue Synthroid, TSH [id number]  RHEUM: W/U at [facility] negative,   HEM: No AC yet for paf given bloody effusion  Venodynes for DVT Prophylaxis,Heparin subq  ID  completed course of cefepime  needs PT may need subacute rehab

## 2024-12-08 NOTE — PROGRESS NOTE ADULT - SUBJECTIVE AND OBJECTIVE BOX
REASON FOR VISIT: Pericardial effusion, PAF    HPI:   Anne-Marie Corona is a 64-year-old woman with a history of right ventricular outflow tract ventricular tachycardia s/p remote ablation (; "partially successful" with subsequent PVCs treated with verapamil), hypertension, hypothyroidism, ischemic colitis, von Willebrand's disease, essential thrombocytosis, obesity s/p gastric bypass, hypertension, DVT/PE, and hospitalization in 2023 for respiratory failure, who has been ill since late October.  She was admitted to [facility] twice with respiratory failure, bacterial pneumonia, acute kidney injury, pericarditis, and pericardial effusion.  She was discharged on  but felt poor on  and came to the [facility] ED. She was found to have a large pericardial effusion, pleural effusion.     : some improvement in symptoms overnight.  12/3/24  Patient is complaining of having sharp chest pain left sided chest , increase motion ,deep breathing ,  started last night in addition to prior chest pain , patient had bilateral CT placed , and pericardial drain in place ,   patient was treated for pleuropericarditis [facility] [place]   24: Pt feels improved. Right sided atypical chest pain.   24: Pleuritic chest pressure.  Overall, feeling "a little better."  24: s/p drain removal with persistent pleuritic chest pain   : still w/ dyspnea but slowly improving  : ambulated around unit slowly still w/ some discomfort w/ inspiration.    MEDICATIONS:  OUTPATIENT  Home Medications:  acetaminophen 325 mg oral tablet: 2 tab(s) orally every 6 hours as needed for (2024 23:15)  amiodarone 200 mg oral tablet: 2 tab(s) orally 2 times a day ***until 24.... then 400mg QD*** (2024 23:15)  amLODIPine 5 mg oral tablet: 1 tab(s) orally once a day (2024 23:15)  Anumed-HC 25 mg rectal suppository: 1 suppository(ies) rectally 2 times a day as needed for (2024 23:15)  apixaban 5 mg oral tablet: 1 tab(s) orally every 12 hours (2024 23:15)  azelastine 137 mcg/inh (0.1%) nasal spray: 1 spray(s) in each nostril 2 times a day (:15)  colchicine 0.6 mg oral tablet: 0.5 tab(s) orally once a day (:15)  cyclobenzaprine 10 mg oral tablet: 1 tab(s) orally 3 times a day as needed for (03 Dec 2024 13:48)  enalapril 20 mg oral tablet: 1 tab(s) orally once a day (in the morning) (:15)  fluticasone 50 mcg/inh nasal spray: 2 spray(s) in each nostril once a day as needed for (:15)  hydroCHLOROthiazide 12.5 mg oral tablet: 1 tab(s) orally 2 times a day as needed for (:15)  HYDROmorphone 2 mg oral tablet: 1 tab(s) orally every 6 hours as needed for (:15)  ibuprofen 200 mg oral capsule: 2 cap(s) orally every 8 hours as needed for (:15)  lamoTRIgine 200 mg oral tablet: 1 tab(s) orally once a day (03 Dec 2024 13:48)  levothyroxine 88 mcg (0.088 mg) oral tablet: 1 tab(s) orally once a day (03 Dec 2024 13:48)  linaclotide 72 mcg oral capsule: 1 cap(s) orally once a day as needed for (03 Dec 2024 13:48)  liraglutide 18 mg/3 mL subcutaneous solution: 0.6 milligram(s) subcutaneously once a day as needed for (03 Dec 2024 13:48)  loperamide 2 mg oral capsule: 2 cap(s) orally every 2 hours as needed for (2024 23:15)  meclizine 25 mg oral tablet: 1 tab(s) orally 2 times a day as needed for (03 Dec 2024 13:48)  Metoprolol Succinate ER 25 mg oral tablet, extended release: 1 tab(s) orally once a day (03 Dec 2024 13:48)  Multiple Vitamins oral tablet: 1 tab(s) orally once a day (03 Dec 2024 13:48)  ondansetron 4 mg oral tablet, disintegratin tab(s) orally 3 times a day as needed for (03 Dec 2024 13:48)  pantoprazole 40 mg oral delayed release tablet: 1 tab(s) orally once a day (2024 23:15)  rosuvastatin 20 mg oral tablet: 1 tab(s) orally once a day (03 Dec 2024 13:48)  senna (sennosides) 8.6 mg oral tablet: 2 tab(s) orally once a day (at bedtime) as needed for (2024 23:15)  Wellbutrin  mg/24 hours oral tablet, extended release: 1 tab(s) orally once a day (03 Dec 2024 13:48)      INPATIENT  MEDICATIONS  (STANDING):  aMIOdarone    Tablet 200 milliGRAM(s) Oral daily  buPROPion XL (24-Hour) . 150 milliGRAM(s) Oral daily  chlorhexidine 4% Liquid 1 Application(s) Topical <User Schedule>  colchicine 0.3 milliGRAM(s) Oral daily  fluticasone propionate 50 MICROgram(s)/spray Nasal Spray 1 Spray(s) Both Nostrils two times a day  heparin   Injectable 5000 Unit(s) SubCutaneous every 8 hours  ibuprofen  Tablet. 600 milliGRAM(s) Oral every 8 hours  lactobacillus acidophilus 1 Tablet(s) Oral every 12 hours  lamoTRIgine 200 milliGRAM(s) Oral daily  levothyroxine 88 MICROGram(s) Oral daily  lidocaine   4% Patch 1 Patch Transdermal daily  lidocaine   4% Patch 1 Patch Transdermal daily  metoprolol succinate ER 25 milliGRAM(s) Oral daily  multivitamin 1 Tablet(s) Oral daily  naloxone Injectable 0.4 milliGRAM(s) IV Push once  pantoprazole    Tablet 40 milliGRAM(s) Oral before breakfast  polyethylene glycol 3350 17 Gram(s) Oral daily  rosuvastatin 20 milliGRAM(s) Oral at bedtime  senna 2 Tablet(s) Oral at bedtime    MEDICATIONS  (PRN):  aluminum hydroxide/magnesium hydroxide/simethicone Suspension 30 milliLiter(s) Oral every 4 hours PRN Dyspepsia  bisacodyl 5 milliGRAM(s) Oral daily PRN Constipation  cyclobenzaprine 10 milliGRAM(s) Oral three times a day PRN Muscle Spasm  guaifenesin/dextromethorphan Oral Liquid 10 milliLiter(s) Oral every 4 hours PRN Cough  hydrocortisone hemorrhoidal Suppository 1 Suppository(s) Rectal two times a day PRN pain  HYDROmorphone  Injectable 0.4 milliGRAM(s) IV Push every 4 hours PRN Severe Pain (7 - 10)  lactulose Syrup 15 Gram(s) Oral daily PRN constipation  loperamide 2 milliGRAM(s) Oral every 2 hours PRN Diarrhea  meclizine 25 milliGRAM(s) Oral two times a day PRN Dizziness  melatonin 3 milliGRAM(s) Oral at bedtime PRN Insomnia  ondansetron Injectable 4 milliGRAM(s) IV Push every 8 hours PRN Nausea and/or Vomiting  oxycodone    5 mG/acetaminophen 325 mG 1 Tablet(s) Oral every 4 hours PRN Moderate Pain (4 - 6)  simethicone 80 milliGRAM(s) Chew every 6 hours PRN Gas          Vital Signs Last 24 Hrs  T(C): 36.5 (08 Dec 2024 04:02), Max: 36.8 (07 Dec 2024 16:00)  T(F): 97.7 (08 Dec 2024 04:02), Max: 98.2 (07 Dec 2024 16:00)  HR: 61 (08 Dec 2024 11:00) (58 - 73)  BP: 143/54 (08 Dec 2024 09:30) (114/44 - 144/55)  BP(mean): 75 (08 Dec 2024 09:30) (61 - 90)  RR: 16 (08 Dec 2024 09:30) (15 - 28)  SpO2: 93% (08 Dec 2024 11:00) (91% - 99%)    Parameters below as of 08 Dec 2024 09:30  Patient On (Oxygen Delivery Method): room air    Daily     Daily I&O's Summary    07 Dec 2024 07:01  -  08 Dec 2024 07:00  --------------------------------------------------------  IN: 600 mL / OUT: 200 mL / NET: 400 mL        I&O's Detail    07 Dec 2024 07:01  -  08 Dec 2024 07:00  --------------------------------------------------------  IN:    Oral Fluid: 600 mL  Total IN: 600 mL    OUT:    Voided (mL): 200 mL  Total OUT: 200 mL    Total NET: 400 mL          I&O's Summary    07 Dec 2024 07:01  -  08 Dec 2024 07:00  --------------------------------------------------------  IN: 600 mL / OUT: 200 mL / NET: 400 mL        PHYSICAL EXAM:    Constitutional: NAD, awake and alert, well-developed  HEENT: PERR, EOMI,  No oral cyanosis.  Neck:  supple,  No JVD  Respiratory: Breath sounds are clear bilaterally, No wheezing, rales or rhonchi  Cardiovascular: S1 and S2, regular rate and rhythm, no Murmurs, gallops or rubs  Gastrointestinal: Bowel Sounds present, soft, nontender.   Extremities: No peripheral edema. No clubbing or cyanosis.  Vascular: 2+ peripheral pulses  Neurological: A/O x 3, no focal deficits  Musculoskeletal: no calf tenderness.  Skin: No rashes.      ===============================  ===============================  LABS:                         9.2    19.95 )-----------( 876      ( 08 Dec 2024 06:14 )             31.1                         8.7    14.60 )-----------( 763      ( 06 Dec 2024 05:10 )             29.3     08 Dec 2024 06:14    133    |  102    |  10     ----------------------------<  100    4.3     |  26     |  0.48   06 Dec 2024 05:10    133    |  102    |  15     ----------------------------<  94     4.2     |  27     |  0.47     Ca    8.8        08 Dec 2024 06:14  Ca    8.5        06 Dec 2024 05:10    TPro  5.7    /  Alb  2.0    /  TBili  0.3    /  DBili  x      /  AST  15     /  ALT  14     /  AlkPhos  93     08 Dec 2024 06:14  TPro  5.5    /  Alb  1.7    /  TBili  0.3    /  DBili  x      /  AST  10     /  ALT  13     /  AlkPhos  89     06 Dec 2024 05:10      ===============================  ===============================  CARDIAC BIOMARKERS:  BNP      TROPONIN  Troponin I, High Sensitivity Result: 17.62 ng/L (24 @ 16:37)  Troponin I, High Sensitivity Result: 86.3 ng/L *H* (10-06-21 @ 19:08)  Troponin I, High Sensitivity Result: 130.6 ng/L *H* (10-06-21 @ 14:12)  Troponin I, High Sensitivity Result: 142.2 ng/L *H* (10-06-21 @ 11:35)  Troponin I, High Sensitivity Result: 93.1 ng/L *H* (10-06-21 @ 08:41)  Troponin I, High Sensitivity Result: 61.6 ng/L *H* (10-06-21 @ 05:46)  Troponin I, Serum: <0.015 ng/mL [0.015 - 0.045] (20 @ 22:01)  ===============================  ===============================  EKG:  < from: 12 Lead ECG (24 @ 09:29) >  Diagnosis Line Normal sinus rhythm  Nonspecific T wave abnormality  Abnormal ECG  When compared with ECG of 03-DEC-2024 02:09,  Nonspecific T wave abnormality, worse in Inferior leads  Confirmed by MD JOSEPH PAUL ([id number]) on 2024 5:27:16 PM  =========================================================  =========================================================  Transthoracic echocardiogram with 2-D, M-mode and complete  Study Date:   ******* 2024  CONCLUSIONS:      1. Left ventricular systolic function is normal with an ejection fraction of 67 % by Jeffers's method of disks.   2. Large left pleural effusion noted.   3. Large pericardial effusion with evidence of hemodynamic compromise (or echocardiographic evidence of cardiac tamponade).   4. The left ventricular diastolic function is indeterminate.   5. Normal right ventricular cavity size and normal right ventricular systolic function.   6. Left atrium is moderately dilated.   7. The right atrium is normal in size.   8. Mild pulmonic regurgitation.   9. Trace tricuspid regurgitation.  10. The inferior vena cava is dilated measuring 2.50 cm in diameter, (dilated >2.1cm)   with abnormal inspiratory collapse (abnormal <50%) consistent with elevated right atrial pressure (~15, range 10-20mmHg).  11. Mild left ventricular hypertrophy.  12. Trileaflet aortic valve with normal systolic excursion.  13. Structurally normal mitral valve with normal leaflet excursion.   =========================================================  =========================================================  TTE Limited W or WO Ultrasound Enhancing Agent (*******12 @ 10:06) >   1. Moderate pericardial effusion noted adjacent to the posterior left ventricle   with no evidence of hemodynamic compromise (or echocardiographic evidence of cardiac tamponade).   2. Left ventricular systolic function is hyperdynamic with an ejection fraction   visually estimated at 65 to 70 %.   3. Normal right ventricular cavity size and normal right ventricular systolic function.   4. Moderate left pleural effusion noted.   5. Compared to the transthoracic echocardiogram performed on 2024,   decreased pericardial effusion than before.  =========================================================  =========================================================  < from: TTE Limited W or WO Ultrasound Enhancing Agent (24 @ 09:01) >  Study Date:    2024  Limited transthoracic echocardiogram.    CONCLUSIONS:   1. Left ventricular systolic function is normal with an ejection fraction of 69 % by 3D.   2. A small fibrinous pericardial effusion localized to the inferolateral pericardium vs   inflammed pericardium is noted.  =========================================================  =========================================================  CT Chest No Cont (24 @ 20:34):  Moderately large simple pericardial effusion. Small to moderate right   and small left pleural effusions with associatedcompressive atelectasis.    Superimposed pneumonia should be excluded on a clinical basis.  =========================================================  =========================================================

## 2024-12-08 NOTE — PROGRESS NOTE ADULT - SUBJECTIVE AND OBJECTIVE BOX
Subjective:    pat better, sitting in chair, no new complaint. left lower chest pain on deep breathing.    Home Medications:  acetaminophen 325 mg oral tablet: 2 tab(s) orally every 6 hours as needed for (2024 23:15)  amiodarone 200 mg oral tablet: 2 tab(s) orally 2 times a day ***until 24.... then 400mg QD*** (2024 23:15)  amLODIPine 5 mg oral tablet: 1 tab(s) orally once a day (2024 23:15)  Anumed-HC 25 mg rectal suppository: 1 suppository(ies) rectally 2 times a day as needed for (2024 23:15)  apixaban 5 mg oral tablet: 1 tab(s) orally every 12 hours (2024 23:15)  azelastine 137 mcg/inh (0.1%) nasal spray: 1 spray(s) in each nostril 2 times a day (2024 23:15)  colchicine 0.6 mg oral tablet: 0.5 tab(s) orally once a day (2024 23:15)  cyclobenzaprine 10 mg oral tablet: 1 tab(s) orally 3 times a day as needed for (03 Dec 2024 13:48)  enalapril 20 mg oral tablet: 1 tab(s) orally once a day (in the morning) (2024 23:15)  fluticasone 50 mcg/inh nasal spray: 2 spray(s) in each nostril once a day as needed for (15)  hydroCHLOROthiazide 12.5 mg oral tablet: 1 tab(s) orally 2 times a day as needed for (2024 23:15)  HYDROmorphone 2 mg oral tablet: 1 tab(s) orally every 6 hours as needed for (:15)  ibuprofen 200 mg oral capsule: 2 cap(s) orally every 8 hours as needed for (2024 23:15)  lamoTRIgine 200 mg oral tablet: 1 tab(s) orally once a day (03 Dec 2024 13:48)  levothyroxine 88 mcg (0.088 mg) oral tablet: 1 tab(s) orally once a day (03 Dec 2024 13:48)  linaclotide 72 mcg oral capsule: 1 cap(s) orally once a day as needed for (03 Dec 2024 13:48)  liraglutide 18 mg/3 mL subcutaneous solution: 0.6 milligram(s) subcutaneously once a day as needed for (03 Dec 2024 13:48)  loperamide 2 mg oral capsule: 2 cap(s) orally every 2 hours as needed for (2024 23:15)  meclizine 25 mg oral tablet: 1 tab(s) orally 2 times a day as needed for (03 Dec 2024 13:48)  Metoprolol Succinate ER 25 mg oral tablet, extended release: 1 tab(s) orally once a day (03 Dec 2024 13:48)  Multiple Vitamins oral tablet: 1 tab(s) orally once a day (03 Dec 2024 13:48)  ondansetron 4 mg oral tablet, disintegratin tab(s) orally 3 times a day as needed for (03 Dec 2024 13:48)  pantoprazole 40 mg oral delayed release tablet: 1 tab(s) orally once a day (2024 23:15)  rosuvastatin 20 mg oral tablet: 1 tab(s) orally once a day (03 Dec 2024 13:48)  senna (sennosides) 8.6 mg oral tablet: 2 tab(s) orally once a day (at bedtime) as needed for (2024 23:15)  Wellbutrin  mg/24 hours oral tablet, extended release: 1 tab(s) orally once a day (03 Dec 2024 13:48)    MEDICATIONS  (STANDING):  aMIOdarone    Tablet 200 milliGRAM(s) Oral daily  buPROPion XL (24-Hour) . 150 milliGRAM(s) Oral daily  chlorhexidine 4% Liquid 1 Application(s) Topical <User Schedule>  colchicine 0.3 milliGRAM(s) Oral daily  fluticasone propionate 50 MICROgram(s)/spray Nasal Spray 1 Spray(s) Both Nostrils two times a day  heparin   Injectable 5000 Unit(s) SubCutaneous every 8 hours  ibuprofen  Tablet. 600 milliGRAM(s) Oral every 8 hours  lactobacillus acidophilus 1 Tablet(s) Oral every 12 hours  lamoTRIgine 200 milliGRAM(s) Oral daily  levothyroxine 88 MICROGram(s) Oral daily  lidocaine   4% Patch 1 Patch Transdermal daily  lidocaine   4% Patch 1 Patch Transdermal daily  metoprolol succinate ER 25 milliGRAM(s) Oral daily  multivitamin 1 Tablet(s) Oral daily  naloxone Injectable 0.4 milliGRAM(s) IV Push once  pantoprazole    Tablet 40 milliGRAM(s) Oral before breakfast  polyethylene glycol 3350 17 Gram(s) Oral daily  rosuvastatin 20 milliGRAM(s) Oral at bedtime  senna 2 Tablet(s) Oral at bedtime    MEDICATIONS  (PRN):  aluminum hydroxide/magnesium hydroxide/simethicone Suspension 30 milliLiter(s) Oral every 4 hours PRN Dyspepsia  bisacodyl 5 milliGRAM(s) Oral daily PRN Constipation  cyclobenzaprine 10 milliGRAM(s) Oral three times a day PRN Muscle Spasm  guaifenesin/dextromethorphan Oral Liquid 10 milliLiter(s) Oral every 4 hours PRN Cough  hydrocortisone hemorrhoidal Suppository 1 Suppository(s) Rectal two times a day PRN pain  HYDROmorphone  Injectable 0.4 milliGRAM(s) IV Push every 4 hours PRN Severe Pain (7 - 10)  lactulose Syrup 15 Gram(s) Oral daily PRN constipation  loperamide 2 milliGRAM(s) Oral every 2 hours PRN Diarrhea  meclizine 25 milliGRAM(s) Oral two times a day PRN Dizziness  melatonin 3 milliGRAM(s) Oral at bedtime PRN Insomnia  ondansetron Injectable 4 milliGRAM(s) IV Push every 8 hours PRN Nausea and/or Vomiting  oxycodone    5 mG/acetaminophen 325 mG 1 Tablet(s) Oral every 4 hours PRN Moderate Pain (4 - 6)  simethicone 80 milliGRAM(s) Chew every 6 hours PRN Gas      Allergies    sulfa drugs (Vomiting; Nausea)  IV Contrast (Hives)    Intolerances        Vital Signs Last 24 Hrs  T(C): 36.5 (08 Dec 2024 04:02), Max: 36.8 (07 Dec 2024 16:00)  T(F): 97.7 (08 Dec 2024 04:02), Max: 98.2 (07 Dec 2024 16:00)  HR: 61 (08 Dec 2024 11:00) (58 - 73)  BP: 143/54 (08 Dec 2024 09:30) (114/44 - 144/55)  BP(mean): 75 (08 Dec 2024 09:30) (61 - 90)  RR: 16 (08 Dec 2024 09:30) (15 - 28)  SpO2: 93% (08 Dec 2024 11:00) (91% - 99%)    Parameters below as of 08 Dec 2024 09:30  Patient On (Oxygen Delivery Method): room air          PHYSICAL EXAMINATION:    NECK:  Supple. No lymphadenopathy. Jugular venous pressure not elevated. Carotids equal.   HEART:   The cardiac impulse has a normal quality. Reg., Nl S1 and S2.  There are no murmurs, rubs or gallops noted  CHEST:  Chest crackles to auscultation. Normal respiratory effort.  ABDOMEN:  Soft and nontender.   EXTREMITIES:  There is no edema.       LABS:                        9.2    19.95 )-----------( 876      ( 08 Dec 2024 06:14 )             31.1     12-08    133[L]  |  102  |  10  ----------------------------<  100[H]  4.3   |  26  |  0.48[L]    Ca    8.8      08 Dec 2024 06:14    TPro  5.7[L]  /  Alb  2.0[L]  /  TBili  0.3  /  DBili  x   /  AST  15  /  ALT  14  /  AlkPhos  93  12-08      Urinalysis Basic - ( 08 Dec 2024 06:14 )    Color: x / Appearance: x / SG: x / pH: x  Gluc: 100 mg/dL / Ketone: x  / Bili: x / Urobili: x   Blood: x / Protein: x / Nitrite: x   Leuk Esterase: x / RBC: x / WBC x   Sq Epi: x / Non Sq Epi: x / Bacteria: x

## 2024-12-08 NOTE — PROGRESS NOTE ADULT - SUBJECTIVE AND OBJECTIVE BOX
Interval History:      still some pleuritic pain but improved      ambulating, gaining strength      no fevers      now on percocet on pain    HPI:      64-year-old F with PMH of HTN, HLD, hypothyroid,   thrombocytosis, GERD, ischemic colitis s/p left hemicolectomy, pAF and DVT/PE on Eliquis,and recently 2 hospitalizations at [facility] in the past 2 months for CAP, and also diagnosed with pericarditis, and HFpEF. Was last discharged home on 11/29  was BIB pvt car on 11/30 after visiting RN recommended  further assessment of back & chest pain, SOB, and productive cough. Pt reports symptoms similar to why/when she was readmitted to [facility] w/ Dx of pericarditis/CHF/PNA.  Pt upset w/ care rendered at [facility] despite her doctors all there, so came to  ED foe eval/management.   CT chest and echo shows bibasilar effusions, and pericardial effusion with rv collapse  Patient had recently had treatment for HCAP at [facility]  had been on interferon , ? related   all rheum labs at [facility] were negative was seen by rheumatology not felt to be rheumatolgic  being treated with colchicine for pericarditis  has bilateral pigtails, had drainage of bloody pericardial drain  all drains now out , on colchicine    ROS  - complaining of pleuritic pain, no fevers, no chills no abdominal pain,  sl  sob with exertion but improving       MEDICATIONS  (STANDING):  aMIOdarone    Tablet 200 milliGRAM(s) Oral daily  buPROPion XL (24-Hour) . 150 milliGRAM(s) Oral daily  chlorhexidine 4% Liquid 1 Application(s) Topical <User Schedule>  colchicine 0.3 milliGRAM(s) Oral daily  fluticasone propionate 50 MICROgram(s)/spray Nasal Spray 1 Spray(s) Both Nostrils two times a day  heparin   Injectable 5000 Unit(s) SubCutaneous every 8 hours  ibuprofen  Tablet. 600 milliGRAM(s) Oral every 8 hours  lactobacillus acidophilus 1 Tablet(s) Oral every 12 hours  lamoTRIgine 200 milliGRAM(s) Oral daily  levothyroxine 88 MICROGram(s) Oral daily  lidocaine   4% Patch 1 Patch Transdermal daily  lidocaine   4% Patch 1 Patch Transdermal daily  metoprolol succinate ER 25 milliGRAM(s) Oral daily  multivitamin 1 Tablet(s) Oral daily  naloxone Injectable 0.4 milliGRAM(s) IV Push once  pantoprazole    Tablet 40 milliGRAM(s) Oral before breakfast  polyethylene glycol 3350 17 Gram(s) Oral daily  rosuvastatin 20 milliGRAM(s) Oral at bedtime  senna 2 Tablet(s) Oral at bedtime    MEDICATIONS  (PRN):  aluminum hydroxide/magnesium hydroxide/simethicone Suspension 30 milliLiter(s) Oral every 4 hours PRN Dyspepsia  bisacodyl 5 milliGRAM(s) Oral daily PRN Constipation  cyclobenzaprine 10 milliGRAM(s) Oral three times a day PRN Muscle Spasm  guaifenesin/dextromethorphan Oral Liquid 10 milliLiter(s) Oral every 4 hours PRN Cough  hydrocortisone hemorrhoidal Suppository 1 Suppository(s) Rectal two times a day PRN pain  HYDROmorphone  Injectable 0.4 milliGRAM(s) IV Push every 4 hours PRN Severe Pain (7 - 10)  lactulose Syrup 15 Gram(s) Oral daily PRN constipation  loperamide 2 milliGRAM(s) Oral every 2 hours PRN Diarrhea  meclizine 25 milliGRAM(s) Oral two times a day PRN Dizziness  melatonin 3 milliGRAM(s) Oral at bedtime PRN Insomnia  ondansetron Injectable 4 milliGRAM(s) IV Push every 8 hours PRN Nausea and/or Vomiting  oxycodone    5 mG/acetaminophen 325 mG 1 Tablet(s) Oral every 4 hours PRN Moderate Pain (4 - 6)  simethicone 80 milliGRAM(s) Chew every 6 hours PRN Gas    ICU Vital Signs Last 24 Hrs  T(C): 36.5 (08 Dec 2024 04:02), Max: 36.8 (07 Dec 2024 16:00)  T(F): 97.7 (08 Dec 2024 04:02), Max: 98.2 (07 Dec 2024 16:00)  HR: 69 (08 Dec 2024 09:31) (59 - 73)  BP: 143/54 (08 Dec 2024 09:30) (114/44 - 144/55)  BP(mean): 75 (08 Dec 2024 09:30) (61 - 90)  ABP: --  ABP(mean): --  RR: 16 (08 Dec 2024 09:30) (15 - 28)  SpO2: 96% (08 Dec 2024 09:30) (92% - 99%)    O2 Parameters below as of 08 Dec 2024 09:30  Patient On (Oxygen Delivery Method): room air    Physical Exam    General - no distress  HEENT - nc/at  neck supple  lungs clear  cv rrr nsr  abdomen soft, nontender  extrmetieis warm  neuro awake and alert  skin no rash    I&O's Summary    07 Dec 2024 07:01  -  08 Dec 2024 07:00  --------------------------------------------------------  IN: 600 mL / OUT: 200 mL / NET: 400 mL                        9.2    19.95 )-----------( 876      ( 08 Dec 2024 06:14 )             31.1       12-08    133[L]  |  102  |  10  ----------------------------<  100[H]  4.3   |  26  |  0.48[L]    Ca    8.8      08 Dec 2024 06:14    TPro  5.7[L]  /  Alb  2.0[L]  /  TBili  0.3  /  DBili  x   /  AST  15  /  ALT  14  /  AlkPhos  93  12-08    Urinalysis Basic - ( 08 Dec 2024 06:14 )    Color: x / Appearance: x / SG: x / pH: x  Gluc: 100 mg/dL / Ketone: x  / Bili: x / Urobili: x   Blood: x / Protein: x / Nitrite: x   Leuk Esterase: x / RBC: x / WBC x   Sq Epi: x / Non Sq Epi: x / Bacteria: x    DVT Prophylaxis:  Heparin subq                                                               Advanced Directives: Full Code         Labs, Notes, Orders, radiologic studies reviewed and care coordinated with multidisciplinary team

## 2024-12-09 LAB
ANION GAP SERPL CALC-SCNC: 1 MMOL/L — LOW (ref 5–17)
BUN SERPL-MCNC: 11 MG/DL — SIGNIFICANT CHANGE UP (ref 7–23)
CALCIUM SERPL-MCNC: 8.7 MG/DL — SIGNIFICANT CHANGE UP (ref 8.5–10.1)
CHLORIDE SERPL-SCNC: 105 MMOL/L — SIGNIFICANT CHANGE UP (ref 96–108)
CO2 SERPL-SCNC: 27 MMOL/L — SIGNIFICANT CHANGE UP (ref 22–31)
CREAT SERPL-MCNC: 0.49 MG/DL — LOW (ref 0.5–1.3)
EGFR: 106 ML/MIN/1.73M2 — SIGNIFICANT CHANGE UP
GLUCOSE SERPL-MCNC: 99 MG/DL — SIGNIFICANT CHANGE UP (ref 70–99)
HCT VFR BLD CALC: 28.3 % — LOW (ref 34.5–45)
HGB BLD-MCNC: 8.4 G/DL — LOW (ref 11.5–15.5)
MCHC RBC-ENTMCNC: 24.6 PG — LOW (ref 27–34)
MCHC RBC-ENTMCNC: 29.7 G/DL — LOW (ref 32–36)
MCV RBC AUTO: 82.7 FL — SIGNIFICANT CHANGE UP (ref 80–100)
NRBC # BLD: 1 /100 WBCS — HIGH (ref 0–0)
PLATELET # BLD AUTO: 815 K/UL — HIGH (ref 150–400)
POTASSIUM SERPL-MCNC: 4.2 MMOL/L — SIGNIFICANT CHANGE UP (ref 3.5–5.3)
POTASSIUM SERPL-SCNC: 4.2 MMOL/L — SIGNIFICANT CHANGE UP (ref 3.5–5.3)
RBC # BLD: 3.42 M/UL — LOW (ref 3.8–5.2)
RBC # FLD: 19.9 % — HIGH (ref 10.3–14.5)
SODIUM SERPL-SCNC: 133 MMOL/L — LOW (ref 135–145)
WBC # BLD: 17.34 K/UL — HIGH (ref 3.8–10.5)
WBC # FLD AUTO: 17.34 K/UL — HIGH (ref 3.8–10.5)

## 2024-12-09 PROCEDURE — 99253 IP/OBS CNSLTJ NEW/EST LOW 45: CPT

## 2024-12-09 PROCEDURE — 99233 SBSQ HOSP IP/OBS HIGH 50: CPT

## 2024-12-09 PROCEDURE — 71045 X-RAY EXAM CHEST 1 VIEW: CPT | Mod: 26

## 2024-12-09 RX ORDER — PREDNISONE 20 MG/1
5 TABLET ORAL DAILY
Refills: 0 | Status: CANCELLED | OUTPATIENT
Start: 2024-12-17 | End: 2024-12-12

## 2024-12-09 RX ORDER — PREDNISONE 20 MG/1
20 TABLET ORAL DAILY
Refills: 0 | Status: DISCONTINUED | OUTPATIENT
Start: 2024-12-09 | End: 2024-12-12

## 2024-12-09 RX ORDER — METHYLPREDNISOLONE SOD SUCC 125 MG
40 VIAL (EA) INJECTION ONCE
Refills: 0 | Status: COMPLETED | OUTPATIENT
Start: 2024-12-09 | End: 2024-12-09

## 2024-12-09 RX ORDER — COLCHICINE 0.6 MG
0.6 TABLET ORAL DAILY
Refills: 0 | Status: DISCONTINUED | OUTPATIENT
Start: 2024-12-09 | End: 2024-12-12

## 2024-12-09 RX ORDER — ACETAMINOPHEN 500MG 500 MG/1
1000 TABLET, COATED ORAL ONCE
Refills: 0 | Status: COMPLETED | OUTPATIENT
Start: 2024-12-09 | End: 2024-12-09

## 2024-12-09 RX ORDER — PREDNISONE 20 MG/1
10 TABLET ORAL DAILY
Refills: 0 | Status: DISCONTINUED | OUTPATIENT
Start: 2024-12-13 | End: 2024-12-12

## 2024-12-09 RX ORDER — HYDROMORPHONE HYDROCHLORIDE 2 MG/1
2 TABLET ORAL EVERY 4 HOURS
Refills: 0 | Status: DISCONTINUED | OUTPATIENT
Start: 2024-12-09 | End: 2024-12-10

## 2024-12-09 RX ADMIN — Medication 600 MILLIGRAM(S): at 05:40

## 2024-12-09 RX ADMIN — CHLORHEXIDINE GLUCONATE 1 APPLICATION(S): 1.2 RINSE ORAL at 05:39

## 2024-12-09 RX ADMIN — HYDROMORPHONE HYDROCHLORIDE 2 MILLIGRAM(S): 2 TABLET ORAL at 21:13

## 2024-12-09 RX ADMIN — ACETAMINOPHEN 500MG 400 MILLIGRAM(S): 500 TABLET, COATED ORAL at 04:29

## 2024-12-09 RX ADMIN — Medication 1 SPRAY(S): at 09:17

## 2024-12-09 RX ADMIN — AMIODARONE HYDROCHLORIDE 200 MILLIGRAM(S): 200 TABLET ORAL at 09:16

## 2024-12-09 RX ADMIN — Medication 88 MICROGRAM(S): at 05:40

## 2024-12-09 RX ADMIN — LACTULOSE 15 GRAM(S): 10 SOLUTION ORAL at 09:34

## 2024-12-09 RX ADMIN — POLYETHYLENE GLYCOL 3350 17 GRAM(S): 17 POWDER, FOR SOLUTION ORAL at 09:15

## 2024-12-09 RX ADMIN — HYDROMORPHONE HYDROCHLORIDE 0.4 MILLIGRAM(S): 2 TABLET ORAL at 05:42

## 2024-12-09 RX ADMIN — ACETAMINOPHEN 500MG 1000 MILLIGRAM(S): 500 TABLET, COATED ORAL at 05:30

## 2024-12-09 RX ADMIN — LAMOTRIGINE 200 MILLIGRAM(S): 50 TABLET, EXTENDED RELEASE ORAL at 09:16

## 2024-12-09 RX ADMIN — Medication 40 MILLIGRAM(S): at 16:19

## 2024-12-09 RX ADMIN — Medication 600 MILLIGRAM(S): at 14:00

## 2024-12-09 RX ADMIN — Medication 600 MILLIGRAM(S): at 06:20

## 2024-12-09 RX ADMIN — Medication 80 MILLIGRAM(S): at 04:49

## 2024-12-09 RX ADMIN — Medication 1 TABLET(S): at 09:16

## 2024-12-09 RX ADMIN — HYDROMORPHONE HYDROCHLORIDE 0.4 MILLIGRAM(S): 2 TABLET ORAL at 06:30

## 2024-12-09 RX ADMIN — ROSUVASTATIN CALCIUM 20 MILLIGRAM(S): 5 TABLET, FILM COATED ORAL at 21:24

## 2024-12-09 RX ADMIN — Medication 5000 UNIT(S): at 13:37

## 2024-12-09 RX ADMIN — Medication 0.3 MILLIGRAM(S): at 09:16

## 2024-12-09 RX ADMIN — ONDANSETRON HYDROCHLORIDE 4 MILLIGRAM(S): 4 TABLET, FILM COATED ORAL at 12:08

## 2024-12-09 RX ADMIN — HYDROMORPHONE HYDROCHLORIDE 2 MILLIGRAM(S): 2 TABLET ORAL at 14:58

## 2024-12-09 RX ADMIN — METOPROLOL TARTRATE 25 MILLIGRAM(S): 100 TABLET, FILM COATED ORAL at 09:16

## 2024-12-09 RX ADMIN — Medication 5000 UNIT(S): at 05:40

## 2024-12-09 RX ADMIN — Medication 600 MILLIGRAM(S): at 22:00

## 2024-12-09 RX ADMIN — Medication 600 MILLIGRAM(S): at 13:37

## 2024-12-09 RX ADMIN — Medication 150 MILLIGRAM(S): at 09:16

## 2024-12-09 RX ADMIN — Medication 2 TABLET(S): at 21:24

## 2024-12-09 RX ADMIN — HYDROMORPHONE HYDROCHLORIDE 2 MILLIGRAM(S): 2 TABLET ORAL at 21:35

## 2024-12-09 RX ADMIN — Medication 1 TABLET(S): at 21:24

## 2024-12-09 RX ADMIN — Medication 600 MILLIGRAM(S): at 21:24

## 2024-12-09 RX ADMIN — PANTOPRAZOLE SODIUM 40 MILLIGRAM(S): 40 TABLET, DELAYED RELEASE ORAL at 09:16

## 2024-12-09 RX ADMIN — Medication 5000 UNIT(S): at 21:23

## 2024-12-09 RX ADMIN — Medication 1 TABLET(S): at 09:15

## 2024-12-09 RX ADMIN — HYDROMORPHONE HYDROCHLORIDE 0.4 MILLIGRAM(S): 2 TABLET ORAL at 02:30

## 2024-12-09 RX ADMIN — HYDROMORPHONE HYDROCHLORIDE 0.4 MILLIGRAM(S): 2 TABLET ORAL at 01:37

## 2024-12-09 NOTE — CONSULT NOTE ADULT - ASSESSMENT
ASSESSMENT/PLAN  64yFemale with functional deficits after  Pain - Tylenol  DVT PPX - SCDs  Rehab -     pending evaluation     Recommend ACUTE inpatient rehabilitation for the functional deficits consisting of 3 hours of therapy/day & 24 hour RN/daily PMR physician for comorbid medical management. Patient will be able to tolerate 3 hours a day.   Recommend SLOAN, patient DOES NOT meet acute inpatient rehabilitation criteria. Patient needs a more prolonged stay to achieve transition to community.    Expect patient to achieve functional goals for DC HOME with OUTPATIENT   Expect patient to achieve functional goals for DC HOME with HOME CARE   Follow up with CONCUSSION [facility] - Call [phone] for an appointment    Will continue to follow. Functional progress will determine ongoing rehab dispo recommendations, which may change.    Continue bedside therapy as well as OOB throughout the day with mobilization by staff to maintain cardiopulmonary function and prevention of secondary complications related to debility.     Discussed with rehab team.  ASSESSMENT/PLAN  64yFemale with functional deficits after bibasilar effusions, and pericardial effusion with rv collapse    Pain - Tylenol  DVT PPX - SCDs     Recommend ACUTE inpatient rehabilitation when medically stable. She would benefit from acute rehab  for the functional deficits consisting of 3 hours of therapy/day & 24 hour RN/daily PMR physician for comorbid medical management. Patient will be able to tolerate 3 hours a day.  ent    Will continue to follow. Functional progress will determine ongoing rehab dispo recommendations, which may change.    Continue bedside therapy as well as OOB throughout the day with mobilization by staff to maintain cardiopulmonary function and prevention of secondary complications related to debility.      ASSESSMENT/PLAN  64yFemale with functional deficits after bibasilar effusions, and pericardial effusion with rv collapse    Pain - Tylenol  DVT PPX - SCDs     Recommend ACUTE inpatient rehabilitation when medically stable. She would benefit from acute rehab  for the functional deficits consisting of 3 hours of therapy/day & 24 hour RN/daily PMR physician for comorbid medical management. Patient will be able to tolerate 3 hours a day    Will continue to follow. Functional progress will determine ongoing rehab dispo recommendations, which may change.    Continue bedside therapy as well as OOB throughout the day with mobilization by staff to maintain cardiopulmonary function and prevention of secondary complications related to debility.

## 2024-12-09 NOTE — CONSULT NOTE ADULT - SUBJECTIVE AND OBJECTIVE BOX
64yF was admitted on 11-30    Patient is a 64y old  Female who presents with a chief complaint of pericarditis (08 Dec 2024 11:30)    HPI:   64-year-old F with PMH of HTN, HLD, hypothyroid,   thrombocytosis, GERD, ischemic colitis s/p left hemicolectomy, pAF and DVT/PE on Eliquis,and recently 2 hospitalizations at [facility] in the past 2 months for CAP, and also diagnosed with pericarditis, and HFpEF. Was last discharged home on 11/29  was BIB pvt car on 11/30 after visiting RN recommended  further assessment of back & chest pain, SOB, and productive cough. Pt reports symptoms similar to why/when she was readmitted to [facility] w/ Dx of pericarditis/CHF/PNA.  Pt upset w/ care rendered at [facility] despite her doctors all there, so came to  ED foe eval/management.   CT chest and echo shows bibasilar effusions, and pericardial effusion with rv collapse  Patient had recently had treatment for HCAP at [facility]  had been on interferon , ? related   all rheum labs at [facility] were negative was seen by rheumatology not felt to be rheumatolgic  being treated with colchicine for pericarditis  has bilateral pigtails, had drainage of bloody pericardial drain  all drains now out , on colchicine    Imaging performed:  Chest Xray 12/06/2024 Interval removal of bilateral chest tubes. No pneumothorax. Probable small loculated left pleural effusion and trace right pleural effusion and/or right basilar atelectasis on the final image.      REVIEW OF SYSTEMS  Constitutional - No fever, No weight loss, No fatigue  HEENT - No eye pain, No visual disturbances, No difficulty hearing, No tinnitus, No vertigo, No neck pain  Respiratory - No cough, No wheezing, No shortness of breath  Cardiovascular - No chest pain, No palpitations  Gastrointestinal - No abdominal pain, No nausea, No vomiting, No diarrhea, No constipation  Genitourinary - No dysuria, No frequency, No hematuria, No incontinence  Neurological - No headaches, No memory loss, No loss of strength, No numbness, No tremors  Skin - No itching, No rashes, No lesions   Endocrine - No temperature intolerance  Musculoskeletal - No joint pain, No joint swelling, No muscle pain  Psychiatric - No depression, No anxiety    VITALS  T(C): 36.2 (12-09-24 @ 08:48), Max: 36.6 (12-08-24 @ 16:03)  HR: 67 (12-09-24 @ 10:51) (55 - 69)  BP: 159/57 (12-09-24 @ 10:51) (119/49 - 159/57)  RR: 18 (12-09-24 @ 10:51) (13 - 24)  SpO2: 94% (12-09-24 @ 10:51) (91% - 97%)  Wt(kg): --    PAST MEDICAL & SURGICAL HISTORY  Hypothyroidism    Von Willebrand disease    Essential thrombocytosis    Pulmonary embolism    DVT, lower extremity    Ventricular tachycardia    Heart murmur    Hernia, hiatal    GERD (gastroesophageal reflux disease)    Fatty liver    Manic depression    Cervical herniated disc    H/O fracture of skull    Vertigo    HTN (hypertension)    Fatty liver    Obesity    Intestinal obstruction    Bipolar disorder    Gastric ulcer    COVID-19 vaccine series completed    H/O endoscopy    H/O colonoscopy    History of partial hysterectomy    History of tonsillectomy    H/O cardiac radiofrequency ablation    S/P gastric bypass    History of surgery        SOCIAL HISTORY - as per documentation/history  Smoking - None  EtOH - None  Drugs - None    FUNCTIONAL HISTORY  Lives   Independent    CURRENT FUNCTIONAL STATUS      FAMILY HISTORY   FH: heart disease    FHx: congenital heart disease        RECENT LABS - Reviewed  CBC Full  -  ( 09 Dec 2024 05:18 )  WBC Count : 17.34 K/uL  RBC Count : 3.42 M/uL  Hemoglobin : 8.4 g/dL  Hematocrit : 28.3 %  Platelet Count - Automated : 815 K/uL  Mean Cell Volume : 82.7 fl  Mean Cell Hemoglobin : 24.6 pg  Mean Cell Hemoglobin Concentration : 29.7 g/dL  Auto Neutrophil # : x  Auto Lymphocyte # : x  Auto Monocyte # : x  Auto Eosinophil # : x  Auto Basophil # : x  Auto Neutrophil % : x  Auto Lymphocyte % : x  Auto Monocyte % : x  Auto Eosinophil % : x  Auto Basophil % : x    12-09    133[L]  |  105  |  11  ----------------------------<  99  4.2   |  27  |  0.49[L]    Ca    8.7      09 Dec 2024 05:18    TPro  5.7[L]  /  Alb  2.0[L]  /  TBili  0.3  /  DBili  x   /  AST  15  /  ALT  14  /  AlkPhos  93  12-08    Urinalysis Basic - ( 09 Dec 2024 05:18 )    Color: x / Appearance: x / SG: x / pH: x  Gluc: 99 mg/dL / Ketone: x  / Bili: x / Urobili: x   Blood: x / Protein: x / Nitrite: x   Leuk Esterase: x / RBC: x / WBC x   Sq Epi: x / Non Sq Epi: x / Bacteria: x        ALLERGIES  sulfa drugs (Vomiting; Nausea)  IV Contrast (Hives)      MEDICATIONS   aluminum hydroxide/magnesium hydroxide/simethicone Suspension 30 milliLiter(s) Oral every 4 hours PRN  aMIOdarone    Tablet 200 milliGRAM(s) Oral daily  bisacodyl 5 milliGRAM(s) Oral daily PRN  buPROPion XL (24-Hour) . 150 milliGRAM(s) Oral daily  chlorhexidine 4% Liquid 1 Application(s) Topical <User Schedule>  colchicine 0.6 milliGRAM(s) Oral daily  cyclobenzaprine 10 milliGRAM(s) Oral three times a day PRN  fluticasone propionate 50 MICROgram(s)/spray Nasal Spray 1 Spray(s) Both Nostrils two times a day  guaifenesin/dextromethorphan Oral Liquid 10 milliLiter(s) Oral every 4 hours PRN  heparin   Injectable 5000 Unit(s) SubCutaneous every 8 hours  hydrocortisone hemorrhoidal Suppository 1 Suppository(s) Rectal two times a day PRN  HYDROmorphone  Injectable 0.4 milliGRAM(s) IV Push every 4 hours PRN  ibuprofen  Tablet. 600 milliGRAM(s) Oral every 8 hours  lactobacillus acidophilus 1 Tablet(s) Oral every 12 hours  lactulose Syrup 15 Gram(s) Oral daily PRN  lamoTRIgine 200 milliGRAM(s) Oral daily  levothyroxine 88 MICROGram(s) Oral daily  loperamide 2 milliGRAM(s) Oral every 2 hours PRN  meclizine 25 milliGRAM(s) Oral two times a day PRN  melatonin 3 milliGRAM(s) Oral at bedtime PRN  metoprolol succinate ER 25 milliGRAM(s) Oral daily  multivitamin 1 Tablet(s) Oral daily  naloxone Injectable 0.4 milliGRAM(s) IV Push once  ondansetron Injectable 4 milliGRAM(s) IV Push every 8 hours PRN  oxycodone    5 mG/acetaminophen 325 mG 1 Tablet(s) Oral every 4 hours PRN  pantoprazole    Tablet 40 milliGRAM(s) Oral before breakfast  polyethylene glycol 3350 17 Gram(s) Oral daily  rosuvastatin 20 milliGRAM(s) Oral at bedtime  senna 2 Tablet(s) Oral at bedtime  simethicone 80 milliGRAM(s) Chew every 6 hours PRN      ----------------------------------------------------------------------------------------  PHYSICAL EXAM  Constitutional - NAD, Comfortable  HEENT - NCAT, EOMI  Neck - Supple, No limited ROM  Chest - Breathing comfortably, No wheezing  Cardiovascular - S1S2   Abdomen - Soft   Extremities - No C/C/E, No calf tenderness   Neurologic Exam -                    Cognitive - AAO to self, place, date, year, situation     Communication - Fluent, No dysarthria     Cranial Nerves - CN 2-12 intact     Motor - No focal deficits                    LEFT    UE - ShAB 5/5, EF 5/5, EE 5/5, WE 5/5,  5/5                    RIGHT UE - ShAB 5/5, EF 5/5, EE 5/5, WE 5/5,  5/5                    LEFT    LE - HF 5/5, KE 5/5, DF 5/5, PF 5/5                    RIGHT LE - HF 5/5, KE 5/5, DF 5/5, PF 5/5        Sensory - Intact to LT     Reflexes - DTR Intact, No primitive reflexive     Coordination - FTN intact     OculoVestibular - No saccades, No nystagmus, VOR         Balance - WNL Static  Psychiatric - Mood stable, Affect WNL  ----------------------------------------------------------------------------------------   64yF was admitted on 11-30    Patient is a 64y old  Female who presents with a chief complaint of pericarditis (08 Dec 2024 11:30)    HPI:   64-year-old F with PMH of HTN, HLD, hypothyroid,   thrombocytosis, GERD, ischemic colitis s/p left hemicolectomy, pAF and DVT/PE on Eliquis,and recently 2 hospitalizations at [facility] in the past 2 months for CAP, and also diagnosed with pericarditis, and HFpEF. Was last discharged home on 11/29  was BIB pvt car on 11/30 after visiting RN recommended  further assessment of back & chest pain, SOB, and productive cough. Pt reports symptoms similar to why/when she was readmitted to [facility] w/ Dx of pericarditis/CHF/PNA.  Pt upset w/ care rendered at [facility] despite her doctors all there, so came to  ED foe eval/management.   CT chest and echo shows bibasilar effusions, and pericardial effusion with rv collapse  Patient had recently had treatment for HCAP at [facility]  had been on interferon , ? related   all rheum labs at [facility] were negative was seen by rheumatology not felt to be rheumatolgic  being treated with colchicine for pericarditis  has bilateral pigtails, had drainage of bloody pericardial drain  all drains now out , on colchicine    Imaging performed:  Chest Xray 12/06/2024 Interval removal of bilateral chest tubes. No pneumothorax. Probable small loculated left pleural effusion and trace right pleural effusion and/or right basilar atelectasis on the final image.      REVIEW OF SYSTEMS  Constitutional - No fever, No weight loss, No fatigue  HEENT - No eye pain, No visual disturbances, No difficulty hearing, No tinnitus, No vertigo, No neck pain  Respiratory - No cough, No wheezing, No shortness of breath  Cardiovascular - No chest pain, No palpitations  Gastrointestinal - No abdominal pain, No nausea, No vomiting, No diarrhea, No constipation  Genitourinary - No dysuria, No frequency, No hematuria, No incontinence  Neurological - No headaches, No memory loss, No loss of strength, No numbness, No tremors  Skin - No itching, No rashes, No lesions   Endocrine - No temperature intolerance  Musculoskeletal - No joint pain, No joint swelling, No muscle pain  Psychiatric - No depression, No anxiety    VITALS  T(C): 36.2 (12-09-24 @ 08:48), Max: 36.6 (12-08-24 @ 16:03)  HR: 67 (12-09-24 @ 10:51) (55 - 69)  BP: 159/57 (12-09-24 @ 10:51) (119/49 - 159/57)  RR: 18 (12-09-24 @ 10:51) (13 - 24)  SpO2: 94% (12-09-24 @ 10:51) (91% - 97%)  Wt(kg): --    PAST MEDICAL & SURGICAL HISTORY  Hypothyroidism    Von Willebrand disease    Essential thrombocytosis    Pulmonary embolism    DVT, lower extremity    Ventricular tachycardia    Heart murmur    Hernia, hiatal    GERD (gastroesophageal reflux disease)    Fatty liver    Manic depression    Cervical herniated disc    H/O fracture of skull    Vertigo    HTN (hypertension)    Fatty liver    Obesity    Intestinal obstruction    Bipolar disorder    Gastric ulcer    COVID-19 vaccine series completed    H/O endoscopy    H/O colonoscopy    History of partial hysterectomy    History of tonsillectomy    H/O cardiac radiofrequency ablation    S/P gastric bypass    History of surgery        SOCIAL HISTORY - as per documentation/history  Smoking - None  EtOH - None  Drugs - None    FUNCTIONAL HISTORY  Lives   Independent    CURRENT FUNCTIONAL STATUS      FAMILY HISTORY   FH: heart disease    FHx: congenital heart disease        RECENT LABS - Reviewed  CBC Full  -  ( 09 Dec 2024 05:18 )  WBC Count : 17.34 K/uL  RBC Count : 3.42 M/uL  Hemoglobin : 8.4 g/dL  Hematocrit : 28.3 %  Platelet Count - Automated : 815 K/uL  Mean Cell Volume : 82.7 fl  Mean Cell Hemoglobin : 24.6 pg  Mean Cell Hemoglobin Concentration : 29.7 g/dL  Auto Neutrophil # : x  Auto Lymphocyte # : x  Auto Monocyte # : x  Auto Eosinophil # : x  Auto Basophil # : x  Auto Neutrophil % : x  Auto Lymphocyte % : x  Auto Monocyte % : x  Auto Eosinophil % : x  Auto Basophil % : x    12-09    133[L]  |  105  |  11  ----------------------------<  99  4.2   |  27  |  0.49[L]    Ca    8.7      09 Dec 2024 05:18    TPro  5.7[L]  /  Alb  2.0[L]  /  TBili  0.3  /  DBili  x   /  AST  15  /  ALT  14  /  AlkPhos  93  12-08    Urinalysis Basic - ( 09 Dec 2024 05:18 )    Color: x / Appearance: x / SG: x / pH: x  Gluc: 99 mg/dL / Ketone: x  / Bili: x / Urobili: x   Blood: x / Protein: x / Nitrite: x   Leuk Esterase: x / RBC: x / WBC x   Sq Epi: x / Non Sq Epi: x / Bacteria: x        ALLERGIES  sulfa drugs (Vomiting; Nausea)  IV Contrast (Hives)      MEDICATIONS   aluminum hydroxide/magnesium hydroxide/simethicone Suspension 30 milliLiter(s) Oral every 4 hours PRN  aMIOdarone    Tablet 200 milliGRAM(s) Oral daily  bisacodyl 5 milliGRAM(s) Oral daily PRN  buPROPion XL (24-Hour) . 150 milliGRAM(s) Oral daily  chlorhexidine 4% Liquid 1 Application(s) Topical <User Schedule>  colchicine 0.6 milliGRAM(s) Oral daily  cyclobenzaprine 10 milliGRAM(s) Oral three times a day PRN  fluticasone propionate 50 MICROgram(s)/spray Nasal Spray 1 Spray(s) Both Nostrils two times a day  guaifenesin/dextromethorphan Oral Liquid 10 milliLiter(s) Oral every 4 hours PRN  heparin   Injectable 5000 Unit(s) SubCutaneous every 8 hours  hydrocortisone hemorrhoidal Suppository 1 Suppository(s) Rectal two times a day PRN  HYDROmorphone  Injectable 0.4 milliGRAM(s) IV Push every 4 hours PRN  ibuprofen  Tablet. 600 milliGRAM(s) Oral every 8 hours  lactobacillus acidophilus 1 Tablet(s) Oral every 12 hours  lactulose Syrup 15 Gram(s) Oral daily PRN  lamoTRIgine 200 milliGRAM(s) Oral daily  levothyroxine 88 MICROGram(s) Oral daily  loperamide 2 milliGRAM(s) Oral every 2 hours PRN  meclizine 25 milliGRAM(s) Oral two times a day PRN  melatonin 3 milliGRAM(s) Oral at bedtime PRN  metoprolol succinate ER 25 milliGRAM(s) Oral daily  multivitamin 1 Tablet(s) Oral daily  naloxone Injectable 0.4 milliGRAM(s) IV Push once  ondansetron Injectable 4 milliGRAM(s) IV Push every 8 hours PRN  oxycodone    5 mG/acetaminophen 325 mG 1 Tablet(s) Oral every 4 hours PRN  pantoprazole    Tablet 40 milliGRAM(s) Oral before breakfast  polyethylene glycol 3350 17 Gram(s) Oral daily  rosuvastatin 20 milliGRAM(s) Oral at bedtime  senna 2 Tablet(s) Oral at bedtime  simethicone 80 milliGRAM(s) Chew every 6 hours PRN      ----------------------------------------------------------------------------------------  PHYSICAL EXAM  Constitutional - NAD, Comfortable  HEENT - NCAT, EOMI  Neck - Supple, No limited ROM  Chest - Breathing comfortably, No wheezing  Cardiovascular - S1S2   Abdomen - Soft   Extremities - No C/C/E, No calf tenderness   Neurologic Exam -                    Cognitive - AAO to self, place, date, year, situation     Communication - Fluent, No dysarthria     Cranial Nerves - CN 2-12 intact     Motor - No focal deficits                    LEFT    UE - ShAB 4/5, EF 4/5, EE 4/5, WE 4/5,  4/5                    RIGHT UE - ShAB 4/5, EF 4/5, EE 4/5, WE 4/5,  4/5                    LEFT    LE - HF 4/5, KE 4/5, DF 4/5, PF 4/5                    RIGHT LE - HF 4/5, KE 4/5, DF 4/5, PF 4/5       Sensory - Intact to LT     Reflexes - DTR Intact, No primitive reflexive     Coordination - FTN intact     OculoVestibular - No saccades, No nystagmus, VOR         Balance - fair Static  Psychiatric - Mood stable, Affect WNL  ----------------------------------------------------------------------------------------

## 2024-12-09 NOTE — PROGRESS NOTE ADULT - ASSESSMENT
65 y/o F with PMH of HTN, HLD, hypothyroid, thrombocytosis (most recently on pegylated interferon alpha), GERD, ischemic colitis s/p left hemicolectomy, pAF and DVT/PE on Eliquis, recently 2 hospitalizations at [facility] in the past 2 months for CAP and also diagnosed with pericarditis, and HFpEF. Was last discharged home on 11/29 who presented to  on 11/30 for worsening chest/back pain, SOB and cough. On admission found to have bilateral pleural effusions and pericardial effusion w/ tamponade s/p bilateral pigtail catheters and pericardial drain, admitted to CCU for further evaluation and management. ON colchicine and ibuprofen for pericarditis treatment. Pleural and pericardial cytologies negative. Reportedly had extensive rheum w/u at [facility] which was negative.    Pericardial drain removed 12/4, R pigtail removed 12/5, L pigtail removed 12/6.    Plan:  - Pain control prn, will deescalate IV dilaudid to PO, c/w home meds  - BP stable, c/w colchicine and ibuprofen for pericarditis, TTEs reviewed, cards recs appreciated, hold AC given bloody pericardial fluid, c/w amio and metoprolol  - CXR w/ some recurrence of pleural effusion, remains on room air, will cont to follow AM CXR  - Discussed with pulm will resend rheum screening labs, start short course of steroid as well for pericarditis/pleuritis possibly related to ?pegylated interferon  - Regular diet, bowel regimen  - Stable renal indices, cont to monitor  - Completed course of cefepime for pna, blood cultures and pleural/pericardial fluid cultures engative  - c/w synthroid  - Hep subq DVT ppx  - PT following, PM&R eval for acute rehab, OT eval

## 2024-12-09 NOTE — CONSULT NOTE ADULT - CONSULT REASON
Essential thrombocythemia
Pneumonia; hypoxemia
Evaluate for Acute Inpatient Rehabilitation
left pleural effusion, pericardial effusion
Pneumonia
Asked to see patient for pericardial effusion

## 2024-12-09 NOTE — CONSULT NOTE ADULT - CONSULT REQUESTED DATE/TIME
01-Dec-2024 09:11
02-Dec-2024 10:06
09-Dec-2024 11:16
01-Dec-2024 07:25
01-Dec-2024 11:02
01-Dec-2024 10:47

## 2024-12-09 NOTE — PROGRESS NOTE ADULT - SUBJECTIVE AND OBJECTIVE BOX
Interval/Overnight Events:  Patient continues to have pleuritic chest pain.  She denies fever or chills.  No productive cough or hemoptysis.  Lower extremity swelling is improving.    FLUIDS/ELECTROLYTES/NUTRITION:  I&O's Summary  ICU Vital Signs Last 24 Hrs  T(C): 36.2 (09 Dec 2024 16:37), Max: 36.2 (09 Dec 2024 08:48)  T(F): 97.1 (09 Dec 2024 16:37), Max: 97.2 (09 Dec 2024 08:48)  HR: 64 (09 Dec 2024 17:41) (55 - 68)  BP: 151/48 (09 Dec 2024 17:39) (121/43 - 159/57)  BP(mean): 72 (09 Dec 2024 17:39) (64 - 76)  RR: 17 (09 Dec 2024 17:41) (13 - 24)  SpO2: 95% (09 Dec 2024 17:39) (91% - 97%)  O2 Parameters below as of 09 Dec 2024 17:39  Patient On (Oxygen Delivery Method): room air    MEDICATIONS  (STANDING):  aMIOdarone    Tablet 200 milliGRAM(s) Oral daily  buPROPion XL (24-Hour) . 150 milliGRAM(s) Oral daily  chlorhexidine 4% Liquid 1 Application(s) Topical <User Schedule>  colchicine 0.6 milliGRAM(s) Oral daily  fluticasone propionate 50 MICROgram(s)/spray Nasal Spray 1 Spray(s) Both Nostrils two times a day  heparin   Injectable 5000 Unit(s) SubCutaneous every 8 hours  ibuprofen  Tablet. 600 milliGRAM(s) Oral every 8 hours  lactobacillus acidophilus 1 Tablet(s) Oral every 12 hours  lamoTRIgine 200 milliGRAM(s) Oral daily  levothyroxine 88 MICROGram(s) Oral daily  metoprolol succinate ER 25 milliGRAM(s) Oral daily  multivitamin 1 Tablet(s) Oral daily  naloxone Injectable 0.4 milliGRAM(s) IV Push once  pantoprazole    Tablet 40 milliGRAM(s) Oral before breakfast  polyethylene glycol 3350 17 Gram(s) Oral daily  predniSONE   Tablet 20 milliGRAM(s) Oral daily  rosuvastatin 20 milliGRAM(s) Oral at bedtime  senna 2 Tablet(s) Oral at bedtime      MEDICATIONS  (PRN):  aluminum hydroxide/magnesium hydroxide/simethicone Suspension 30 milliLiter(s) Oral every 4 hours PRN Dyspepsia  bisacodyl 5 milliGRAM(s) Oral daily PRN Constipation  cyclobenzaprine 10 milliGRAM(s) Oral three times a day PRN Muscle Spasm  guaifenesin/dextromethorphan Oral Liquid 10 milliLiter(s) Oral every 4 hours PRN Cough  hydrocortisone hemorrhoidal Suppository 1 Suppository(s) Rectal two times a day PRN pain  HYDROmorphone   Tablet 2 milliGRAM(s) Oral every 4 hours PRN Severe Pain (7 - 10)  lactulose Syrup 15 Gram(s) Oral daily PRN constipation  loperamide 2 milliGRAM(s) Oral every 2 hours PRN Diarrhea  meclizine 25 milliGRAM(s) Oral two times a day PRN Dizziness  melatonin 3 milliGRAM(s) Oral at bedtime PRN Insomnia  ondansetron Injectable 4 milliGRAM(s) IV Push every 8 hours PRN Nausea and/or Vomiting  oxycodone    5 mG/acetaminophen 325 mG 1 Tablet(s) Oral every 4 hours PRN Moderate Pain (4 - 6)  simethicone 80 milliGRAM(s) Chew every 6 hours PRN Gas                         8.4    17.34 )-----------( 815      ( 09 Dec 2024 05:18 )             28.3     12-09    133[L]  |  105  |  11  ----------------------------<  99  4.2   |  27  |  0.49[L]  Ca    8.7      09 Dec 2024 05:18  TPro  5.7[L]  /  Alb  2.0[L]  /  TBili  0.3  /  DBili  x   /  AST  15  /  ALT  14  /  AlkPhos  93  12-08      Urinalysis Basic - ( 09 Dec 2024 05:18 )    PHYSICAL EXAM:  General:	In no acute distress  Respiratory:	Bilateral basal crepitations and pleural rub.  Decreased air entry at bases  CV:		Regular rate and rhythm. Normal S1/S2. No murmurs, rubs, or   .		gallop. Capillary refill < 2 seconds. Distal pulses 2+ and equal.  Abdomen:	Soft, non-distended. Bowel sounds present.Small induration secondary to Lovenox injections  Skin:		No rash.  Extremities:	Warm and well perfused. No gross extremity deformities.  Neurologic:	Alert and oriented.     IMAGING STUDIES:  < from: Xray Chest 1 View- PORTABLE-Urgent (Xray Chest 1 View- PORTABLE-Urgent .) (12.06.24 @ 13:03) >   Interval removal of bilateral chest tubes. No pneumothorax.   Probable small loculated left pleural effusion and trace right pleural   effusion and/or right basilar atelectasis on the final image.    < end of copied text >  < from: CT Chest No Cont (11.30.24 @ 20:34) >  Moderately large simple pericardial effusion. Small to moderate right and   small left pleural effusions with associatedcompressive atelectasis.   Superimposed pneumonia should be excluded on a clinical basis.    < end of copied text >  < from: TTE Limited W or WO Ultrasound Enhancing Agent (12.05.24 @ 09:01) >  1. Left ventricularsystolic function is normal with an ejection fraction of 69 % by 3D.   2. A small fibrinous pericardial effusion localized to the inferolateral pericardium vs inflammed pericardium is noted.    < end of copied text >  < from: TTE W or WO Ultrasound Enhancing Agent (12.01.24 @ 07:58) >  1. Left ventricular systolic function is normal with an ejection fraction of 67 % by Jeffers's method of disks.   2. Large left pleural effusion noted.   3. Large pericardial effusion with evidence of hemodynamic compromise (or echocardiographic evidence of cardiac tamponade).   4. The left ventricular diastolic function is indeterminate.   5. Normal right ventricular cavity size and normal right ventricular systolic function.   6. Left atrium is moderately dilated.   7. The right atrium is normal in size.   8. Mild pulmonic regurgitation.   9. Trace tricuspid regurgitation.  10. The inferior vena cava is dilated measuring 2.50 cm in diameter, (dilated >2.1cm) with abnormal inspiratory collapse (abnormal <50%) consistent with elevated right atrial pressure (~15, range 10-20mmHg).  11. Mild left ventricular hypertrophy.  12. Trileaflet aortic valve with normal systolic excursion.  13. Structurally normal mitral valve with normal leaflet excursion.      < end of copied text >

## 2024-12-09 NOTE — PROGRESS NOTE ADULT - SUBJECTIVE AND OBJECTIVE BOX
HPI:  REASON FOR VISIT: Pericardial effusion, PAF    HPI:   Anne-Marie Corona is a 64-year-old woman with a history of right ventricular outflow tract ventricular tachycardia s/p remote ablation (; "partially successful" with subsequent PVCs treated with verapamil), hypertension, hypothyroidism, ischemic colitis, von Willebrand's disease, essential thrombocytosis, obesity s/p gastric bypass, hypertension, DVT/PE, and hospitalization in 2023 for respiratory failure, who has been ill since late October.  She was admitted to [facility] twice with respiratory failure, bacterial pneumonia, acute kidney injury, pericarditis, and pericardial effusion.  She was discharged on  but felt poor on  and came to the [facility] ED. She was found to have a large pericardial effusion, pleural effusion.     : some improvement in symptoms overnight.  12/3/24  Patient is complaining of having sharp chest pain left sided chest , increase motion ,deep breathing ,  started last night in addition to prior chest pain , patient had bilateral CT placed , and pericardial drain in place ,   patient was treated for pleuropericarditis [facility]   24: Pt feels improved. Right sided atypical chest pain.   24: Pleuritic chest pressure.  Overall, feeling "a little better."  24: s/p drain removal with persistent pleuritic chest pain   : still w/ dyspnea but slowly improving  : ambulated around unit slowly still w/ some discomfort w/ inspiration.  : sleeping, no problems overnight.    MEDICATIONS:  OUTPATIENT  Home Medications:  acetaminophen 325 mg oral tablet: 2 tab(s) orally every 6 hours as needed for (2024 23:15)  amiodarone 200 mg oral tablet: 2 tab(s) orally 2 times a day ***until 24.... then 400mg QD*** (2024 23:15)  amLODIPine 5 mg oral tablet: 1 tab(s) orally once a day (2024 23:15)  Anumed-HC 25 mg rectal suppository: 1 suppository(ies) rectally 2 times a day as needed for (2024 23:15)  apixaban 5 mg oral tablet: 1 tab(s) orally every 12 hours (:15)  azelastine 137 mcg/inh (0.1%) nasal spray: 1 spray(s) in each nostril 2 times a day (:15)  colchicine 0.6 mg oral tablet: 0.5 tab(s) orally once a day (:15)  cyclobenzaprine 10 mg oral tablet: 1 tab(s) orally 3 times a day as needed for (03 Dec 2024 13:48)  enalapril 20 mg oral tablet: 1 tab(s) orally once a day (in the morning) (:15)  fluticasone 50 mcg/inh nasal spray: 2 spray(s) in each nostril once a day as needed for (2024 23:15)  hydroCHLOROthiazide 12.5 mg oral tablet: 1 tab(s) orally 2 times a day as needed for (:15)  HYDROmorphone 2 mg oral tablet: 1 tab(s) orally every 6 hours as needed for (:15)  ibuprofen 200 mg oral capsule: 2 cap(s) orally every 8 hours as needed for (:15)  lamoTRIgine 200 mg oral tablet: 1 tab(s) orally once a day (03 Dec 2024 13:48)  levothyroxine 88 mcg (0.088 mg) oral tablet: 1 tab(s) orally once a day (03 Dec 2024 13:48)  linaclotide 72 mcg oral capsule: 1 cap(s) orally once a day as needed for (03 Dec 2024 13:48)  liraglutide 18 mg/3 mL subcutaneous solution: 0.6 milligram(s) subcutaneously once a day as needed for (03 Dec 2024 13:48)  loperamide 2 mg oral capsule: 2 cap(s) orally every 2 hours as needed for (:15)  meclizine 25 mg oral tablet: 1 tab(s) orally 2 times a day as needed for (03 Dec 2024 13:48)  Metoprolol Succinate ER 25 mg oral tablet, extended release: 1 tab(s) orally once a day (03 Dec 2024 13:48)  Multiple Vitamins oral tablet: 1 tab(s) orally once a day (03 Dec 2024 13:48)  ondansetron 4 mg oral tablet, disintegratin tab(s) orally 3 times a day as needed for (03 Dec 2024 13:48)  pantoprazole 40 mg oral delayed release tablet: 1 tab(s) orally once a day (2024 23:15)  rosuvastatin 20 mg oral tablet: 1 tab(s) orally once a day (03 Dec 2024 13:48)  senna (sennosides) 8.6 mg oral tablet: 2 tab(s) orally once a day (at bedtime) as needed for (2024 23:15)  Wellbutrin  mg/24 hours oral tablet, extended release: 1 tab(s) orally once a day (03 Dec 2024 13:48)      INPATIENT  MEDICATIONS  (STANDING):  aMIOdarone    Tablet 200 milliGRAM(s) Oral daily  buPROPion XL (24-Hour) . 150 milliGRAM(s) Oral daily  chlorhexidine 4% Liquid 1 Application(s) Topical <User Schedule>  colchicine 0.6 milliGRAM(s) Oral daily  fluticasone propionate 50 MICROgram(s)/spray Nasal Spray 1 Spray(s) Both Nostrils two times a day  heparin   Injectable 5000 Unit(s) SubCutaneous every 8 hours  ibuprofen  Tablet. 600 milliGRAM(s) Oral every 8 hours  lactobacillus acidophilus 1 Tablet(s) Oral every 12 hours  lamoTRIgine 200 milliGRAM(s) Oral daily  levothyroxine 88 MICROGram(s) Oral daily  metoprolol succinate ER 25 milliGRAM(s) Oral daily  multivitamin 1 Tablet(s) Oral daily  naloxone Injectable 0.4 milliGRAM(s) IV Push once  pantoprazole    Tablet 40 milliGRAM(s) Oral before breakfast  polyethylene glycol 3350 17 Gram(s) Oral daily  predniSONE   Tablet 20 milliGRAM(s) Oral daily  rosuvastatin 20 milliGRAM(s) Oral at bedtime  senna 2 Tablet(s) Oral at bedtime    MEDICATIONS  (PRN):  aluminum hydroxide/magnesium hydroxide/simethicone Suspension 30 milliLiter(s) Oral every 4 hours PRN Dyspepsia  bisacodyl 5 milliGRAM(s) Oral daily PRN Constipation  cyclobenzaprine 10 milliGRAM(s) Oral three times a day PRN Muscle Spasm  guaifenesin/dextromethorphan Oral Liquid 10 milliLiter(s) Oral every 4 hours PRN Cough  hydrocortisone hemorrhoidal Suppository 1 Suppository(s) Rectal two times a day PRN pain  HYDROmorphone   Tablet 2 milliGRAM(s) Oral every 4 hours PRN Severe Pain (7 - 10)  lactulose Syrup 15 Gram(s) Oral daily PRN constipation  loperamide 2 milliGRAM(s) Oral every 2 hours PRN Diarrhea  meclizine 25 milliGRAM(s) Oral two times a day PRN Dizziness  melatonin 3 milliGRAM(s) Oral at bedtime PRN Insomnia  ondansetron Injectable 4 milliGRAM(s) IV Push every 8 hours PRN Nausea and/or Vomiting  oxycodone    5 mG/acetaminophen 325 mG 1 Tablet(s) Oral every 4 hours PRN Moderate Pain (4 - 6)  simethicone 80 milliGRAM(s) Chew every 6 hours PRN Gas          Vital Signs Last 24 Hrs  T(C): 36.2 (09 Dec 2024 16:37), Max: 36.2 (09 Dec 2024 08:48)  T(F): 97.1 (09 Dec 2024 16:37), Max: 97.2 (09 Dec 2024 08:48)  HR: 64 (09 Dec 2024 17:41) (55 - 68)  BP: 151/48 (09 Dec 2024 17:39) (119/49 - 159/57)  BP(mean): 72 (09 Dec 2024 17:39) (64 - 76)  RR: 17 (09 Dec 2024 17:41) (13 - 24)  SpO2: 95% (09 Dec 2024 17:39) (91% - 97%)    Parameters below as of 09 Dec 2024 17:39  Patient On (Oxygen Delivery Method): room air    Daily     Daily Weight in k.2 (09 Dec 2024 04:05)I&O's Summary      I&O's Detail      I&O's Summary      PHYSICAL EXAM:    Constitutional: NAD, awake and alert, well-developed  HEENT: PERR, EOMI,  No oral cyanosis.  Neck:  supple,  No JVD  Respiratory: Breath sounds are clear bilaterally, No wheezing, rales or rhonchi  Cardiovascular: S1 and S2, regular rate and rhythm, no Murmurs, gallops or rubs  Gastrointestinal: Bowel Sounds present, soft, nontender.   Extremities: No peripheral edema. No clubbing or cyanosis.  Vascular: 2+ peripheral pulses  Neurological: A/O x 3, no focal deficits  Musculoskeletal: no calf tenderness.  Skin: No rashes.        ===============================  ===============================  LABS:                         8.4    17.34 )-----------( 815      ( 09 Dec 2024 05:18 )             28.3                         9.2    19.95 )-----------( 876      ( 08 Dec 2024 06:14 )             31.1     09 Dec 2024 05:18    133    |  105    |  11     ----------------------------<  99     4.2     |  27     |  0.49   08 Dec 2024 06:14    133    |  102    |  10     ----------------------------<  100    4.3     |  26     |  0.48     Ca    8.7        09 Dec 2024 05:18  Ca    8.8        08 Dec 2024 06:14    TPro  5.7    /  Alb  2.0    /  TBili  0.3    /  DBili  x      /  AST  15     /  ALT  14     /  AlkPhos  93     08 Dec 2024 06:14      ===============================  ===============================  CARDIAC BIOMARKERS:  BNP      TROPONIN  Troponin I, High Sensitivity Result: 17.62 ng/L (24 @ 16:37)  Troponin I, High Sensitivity Result: 86.3 ng/L *H* (10-06-21 @ 19:08)  Troponin I, High Sensitivity Result: 130.6 ng/L *H* (10-06-21 @ 14:12)  Troponin I, High Sensitivity Result: 142.2 ng/L *H* (10-06-21 @ 11:35)  Troponin I, High Sensitivity Result: 93.1 ng/L *H* (10-06-21 @ 08:41)  Troponin I, High Sensitivity Result: 61.6 ng/L *H* (10-06-21 @ 05:46)  Troponin I, Serum: <0.015 ng/mL [0.015 - 0.045] (20 @ 22:01)  ===============================  ===============================  EKG:  < from: 12 Lead ECG (24 @ 09:29) >  Diagnosis Line Normal sinus rhythm  Nonspecific T wave abnormality  Abnormal ECG  When compared with ECG of 03-DEC-2024 02:09,  Nonspecific T wave abnormality, worse in Inferior leads  Confirmed by MD OPAL, CHARLIE ([id number]) on 2024 5:27:16 PM  =========================================================  =========================================================  Transthoracic echocardiogram with 2-D, M-mode and complete  Study Date:   ******* 2024  CONCLUSIONS:      1. Left ventricular systolic function is normal with an ejection fraction of 67 % by Jeffers's method of disks.   2. Large left pleural effusion noted.   3. Large pericardial effusion with evidence of hemodynamic compromise (or echocardiographic evidence of cardiac tamponade).   4. The left ventricular diastolic function is indeterminate.   5. Normal right ventricular cavity size and normal right ventricular systolic function.   6. Left atrium is moderately dilated.   7. The right atrium is normal in size.   8. Mild pulmonic regurgitation.   9. Trace tricuspid regurgitation.  10. The inferior vena cava is dilated measuring 2.50 cm in diameter, (dilated >2.1cm)   with abnormal inspiratory collapse (abnormal <50%) consistent with elevated right atrial pressure (~15, range 10-20mmHg).  11. Mild left ventricular hypertrophy.  12. Trileaflet aortic valve with normal systolic excursion.  13. Structurally normal mitral valve with normal leaflet excursion.   =========================================================  =========================================================  TTE Limited W or WO Ultrasound Enhancing Agent (*******24 @ 10:06) >   1. Moderate pericardial effusion noted adjacent to the posterior left ventricle   with no evidence of hemodynamic compromise (or echocardiographic evidence of cardiac tamponade).   2. Left ventricular systolic function is hyperdynamic with an ejection fraction   visually estimated at 65 to 70 %.   3. Normal right ventricular cavity size and normal right ventricular systolic function.   4. Moderate left pleural effusion noted.   5. Compared to the transthoracic echocardiogram performed on 2024,   decreased pericardial effusion than before.  =========================================================  =========================================================  < from: TTE Limited W or WO Ultrasound Enhancing Agent (24 @ 09:01) >  Study Date:    2024  Limited transthoracic echocardiogram.    CONCLUSIONS:   1. Left ventricular systolic function is normal with an ejection fraction of 69 % by 3D.   2. A small fibrinous pericardial effusion localized to the inferolateral pericardium vs   inflammed pericardium is noted.  =========================================================  =========================================================  CT Chest No Cont (11.30.24 @ 20:34):  Moderately large simple pericardial effusion. Small to moderate right   and small left pleural effusions with associatedcompressive atelectasis.    Superimposed pneumonia should be excluded on a clinical basis.  =========================================================  =========================================================

## 2024-12-09 NOTE — PROGRESS NOTE ADULT - ASSESSMENT
64 years old female with history of right ventricular outflow tract obstruction, Von Willebrand's disease, essential thrombocytosis recent hospitalization for acute renal failure, acute pericarditis and pneumonia at [place]. Readmitted a day after discharge to [facility].  Found to have pericardial effusion with tamponade physiology and bilateral pleural effusion.  Status post pericardial drain and drainage of pleural effusions  Hemorrhagic pericardial effusion, exudative pleural effusion with a high LDH around 800  Workup at [facility]-showed some inflammatory markers according to patient rheumatoid factor was positive.  No prior history of rheumatologic disease.  Patient does have history of essential thrombocythemia-and has taken hydroxyurea in the past for last few months she has not taken pegylated interferon.     Problems.   Recent Pericarditis-Pericardial effusion  Bilateral pleural effusion-Compressive atelectasis  AFIB  HFpEF  Anemia with iron deficiency    Of note interferons-have been linked with case reports of pericarditis, pleuritis as well as drug-induced lupus  Pericardial fluid cytology does not show malignant cells but shows lymphocytic predominance, pleural fluid cytology acute neutrophilic infiltrate.  Patient is also receiving amiodarone at a low dose and has received pegylated interferon's in the recent past        Recommendations:  Patient continues to have pleuritic chest pain with pleural rub . She has received steroid in recent past with some improvement-would recommend a short course of low-dose oral steroids in addition to other treatments of pericarditis . Low-dose of oral steroids were started given history of some intolerance to steroids like irritability.  Patient does have increased inflammatory markers increased CRP and exudative serositis.    Continue follow-up with rheumatology     incentive spirometry to improve atelectasis    Goal oxygen saturation more than 90%    Continue follow-up with hematology for anemia, platelet dysfunction, ET     Agree with DVT prophylaxis

## 2024-12-09 NOTE — PROGRESS NOTE ADULT - SUBJECTIVE AND OBJECTIVE BOX
OVERNIGHT EVENTS / SUBJECTIVE: Patient seen and examined at bedside.     OBJECTIVE:    VITAL SIGNS:  ICU Vital Signs Last 24 Hrs  T(C): 35.7 (09 Dec 2024 04:05), Max: 36.6 (08 Dec 2024 16:03)  T(F): 96.3 (09 Dec 2024 04:05), Max: 97.9 (08 Dec 2024 16:03)  HR: 55 (09 Dec 2024 07:00) (55 - 70)  BP: 137/51 (09 Dec 2024 07:00) (119/49 - 150/49)  BP(mean): 75 (09 Dec 2024 07:00) (66 - 76)  ABP: --  ABP(mean): --  RR: 17 (09 Dec 2024 07:00) (13 - 24)  SpO2: 93% (09 Dec 2024 07:00) (91% - 97%)    O2 Parameters below as of 08 Dec 2024 16:03  Patient On (Oxygen Delivery Method): nasal cannula  O2 Flow (L/min): 2            CAPILLARY BLOOD GLUCOSE          PHYSICAL EXAM:    General: NAD  HEENT: NC/AT; PERRL, clear conjunctiva  Neck: supple  Respiratory: CTA b/l  Cardiovascular: +S1/S2; RRR  Abdomen: soft, NT/ND; +BS x4  Extremities: WWP, 2+ peripheral pulses b/l; no LE edema  Skin: normal color and turgor; no rash  Neurological:    MEDICATIONS:  MEDICATIONS  (STANDING):  aMIOdarone    Tablet 200 milliGRAM(s) Oral daily  buPROPion XL (24-Hour) . 150 milliGRAM(s) Oral daily  chlorhexidine 4% Liquid 1 Application(s) Topical <User Schedule>  colchicine 0.3 milliGRAM(s) Oral daily  fluticasone propionate 50 MICROgram(s)/spray Nasal Spray 1 Spray(s) Both Nostrils two times a day  heparin   Injectable 5000 Unit(s) SubCutaneous every 8 hours  ibuprofen  Tablet. 600 milliGRAM(s) Oral every 8 hours  lactobacillus acidophilus 1 Tablet(s) Oral every 12 hours  lamoTRIgine 200 milliGRAM(s) Oral daily  levothyroxine 88 MICROGram(s) Oral daily  lidocaine   4% Patch 1 Patch Transdermal daily  lidocaine   4% Patch 1 Patch Transdermal daily  metoprolol succinate ER 25 milliGRAM(s) Oral daily  multivitamin 1 Tablet(s) Oral daily  naloxone Injectable 0.4 milliGRAM(s) IV Push once  pantoprazole    Tablet 40 milliGRAM(s) Oral before breakfast  polyethylene glycol 3350 17 Gram(s) Oral daily  rosuvastatin 20 milliGRAM(s) Oral at bedtime  senna 2 Tablet(s) Oral at bedtime    MEDICATIONS  (PRN):  aluminum hydroxide/magnesium hydroxide/simethicone Suspension 30 milliLiter(s) Oral every 4 hours PRN Dyspepsia  bisacodyl 5 milliGRAM(s) Oral daily PRN Constipation  cyclobenzaprine 10 milliGRAM(s) Oral three times a day PRN Muscle Spasm  guaifenesin/dextromethorphan Oral Liquid 10 milliLiter(s) Oral every 4 hours PRN Cough  hydrocortisone hemorrhoidal Suppository 1 Suppository(s) Rectal two times a day PRN pain  HYDROmorphone  Injectable 0.4 milliGRAM(s) IV Push every 4 hours PRN Severe Pain (7 - 10)  lactulose Syrup 15 Gram(s) Oral daily PRN constipation  loperamide 2 milliGRAM(s) Oral every 2 hours PRN Diarrhea  meclizine 25 milliGRAM(s) Oral two times a day PRN Dizziness  melatonin 3 milliGRAM(s) Oral at bedtime PRN Insomnia  ondansetron Injectable 4 milliGRAM(s) IV Push every 8 hours PRN Nausea and/or Vomiting  oxycodone    5 mG/acetaminophen 325 mG 1 Tablet(s) Oral every 4 hours PRN Moderate Pain (4 - 6)  simethicone 80 milliGRAM(s) Chew every 6 hours PRN Gas      ALLERGIES:  Allergies    sulfa drugs (Vomiting; Nausea)  IV Contrast (Hives)    Intolerances        LABS:                        8.4    17.34 )-----------( 815      ( 09 Dec 2024 05:18 )             28.3     Hemoglobin: 8.4 g/dL (12-09 @ 05:18)  Hemoglobin: 9.2 g/dL (12-08 @ 06:14)  Hemoglobin: 8.7 g/dL (12-06 @ 05:10)  Hemoglobin: 8.5 g/dL (12-05 @ 05:48)    CBC Full  -  ( 09 Dec 2024 05:18 )  WBC Count : 17.34 K/uL  RBC Count : 3.42 M/uL  Hemoglobin : 8.4 g/dL  Hematocrit : 28.3 %  Platelet Count - Automated : 815 K/uL  Mean Cell Volume : 82.7 fl  Mean Cell Hemoglobin : 24.6 pg  Mean Cell Hemoglobin Concentration : 29.7 g/dL  Auto Neutrophil # : x  Auto Lymphocyte # : x  Auto Monocyte # : x  Auto Eosinophil # : x  Auto Basophil # : x  Auto Neutrophil % : x  Auto Lymphocyte % : x  Auto Monocyte % : x  Auto Eosinophil % : x  Auto Basophil % : x    12-09    133[L]  |  105  |  11  ----------------------------<  99  4.2   |  27  |  0.49[L]    Ca    8.7      09 Dec 2024 05:18    TPro  5.7[L]  /  Alb  2.0[L]  /  TBili  0.3  /  DBili  x   /  AST  15  /  ALT  14  /  AlkPhos  93  12-08    Creatinine Trend: 0.49<--, 0.48<--, 0.47<--, 0.44<--, 0.69<--, 0.83<--  LIVER FUNCTIONS - ( 08 Dec 2024 06:14 )  Alb: 2.0 g/dL / Pro: 5.7 gm/dL / ALK PHOS: 93 U/L / ALT: 14 U/L / AST: 15 U/L / GGT: x               hs Troponin:            Urinalysis Basic - ( 09 Dec 2024 05:18 )    Color: x / Appearance: x / SG: x / pH: x  Gluc: 99 mg/dL / Ketone: x  / Bili: x / Urobili: x   Blood: x / Protein: x / Nitrite: x   Leuk Esterase: x / RBC: x / WBC x   Sq Epi: x / Non Sq Epi: x / Bacteria: x      CSF:                      EKG:   MICROBIOLOGY:    IMAGING:      Labs, imaging, EKG personally reviewed    RADIOLOGY & ADDITIONAL TESTS: Reviewed. OVERNIGHT EVENTS / SUBJECTIVE: Patient seen and examined at bedside.     No acute events overnight. Pt requiring IV dilaudid x3 overnight for pain control. Today pt c/o persistent pain epigastric area worse with deep breath. On room air, ambulating short distances with PT.     OBJECTIVE:    VITAL SIGNS:  ICU Vital Signs Last 24 Hrs  T(C): 35.7 (09 Dec 2024 04:05), Max: 36.6 (08 Dec 2024 16:03)  T(F): 96.3 (09 Dec 2024 04:05), Max: 97.9 (08 Dec 2024 16:03)  HR: 55 (09 Dec 2024 07:00) (55 - 70)  BP: 137/51 (09 Dec 2024 07:00) (119/49 - 150/49)  BP(mean): 75 (09 Dec 2024 07:00) (66 - 76)  ABP: --  ABP(mean): --  RR: 17 (09 Dec 2024 07:00) (13 - 24)  SpO2: 93% (09 Dec 2024 07:00) (91% - 97%)    O2 Parameters below as of 08 Dec 2024 16:03  Patient On (Oxygen Delivery Method): nasal cannula  O2 Flow (L/min): 2            CAPILLARY BLOOD GLUCOSE          PHYSICAL EXAM:    General: NAD, appears uncomfortable due to pain  HEENT: NC/AT; clear conjunctiva  Neck: supple  Respiratory: CTA b/l  Cardiovascular: +S1/S2; RRR  Abdomen: soft, nontender, nondistended  Extremities: Warm, no LE edema  Skin: normal color and turgor; no rash  Neurological: A&Ox3, no focal deficits    MEDICATIONS:  MEDICATIONS  (STANDING):  aMIOdarone    Tablet 200 milliGRAM(s) Oral daily  buPROPion XL (24-Hour) . 150 milliGRAM(s) Oral daily  chlorhexidine 4% Liquid 1 Application(s) Topical <User Schedule>  colchicine 0.3 milliGRAM(s) Oral daily  fluticasone propionate 50 MICROgram(s)/spray Nasal Spray 1 Spray(s) Both Nostrils two times a day  heparin   Injectable 5000 Unit(s) SubCutaneous every 8 hours  ibuprofen  Tablet. 600 milliGRAM(s) Oral every 8 hours  lactobacillus acidophilus 1 Tablet(s) Oral every 12 hours  lamoTRIgine 200 milliGRAM(s) Oral daily  levothyroxine 88 MICROGram(s) Oral daily  lidocaine   4% Patch 1 Patch Transdermal daily  lidocaine   4% Patch 1 Patch Transdermal daily  metoprolol succinate ER 25 milliGRAM(s) Oral daily  multivitamin 1 Tablet(s) Oral daily  naloxone Injectable 0.4 milliGRAM(s) IV Push once  pantoprazole    Tablet 40 milliGRAM(s) Oral before breakfast  polyethylene glycol 3350 17 Gram(s) Oral daily  rosuvastatin 20 milliGRAM(s) Oral at bedtime  senna 2 Tablet(s) Oral at bedtime    MEDICATIONS  (PRN):  aluminum hydroxide/magnesium hydroxide/simethicone Suspension 30 milliLiter(s) Oral every 4 hours PRN Dyspepsia  bisacodyl 5 milliGRAM(s) Oral daily PRN Constipation  cyclobenzaprine 10 milliGRAM(s) Oral three times a day PRN Muscle Spasm  guaifenesin/dextromethorphan Oral Liquid 10 milliLiter(s) Oral every 4 hours PRN Cough  hydrocortisone hemorrhoidal Suppository 1 Suppository(s) Rectal two times a day PRN pain  HYDROmorphone  Injectable 0.4 milliGRAM(s) IV Push every 4 hours PRN Severe Pain (7 - 10)  lactulose Syrup 15 Gram(s) Oral daily PRN constipation  loperamide 2 milliGRAM(s) Oral every 2 hours PRN Diarrhea  meclizine 25 milliGRAM(s) Oral two times a day PRN Dizziness  melatonin 3 milliGRAM(s) Oral at bedtime PRN Insomnia  ondansetron Injectable 4 milliGRAM(s) IV Push every 8 hours PRN Nausea and/or Vomiting  oxycodone    5 mG/acetaminophen 325 mG 1 Tablet(s) Oral every 4 hours PRN Moderate Pain (4 - 6)  simethicone 80 milliGRAM(s) Chew every 6 hours PRN Gas      ALLERGIES:  Allergies    sulfa drugs (Vomiting; Nausea)  IV Contrast (Hives)    Intolerances        LABS:                        8.4    17.34 )-----------( 815      ( 09 Dec 2024 05:18 )             28.3     Hemoglobin: 8.4 g/dL (12-09 @ 05:18)  Hemoglobin: 9.2 g/dL (12-08 @ 06:14)  Hemoglobin: 8.7 g/dL (12-06 @ 05:10)  Hemoglobin: 8.5 g/dL (12-05 @ 05:48)    CBC Full  -  ( 09 Dec 2024 05:18 )  WBC Count : 17.34 K/uL  RBC Count : 3.42 M/uL  Hemoglobin : 8.4 g/dL  Hematocrit : 28.3 %  Platelet Count - Automated : 815 K/uL  Mean Cell Volume : 82.7 fl  Mean Cell Hemoglobin : 24.6 pg  Mean Cell Hemoglobin Concentration : 29.7 g/dL  Auto Neutrophil # : x  Auto Lymphocyte # : x  Auto Monocyte # : x  Auto Eosinophil # : x  Auto Basophil # : x  Auto Neutrophil % : x  Auto Lymphocyte % : x  Auto Monocyte % : x  Auto Eosinophil % : x  Auto Basophil % : x    12-09    133[L]  |  105  |  11  ----------------------------<  99  4.2   |  27  |  0.49[L]    Ca    8.7      09 Dec 2024 05:18    TPro  5.7[L]  /  Alb  2.0[L]  /  TBili  0.3  /  DBili  x   /  AST  15  /  ALT  14  /  AlkPhos  93  12-08    Creatinine Trend: 0.49<--, 0.48<--, 0.47<--, 0.44<--, 0.69<--, 0.83<--  LIVER FUNCTIONS - ( 08 Dec 2024 06:14 )  Alb: 2.0 g/dL / Pro: 5.7 gm/dL / ALK PHOS: 93 U/L / ALT: 14 U/L / AST: 15 U/L / GGT: x               hs Troponin:            Urinalysis Basic - ( 09 Dec 2024 05:18 )    Color: x / Appearance: x / SG: x / pH: x  Gluc: 99 mg/dL / Ketone: x  / Bili: x / Urobili: x   Blood: x / Protein: x / Nitrite: x   Leuk Esterase: x / RBC: x / WBC x   Sq Epi: x / Non Sq Epi: x / Bacteria: x      CSF:                      EKG:   MICROBIOLOGY:    IMAGING:      Labs, imaging, EKG personally reviewed    RADIOLOGY & ADDITIONAL TESTS: Reviewed.

## 2024-12-10 LAB
ALBUMIN SERPL ELPH-MCNC: 2.1 G/DL — LOW (ref 3.3–5)
ALP SERPL-CCNC: 91 U/L — SIGNIFICANT CHANGE UP (ref 40–120)
ALT FLD-CCNC: 15 U/L — SIGNIFICANT CHANGE UP (ref 12–78)
ANISOCYTOSIS BLD QL: SIGNIFICANT CHANGE UP
APTT BLD: 32.2 SEC — SIGNIFICANT CHANGE UP (ref 24.5–35.6)
AST SERPL-CCNC: 16 U/L — SIGNIFICANT CHANGE UP (ref 15–37)
BASOPHILS # BLD AUTO: 0 K/UL — SIGNIFICANT CHANGE UP (ref 0–0.2)
BASOPHILS NFR BLD AUTO: 0 % — SIGNIFICANT CHANGE UP (ref 0–2)
BILIRUB SERPL-MCNC: 0.2 MG/DL — SIGNIFICANT CHANGE UP (ref 0.2–1.2)
BUN SERPL-MCNC: 10 MG/DL — SIGNIFICANT CHANGE UP (ref 7–23)
CALCIUM SERPL-MCNC: 9.4 MG/DL — SIGNIFICANT CHANGE UP (ref 8.5–10.1)
CHLORIDE SERPL-SCNC: 104 MMOL/L — SIGNIFICANT CHANGE UP (ref 96–108)
CO2 SERPL-SCNC: 28 MMOL/L — SIGNIFICANT CHANGE UP (ref 22–31)
CREAT SERPL-MCNC: 0.53 MG/DL — SIGNIFICANT CHANGE UP (ref 0.5–1.3)
DSDNA AB SER-ACNC: <1 IU/ML — SIGNIFICANT CHANGE UP
EGFR: 104 ML/MIN/1.73M2 — SIGNIFICANT CHANGE UP
EOSINOPHIL # BLD AUTO: 0 K/UL — SIGNIFICANT CHANGE UP (ref 0–0.5)
EOSINOPHIL NFR BLD AUTO: 0 % — SIGNIFICANT CHANGE UP (ref 0–6)
GIANT PLATELETS BLD QL SMEAR: PRESENT — SIGNIFICANT CHANGE UP
GLUCOSE SERPL-MCNC: 129 MG/DL — HIGH (ref 70–99)
HCT VFR BLD CALC: 29.2 % — LOW (ref 34.5–45)
HGB BLD-MCNC: 8.7 G/DL — LOW (ref 11.5–15.5)
HYPOCHROMIA BLD QL: SIGNIFICANT CHANGE UP
INR BLD: 1.15 RATIO — SIGNIFICANT CHANGE UP (ref 0.85–1.16)
LYMPHOCYTES # BLD AUTO: 1.79 K/UL — SIGNIFICANT CHANGE UP (ref 1–3.3)
LYMPHOCYTES # BLD AUTO: 8 % — LOW (ref 13–44)
MACROCYTES BLD QL: SIGNIFICANT CHANGE UP
MAGNESIUM SERPL-MCNC: 2 MG/DL — SIGNIFICANT CHANGE UP (ref 1.6–2.6)
MANUAL SMEAR VERIFICATION: SIGNIFICANT CHANGE UP
MCHC RBC-ENTMCNC: 24.6 PG — LOW (ref 27–34)
MCHC RBC-ENTMCNC: 29.8 G/DL — LOW (ref 32–36)
MCV RBC AUTO: 82.7 FL — SIGNIFICANT CHANGE UP (ref 80–100)
METAMYELOCYTES # FLD: 3 % — HIGH (ref 0–0)
MICROCYTES BLD QL: SIGNIFICANT CHANGE UP
MONOCYTES # BLD AUTO: 0.22 K/UL — SIGNIFICANT CHANGE UP (ref 0–0.9)
MONOCYTES NFR BLD AUTO: 1 % — LOW (ref 2–14)
MYELOCYTES NFR BLD: 3 % — HIGH (ref 0–0)
NEUTROPHILS # BLD AUTO: 19.03 K/UL — HIGH (ref 1.8–7.4)
NEUTROPHILS NFR BLD AUTO: 76 % — SIGNIFICANT CHANGE UP (ref 43–77)
NEUTS BAND # BLD: 9 % — HIGH (ref 0–8)
NRBC # BLD: 0 /100 WBCS — SIGNIFICANT CHANGE UP (ref 0–0)
NRBC # BLD: SIGNIFICANT CHANGE UP /100 WBCS (ref 0–0)
OVALOCYTES BLD QL SMEAR: SIGNIFICANT CHANGE UP
PHOSPHATE SERPL-MCNC: 3.6 MG/DL — SIGNIFICANT CHANGE UP (ref 2.5–4.5)
PLAT MORPH BLD: ABNORMAL
PLATELET # BLD AUTO: 953 K/UL — HIGH (ref 150–400)
PLATELET COUNT - ESTIMATE: ABNORMAL
POLYCHROMASIA BLD QL SMEAR: SLIGHT — SIGNIFICANT CHANGE UP
POTASSIUM SERPL-MCNC: 5 MMOL/L — SIGNIFICANT CHANGE UP (ref 3.5–5.3)
POTASSIUM SERPL-SCNC: 5 MMOL/L — SIGNIFICANT CHANGE UP (ref 3.5–5.3)
PROT SERPL-MCNC: 6 GM/DL — SIGNIFICANT CHANGE UP (ref 6–8.3)
PROTHROM AB SERPL-ACNC: 13.5 SEC — HIGH (ref 9.9–13.4)
RBC # BLD: 3.53 M/UL — LOW (ref 3.8–5.2)
RBC # FLD: 19.7 % — HIGH (ref 10.3–14.5)
RBC BLD AUTO: ABNORMAL
RHEUMATOID FACT SERPL-ACNC: 10 IU/ML — SIGNIFICANT CHANGE UP (ref 0–13)
SODIUM SERPL-SCNC: 131 MMOL/L — LOW (ref 135–145)
STOMATOCYTES BLD QL SMEAR: SIGNIFICANT CHANGE UP
TARGETS BLD QL SMEAR: SLIGHT — SIGNIFICANT CHANGE UP
WBC # BLD: 22.39 K/UL — HIGH (ref 3.8–10.5)
WBC # FLD AUTO: 22.39 K/UL — HIGH (ref 3.8–10.5)

## 2024-12-10 PROCEDURE — 99233 SBSQ HOSP IP/OBS HIGH 50: CPT

## 2024-12-10 PROCEDURE — 99231 SBSQ HOSP IP/OBS SF/LOW 25: CPT

## 2024-12-10 PROCEDURE — 99222 1ST HOSP IP/OBS MODERATE 55: CPT

## 2024-12-10 PROCEDURE — 71045 X-RAY EXAM CHEST 1 VIEW: CPT | Mod: 26

## 2024-12-10 RX ORDER — HYDROMORPHONE HYDROCHLORIDE 2 MG/1
4 TABLET ORAL EVERY 4 HOURS
Refills: 0 | Status: DISCONTINUED | OUTPATIENT
Start: 2024-12-10 | End: 2024-12-12

## 2024-12-10 RX ORDER — HYDROMORPHONE HYDROCHLORIDE 2 MG/1
2 TABLET ORAL ONCE
Refills: 0 | Status: DISCONTINUED | OUTPATIENT
Start: 2024-12-10 | End: 2024-12-10

## 2024-12-10 RX ORDER — ACETAMINOPHEN 500MG 500 MG/1
975 TABLET, COATED ORAL ONCE
Refills: 0 | Status: COMPLETED | OUTPATIENT
Start: 2024-12-10 | End: 2024-12-10

## 2024-12-10 RX ADMIN — Medication 600 MILLIGRAM(S): at 21:21

## 2024-12-10 RX ADMIN — LAMOTRIGINE 200 MILLIGRAM(S): 50 TABLET, EXTENDED RELEASE ORAL at 08:21

## 2024-12-10 RX ADMIN — Medication 88 MICROGRAM(S): at 06:14

## 2024-12-10 RX ADMIN — HYDROMORPHONE HYDROCHLORIDE 4 MILLIGRAM(S): 2 TABLET ORAL at 21:45

## 2024-12-10 RX ADMIN — HYDROMORPHONE HYDROCHLORIDE 2 MILLIGRAM(S): 2 TABLET ORAL at 01:00

## 2024-12-10 RX ADMIN — ACETAMINOPHEN, DIPHENHYDRAMINE HCL, PHENYLEPHRINE HCL 3 MILLIGRAM(S): 325; 25; 5 TABLET ORAL at 00:42

## 2024-12-10 RX ADMIN — Medication 1 TABLET(S): at 08:18

## 2024-12-10 RX ADMIN — Medication 5000 UNIT(S): at 13:35

## 2024-12-10 RX ADMIN — Medication 600 MILLIGRAM(S): at 21:45

## 2024-12-10 RX ADMIN — Medication 1 SPRAY(S): at 21:38

## 2024-12-10 RX ADMIN — HYDROMORPHONE HYDROCHLORIDE 2 MILLIGRAM(S): 2 TABLET ORAL at 14:01

## 2024-12-10 RX ADMIN — Medication 600 MILLIGRAM(S): at 13:33

## 2024-12-10 RX ADMIN — METOPROLOL TARTRATE 25 MILLIGRAM(S): 100 TABLET, FILM COATED ORAL at 08:18

## 2024-12-10 RX ADMIN — HYDROMORPHONE HYDROCHLORIDE 4 MILLIGRAM(S): 2 TABLET ORAL at 21:21

## 2024-12-10 RX ADMIN — HYDROMORPHONE HYDROCHLORIDE 2 MILLIGRAM(S): 2 TABLET ORAL at 06:53

## 2024-12-10 RX ADMIN — Medication 2 TABLET(S): at 21:21

## 2024-12-10 RX ADMIN — AMIODARONE HYDROCHLORIDE 200 MILLIGRAM(S): 200 TABLET ORAL at 08:17

## 2024-12-10 RX ADMIN — ACETAMINOPHEN 500MG 975 MILLIGRAM(S): 500 TABLET, COATED ORAL at 14:00

## 2024-12-10 RX ADMIN — ROSUVASTATIN CALCIUM 20 MILLIGRAM(S): 5 TABLET, FILM COATED ORAL at 21:20

## 2024-12-10 RX ADMIN — PANTOPRAZOLE SODIUM 40 MILLIGRAM(S): 40 TABLET, DELAYED RELEASE ORAL at 06:14

## 2024-12-10 RX ADMIN — HYDROMORPHONE HYDROCHLORIDE 2 MILLIGRAM(S): 2 TABLET ORAL at 00:38

## 2024-12-10 RX ADMIN — Medication 600 MILLIGRAM(S): at 06:14

## 2024-12-10 RX ADMIN — PREDNISONE 20 MILLIGRAM(S): 20 TABLET ORAL at 08:18

## 2024-12-10 RX ADMIN — Medication 0.6 MILLIGRAM(S): at 08:18

## 2024-12-10 RX ADMIN — HYDROMORPHONE HYDROCHLORIDE 2 MILLIGRAM(S): 2 TABLET ORAL at 12:07

## 2024-12-10 RX ADMIN — Medication 5000 UNIT(S): at 21:20

## 2024-12-10 RX ADMIN — POLYETHYLENE GLYCOL 3350 17 GRAM(S): 17 POWDER, FOR SOLUTION ORAL at 08:17

## 2024-12-10 RX ADMIN — Medication 1 TABLET(S): at 21:20

## 2024-12-10 RX ADMIN — Medication 5000 UNIT(S): at 06:15

## 2024-12-10 NOTE — PROGRESS NOTE ADULT - SUBJECTIVE AND OBJECTIVE BOX
HPI:  REASON FOR VISIT: Pericardial effusion, PAF    HPI:   Anne-Marie Corona is a 64-year-old woman with a history of right ventricular outflow tract ventricular tachycardia s/p remote ablation (; "partially successful" with subsequent PVCs treated with verapamil), hypertension, hypothyroidism, ischemic colitis, von Willebrand's disease, essential thrombocytosis, obesity s/p gastric bypass, hypertension, DVT/PE, and hospitalization in 2023 for respiratory failure, who has been ill since late October.  She was admitted to [facility] twice with respiratory failure, bacterial pneumonia, acute kidney injury, pericarditis, and pericardial effusion.  She was discharged on  but felt poor on  and came to the [facility] ED. She was found to have a large pericardial effusion, pleural effusion.     : some improvement in symptoms overnight.  12/3/24  Patient is complaining of having sharp chest pain left sided chest , increase motion ,deep breathing ,  started last night in addition to prior chest pain , patient had bilateral CT placed , and pericardial drain in place ,   patient was treated for pleuropericarditis [facility]   24: Pt feels improved. Right sided atypical chest pain.   24: Pleuritic chest pressure.  Overall, feeling "a little better."  24: s/p drain removal with persistent pleuritic chest pain   : still w/ dyspnea but slowly improving  : ambulated around unit slowly still w/ some discomfort w/ inspiration.  : sleeping, no problems overnight.  12/10/24  feeling better , does have discomfort on taking deep breaths  no SOB   , did recieve IV solumderol yesterday , with improved symptoms     MEDICATIONS:  OUTPATIENT  Home Medications:  acetaminophen 325 mg oral tablet: 2 tab(s) orally every 6 hours as needed for (2024 23:15)  amiodarone 200 mg oral tablet: 2 tab(s) orally 2 times a day ***until 24.... then 400mg QD*** (2024 23:15)  amLODIPine 5 mg oral tablet: 1 tab(s) orally once a day (2024 23:15)  Anumed-HC 25 mg rectal suppository: 1 suppository(ies) rectally 2 times a day as needed for (2024 23:15)  apixaban 5 mg oral tablet: 1 tab(s) orally every 12 hours (:15)  azelastine 137 mcg/inh (0.1%) nasal spray: 1 spray(s) in each nostril 2 times a day (:15)  colchicine 0.6 mg oral tablet: 0.5 tab(s) orally once a day (:15)  cyclobenzaprine 10 mg oral tablet: 1 tab(s) orally 3 times a day as needed for (03 Dec 2024 13:48)  enalapril 20 mg oral tablet: 1 tab(s) orally once a day (in the morning) (:15)  fluticasone 50 mcg/inh nasal spray: 2 spray(s) in each nostril once a day as needed for (:15)  hydroCHLOROthiazide 12.5 mg oral tablet: 1 tab(s) orally 2 times a day as needed for (:15)  HYDROmorphone 2 mg oral tablet: 1 tab(s) orally every 6 hours as needed for (:15)  ibuprofen 200 mg oral capsule: 2 cap(s) orally every 8 hours as needed for (:15)  lamoTRIgine 200 mg oral tablet: 1 tab(s) orally once a day (03 Dec 2024 13:48)  levothyroxine 88 mcg (0.088 mg) oral tablet: 1 tab(s) orally once a day (03 Dec 2024 13:48)  linaclotide 72 mcg oral capsule: 1 cap(s) orally once a day as needed for (03 Dec 2024 13:48)  liraglutide 18 mg/3 mL subcutaneous solution: 0.6 milligram(s) subcutaneously once a day as needed for (03 Dec 2024 13:48)  loperamide 2 mg oral capsule: 2 cap(s) orally every 2 hours as needed for (2024 23:15)  meclizine 25 mg oral tablet: 1 tab(s) orally 2 times a day as needed for (03 Dec 2024 13:48)  Metoprolol Succinate ER 25 mg oral tablet, extended release: 1 tab(s) orally once a day (03 Dec 2024 13:48)  Multiple Vitamins oral tablet: 1 tab(s) orally once a day (03 Dec 2024 13:48)  ondansetron 4 mg oral tablet, disintegratin tab(s) orally 3 times a day as needed for (03 Dec 2024 13:48)  pantoprazole 40 mg oral delayed release tablet: 1 tab(s) orally once a day (2024 23:15)  rosuvastatin 20 mg oral tablet: 1 tab(s) orally once a day (03 Dec 2024 13:48)  senna (sennosides) 8.6 mg oral tablet: 2 tab(s) orally once a day (at bedtime) as needed for (2024 23:15)  Wellbutrin  mg/24 hours oral tablet, extended release: 1 tab(s) orally once a day (03 Dec 2024 13:48)    MEDICATIONS  (STANDING):  aMIOdarone    Tablet 200 milliGRAM(s) Oral daily  buPROPion XL (24-Hour) . 150 milliGRAM(s) Oral daily  chlorhexidine 4% Liquid 1 Application(s) Topical <User Schedule>  colchicine 0.6 milliGRAM(s) Oral daily  fluticasone propionate 50 MICROgram(s)/spray Nasal Spray 1 Spray(s) Both Nostrils two times a day  heparin   Injectable 5000 Unit(s) SubCutaneous every 8 hours  ibuprofen  Tablet. 600 milliGRAM(s) Oral every 8 hours  lactobacillus acidophilus 1 Tablet(s) Oral every 12 hours  lamoTRIgine 200 milliGRAM(s) Oral daily  levothyroxine 88 MICROGram(s) Oral daily  metoprolol succinate ER 25 milliGRAM(s) Oral daily  multivitamin 1 Tablet(s) Oral daily  naloxone Injectable 0.4 milliGRAM(s) IV Push once  pantoprazole    Tablet 40 milliGRAM(s) Oral before breakfast  polyethylene glycol 3350 17 Gram(s) Oral daily  predniSONE   Tablet 20 milliGRAM(s) Oral daily  rosuvastatin 20 milliGRAM(s) Oral at bedtime  senna 2 Tablet(s) Oral at bedtime    MEDICATIONS  (PRN):  aluminum hydroxide/magnesium hydroxide/simethicone Suspension 30 milliLiter(s) Oral every 4 hours PRN Dyspepsia  bisacodyl 5 milliGRAM(s) Oral daily PRN Constipation  cyclobenzaprine 10 milliGRAM(s) Oral three times a day PRN Muscle Spasm  guaifenesin/dextromethorphan Oral Liquid 10 milliLiter(s) Oral every 4 hours PRN Cough  hydrocortisone hemorrhoidal Suppository 1 Suppository(s) Rectal two times a day PRN pain  HYDROmorphone   Tablet 2 milliGRAM(s) Oral every 4 hours PRN Severe Pain (7 - 10)  lactulose Syrup 15 Gram(s) Oral daily PRN constipation  loperamide 2 milliGRAM(s) Oral every 2 hours PRN Diarrhea  meclizine 25 milliGRAM(s) Oral two times a day PRN Dizziness  melatonin 3 milliGRAM(s) Oral at bedtime PRN Insomnia  ondansetron Injectable 4 milliGRAM(s) IV Push every 8 hours PRN Nausea and/or Vomiting  oxycodone    5 mG/acetaminophen 325 mG 1 Tablet(s) Oral every 4 hours PRN Moderate Pain (4 - 6)  simethicone 80 milliGRAM(s) Chew every 6 hours PRN Gas    Vital Signs Last 24 Hrs  T(C): 36.1 (10 Dec 2024 06:00), Max: 36.3 (09 Dec 2024 22:00)  T(F): 97 (10 Dec 2024 06:00), Max: 97.3 (09 Dec 2024 22:00)  HR: 71 (10 Dec 2024 09:36) (55 - 71)  BP: 134/42 (10 Dec 2024 08:15) (126/47 - 159/57)  BP(mean): 67 (10 Dec 2024 08:15) (65 - 88)  RR: 15 (10 Dec 2024 09:36) (13 - 24)  SpO2: 95% (10 Dec 2024 06:00) (92% - 96%)    Parameters below as of 10 Dec 2024 04:00  Patient On (Oxygen Delivery Method): room air        I&O's Summary        PHYSICAL EXAM:    Constitutional: NAD, awake and alert, well-developed  HEENT: PERR, EOMI,  No oral cyanosis.  Neck:  supple,  No JVD  Respiratory: Breath sounds are clear bilaterally, No wheezing, rales or rhonchi  Cardiovascular: S1 and S2, regular rate and rhythm, no Murmurs, gallops or rubs  Gastrointestinal: Bowel Sounds present, soft, nontender.   Extremities: No peripheral edema. No clubbing or cyanosis.  Vascular: 2+ peripheral pulses  Neurological: A/O x 3, no focal deficits  Musculoskeletal: no calf tenderness.  Skin: No rashes.        ===============================  ===============================  LABS:                         8.4    17.34 )-----------( 815      ( 09 Dec 2024 05:18 )             28.3                         9.2    19.95 )-----------( 876      ( 08 Dec 2024 06:14 )             31.1     09 Dec 2024 05:18    133    |  105    |  11     ----------------------------<  99     4.2     |  27     |  0.49   08 Dec 2024 06:14    133    |  102    |  10     ----------------------------<  100    4.3     |  26     |  0.48     Ca    8.7        09 Dec 2024 05:18  Ca    8.8        08 Dec 2024 06:14    TPro  5.7    /  Alb  2.0    /  TBili  0.3    /  DBili  x      /  AST  15     /  ALT  14     /  AlkPhos  93     08 Dec 2024 06:14      ===============================  ===============================  CARDIAC BIOMARKERS:  BNP      TROPONIN  Troponin I, High Sensitivity Result: 17.62 ng/L (24 @ 16:37)  Troponin I, High Sensitivity Result: 86.3 ng/L *H* (10-06-21 @ 19:08)  Troponin I, High Sensitivity Result: 130.6 ng/L *H* (10-06-21 @ 14:12)  Troponin I, High Sensitivity Result: 142.2 ng/L *H* (10-06-21 @ 11:35)  Troponin I, High Sensitivity Result: 93.1 ng/L *H* (10-06-21 @ 08:41)  Troponin I, High Sensitivity Result: 61.6 ng/L *H* (10-06-21 @ 05:46)  Troponin I, Serum: <0.015 ng/mL [0.015 - 0.045] (20 @ 22:01)  ===============================  ===============================  EKG:  < from: 12 Lead ECG (24 @ 09:29) >  Diagnosis Line Normal sinus rhythm  Nonspecific T wave abnormality  Abnormal ECG  When compared with ECG of 03-DEC-2024 02:09,  Nonspecific T wave abnormality, worse in Inferior leads  Confirmed by MD JOSEPH PAUL ([id number]) on 2024 5:27:16 PM  =========================================================  =========================================================  Transthoracic echocardiogram with 2-D, M-mode and complete  Study Date:   ******* 2024  CONCLUSIONS:      1. Left ventricular systolic function is normal with an ejection fraction of 67 % by Jeffers's method of disks.   2. Large left pleural effusion noted.   3. Large pericardial effusion with evidence of hemodynamic compromise (or echocardiographic evidence of cardiac tamponade).   4. The left ventricular diastolic function is indeterminate.   5. Normal right ventricular cavity size and normal right ventricular systolic function.   6. Left atrium is moderately dilated.   7. The right atrium is normal in size.   8. Mild pulmonic regurgitation.   9. Trace tricuspid regurgitation.  10. The inferior vena cava is dilated measuring 2.50 cm in diameter, (dilated >2.1cm)   with abnormal inspiratory collapse (abnormal <50%) consistent with elevated right atrial pressure (~15, range 10-20mmHg).  11. Mild left ventricular hypertrophy.  12. Trileaflet aortic valve with normal systolic excursion.  13. Structurally normal mitral valve with normal leaflet excursion.   =========================================================  =========================================================  TTE Limited W or WO Ultrasound Enhancing Agent (*******24 @ 10:06) >   1. Moderate pericardial effusion noted adjacent to the posterior left ventricle   with no evidence of hemodynamic compromise (or echocardiographic evidence of cardiac tamponade).   2. Left ventricular systolic function is hyperdynamic with an ejection fraction   visually estimated at 65 to 70 %.   3. Normal right ventricular cavity size and normal right ventricular systolic function.   4. Moderate left pleural effusion noted.   5. Compared to the transthoracic echocardiogram performed on 2024,   decreased pericardial effusion than before.  =========================================================  =========================================================  < from: TTE Limited W or WO Ultrasound Enhancing Agent (24 @ 09:01) >  Study Date:    2024  Limited transthoracic echocardiogram.    CONCLUSIONS:   1. Left ventricular systolic function is normal with an ejection fraction of 69 % by 3D.   2. A small fibrinous pericardial effusion localized to the inferolateral pericardium vs   inflammed pericardium is noted.  =========================================================  =========================================================  CT Chest No Cont (11.30.24 @ 20:34):  Moderately large simple pericardial effusion. Small to moderate right   and small left pleural effusions with associatedcompressive atelectasis.    Superimposed pneumonia should be excluded on a clinical basis.  =========================================================  =========================================================

## 2024-12-10 NOTE — OCCUPATIONAL THERAPY INITIAL EVALUATION ADULT - ADDITIONAL COMMENTS
Pt reports she resides in a condo alone with 3 JITENDRA + R HR. Pt reports she has a walk in shower +built in shower chair, standard toilet. Prior to all hospitalizations (in October) she was (I) with all ADL/IADL tasks and walked without AD, since hospitalizations has needed RW and assist with fxl tasks. Prior pt was active, liked to play golf, driving, RHD. Pt volunteers at [employer], has friends close. Pt reports she resides in a condo alone with 3 JITENDRA + R HR. Pt reports she has a walk in shower +built in shower chair, standard toilet. Prior to all hospitalizations (in October) she was (I) with all ADL/IADL tasks and walked without AD, since hospitalizations has needed RW and assist with fxl tasks. Prior pt was active, liked to play golf, driving, RHD. Pt volunteers at [employer], has friends close. Pt has 4 cats and a dog she cares for at home.

## 2024-12-10 NOTE — OCCUPATIONAL THERAPY INITIAL EVALUATION ADULT - PERTINENT HX OF CURRENT PROBLEM, REHAB EVAL
Per EMR, pt is a 64-year-old woman with a history of right ventricular outflow tract ventricular tachycardia s/p remote ablation (2001; "partially successful" with subsequent PVCs treated with verapamil), hypertension, hypothyroidism, ischemic colitis, von Willebrand's disease, essential thrombocytosis, obesity s/p gastric bypass, hypertension, DVT/PE, and hospitalization in March 2023 for respiratory failure, who has been ill since late October.  She was admitted to [facility] twice with respiratory failure, bacterial pneumonia, acute kidney injury, pericarditis, and pericardial effusion.  She was discharged on 11/29 but felt poor on 11/30 and came to the [facility] ED. She was found to have a large pericardial effusion, pleural effusion.     -YOANA:  1. Left ventricular systolic function is normal with an ejection fraction of 69 % by 3D.2. A small fibrinous pericardial effusion localized to the inferolateral pericardium vs inflamed pericardium is noted.  -CT Chest No Cont (11.30.24 @ 20:34): Moderately large simple pericardial effusion. Small to moderate right  and small left pleural effusions with associated compressive atelectasis. Superimposed pneumonia should be excluded on a clinical basis.

## 2024-12-10 NOTE — OCCUPATIONAL THERAPY INITIAL EVALUATION ADULT - MD ORDER
"OT Evaluate and Treat"- MD orders received. Chart reviewed, contents noted, conferred with RN- cleared to be seen by RN. Please refer to Therapeutic Interventions under Adult A & I for future documentation and treatment notes.

## 2024-12-10 NOTE — PROGRESS NOTE ADULT - SUBJECTIVE AND OBJECTIVE BOX
Interval/Overnight Events:  Chest pain is improving.  Continues to have exertional dyspnea.    She denies fever or chills.  No productive cough or hemoptysis.  Lower extremity swelling is improving.    FLUIDS/ELECTROLYTES/NUTRITION:  I&O's Summary    10 Dec 2024 07:01  -  10 Dec 2024 19:58  --------------------------------------------------------  IN: 480 mL / OUT: 0 mL / NET: 480 mL        ICU Vital Signs Last 24 Hrs  T(C): 36.8 (10 Dec 2024 15:49), Max: 36.8 (10 Dec 2024 15:49)  T(F): 98.3 (10 Dec 2024 15:49), Max: 98.3 (10 Dec 2024 15:49)  HR: 68 (10 Dec 2024 18:36) (55 - 75)  BP: 141/43 (10 Dec 2024 18:10) (126/47 - 144/49)  BP(mean): 71 (10 Dec 2024 18:10) (67 - 88)  RR: 18 (10 Dec 2024 18:36) (14 - 24)  SpO2: 96% (10 Dec 2024 18:36) (92% - 96%)  O2 Parameters below as of 10 Dec 2024 04:00  Patient On (Oxygen Delivery Method): room air    MEDICATIONS  (STANDING):  aMIOdarone    Tablet 200 milliGRAM(s) Oral daily  buPROPion XL (24-Hour) . 150 milliGRAM(s) Oral daily  chlorhexidine 4% Liquid 1 Application(s) Topical <User Schedule>  colchicine 0.6 milliGRAM(s) Oral daily  fluticasone propionate 50 MICROgram(s)/spray Nasal Spray 1 Spray(s) Both Nostrils two times a day  heparin   Injectable 5000 Unit(s) SubCutaneous every 8 hours  ibuprofen  Tablet. 600 milliGRAM(s) Oral every 8 hours  lactobacillus acidophilus 1 Tablet(s) Oral every 12 hours  lamoTRIgine 200 milliGRAM(s) Oral daily  levothyroxine 88 MICROGram(s) Oral daily  metoprolol succinate ER 25 milliGRAM(s) Oral daily  multivitamin 1 Tablet(s) Oral daily  naloxone Injectable 0.4 milliGRAM(s) IV Push once  pantoprazole    Tablet 40 milliGRAM(s) Oral before breakfast  polyethylene glycol 3350 17 Gram(s) Oral daily  predniSONE   Tablet 20 milliGRAM(s) Oral daily  rosuvastatin 20 milliGRAM(s) Oral at bedtime  senna 2 Tablet(s) Oral at bedtime      MEDICATIONS  (PRN):  aluminum hydroxide/magnesium hydroxide/simethicone Suspension 30 milliLiter(s) Oral every 4 hours PRN Dyspepsia  bisacodyl 5 milliGRAM(s) Oral daily PRN Constipation  cyclobenzaprine 10 milliGRAM(s) Oral three times a day PRN Muscle Spasm  guaifenesin/dextromethorphan Oral Liquid 10 milliLiter(s) Oral every 4 hours PRN Cough  hydrocortisone hemorrhoidal Suppository 1 Suppository(s) Rectal two times a day PRN pain  HYDROmorphone   Tablet 4 milliGRAM(s) Oral every 4 hours PRN Severe Pain (7 - 10)  lactulose Syrup 15 Gram(s) Oral daily PRN constipation  loperamide 2 milliGRAM(s) Oral every 2 hours PRN Diarrhea  meclizine 25 milliGRAM(s) Oral two times a day PRN Dizziness  melatonin 3 milliGRAM(s) Oral at bedtime PRN Insomnia  ondansetron Injectable 4 milliGRAM(s) IV Push every 8 hours PRN Nausea and/or Vomiting  oxycodone    5 mG/acetaminophen 325 mG 1 Tablet(s) Oral every 4 hours PRN Moderate Pain (4 - 6)  simethicone 80 milliGRAM(s) Chew every 6 hours PRN Gas                        8.7    22.39 )-----------( 953      ( 10 Dec 2024 05:14 )             29.2     Bands 9.0    12-10    131[L]  |  104  |  10  ----------------------------<  129[H]  5.0   |  28  |  0.53    Ca    9.4      10 Dec 2024 05:14  Phos  3.6     12-10  Mg     2.0     12-10    TPro  6.0  /  Alb  2.1[L]  /  TBili  0.2  /  DBili  x   /  AST  16  /  ALT  15  /  AlkPhos  91  12-10    PT/INR - ( 10 Dec 2024 08:58 )   PT: 13.5 sec;   INR: 1.15 ratio    PHYSICAL EXAM:  General:	In no acute distress  Respiratory:	Bilateral basal crepitations and pleural rub.  Decreased air entry at bases  CV:		Regular rate and rhythm. Normal S1/S2. No murmurs, rubs, or   .		gallop. Capillary refill < 2 seconds. Distal pulses 2+ and equal.  Abdomen:	Soft, non-distended. Bowel sounds present.Small induration secondary to Lovenox injections  Skin:		No rash.  Extremities:	Warm and well perfused. No gross extremity deformities.  Neurologic:	Alert and oriented.     IMAGING STUDIES:  < from: Xray Chest 1 View- PORTABLE-Urgent (Xray Chest 1 View- PORTABLE-Urgent .) (12.06.24 @ 13:03) >   Interval removal of bilateral chest tubes. No pneumothorax.   Probable small loculated left pleural effusion and trace right pleural   effusion and/or right basilar atelectasis on the final image.    < end of copied text >  < from: CT Chest No Cont (11.30.24 @ 20:34) >  Moderately large simple pericardial effusion. Small to moderate right and   small left pleural effusions with associatedcompressive atelectasis.   Superimposed pneumonia should be excluded on a clinical basis.    < end of copied text >  < from: TTE Limited W or WO Ultrasound Enhancing Agent (12.05.24 @ 09:01) >  1. Left ventricularsystolic function is normal with an ejection fraction of 69 % by 3D.   2. A small fibrinous pericardial effusion localized to the inferolateral pericardium vs inflammed pericardium is noted.    < end of copied text >  < from: TTE W or WO Ultrasound Enhancing Agent (12.01.24 @ 07:58) >  1. Left ventricular systolic function is normal with an ejection fraction of 67 % by Jeffers's method of disks.   2. Large left pleural effusion noted.   3. Large pericardial effusion with evidence of hemodynamic compromise (or echocardiographic evidence of cardiac tamponade).   4. The left ventricular diastolic function is indeterminate.   5. Normal right ventricular cavity size and normal right ventricular systolic function.   6. Left atrium is moderately dilated.   7. The right atrium is normal in size.   8. Mild pulmonic regurgitation.   9. Trace tricuspid regurgitation.  10. The inferior vena cava is dilated measuring 2.50 cm in diameter, (dilated >2.1cm) with abnormal inspiratory collapse (abnormal <50%) consistent with elevated right atrial pressure (~15, range 10-20mmHg).  11. Mild left ventricular hypertrophy.  12. Trileaflet aortic valve with normal systolic excursion.  13. Structurally normal mitral valve with normal leaflet excursion.      < end of copied text >

## 2024-12-10 NOTE — OCCUPATIONAL THERAPY INITIAL EVALUATION ADULT - SHORT TERM MEMORY, REHAB EVAL
pt scored a 27/30 on MMSE indicating no cognitive impairment, pt reports ar baseline has a hard time remembering more than 2 things at time, compensates well using techniques/intact

## 2024-12-10 NOTE — PROGRESS NOTE ADULT - ASSESSMENT
64 years old female with history of right ventricular outflow tract obstruction, Von Willebrand's disease, essential thrombocytosis recent hospitalization for acute renal failure, acute pericarditis and pneumonia at [place]. Readmitted a day after discharge to [facility].  Found to have pericardial effusion with tamponade physiology and bilateral pleural effusion.  Status post pericardial drain and drainage of pleural effusions  Hemorrhagic pericardial effusion, exudative pleural effusion with a high LDH around 800  Workup at [facility]-showed some inflammatory markers according to patient rheumatoid factor was positive.  No prior history of rheumatologic disease.  Patient does have history of essential thrombocythemia-and has taken hydroxyurea in the past for last few months she has not taken pegylated interferon.     Problems.   Recent Pericarditis-Pericardial effusion  Bilateral pleural effusion-Compressive atelectasis  AFIB  HFpEF  Anemia with iron deficiency    Of note interferons-have been linked with case reports of pericarditis, pleuritis as well as drug-induced lupus  Pericardial fluid cytology does not show malignant cells but shows lymphocytic predominance, pleural fluid cytology acute neutrophilic infiltrate.  Patient is also receiving amiodarone at a low dose and has received pegylated interferon's in the recent past  Patient has increasing white cell count increased bands and immature white cells plan for bone marrow biopsy. 1 dose of prednisone is unlikely the reason      Recommendations:  Oral steroids with a taper  Follow-up with rheumatology   incentive spirometry to improve atelectasis  Goal oxygen saturation more than 90%  Continue follow-up with hematology for anemia, platelet dysfunction, ET   Agree with DVT prophylaxis  Patient will need outpatient pulmonary follow-up

## 2024-12-10 NOTE — PROGRESS NOTE ADULT - SUBJECTIVE AND OBJECTIVE BOX
OVERNIGHT EVENTS / SUBJECTIVE: Patient seen and examined at bedside.     WBC increased to 22, started steroid yesterday, 9% bands    OBJECTIVE:    VITAL SIGNS:  ICU Vital Signs Last 24 Hrs  T(C): 36.1 (10 Dec 2024 06:00), Max: 36.3 (09 Dec 2024 22:00)  T(F): 97 (10 Dec 2024 06:00), Max: 97.3 (09 Dec 2024 22:00)  HR: 58 (10 Dec 2024 06:00) (55 - 69)  BP: 143/69 (10 Dec 2024 06:00) (126/47 - 159/57)  BP(mean): 88 (10 Dec 2024 06:00) (64 - 88)  ABP: --  ABP(mean): --  RR: 20 (10 Dec 2024 06:00) (13 - 24)  SpO2: 95% (10 Dec 2024 06:00) (92% - 96%)    O2 Parameters below as of 10 Dec 2024 04:00  Patient On (Oxygen Delivery Method): room air              CAPILLARY BLOOD GLUCOSE          PHYSICAL EXAM:    General: NAD, appears uncomfortable due to pain  HEENT: NC/AT; clear conjunctiva  Neck: supple  Respiratory: CTA b/l  Cardiovascular: +S1/S2; RRR  Abdomen: soft, nontender, nondistended  Extremities: Warm, no LE edema  Skin: normal color and turgor; no rash  Neurological: A&Ox3, no focal deficits    MEDICATIONS:  MEDICATIONS  (STANDING):  aMIOdarone    Tablet 200 milliGRAM(s) Oral daily  buPROPion XL (24-Hour) . 150 milliGRAM(s) Oral daily  chlorhexidine 4% Liquid 1 Application(s) Topical <User Schedule>  colchicine 0.6 milliGRAM(s) Oral daily  fluticasone propionate 50 MICROgram(s)/spray Nasal Spray 1 Spray(s) Both Nostrils two times a day  heparin   Injectable 5000 Unit(s) SubCutaneous every 8 hours  ibuprofen  Tablet. 600 milliGRAM(s) Oral every 8 hours  lactobacillus acidophilus 1 Tablet(s) Oral every 12 hours  lamoTRIgine 200 milliGRAM(s) Oral daily  levothyroxine 88 MICROGram(s) Oral daily  metoprolol succinate ER 25 milliGRAM(s) Oral daily  multivitamin 1 Tablet(s) Oral daily  naloxone Injectable 0.4 milliGRAM(s) IV Push once  pantoprazole    Tablet 40 milliGRAM(s) Oral before breakfast  polyethylene glycol 3350 17 Gram(s) Oral daily  predniSONE   Tablet 20 milliGRAM(s) Oral daily  rosuvastatin 20 milliGRAM(s) Oral at bedtime  senna 2 Tablet(s) Oral at bedtime    MEDICATIONS  (PRN):  aluminum hydroxide/magnesium hydroxide/simethicone Suspension 30 milliLiter(s) Oral every 4 hours PRN Dyspepsia  bisacodyl 5 milliGRAM(s) Oral daily PRN Constipation  cyclobenzaprine 10 milliGRAM(s) Oral three times a day PRN Muscle Spasm  guaifenesin/dextromethorphan Oral Liquid 10 milliLiter(s) Oral every 4 hours PRN Cough  hydrocortisone hemorrhoidal Suppository 1 Suppository(s) Rectal two times a day PRN pain  HYDROmorphone   Tablet 2 milliGRAM(s) Oral every 4 hours PRN Severe Pain (7 - 10)  lactulose Syrup 15 Gram(s) Oral daily PRN constipation  loperamide 2 milliGRAM(s) Oral every 2 hours PRN Diarrhea  meclizine 25 milliGRAM(s) Oral two times a day PRN Dizziness  melatonin 3 milliGRAM(s) Oral at bedtime PRN Insomnia  ondansetron Injectable 4 milliGRAM(s) IV Push every 8 hours PRN Nausea and/or Vomiting  oxycodone    5 mG/acetaminophen 325 mG 1 Tablet(s) Oral every 4 hours PRN Moderate Pain (4 - 6)  simethicone 80 milliGRAM(s) Chew every 6 hours PRN Gas      ALLERGIES:  Allergies    sulfa drugs (Vomiting; Nausea)  IV Contrast (Hives)    Intolerances        LABS:                        8.7    22.39 )-----------( 953      ( 10 Dec 2024 05:14 )             29.2     Hemoglobin: 8.7 g/dL (12-10 @ 05:14)  Hemoglobin: 8.4 g/dL (12-09 @ 05:18)  Hemoglobin: 9.2 g/dL (12-08 @ 06:14)  Hemoglobin: 8.7 g/dL (12-06 @ 05:10)    CBC Full  -  ( 10 Dec 2024 05:14 )  WBC Count : 22.39 K/uL  RBC Count : 3.53 M/uL  Hemoglobin : 8.7 g/dL  Hematocrit : 29.2 %  Platelet Count - Automated : 953 K/uL  Mean Cell Volume : 82.7 fl  Mean Cell Hemoglobin : 24.6 pg  Mean Cell Hemoglobin Concentration : 29.8 g/dL  Auto Neutrophil # : 19.03 K/uL  Auto Lymphocyte # : 1.79 K/uL  Auto Monocyte # : 0.22 K/uL  Auto Eosinophil # : 0.00 K/uL  Auto Basophil # : 0.00 K/uL  Auto Neutrophil % : 76.0 %  Auto Lymphocyte % : 8.0 %  Auto Monocyte % : 1.0 %  Auto Eosinophil % : 0.0 %  Auto Basophil % : 0.0 %    12-10    131[L]  |  104  |  10  ----------------------------<  129[H]  5.0   |  28  |  0.53    Ca    9.4      10 Dec 2024 05:14  Phos  3.6     12-10  Mg     2.0     12-10    TPro  6.0  /  Alb  2.1[L]  /  TBili  0.2  /  DBili  x   /  AST  16  /  ALT  15  /  AlkPhos  91  12-10    Creatinine Trend: 0.53<--, 0.49<--, 0.48<--, 0.47<--, 0.44<--, 0.69<--  LIVER FUNCTIONS - ( 10 Dec 2024 05:14 )  Alb: 2.1 g/dL / Pro: 6.0 gm/dL / ALK PHOS: 91 U/L / ALT: 15 U/L / AST: 16 U/L / GGT: x               hs Troponin:            Urinalysis Basic - ( 10 Dec 2024 05:14 )    Color: x / Appearance: x / SG: x / pH: x  Gluc: 129 mg/dL / Ketone: x  / Bili: x / Urobili: x   Blood: x / Protein: x / Nitrite: x   Leuk Esterase: x / RBC: x / WBC x   Sq Epi: x / Non Sq Epi: x / Bacteria: x      CSF:                      EKG:   MICROBIOLOGY:    IMAGING:      Labs, imaging, EKG personally reviewed    RADIOLOGY & ADDITIONAL TESTS: Reviewed. OVERNIGHT EVENTS / SUBJECTIVE: Patient seen and examined at bedside.     WBC increased to 22, started steroid yesterday, some bands and metamyelocytes noted. Pt reports pain improved since starting steroid yesterday, less pleuritic pain with breathing. Denies SOB. CXR more clear compared to yesterday.    OBJECTIVE:    VITAL SIGNS:  ICU Vital Signs Last 24 Hrs  T(C): 36.1 (10 Dec 2024 06:00), Max: 36.3 (09 Dec 2024 22:00)  T(F): 97 (10 Dec 2024 06:00), Max: 97.3 (09 Dec 2024 22:00)  HR: 58 (10 Dec 2024 06:00) (55 - 69)  BP: 143/69 (10 Dec 2024 06:00) (126/47 - 159/57)  BP(mean): 88 (10 Dec 2024 06:00) (64 - 88)  ABP: --  ABP(mean): --  RR: 20 (10 Dec 2024 06:00) (13 - 24)  SpO2: 95% (10 Dec 2024 06:00) (92% - 96%)    O2 Parameters below as of 10 Dec 2024 04:00  Patient On (Oxygen Delivery Method): room air              CAPILLARY BLOOD GLUCOSE          PHYSICAL EXAM:    General: NAD, appears more comfortable today  HEENT: NC/AT; clear conjunctiva  Neck: supple  Respiratory: Decreased BS L base  Cardiovascular: +S1/S2; RRR  Abdomen: soft, nontender, nondistended  Extremities: Warm, no LE edema  Skin: normal color and turgor; no rash  Neurological: A&Ox3, no focal deficits    MEDICATIONS:  MEDICATIONS  (STANDING):  aMIOdarone    Tablet 200 milliGRAM(s) Oral daily  buPROPion XL (24-Hour) . 150 milliGRAM(s) Oral daily  chlorhexidine 4% Liquid 1 Application(s) Topical <User Schedule>  colchicine 0.6 milliGRAM(s) Oral daily  fluticasone propionate 50 MICROgram(s)/spray Nasal Spray 1 Spray(s) Both Nostrils two times a day  heparin   Injectable 5000 Unit(s) SubCutaneous every 8 hours  ibuprofen  Tablet. 600 milliGRAM(s) Oral every 8 hours  lactobacillus acidophilus 1 Tablet(s) Oral every 12 hours  lamoTRIgine 200 milliGRAM(s) Oral daily  levothyroxine 88 MICROGram(s) Oral daily  metoprolol succinate ER 25 milliGRAM(s) Oral daily  multivitamin 1 Tablet(s) Oral daily  naloxone Injectable 0.4 milliGRAM(s) IV Push once  pantoprazole    Tablet 40 milliGRAM(s) Oral before breakfast  polyethylene glycol 3350 17 Gram(s) Oral daily  predniSONE   Tablet 20 milliGRAM(s) Oral daily  rosuvastatin 20 milliGRAM(s) Oral at bedtime  senna 2 Tablet(s) Oral at bedtime    MEDICATIONS  (PRN):  aluminum hydroxide/magnesium hydroxide/simethicone Suspension 30 milliLiter(s) Oral every 4 hours PRN Dyspepsia  bisacodyl 5 milliGRAM(s) Oral daily PRN Constipation  cyclobenzaprine 10 milliGRAM(s) Oral three times a day PRN Muscle Spasm  guaifenesin/dextromethorphan Oral Liquid 10 milliLiter(s) Oral every 4 hours PRN Cough  hydrocortisone hemorrhoidal Suppository 1 Suppository(s) Rectal two times a day PRN pain  HYDROmorphone   Tablet 2 milliGRAM(s) Oral every 4 hours PRN Severe Pain (7 - 10)  lactulose Syrup 15 Gram(s) Oral daily PRN constipation  loperamide 2 milliGRAM(s) Oral every 2 hours PRN Diarrhea  meclizine 25 milliGRAM(s) Oral two times a day PRN Dizziness  melatonin 3 milliGRAM(s) Oral at bedtime PRN Insomnia  ondansetron Injectable 4 milliGRAM(s) IV Push every 8 hours PRN Nausea and/or Vomiting  oxycodone    5 mG/acetaminophen 325 mG 1 Tablet(s) Oral every 4 hours PRN Moderate Pain (4 - 6)  simethicone 80 milliGRAM(s) Chew every 6 hours PRN Gas      ALLERGIES:  Allergies    sulfa drugs (Vomiting; Nausea)  IV Contrast (Hives)    Intolerances        LABS:                        8.7    22.39 )-----------( 953      ( 10 Dec 2024 05:14 )             29.2     Hemoglobin: 8.7 g/dL (12-10 @ 05:14)  Hemoglobin: 8.4 g/dL (12-09 @ 05:18)  Hemoglobin: 9.2 g/dL (12-08 @ 06:14)  Hemoglobin: 8.7 g/dL (12-06 @ 05:10)    CBC Full  -  ( 10 Dec 2024 05:14 )  WBC Count : 22.39 K/uL  RBC Count : 3.53 M/uL  Hemoglobin : 8.7 g/dL  Hematocrit : 29.2 %  Platelet Count - Automated : 953 K/uL  Mean Cell Volume : 82.7 fl  Mean Cell Hemoglobin : 24.6 pg  Mean Cell Hemoglobin Concentration : 29.8 g/dL  Auto Neutrophil # : 19.03 K/uL  Auto Lymphocyte # : 1.79 K/uL  Auto Monocyte # : 0.22 K/uL  Auto Eosinophil # : 0.00 K/uL  Auto Basophil # : 0.00 K/uL  Auto Neutrophil % : 76.0 %  Auto Lymphocyte % : 8.0 %  Auto Monocyte % : 1.0 %  Auto Eosinophil % : 0.0 %  Auto Basophil % : 0.0 %    12-10    131[L]  |  104  |  10  ----------------------------<  129[H]  5.0   |  28  |  0.53    Ca    9.4      10 Dec 2024 05:14  Phos  3.6     12-10  Mg     2.0     12-10    TPro  6.0  /  Alb  2.1[L]  /  TBili  0.2  /  DBili  x   /  AST  16  /  ALT  15  /  AlkPhos  91  12-10    Creatinine Trend: 0.53<--, 0.49<--, 0.48<--, 0.47<--, 0.44<--, 0.69<--  LIVER FUNCTIONS - ( 10 Dec 2024 05:14 )  Alb: 2.1 g/dL / Pro: 6.0 gm/dL / ALK PHOS: 91 U/L / ALT: 15 U/L / AST: 16 U/L / GGT: x               hs Troponin:            Urinalysis Basic - ( 10 Dec 2024 05:14 )    Color: x / Appearance: x / SG: x / pH: x  Gluc: 129 mg/dL / Ketone: x  / Bili: x / Urobili: x   Blood: x / Protein: x / Nitrite: x   Leuk Esterase: x / RBC: x / WBC x   Sq Epi: x / Non Sq Epi: x / Bacteria: x      CSF:                      EKG:   MICROBIOLOGY:    IMAGING:      Labs, imaging, EKG personally reviewed    RADIOLOGY & ADDITIONAL TESTS: Reviewed.

## 2024-12-10 NOTE — OCCUPATIONAL THERAPY INITIAL EVALUATION ADULT - GENERAL OBSERVATIONS, REHAB EVAL
Pt rec'd/left sitting in bedside recliner chair in CCU, +tele/monitors, +HM, +PIV, lines intact, agreeable to OT IE.

## 2024-12-10 NOTE — PROGRESS NOTE ADULT - ASSESSMENT
65 y/o F with PMH of HTN, HLD, hypothyroid, thrombocytosis (most recently on pegylated interferon alpha), GERD, ischemic colitis s/p left hemicolectomy, pAF and DVT/PE on Eliquis, recently 2 hospitalizations at [facility] in the past 2 months for CAP and also diagnosed with pericarditis, and HFpEF. Was last discharged home on 11/29 who presented to  on 11/30 for worsening chest/back pain, SOB and cough. On admission found to have bilateral pleural effusions and pericardial effusion w/ tamponade s/p bilateral pigtail catheters and pericardial drain, admitted to CCU for further evaluation and management. ON colchicine and ibuprofen for pericarditis treatment. Pleural and pericardial cytologies negative. Reportedly had extensive rheum w/u at [facility] which was negative.    Pericardial drain removed 12/4, R pigtail removed 12/5, L pigtail removed 12/6.    Plan:  - Pain control prn, will deescalate IV dilaudid to PO, c/w home meds  - BP stable, c/w colchicine and ibuprofen for pericarditis, TTEs reviewed, cards recs appreciated, hold AC given bloody pericardial fluid, c/w amio and metoprolol  - CXR w/ some recurrence of pleural effusion, remains on room air, will cont to follow AM CXR  - Discussed with pulm will resend rheum screening labs, start short course of steroid as well for pericarditis/pleuritis possibly related to ?pegylated interferon  - Regular diet, bowel regimen  - Stable renal indices, cont to monitor  - Completed course of cefepime for pna, blood cultures and pleural/pericardial fluid cultures engative  - c/w synthroid  - Hep subq DVT ppx  - PT following, PM&R eval for acute rehab, OT eval     63 y/o F with PMH of HTN, HLD, hypothyroid, thrombocytosis (most recently on pegylated interferon alpha), GERD, ischemic colitis s/p left hemicolectomy, pAF and DVT/PE on Eliquis, recently 2 hospitalizations at [facility] in the past 2 months for CAP and also diagnosed with pericarditis, and HFpEF. Was last discharged home on 11/29 who presented to  on 11/30 for worsening chest/back pain, SOB and cough. On admission found to have bilateral pleural effusions and pericardial effusion w/ tamponade s/p bilateral pigtail catheters and pericardial drain, admitted to CCU for further evaluation and management. On colchicine and ibuprofen for pericarditis treatment. Pleural and pericardial cytologies negative. Reportedly had extensive rheum w/u at [facility] which was negative.    Pericardial drain removed 12/4, R pigtail removed 12/5, L pigtail removed 12/6.    Plan:  - Pain control prn, c/w home meds  - BP stable, c/w colchicine and ibuprofen for pericarditis, TTEs reviewed, cards recs appreciated, hold AC given bloody pericardial fluid, c/w amio and metoprolol  - CXR this AM more clear ?after starting steroid yesterday, remains on room air, pt reports pain improved  - C/w short course of steroid taper per pulm for pericarditis/pleuritis possibly related to ?pegylated interferon, screening rheum w/u resent  - Regular diet, bowel regimen, NPO @12am for BM biopsy tmrw  - Stable renal indices, cont to monitor  - Completed course of cefepime for pna, blood cultures and pleural/pericardial fluid cultures negative  - WBC remains elevated, diff today notes metamyelocytes, discussed with heme/onc PA will pursue BM biopsy w/ IR tomorrow AM, pt states she may be interested in continuing follow-up with [facility] heme/onc team  - c/w synthroid  - Hep subq DVT ppx, hold AM dose for BM biopsy  - PT following, PM&R eval for acute rehab, OT eval

## 2024-12-10 NOTE — PROGRESS NOTE ADULT - SUBJECTIVE AND OBJECTIVE BOX
Interventional Radiology    Reason for consult: BMBX       HPI: 64y Female with  known ET but condition seems to be changing, need to reassess disease. IR consulted for BMBX.    Allergies: sulfa drugs (Vomiting; Nausea)  IV Contrast (Hives)    Medications (Abx/Cardiac/Anticoagulation/Blood Products)  aMIOdarone    Tablet: 200 milliGRAM(s) Oral (12-10 @ 08:17)  heparin   Injectable: 5000 Unit(s) SubCutaneous (12-10 @ 13:35)  metoprolol succinate ER: 25 milliGRAM(s) Oral (12-10 @ 08:18)    Data:    T(C): 36.8  HR: 69  BP: 133/52  RR: 15  SpO2: 95%    -WBC 22.39 / HgB 8.7 / Hct 29.2 / Plt 953  -Na 131 / Cl 104 / BUN 10 / Glucose 129  -K 5.0 / CO2 28 / Cr 0.53  -ALT 15 / Alk Phos 91 / T.Bili 0.2  -INR 1.15 / PTT 32.2          Radiology:     Assessment/Plan: - 64y Female with  known ET but condition seems to be changing, need to reassess disease. IR consulted for BMBX.    - Will plan for procedure on 12/11.  - Pt needs to be NPO AMN    - Needs STAT CBC, BMP, Coags, in AM.

## 2024-12-10 NOTE — OCCUPATIONAL THERAPY INITIAL EVALUATION ADULT - VISUAL ASSESSMENT: TRACKING
Pt with no r/o diplopia or blurry vision. Pt wears glasses at baseline, which were on t/o assessments./normal

## 2024-12-11 ENCOUNTER — RESULT REVIEW (OUTPATIENT)
Age: 64
End: 2024-12-11

## 2024-12-11 LAB
ALBUMIN SERPL ELPH-MCNC: 2.2 G/DL — LOW (ref 3.3–5)
ALP SERPL-CCNC: 84 U/L — SIGNIFICANT CHANGE UP (ref 40–120)
ALT FLD-CCNC: 16 U/L — SIGNIFICANT CHANGE UP (ref 12–78)
ANION GAP SERPL CALC-SCNC: 2 MMOL/L — LOW (ref 5–17)
APTT BLD: 29.6 SEC — SIGNIFICANT CHANGE UP (ref 24.5–35.6)
AST SERPL-CCNC: 22 U/L — SIGNIFICANT CHANGE UP (ref 15–37)
BASOPHILS # BLD AUTO: 0.28 K/UL — HIGH (ref 0–0.2)
BASOPHILS NFR BLD AUTO: 1.4 % — SIGNIFICANT CHANGE UP (ref 0–2)
BILIRUB SERPL-MCNC: 0.2 MG/DL — SIGNIFICANT CHANGE UP (ref 0.2–1.2)
BLD GP AB SCN SERPL QL: SIGNIFICANT CHANGE UP
BUN SERPL-MCNC: 13 MG/DL — SIGNIFICANT CHANGE UP (ref 7–23)
CALCIUM SERPL-MCNC: 9.2 MG/DL — SIGNIFICANT CHANGE UP (ref 8.5–10.1)
CHLORIDE SERPL-SCNC: 105 MMOL/L — SIGNIFICANT CHANGE UP (ref 96–108)
CO2 SERPL-SCNC: 27 MMOL/L — SIGNIFICANT CHANGE UP (ref 22–31)
CREAT SERPL-MCNC: 0.54 MG/DL — SIGNIFICANT CHANGE UP (ref 0.5–1.3)
EGFR: 104 ML/MIN/1.73M2 — SIGNIFICANT CHANGE UP
EOSINOPHIL # BLD AUTO: 0.14 K/UL — SIGNIFICANT CHANGE UP (ref 0–0.5)
EOSINOPHIL NFR BLD AUTO: 0.7 % — SIGNIFICANT CHANGE UP (ref 0–6)
GLUCOSE SERPL-MCNC: 87 MG/DL — SIGNIFICANT CHANGE UP (ref 70–99)
HCT VFR BLD CALC: 28.5 % — LOW (ref 34.5–45)
HGB BLD-MCNC: 8.4 G/DL — LOW (ref 11.5–15.5)
IMM GRANULOCYTES NFR BLD AUTO: 17.1 % — HIGH (ref 0–0.9)
INR BLD: 1.17 RATIO — HIGH (ref 0.85–1.16)
LYMPHOCYTES # BLD AUTO: 1.67 K/UL — SIGNIFICANT CHANGE UP (ref 1–3.3)
LYMPHOCYTES # BLD AUTO: 8.6 % — LOW (ref 13–44)
MAGNESIUM SERPL-MCNC: 1.9 MG/DL — SIGNIFICANT CHANGE UP (ref 1.6–2.6)
MCHC RBC-ENTMCNC: 24.5 PG — LOW (ref 27–34)
MCHC RBC-ENTMCNC: 29.5 G/DL — LOW (ref 32–36)
MCV RBC AUTO: 83.1 FL — SIGNIFICANT CHANGE UP (ref 80–100)
MONOCYTES # BLD AUTO: 0.97 K/UL — HIGH (ref 0–0.9)
MONOCYTES NFR BLD AUTO: 5 % — SIGNIFICANT CHANGE UP (ref 2–14)
NEUTROPHILS # BLD AUTO: 12.97 K/UL — HIGH (ref 1.8–7.4)
NEUTROPHILS NFR BLD AUTO: 67.2 % — SIGNIFICANT CHANGE UP (ref 43–77)
NRBC # BLD: 2 /100 WBCS — HIGH (ref 0–0)
PHOSPHATE SERPL-MCNC: 3.4 MG/DL — SIGNIFICANT CHANGE UP (ref 2.5–4.5)
PLATELET # BLD AUTO: 925 K/UL — HIGH (ref 150–400)
POTASSIUM SERPL-MCNC: 4.4 MMOL/L — SIGNIFICANT CHANGE UP (ref 3.5–5.3)
POTASSIUM SERPL-SCNC: 4.4 MMOL/L — SIGNIFICANT CHANGE UP (ref 3.5–5.3)
PROT SERPL-MCNC: 5.8 GM/DL — LOW (ref 6–8.3)
PROTHROM AB SERPL-ACNC: 13.4 SEC — SIGNIFICANT CHANGE UP (ref 9.9–13.4)
RBC # BLD: 3.43 M/UL — LOW (ref 3.8–5.2)
RBC # FLD: 19.8 % — HIGH (ref 10.3–14.5)
SODIUM SERPL-SCNC: 134 MMOL/L — LOW (ref 135–145)
WBC # BLD: 19.33 K/UL — HIGH (ref 3.8–10.5)
WBC # FLD AUTO: 19.33 K/UL — HIGH (ref 3.8–10.5)

## 2024-12-11 PROCEDURE — 38222 DX BONE MARROW BX & ASPIR: CPT | Mod: RT

## 2024-12-11 PROCEDURE — 85097 BONE MARROW INTERPRETATION: CPT

## 2024-12-11 PROCEDURE — 99232 SBSQ HOSP IP/OBS MODERATE 35: CPT

## 2024-12-11 PROCEDURE — 88291 CYTO/MOLECULAR REPORT: CPT

## 2024-12-11 PROCEDURE — 88360 TUMOR IMMUNOHISTOCHEM/MANUAL: CPT | Mod: 26

## 2024-12-11 PROCEDURE — 88313 SPECIAL STAINS GROUP 2: CPT | Mod: 26

## 2024-12-11 PROCEDURE — 88341 IMHCHEM/IMCYTCHM EA ADD ANTB: CPT | Mod: 26

## 2024-12-11 PROCEDURE — 88189 FLOWCYTOMETRY/READ 16 & >: CPT

## 2024-12-11 PROCEDURE — 99233 SBSQ HOSP IP/OBS HIGH 50: CPT

## 2024-12-11 PROCEDURE — 88305 TISSUE EXAM BY PATHOLOGIST: CPT | Mod: 26

## 2024-12-11 PROCEDURE — 88342 IMHCHEM/IMCYTCHM 1ST ANTB: CPT | Mod: 26

## 2024-12-11 PROCEDURE — 77012 CT SCAN FOR NEEDLE BIOPSY: CPT | Mod: 26

## 2024-12-11 RX ORDER — DESMOPRESSIN ACETATE 4 UG/ML
20 SOLUTION INTRAVENOUS ONCE
Refills: 0 | Status: COMPLETED | OUTPATIENT
Start: 2024-12-11 | End: 2024-12-11

## 2024-12-11 RX ORDER — HEPARIN SODIUM,PORCINE 1000/ML
5000 VIAL (ML) INJECTION EVERY 8 HOURS
Refills: 0 | Status: DISCONTINUED | OUTPATIENT
Start: 2024-12-11 | End: 2024-12-12

## 2024-12-11 RX ORDER — POVIDONE, POLYVINYL ALCOHOL 20; 27 G/1000ML; G/1000ML
1 SOLUTION OPHTHALMIC EVERY 4 HOURS
Refills: 0 | Status: DISCONTINUED | OUTPATIENT
Start: 2024-12-11 | End: 2024-12-12

## 2024-12-11 RX ADMIN — Medication 600 MILLIGRAM(S): at 14:56

## 2024-12-11 RX ADMIN — HYDROMORPHONE HYDROCHLORIDE 4 MILLIGRAM(S): 2 TABLET ORAL at 11:00

## 2024-12-11 RX ADMIN — DESMOPRESSIN ACETATE 220 MICROGRAM(S): 4 SOLUTION INTRAVENOUS at 09:37

## 2024-12-11 RX ADMIN — ONDANSETRON HYDROCHLORIDE 4 MILLIGRAM(S): 4 TABLET, FILM COATED ORAL at 08:16

## 2024-12-11 RX ADMIN — Medication 2 TABLET(S): at 21:25

## 2024-12-11 RX ADMIN — Medication 1 TABLET(S): at 11:41

## 2024-12-11 RX ADMIN — HYDROMORPHONE HYDROCHLORIDE 4 MILLIGRAM(S): 2 TABLET ORAL at 15:29

## 2024-12-11 RX ADMIN — HYDROMORPHONE HYDROCHLORIDE 4 MILLIGRAM(S): 2 TABLET ORAL at 15:45

## 2024-12-11 RX ADMIN — ROSUVASTATIN CALCIUM 20 MILLIGRAM(S): 5 TABLET, FILM COATED ORAL at 21:25

## 2024-12-11 RX ADMIN — Medication 150 MILLIGRAM(S): at 11:40

## 2024-12-11 RX ADMIN — HYDROMORPHONE HYDROCHLORIDE 4 MILLIGRAM(S): 2 TABLET ORAL at 23:50

## 2024-12-11 RX ADMIN — Medication 1 SPRAY(S): at 21:25

## 2024-12-11 RX ADMIN — Medication 0.6 MILLIGRAM(S): at 11:41

## 2024-12-11 RX ADMIN — Medication 1 TABLET(S): at 21:25

## 2024-12-11 RX ADMIN — HYDROMORPHONE HYDROCHLORIDE 4 MILLIGRAM(S): 2 TABLET ORAL at 10:45

## 2024-12-11 RX ADMIN — Medication 600 MILLIGRAM(S): at 21:25

## 2024-12-11 RX ADMIN — HYDROMORPHONE HYDROCHLORIDE 4 MILLIGRAM(S): 2 TABLET ORAL at 01:47

## 2024-12-11 RX ADMIN — Medication 1 SPRAY(S): at 11:41

## 2024-12-11 RX ADMIN — HYDROMORPHONE HYDROCHLORIDE 4 MILLIGRAM(S): 2 TABLET ORAL at 05:46

## 2024-12-11 RX ADMIN — LAMOTRIGINE 200 MILLIGRAM(S): 50 TABLET, EXTENDED RELEASE ORAL at 11:39

## 2024-12-11 RX ADMIN — METOPROLOL TARTRATE 25 MILLIGRAM(S): 100 TABLET, FILM COATED ORAL at 11:42

## 2024-12-11 RX ADMIN — Medication 5000 UNIT(S): at 21:24

## 2024-12-11 RX ADMIN — CHLORHEXIDINE GLUCONATE 1 APPLICATION(S): 1.2 RINSE ORAL at 05:47

## 2024-12-11 RX ADMIN — ONDANSETRON HYDROCHLORIDE 4 MILLIGRAM(S): 4 TABLET, FILM COATED ORAL at 21:24

## 2024-12-11 RX ADMIN — PREDNISONE 20 MILLIGRAM(S): 20 TABLET ORAL at 11:41

## 2024-12-11 RX ADMIN — POLYETHYLENE GLYCOL 3350 17 GRAM(S): 17 POWDER, FOR SOLUTION ORAL at 11:37

## 2024-12-11 RX ADMIN — POVIDONE, POLYVINYL ALCOHOL 1 DROP(S): 20; 27 SOLUTION OPHTHALMIC at 13:17

## 2024-12-11 RX ADMIN — PANTOPRAZOLE SODIUM 40 MILLIGRAM(S): 40 TABLET, DELAYED RELEASE ORAL at 05:46

## 2024-12-11 RX ADMIN — AMIODARONE HYDROCHLORIDE 200 MILLIGRAM(S): 200 TABLET ORAL at 11:39

## 2024-12-11 RX ADMIN — Medication 600 MILLIGRAM(S): at 13:56

## 2024-12-11 NOTE — PROGRESS NOTE ADULT - TIME BILLING
Patient seen discussed with staff on rounds
Patient seen labs reviewed d/w cardiology
Patient seen discussed with staff on rounds, labs cxrs reviewed
.
.
Patient seen discussed with staff on rounds, labs echo, cxr reviewed
.
Patient seen discussed with Cardiology, Pulmonary,CTS
Patient seen discussed with staff on rounds, labs cxr reviewed d/w ID

## 2024-12-11 NOTE — PROGRESS NOTE ADULT - ASSESSMENT
65 y/o F with PMH of HTN, HLD, hypothyroid, thrombocytosis (most recently on pegylated interferon alpha), GERD, ischemic colitis s/p left hemicolectomy, pAF and DVT/PE on Eliquis, recently 2 hospitalizations at [facility] in the past 2 months for CAP and also diagnosed with pericarditis, and HFpEF. Was last discharged home on 11/29 who presented to  on 11/30 for worsening chest/back pain, SOB and cough. On admission found to have bilateral pleural effusions and pericardial effusion w/ tamponade s/p bilateral pigtail catheters and pericardial drain, admitted to CCU for further evaluation and management. On colchicine and ibuprofen for pericarditis treatment. Pleural and pericardial cytologies negative. Reportedly had extensive rheum w/u at [facility] which was negative.    Pericardial drain removed 12/4, R pigtail removed 12/5, L pigtail removed 12/6.    Plan:  - Pain control prn, c/w home meds  - BP stable, c/w colchicine and ibuprofen for pericarditis, TTEs reviewed, cards recs appreciated, hold AC given bloody pericardial fluid, c/w amio and metoprolol  - CXR this AM more clear ?after starting steroid yesterday, remains on room air, pt reports pain improved  - C/w short course of steroid taper per pulm for pericarditis/pleuritis possibly related to ?pegylated interferon, screening rheum w/u resent  - Regular diet, bowel regimen, NPO @12am for BM biopsy tmrw  - Stable renal indices, cont to monitor  - Completed course of cefepime for pna, blood cultures and pleural/pericardial fluid cultures negative  - WBC remains elevated, diff today notes metamyelocytes, discussed with heme/onc PA will pursue BM biopsy w/ IR tomorrow AM, pt states she may be interested in continuing follow-up with [facility] heme/onc team  - c/w synthroid  - Hep subq DVT ppx, hold AM dose for BM biopsy  - PT following, PM&R eval for acute rehab, OT eval     65 y/o F with PMH of HTN, HLD, hypothyroid, thrombocytosis (most recently on pegylated interferon alpha), GERD, ischemic colitis s/p left hemicolectomy, pAF and DVT/PE on Eliquis, recently 2 hospitalizations at [facility] in the past 2 months for CAP and also diagnosed with pericarditis, and HFpEF. Was last discharged home on 11/29 who presented to  on 11/30 for worsening chest/back pain, SOB and cough. On admission found to have bilateral pleural effusions and pericardial effusion w/ tamponade s/p bilateral pigtail catheters and pericardial drain, admitted to CCU for further evaluation and management. On colchicine and ibuprofen for pericarditis treatment. Pleural and pericardial cytologies negative. Reportedly had extensive rheum w/u at [facility] which was negative.    Pericardial drain removed 12/4, R pigtail removed 12/5, L pigtail removed 12/6.    Plan:  - Pain control prn, c/w home meds  - BP stable, c/w colchicine and ibuprofen for pericarditis, TTEs reviewed, cards recs appreciated, hold AC given bloody pericardial fluid, c/w amio and metoprolol  - CXR more clear after starting steroid 12/9 with improved symptoms, remains on room air, pt reports pain improved  - C/w short course of steroid taper per pulm for pericarditis/pleuritis possibly related to ?pegylated interferon, screening rheum w/u resent  - Regular diet, bowel regimen  - Stable renal indices, cont to monitor  - Completed course of cefepime for pna, blood cultures and pleural/pericardial fluid cultures negative  - WBC remains elevated, s/p BM biopsy 12/10  - c/w synthroid  - Hep subq DVT ppx, held for BM biopsy today  - PT following, PM&R eval for acute rehab, OT eval     63 y/o F with PMH of HTN, HLD, hypothyroid, thrombocytosis (most recently on pegylated interferon alpha), GERD, ischemic colitis s/p left hemicolectomy, pAF and DVT/PE on Eliquis, recently 2 hospitalizations at [facility] in the past 2 months for CAP and also diagnosed with pericarditis, and HFpEF. Was last discharged home on 11/29 who presented to  on 11/30 for worsening chest/back pain, SOB and cough. On admission found to have bilateral pleural effusions and pericardial effusion w/ tamponade s/p bilateral pigtail catheters and pericardial drain, admitted to CCU for further evaluation and management. On colchicine and ibuprofen for pericarditis treatment. Pleural and pericardial cytologies negative. Reportedly had extensive rheum w/u at [facility] which was negative.    Pericardial drain removed 12/4, R pigtail removed 12/5, L pigtail removed 12/6.    Plan:  - Pain control prn, c/w home meds  - BP stable, c/w colchicine (x3 months) and ibuprofen (x2 weeks then downtitration) for pericarditis, TTEs reviewed, cards recs appreciated, hold AC given bloody pericardial fluid, c/w amio and metoprolol  - CXR more clear after starting steroid 12/9 with improved symptoms, remains on room air, pt reports pain improved  - C/w short course of low dose steroid taper per pulm for pericarditis/pleuritis possibly related to ?pegylated interferon, screening rheum w/u resent  - Regular diet, bowel regimen  - Stable renal indices, cont to monitor  - Completed course of cefepime for pna, blood cultures and pleural/pericardial fluid cultures negative  - WBC remains elevated, s/p BM biopsy 12/10, f/u results, heme/onc recs  - c/w synthroid  - Hep subq DVT ppx, held for BM biopsy today, can restart tonight per IR  - PT following, PM&R eval for acute rehab, OT eval    Dispo: Pt medically stable for transfer to med-surg, d/w Dr. Castillo, updated pt at bedside

## 2024-12-11 NOTE — PROGRESS NOTE ADULT - REASON FOR ADMISSION
chest pain
pericardial effusion
sob
weakness , pleuritic pain, pericardial effusion
SOB and cough
sob
Pleural and pericardial effusion
Pleural and pericardial effusion
pericardial effusion
pericardial effusion
shortness of pain, pleuritic pain
sob
SOB and cough
SOB and cough
SOB
pericarditis
pericarditis, pericardial effusion

## 2024-12-11 NOTE — PROGRESS NOTE ADULT - SUBJECTIVE AND OBJECTIVE BOX
OVERNIGHT EVENTS / SUBJECTIVE: Patient seen and examined at bedside.     OBJECTIVE:    VITAL SIGNS:  ICU Vital Signs Last 24 Hrs  T(C): 35.8 (11 Dec 2024 05:15), Max: 36.8 (10 Dec 2024 15:49)  T(F): 96.5 (11 Dec 2024 05:15), Max: 98.3 (10 Dec 2024 15:49)  HR: 55 (11 Dec 2024 05:00) (55 - 75)  BP: 132/47 (11 Dec 2024 04:00) (125/45 - 149/49)  BP(mean): 72 (11 Dec 2024 04:00) (67 - 79)  ABP: --  ABP(mean): --  RR: 19 (11 Dec 2024 05:00) (14 - 24)  SpO2: 94% (11 Dec 2024 05:00) (94% - 96%)    O2 Parameters below as of 10 Dec 2024 23:00  Patient On (Oxygen Delivery Method): room air              12-10 @ 07:01  -  12-11 @ 07:00  --------------------------------------------------------  IN: 480 mL / OUT: 0 mL / NET: 480 mL      CAPILLARY BLOOD GLUCOSE          PHYSICAL EXAM:    General: NAD, appears more comfortable today  HEENT: NC/AT; clear conjunctiva  Neck: supple  Respiratory: Decreased BS L base  Cardiovascular: +S1/S2; RRR  Abdomen: soft, nontender, nondistended  Extremities: Warm, no LE edema  Skin: normal color and turgor; no rash  Neurological: A&Ox3, no focal deficits    MEDICATIONS:  MEDICATIONS  (STANDING):  aMIOdarone    Tablet 200 milliGRAM(s) Oral daily  buPROPion XL (24-Hour) . 150 milliGRAM(s) Oral daily  chlorhexidine 4% Liquid 1 Application(s) Topical <User Schedule>  colchicine 0.6 milliGRAM(s) Oral daily  fluticasone propionate 50 MICROgram(s)/spray Nasal Spray 1 Spray(s) Both Nostrils two times a day  ibuprofen  Tablet. 600 milliGRAM(s) Oral every 8 hours  lactobacillus acidophilus 1 Tablet(s) Oral every 12 hours  lamoTRIgine 200 milliGRAM(s) Oral daily  levothyroxine 88 MICROGram(s) Oral daily  metoprolol succinate ER 25 milliGRAM(s) Oral daily  multivitamin 1 Tablet(s) Oral daily  naloxone Injectable 0.4 milliGRAM(s) IV Push once  pantoprazole    Tablet 40 milliGRAM(s) Oral before breakfast  polyethylene glycol 3350 17 Gram(s) Oral daily  predniSONE   Tablet 20 milliGRAM(s) Oral daily  rosuvastatin 20 milliGRAM(s) Oral at bedtime  senna 2 Tablet(s) Oral at bedtime    MEDICATIONS  (PRN):  aluminum hydroxide/magnesium hydroxide/simethicone Suspension 30 milliLiter(s) Oral every 4 hours PRN Dyspepsia  bisacodyl 5 milliGRAM(s) Oral daily PRN Constipation  cyclobenzaprine 10 milliGRAM(s) Oral three times a day PRN Muscle Spasm  guaifenesin/dextromethorphan Oral Liquid 10 milliLiter(s) Oral every 4 hours PRN Cough  hydrocortisone hemorrhoidal Suppository 1 Suppository(s) Rectal two times a day PRN pain  HYDROmorphone   Tablet 4 milliGRAM(s) Oral every 4 hours PRN Severe Pain (7 - 10)  lactulose Syrup 15 Gram(s) Oral daily PRN constipation  loperamide 2 milliGRAM(s) Oral every 2 hours PRN Diarrhea  meclizine 25 milliGRAM(s) Oral two times a day PRN Dizziness  melatonin 3 milliGRAM(s) Oral at bedtime PRN Insomnia  ondansetron Injectable 4 milliGRAM(s) IV Push every 8 hours PRN Nausea and/or Vomiting  oxycodone    5 mG/acetaminophen 325 mG 1 Tablet(s) Oral every 4 hours PRN Moderate Pain (4 - 6)  simethicone 80 milliGRAM(s) Chew every 6 hours PRN Gas      ALLERGIES:  Allergies    sulfa drugs (Vomiting; Nausea)  IV Contrast (Hives)    Intolerances        LABS:                        8.4    19.33 )-----------( 925      ( 11 Dec 2024 05:40 )             28.5     Hemoglobin: 8.4 g/dL (12-11 @ 05:40)  Hemoglobin: 8.7 g/dL (12-10 @ 05:14)  Hemoglobin: 8.4 g/dL (12-09 @ 05:18)  Hemoglobin: 9.2 g/dL (12-08 @ 06:14)    CBC Full  -  ( 11 Dec 2024 05:40 )  WBC Count : 19.33 K/uL  RBC Count : 3.43 M/uL  Hemoglobin : 8.4 g/dL  Hematocrit : 28.5 %  Platelet Count - Automated : 925 K/uL  Mean Cell Volume : 83.1 fl  Mean Cell Hemoglobin : 24.5 pg  Mean Cell Hemoglobin Concentration : 29.5 g/dL  Auto Neutrophil # : 12.97 K/uL  Auto Lymphocyte # : 1.67 K/uL  Auto Monocyte # : 0.97 K/uL  Auto Eosinophil # : 0.14 K/uL  Auto Basophil # : 0.28 K/uL  Auto Neutrophil % : 67.2 %  Auto Lymphocyte % : 8.6 %  Auto Monocyte % : 5.0 %  Auto Eosinophil % : 0.7 %  Auto Basophil % : 1.4 %    12-11    134[L]  |  105  |  13  ----------------------------<  87  4.4   |  27  |  0.54    Ca    9.2      11 Dec 2024 05:40  Phos  3.4     12-11  Mg     1.9     12-11    TPro  5.8[L]  /  Alb  2.2[L]  /  TBili  0.2  /  DBili  x   /  AST  22  /  ALT  16  /  AlkPhos  84  12-11    Creatinine Trend: 0.54<--, 0.53<--, 0.49<--, 0.48<--, 0.47<--, 0.44<--  LIVER FUNCTIONS - ( 11 Dec 2024 05:40 )  Alb: 2.2 g/dL / Pro: 5.8 gm/dL / ALK PHOS: 84 U/L / ALT: 16 U/L / AST: 22 U/L / GGT: x           PT/INR - ( 11 Dec 2024 05:40 )   PT: 13.4 sec;   INR: 1.17 ratio         PTT - ( 11 Dec 2024 05:40 )  PTT:29.6 sec    hs Troponin:            Urinalysis Basic - ( 11 Dec 2024 05:40 )    Color: x / Appearance: x / SG: x / pH: x  Gluc: 87 mg/dL / Ketone: x  / Bili: x / Urobili: x   Blood: x / Protein: x / Nitrite: x   Leuk Esterase: x / RBC: x / WBC x   Sq Epi: x / Non Sq Epi: x / Bacteria: x      CSF:                      EKG:   MICROBIOLOGY:    IMAGING:      Labs, imaging, EKG personally reviewed    RADIOLOGY & ADDITIONAL TESTS: Reviewed. OVERNIGHT EVENTS / SUBJECTIVE: Patient seen and examined at bedside.     No acute events overnight. PT reports pain well-controlled today. Denies SOB. Pending BM biopsy today.    OBJECTIVE:    VITAL SIGNS:  ICU Vital Signs Last 24 Hrs  T(C): 35.8 (11 Dec 2024 05:15), Max: 36.8 (10 Dec 2024 15:49)  T(F): 96.5 (11 Dec 2024 05:15), Max: 98.3 (10 Dec 2024 15:49)  HR: 55 (11 Dec 2024 05:00) (55 - 75)  BP: 132/47 (11 Dec 2024 04:00) (125/45 - 149/49)  BP(mean): 72 (11 Dec 2024 04:00) (67 - 79)  ABP: --  ABP(mean): --  RR: 19 (11 Dec 2024 05:00) (14 - 24)  SpO2: 94% (11 Dec 2024 05:00) (94% - 96%)    O2 Parameters below as of 10 Dec 2024 23:00  Patient On (Oxygen Delivery Method): room air              12-10 @ 07:01  -  12-11 @ 07:00  --------------------------------------------------------  IN: 480 mL / OUT: 0 mL / NET: 480 mL      CAPILLARY BLOOD GLUCOSE          PHYSICAL EXAM:    General: NAD, appears more comfortable today  HEENT: NC/AT; clear conjunctiva  Neck: supple  Respiratory: CTA b/l  Cardiovascular: +S1/S2; RRR  Abdomen: soft, nontender, nondistended  Extremities: Warm, no LE edema  Skin: normal color and turgor; no rash  Neurological: A&Ox3, no focal deficits    MEDICATIONS:  MEDICATIONS  (STANDING):  aMIOdarone    Tablet 200 milliGRAM(s) Oral daily  buPROPion XL (24-Hour) . 150 milliGRAM(s) Oral daily  chlorhexidine 4% Liquid 1 Application(s) Topical <User Schedule>  colchicine 0.6 milliGRAM(s) Oral daily  fluticasone propionate 50 MICROgram(s)/spray Nasal Spray 1 Spray(s) Both Nostrils two times a day  ibuprofen  Tablet. 600 milliGRAM(s) Oral every 8 hours  lactobacillus acidophilus 1 Tablet(s) Oral every 12 hours  lamoTRIgine 200 milliGRAM(s) Oral daily  levothyroxine 88 MICROGram(s) Oral daily  metoprolol succinate ER 25 milliGRAM(s) Oral daily  multivitamin 1 Tablet(s) Oral daily  naloxone Injectable 0.4 milliGRAM(s) IV Push once  pantoprazole    Tablet 40 milliGRAM(s) Oral before breakfast  polyethylene glycol 3350 17 Gram(s) Oral daily  predniSONE   Tablet 20 milliGRAM(s) Oral daily  rosuvastatin 20 milliGRAM(s) Oral at bedtime  senna 2 Tablet(s) Oral at bedtime    MEDICATIONS  (PRN):  aluminum hydroxide/magnesium hydroxide/simethicone Suspension 30 milliLiter(s) Oral every 4 hours PRN Dyspepsia  bisacodyl 5 milliGRAM(s) Oral daily PRN Constipation  cyclobenzaprine 10 milliGRAM(s) Oral three times a day PRN Muscle Spasm  guaifenesin/dextromethorphan Oral Liquid 10 milliLiter(s) Oral every 4 hours PRN Cough  hydrocortisone hemorrhoidal Suppository 1 Suppository(s) Rectal two times a day PRN pain  HYDROmorphone   Tablet 4 milliGRAM(s) Oral every 4 hours PRN Severe Pain (7 - 10)  lactulose Syrup 15 Gram(s) Oral daily PRN constipation  loperamide 2 milliGRAM(s) Oral every 2 hours PRN Diarrhea  meclizine 25 milliGRAM(s) Oral two times a day PRN Dizziness  melatonin 3 milliGRAM(s) Oral at bedtime PRN Insomnia  ondansetron Injectable 4 milliGRAM(s) IV Push every 8 hours PRN Nausea and/or Vomiting  oxycodone    5 mG/acetaminophen 325 mG 1 Tablet(s) Oral every 4 hours PRN Moderate Pain (4 - 6)  simethicone 80 milliGRAM(s) Chew every 6 hours PRN Gas      ALLERGIES:  Allergies    sulfa drugs (Vomiting; Nausea)  IV Contrast (Hives)    Intolerances        LABS:                        8.4    19.33 )-----------( 925      ( 11 Dec 2024 05:40 )             28.5     Hemoglobin: 8.4 g/dL (12-11 @ 05:40)  Hemoglobin: 8.7 g/dL (12-10 @ 05:14)  Hemoglobin: 8.4 g/dL (12-09 @ 05:18)  Hemoglobin: 9.2 g/dL (12-08 @ 06:14)    CBC Full  -  ( 11 Dec 2024 05:40 )  WBC Count : 19.33 K/uL  RBC Count : 3.43 M/uL  Hemoglobin : 8.4 g/dL  Hematocrit : 28.5 %  Platelet Count - Automated : 925 K/uL  Mean Cell Volume : 83.1 fl  Mean Cell Hemoglobin : 24.5 pg  Mean Cell Hemoglobin Concentration : 29.5 g/dL  Auto Neutrophil # : 12.97 K/uL  Auto Lymphocyte # : 1.67 K/uL  Auto Monocyte # : 0.97 K/uL  Auto Eosinophil # : 0.14 K/uL  Auto Basophil # : 0.28 K/uL  Auto Neutrophil % : 67.2 %  Auto Lymphocyte % : 8.6 %  Auto Monocyte % : 5.0 %  Auto Eosinophil % : 0.7 %  Auto Basophil % : 1.4 %    12-11    134[L]  |  105  |  13  ----------------------------<  87  4.4   |  27  |  0.54    Ca    9.2      11 Dec 2024 05:40  Phos  3.4     12-11  Mg     1.9     12-11    TPro  5.8[L]  /  Alb  2.2[L]  /  TBili  0.2  /  DBili  x   /  AST  22  /  ALT  16  /  AlkPhos  84  12-11    Creatinine Trend: 0.54<--, 0.53<--, 0.49<--, 0.48<--, 0.47<--, 0.44<--  LIVER FUNCTIONS - ( 11 Dec 2024 05:40 )  Alb: 2.2 g/dL / Pro: 5.8 gm/dL / ALK PHOS: 84 U/L / ALT: 16 U/L / AST: 22 U/L / GGT: x           PT/INR - ( 11 Dec 2024 05:40 )   PT: 13.4 sec;   INR: 1.17 ratio         PTT - ( 11 Dec 2024 05:40 )  PTT:29.6 sec    hs Troponin:            Urinalysis Basic - ( 11 Dec 2024 05:40 )    Color: x / Appearance: x / SG: x / pH: x  Gluc: 87 mg/dL / Ketone: x  / Bili: x / Urobili: x   Blood: x / Protein: x / Nitrite: x   Leuk Esterase: x / RBC: x / WBC x   Sq Epi: x / Non Sq Epi: x / Bacteria: x      CSF:                      EKG:   MICROBIOLOGY:    IMAGING:      Labs, imaging, EKG personally reviewed    RADIOLOGY & ADDITIONAL TESTS: Reviewed.

## 2024-12-11 NOTE — PROGRESS NOTE ADULT - SUBJECTIVE AND OBJECTIVE BOX
HPI:  64-year-old F with PMH of HTN, HLD, hypothyroid, primary lymphedema, central thrombocytosis, GERD post bariatric surgery, ischemic colitis s/p left hemicolectomy, pAF and DVT/PE on Eliquis, HFpEF, and recently 2 hospitalizations at [facility] in the past 2 months for CAP, and also diagnosed with pericarditis, and HFpEF. Was last discharged home on 11/29 on PO ATBs (Doxycycline). Came to  on 11/30 after visiting RN recommended  further assessment of back & chest pain, SOB, and productive cough. Pt reports symptoms similar to why/when she was readmitted to [facility] w/ Dx of pericarditis/CHF/PNA. No F/C, abd pain, N/V.     Review of Systems:  CONSTITUTIONAL: as per hpi   EYES/ENT: No visual changes;  No vertigo or throat pain   NECK: No pain or stiffness  RESPIRATORY: As per hpi   CARDIOVASCULAR: as per hpi   GASTROINTESTINAL: No abdominal or epigastric pain. No nausea, vomiting, or hematemesis; No diarrhea or constipation.   GENITOURINARY: No dysuria, frequency or hematuria  NEUROLOGICAL: No numbness or weakness  SKIN: No itching, burning, rashes, or lesions   All other review of systems is negative unless indicated above    PHYSICAL EXAM:    Vital Signs Last 24 Hrs  T(C): 35.9 (11 Dec 2024 10:40), Max: 36.8 (10 Dec 2024 15:49)  T(F): 96.7 (11 Dec 2024 10:40), Max: 98.3 (10 Dec 2024 15:49)  HR: 60 (11 Dec 2024 13:00) (55 - 75)  BP: 115/45 (11 Dec 2024 13:00) (115/45 - 149/49)  BP(mean): 66 (11 Dec 2024 13:00) (65 - 115)  RR: 16 (11 Dec 2024 13:00) (14 - 24)  SpO2: 94% (11 Dec 2024 12:00) (89% - 96%)    Parameters below as of 11 Dec 2024 08:03  Patient On (Oxygen Delivery Method): room air        GENERAL: comfortable   HEAD:  Atraumatic, Normocephalic  EYES: EOMI, PERRLA, conjunctiva and sclera clear  HEENT: Moist mucous membranes  NECK: Supple, No JVD  NERVOUS SYSTEM:  Alert & Oriented X3, Motor Strength 5/5 B/L upper and lower extremities; DTRs 2+ intact and symmetric  CHEST/LUNG: Clear to auscultation bilaterally; No rales, rhonchi, wheezing, or rubs  HEART:S1S2 normal,   ABDOMEN: Soft, Nontender, Nondistended; Bowel sounds present  GENITOURINARY- Voiding, no palpable bladder  EXTREMITIES:  2+ Peripheral Pulses, No clubbing, cyanosis, or edema  MUSCULOSKELETAL No muscle tenderness, Muscle tone normal, No joint tenderness, no Joint swelling, Joint range of motion-normal  SKIN-no rash, no lesion  PSYCH- Mood stable  LYMPH Node- No palpable lymph node    LABS:                        8.4    19.33 )-----------( 925      ( 11 Dec 2024 05:40 )             28.5     12-11    134[L]  |  105  |  13  ----------------------------<  87  4.4   |  27  |  0.54    Ca    9.2      11 Dec 2024 05:40  Phos  3.4     12-11  Mg     1.9     12-11    TPro  5.8[L]  /  Alb  2.2[L]  /  TBili  0.2  /  DBili  x   /  AST  22  /  ALT  16  /  AlkPhos  84  12-11    PT/INR - ( 11 Dec 2024 05:40 )   PT: 13.4 sec;   INR: 1.17 ratio         PTT - ( 11 Dec 2024 05:40 )  PTT:29.6 sec  Urinalysis Basic - ( 11 Dec 2024 05:40 )    Color: x / Appearance: x / SG: x / pH: x  Gluc: 87 mg/dL / Ketone: x  / Bili: x / Urobili: x   Blood: x / Protein: x / Nitrite: x   Leuk Esterase: x / RBC: x / WBC x   Sq Epi: x / Non Sq Epi: x / Bacteria: x        CAPILLARY BLOOD GLUCOSE                Standing medicine  aluminum hydroxide/magnesium hydroxide/simethicone Suspension 30 milliLiter(s) Oral every 4 hours PRN  aMIOdarone    Tablet 200 milliGRAM(s) Oral daily  artificial tears (preservative free) Ophthalmic Solution 1 Drop(s) Both EYES every 4 hours PRN  bisacodyl 5 milliGRAM(s) Oral daily PRN  buPROPion XL (24-Hour) . 150 milliGRAM(s) Oral daily  chlorhexidine 4% Liquid 1 Application(s) Topical <User Schedule>  colchicine 0.6 milliGRAM(s) Oral daily  cyclobenzaprine 10 milliGRAM(s) Oral three times a day PRN  fluticasone propionate 50 MICROgram(s)/spray Nasal Spray 1 Spray(s) Both Nostrils two times a day  guaifenesin/dextromethorphan Oral Liquid 10 milliLiter(s) Oral every 4 hours PRN  hydrocortisone hemorrhoidal Suppository 1 Suppository(s) Rectal two times a day PRN  HYDROmorphone   Tablet 4 milliGRAM(s) Oral every 4 hours PRN  ibuprofen  Tablet. 600 milliGRAM(s) Oral every 8 hours  lactobacillus acidophilus 1 Tablet(s) Oral every 12 hours  lactulose Syrup 15 Gram(s) Oral daily PRN  lamoTRIgine 200 milliGRAM(s) Oral daily  levothyroxine 88 MICROGram(s) Oral daily  loperamide 2 milliGRAM(s) Oral every 2 hours PRN  meclizine 25 milliGRAM(s) Oral two times a day PRN  melatonin 3 milliGRAM(s) Oral at bedtime PRN  metoprolol succinate ER 25 milliGRAM(s) Oral daily  multivitamin 1 Tablet(s) Oral daily  naloxone Injectable 0.4 milliGRAM(s) IV Push once  ondansetron Injectable 4 milliGRAM(s) IV Push every 8 hours PRN  oxycodone    5 mG/acetaminophen 325 mG 1 Tablet(s) Oral every 4 hours PRN  pantoprazole    Tablet 40 milliGRAM(s) Oral before breakfast  polyethylene glycol 3350 17 Gram(s) Oral daily  predniSONE   Tablet 20 milliGRAM(s) Oral daily  rosuvastatin 20 milliGRAM(s) Oral at bedtime  senna 2 Tablet(s) Oral at bedtime  simethicone 80 milliGRAM(s) Chew every 6 hours PRN        # Reviewed today's blood work which include CBC, BMP     #Discussed with other team member- ICU team -Dr. Durham    #Discussed with pt in detail          Total time spent 51 minutes
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
## 2024-12-11 NOTE — PROGRESS NOTE ADULT - SUBJECTIVE AND OBJECTIVE BOX
Subjective:   patient seen and examined, no new events. Plans for BM biopsy today    Denies cough, expectoration, hemoptysis, pleuritic chest pain.    ROS:  Gen: Denies fever, chills, rigors  HEENT: Denies neck swelling, difficulty swallowing, nasal congestion  PULM: As above  Cardiology: Denies palpitation, dizziness,         GENERAL APPEARANCE:  Pt. is not currently dyspneic, in no distress. Pt. is alert and pleasant.  HEENT:   No icterus. Mucous membranes moist,   NECK:  Supple.  Jugular venous pressure not elevated.   HEART:    Regular. Normal S1 and S2. There are no murmurs, rubs or gallops appreciated  CHEST:  diminished breath sounds at both bases.   EXTREMITIES:  There is no cyanosis, clubbing or edema.     Vital Signs Last 24 Hrs  T(C): 35.9 (11 Dec 2024 10:40), Max: 36.8 (10 Dec 2024 15:49)  T(F): 96.7 (11 Dec 2024 10:40), Max: 98.3 (10 Dec 2024 15:49)  HR: 60 (11 Dec 2024 13:00) (55 - 75)  BP: 115/45 (11 Dec 2024 13:00) (115/45 - 149/49)  BP(mean): 66 (11 Dec 2024 13:00) (65 - 115)  RR: 16 (11 Dec 2024 13:00) (14 - 24)  SpO2: 94% (11 Dec 2024 12:00) (89% - 96%)    Parameters below as of 11 Dec 2024 08:03  Patient On (Oxygen Delivery Method): room air            Allergies    sulfa drugs (Vomiting; Nausea)  IV Contrast (Hives)    Intolerances      LABS:                        8.4    19.33 )-----------( 925      ( 11 Dec 2024 05:40 )             28.5     12-11    134[L]  |  105  |  13  ----------------------------<  87  4.4   |  27  |  0.54    Ca    9.2      11 Dec 2024 05:40  Phos  3.4     12-11  Mg     1.9     12-11    TPro  5.8[L]  /  Alb  2.2[L]  /  TBili  0.2  /  DBili  x   /  AST  22  /  ALT  16  /  AlkPhos  84  12-11    PT/INR - ( 11 Dec 2024 05:40 )   PT: 13.4 sec;   INR: 1.17 ratio         PTT - ( 11 Dec 2024 05:40 )  PTT:29.6 sec    RADIOLOGY & ADDITIONAL TESTS:    Recent Chest CT and CXR reviewed    MEDICATIONS  (STANDING):  aMIOdarone    Tablet 200 milliGRAM(s) Oral daily  buPROPion XL (24-Hour) . 150 milliGRAM(s) Oral daily  chlorhexidine 4% Liquid 1 Application(s) Topical <User Schedule>  colchicine 0.6 milliGRAM(s) Oral daily  fluticasone propionate 50 MICROgram(s)/spray Nasal Spray 1 Spray(s) Both Nostrils two times a day  ibuprofen  Tablet. 600 milliGRAM(s) Oral every 8 hours  lactobacillus acidophilus 1 Tablet(s) Oral every 12 hours  lamoTRIgine 200 milliGRAM(s) Oral daily  levothyroxine 88 MICROGram(s) Oral daily  metoprolol succinate ER 25 milliGRAM(s) Oral daily  multivitamin 1 Tablet(s) Oral daily  naloxone Injectable 0.4 milliGRAM(s) IV Push once  pantoprazole    Tablet 40 milliGRAM(s) Oral before breakfast  polyethylene glycol 3350 17 Gram(s) Oral daily  predniSONE   Tablet 20 milliGRAM(s) Oral daily  rosuvastatin 20 milliGRAM(s) Oral at bedtime  senna 2 Tablet(s) Oral at bedtime    MEDICATIONS  (PRN):  aluminum hydroxide/magnesium hydroxide/simethicone Suspension 30 milliLiter(s) Oral every 4 hours PRN Dyspepsia  artificial tears (preservative free) Ophthalmic Solution 1 Drop(s) Both EYES every 4 hours PRN Dry Eyes  bisacodyl 5 milliGRAM(s) Oral daily PRN Constipation  cyclobenzaprine 10 milliGRAM(s) Oral three times a day PRN Muscle Spasm  guaifenesin/dextromethorphan Oral Liquid 10 milliLiter(s) Oral every 4 hours PRN Cough  hydrocortisone hemorrhoidal Suppository 1 Suppository(s) Rectal two times a day PRN pain  HYDROmorphone   Tablet 4 milliGRAM(s) Oral every 4 hours PRN Severe Pain (7 - 10)  lactulose Syrup 15 Gram(s) Oral daily PRN constipation  loperamide 2 milliGRAM(s) Oral every 2 hours PRN Diarrhea  meclizine 25 milliGRAM(s) Oral two times a day PRN Dizziness  melatonin 3 milliGRAM(s) Oral at bedtime PRN Insomnia  ondansetron Injectable 4 milliGRAM(s) IV Push every 8 hours PRN Nausea and/or Vomiting  oxycodone    5 mG/acetaminophen 325 mG 1 Tablet(s) Oral every 4 hours PRN Moderate Pain (4 - 6)  simethicone 80 milliGRAM(s) Chew every 6 hours PRN Gas      Impression:    Bilateral pleural effusion ( exudative/ s/p drainage)  Pericardial effusion ( s/p drainage)  Leucocytosis  Thrombocytosis  hypoalbuminemia  Negative rheum work up.  vWB disease    Recommendations:    -Monitor for pleural effusion, small on recent chest Xray  -I reviewed the prior notes, unable to identify a unifying diagnosis for exudative pleuro-pericaridal effusion.  - negative rheum wup.  - Empiric steroid taper trial. can potentialy taper  quickly  - Rest of the care per primary team.

## 2024-12-11 NOTE — PROGRESS NOTE ADULT - ASSESSMENT
#Pericardial effusion   -s/p pericardial drain, s/p removal of drain on 12/4  -etiology probably pericarditis   -ct colchicine, NSAID    #Pleural effusion   -s/p pigtail catheter drain   -s/p removal of drain on 12/5 -right side, and 12/6 on left side   -etiology probably related to interferon  -on tapering steroids -as per pulmonologist team     #Elevated wbc   -s/p BM biopsy today   - ct to monitor it   -s/p abx     #HTN    #HLD- Crestor     #h/o DVT/PE on DOAC  -DOAC on hold in view of hemorrhagic pericardial effusion as per cardiology team     #Pafib   - ct amiodarone  -ct metoprolol     #Hypothyroidism -levothyroxine    #Thrombocytosis -monitor it     #DVT pr -SCD

## 2024-12-12 ENCOUNTER — TRANSCRIPTION ENCOUNTER (OUTPATIENT)
Age: 64
End: 2024-12-12

## 2024-12-12 ENCOUNTER — INPATIENT (INPATIENT)
Facility: HOSPITAL | Age: 64
LOS: 7 days | Discharge: HOME | DRG: 948 | End: 2024-12-20
Attending: PHYSICAL MEDICINE & REHABILITATION | Admitting: PHYSICAL MEDICINE & REHABILITATION
Payer: COMMERCIAL

## 2024-12-12 VITALS
RESPIRATION RATE: 17 BRPM | TEMPERATURE: 98 F | DIASTOLIC BLOOD PRESSURE: 45 MMHG | SYSTOLIC BLOOD PRESSURE: 148 MMHG | HEART RATE: 65 BPM | OXYGEN SATURATION: 97 %

## 2024-12-12 VITALS
WEIGHT: 147.49 LBS | SYSTOLIC BLOOD PRESSURE: 143 MMHG | OXYGEN SATURATION: 97 % | TEMPERATURE: 98 F | RESPIRATION RATE: 16 BRPM | DIASTOLIC BLOOD PRESSURE: 52 MMHG | HEART RATE: 59 BPM | HEIGHT: 60.3 IN

## 2024-12-12 DIAGNOSIS — Z98.890 OTHER SPECIFIED POSTPROCEDURAL STATES: Chronic | ICD-10-CM

## 2024-12-12 DIAGNOSIS — Z90.89 ACQUIRED ABSENCE OF OTHER ORGANS: Chronic | ICD-10-CM

## 2024-12-12 DIAGNOSIS — Z98.84 BARIATRIC SURGERY STATUS: Chronic | ICD-10-CM

## 2024-12-12 DIAGNOSIS — R53.81 OTHER MALAISE: ICD-10-CM

## 2024-12-12 DIAGNOSIS — Z90.711 ACQUIRED ABSENCE OF UTERUS WITH REMAINING CERVICAL STUMP: Chronic | ICD-10-CM

## 2024-12-12 LAB
ANA TITR SER: NEGATIVE — SIGNIFICANT CHANGE UP
ANION GAP SERPL CALC-SCNC: 4 MMOL/L — LOW (ref 5–17)
ANISOCYTOSIS BLD QL: SIGNIFICANT CHANGE UP
BASOPHILS # BLD AUTO: 0.46 K/UL — HIGH (ref 0–0.2)
BASOPHILS NFR BLD AUTO: 2 % — SIGNIFICANT CHANGE UP (ref 0–2)
BLASTS # FLD: 1 % — HIGH (ref 0–0)
BUN SERPL-MCNC: 15 MG/DL — SIGNIFICANT CHANGE UP (ref 7–23)
CALCIUM SERPL-MCNC: 8.9 MG/DL — SIGNIFICANT CHANGE UP (ref 8.5–10.1)
CHLORIDE SERPL-SCNC: 105 MMOL/L — SIGNIFICANT CHANGE UP (ref 96–108)
CO2 SERPL-SCNC: 25 MMOL/L — SIGNIFICANT CHANGE UP (ref 22–31)
CREAT SERPL-MCNC: 0.73 MG/DL — SIGNIFICANT CHANGE UP (ref 0.5–1.3)
DACRYOCYTES BLD QL SMEAR: SLIGHT — SIGNIFICANT CHANGE UP
EGFR: 93 ML/MIN/1.73M2 — SIGNIFICANT CHANGE UP
ELLIPTOCYTES BLD QL SMEAR: SLIGHT — SIGNIFICANT CHANGE UP
EOSINOPHIL # BLD AUTO: 0.46 K/UL — SIGNIFICANT CHANGE UP (ref 0–0.5)
EOSINOPHIL NFR BLD AUTO: 2 % — SIGNIFICANT CHANGE UP (ref 0–6)
GIANT PLATELETS BLD QL SMEAR: PRESENT — SIGNIFICANT CHANGE UP
GLUCOSE SERPL-MCNC: 110 MG/DL — HIGH (ref 70–99)
HCT VFR BLD CALC: 30.1 % — LOW (ref 34.5–45)
HGB BLD-MCNC: 8.6 G/DL — LOW (ref 11.5–15.5)
HYPOCHROMIA BLD QL: SLIGHT — SIGNIFICANT CHANGE UP
LG PLATELETS BLD QL AUTO: SLIGHT — SIGNIFICANT CHANGE UP
LYMPHOCYTES # BLD AUTO: 1.83 K/UL — SIGNIFICANT CHANGE UP (ref 1–3.3)
LYMPHOCYTES # BLD AUTO: 8 % — LOW (ref 13–44)
MACROCYTES BLD QL: SLIGHT — SIGNIFICANT CHANGE UP
MAGNESIUM SERPL-MCNC: 1.7 MG/DL — SIGNIFICANT CHANGE UP (ref 1.6–2.6)
MANUAL SMEAR VERIFICATION: SIGNIFICANT CHANGE UP
MCHC RBC-ENTMCNC: 24.4 PG — LOW (ref 27–34)
MCHC RBC-ENTMCNC: 28.6 G/DL — LOW (ref 32–36)
MCV RBC AUTO: 85.3 FL — SIGNIFICANT CHANGE UP (ref 80–100)
METAMYELOCYTES # FLD: 2 % — HIGH (ref 0–0)
MONOCYTES # BLD AUTO: 0.68 K/UL — SIGNIFICANT CHANGE UP (ref 0–0.9)
MONOCYTES NFR BLD AUTO: 3 % — SIGNIFICANT CHANGE UP (ref 2–14)
MYELOCYTES NFR BLD: 3 % — HIGH (ref 0–0)
NEUTROPHILS # BLD AUTO: 18.03 K/UL — HIGH (ref 1.8–7.4)
NEUTROPHILS NFR BLD AUTO: 79 % — HIGH (ref 43–77)
NRBC # BLD: 2 /100 WBCS — HIGH (ref 0–0)
NRBC # BLD: SIGNIFICANT CHANGE UP /100 WBCS (ref 0–0)
OVALOCYTES BLD QL SMEAR: SLIGHT — SIGNIFICANT CHANGE UP
PHOSPHATE SERPL-MCNC: 3.2 MG/DL — SIGNIFICANT CHANGE UP (ref 2.5–4.5)
PLAT MORPH BLD: ABNORMAL
PLATELET # BLD AUTO: 924 K/UL — HIGH (ref 150–400)
POIKILOCYTOSIS BLD QL AUTO: SIGNIFICANT CHANGE UP
POLYCHROMASIA BLD QL SMEAR: SLIGHT — SIGNIFICANT CHANGE UP
POTASSIUM SERPL-MCNC: 3.6 MMOL/L — SIGNIFICANT CHANGE UP (ref 3.5–5.3)
POTASSIUM SERPL-SCNC: 3.6 MMOL/L — SIGNIFICANT CHANGE UP (ref 3.5–5.3)
RBC # BLD: 3.53 M/UL — LOW (ref 3.8–5.2)
RBC # FLD: 20.4 % — HIGH (ref 10.3–14.5)
RBC BLD AUTO: ABNORMAL
SARS-COV-2 RNA SPEC QL NAA+PROBE: SIGNIFICANT CHANGE UP
SCHISTOCYTES BLD QL AUTO: SLIGHT — SIGNIFICANT CHANGE UP
SODIUM SERPL-SCNC: 134 MMOL/L — LOW (ref 135–145)
SPHEROCYTES BLD QL SMEAR: SLIGHT — SIGNIFICANT CHANGE UP
WBC # BLD: 22.82 K/UL — HIGH (ref 3.8–10.5)
WBC # FLD AUTO: 22.82 K/UL — HIGH (ref 3.8–10.5)

## 2024-12-12 PROCEDURE — 99239 HOSP IP/OBS DSCHRG MGMT >30: CPT

## 2024-12-12 PROCEDURE — 99232 SBSQ HOSP IP/OBS MODERATE 35: CPT

## 2024-12-12 PROCEDURE — 93010 ELECTROCARDIOGRAM REPORT: CPT

## 2024-12-12 PROCEDURE — 93970 EXTREMITY STUDY: CPT | Mod: 26

## 2024-12-12 RX ORDER — METOPROLOL TARTRATE 100 MG/1
25 TABLET, FILM COATED ORAL DAILY
Refills: 0 | Status: DISCONTINUED | OUTPATIENT
Start: 2024-12-13 | End: 2024-12-20

## 2024-12-12 RX ORDER — AMLODIPINE BESYLATE 10 MG/1
1 TABLET ORAL
Refills: 0 | DISCHARGE

## 2024-12-12 RX ORDER — HEPARIN SODIUM,PORCINE 1000/ML
5000 VIAL (ML) INJECTION EVERY 8 HOURS
Refills: 0 | Status: DISCONTINUED | OUTPATIENT
Start: 2024-12-12 | End: 2024-12-20

## 2024-12-12 RX ORDER — POVIDONE, POLYVINYL ALCOHOL 20; 27 G/1000ML; G/1000ML
1 SOLUTION OPHTHALMIC
Qty: 0 | Refills: 0 | DISCHARGE
Start: 2024-12-12

## 2024-12-12 RX ORDER — CYANOCOBALAMIN/FOLIC AC/VIT B6 1-2.2-25MG
1 TABLET ORAL DAILY
Refills: 0 | Status: DISCONTINUED | OUTPATIENT
Start: 2024-12-13 | End: 2024-12-16

## 2024-12-12 RX ORDER — OXYCODONE AND ACETAMINOPHEN 5; 325 MG/1; MG/1
1 TABLET ORAL EVERY 4 HOURS
Refills: 0 | Status: DISCONTINUED | OUTPATIENT
Start: 2024-12-12 | End: 2024-12-12

## 2024-12-12 RX ORDER — LACTULOSE 10 G/15ML
15 SOLUTION ORAL DAILY
Refills: 0 | Status: DISCONTINUED | OUTPATIENT
Start: 2024-12-12 | End: 2024-12-15

## 2024-12-12 RX ORDER — COLCHICINE 0.6 MG
1 TABLET ORAL
Qty: 0 | Refills: 0 | DISCHARGE
Start: 2024-12-12

## 2024-12-12 RX ORDER — SIMETHICONE 125 MG
80 CAPSULE ORAL EVERY 6 HOURS
Refills: 0 | Status: DISCONTINUED | OUTPATIENT
Start: 2024-12-12 | End: 2024-12-20

## 2024-12-12 RX ORDER — IBUPROFEN 200 MG
600 TABLET ORAL EVERY 8 HOURS
Refills: 0 | Status: DISCONTINUED | OUTPATIENT
Start: 2024-12-12 | End: 2024-12-16

## 2024-12-12 RX ORDER — SENNOSIDES 8.6 MG
2 TABLET ORAL
Refills: 0 | DISCHARGE

## 2024-12-12 RX ORDER — PREDNISONE 20 MG/1
20 TABLET ORAL DAILY
Refills: 0 | Status: COMPLETED | OUTPATIENT
Start: 2024-12-13 | End: 2024-12-13

## 2024-12-12 RX ORDER — MAGNESIUM, ALUMINUM HYDROXIDE 200-225/5
30 SUSPENSION, ORAL (FINAL DOSE FORM) ORAL EVERY 4 HOURS
Refills: 0 | Status: DISCONTINUED | OUTPATIENT
Start: 2024-12-12 | End: 2024-12-20

## 2024-12-12 RX ORDER — ENALAPRIL MALEATE 2.5 MG/1
20 TABLET ORAL DAILY
Refills: 0 | Status: DISCONTINUED | OUTPATIENT
Start: 2024-12-13 | End: 2024-12-20

## 2024-12-12 RX ORDER — PREDNISONE 20 MG/1
1 TABLET ORAL
Qty: 0 | Refills: 0 | DISCHARGE
Start: 2024-12-12

## 2024-12-12 RX ORDER — IBUPROFEN 200 MG
1 TABLET ORAL
Qty: 0 | Refills: 0 | DISCHARGE
Start: 2024-12-12

## 2024-12-12 RX ORDER — LAMOTRIGINE 50 MG/1
200 TABLET, EXTENDED RELEASE ORAL DAILY
Refills: 0 | Status: DISCONTINUED | OUTPATIENT
Start: 2024-12-13 | End: 2024-12-20

## 2024-12-12 RX ORDER — LEVOTHYROXINE SODIUM 150 MCG
88 TABLET ORAL DAILY
Refills: 0 | Status: DISCONTINUED | OUTPATIENT
Start: 2024-12-13 | End: 2024-12-20

## 2024-12-12 RX ORDER — COLCHICINE 0.6 MG
0.6 TABLET ORAL DAILY
Refills: 0 | Status: DISCONTINUED | OUTPATIENT
Start: 2024-12-13 | End: 2024-12-20

## 2024-12-12 RX ORDER — COLCHICINE 0.6 MG
0.5 TABLET ORAL
Refills: 0 | DISCHARGE

## 2024-12-12 RX ORDER — PANTOPRAZOLE SODIUM 40 MG/1
40 TABLET, DELAYED RELEASE ORAL
Refills: 0 | Status: DISCONTINUED | OUTPATIENT
Start: 2024-12-13 | End: 2024-12-20

## 2024-12-12 RX ORDER — LINACLOTIDE 72 UG/1
1 CAPSULE, GELATIN COATED ORAL
Qty: 0 | Refills: 0 | DISCHARGE

## 2024-12-12 RX ORDER — AMIODARONE HYDROCHLORIDE 200 MG/1
200 TABLET ORAL DAILY
Refills: 0 | Status: DISCONTINUED | OUTPATIENT
Start: 2024-12-13 | End: 2024-12-20

## 2024-12-12 RX ORDER — POLYETHYLENE GLYCOL 3350 17 G/17G
17 POWDER, FOR SOLUTION ORAL
Qty: 0 | Refills: 0 | DISCHARGE
Start: 2024-12-12

## 2024-12-12 RX ORDER — HYDROMORPHONE HYDROCHLORIDE 2 MG/1
4 TABLET ORAL EVERY 4 HOURS
Refills: 0 | Status: DISCONTINUED | OUTPATIENT
Start: 2024-12-12 | End: 2024-12-17

## 2024-12-12 RX ORDER — ACETAMINOPHEN, DIPHENHYDRAMINE HCL, PHENYLEPHRINE HCL 325; 25; 5 MG/1; MG/1; MG/1
3 TABLET ORAL AT BEDTIME
Refills: 0 | Status: DISCONTINUED | OUTPATIENT
Start: 2024-12-12 | End: 2024-12-20

## 2024-12-12 RX ORDER — IBUPROFEN 200 MG
2 TABLET ORAL
Refills: 0 | DISCHARGE

## 2024-12-12 RX ORDER — HYDROCORTISONE BUTYRATE 0.1 %
1 CREAM (GRAM) TOPICAL
Refills: 0 | Status: DISCONTINUED | OUTPATIENT
Start: 2024-12-12 | End: 2024-12-20

## 2024-12-12 RX ORDER — LACTULOSE 10 G/15ML
22.5 SOLUTION ORAL
Qty: 0 | Refills: 0 | DISCHARGE
Start: 2024-12-12

## 2024-12-12 RX ORDER — FLUTICASONE PROPIONATE 50 MCG
1 SPRAY, SUSPENSION NASAL
Refills: 0 | Status: DISCONTINUED | OUTPATIENT
Start: 2024-12-12 | End: 2024-12-20

## 2024-12-12 RX ORDER — HYDROCHLOROTHIAZIDE 50 MG
1 TABLET ORAL
Refills: 0 | DISCHARGE

## 2024-12-12 RX ORDER — APIXABAN 2.5 MG/1
1 TABLET, FILM COATED ORAL
Refills: 0 | DISCHARGE

## 2024-12-12 RX ORDER — CYCLOBENZAPRINE HCL 10 MG
10 TABLET ORAL THREE TIMES A DAY
Refills: 0 | Status: DISCONTINUED | OUTPATIENT
Start: 2024-12-12 | End: 2024-12-20

## 2024-12-12 RX ORDER — GUAIFENESIN/DEXTROMETHORPHAN 100-10MG/5
10 SYRUP ORAL EVERY 4 HOURS
Refills: 0 | Status: DISCONTINUED | OUTPATIENT
Start: 2024-12-12 | End: 2024-12-20

## 2024-12-12 RX ORDER — ROSUVASTATIN CALCIUM 5 MG/1
20 TABLET, FILM COATED ORAL AT BEDTIME
Refills: 0 | Status: DISCONTINUED | OUTPATIENT
Start: 2024-12-12 | End: 2024-12-20

## 2024-12-12 RX ORDER — PREDNISONE 20 MG/1
10 TABLET ORAL DAILY
Refills: 0 | Status: COMPLETED | OUTPATIENT
Start: 2024-12-14 | End: 2024-12-17

## 2024-12-12 RX ORDER — ACETAMINOPHEN 500MG 500 MG/1
650 TABLET, COATED ORAL EVERY 6 HOURS
Refills: 0 | Status: DISCONTINUED | OUTPATIENT
Start: 2024-12-12 | End: 2024-12-20

## 2024-12-12 RX ORDER — ONDANSETRON HYDROCHLORIDE 4 MG/1
4 TABLET, FILM COATED ORAL THREE TIMES A DAY
Refills: 0 | Status: DISCONTINUED | OUTPATIENT
Start: 2024-12-12 | End: 2024-12-13

## 2024-12-12 RX ORDER — POVIDONE, POLYVINYL ALCOHOL 20; 27 G/1000ML; G/1000ML
1 SOLUTION OPHTHALMIC EVERY 4 HOURS
Refills: 0 | Status: DISCONTINUED | OUTPATIENT
Start: 2024-12-12 | End: 2024-12-20

## 2024-12-12 RX ORDER — SENNOSIDES 8.6 MG
2 TABLET ORAL AT BEDTIME
Refills: 0 | Status: DISCONTINUED | OUTPATIENT
Start: 2024-12-12 | End: 2024-12-15

## 2024-12-12 RX ORDER — MECLIZINE HCL 12.5 MG
25 TABLET ORAL
Refills: 0 | Status: DISCONTINUED | OUTPATIENT
Start: 2024-12-12 | End: 2024-12-20

## 2024-12-12 RX ORDER — POLYETHYLENE GLYCOL 3350 17 G/17G
17 POWDER, FOR SOLUTION ORAL DAILY
Refills: 0 | Status: DISCONTINUED | OUTPATIENT
Start: 2024-12-13 | End: 2024-12-15

## 2024-12-12 RX ORDER — PREDNISONE 20 MG/1
5 TABLET ORAL DAILY
Refills: 0 | Status: DISCONTINUED | OUTPATIENT
Start: 2024-12-18 | End: 2024-12-20

## 2024-12-12 RX ORDER — AZELASTINE HCL 205.5 UG/1
1 SPRAY NASAL
Refills: 0 | DISCHARGE

## 2024-12-12 RX ORDER — BUPROPION HCL 100 MG
150 TABLET ORAL DAILY
Refills: 0 | Status: DISCONTINUED | OUTPATIENT
Start: 2024-12-13 | End: 2024-12-20

## 2024-12-12 RX ADMIN — HYDROMORPHONE HYDROCHLORIDE 4 MILLIGRAM(S): 2 TABLET ORAL at 23:18

## 2024-12-12 RX ADMIN — Medication 5000 UNIT(S): at 05:22

## 2024-12-12 RX ADMIN — HYDROMORPHONE HYDROCHLORIDE 4 MILLIGRAM(S): 2 TABLET ORAL at 08:17

## 2024-12-12 RX ADMIN — Medication 5000 UNIT(S): at 22:19

## 2024-12-12 RX ADMIN — Medication 0.6 MILLIGRAM(S): at 09:16

## 2024-12-12 RX ADMIN — Medication 600 MILLIGRAM(S): at 07:01

## 2024-12-12 RX ADMIN — POLYETHYLENE GLYCOL 3350 17 GRAM(S): 17 POWDER, FOR SOLUTION ORAL at 09:15

## 2024-12-12 RX ADMIN — Medication 600 MILLIGRAM(S): at 23:18

## 2024-12-12 RX ADMIN — Medication 1 TABLET(S): at 18:57

## 2024-12-12 RX ADMIN — HYDROMORPHONE HYDROCHLORIDE 4 MILLIGRAM(S): 2 TABLET ORAL at 00:20

## 2024-12-12 RX ADMIN — HYDROMORPHONE HYDROCHLORIDE 4 MILLIGRAM(S): 2 TABLET ORAL at 22:18

## 2024-12-12 RX ADMIN — Medication 600 MILLIGRAM(S): at 22:18

## 2024-12-12 RX ADMIN — LAMOTRIGINE 200 MILLIGRAM(S): 50 TABLET, EXTENDED RELEASE ORAL at 09:16

## 2024-12-12 RX ADMIN — METOPROLOL TARTRATE 25 MILLIGRAM(S): 100 TABLET, FILM COATED ORAL at 09:15

## 2024-12-12 RX ADMIN — Medication 1 TABLET(S): at 09:27

## 2024-12-12 RX ADMIN — Medication 88 MICROGRAM(S): at 05:22

## 2024-12-12 RX ADMIN — ROSUVASTATIN CALCIUM 20 MILLIGRAM(S): 5 TABLET, FILM COATED ORAL at 22:19

## 2024-12-12 RX ADMIN — Medication 1 TABLET(S): at 09:16

## 2024-12-12 RX ADMIN — Medication 150 MILLIGRAM(S): at 09:16

## 2024-12-12 RX ADMIN — PREDNISONE 20 MILLIGRAM(S): 20 TABLET ORAL at 09:28

## 2024-12-12 RX ADMIN — Medication 2 TABLET(S): at 22:19

## 2024-12-12 RX ADMIN — Medication 1 SPRAY(S): at 09:28

## 2024-12-12 RX ADMIN — HYDROMORPHONE HYDROCHLORIDE 4 MILLIGRAM(S): 2 TABLET ORAL at 03:54

## 2024-12-12 RX ADMIN — PANTOPRAZOLE SODIUM 40 MILLIGRAM(S): 40 TABLET, DELAYED RELEASE ORAL at 07:02

## 2024-12-12 RX ADMIN — AMIODARONE HYDROCHLORIDE 200 MILLIGRAM(S): 200 TABLET ORAL at 09:16

## 2024-12-12 NOTE — DISCHARGE NOTE PROVIDER - NSDCMRMEDTOKEN_GEN_ALL_CORE_FT
acetaminophen 325 mg oral tablet: 2 tab(s) orally every 6 hours as needed for mild pain  amiodarone 200 mg oral tablet: 1 tab(s) orally once a day ***until 24.... then 400mg QD***  Anumed-HC 25 mg rectal suppository: 1 suppository(ies) rectally 2 times a day as needed for rectal pain  bisacodyl 5 mg oral delayed release tablet: 1 tab(s) orally once a day As needed Constipation  colchicine 0.6 mg oral tablet: 1 tab(s) orally once a day  cyclobenzaprine 10 mg oral tablet: 1 tab(s) orally 3 times a day as needed for muscle spasm  enalapril 20 mg oral tablet: 1 tab(s) orally once a day (in the morning)  fluticasone 50 mcg/inh nasal spray: 2 spray(s) in each nostril once a day as needed for  HYDROmorphone 2 mg oral tablet: 1 tab(s) orally every 6 hours as needed for  severe pain  ibuprofen 600 mg oral tablet: 1 tab(s) orally every 8 hours  lactobacillus acidophilus oral capsule: 1 cap(s) orally 2 times a day  lactulose 10 g/15 mL oral syrup: 22.5 milliliter(s) orally once a day As needed constipation  lamoTRIgine 200 mg oral tablet: 1 tab(s) orally once a day  levothyroxine 88 mcg (0.088 mg) oral tablet: 1 tab(s) orally once a day  linaclotide 72 mcg oral capsule: 1 cap(s) orally once a day as needed for  constipation  liraglutide 18 mg/3 mL subcutaneous solution: 0.6 milligram(s) subcutaneously once a day as needed for  loperamide 2 mg oral capsule: 2 cap(s) orally 3 times as needed for  diarrhea  meclizine 25 mg oral tablet: 1 tab(s) orally 2 times a day as needed for  dizziness  Metoprolol Succinate ER 25 mg oral tablet, extended release: 1 tab(s) orally once a day  Multiple Vitamins oral tablet: 1 tab(s) orally once a day  ocular lubricant preservative-free ophthalmic solution: 1 drop(s) to each affected eye every 4 hours As needed Dry Eyes  ondansetron 4 mg oral tablet, disintegratin tab(s) orally 3 times a day as needed for  nausea  pantoprazole 40 mg oral delayed release tablet: 1 tab(s) orally once a day  polyethylene glycol 3350 oral powder for reconstitution: 17 gram(s) orally once a day  predniSONE 10 mg oral tablet: 1 tab(s) orally once a day start on 12/14/24 x 5 doses, then c/w 5 mg PO QD x 5 doses and then stop  predniSONE 20 mg oral tablet: 1 tab(s) orally once a day x 1 on 24  rosuvastatin 20 mg oral tablet: 1 tab(s) orally once a day  Wellbutrin  mg/24 hours oral tablet, extended release: 1 tab(s) orally once a day

## 2024-12-12 NOTE — DISCHARGE NOTE NURSING/CASE MANAGEMENT/SOCIAL WORK - PATIENT PORTAL LINK FT
You can access the FollowMyHealth Patient Portal offered by [facility] by registering at the following website: [email or web address]. By joining [facility]’s FollowMyHealth portal, you will also be able to view your health information using other applications (apps) compatible with our system.

## 2024-12-12 NOTE — DISCHARGE NOTE PROVIDER - CARE PROVIDER_API CALL
Shelby Dailey  Hematology  [address], [place] [address]  Phone: [phone]  Fax: [phone]  Follow Up Time: 1 week    Ravinder Abdi  Cardiovascular Disease  [address], [address]  [place] [address]  Phone: [phone]  Fax: [phone]  Follow Up Time: 2 weeks

## 2024-12-12 NOTE — DISCHARGE NOTE PROVIDER - PROVIDER TOKENS
PROVIDER:[TOKEN:[[id number]],FOLLOWUP:[1 week]],PROVIDER:[TOKEN:[[id number]],FOLLOWUP:[2 weeks]]

## 2024-12-12 NOTE — PROGRESS NOTE ADULT - PROVIDER SPECIALTY LIST ADULT
Critical Care
Hospitalist
Infectious Disease
Intervent Radiology
Intervent Radiology
Pulmonology
Thoracic Surgery
Critical Care
Pulmonology
Pulmonology
Thoracic Surgery
Cardiology
Cardiology
Critical Care
Heme/Onc
Heme/Onc
Infectious Disease
Pulmonology
Cardiology
Critical Care
Heme/Onc
Pulmonology
Cardiology
Critical Care
Cardiology
Critical Care
Critical Care
Cardiology
Critical Care
Cardiology
Pulmonology
Cardiology

## 2024-12-12 NOTE — PROGRESS NOTE ADULT - SUBJECTIVE AND OBJECTIVE BOX
Subjective:   patient seen and examined,   Denies chest pain  Says she can not take very deep breath but otherwise stable    ROS:  Gen: Denies fever, chills, rigors  HEENT: Denies neck swelling, difficulty swallowing, nasal congestion  PULM: As above  Cardiology: Denies palpitation, dizziness,         GENERAL APPEARANCE:  Pt. is not currently dyspneic, in no distress. Pt. is alert and pleasant.  HEENT:   No icterus. Mucous membranes moist,   NECK:  Supple.  Jugular venous pressure not elevated.   HEART:    Regular. Normal S1 and S2. There are no murmurs, rubs or gallops appreciated  CHEST: diminished breath sounds LLL.   EXTREMITIES:  There is no cyanosis, clubbing or edema.     Vital Signs Last 24 Hrs  T(C): 36.6 (12 Dec 2024 08:29), Max: 36.6 (11 Dec 2024 21:35)  T(F): 97.9 (12 Dec 2024 08:29), Max: 97.9 (12 Dec 2024 08:29)  HR: 65 (12 Dec 2024 08:29) (53 - 72)  BP: 148/45 (12 Dec 2024 08:29) (101/81 - 148/45)  BP(mean): 73 (12 Dec 2024 08:29) (65 - 115)  RR: 17 (12 Dec 2024 08:29) (14 - 21)  SpO2: 97% (12 Dec 2024 08:29) (89% - 97%)    Parameters below as of 12 Dec 2024 08:29  Patient On (Oxygen Delivery Method): room air    Allergies    sulfa drugs (Vomiting; Nausea)  IV Contrast (Hives)    Intolerances      LABS:                        8.4    19.33 )-----------( 925      ( 11 Dec 2024 05:40 )             28.5     12-11    134[L]  |  105  |  13  ----------------------------<  87  4.4   |  27  |  0.54    Ca    9.2      11 Dec 2024 05:40  Phos  3.4     12-11  Mg     1.9     12-11    TPro  5.8[L]  /  Alb  2.2[L]  /  TBili  0.2  /  DBili  x   /  AST  22  /  ALT  16  /  AlkPhos  84  12-11    PT/INR - ( 11 Dec 2024 05:40 )   PT: 13.4 sec;   INR: 1.17 ratio         PTT - ( 11 Dec 2024 05:40 )  PTT:29.6 sec    RADIOLOGY & ADDITIONAL TESTS:    MEDICATIONS  (STANDING):  aMIOdarone    Tablet 200 milliGRAM(s) Oral daily  buPROPion XL (24-Hour) . 150 milliGRAM(s) Oral daily  chlorhexidine 4% Liquid 1 Application(s) Topical <User Schedule>  colchicine 0.6 milliGRAM(s) Oral daily  fluticasone propionate 50 MICROgram(s)/spray Nasal Spray 1 Spray(s) Both Nostrils two times a day  heparin   Injectable 5000 Unit(s) SubCutaneous every 8 hours  ibuprofen  Tablet. 600 milliGRAM(s) Oral every 8 hours  lactobacillus acidophilus 1 Tablet(s) Oral every 12 hours  lamoTRIgine 200 milliGRAM(s) Oral daily  levothyroxine 88 MICROGram(s) Oral daily  metoprolol succinate ER 25 milliGRAM(s) Oral daily  multivitamin 1 Tablet(s) Oral daily  naloxone Injectable 0.4 milliGRAM(s) IV Push once  pantoprazole    Tablet 40 milliGRAM(s) Oral before breakfast  polyethylene glycol 3350 17 Gram(s) Oral daily  predniSONE   Tablet 20 milliGRAM(s) Oral daily  rosuvastatin 20 milliGRAM(s) Oral at bedtime  senna 2 Tablet(s) Oral at bedtime    MEDICATIONS  (PRN):  aluminum hydroxide/magnesium hydroxide/simethicone Suspension 30 milliLiter(s) Oral every 4 hours PRN Dyspepsia  artificial tears (preservative free) Ophthalmic Solution 1 Drop(s) Both EYES every 4 hours PRN Dry Eyes  bisacodyl 5 milliGRAM(s) Oral daily PRN Constipation  cyclobenzaprine 10 milliGRAM(s) Oral three times a day PRN Muscle Spasm  guaifenesin/dextromethorphan Oral Liquid 10 milliLiter(s) Oral every 4 hours PRN Cough  hydrocortisone hemorrhoidal Suppository 1 Suppository(s) Rectal two times a day PRN pain  HYDROmorphone   Tablet 4 milliGRAM(s) Oral every 4 hours PRN Severe Pain (7 - 10)  lactulose Syrup 15 Gram(s) Oral daily PRN constipation  loperamide 2 milliGRAM(s) Oral every 2 hours PRN Diarrhea  meclizine 25 milliGRAM(s) Oral two times a day PRN Dizziness  melatonin 3 milliGRAM(s) Oral at bedtime PRN Insomnia  ondansetron Injectable 4 milliGRAM(s) IV Push every 8 hours PRN Nausea and/or Vomiting  oxycodone    5 mG/acetaminophen 325 mG 1 Tablet(s) Oral every 4 hours PRN Moderate Pain (4 - 6)  simethicone 80 milliGRAM(s) Chew every 6 hours PRN Gas      Impression:    Bilateral pleural effusion ( exudative/ s/p drainage)  Pericardial effusion ( s/p drainage)  Leucocytosis  Thrombocytosis  hypoalbuminemia  Negative rheum work up.  vWB disease    Recommendations:    -Pulnonary status is stable.   - I encouraged her incentive spirometry  - If new/worsening symptoms repeat CXR.  - Cont. Prednisone taper to stop in next few days.   - will sign off, please call with questions.  - Rest of the care per primary team.

## 2024-12-12 NOTE — PROGRESS NOTE ADULT - PROBLEM SELECTOR PROBLEM 1
Pericarditis
Bilateral pleural effusion
Pericarditis
Acute hypoxic respiratory failure
Pericarditis

## 2024-12-12 NOTE — PROGRESS NOTE ADULT - PROBLEM SELECTOR PLAN 2
Currently in sinus rhythm; continue short-term amiodarone therapy.  I had a discussion with Anne-Marie and her brother, Brendan.  We decided to defer systemic anticoagulation at this time (low- intermediate risk of thromboembolism) and will monitor burden of arrythmia. She will f/u with me when discharged from rehab.
-CT report described moderate-large pericardial effusion.  Echo report describes large pericardial effusion. Echo yesterday improved after pericardial intervention.
currently in NSR  will cont to hold AC due to hemorrhagic pericardial effusion   cont amiodarone for planned short course
currently in NSR  will cont to hold AC due to hemorrhagic pericardial effusion   cont amiodarone for planned short course - was on amio prior to admission - would favor  continuing amio for approximately 2-3 weeks - will likely d/c after seen in cardiology clinic if no recurrences of afib.
-CT report described moderate-large pericardial effusion.  Echo report describes large pericardial effusion  " evidence of hemodynamic compromise (or echocardiographic evidence of cardiac tamponade"  -personally reviewed images. predominantly posterior pericardial effusion. in some views there is evidence of RV diastolic collapse & substantial variation in mitral inflow.  -pt is hemodynamically stable HRs 70s SBPS 120s on metoprolol - no clinical evidence of tamponade.     fluid is predominantly posterior, very little at apex - evaluation by IR &/or CT surgery recommended.   -interventional radiology  placed pericardial drain , had 500cc of sanguinous effusion , was on eliquis which is was on prior to procedure , will discuss with interventional radiology ? analysis was sent or not ,    will discuss with CT  regarding ? necessity of pericardial biopsy ?
currently in NSR  will cont to hold AC due to hemorrhagic pericardial effusion   cont amiodarone for planned short course
currently in NSR  will cont to hold AC due to hemorrhagic pericardial effusion   cont amiodarone for planned short course - was on amio prior to admission - would favor  continuing amio for approximately 2-3 weeks - will likely d/c after seen in cardiology clinic if no recurrences of afib.
currently in NSR  will cont to hold AC due to hemorrhagic pericardial effusion   cont amiodarone for planned short course
currently in NSR  will cont to hold AC due to hemorrhagic pericardial effusion   cont amiodarone for planned short course - was on amio prior to admission - would favor  continuing amio for approximately 2-3 weeks - will likely d/c after seen in cardiology clinic if no recurrences of afib.
S/p pericardial drain; repeat TTE 12/6.
-CT report described moderate-large pericardial effusion.  Echo report describes large pericardial effusion  " evidence of hemodynamic compromise (or echocardiographic evidence of cardiac tamponade"  -personally reviewed images. predominantly posterior pericardial effusion. in some views there is evidence of RV diastolic collapse & substantial variation in mitral inflow.  -pt is hemodynamically stable HRs 70s SBPS 120s on metoprolol - no clinical evidence of tamponade.    -Discussed w/ interventional cardio - fluid is predominantly posterior, very little at apex - evaluation by IR &/or CT surgery reccomended.   -interventional radiology will evaluate pt today.

## 2024-12-12 NOTE — DISCHARGE NOTE NURSING/CASE MANAGEMENT/SOCIAL WORK - FINANCIAL ASSISTANCE
[facility] provides services at a reduced cost to those who are determined to be eligible through [facility]’s financial assistance program. Information regarding [facility]’s financial assistance program can be found by going to [email or web address] or by calling [phone].

## 2024-12-12 NOTE — H&P ADULT - NS ATTEND AMEND GEN_ALL_CORE FT
DOing well  Pain is controlled  TAking shallow breaths     Vital Signs Last 24 Hrs  T(C): 36.4 (13 Dec 2024 08:55), Max: 36.5 (12 Dec 2024 22:16)  T(F): 97.6 (13 Dec 2024 08:55), Max: 97.7 (12 Dec 2024 22:16)  HR: 64 (13 Dec 2024 08:55) (57 - 64)  BP: 156/55 (13 Dec 2024 08:55) (143/52 - 156/60)  BP(mean): --  RR: 16 (13 Dec 2024 08:55) (16 - 16)  SpO2: 97% (13 Dec 2024 08:55) (97% - 97%)    Parameters below as of 13 Dec 2024 08:55  Patient On (Oxygen Delivery Method): room air    KITTY JIMENEZ is a 63 y/o  F with PMHx of HTN, HLD, hypothyroidism, primary lymphedema, Essential  thrombocytosis, GERD, Obesity s/p bariatric surgery, ischemic colitis s/p left hemicolectomy, pAF and DVT/PE on Eliquis, HFpEF,  vW Disease, recently had  2 hospitalizations at [facility] in the past 2 months for CAP, and also diagnosed with pericarditis, and HFpEF s/p PO abx.  Patient returns to  ED on 11/30 with worsening c/o back pain, CP, SOB and productive cough. Found to have CHF exacerbation with superimposed PNA, pericarditis, and pericardio/ pleural effusions. Now admitted to [facility] for functional deficits and initiation of a multidisciplinary rehab program consisting focused on functional mobility, transfers and ADLs.    #Pericardial effusion 2/2 pericarditis   #Pleural effusion suspected  2/2 interferon alpha  #Acute respiratory failure with hypoxia  -S/p NC, monitor O2  -CT Chest- Moderately large simple pericardial effusion. Small to moderate right and small left pleural effusions with associated compressive atelectasis and Superimposed pneumonia.  -S/p pericardial drain (removed on 12/4)  -S/p b/l pigtail catheters (removed 12/5 and 12/6)  -S/p steroid taper: c/w Prednisone 20mg HS x1 day start 12/13, Prednisone 10mg x 4 days start on 12/14; Prednisone 5mg x 5 days start on 12/18.  -Colchicine 0.6mg daily x 3 months  -Ibuprofen 600mg q8h  -F/u outpatient cardiology

## 2024-12-12 NOTE — PATIENT PROFILE ADULT - OVER THE PAST TWO WEEKS HAVE YOU FELT DOWN, DEPRESSED OR HOPELESS?
Pending Prescriptions:                       Disp   Refills    sertraline (ZOLOFT) 100 MG tablet [Pharmac*135 ta*2        Sig: TAKE 1.5 TABLETS BY MOUTH DAILY    Routing refill request to provider for review/approval because:  Patient fails protocol    PHQ-9 score:    PHQ 3/12/2020   PHQ-9 Total Score 4   Q9: Thoughts of better off dead/self-harm past 2 weeks Not at all   Some encounter information is confidential and restricted. Go to Review Flowsheets activity to see all data.               
no

## 2024-12-12 NOTE — H&P ADULT - HISTORY OF PRESENT ILLNESS
63 y/o  F with PMHx of HTN, HLD, hypothyroidism, primary lymphedema, Essential  thrombocytosis, GERD, Obesity s/p bariatric surgery, ischemic colitis s/p left hemicolectomy, pAF and DVT/PE on Eliquis, HFpEF,  vW Disease, recently had  2 hospitalizations at [facility] in the past 2 months for CAP, and also diagnosed with pericarditis, and HFpEF s/p PO abx.  Patient presented to  ED on 11/30 with c/o back pain, CP, SOB and productive cough. Patient was found to be in respiratory distress and hypoxic requiring 4L NC and in HF exacerbation. CT imaging showed moderately large simple pericardial effusion. Small to moderate right and  small left pleural effusions with associated compressive atelectasis.with suspected superimposed pneumonia. Hospital course c/b pericarditis 2/2 pegylated interferon and pericardial effusions. TTE found large pericardial effusions with signs of early tamponade.  S/p pericardial drain and B/l chest tube  placement. Cytology cultures are negative. Started on empiric cefepime, colchicine,  ibuprofen and steroid taper. Of note, patient had an episode of pAFIB, treated with Amiodarone,  Heme/onc was consulted and suspect advancement/transformation of Essential Thrombocytosis, Patient failed/did not tolerate previous Tx. Now s/p BM Bx on 12/10 will need to f/u  with Dr Dailey outpatient to discuss results and being treatment with Jakafi.  Patient optimized and was evaluated by PM&R and therapy for functional deficits, gait/ADL impairments and acute rehabilitation was recommended. Patient was cleared for discharge to [facility] IRU on 12/12/24.   65 y/o  F with PMHx of HTN, HLD, hypothyroidism, primary lymphedema, Essential  thrombocytosis, GERD, Obesity s/p bariatric surgery, ischemic colitis s/p left hemicolectomy, pAF and DVT/PE on Eliquis, HFpEF,  vW Disease, recently had  2 hospitalizations at [facility] in the past 2 months for CAP, and also diagnosed with pericarditis, and HFpEF s/p PO abx.  Patient returns to  ED on 11/30 with worsening c/o back pain, CP, SOB and productive cough. Patient was found to be in respiratory distress and hypoxic requiring 4L NC and in HF exacerbation. CT imaging showed moderately large simple pericardial effusion. Small to moderate right and  small left pleural effusions with associated compressive atelectasis with suspected superimposed pneumonia. Hospital course c/b pericarditis 2/2 pegylated interferon and pericardial effusions. TTE found large pericardial effusions with signs of early tamponade.  S/p pericardial drain and B/l chest tube  placement. Cytology cultures are negative. Started on empiric cefepime, colchicine,  ibuprofen and steroid taper. Of note, patient had an episode of pAFIB, treated with Amiodarone,  Heme/onc was consulted and suspect advancement/transformation of Essential Thrombocytosis, Patient failed/did not tolerate previous Tx. Now s/p BM Bx on 12/10 will need to f/u  with Dr Dailey outpatient to discuss results and being treatment with Jakafi.  Patient optimized and was evaluated by PM&R and therapy for functional deficits, gait/ADL impairments and acute rehabilitation was recommended. Patient was cleared for discharge to [facility]U on 12/12/24.

## 2024-12-12 NOTE — PATIENT PROFILE ADULT - FALL HARM RISK - HARM RISK INTERVENTIONS

## 2024-12-12 NOTE — H&P ADULT - ASSESSMENT
Assessment/Plan:  KITTY JIMENEZ is a 64y with a history of *** .   Now admitted to [facility] after for initiation of a multidisciplinary rehab program consisting focused on functional mobility, transfers and ADLs.    #  Deficits:   Comprehensive Multidisciplinary Rehab Program:  - Start comprehensive rehab program, 3 hours a day, 5 days a week.  - PT 1hr/day: Focused on improving strength, endurance, coordination, balance, functional mobility, and transfers  - OT 1hr/day: Focused on improving strength, fine motor skills, coordination, posture and ADLs.    - Speech Therapy 1hr/day: to diagnose and treat deficits in swallowing, cognition and communication.   - Activity Limitations: Decreased social, vocational and leisure activities, decreased self care and ADLs, decreased mobility, decreased ability to manage household and finances.     -----------------------------------------------------------------------------------  Concurrent Medical Problems    #Mood/Cognition:  Neuropsychology consult PRN    #Sleep:   Maintain quiet hours and low stim environment.  Melatonin PRN to maximize participation in therapy during the day.     #Pain Management:  Analgesic: Tylenol PRN  Narcotics:  Neuropathic agent:  Antispasmodic:   Avoid sedating medications that may interfere with cognitive recovery    #GI/Bowel:  Senna QHS, Miralax PRN Daily  GI ppx: Pantoprazole     #/Bladder:   - PVR q 8 hours (SC if > 400)    #Skin/Pressure Injury:   - Skin assessment on admission: ***  - Monitor Incisions: ***  - Turn every 2 hours while in bed, air mattress  - Soft heel protectors  - Skin barrier cream as needed  - Nursing to monitor skin Qshift    #Diet/Dysphagia:  Dysphagia: SLP consult for swallow function evaluation and treatment  Current Diet:    [X] Aspiration Precautions  Nutrition consult     #Precautions / PROPHYLAXIS:   - Falls, Cardiac, Sternal, Hip, Spinal, Seizure   - ortho: Weight bearing status: WBAT   - Lungs: Aspiration, Incentive Spirometer   - DVT ppx:   - Pressure injury/Skin: Turn Q2hrs while in bed, OOB to Chair, PT/OT      ---------------  Code Status: FULL  Emergency Contact:    Outpatient Follow-up (Specialty/Name of physician):    --------------  Goals: Safe discharge to Home*****  Estimated Length of Stay: 10-14 days  Rehab Potential: Good  Medical Prognosis: Good  Estimated Disposition: Home with Home Care  ---------------    PRESCREEN COMPARISON:  I have reviewed the prescreen information and I have found no relevant changes between the preadmission screening and my post admission evaluation.    RATIONALE FOR INPATIENT ADMISSION: Patient demonstrates the following:  [X] Medically appropriate for rehabilitation admission  [X] Has attainable rehab goals with an appropriate initial discharge plan  [X]Has rehabilitation potential (expected to make a significant improvement within a reasonable period of time)  [X] Requires close medical management by a rehab physician, rehab nursing care, Hospitalist and comprehensive interdisciplinary team (including PT, OT and/or SLP, Prosthetics and Orthotics)         Assessment/Plan:  KITTY JIMENEZ is a 65 y/o  F with PMHx of HTN, HLD, hypothyroidism, primary lymphedema, Essential  thrombocytosis, GERD, Obesity s/p bariatric surgery, ischemic colitis s/p left hemicolectomy, pAF and DVT/PE on Eliquis, HFpEF,  vW Disease, recently had  2 hospitalizations at [facility] in the past 2 months for CAP, and also diagnosed with pericarditis, and HFpEF s/p PO abx.  Patient returns to  ED on 11/30 with worsening c/o back pain, CP, SOB and productive cough. Found to have CHF exacerbation with superimposed PNA, pericarditis, and pleuralcardio effusions. Now admitted to [facility] for functional deficits and initiation of a multidisciplinary rehab program consisting focused on functional mobility, transfers and ADLs.    #  Deficits:   Comprehensive Multidisciplinary Rehab Program:  - Start comprehensive rehab program, 3 hours a day, 5 days a week.  - PT 1hr/day: Focused on improving strength, endurance, coordination, balance, functional mobility, and transfers  - OT 1hr/day: Focused on improving strength, fine motor skills, coordination, posture and ADLs.    - Speech Therapy 1hr/day: to diagnose and treat deficits in swallowing, cognition and communication.   - Activity Limitations: Decreased social, vocational and leisure activities, decreased self care and ADLs, decreased mobility, decreased ability to manage household and finances.     -----------------------------------------------------------------------------------  Concurrent Medical Problems    #Mood/Cognition:  Neuropsychology consult PRN    #Sleep:   Maintain quiet hours and low stim environment.  Melatonin PRN to maximize participation in therapy during the day.     #Pain Management:  Analgesic: Tylenol PRN  Narcotics:  Neuropathic agent:  Antispasmodic:   Avoid sedating medications that may interfere with cognitive recovery    #GI/Bowel:  Senna QHS, Miralax PRN Daily  GI ppx: Pantoprazole     #/Bladder:   - PVR q 8 hours (SC if > 400)    #Skin/Pressure Injury:   - Skin assessment on admission: ***  - Monitor Incisions: ***  - Turn every 2 hours while in bed, air mattress  - Soft heel protectors  - Skin barrier cream as needed  - Nursing to monitor skin Qshift    #Diet/Dysphagia:  Dysphagia: SLP consult for swallow function evaluation and treatment  Current Diet:    [X] Aspiration Precautions  Nutrition consult     #Precautions / PROPHYLAXIS:   - Falls, Cardiac, Sternal, Hip, Spinal, Seizure   - ortho: Weight bearing status: WBAT   - Lungs: Aspiration, Incentive Spirometer   - DVT ppx:   - Pressure injury/Skin: Turn Q2hrs while in bed, OOB to Chair, PT/OT      ---------------  Code Status: FULL  Emergency Contact:    Outpatient Follow-up (Specialty/Name of physician):    --------------  Goals: Safe discharge to Home*****  Estimated Length of Stay: 10-14 days  Rehab Potential: Good  Medical Prognosis: Good  Estimated Disposition: Home with Home Care  ---------------    PRESCREEN COMPARISON:  I have reviewed the prescreen information and I have found no relevant changes between the preadmission screening and my post admission evaluation.    RATIONALE FOR INPATIENT ADMISSION: Patient demonstrates the following:  [X] Medically appropriate for rehabilitation admission  [X] Has attainable rehab goals with an appropriate initial discharge plan  [X]Has rehabilitation potential (expected to make a significant improvement within a reasonable period of time)  [X] Requires close medical management by a rehab physician, rehab nursing care, Hospitalist and comprehensive interdisciplinary team (including PT, OT and/or SLP, Prosthetics and Orthotics)         Assessment/Plan:  KITTY JIMENEZ is a 63 y/o  F with PMHx of HTN, HLD, hypothyroidism, primary lymphedema, Essential  thrombocytosis, GERD, Obesity s/p bariatric surgery, ischemic colitis s/p left hemicolectomy, pAF and DVT/PE on Eliquis, HFpEF,  vW Disease, recently had  2 hospitalizations at [facility] in the past 2 months for CAP, and also diagnosed with pericarditis, and HFpEF s/p PO abx.  Patient returns to  ED on 11/30 with worsening c/o back pain, CP, SOB and productive cough. Found to have CHF exacerbation with superimposed PNA, pericarditis, and pericardio/ pleural effusions. Now admitted to [facility] for functional deficits and initiation of a multidisciplinary rehab program consisting focused on functional mobility, transfers and ADLs.    #Pericardial effusion 2/2 pericarditis   #Pleural effusion suspected  2/2 interferon alpha  #Acute respiratory failure with hypoxia  -S/p NC, monitor O2  -CT Chest- Moderately large simple pericardial effusion. Small to moderate right and small left pleural effusions with associated compressive atelectasis and Superimposed pneumonia.  -S/p pericardial drain (removed on 12/4)  -S/p b/l pigtail catheters (removed 12/5 and 12/6)  -S/p steroid taper: c/w Prednisone 20mg HS  -Colchicine 0.6mg daily x 3 months  -Ibuprofen 600mg q8h  -F/u outpatient cardiology     Comprehensive Multidisciplinary Rehab Program:  - Start comprehensive rehab program, 3 hours a day, 5 days a week.  - PT 2hr/day: Focused on improving strength, endurance, coordination, balance, functional mobility, and transfers  - OT 1hr/day: Focused on improving strength, fine motor skills, coordination, posture and ADLs.    - Activity Limitations: Decreased social, vocational and leisure activities, decreased self care and ADLs, decreased mobility, decreased ability to manage household and finances.     -----------------------------------------------------------------------------------  Concurrent Medical Problems    #Leukocytosis  #Sepsis- PNA  #Essential thrombocythemia   - + abnormal  RBC, platelet morphology - Suspected transformation  -S/p bone biopsy 12/10--> f/u outpatient for results and  begin Jakafi treatment   -S/p Cefipime abx   -Blood cultures and pleural/pericardial fluid cultures negative  -Intolerant to anagrelide due to afib, on hydrea until about 6-12 months ago with no response, most recently on pegylated IFN alpha (Besrema) for the past 6 months- intolerant as well.  Stopped 2 months ago.   -Trend CBC, monitor for fevers (12/12- WBC 22.83)  -Monitor platelets (12/12 Plt- 924)    #vW disease  -Receives DDAVP prior to procedures.     #HTN/HLD  -Rosuvastatin 20mg HS   -Metoprolol 25mg daily     #pAFIB  -Amiodarone 200mg daily   -Metoprolol 25mg daily     #Hypothyroidism  -Synthroid 88mcg daily     #Chronic anemia  -Monitor H/H (12/12- Hgb/Hct- 8.6/30.1)  -Transfuse if Hbg <7 PRN    #Dry eyes/Allergies  #Cough  -Artificial tears q4h PRN  -Fluticasone nasal spray BID  -Guaifenesin/dextromethorphan  q4h PRN    #Pain management  -Tylenol PRN  -Flexeril 10mg TID PRN  -Dilaudid 4mg q4h PRN  -Oxycodone 5mg/Acetaminiophen 325mg q4h PRN     #Mood/Cognition  Neuropsychology consult PRN  -Wellbutrin 150mg daily   -Lamictal 200mg daily     #Sleep:   Maintain quiet hours and low stim environment.  Melatonin 3mg HS PRN to maximize participation in therapy during the day.     #GI/Bowel:  Senna QHS, Miralax PRN Daily, Bisacodyl 5mg daily PRN, Lactulose PRN, Simethicone PRN, Loperamide 2mg PRN  GI ppx: Pantoprazole 40mg daily   -Meclizine 25mg BID PRN    #/Bladder:   - PVR x1 on admission (SC if > 400)    #Skin/Pressure Injury:   - Skin assessment on admission: ***    #Diet/Supplements  Current Diet: Regular diet   Nutrition consult   -Lactobacillus tab BID   -Multivitamin daily     #Precautions / PROPHYLAXIS:   - Falls, Cardiac,   - ortho: Weight bearing status: WBAT   - Lungs: Aspiration, Incentive Spirometer   - DVT ppx: Heparin, SCDs  - Pressure injury/Skin: OOB to Chair, PT/OT      ---------------  Code Status: FULL  Emergency Contact:    Outpatient Follow-up (Specialty/Name of physician):    Shelby Dailey  Hematology  [address], [place] [address]  Phone: [phone]  Fax: [phone]  Follow Up Time: 1 week    Ravinder Abdi  Cardiovascular Disease  [address], [address]  [place] [address]  Phone: [phone]  Fax: [phone]  Follow Up Time: 2 weeks    --------------  Goals: Safe discharge to Home*****  Estimated Length of Stay: 10-14 days  Rehab Potential: Good  Medical Prognosis: Good  Estimated Disposition: Home with Home Care  ---------------    PRESCREEN COMPARISON:  I have reviewed the prescreen information and I have found no relevant changes between the preadmission screening and my post admission evaluation.    RATIONALE FOR INPATIENT ADMISSION: Patient demonstrates the following:  [X] Medically appropriate for rehabilitation admission  [X] Has attainable rehab goals with an appropriate initial discharge plan  [X]Has rehabilitation potential (expected to make a significant improvement within a reasonable period of time)  [X] Requires close medical management by a rehab physician, rehab nursing care, Hospitalist and comprehensive interdisciplinary team (including PT, OT, Prosthetics and Orthotics)         Assessment/Plan:  KITTY JIMENEZ is a 65 y/o  F with PMHx of HTN, HLD, hypothyroidism, primary lymphedema, Essential  thrombocytosis, GERD, Obesity s/p bariatric surgery, ischemic colitis s/p left hemicolectomy, pAF and DVT/PE on Eliquis, HFpEF,  vW Disease, recently had  2 hospitalizations at [facility] in the past 2 months for CAP, and also diagnosed with pericarditis, and HFpEF s/p PO abx.  Patient returns to  ED on 11/30 with worsening c/o back pain, CP, SOB and productive cough. Found to have CHF exacerbation with superimposed PNA, pericarditis, and pericardio/ pleural effusions. Now admitted to [facility] for functional deficits and initiation of a multidisciplinary rehab program consisting focused on functional mobility, transfers and ADLs.    #Pericardial effusion 2/2 pericarditis   #Pleural effusion suspected  2/2 interferon alpha  #Acute respiratory failure with hypoxia  -S/p NC, monitor O2  -CT Chest- Moderately large simple pericardial effusion. Small to moderate right and small left pleural effusions with associated compressive atelectasis and Superimposed pneumonia.  -S/p pericardial drain (removed on 12/4)  -S/p b/l pigtail catheters (removed 12/5 and 12/6)  -S/p steroid taper: c/w Prednisone 20mg HS x1 day start 12/13, Prednisone 10mg x 4 days start on 12/14; Prednisone 5mg x 5 days start on 12/18.  -Colchicine 0.6mg daily x 3 months  -Ibuprofen 600mg q8h  -F/u outpatient cardiology     Comprehensive Multidisciplinary Rehab Program:  - Start comprehensive rehab program, 3 hours a day, 5 days a week.  - PT 2hr/day: Focused on improving strength, endurance, coordination, balance, functional mobility, and transfers  - OT 1hr/day: Focused on improving strength, fine motor skills, coordination, posture and ADLs.    - Activity Limitations: Decreased social, vocational and leisure activities, decreased self care and ADLs, decreased mobility, decreased ability to manage household and finances.     -----------------------------------------------------------------------------------  Concurrent Medical Problems    #Leukocytosis  #Sepsis- PNA  #Essential thrombocythemia   - + abnormal  RBC, platelet morphology - Suspected transformation  -S/p bone biopsy 12/10--> f/u outpatient for results and  begin Jakafi treatment   -S/p Cefipime abx   -Blood cultures and pleural/pericardial fluid cultures negative  -Intolerant to anagrelide due to afib, on hydrea until about 6-12 months ago with no response, most recently on pegylated IFN alpha (Besrema) for the past 6 months- intolerant as well.  Stopped 2 months ago.   -Trend CBC, monitor for fevers (12/12- WBC 22.83)  -Monitor platelets (12/12 Plt- 924)    #vW disease  -Receives DDAVP prior to procedures.     #HTN/HLD  -Rosuvastatin 20mg HS   -Metoprolol 25mg daily     #pAFIB  -Amiodarone 200mg daily   -Metoprolol 25mg daily     #Hypothyroidism  -Synthroid 88mcg daily     #Chronic anemia  -Monitor H/H (12/12- Hgb/Hct- 8.6/30.1)  -Transfuse if Hbg <7 PRN    #Dry eyes/Allergies  #Cough  -Artificial tears q4h PRN  -Fluticasone nasal spray BID  -Guaifenesin/dextromethorphan  q4h PRN    #Pain management  -Tylenol PRN  -Flexeril 10mg TID PRN  -Dilaudid 4mg q4h PRN  -Oxycodone 5mg/Acetaminiophen 325mg q4h PRN     #Mood/Cognition  Neuropsychology consult PRN  -Wellbutrin 150mg daily   -Lamictal 200mg daily     #Sleep:   Maintain quiet hours and low stim environment.  Melatonin 3mg HS PRN to maximize participation in therapy during the day.     #GI/Bowel:  Senna QHS, Miralax PRN Daily, Bisacodyl 5mg daily PRN, Lactulose PRN, Simethicone PRN, Loperamide 2mg PRN  GI ppx: Pantoprazole 40mg daily   -Meclizine 25mg BID PRN    #/Bladder:   - PVR x1 on admission (SC if > 400)    #Skin/Pressure Injury:   - Skin assessment on admission: ***    #Diet/Supplements  Current Diet: Regular diet   Nutrition consult   -Lactobacillus tab BID   -Multivitamin daily     #Precautions / PROPHYLAXIS:   - Falls, Cardiac,   - ortho: Weight bearing status: WBAT   - Lungs: Aspiration, Incentive Spirometer   - DVT ppx: Heparin 5000U TID (ok per IR), SCDs  - Pressure injury/Skin: OOB to Chair, PT/OT      ---------------  Code Status: FULL  Emergency Contact:    Outpatient Follow-up (Specialty/Name of physician):    Shelby Dailey  Hematology  [address], [place] [address]  Phone: [phone]  Fax: [phone]  Follow Up Time: 1 week    Ravinder Abdi  Cardiovascular Disease  [address], [address]  [place] [address]  Phone: [phone]  Fax: [phone]  Follow Up Time: 2 weeks    --------------  Goals: Safe discharge to Home*****  Estimated Length of Stay: 10-14 days  Rehab Potential: Good  Medical Prognosis: Good  Estimated Disposition: Home with Home Care  ---------------    PRESCREEN COMPARISON:  I have reviewed the prescreen information and I have found no relevant changes between the preadmission screening and my post admission evaluation.    RATIONALE FOR INPATIENT ADMISSION: Patient demonstrates the following:  [X] Medically appropriate for rehabilitation admission  [X] Has attainable rehab goals with an appropriate initial discharge plan  [X]Has rehabilitation potential (expected to make a significant improvement within a reasonable period of time)  [X] Requires close medical management by a rehab physician, rehab nursing care, Hospitalist and comprehensive interdisciplinary team (including PT, OT, Prosthetics and Orthotics)         Assessment/Plan:  KITTY JIMENEZ is a 63 y/o  F with PMHx of HTN, HLD, hypothyroidism, primary lymphedema, Essential  thrombocytosis, GERD, Obesity s/p bariatric surgery, ischemic colitis s/p left hemicolectomy, pAF and DVT/PE on Eliquis, HFpEF,  vW Disease, recently had  2 hospitalizations at [facility] in the past 2 months for CAP, and also diagnosed with pericarditis, and HFpEF s/p PO abx.  Patient returns to  ED on 11/30 with worsening c/o back pain, CP, SOB and productive cough. Found to have CHF exacerbation with superimposed PNA, pericarditis, and pericardio/ pleural effusions. Now admitted to [facility] for functional deficits and initiation of a multidisciplinary rehab program consisting focused on functional mobility, transfers and ADLs.    #Pericardial effusion 2/2 pericarditis   #Pleural effusion suspected  2/2 interferon alpha  #Acute respiratory failure with hypoxia  -S/p NC, monitor O2  -CT Chest- Moderately large simple pericardial effusion. Small to moderate right and small left pleural effusions with associated compressive atelectasis and Superimposed pneumonia.  -S/p pericardial drain (removed on 12/4)  -S/p b/l pigtail catheters (removed 12/5 and 12/6)  -S/p steroid taper: c/w Prednisone 20mg HS x1 day start 12/13, Prednisone 10mg x 4 days start on 12/14; Prednisone 5mg x 5 days start on 12/18.  -Colchicine 0.6mg daily x 3 months  -Ibuprofen 600mg q8h  -F/u outpatient cardiology     Comprehensive Multidisciplinary Rehab Program:  - Start comprehensive rehab program, 3 hours a day, 5 days a week.  - PT 2hr/day: Focused on improving strength, endurance, coordination, balance, functional mobility, and transfers  - OT 1hr/day: Focused on improving strength, fine motor skills, coordination, posture and ADLs.    - Activity Limitations: Decreased social, vocational and leisure activities, decreased self care and ADLs, decreased mobility, decreased ability to manage household and finances.     -----------------------------------------------------------------------------------  Concurrent Medical Problems    #Leukocytosis  #Sepsis- PNA  #Essential thrombocythemia   - + abnormal  RBC, platelet morphology - Suspected transformation  -S/p bone biopsy 12/10--> f/u outpatient for results and  begin Jakafi treatment   -S/p Cefipime abx   -Blood cultures and pleural/pericardial fluid cultures negative  -Intolerant to anagrelide due to afib, on hydrea until about 6-12 months ago with no response, most recently on pegylated IFN alpha (Besrema) for the past 6 months- intolerant as well.  Stopped 2 months ago.   -Trend CBC, monitor for fevers (12/12- WBC 22.83)  -Monitor platelets (12/12 Plt- 924)    #vW disease  -Receives DDAVP prior to procedures.     #HTN/HLD  -Rosuvastatin 20mg HS   -Metoprolol 25mg daily     #pAFIB  -Amiodarone 200mg daily   -Metoprolol 25mg daily     #Hypothyroidism  -Synthroid 88mcg daily     #Chronic anemia  -Monitor H/H (12/12- Hgb/Hct- 8.6/30.1)  -Transfuse if Hbg <7 PRN    #Dry eyes/Allergies  #Cough  -Artificial tears q4h PRN  -Fluticasone nasal spray BID  -Guaifenesin/dextromethorphan  q4h PRN    #Pain management  -Tylenol PRN  -Flexeril 10mg TID PRN  -Dilaudid 4mg q4h PRN  -Oxycodone 5mg/Acetaminiophen 325mg q4h PRN     #Mood/Cognition  Neuropsychology consult PRN  -Wellbutrin 150mg daily   -Lamictal 200mg daily     #Sleep:   Maintain quiet hours and low stim environment.  Melatonin 3mg HS PRN to maximize participation in therapy during the day.     #GI/Bowel:  Senna QHS, Miralax PRN Daily, Bisacodyl 5mg daily PRN, Lactulose PRN, Simethicone PRN  GI ppx: Pantoprazole 40mg daily   -Meclizine 25mg BID PRN    #/Bladder:   - PVR x1 on admission (SC if > 400)    #Skin/Pressure Injury:   - Skin assessment on admission: R buttocks biopsy puncture site, w/ gauze and tegaderm, CDI, b/l flank drain sites, MARILOU, abd drain site, MARILOU, abd bruising    #Diet/Supplements  Current Diet: Regular diet   Nutrition consult   -Lactobacillus tab BID   -Multivitamin daily     #Precautions / PROPHYLAXIS:   - Falls, Cardiac,   - ortho: Weight bearing status: WBAT   - Lungs: Aspiration, Incentive Spirometer   - DVT ppx: Heparin 5000U TID (ok per IR), SCDs  - B/L calf tenderness on admission--> BLE DUPLEX US r/o DVT  - Pressure injury/Skin: OOB to Chair, PT/OT      ---------------  Code Status: FULL  Emergency Contact:    Outpatient Follow-up (Specialty/Name of physician):    Shelby Dailey  Hematology  [address] [place] [place] [address]  Phone: [phone]  Fax: [phone]  Follow Up Time: 1 week    Ravinder Abdi  Cardiovascular Disease  [address], [address] [place] [address]  Phone: [phone]  Fax: [phone]  Follow Up Time: 2 weeks    --------------  Goals: Safe discharge to Home*****  Estimated Length of Stay: 10-14 days  Rehab Potential: Good  Medical Prognosis: Good  Estimated Disposition: Home with Home Care  ---------------    PRESCREEN COMPARISON:  I have reviewed the prescreen information and I have found no relevant changes between the preadmission screening and my post admission evaluation.    RATIONALE FOR INPATIENT ADMISSION: Patient demonstrates the following:  [X] Medically appropriate for rehabilitation admission  [X] Has attainable rehab goals with an appropriate initial discharge plan  [X]Has rehabilitation potential (expected to make a significant improvement within a reasonable period of time)  [X] Requires close medical management by a rehab physician, rehab nursing care, Hospitalist and comprehensive interdisciplinary team (including PT, OT, Prosthetics and Orthotics)         Assessment/Plan:  KITTY JIMENEZ is a 63 y/o  F with PMHx of HTN, HLD, hypothyroidism, primary lymphedema, Essential  thrombocytosis, GERD, Obesity s/p bariatric surgery, ischemic colitis s/p left hemicolectomy, pAF and DVT/PE on Eliquis, HFpEF,  vW Disease, recently had  2 hospitalizations at [facility] in the past 2 months for CAP, and also diagnosed with pericarditis, and HFpEF s/p PO abx.  Patient returns to  ED on 11/30 with worsening c/o back pain, CP, SOB and productive cough. Found to have CHF exacerbation with superimposed PNA, pericarditis, and pericardio/ pleural effusions. Now admitted to [facility] for functional deficits and initiation of a multidisciplinary rehab program consisting focused on functional mobility, transfers and ADLs.    #Pericardial effusion 2/2 pericarditis   #Pleural effusion suspected  2/2 interferon alpha  #Acute respiratory failure with hypoxia  -S/p NC, monitor O2  -CT Chest- Moderately large simple pericardial effusion. Small to moderate right and small left pleural effusions with associated compressive atelectasis and Superimposed pneumonia.  -S/p pericardial drain (removed on 12/4)  -S/p b/l pigtail catheters (removed 12/5 and 12/6)  -S/p steroid taper: c/w Prednisone 20mg HS x1 day start 12/13, Prednisone 10mg x 4 days start on 12/14; Prednisone 5mg x 5 days start on 12/18.  -Colchicine 0.6mg daily x 3 months  -Ibuprofen 600mg q8h  -F/u outpatient cardiology     Comprehensive Multidisciplinary Rehab Program:  - Start comprehensive rehab program, 3 hours a day, 5 days a week.  - PT 2hr/day: Focused on improving strength, endurance, coordination, balance, functional mobility, and transfers  - OT 1hr/day: Focused on improving strength, fine motor skills, coordination, posture and ADLs.    - Activity Limitations: Decreased social, vocational and leisure activities, decreased self care and ADLs, decreased mobility, decreased ability to manage household and finances.     -----------------------------------------------------------------------------------  Concurrent Medical Problems    #Leukocytosis  #Sepsis- PNA  #Essential thrombocythemia   - + abnormal  RBC, platelet morphology - Suspected transformation  -S/p bone biopsy 12/10--> f/u outpatient for results and  begin Jakafi treatment   -S/p Cefipime abx   -Blood cultures and pleural/pericardial fluid cultures negative  -Intolerant to anagrelide due to afib, on hydrea until about 6-12 months ago with no response, most recently on pegylated IFN alpha (Besrema) for the past 6 months- intolerant as well.  Stopped 2 months ago.   -Trend CBC, monitor for fevers (12/12- WBC 22.83)  -Monitor platelets (12/12 Plt- 924)    #vW disease  -Receives DDAVP prior to procedures.     #HTN/HLD  -Rosuvastatin 20mg HS   -Metoprolol 25mg daily   -Enalapril 20mg daily     #pAFIB  -Amiodarone 200mg daily   -Metoprolol 25mg daily     #Hypothyroidism  -Synthroid 88mcg daily     #Chronic anemia  -Monitor H/H (12/12- Hgb/Hct- 8.6/30.1)  -Transfuse if Hbg <7 PRN    #Dry eyes/Allergies  #Cough  -Artificial tears q4h PRN  -Fluticasone nasal spray BID  -Guaifenesin/dextromethorphan  q4h PRN    #Pain management  -Tylenol PRN  -Flexeril 10mg TID PRN  -Dilaudid 4mg q4h PRN      #Mood/Cognition  Neuropsychology consult PRN  -Wellbutrin 150mg daily   -Lamictal 200mg daily     #Sleep:   Maintain quiet hours and low stim environment.  Melatonin 3mg HS PRN to maximize participation in therapy during the day.     #GI/Bowel  #N/V  Senna QHS, Miralax PRN Daily, Bisacodyl 5mg daily PRN, Lactulose PRN, Simethicone PRN  GI ppx: Pantoprazole 40mg daily   -Meclizine 25mg BID PRN  -Zofran 4mg TID PRN    #/Bladder:   - PVR x1 on admission (SC if > 400)    #Skin/Pressure Injury:   - Skin assessment on admission: R buttocks biopsy puncture site, w/ gauze and tegaderm, CDI, b/l flank drain sites, MARILOU, abd drain site, MARILOU, abd bruising    #Diet/Supplements  Current Diet: Regular diet   Nutrition consult   -Lactobacillus tab BID   -Multivitamin daily     #Precautions / PROPHYLAXIS:   - Falls, Cardiac  - ortho: Weight bearing status: WBAT   - Lungs: Aspiration, Incentive Spirometer   - DVT ppx: Heparin 5000U TID (ok per IR), SCDs  - B/L calf tenderness on admission--> BLE DUPLEX US r/o DVT  - Pressure injury/Skin: OOB to Chair, PT/OT      ---------------  Code Status: FULL  Emergency Contact:    Outpatient Follow-up (Specialty/Name of physician):    Shelby Dailey  Hematology  [address] [facility], [facility] [place] [address]  Phone: [phone]  Fax: [phone]  Follow Up Time: 1 week    Ravinder Abdi  Cardiovascular Disease  [address] [facility], [address] [place] [id number]  Phone: [phone]  Fax: [phone]  Follow Up Time: 2 weeks    --------------  Goals: Safe discharge to Home*****  Estimated Length of Stay: 10-14 days  Rehab Potential: Good  Medical Prognosis: Good  Estimated Disposition: Home with Home Care  ---------------    PRESCREEN COMPARISON:  I have reviewed the prescreen information and I have found no relevant changes between the preadmission screening and my post admission evaluation.    RATIONALE FOR INPATIENT ADMISSION: Patient demonstrates the following:  [X] Medically appropriate for rehabilitation admission  [X] Has attainable rehab goals with an appropriate initial discharge plan  [X]Has rehabilitation potential (expected to make a significant improvement within a reasonable period of time)  [X] Requires close medical management by a rehab physician, rehab nursing care, Hospitalist and comprehensive interdisciplinary team (including PT, OT, Prosthetics and Orthotics)

## 2024-12-12 NOTE — DISCHARGE NOTE PROVIDER - HOSPITAL COURSE
64-year-old F with PMH of HTN, HLD, hypothyroidism, primary lymphedema, Essential  thrombocytosis, GERD, Obesity s/p bariatric surgery, ischemic colitis s/p left hemicolectomy, pAF and DVT/PE on Eliquis, HFpEF,  vW Disease, recently had  2 hospitalizations at [facility] in the past 2 months for CAP, and also diagnosed with pericarditis, and HFpEF. Pt Was last discharged home on 11/29 on PO Abxs  (Doxycycline). She presented to  ED c/o back & chest pain, SOB and productive cough. Pt reports symptoms similar to when she was readmitted to [facility] w/ Dx of pericarditis/CHF/PNA.  In ED: Tachypneic  and hypoxic on RA, required 4L NC. Labs with leukocytosis, H/H 8.8/28.7,  Plts 760,  mildly elevated Alk phos.  Troponin wnl. Elevated BNP at 3214  CT chest: Moderately large simple pericardial effusion. Small to moderate right and  small left pleural effusions with associated compressive atelectasis. Superimposed pneumonia should be excluded on a clinical basis. Pt was initially admitted to medicine for Sepsis and suspected PNA,  Work up which included TTE found Large pericardial effusions with signs of early tamponade.  Pt was transferred to CCU. She  was evaluated by Cardio, CT Sx and IR, S/p pericardial drain   and B/l chest tube  placement.  Pleural fluid exudative effusion. Pt  received empiric Tx with  cefepime, 5 days course, Infectious work up neg. Pt was followed by Cardio and heme/onc pericarditis and pericardial effusions possibly related to interferon Tx? Pt is on Colcrys and Prednisone steroid taper. Pts pain   and SOB improved. Now drains removed   Pt with episode of PAFIB, on Amio, will continue,, plan for short term course and Pt will need to F/u with Dr Abdi for further management. No A/c Pt is low-intermediate risk, will be further reassessed outPt    Pt was followed  by heme/onc, suspect advancement/transformation? of Essential Thrombocytosis, Pt failed or did not tolerate previous Tx. Now s/p BM Bx will need to f/u  with Dr Dailey outPt to discuss results   Today pt reports that feels overall better, no SOB, mild cough and some residual pain, bit overall greatly improved. Pt reports leg pain, states its  chronic just felt more intense last night. D/c planning and f/u discussed. Pt agrees     Vital Signs Last 24 Hrs  T(C): 36.6 (12 Dec 2024 08:29), Max: 36.6 (11 Dec 2024 21:35)  T(F): 97.9 (12 Dec 2024 08:29), Max: 97.9 (12 Dec 2024 08:29)  HR: 65 (12 Dec 2024 08:29) (53 - 72)  BP: 148/45 (12 Dec 2024 08:29) (101/81 - 148/45)  BP(mean): 73 (12 Dec 2024 08:29) (70 - 87)  RR: 17 (12 Dec 2024 08:29) (14 - 20)  SpO2: 97% (12 Dec 2024 08:29) (94% - 97%)    Parameters below as of 12 Dec 2024 08:29  Patient On (Oxygen Delivery Method): room air      PHYSICAL EXAM:  General: in no acute distress  Eyes:  EOMI; conjunctiva and sclera clear  Head: Normocephalic; atraumatic  ENMT: No nasal discharge; airway clear  Respiratory: Decreased BS at bases,  No wheezes  Cardiovascular: Regular rate and rhythm. S1 and S2 Normal;  Gastrointestinal: Soft non-tender non-distended; Normal bowel sounds  Genitourinary: No  suprapubic  tenderness  Extremities: No edema, mild tenderness to LE on palpation, chronic   Neurological: Alert and oriented x3, non focal   Musculoskeletal: Normal muscle tone, without deformities  Psychiatric: Cooperative and appropriate      64-year-old F with PMH of HTN, HLD, hypothyroidism, primary lymphedema, Essential  thrombocytosis, GERD, Obesity s/p bariatric surgery, ischemic colitis s/p left hemicolectomy, pAF and DVT/PE on Eliquis, HFpEF,  vW Disease, recently had  2 hospitalizations at [facility] in the past 2 months for CAP, and also diagnosed with pericarditis, and HFpEF admitted for:       1. Acute Hypoxic respiratory failure due to  presumed Sepsis for  suspected GNR PNA, B/l Pleural effusions and Large periradial effusion. Pericarditis, unclear etiology . Acute HFpEF due to  cardiac tamponade and Effusions   On Presentation Leukocytosis, Tachypnea. CT chest with possible PNA  Infectious work up was neg. Pleural fluid exudative but Cx neg   Pt completed 5 days Of cefepime  Pt S/p B/l Chest tubes, now removed  Pleural Fluid exudative, cytology neg for malignancy, CX NG   S/p Pericardial drain placement   for cardiac tamponade. Now  removed  Pleuritis and Pericarditis though to be 2/2 interferon Tx? Rheum and infectious work up neg at [facility]   Pt ro c/w pain meds PRN  C/w Colcrys, C/w Motrin  C/w prednisone taper. 20mg last day tomorrow, then 10mg x 5 days and 5 mg x 5 days =, then stop  Pt is off O2 now   Pt to f/u with cardio       2. PAFIB   In NSR  now   C/w Amio 200mg PO QD, plan for short term Tx   C/w metoprolol  No A/c as per cardio will evaluate outPt   D/w Dr Abdi       3. [facility] Thrombocytosis. Chronic Anemia  H/H stable, Plts in 900s   S/p BM Bx, suspected transformation   D/w Onc team, Pt to f/u with Dr Dailey outPt for results and further management       4. HTN   BP meds were held on admission   C/w Toprol, willl resume  enalapril  Cont to Hold amlodipine and HCTZ  F/u with CArdio     Dispo; stable for dc  to Acute rehab  Fax dc summary to PCP  Total time 80 min

## 2024-12-12 NOTE — PROGRESS NOTE ADULT - SUBJECTIVE AND OBJECTIVE BOX
PCP:    REQUESTING PHYSICIAN:    REASON FOR VISIT: AF, Pericarditis / effusion    HPI:   Anne-Marie Corona is a 64-year-old woman with a history of right ventricular outflow tract ventricular tachycardia s/p remote ablation (2001; "partially successful" with subsequent PVCs treated with verapamil), hypertension, hypothyroidism, ischemic colitis, von Willebrand's disease, essential thrombocytosis, obesity s/p gastric bypass, hypertension, DVT/PE, and hospitalization in March 2023 for respiratory failure, who has been ill since late October.  She was admitted to [facility] twice with respiratory failure, bacterial pneumonia, acute kidney injury, pericarditis, and pericardial effusion.  She was discharged on 11/29 but felt poor on 11/30 and came to the [facility] ED. She was found to have a large pericardial effusion, pleural effusion.     12/2/'24: some improvement in symptoms overnight.  12/3/24  Patient is complaining of having sharp chest pain left sided chest , increase motion ,deep breathing ,  started last night in addition to prior chest pain , patient had bilateral CT placed , and pericardial drain in place ,   patient was treated for pleuropericarditis [facility] [place]   12/4/24: Pt feels improved. Right sided atypical chest pain.   12/5/24: Pleuritic chest pressure.  Overall, feeling "a little better."  12/6/24: s/p drain removal with persistent pleuritic chest pain   12/7/'24: still w/ dyspnea but slowly improving  12/8/'24: ambulated around unit slowly still w/ some discomfort w/ inspiration.  12/9/'24: sleeping, no problems overnight.  12/10/24  feeling better , does have discomfort on taking deep breaths  no SOB   , did receive IV solumedrol yesterday , with improved symptoms   12/11/24 Feels improved.   12/12/24:  No new complaints; eager to transfer to rehab; breathing comfortably; mild pleuritic pain with deep inspiration. S/p bone marrow biopsy.    CV MEDICATIONS  (STANDING):  aMIOdarone    Tablet 200 milliGRAM(s) Oral daily  colchicine 0.6 milliGRAM(s) Oral daily  heparin   Injectable 5000 Unit(s) SubCutaneous every 8 hours  ibuprofen  Tablet. 600 milliGRAM(s) Oral every 8 hours  metoprolol succinate ER 25 milliGRAM(s) Oral daily  rosuvastatin 20 milliGRAM(s) Oral at bedtime    Vital Signs Last 24 Hrs  T(C): 36.6 (12 Dec 2024 08:29), Max: 36.6 (11 Dec 2024 21:35)  T(F): 97.9 (12 Dec 2024 08:29), Max: 97.9 (12 Dec 2024 08:29)  HR: 65 (12 Dec 2024 08:29) (53 - 72)  BP: 148/45 (12 Dec 2024 08:29) (101/81 - 148/45)  BP(mean): 73 (12 Dec 2024 08:29) (65 - 115)  RR: 17 (12 Dec 2024 08:29) (14 - 21)  SpO2: 97% (12 Dec 2024 08:29) (89% - 97%)  Patient On (Oxygen Delivery Method): room air    PHYSICAL EXAM:  Constitutional: NAD, awake and alert, well-appearing  Respiratory: Breath sounds are clear bilaterally, Nonlabored  Cardiovascular: S1 and S2, regular  Extremities: No edema.    LABS:                    8.4    19.33 )-----------( 925      ( 11 Dec 2024 05:40 )             28.5     134[L]  |  105  |  13  ----------------------------<  87  4.4   |  27  |  0.54    Ca    9.2      11 Dec 2024 05:40  Phos  3.4     12-11  Mg     1.9     12-11    TPro  5.8[L]  /  Alb  2.2[L]  /  TBili  0.2  /  DBili  x   /  AST  22  /  ALT  16  /  AlkPhos  84  12-11    TTE Limited W or WO Ultrasound Enhancing Agent (12.05.24 @ 09:01) >   1. Left ventricular systolic function is normal with an ejection fraction of 69 % by 3D.   2. A small fibrinous pericardial effusion localized to the inferolateral pericardium vs inflammed pericardium is noted.

## 2024-12-12 NOTE — H&P ADULT - NSHPSOCIALHISTORY_GEN_ALL_CORE
SOCIAL HISTORY  Smoking -   EtOH -   Drugs -     FUNCTIONAL HISTORY  Lives in a ______ with ______  and has __ stairs to enter + hand rails and _____ stairs inside + hand rails. Assissted device to walk ______.  Prior Level of Function: Independent in ADLs and ambulation    CURRENT FUNCTIONAL STATUS  - Bed Mobility:  - Transfers:   - Gait:  - ADLs:  -Upper Body Dressing:  -Lower Body Dressing:  -Toileting:  -Bathing: SOCIAL HISTORY  Smoking - denies  EtOH - denies  Drugs - denies    FUNCTIONAL HISTORY  Patient lives alone; In a condo with three steps with right handrail. Uses RW/SAC    CURRENT FUNCTIONAL STATUS  - Bed Mobility: Min A; 1PA  - Transfers: Min A; 1PA; WBAT; RW  - Gait: CG; 1PA; WBAT; 10 feet with RW    - ADLs:  -Toileting: Min A; 1PA  -Lower Body Dressing: Min A; 1PA  -Eating: Independent  -Grooming: Supervision, set up required

## 2024-12-12 NOTE — PROGRESS NOTE ADULT - PROBLEM SELECTOR PROBLEM 2
Paroxysmal atrial fibrillation
Pericardial effusion
Pneumonia

## 2024-12-12 NOTE — DISCHARGE NOTE PROVIDER - CARE PROVIDERS DIRECT ADDRESSES
[email or web address]

## 2024-12-12 NOTE — H&P ADULT - NSHPPHYSICALEXAM_GEN_ALL_CORE
PHYSICAL EXAM  VITALS  T(C): 36.4 (12-12-24 @ 15:00), Max: 36.6 (12-11-24 @ 21:35)  HR: 59 (12-12-24 @ 15:00) (53 - 72)  BP: 143/52 (12-12-24 @ 15:00) (101/81 - 148/45)  RR: 16 (12-12-24 @ 15:00) (14 - 20)  SpO2: 97% (12-12-24 @ 15:00) (94% - 97%)    Gen - NAD, mildly anxious   HEENT - NCAT, EOMI, MMM, PERRLA, Normal Conjunctivae  Neck - Supple, No limited ROM  Pulm - Diminished breath sounds at bases, No wheeze, No rhonchi, No crackles, painful with deep inspiration   Cardiovascular - RRR, S1S2, No murmurs  Chest - good chest expansion, good respiratory effort  Abdomen - Soft, NT/ND, +BS  Extremities - No C/C, + b/l calf tenderness, + BLE trace edema  Neuro-     Cognitive - awake, alert, oriented to person, place, date, year, and situation.  Able  to follow command     Communication - Fluent, Comprehensible, No dysarthria, No aphasia      Cranial Nerves -No facial asymmetry, Tongue midline, EOMI, Shoulder shrug intact     Motor -                     LEFT    UE - ShAB 4/5, EF 5/5, EE 4/5,  4/5                    RIGHT UE - ShAB 5/5, EF 5/5, EE 4/5,   5/5                    LEFT    LE - HF 4/5, KE 5/5, DF 5/5, PF 5/5                    RIGHT LE - HF 4/5, KE 5/5, DF 5/5, PF 5/5        Sensory - Impaired to LT on the bottom soles of b/l feet      Tone - Normal  MSK: Soreness to b/l flank drain sites, abd drain site, and biopsy site   Psychiatric - Mood stable, Affect WNL  Skin: R buttocks biopsy puncture site, w/ gauze and tegaderm, CDI, b/l flank drain sites, MARILOU, abd drain site, MARILOU, abd bruising

## 2024-12-12 NOTE — DISCHARGE NOTE PROVIDER - NSDCCPCAREPLAN_GEN_ALL_CORE_FT
PRINCIPAL DISCHARGE DIAGNOSIS  Diagnosis: Pericardial effusion  Assessment and Plan of Treatment: was drained  Continue Colcryl daily  Prednsione taper  Motrin and pain meds as needed  F/u with  Cardio         SECONDARY DISCHARGE DIAGNOSES  Diagnosis: Anticoagulated by anticoagulation treatment  Assessment and Plan of Treatment:     Diagnosis: Essential thrombocytosis  Assessment and Plan of Treatment: F/u with Dr Dailey for results of Biopsy

## 2024-12-12 NOTE — H&P ADULT - NSHPLABSRESULTS_GEN_ALL_CORE
Labs             8.6    22.82 )-----------( 924      ( 12 Dec 2024 08:53 )             30.1     12-12    134[L]  |  105  |  15  ----------------------------<  110[H]  3.6   |  25  |  0.73    Ca    8.9      12 Dec 2024 08:53  Phos  3.2     12-12  Mg     1.7     12-12    TPro  5.8[L]  /  Alb  2.2[L]  /  TBili  0.2  /  DBili  x   /  AST  22  /  ALT  16  /  AlkPhos  84  12-11    LIVER FUNCTIONS - ( 11 Dec 2024 05:40 )  Alb: 2.2 g/dL / Pro: 5.8 gm/dL / ALK PHOS: 84 U/L / ALT: 16 U/L / AST: 22 U/L / GGT: x           PT/INR - ( 11 Dec 2024 05:40 )   PT: 13.4 sec;   INR: 1.17 ratio      PTT - ( 11 Dec 2024 05:40 )  PTT:29.6 sec    Urinalysis Basic - ( 12 Dec 2024 08:53 )    Color: x / Appearance: x / SG: x / pH: x  Gluc: 110 mg/dL / Ketone: x  / Bili: x / Urobili: x   Blood: x / Protein: x / Nitrite: x   Leuk Esterase: x / RBC: x / WBC x   Sq Epi: x / Non Sq Epi: x / Bacteria: x Labs             8.6    22.82 )-----------( 924      ( 12 Dec 2024 08:53 )             30.1     12-12    134[L]  |  105  |  15  ----------------------------<  110[H]  3.6   |  25  |  0.73    Ca    8.9      12 Dec 2024 08:53  Phos  3.2     12-12  Mg     1.7     12-12    TPro  5.8[L]  /  Alb  2.2[L]  /  TBili  0.2  /  DBili  x   /  AST  22  /  ALT  16  /  AlkPhos  84  12-11    LIVER FUNCTIONS - ( 11 Dec 2024 05:40 )  Alb: 2.2 g/dL / Pro: 5.8 gm/dL / ALK PHOS: 84 U/L / ALT: 16 U/L / AST: 22 U/L / GGT: x           PT/INR - ( 11 Dec 2024 05:40 )   PT: 13.4 sec;   INR: 1.17 ratio      PTT - ( 11 Dec 2024 05:40 )  PTT:29.6 sec    Urinalysis Basic - ( 12 Dec 2024 08:53 )    Color: x / Appearance: x / SG: x / pH: x  Gluc: 110 mg/dL / Ketone: x  / Bili: x / Urobili: x   Blood: x / Protein: x / Nitrite: x   Leuk Esterase: x / RBC: x / WBC x   Sq Epi: x / Non Sq Epi: x / Bacteria: x    < from: Xray Chest 1 View- PORTABLE-Routine (Xray Chest 1 View- PORTABLE-Routine in AM.) (12.09.24 @ 08:20) >    IMPRESSION:  1. Cardiomegaly with borderline vascularity but no pulmonary edema.  2. Small bilateral pleural effusions, increased from the prior exam.    < from: Xray Chest 1 View- PORTABLE-Urgent (Xray Chest 1 View- PORTABLE-Urgent .) (12.06.24 @ 13:03) >    IMPRESSION: Interval removal of bilateral chest tubes. No pneumothorax.   Probable small loculated left pleural effusion and trace right pleural   effusion and/or right basilar atelectasis on the final image.    < from: CT Chest No Cont (11.30.24 @ 20:34) >    IMPRESSION:  Moderately large simple pericardial effusion. Small to moderate right and   small left pleural effusions with associatedcompressive atelectasis.   Superimposed pneumonia should be excluded on a clinical basis. Labs             8.6    22.82 )-----------( 924      ( 12 Dec 2024 08:53 )             30.1     12-12    134[L]  |  105  |  15  ----------------------------<  110[H]  3.6   |  25  |  0.73    Ca    8.9      12 Dec 2024 08:53  Phos  3.2     12-12  Mg     1.7     12-12    TPro  5.8[L]  /  Alb  2.2[L]  /  TBili  0.2  /  DBili  x   /  AST  22  /  ALT  16  /  AlkPhos  84  12-11    LIVER FUNCTIONS - ( 11 Dec 2024 05:40 )  Alb: 2.2 g/dL / Pro: 5.8 gm/dL / ALK PHOS: 84 U/L / ALT: 16 U/L / AST: 22 U/L / GGT: x           PT/INR - ( 11 Dec 2024 05:40 )   PT: 13.4 sec;   INR: 1.17 ratio      PTT - ( 11 Dec 2024 05:40 )  PTT:29.6 sec    Urinalysis Basic - ( 12 Dec 2024 08:53 )    Color: x / Appearance: x / SG: x / pH: x  Gluc: 110 mg/dL / Ketone: x  / Bili: x / Urobili: x   Blood: x / Protein: x / Nitrite: x   Leuk Esterase: x / RBC: x / WBC x   Sq Epi: x / Non Sq Epi: x / Bacteria: x    < from: Xray Chest 1 View- PORTABLE-Routine (Xray Chest 1 View- PORTABLE-Routine in AM.) (12.09.24 @ 08:20) >    IMPRESSION:  1. Cardiomegaly with borderline vascularity but no pulmonary edema.  2. Small bilateral pleural effusions, increased from the prior exam.    < from: Xray Chest 1 View- PORTABLE-Urgent (Xray Chest 1 View- PORTABLE-Urgent .) (12.06.24 @ 13:03) >    IMPRESSION: Interval removal of bilateral chest tubes. No pneumothorax.   Probable small loculated left pleural effusion and trace right pleural   effusion and/or right basilar atelectasis on the final image.    < from: CT Chest No Cont (11.30.24 @ 20:34) >    IMPRESSION:  Moderately large simple pericardial effusion. Small to moderate right and   small left pleural effusions with associated compressive atelectasis.   Superimposed pneumonia should be excluded on a clinical basis.

## 2024-12-12 NOTE — DISCHARGE NOTE NURSING/CASE MANAGEMENT/SOCIAL WORK - NSDCVIVACCINE_GEN_ALL_CORE_FT
influenza, injectable, quadrivalent, preservative free; 09-Oct-2021 12:11; Magdalene Foster (RN); [employer]; [id number] (Exp. Date: 30-Jun-2022); IntraMuscular; Deltoid Right.; 0.5 milliLiter(s); VIS (VIS Published: 15-Aug-2019, VIS Presented: 09-Oct-2021);   rabies, intradermal injection; 17-Sep-2023 14:26; Dany Lewis (RN); [employer]; [id number] (Exp. Date: 17-Feb-2025); IntraMuscular.; Deltoid Left...; 1 milliLiter(s); VIS (VIS Published: 17-Oct-2009, VIS Presented: 17-Sep-2023);

## 2024-12-12 NOTE — H&P ADULT - NSHPREVIEWOFSYSTEMS_GEN_ALL_CORE
REVIEW OF SYSTEMS  Constitutional: No fever, No Chills, + fatigue  HEENT: No eye pain, No visual disturbances, No difficulty hearing  Pulm: + intermittent cough,  +shortness of breath on exertion  Cardio: No chest pain, No palpitations  GI:  No abdominal pain, + intermittent nausea, No vomiting, No diarrhea, No constipation LBM 12/12  : No dysuria, No frequency, No hematuria  Neuro: No headaches, No memory loss, + generalized weakness, + numbness soles b/l feet , +intermittent resting tremors R hand  Skin: No itching, No rashes, No lesions   Endo: No temperature intolerance  MSK:  No Neck pain,  No back pain, + biopsy incision soreness, + drain site soreness  Psych:  No depression, + anxiety

## 2024-12-12 NOTE — PROGRESS NOTE ADULT - PROBLEM SELECTOR PLAN 1
-Acute pericarditis with pericardial effusion s/p pericardiocentesis 12/2/'24  -12/3-moderate effusion, 12/5-small effusion.  -cont colchicine for planned 3 months. Cr/GFR 0.48/107, NSAID with PPI started today, recommend 2 weeks therapy then downtitration.
-Acute pericarditis with pericardial effusion s/p pericardiocentesis 12/2/'24  -12/3-moderate effusion, 12/5-small effusion.  -cont colchicine for planned 3 months. Cr/GFR 0.48/107, NSAID with PPI started today, recommend 2 weeks therapy then downtitration.
- Pt seen at bedside, reported feeling better overall following significant output from IR drains. Able to breath better. However, she is complaining of severe back pain and was interested in other pain control medications. Dr. Ojeda aware and spoke with pt.   - Cardiothoracic surgery following chest tubes and pericardial drain  - Pericardial drain is currently clamped in order to obtain a sample  - Please call IR as needed, will continue to monitor
-Acute pericarditis with pericardial effusion s/p pericardiocentesis 12/2/'24  -12/3-moderate effusion, 12/5-small effusion.  -cont colchicine for planned 3 months , consider resuming NSAID with PPI for planned 2 weeks
Acute pericarditis with hemorraghic pericardial effusion s/p pericardiocentesis 12/2/24. Clinically improved. Continue ibuprofen, colchicine.
-Acute pericarditis with pericardial effusion s/p pericardiocentesis 12/2/'24  -12/3-moderate effusion, 12/5-small effusion.  -cont colchicine for planned 3 months. Cr/GFR 0.48/107, NSAID with PPI  started on 12/8/24 recommend 2 weeks therapy then down titration. patient receiving prednisone  for short course as suggested by pulmonary , helped her symptoms ,     mild leucocytosis  on steroids
Acute pericarditis -- still with pleuritic chest pain; continue colchicine; I would favor resuming NSAID given persistent pain.
Acute pericarditis with pericardial effusion s/p pericardiocentesis  drain removed- will need to repeat limited TTE to assess effusion   cont colchicine for planned 3 months , consider resuming NSAID with PPI for planned 2 weeks
-Acute pericarditis was diagnosed Nov '24, treated w/ Motrin & colchicine. NSAIDs as she is only on motrin PRN.  now     -Cont. colchicine. CRP
-Acute pericarditis was diagnosed Nov '24, treated w/ motrin & colchicine. Presumably weaned off high dose NSAIDs as she is only on motrin PRN.    -Cont. colchicine. CRP ordered today.
-Acute pericarditis with pericardial effusion s/p pericardiocentesis 12/2/'24  -12/3-moderate effusion, 12/5-small effusion.  -cont colchicine for planned 3 months. Cr/GFR 0.48/107, NSAID with PPI  started on 12/8/24 recommend 2 weeks therapy then down titration. patient receiving prednisone  for short course as suggested by pulmonary , helped her symptoms ,     mild leucocytosis  on steroids
-Acute pericarditis was diagnosed Nov '24, treated w/ Motrin & colchicine. Presumably weaned off high dose NSAIDs as she is only on motrin PRN.  now     -Cont. colchicine. CRP

## 2024-12-13 LAB
ALBUMIN SERPL ELPH-MCNC: 2.4 G/DL — LOW (ref 3.3–5)
ALP SERPL-CCNC: 86 U/L — SIGNIFICANT CHANGE UP (ref 40–120)
ALT FLD-CCNC: 13 U/L — SIGNIFICANT CHANGE UP (ref 10–45)
ANION GAP SERPL CALC-SCNC: 10 MMOL/L — SIGNIFICANT CHANGE UP (ref 5–17)
ANISOCYTOSIS BLD QL: SIGNIFICANT CHANGE UP
AST SERPL-CCNC: 13 U/L — SIGNIFICANT CHANGE UP (ref 10–40)
BASOPHILS # BLD AUTO: 0.7 K/UL — HIGH (ref 0–0.2)
BASOPHILS NFR BLD AUTO: 3 % — HIGH (ref 0–2)
BILIRUB SERPL-MCNC: 0.2 MG/DL — SIGNIFICANT CHANGE UP (ref 0.2–1.2)
BLASTS # FLD: 2 % — HIGH (ref 0–0)
BUN SERPL-MCNC: 12 MG/DL — SIGNIFICANT CHANGE UP (ref 7–23)
CALCIUM SERPL-MCNC: 9.4 MG/DL — SIGNIFICANT CHANGE UP (ref 8.4–10.5)
CHLORIDE SERPL-SCNC: 101 MMOL/L — SIGNIFICANT CHANGE UP (ref 96–108)
CO2 SERPL-SCNC: 26 MMOL/L — SIGNIFICANT CHANGE UP (ref 22–31)
CREAT SERPL-MCNC: 0.57 MG/DL — SIGNIFICANT CHANGE UP (ref 0.5–1.3)
DACRYOCYTES BLD QL SMEAR: SLIGHT — SIGNIFICANT CHANGE UP
EGFR: 103 ML/MIN/1.73M2 — SIGNIFICANT CHANGE UP
ELLIPTOCYTES BLD QL SMEAR: SLIGHT — SIGNIFICANT CHANGE UP
EOSINOPHIL # BLD AUTO: 0 K/UL — SIGNIFICANT CHANGE UP (ref 0–0.5)
EOSINOPHIL NFR BLD AUTO: 0 % — SIGNIFICANT CHANGE UP (ref 0–6)
GLUCOSE SERPL-MCNC: 77 MG/DL — SIGNIFICANT CHANGE UP (ref 70–99)
HCT VFR BLD CALC: 29.8 % — LOW (ref 34.5–45)
HGB BLD-MCNC: 8.9 G/DL — LOW (ref 11.5–15.5)
HYPOCHROMIA BLD QL: SLIGHT — SIGNIFICANT CHANGE UP
LG PLATELETS BLD QL AUTO: SLIGHT — SIGNIFICANT CHANGE UP
LYMPHOCYTES # BLD AUTO: 1.17 K/UL — SIGNIFICANT CHANGE UP (ref 1–3.3)
LYMPHOCYTES # BLD AUTO: 5 % — LOW (ref 13–44)
MACROCYTES BLD QL: SLIGHT — SIGNIFICANT CHANGE UP
MANUAL SMEAR VERIFICATION: SIGNIFICANT CHANGE UP
MCHC RBC-ENTMCNC: 24.8 PG — LOW (ref 27–34)
MCHC RBC-ENTMCNC: 29.9 G/DL — LOW (ref 32–36)
MCV RBC AUTO: 83 FL — SIGNIFICANT CHANGE UP (ref 80–100)
METAMYELOCYTES # FLD: 1 % — HIGH (ref 0–0)
MONOCYTES # BLD AUTO: 0.93 K/UL — HIGH (ref 0–0.9)
MONOCYTES NFR BLD AUTO: 4 % — SIGNIFICANT CHANGE UP (ref 2–14)
MYELOCYTES NFR BLD: 2 % — HIGH (ref 0–0)
NEUTROPHILS # BLD AUTO: 19.14 K/UL — HIGH (ref 1.8–7.4)
NEUTROPHILS NFR BLD AUTO: 82 % — HIGH (ref 43–77)
NRBC # BLD: 1 /100 WBCS — HIGH (ref 0–0)
OVALOCYTES BLD QL SMEAR: SLIGHT — SIGNIFICANT CHANGE UP
PLAT MORPH BLD: NORMAL — SIGNIFICANT CHANGE UP
PLATELET # BLD AUTO: 924 K/UL — HIGH (ref 150–400)
POIKILOCYTOSIS BLD QL AUTO: SIGNIFICANT CHANGE UP
POLYCHROMASIA BLD QL SMEAR: SLIGHT — SIGNIFICANT CHANGE UP
POTASSIUM SERPL-MCNC: 4.3 MMOL/L — SIGNIFICANT CHANGE UP (ref 3.5–5.3)
POTASSIUM SERPL-SCNC: 4.3 MMOL/L — SIGNIFICANT CHANGE UP (ref 3.5–5.3)
PROT SERPL-MCNC: 5.8 G/DL — LOW (ref 6–8.3)
RBC # BLD: 3.59 M/UL — LOW (ref 3.8–5.2)
RBC # FLD: 20.5 % — HIGH (ref 10.3–14.5)
RBC BLD AUTO: ABNORMAL
SCHISTOCYTES BLD QL AUTO: SLIGHT — SIGNIFICANT CHANGE UP
SODIUM SERPL-SCNC: 137 MMOL/L — SIGNIFICANT CHANGE UP (ref 135–145)
STOMATOCYTES BLD QL SMEAR: SLIGHT — SIGNIFICANT CHANGE UP
TARGETS BLD QL SMEAR: SLIGHT — SIGNIFICANT CHANGE UP
TM INTERPRETATION: SIGNIFICANT CHANGE UP
VARIANT LYMPHS # BLD: 1 % — SIGNIFICANT CHANGE UP (ref 0–6)
WBC # BLD: 23.34 K/UL — HIGH (ref 3.8–10.5)
WBC # FLD AUTO: 23.34 K/UL — HIGH (ref 3.8–10.5)

## 2024-12-13 PROCEDURE — 90791 PSYCH DIAGNOSTIC EVALUATION: CPT

## 2024-12-13 PROCEDURE — 99223 1ST HOSP IP/OBS HIGH 75: CPT | Mod: GC

## 2024-12-13 PROCEDURE — 99223 1ST HOSP IP/OBS HIGH 75: CPT

## 2024-12-13 RX ORDER — ONDANSETRON HYDROCHLORIDE 4 MG/1
4 TABLET, FILM COATED ORAL
Refills: 0 | Status: DISCONTINUED | OUTPATIENT
Start: 2024-12-13 | End: 2024-12-20

## 2024-12-13 RX ADMIN — ONDANSETRON HYDROCHLORIDE 4 MILLIGRAM(S): 4 TABLET, FILM COATED ORAL at 13:11

## 2024-12-13 RX ADMIN — Medication 600 MILLIGRAM(S): at 05:37

## 2024-12-13 RX ADMIN — Medication 5000 UNIT(S): at 22:19

## 2024-12-13 RX ADMIN — ROSUVASTATIN CALCIUM 20 MILLIGRAM(S): 5 TABLET, FILM COATED ORAL at 22:19

## 2024-12-13 RX ADMIN — HYDROMORPHONE HYDROCHLORIDE 4 MILLIGRAM(S): 2 TABLET ORAL at 15:15

## 2024-12-13 RX ADMIN — Medication 88 MICROGRAM(S): at 05:37

## 2024-12-13 RX ADMIN — AMIODARONE HYDROCHLORIDE 200 MILLIGRAM(S): 200 TABLET ORAL at 05:37

## 2024-12-13 RX ADMIN — Medication 600 MILLIGRAM(S): at 14:23

## 2024-12-13 RX ADMIN — Medication 1 TABLET(S): at 14:23

## 2024-12-13 RX ADMIN — Medication 150 MILLIGRAM(S): at 14:23

## 2024-12-13 RX ADMIN — PANTOPRAZOLE SODIUM 40 MILLIGRAM(S): 40 TABLET, DELAYED RELEASE ORAL at 05:36

## 2024-12-13 RX ADMIN — PREDNISONE 20 MILLIGRAM(S): 20 TABLET ORAL at 05:37

## 2024-12-13 RX ADMIN — POLYETHYLENE GLYCOL 3350 17 GRAM(S): 17 POWDER, FOR SOLUTION ORAL at 14:22

## 2024-12-13 RX ADMIN — METOPROLOL TARTRATE 25 MILLIGRAM(S): 100 TABLET, FILM COATED ORAL at 05:37

## 2024-12-13 RX ADMIN — Medication 5000 UNIT(S): at 14:24

## 2024-12-13 RX ADMIN — Medication 1 TABLET(S): at 18:34

## 2024-12-13 RX ADMIN — Medication 600 MILLIGRAM(S): at 06:37

## 2024-12-13 RX ADMIN — Medication 1 SPRAY(S): at 05:38

## 2024-12-13 RX ADMIN — Medication 1 SPRAY(S): at 18:46

## 2024-12-13 RX ADMIN — HYDROMORPHONE HYDROCHLORIDE 4 MILLIGRAM(S): 2 TABLET ORAL at 22:19

## 2024-12-13 RX ADMIN — ENALAPRIL MALEATE 20 MILLIGRAM(S): 2.5 TABLET ORAL at 05:36

## 2024-12-13 RX ADMIN — Medication 2 TABLET(S): at 22:19

## 2024-12-13 RX ADMIN — HYDROMORPHONE HYDROCHLORIDE 4 MILLIGRAM(S): 2 TABLET ORAL at 14:22

## 2024-12-13 RX ADMIN — HYDROMORPHONE HYDROCHLORIDE 4 MILLIGRAM(S): 2 TABLET ORAL at 23:19

## 2024-12-13 RX ADMIN — ACETAMINOPHEN, DIPHENHYDRAMINE HCL, PHENYLEPHRINE HCL 3 MILLIGRAM(S): 325; 25; 5 TABLET ORAL at 23:48

## 2024-12-13 RX ADMIN — LAMOTRIGINE 200 MILLIGRAM(S): 50 TABLET, EXTENDED RELEASE ORAL at 14:24

## 2024-12-13 RX ADMIN — Medication 600 MILLIGRAM(S): at 15:15

## 2024-12-13 RX ADMIN — Medication 1 TABLET(S): at 05:37

## 2024-12-13 RX ADMIN — Medication 0.6 MILLIGRAM(S): at 14:23

## 2024-12-13 RX ADMIN — Medication 5000 UNIT(S): at 05:35

## 2024-12-13 NOTE — DIETITIAN INITIAL EVALUATION ADULT - ORAL NUTRITION SUPPLEMENTS
Recommended Ensure Max 11oz PO Daily (Provides 150kcal & 30grams of Protein)   Magic Cup Daily (Provides 290kcal-9grams of Protein)

## 2024-12-13 NOTE — DIETITIAN INITIAL EVALUATION ADULT - ORAL INTAKE PTA/DIET HISTORY
Pt with low appetite however states appetite improving since being at previous facility. NKFA. Pt with ~ 8lb weight loss stated.

## 2024-12-13 NOTE — DIETITIAN INITIAL EVALUATION ADULT - PERTINENT LABORATORY DATA
12-13    137  |  101  |  12  ----------------------------<  77  4.3   |  26  |  0.57    Ca    9.4      13 Dec 2024 05:39  Phos  3.2     12-12  Mg     1.7     12-12    TPro  5.8[L]  /  Alb  2.4[L]  /  TBili  0.2  /  DBili  x   /  AST  13  /  ALT  13  /  AlkPhos  86  12-13

## 2024-12-13 NOTE — DIETITIAN INITIAL EVALUATION ADULT - PERTINENT MEDS FT
MEDICATIONS  (STANDING):  aMIOdarone    Tablet 200 milliGRAM(s) Oral daily  buPROPion XL (24-Hour) . 150 milliGRAM(s) Oral daily  colchicine 0.6 milliGRAM(s) Oral daily  enalapril 20 milliGRAM(s) Oral daily  fluticasone propionate 50 MICROgram(s)/spray Nasal Spray 1 Spray(s) Both Nostrils two times a day  heparin   Injectable 5000 Unit(s) SubCutaneous every 8 hours  ibuprofen  Tablet. 600 milliGRAM(s) Oral every 8 hours  lactobacillus acidophilus 1 Tablet(s) Oral every 12 hours  lamoTRIgine 200 milliGRAM(s) Oral daily  levothyroxine 88 MICROGram(s) Oral daily  metoprolol succinate ER 25 milliGRAM(s) Oral daily  multivitamin 1 Tablet(s) Oral daily  pantoprazole    Tablet 40 milliGRAM(s) Oral before breakfast  polyethylene glycol 3350 17 Gram(s) Oral daily  rosuvastatin 20 milliGRAM(s) Oral at bedtime  senna 2 Tablet(s) Oral at bedtime    MEDICATIONS  (PRN):  acetaminophen     Tablet .. 650 milliGRAM(s) Oral every 6 hours PRN Mild Pain (1 - 3)  aluminum hydroxide/magnesium hydroxide/simethicone Suspension 30 milliLiter(s) Oral every 4 hours PRN Dyspepsia  artificial tears (preservative free) Ophthalmic Solution 1 Drop(s) Both EYES every 4 hours PRN Dry Eyes  bisacodyl 5 milliGRAM(s) Oral daily PRN Constipation  cyclobenzaprine 10 milliGRAM(s) Oral three times a day PRN Muscle Spasm  guaifenesin/dextromethorphan Oral Liquid 10 milliLiter(s) Oral every 4 hours PRN Cough  hydrocortisone hemorrhoidal Suppository 1 Suppository(s) Rectal two times a day PRN rectal pain  HYDROmorphone   Tablet 4 milliGRAM(s) Oral every 4 hours PRN Severe Pain (7 - 10)  lactulose Syrup 15 Gram(s) Oral daily PRN constipation  meclizine 25 milliGRAM(s) Oral two times a day PRN Dizziness  melatonin 3 milliGRAM(s) Oral at bedtime PRN Insomnia  ondansetron    Tablet 4 milliGRAM(s) Oral three times a day PRN Nausea and/or Vomiting  simethicone 80 milliGRAM(s) Chew every 6 hours PRN Gas

## 2024-12-14 PROCEDURE — 99222 1ST HOSP IP/OBS MODERATE 55: CPT

## 2024-12-14 PROCEDURE — 99232 SBSQ HOSP IP/OBS MODERATE 35: CPT

## 2024-12-14 RX ORDER — BISMUTH SUBSALICYLATE 262MG/15ML
30 SUSPENSION, ORAL (FINAL DOSE FORM) ORAL ONCE
Refills: 0 | Status: COMPLETED | OUTPATIENT
Start: 2024-12-14 | End: 2024-12-14

## 2024-12-14 RX ADMIN — LAMOTRIGINE 200 MILLIGRAM(S): 50 TABLET, EXTENDED RELEASE ORAL at 14:10

## 2024-12-14 RX ADMIN — Medication 600 MILLIGRAM(S): at 15:10

## 2024-12-14 RX ADMIN — HYDROMORPHONE HYDROCHLORIDE 4 MILLIGRAM(S): 2 TABLET ORAL at 20:33

## 2024-12-14 RX ADMIN — HYDROMORPHONE HYDROCHLORIDE 4 MILLIGRAM(S): 2 TABLET ORAL at 06:39

## 2024-12-14 RX ADMIN — AMIODARONE HYDROCHLORIDE 200 MILLIGRAM(S): 200 TABLET ORAL at 05:13

## 2024-12-14 RX ADMIN — PREDNISONE 10 MILLIGRAM(S): 20 TABLET ORAL at 05:13

## 2024-12-14 RX ADMIN — Medication 5000 UNIT(S): at 21:47

## 2024-12-14 RX ADMIN — Medication 1 TABLET(S): at 05:13

## 2024-12-14 RX ADMIN — Medication 1 SPRAY(S): at 05:14

## 2024-12-14 RX ADMIN — Medication 88 MICROGRAM(S): at 05:13

## 2024-12-14 RX ADMIN — ONDANSETRON HYDROCHLORIDE 4 MILLIGRAM(S): 4 TABLET, FILM COATED ORAL at 08:47

## 2024-12-14 RX ADMIN — Medication 2 MILLIGRAM(S): at 20:33

## 2024-12-14 RX ADMIN — PANTOPRAZOLE SODIUM 40 MILLIGRAM(S): 40 TABLET, DELAYED RELEASE ORAL at 05:12

## 2024-12-14 RX ADMIN — HYDROMORPHONE HYDROCHLORIDE 4 MILLIGRAM(S): 2 TABLET ORAL at 07:39

## 2024-12-14 RX ADMIN — Medication 1 SPRAY(S): at 17:28

## 2024-12-14 RX ADMIN — ENALAPRIL MALEATE 20 MILLIGRAM(S): 2.5 TABLET ORAL at 05:13

## 2024-12-14 RX ADMIN — Medication 600 MILLIGRAM(S): at 06:13

## 2024-12-14 RX ADMIN — Medication 5000 UNIT(S): at 14:11

## 2024-12-14 RX ADMIN — Medication 1 TABLET(S): at 17:28

## 2024-12-14 RX ADMIN — Medication 600 MILLIGRAM(S): at 05:13

## 2024-12-14 RX ADMIN — Medication 0.6 MILLIGRAM(S): at 14:09

## 2024-12-14 RX ADMIN — Medication 150 MILLIGRAM(S): at 14:10

## 2024-12-14 RX ADMIN — Medication 5000 UNIT(S): at 05:13

## 2024-12-14 RX ADMIN — Medication 1 TABLET(S): at 14:09

## 2024-12-14 RX ADMIN — HYDROMORPHONE HYDROCHLORIDE 4 MILLIGRAM(S): 2 TABLET ORAL at 21:33

## 2024-12-14 RX ADMIN — METOPROLOL TARTRATE 25 MILLIGRAM(S): 100 TABLET, FILM COATED ORAL at 05:13

## 2024-12-14 RX ADMIN — ONDANSETRON HYDROCHLORIDE 4 MILLIGRAM(S): 4 TABLET, FILM COATED ORAL at 20:12

## 2024-12-14 RX ADMIN — Medication 600 MILLIGRAM(S): at 14:10

## 2024-12-14 RX ADMIN — ROSUVASTATIN CALCIUM 20 MILLIGRAM(S): 5 TABLET, FILM COATED ORAL at 21:47

## 2024-12-14 NOTE — PROGRESS NOTE ADULT - ASSESSMENT
a/p:    65 y/o  F with PMHx of HTN, HLD, hypothyroidism, primary lymphedema, Essential  thrombocytosis, GERD, Obesity s/p bariatric surgery, ischemic colitis s/p left hemicolectomy, pAF and DVT/PE on Eliquis, HFpEF,  vW Disease, recent acute hospitalization for CHF exacerbation with superimposed PNA, pericarditis, and pericardio/ pleural effusions. Now admitted to [facility] for multidisciplinary rehab program     #Pericardial effusion 2/2 pericarditis   #Pleural effusion suspected  2/2 interferon alpha  #Acute respiratory failure with hypoxia  -S/p NC, monitor O2  -CT Chest- Moderately large simple pericardial effusion. Small to moderate right and small left pleural effusions with associated compressive atelectasis and Superimposed pneumonia.  -S/p pericardial drain (removed on 12/4)  -S/p b/l pigtail catheters (removed 12/5 and 12/6)  -S/p steroid taper: c/w Prednisone 20mg HS x1 day start 12/13, Prednisone 10mg x 4 days start on 12/14; Prednisone 5mg x 5 days start on 12/18.  -Colchicine 0.6mg daily x 3 months  -Ibuprofen 600mg q8h  -F/u outpatient cardiology     # Comprehensive Multidisciplinary Rehab Program  - as per floor protocol  - pain management    #Leukocytosis  #Sepsis- PNA  #Essential thrombocythemia   - + abnormal  RBC, platelet morphology - Suspected transformation  -S/p bone biopsy 12/10--> f/u outpatient for results and  begin Jakafi treatment   -S/p Cefipime abx   -Blood cultures and pleural/pericardial fluid cultures negative  -Intolerant to anagrelide due to afib, on hydrea until about 6-12 months ago with no response, most recently on pegylated IFN alpha (Besrema) for the past 6 months- intolerant as well.  Stopped 2 months ago.   -Trend CBC, monitor for fevers  -Monitor platelets    #vW disease  -Receives DDAVP prior to procedures.     #HTN/HLD  -Rosuvastatin 20mg HS   -Metoprolol 25mg daily   -Enalapril 20mg daily     #pAFIB  -Amiodarone 200mg daily   -Metoprolol 25mg daily     #Hypothyroidism  -Synthroid 88mcg daily     #Chronic anemia  -Monitor H/H  -Transfuse if Hbg <7 PRN    #Dry eyes/Allergies  #Cough  -Artificial tears q4h PRN  -Fluticasone nasal spray BID  -Guaifenesin/dextromethorphan  q4h PRN    #Pain management  -Tylenol PRN  -Flexeril 10mg TID PRN  -Dilaudid 4mg q4h PRN      #Mood/Cognition  Neuropsychology consult PRN  -Wellbutrin 150mg daily   -Lamictal 200mg daily     #Diet/Supplements  Current Diet: Regular diet   Nutrition consult   -Lactobacillus tab BID   -Multivitamin daily     d/w rehab team, will follow

## 2024-12-14 NOTE — PROGRESS NOTE ADULT - SUBJECTIVE AND OBJECTIVE BOX
65 y/o  F with PMHx of HTN, HLD, hypothyroidism, primary lymphedema, Essential  thrombocytosis, GERD, Obesity s/p bariatric surgery, ischemic colitis s/p left hemicolectomy, pAF and DVT/PE on Eliquis, HFpEF,  vW Disease, admitted with CHF exacerbation with superimposed PNA, pericarditis, and pericardio/ pleural effusions.    interval history    Patient seen and examined. No acute evnts overnight. still with mild discomfort on deep breaths.     T(C): 36.6 (12-14-24 @ 08:50), Max: 36.6 (12-13-24 @ 22:24)  T(F): 97.8 (12-14-24 @ 08:50), Max: 97.9 (12-13-24 @ 22:24)  HR: 61 (12-14-24 @ 08:50) (56 - 61)  BP: 121/43 (12-14-24 @ 08:50) (121/43 - 151/68)  ABP: --  ABP(mean): --  RR: 16 (12-14-24 @ 08:50) (16 - 16)  SpO2: 99% (12-14-24 @ 08:50) (95% - 99%)      on exam:    General- NAD, mildy anxious  HEENT- atraumatic  Neck- supple  Lungs- both lungs clear  CVS- s1, s2, regular, pansystolic murmur+  Abdomen- soft, NT, ND  Extremities- can move all extremities  minimal pedal edema +    LABS:                        8.9    23.34 )-----------( 924      ( 13 Dec 2024 05:39 )             29.8     12-13    137  |  101  |  12  ----------------------------<  77  4.3   |  26  |  0.57    Ca    9.4      13 Dec 2024 05:39    TPro  5.8[L]  /  Alb  2.4[L]  /  TBili  0.2  /  DBili  x   /  AST  13  /  ALT  13  /  AlkPhos  86  12-13      Urinalysis Basic - ( 13 Dec 2024 05:39 )    Color: x / Appearance: x / SG: x / pH: x  Gluc: 77 mg/dL / Ketone: x  / Bili: x / Urobili: x   Blood: x / Protein: x / Nitrite: x   Leuk Esterase: x / RBC: x / WBC x   Sq Epi: x / Non Sq Epi: x / Bacteria: x       CAPILLARY BLOOD GLUCOSE            Urinalysis Basic - ( 13 Dec 2024 05:39 )    Color: x / Appearance: x / SG: x / pH: x  Gluc: 77 mg/dL / Ketone: x  / Bili: x / Urobili: x   Blood: x / Protein: x / Nitrite: x   Leuk Esterase: x / RBC: x / WBC x   Sq Epi: x / Non Sq Epi: x / Bacteria: x        RADIOLOGY & ADDITIONAL TESTS:    Consultant(s) Notes Reviewed:  [x ] YES  [ ] NO  Care Discussed with Consultants/Other Providers [ x] YES  [ ] NO  Imaging Personally Reviewed:  [x ] YES  [ ] NO    MEDICATIONS  (STANDING):  aMIOdarone    Tablet 200 milliGRAM(s) Oral daily  buPROPion XL (24-Hour) . 150 milliGRAM(s) Oral daily  colchicine 0.6 milliGRAM(s) Oral daily  enalapril 20 milliGRAM(s) Oral daily  fluticasone propionate 50 MICROgram(s)/spray Nasal Spray 1 Spray(s) Both Nostrils two times a day  heparin   Injectable 5000 Unit(s) SubCutaneous every 8 hours  ibuprofen  Tablet. 600 milliGRAM(s) Oral every 8 hours  lactobacillus acidophilus 1 Tablet(s) Oral every 12 hours  lamoTRIgine 200 milliGRAM(s) Oral daily  levothyroxine 88 MICROGram(s) Oral daily  metoprolol succinate ER 25 milliGRAM(s) Oral daily  multivitamin 1 Tablet(s) Oral daily  pantoprazole    Tablet 40 milliGRAM(s) Oral before breakfast  polyethylene glycol 3350 17 Gram(s) Oral daily  predniSONE   Tablet 10 milliGRAM(s) Oral daily  rosuvastatin 20 milliGRAM(s) Oral at bedtime  senna 2 Tablet(s) Oral at bedtime    MEDICATIONS  (PRN):  acetaminophen     Tablet .. 650 milliGRAM(s) Oral every 6 hours PRN Mild Pain (1 - 3)  aluminum hydroxide/magnesium hydroxide/simethicone Suspension 30 milliLiter(s) Oral every 4 hours PRN Dyspepsia  artificial tears (preservative free) Ophthalmic Solution 1 Drop(s) Both EYES every 4 hours PRN Dry Eyes  bisacodyl 5 milliGRAM(s) Oral daily PRN Constipation  cyclobenzaprine 10 milliGRAM(s) Oral three times a day PRN Muscle Spasm  guaifenesin/dextromethorphan Oral Liquid 10 milliLiter(s) Oral every 4 hours PRN Cough  hydrocortisone hemorrhoidal Suppository 1 Suppository(s) Rectal two times a day PRN rectal pain  HYDROmorphone   Tablet 4 milliGRAM(s) Oral every 4 hours PRN Severe Pain (7 - 10)  lactulose Syrup 15 Gram(s) Oral daily PRN constipation  meclizine 25 milliGRAM(s) Oral two times a day PRN Dizziness  melatonin 3 milliGRAM(s) Oral at bedtime PRN Insomnia  ondansetron   Disintegrating Tablet 4 milliGRAM(s) Oral two times a day PRN Nausea and/or Vomiting  simethicone 80 milliGRAM(s) Chew every 6 hours PRN Gas

## 2024-12-14 NOTE — PROGRESS NOTE ADULT - SUBJECTIVE AND OBJECTIVE BOX
Cc: Gait dysfunction2/2 pericarditis and pleural effusions    HPI: Patient with no new medical issues today.  Per nurse given zofran for nausea.   Pain controlled, no chest pain, no Fevers/Chills. No other new ROS  Has been tolerating rehabilitation program.    acetaminophen     Tablet .. 650 milliGRAM(s) Oral every 6 hours PRN  aluminum hydroxide/magnesium hydroxide/simethicone Suspension 30 milliLiter(s) Oral every 4 hours PRN  aMIOdarone    Tablet 200 milliGRAM(s) Oral daily  artificial tears (preservative free) Ophthalmic Solution 1 Drop(s) Both EYES every 4 hours PRN  bisacodyl 5 milliGRAM(s) Oral daily PRN  buPROPion XL (24-Hour) . 150 milliGRAM(s) Oral daily  colchicine 0.6 milliGRAM(s) Oral daily  cyclobenzaprine 10 milliGRAM(s) Oral three times a day PRN  enalapril 20 milliGRAM(s) Oral daily  fluticasone propionate 50 MICROgram(s)/spray Nasal Spray 1 Spray(s) Both Nostrils two times a day  guaifenesin/dextromethorphan Oral Liquid 10 milliLiter(s) Oral every 4 hours PRN  heparin   Injectable 5000 Unit(s) SubCutaneous every 8 hours  hydrocortisone hemorrhoidal Suppository 1 Suppository(s) Rectal two times a day PRN  HYDROmorphone   Tablet 4 milliGRAM(s) Oral every 4 hours PRN  ibuprofen  Tablet. 600 milliGRAM(s) Oral every 8 hours  lactobacillus acidophilus 1 Tablet(s) Oral every 12 hours  lactulose Syrup 15 Gram(s) Oral daily PRN  lamoTRIgine 200 milliGRAM(s) Oral daily  levothyroxine 88 MICROGram(s) Oral daily  meclizine 25 milliGRAM(s) Oral two times a day PRN  melatonin 3 milliGRAM(s) Oral at bedtime PRN  metoprolol succinate ER 25 milliGRAM(s) Oral daily  multivitamin 1 Tablet(s) Oral daily  ondansetron   Disintegrating Tablet 4 milliGRAM(s) Oral two times a day PRN  pantoprazole    Tablet 40 milliGRAM(s) Oral before breakfast  polyethylene glycol 3350 17 Gram(s) Oral daily  predniSONE   Tablet 10 milliGRAM(s) Oral daily  rosuvastatin 20 milliGRAM(s) Oral at bedtime  senna 2 Tablet(s) Oral at bedtime  simethicone 80 milliGRAM(s) Chew every 6 hours PRN      T(C): 36.6 (12-14-24 @ 08:50), Max: 36.6 (12-13-24 @ 22:24)  HR: 61 (12-14-24 @ 08:50) (56 - 61)  BP: 121/43 (12-14-24 @ 08:50) (121/43 - 151/68)  RR: 16 (12-14-24 @ 08:50) (16 - 16)  SpO2: 99% (12-14-24 @ 08:50) (95% - 99%)    In NAD  HEENT- EOMI  Heart- RRR, S1S2  Lungs- no respiratory distress  Abd- + BS, NT  Ext- No calf pain  Neuro- Exam unchanged  generalized 4+/5 weakness  imparied sensation b/l feet        Imp: KITTY JIMENEZ is a 63 y/o  F with PMHx of HTN, HLD, hypothyroidism, primary lymphedema, Essential  thrombocytosis, GERD, Obesity s/p bariatric surgery, ischemic colitis s/p left hemicolectomy, pAF and DVT/PE on Eliquis, HFpEF,  vW Disease, recently had  2 hospitalizations at [facility] in the past 2 months for CAP, and also diagnosed with pericarditis, and HFpEF s/p PO abx.  Patient returns to  ED on 11/30 with worsening c/o back pain, CP, SOB and productive cough. Found to have CHF exacerbation with superimposed PNA, pericarditis, and pericardio/ pleural effusions. Now admitted to [facility] for functional deficits and initiation of a multidisciplinary rehab program consisting focused on functional mobility, transfers and ADLs.   admitted for comprehensive acute rehabilitation.    Plan:  - Continue therapies  - DVT prophylaxis- heparin  - Skin- Turn q2h, check skin daily  - Continue current medications; patient medically stable.   - Patient is stable to continue current rehabilitation program.

## 2024-12-15 PROCEDURE — 99232 SBSQ HOSP IP/OBS MODERATE 35: CPT

## 2024-12-15 RX ADMIN — HYDROMORPHONE HYDROCHLORIDE 4 MILLIGRAM(S): 2 TABLET ORAL at 01:40

## 2024-12-15 RX ADMIN — HYDROMORPHONE HYDROCHLORIDE 4 MILLIGRAM(S): 2 TABLET ORAL at 22:45

## 2024-12-15 RX ADMIN — Medication 1 TABLET(S): at 17:42

## 2024-12-15 RX ADMIN — Medication 1 TABLET(S): at 13:07

## 2024-12-15 RX ADMIN — Medication 1 TABLET(S): at 06:27

## 2024-12-15 RX ADMIN — Medication 600 MILLIGRAM(S): at 13:53

## 2024-12-15 RX ADMIN — ROSUVASTATIN CALCIUM 20 MILLIGRAM(S): 5 TABLET, FILM COATED ORAL at 21:11

## 2024-12-15 RX ADMIN — LAMOTRIGINE 200 MILLIGRAM(S): 50 TABLET, EXTENDED RELEASE ORAL at 13:08

## 2024-12-15 RX ADMIN — ONDANSETRON HYDROCHLORIDE 4 MILLIGRAM(S): 4 TABLET, FILM COATED ORAL at 03:06

## 2024-12-15 RX ADMIN — HYDROMORPHONE HYDROCHLORIDE 4 MILLIGRAM(S): 2 TABLET ORAL at 13:58

## 2024-12-15 RX ADMIN — Medication 1 SPRAY(S): at 17:42

## 2024-12-15 RX ADMIN — HYDROMORPHONE HYDROCHLORIDE 4 MILLIGRAM(S): 2 TABLET ORAL at 21:10

## 2024-12-15 RX ADMIN — Medication 0.6 MILLIGRAM(S): at 13:14

## 2024-12-15 RX ADMIN — PANTOPRAZOLE SODIUM 40 MILLIGRAM(S): 40 TABLET, DELAYED RELEASE ORAL at 06:27

## 2024-12-15 RX ADMIN — Medication 5000 UNIT(S): at 06:27

## 2024-12-15 RX ADMIN — AMIODARONE HYDROCHLORIDE 200 MILLIGRAM(S): 200 TABLET ORAL at 06:26

## 2024-12-15 RX ADMIN — Medication 600 MILLIGRAM(S): at 22:12

## 2024-12-15 RX ADMIN — Medication 88 MICROGRAM(S): at 06:27

## 2024-12-15 RX ADMIN — ENALAPRIL MALEATE 20 MILLIGRAM(S): 2.5 TABLET ORAL at 09:06

## 2024-12-15 RX ADMIN — Medication 5000 UNIT(S): at 21:10

## 2024-12-15 RX ADMIN — METOPROLOL TARTRATE 25 MILLIGRAM(S): 100 TABLET, FILM COATED ORAL at 09:06

## 2024-12-15 RX ADMIN — PREDNISONE 10 MILLIGRAM(S): 20 TABLET ORAL at 06:27

## 2024-12-15 RX ADMIN — Medication 600 MILLIGRAM(S): at 13:07

## 2024-12-15 RX ADMIN — Medication 30 MILLILITER(S): at 03:06

## 2024-12-15 RX ADMIN — Medication 600 MILLIGRAM(S): at 21:11

## 2024-12-15 RX ADMIN — Medication 5000 UNIT(S): at 13:08

## 2024-12-15 RX ADMIN — Medication 150 MILLIGRAM(S): at 13:07

## 2024-12-15 RX ADMIN — ONDANSETRON HYDROCHLORIDE 4 MILLIGRAM(S): 4 TABLET, FILM COATED ORAL at 13:18

## 2024-12-15 RX ADMIN — HYDROMORPHONE HYDROCHLORIDE 4 MILLIGRAM(S): 2 TABLET ORAL at 13:18

## 2024-12-15 RX ADMIN — HYDROMORPHONE HYDROCHLORIDE 4 MILLIGRAM(S): 2 TABLET ORAL at 00:40

## 2024-12-15 NOTE — PROGRESS NOTE ADULT - ASSESSMENT
a/p:    63 y/o  F with PMHx of HTN, HLD, hypothyroidism, primary lymphedema, Essential  thrombocytosis, GERD, Obesity s/p bariatric surgery, ischemic colitis s/p left hemicolectomy, pAF and DVT/PE on Eliquis, HFpEF,  vW Disease, recent acute hospitalization for CHF exacerbation with superimposed PNA, pericarditis, and pericardio/ pleural effusions. Now admitted to [facility] for multidisciplinary rehab program     #Pericardial effusion 2/2 pericarditis   #Pleural effusion suspected  2/2 interferon alpha  #Acute respiratory failure with hypoxia  -S/p NC, monitor O2  -CT Chest- Moderately large simple pericardial effusion. Small to moderate right and small left pleural effusions with associated compressive atelectasis and Superimposed pneumonia.  -S/p pericardial drain (removed on 12/4)  -S/p b/l pigtail catheters (removed 12/5 and 12/6)  -S/p steroid taper: c/w Prednisone 20mg HS x1 day start 12/13, Prednisone 10mg x 4 days start on 12/14; Prednisone 5mg x 5 days start on 12/18.  -Colchicine 0.6mg daily x 3 months  -Ibuprofen 600mg q8h  -F/u outpatient cardiology     # Comprehensive Multidisciplinary Rehab Program  - as per floor protocol  - pain management    #Leukocytosis  #Sepsis- PNA  #Essential thrombocythemia   - + abnormal  RBC, platelet morphology - Suspected transformation  -S/p bone biopsy 12/10--> f/u outpatient for results and  begin Jakafi treatment   -S/p Cefipime abx   -Blood cultures and pleural/pericardial fluid cultures negative  -Intolerant to anagrelide due to afib, on hydrea until about 6-12 months ago with no response, most recently on pegylated IFN alpha (Besrema) for the past 6 months- intolerant as well.  Stopped 2 months ago.   -Trend CBC, monitor for fevers  -Monitor platelets    #vW disease  -Receives DDAVP prior to procedures.     #HTN/HLD  -Rosuvastatin 20mg HS   -Metoprolol 25mg daily   -Enalapril 20mg daily     #pAFIB  -Amiodarone 200mg daily   -Metoprolol 25mg daily     #Hypothyroidism  -Synthroid 88mcg daily     #Chronic anemia  -Monitor H/H  -Transfuse if Hbg <7 PRN    #Dry eyes/Allergies  #Cough  -Artificial tears q4h PRN  -Fluticasone nasal spray BID  -Guaifenesin/dextromethorphan  q4h PRN    #Pain management  -Tylenol PRN  -Flexeril 10mg TID PRN  -Dilaudid 4mg q4h PRN      #Mood/Cognition  Neuropsychology consult PRN  -Wellbutrin 150mg daily   -Lamictal 200mg daily     #Diet/Supplements  Current Diet: Regular diet   Nutrition consult   -Lactobacillus tab BID   -Multivitamin daily     d/w rehab team, will follow

## 2024-12-15 NOTE — PROGRESS NOTE ADULT - SUBJECTIVE AND OBJECTIVE BOX
Cc: Gait dysfunction 2/2 pericarditis and pleural effusions    HPI: Patient with no new medical issues today.  Reports diarrhea when not taking opioids.    Pain controlled, no chest pain, no N/V, no Fevers/Chills. No other new ROS  Has been tolerating rehabilitation program.    acetaminophen     Tablet .. 650 milliGRAM(s) Oral every 6 hours PRN  aluminum hydroxide/magnesium hydroxide/simethicone Suspension 30 milliLiter(s) Oral every 4 hours PRN  aMIOdarone    Tablet 200 milliGRAM(s) Oral daily  artificial tears (preservative free) Ophthalmic Solution 1 Drop(s) Both EYES every 4 hours PRN  bisacodyl 5 milliGRAM(s) Oral daily PRN  buPROPion XL (24-Hour) . 150 milliGRAM(s) Oral daily  colchicine 0.6 milliGRAM(s) Oral daily  cyclobenzaprine 10 milliGRAM(s) Oral three times a day PRN  enalapril 20 milliGRAM(s) Oral daily  fluticasone propionate 50 MICROgram(s)/spray Nasal Spray 1 Spray(s) Both Nostrils two times a day  guaifenesin/dextromethorphan Oral Liquid 10 milliLiter(s) Oral every 4 hours PRN  heparin   Injectable 5000 Unit(s) SubCutaneous every 8 hours  hydrocortisone hemorrhoidal Suppository 1 Suppository(s) Rectal two times a day PRN  HYDROmorphone   Tablet 4 milliGRAM(s) Oral every 4 hours PRN  ibuprofen  Tablet. 600 milliGRAM(s) Oral every 8 hours  lactobacillus acidophilus 1 Tablet(s) Oral every 12 hours  lactulose Syrup 15 Gram(s) Oral daily PRN  lamoTRIgine 200 milliGRAM(s) Oral daily  levothyroxine 88 MICROGram(s) Oral daily  meclizine 25 milliGRAM(s) Oral two times a day PRN  melatonin 3 milliGRAM(s) Oral at bedtime PRN  metoprolol succinate ER 25 milliGRAM(s) Oral daily  multivitamin 1 Tablet(s) Oral daily  ondansetron   Disintegrating Tablet 4 milliGRAM(s) Oral two times a day PRN  pantoprazole    Tablet 40 milliGRAM(s) Oral before breakfast  polyethylene glycol 3350 17 Gram(s) Oral daily  predniSONE   Tablet 10 milliGRAM(s) Oral daily  rosuvastatin 20 milliGRAM(s) Oral at bedtime  senna 2 Tablet(s) Oral at bedtime  simethicone 80 milliGRAM(s) Chew every 6 hours PRN      T(C): 36.6 (12-15-24 @ 09:11), Max: 36.6 (12-15-24 @ 09:11)  HR: 66 (12-15-24 @ 09:11) (53 - 66)  BP: 163/62 (12-15-24 @ 09:11) (134/64 - 163/62)  RR: 16 (12-15-24 @ 09:11) (16 - 16)  SpO2: 96% (12-15-24 @ 09:11) (95% - 97%)     In NAD  HEENT- EOMI  Heart- RRR, S1S2  Lungs- no respiratory distress  Abd- + BS, NT  Ext- No calf pain  Neuro- Exam unchanged  generalized 4+/5 weakness  imparied sensation b/l feet      Imp: KITTY JIMENEZ is a 63 y/o  F with PMHx of HTN, HLD, hypothyroidism, primary lymphedema, Essential  thrombocytosis, GERD, Obesity s/p bariatric surgery, ischemic colitis s/p left hemicolectomy, pAF and DVT/PE on Eliquis, HFpEF,  vW Disease, recently had  2 hospitalizations at [facility] in the past 2 months for CAP, and also diagnosed with pericarditis, and HFpEF s/p PO abx.  Patient returns to  ED on 11/30 with worsening c/o back pain, CP, SOB and productive cough. Found to have CHF exacerbation with superimposed PNA, pericarditis, and pericardio/ pleural effusions. Now admitted to [facility] for functional deficits and initiation of a multidisciplinary rehab program consisting focused on functional mobility, transfers and ADLs.   admitted for comprehensive acute rehabilitation.    Plan:  - Continue therapies  - DVT prophylaxis- heparin  - Skin- Turn q2h, check skin daily  - Continue current medications; patient medically stable.   - Patient is stable to continue current rehabilitation program.          Cc: Gait dysfunction 2/2 pericarditis and pleural effusions    HPI: Patient with no new medical issues today.  Reports diarrhea, restarting loparamide.    Pain controlled, no chest pain, no N/V, no Fevers/Chills. No other new ROS  Has been tolerating rehabilitation program.    acetaminophen     Tablet .. 650 milliGRAM(s) Oral every 6 hours PRN  aluminum hydroxide/magnesium hydroxide/simethicone Suspension 30 milliLiter(s) Oral every 4 hours PRN  aMIOdarone    Tablet 200 milliGRAM(s) Oral daily  artificial tears (preservative free) Ophthalmic Solution 1 Drop(s) Both EYES every 4 hours PRN  bisacodyl 5 milliGRAM(s) Oral daily PRN  buPROPion XL (24-Hour) . 150 milliGRAM(s) Oral daily  colchicine 0.6 milliGRAM(s) Oral daily  cyclobenzaprine 10 milliGRAM(s) Oral three times a day PRN  enalapril 20 milliGRAM(s) Oral daily  fluticasone propionate 50 MICROgram(s)/spray Nasal Spray 1 Spray(s) Both Nostrils two times a day  guaifenesin/dextromethorphan Oral Liquid 10 milliLiter(s) Oral every 4 hours PRN  heparin   Injectable 5000 Unit(s) SubCutaneous every 8 hours  hydrocortisone hemorrhoidal Suppository 1 Suppository(s) Rectal two times a day PRN  HYDROmorphone   Tablet 4 milliGRAM(s) Oral every 4 hours PRN  ibuprofen  Tablet. 600 milliGRAM(s) Oral every 8 hours  lactobacillus acidophilus 1 Tablet(s) Oral every 12 hours  lactulose Syrup 15 Gram(s) Oral daily PRN  lamoTRIgine 200 milliGRAM(s) Oral daily  levothyroxine 88 MICROGram(s) Oral daily  meclizine 25 milliGRAM(s) Oral two times a day PRN  melatonin 3 milliGRAM(s) Oral at bedtime PRN  metoprolol succinate ER 25 milliGRAM(s) Oral daily  multivitamin 1 Tablet(s) Oral daily  ondansetron   Disintegrating Tablet 4 milliGRAM(s) Oral two times a day PRN  pantoprazole    Tablet 40 milliGRAM(s) Oral before breakfast  polyethylene glycol 3350 17 Gram(s) Oral daily  predniSONE   Tablet 10 milliGRAM(s) Oral daily  rosuvastatin 20 milliGRAM(s) Oral at bedtime  senna 2 Tablet(s) Oral at bedtime  simethicone 80 milliGRAM(s) Chew every 6 hours PRN      T(C): 36.6 (12-15-24 @ 09:11), Max: 36.6 (12-15-24 @ 09:11)  HR: 66 (12-15-24 @ 09:11) (53 - 66)  BP: 163/62 (12-15-24 @ 09:11) (134/64 - 163/62)  RR: 16 (12-15-24 @ 09:11) (16 - 16)  SpO2: 96% (12-15-24 @ 09:11) (95% - 97%)     In NAD  HEENT- EOMI  Heart- RRR, S1S2  Lungs- no respiratory distress  Abd- + BS, NT  Ext- No calf pain  Neuro- Exam unchanged  generalized 4+/5 weakness  imparied sensation b/l feet      Imp: KITTY JIMENEZ is a 65 y/o  F with PMHx of HTN, HLD, hypothyroidism, primary lymphedema, Essential  thrombocytosis, GERD, Obesity s/p bariatric surgery, ischemic colitis s/p left hemicolectomy, pAF and DVT/PE on Eliquis, HFpEF,  vW Disease, recently had  2 hospitalizations at [facility] in the past 2 months for CAP, and also diagnosed with pericarditis, and HFpEF s/p PO abx.  Patient returns to  ED on 11/30 with worsening c/o back pain, CP, SOB and productive cough. Found to have CHF exacerbation with superimposed PNA, pericarditis, and pericardio/ pleural effusions. Now admitted to [facility] for functional deficits and initiation of a multidisciplinary rehab program consisting focused on functional mobility, transfers and ADLs.   admitted for comprehensive acute rehabilitation.    Plan:  - Continue therapies  - DVT prophylaxis- heparin  - Skin- Turn q2h, check skin daily  - Continue current medications; patient medically stable.   - Patient is stable to continue current rehabilitation program.

## 2024-12-15 NOTE — PROVIDER CONTACT NOTE (OTHER) - BACKGROUND
64 Y F s/p pericarditis, PMH of GERD, obesity s/p bariatirc surgery, ischemic colitis s/p left hemicolectomy, HTN, HLD, hypothyroidism, primary lymphedema, essential thrombocytosis.

## 2024-12-15 NOTE — PROGRESS NOTE ADULT - SUBJECTIVE AND OBJECTIVE BOX
63 y/o  F with PMHx of HTN, HLD, hypothyroidism, primary lymphedema, Essential  thrombocytosis, GERD, Obesity s/p bariatric surgery, ischemic colitis s/p left hemicolectomy, pAF and DVT/PE on Eliquis, HFpEF,  vW Disease, admitted with CHF exacerbation with superimposed PNA, pericarditis, and pericardio/ pleural effusions.    interval history    Patient seen and examined. No acute evnts overnight. No new complaints.    T(C): 36.6 (12-15-24 @ 09:11), Max: 36.6 (12-15-24 @ 09:11)  T(F): 97.8 (12-15-24 @ 09:11), Max: 97.8 (12-15-24 @ 09:11)  HR: 66 (12-15-24 @ 09:11) (53 - 66)  BP: 163/62 (12-15-24 @ 09:11) (134/64 - 163/62)  ABP: --  ABP(mean): --  RR: 16 (12-15-24 @ 09:11) (16 - 16)  SpO2: 96% (12-15-24 @ 09:11) (95% - 97%)    on exam:    General- NAD, mildy anxious  HEENT- atraumatic  Neck- supple  Lungs- both lungs clear  CVS- s1, s2, regular, pansystolic murmur+  Abdomen- soft, NT, ND  Extremities- can move all extremities  minimal pedal edema +    LABS:                        8.9    23.34 )-----------( 924      ( 13 Dec 2024 05:39 )             29.8     12-13    137  |  101  |  12  ----------------------------<  77  4.3   |  26  |  0.57    Ca    9.4      13 Dec 2024 05:39    TPro  5.8[L]  /  Alb  2.4[L]  /  TBili  0.2  /  DBili  x   /  AST  13  /  ALT  13  /  AlkPhos  86  12-13      Urinalysis Basic - ( 13 Dec 2024 05:39 )    Color: x / Appearance: x / SG: x / pH: x  Gluc: 77 mg/dL / Ketone: x  / Bili: x / Urobili: x   Blood: x / Protein: x / Nitrite: x   Leuk Esterase: x / RBC: x / WBC x   Sq Epi: x / Non Sq Epi: x / Bacteria: x       CAPILLARY BLOOD GLUCOSE            Urinalysis Basic - ( 13 Dec 2024 05:39 )    Color: x / Appearance: x / SG: x / pH: x  Gluc: 77 mg/dL / Ketone: x  / Bili: x / Urobili: x   Blood: x / Protein: x / Nitrite: x   Leuk Esterase: x / RBC: x / WBC x   Sq Epi: x / Non Sq Epi: x / Bacteria: x        RADIOLOGY & ADDITIONAL TESTS:    Consultant(s) Notes Reviewed:  [x ] YES  [ ] NO  Care Discussed with Consultants/Other Providers [ x] YES  [ ] NO  Imaging Personally Reviewed:  [x ] YES  [ ] NO    MEDICATIONS  (STANDING):  aMIOdarone    Tablet 200 milliGRAM(s) Oral daily  buPROPion XL (24-Hour) . 150 milliGRAM(s) Oral daily  colchicine 0.6 milliGRAM(s) Oral daily  enalapril 20 milliGRAM(s) Oral daily  fluticasone propionate 50 MICROgram(s)/spray Nasal Spray 1 Spray(s) Both Nostrils two times a day  heparin   Injectable 5000 Unit(s) SubCutaneous every 8 hours  ibuprofen  Tablet. 600 milliGRAM(s) Oral every 8 hours  lactobacillus acidophilus 1 Tablet(s) Oral every 12 hours  lamoTRIgine 200 milliGRAM(s) Oral daily  levothyroxine 88 MICROGram(s) Oral daily  metoprolol succinate ER 25 milliGRAM(s) Oral daily  multivitamin 1 Tablet(s) Oral daily  pantoprazole    Tablet 40 milliGRAM(s) Oral before breakfast  polyethylene glycol 3350 17 Gram(s) Oral daily  predniSONE   Tablet 10 milliGRAM(s) Oral daily  rosuvastatin 20 milliGRAM(s) Oral at bedtime  senna 2 Tablet(s) Oral at bedtime    MEDICATIONS  (PRN):  acetaminophen     Tablet .. 650 milliGRAM(s) Oral every 6 hours PRN Mild Pain (1 - 3)  aluminum hydroxide/magnesium hydroxide/simethicone Suspension 30 milliLiter(s) Oral every 4 hours PRN Dyspepsia  artificial tears (preservative free) Ophthalmic Solution 1 Drop(s) Both EYES every 4 hours PRN Dry Eyes  bisacodyl 5 milliGRAM(s) Oral daily PRN Constipation  cyclobenzaprine 10 milliGRAM(s) Oral three times a day PRN Muscle Spasm  guaifenesin/dextromethorphan Oral Liquid 10 milliLiter(s) Oral every 4 hours PRN Cough  hydrocortisone hemorrhoidal Suppository 1 Suppository(s) Rectal two times a day PRN rectal pain  HYDROmorphone   Tablet 4 milliGRAM(s) Oral every 4 hours PRN Severe Pain (7 - 10)  lactulose Syrup 15 Gram(s) Oral daily PRN constipation  meclizine 25 milliGRAM(s) Oral two times a day PRN Dizziness  melatonin 3 milliGRAM(s) Oral at bedtime PRN Insomnia  ondansetron   Disintegrating Tablet 4 milliGRAM(s) Oral two times a day PRN Nausea and/or Vomiting  simethicone 80 milliGRAM(s) Chew every 6 hours PRN Gas

## 2024-12-16 ENCOUNTER — TRANSCRIPTION ENCOUNTER (OUTPATIENT)
Age: 64
End: 2024-12-16

## 2024-12-16 DIAGNOSIS — Z01.818 ENCOUNTER FOR OTHER PREPROCEDURAL EXAMINATION: ICD-10-CM

## 2024-12-16 DIAGNOSIS — Z87.898 PERSONAL HISTORY OF OTHER SPECIFIED CONDITIONS: ICD-10-CM

## 2024-12-16 DIAGNOSIS — Z01.811 ENCOUNTER FOR PREPROCEDURAL RESPIRATORY EXAMINATION: ICD-10-CM

## 2024-12-16 DIAGNOSIS — E03.9 HYPOTHYROIDISM, UNSPECIFIED: ICD-10-CM

## 2024-12-16 DIAGNOSIS — D68.00 VON WILLEBRAND DISEASE, UNSPECIFIED: ICD-10-CM

## 2024-12-16 DIAGNOSIS — I10 ESSENTIAL (PRIMARY) HYPERTENSION: ICD-10-CM

## 2024-12-16 DIAGNOSIS — Z09 ENCOUNTER FOR FOLLOW-UP EXAMINATION AFTER COMPLETED TREATMENT FOR CONDITIONS OTHER THAN MALIGNANT NEOPLASM: ICD-10-CM

## 2024-12-16 DIAGNOSIS — D47.3 ESSENTIAL (HEMORRHAGIC) THROMBOCYTHEMIA: ICD-10-CM

## 2024-12-16 DIAGNOSIS — K21.9 GASTRO-ESOPHAGEAL REFLUX DISEASE W/OUT ESOPHAGITIS: ICD-10-CM

## 2024-12-16 LAB
ALBUMIN SERPL ELPH-MCNC: 2.8 G/DL — LOW (ref 3.3–5)
ALP SERPL-CCNC: 93 U/L — SIGNIFICANT CHANGE UP (ref 40–120)
ALT FLD-CCNC: 21 U/L — SIGNIFICANT CHANGE UP (ref 10–45)
ANION GAP SERPL CALC-SCNC: 6 MMOL/L — SIGNIFICANT CHANGE UP (ref 5–17)
ANISOCYTOSIS BLD QL: SIGNIFICANT CHANGE UP
AST SERPL-CCNC: 14 U/L — SIGNIFICANT CHANGE UP (ref 10–40)
BASO STIPL BLD QL SMEAR: PRESENT — SIGNIFICANT CHANGE UP
BASOPHILS # BLD AUTO: 0.74 K/UL — HIGH (ref 0–0.2)
BASOPHILS NFR BLD AUTO: 3 % — HIGH (ref 0–2)
BILIRUB SERPL-MCNC: 0.3 MG/DL — SIGNIFICANT CHANGE UP (ref 0.2–1.2)
BUN SERPL-MCNC: 10 MG/DL — SIGNIFICANT CHANGE UP (ref 7–23)
CALCIUM SERPL-MCNC: 9.3 MG/DL — SIGNIFICANT CHANGE UP (ref 8.4–10.5)
CHLORIDE SERPL-SCNC: 99 MMOL/L — SIGNIFICANT CHANGE UP (ref 96–108)
CO2 SERPL-SCNC: 31 MMOL/L — SIGNIFICANT CHANGE UP (ref 22–31)
CREAT SERPL-MCNC: 0.69 MG/DL — SIGNIFICANT CHANGE UP (ref 0.5–1.3)
DACRYOCYTES BLD QL SMEAR: SLIGHT — SIGNIFICANT CHANGE UP
EGFR: 98 ML/MIN/1.73M2 — SIGNIFICANT CHANGE UP
EOSINOPHIL # BLD AUTO: 0.49 K/UL — SIGNIFICANT CHANGE UP (ref 0–0.5)
EOSINOPHIL NFR BLD AUTO: 2 % — SIGNIFICANT CHANGE UP (ref 0–6)
GLUCOSE SERPL-MCNC: 70 MG/DL — SIGNIFICANT CHANGE UP (ref 70–99)
HCT VFR BLD CALC: 35.6 % — SIGNIFICANT CHANGE UP (ref 34.5–45)
HGB BLD-MCNC: 10.4 G/DL — LOW (ref 11.5–15.5)
HYPOCHROMIA BLD QL: SLIGHT — SIGNIFICANT CHANGE UP
LYMPHOCYTES # BLD AUTO: 0.49 K/UL — LOW (ref 1–3.3)
LYMPHOCYTES # BLD AUTO: 2 % — LOW (ref 13–44)
MACROCYTES BLD QL: SLIGHT — SIGNIFICANT CHANGE UP
MANUAL SMEAR VERIFICATION: SIGNIFICANT CHANGE UP
MCHC RBC-ENTMCNC: 24.6 PG — LOW (ref 27–34)
MCHC RBC-ENTMCNC: 29.2 G/DL — LOW (ref 32–36)
MCV RBC AUTO: 84.4 FL — SIGNIFICANT CHANGE UP (ref 80–100)
METAMYELOCYTES # FLD: 1 % — HIGH (ref 0–0)
MICROCYTES BLD QL: SLIGHT — SIGNIFICANT CHANGE UP
MONOCYTES # BLD AUTO: 1.97 K/UL — HIGH (ref 0–0.9)
MONOCYTES NFR BLD AUTO: 8 % — SIGNIFICANT CHANGE UP (ref 2–14)
MYELOCYTES NFR BLD: 3 % — HIGH (ref 0–0)
NEUTROPHILS # BLD AUTO: 19.74 K/UL — HIGH (ref 1.8–7.4)
NEUTROPHILS NFR BLD AUTO: 80 % — HIGH (ref 43–77)
NRBC # BLD: 1 /100 WBCS — HIGH (ref 0–0)
PLAT MORPH BLD: NORMAL — SIGNIFICANT CHANGE UP
PLATELET # BLD AUTO: 963 K/UL — HIGH (ref 150–400)
POIKILOCYTOSIS BLD QL AUTO: SLIGHT — SIGNIFICANT CHANGE UP
POLYCHROMASIA BLD QL SMEAR: SLIGHT — SIGNIFICANT CHANGE UP
POTASSIUM SERPL-MCNC: 4.7 MMOL/L — SIGNIFICANT CHANGE UP (ref 3.5–5.3)
POTASSIUM SERPL-SCNC: 4.7 MMOL/L — SIGNIFICANT CHANGE UP (ref 3.5–5.3)
PROMYELOCYTES # FLD: 1 % — HIGH (ref 0–0)
PROT SERPL-MCNC: 6.8 G/DL — SIGNIFICANT CHANGE UP (ref 6–8.3)
RBC # BLD: 4.22 M/UL — SIGNIFICANT CHANGE UP (ref 3.8–5.2)
RBC # FLD: 21.3 % — HIGH (ref 10.3–14.5)
RBC BLD AUTO: ABNORMAL
SCHISTOCYTES BLD QL AUTO: SLIGHT — SIGNIFICANT CHANGE UP
SODIUM SERPL-SCNC: 136 MMOL/L — SIGNIFICANT CHANGE UP (ref 135–145)
WBC # BLD: 24.68 K/UL — HIGH (ref 3.8–10.5)
WBC # FLD AUTO: 24.68 K/UL — HIGH (ref 3.8–10.5)

## 2024-12-16 PROCEDURE — 99232 SBSQ HOSP IP/OBS MODERATE 35: CPT

## 2024-12-16 PROCEDURE — 99232 SBSQ HOSP IP/OBS MODERATE 35: CPT | Mod: GC

## 2024-12-16 RX ORDER — METOPROLOL TARTRATE 100 MG/1
1 TABLET, FILM COATED ORAL
Qty: 0 | Refills: 0 | DISCHARGE
Start: 2024-12-16

## 2024-12-16 RX ORDER — ONDANSETRON HYDROCHLORIDE 4 MG/1
1 TABLET, FILM COATED ORAL
Qty: 0 | Refills: 0 | DISCHARGE
Start: 2024-12-16

## 2024-12-16 RX ORDER — ROSUVASTATIN CALCIUM 5 MG/1
1 TABLET, FILM COATED ORAL
Qty: 0 | Refills: 0 | DISCHARGE
Start: 2024-12-16

## 2024-12-16 RX ORDER — HYDROMORPHONE HYDROCHLORIDE 2 MG/1
1 TABLET ORAL
Qty: 0 | Refills: 0 | DISCHARGE

## 2024-12-16 RX ORDER — IBUPROFEN 200 MG
1 TABLET ORAL
Qty: 0 | Refills: 0 | DISCHARGE
Start: 2024-12-16

## 2024-12-16 RX ORDER — FLUTICASONE PROPIONATE 50 MCG
2 SPRAY, SUSPENSION NASAL
Refills: 0 | DISCHARGE

## 2024-12-16 RX ORDER — LAMOTRIGINE 50 MG/1
1 TABLET, EXTENDED RELEASE ORAL
Qty: 0 | Refills: 0 | DISCHARGE
Start: 2024-12-16

## 2024-12-16 RX ORDER — CYCLOBENZAPRINE HCL 10 MG
1 TABLET ORAL
Qty: 0 | Refills: 0 | DISCHARGE

## 2024-12-16 RX ORDER — AMIODARONE HYDROCHLORIDE 200 MG/1
1 TABLET ORAL
Qty: 0 | Refills: 0 | DISCHARGE
Start: 2024-12-16

## 2024-12-16 RX ORDER — LEVOTHYROXINE SODIUM 150 MCG
1 TABLET ORAL
Qty: 0 | Refills: 0 | DISCHARGE
Start: 2024-12-16

## 2024-12-16 RX ORDER — ROSUVASTATIN CALCIUM 5 MG/1
1 TABLET, FILM COATED ORAL
Refills: 0 | DISCHARGE

## 2024-12-16 RX ORDER — COLCHICINE 0.6 MG
1 TABLET ORAL
Qty: 0 | Refills: 0 | DISCHARGE
Start: 2024-12-16

## 2024-12-16 RX ORDER — MECLIZINE HCL 12.5 MG
1 TABLET ORAL
Qty: 0 | Refills: 0 | DISCHARGE

## 2024-12-16 RX ORDER — LAMOTRIGINE 50 MG/1
1 TABLET, EXTENDED RELEASE ORAL
Refills: 0 | DISCHARGE

## 2024-12-16 RX ORDER — ENALAPRIL MALEATE 2.5 MG/1
1 TABLET ORAL
Refills: 0 | DISCHARGE

## 2024-12-16 RX ORDER — PANTOPRAZOLE SODIUM 40 MG/1
1 TABLET, DELAYED RELEASE ORAL
Refills: 0 | DISCHARGE

## 2024-12-16 RX ORDER — POVIDONE, POLYVINYL ALCOHOL 20; 27 G/1000ML; G/1000ML
1 SOLUTION OPHTHALMIC
Qty: 0 | Refills: 0 | DISCHARGE
Start: 2024-12-16

## 2024-12-16 RX ORDER — METOPROLOL TARTRATE 100 MG/1
1 TABLET, FILM COATED ORAL
Refills: 0 | DISCHARGE

## 2024-12-16 RX ORDER — LEVOTHYROXINE SODIUM 150 MCG
1 TABLET ORAL
Refills: 0 | DISCHARGE

## 2024-12-16 RX ORDER — HYDROCORTISONE BUTYRATE 0.1 %
1 CREAM (GRAM) TOPICAL
Qty: 0 | Refills: 0 | DISCHARGE

## 2024-12-16 RX ORDER — ACETAMINOPHEN 500MG 500 MG/1
2 TABLET, COATED ORAL
Qty: 0 | Refills: 0 | DISCHARGE
Start: 2024-12-16

## 2024-12-16 RX ORDER — ACETAMINOPHEN 500MG 500 MG/1
2 TABLET, COATED ORAL
Qty: 0 | Refills: 0 | DISCHARGE

## 2024-12-16 RX ORDER — PREDNISONE 20 MG/1
1 TABLET ORAL
Qty: 0 | Refills: 0 | DISCHARGE
Start: 2024-12-16

## 2024-12-16 RX ORDER — BUPROPION HCL 100 MG
1 TABLET ORAL
Refills: 0 | DISCHARGE

## 2024-12-16 RX ORDER — BUPROPION HCL 100 MG
1 TABLET ORAL
Qty: 0 | Refills: 0 | DISCHARGE
Start: 2024-12-16

## 2024-12-16 RX ORDER — ENALAPRIL MALEATE 2.5 MG/1
1 TABLET ORAL
Qty: 0 | Refills: 0 | DISCHARGE
Start: 2024-12-16

## 2024-12-16 RX ORDER — PANTOPRAZOLE SODIUM 40 MG/1
1 TABLET, DELAYED RELEASE ORAL
Qty: 0 | Refills: 0 | DISCHARGE
Start: 2024-12-16

## 2024-12-16 RX ORDER — IBUPROFEN 200 MG
400 TABLET ORAL EVERY 8 HOURS
Refills: 0 | Status: DISCONTINUED | OUTPATIENT
Start: 2024-12-16 | End: 2024-12-20

## 2024-12-16 RX ORDER — AMIODARONE HYDROCHLORIDE 200 MG/1
1 TABLET ORAL
Qty: 0 | Refills: 0 | DISCHARGE

## 2024-12-16 RX ORDER — ONDANSETRON HYDROCHLORIDE 4 MG/1
1 TABLET, FILM COATED ORAL
Qty: 0 | Refills: 0 | DISCHARGE

## 2024-12-16 RX ADMIN — Medication 88 MICROGRAM(S): at 05:30

## 2024-12-16 RX ADMIN — AMIODARONE HYDROCHLORIDE 200 MILLIGRAM(S): 200 TABLET ORAL at 05:32

## 2024-12-16 RX ADMIN — Medication 5000 UNIT(S): at 22:05

## 2024-12-16 RX ADMIN — Medication 0.6 MILLIGRAM(S): at 11:11

## 2024-12-16 RX ADMIN — ACETAMINOPHEN 500MG 650 MILLIGRAM(S): 500 TABLET, COATED ORAL at 13:25

## 2024-12-16 RX ADMIN — Medication 5000 UNIT(S): at 15:00

## 2024-12-16 RX ADMIN — Medication 1 SPRAY(S): at 16:51

## 2024-12-16 RX ADMIN — METOPROLOL TARTRATE 25 MILLIGRAM(S): 100 TABLET, FILM COATED ORAL at 08:26

## 2024-12-16 RX ADMIN — ENALAPRIL MALEATE 20 MILLIGRAM(S): 2.5 TABLET ORAL at 08:26

## 2024-12-16 RX ADMIN — Medication 150 MILLIGRAM(S): at 11:11

## 2024-12-16 RX ADMIN — ONDANSETRON HYDROCHLORIDE 4 MILLIGRAM(S): 4 TABLET, FILM COATED ORAL at 12:44

## 2024-12-16 RX ADMIN — PREDNISONE 10 MILLIGRAM(S): 20 TABLET ORAL at 05:31

## 2024-12-16 RX ADMIN — HYDROMORPHONE HYDROCHLORIDE 4 MILLIGRAM(S): 2 TABLET ORAL at 01:40

## 2024-12-16 RX ADMIN — HYDROMORPHONE HYDROCHLORIDE 4 MILLIGRAM(S): 2 TABLET ORAL at 01:45

## 2024-12-16 RX ADMIN — PANTOPRAZOLE SODIUM 40 MILLIGRAM(S): 40 TABLET, DELAYED RELEASE ORAL at 05:30

## 2024-12-16 RX ADMIN — ROSUVASTATIN CALCIUM 20 MILLIGRAM(S): 5 TABLET, FILM COATED ORAL at 22:05

## 2024-12-16 RX ADMIN — Medication 5000 UNIT(S): at 05:30

## 2024-12-16 RX ADMIN — Medication 1 TABLET(S): at 11:10

## 2024-12-16 RX ADMIN — ACETAMINOPHEN 500MG 650 MILLIGRAM(S): 500 TABLET, COATED ORAL at 12:44

## 2024-12-16 RX ADMIN — Medication 400 MILLIGRAM(S): at 16:52

## 2024-12-16 RX ADMIN — Medication 400 MILLIGRAM(S): at 17:40

## 2024-12-16 RX ADMIN — LAMOTRIGINE 200 MILLIGRAM(S): 50 TABLET, EXTENDED RELEASE ORAL at 11:11

## 2024-12-16 RX ADMIN — Medication 1 TABLET(S): at 05:57

## 2024-12-16 RX ADMIN — ACETAMINOPHEN, DIPHENHYDRAMINE HCL, PHENYLEPHRINE HCL 3 MILLIGRAM(S): 325; 25; 5 TABLET ORAL at 22:58

## 2024-12-16 RX ADMIN — Medication 1 SPRAY(S): at 05:39

## 2024-12-16 RX ADMIN — Medication 600 MILLIGRAM(S): at 05:31

## 2024-12-16 NOTE — DISCHARGE NOTE PROVIDER - CARE PROVIDERS DIRECT ADDRESSES
[email or web address]

## 2024-12-16 NOTE — PROGRESS NOTE ADULT - SUBJECTIVE AND OBJECTIVE BOX
PROGRESS NOTE:     Patient is a 64y old  Female who presents with a chief complaint of Pericarditis/Pericardial effusions (16 Dec 2024 10:31)          SUBJECTIVE & OBJECTIVE:   Pt seen and examined at bedside in AM    no overnight events.   no new complaints    REVIEW OF SYSTEMS: remaining ROS negative     PHYSICAL EXAM:  VITALS:  Vital Signs Last 24 Hrs  T(C): 36.6 (16 Dec 2024 12:46), Max: 36.7 (16 Dec 2024 08:29)  T(F): 97.9 (16 Dec 2024 12:46), Max: 98 (16 Dec 2024 08:29)  HR: 74 (16 Dec 2024 12:46) (53 - 74)  BP: 148/56 (16 Dec 2024 12:46) (138/55 - 155/56)  BP(mean): --  RR: 16 (16 Dec 2024 12:46) (16 - 16)  SpO2: 97% (16 Dec 2024 12:46) (95% - 99%)    Parameters below as of 16 Dec 2024 12:46  Patient On (Oxygen Delivery Method): room air          GENERAL: NAD,  no increased WOB  HEAD:  Atraumatic, Normocephalic  EYES: EOMI, conjunctiva and sclera clear  ENMT: Moist mucous membranes  NECK: Supple, No JVD  NERVOUS SYSTEM:  Alert & Oriented   CHEST/LUNG: decrease breath sound at right base No rales, rhonchi, wheezing  HEART: Regular rate and rhythm  ABDOMEN: Soft, Nontender, Nondistended; Bowel sounds present  EXTREMITIES:  No clubbing, cyanosis, calf tenderness      MEDICATIONS  (STANDING):  aMIOdarone    Tablet 200 milliGRAM(s) Oral daily  buPROPion XL (24-Hour) . 150 milliGRAM(s) Oral daily  colchicine 0.6 milliGRAM(s) Oral daily  enalapril 20 milliGRAM(s) Oral daily  fluticasone propionate 50 MICROgram(s)/spray Nasal Spray 1 Spray(s) Both Nostrils two times a day  heparin   Injectable 5000 Unit(s) SubCutaneous every 8 hours  lactobacillus acidophilus 1 Tablet(s) Oral daily  lamoTRIgine 200 milliGRAM(s) Oral daily  levothyroxine 88 MICROGram(s) Oral daily  metoprolol succinate ER 25 milliGRAM(s) Oral daily  multivitamin 1 Tablet(s) Oral daily  pantoprazole    Tablet 40 milliGRAM(s) Oral before breakfast  predniSONE   Tablet 10 milliGRAM(s) Oral daily  rosuvastatin 20 milliGRAM(s) Oral at bedtime    MEDICATIONS  (PRN):  acetaminophen     Tablet .. 650 milliGRAM(s) Oral every 6 hours PRN Mild Pain (1 - 3)  aluminum hydroxide/magnesium hydroxide/simethicone Suspension 30 milliLiter(s) Oral every 4 hours PRN Dyspepsia  artificial tears (preservative free) Ophthalmic Solution 1 Drop(s) Both EYES every 4 hours PRN Dry Eyes  cyclobenzaprine 10 milliGRAM(s) Oral three times a day PRN Muscle Spasm  guaifenesin/dextromethorphan Oral Liquid 10 milliLiter(s) Oral every 4 hours PRN Cough  hydrocortisone hemorrhoidal Suppository 1 Suppository(s) Rectal two times a day PRN rectal pain  HYDROmorphone   Tablet 4 milliGRAM(s) Oral every 4 hours PRN Severe Pain (7 - 10)  ibuprofen  Tablet. 400 milliGRAM(s) Oral every 8 hours PRN Moderate Pain (4 - 6)  loperamide 2 milliGRAM(s) Oral three times a day PRN Diarrhea  meclizine 25 milliGRAM(s) Oral two times a day PRN Dizziness  melatonin 3 milliGRAM(s) Oral at bedtime PRN Insomnia  ondansetron   Disintegrating Tablet 4 milliGRAM(s) Oral two times a day PRN Nausea and/or Vomiting  simethicone 80 milliGRAM(s) Chew every 6 hours PRN Gas      Allergies    sulfa drugs (Vomiting; Nausea)  IV Contrast (Hives)    Intolerances              LABS:                           10.4   24.68 )-----------( 963      ( 16 Dec 2024 05:17 )             35.6     12-16    136  |  99  |  10  ----------------------------<  70  4.7   |  31  |  0.69    Ca    9.3      16 Dec 2024 05:17    TPro  6.8  /  Alb  2.8[L]  /  TBili  0.3  /  DBili  x   /  AST  14  /  ALT  21  /  AlkPhos  93  12-16      Urinalysis Basic - ( 16 Dec 2024 05:17 )    Color: x / Appearance: x / SG: x / pH: x  Gluc: 70 mg/dL / Ketone: x  / Bili: x / Urobili: x   Blood: x / Protein: x / Nitrite: x   Leuk Esterase: x / RBC: x / WBC x   Sq Epi: x / Non Sq Epi: x / Bacteria: x      CAPILLARY BLOOD GLUCOSE                    RECENT CULTURES:          RADIOLOGY & ADDITIONAL TESTS:

## 2024-12-16 NOTE — DISCHARGE NOTE PROVIDER - CARE PROVIDER_API CALL
Ravinder Abdi  Cardiovascular Disease  [address], [address]  [place] [address]  Phone: [phone]  Fax: [phone]  Follow Up Time:     Shelby Dailey  [facility]  [address], [place] [address]  Phone: [phone]  Fax: [phone]  Follow Up Time:

## 2024-12-16 NOTE — PROGRESS NOTE ADULT - ASSESSMENT
KITTY JIMENEZ is a 65 y/o  F with PMHx of HTN, HLD, hypothyroidism, primary lymphedema, Essential  thrombocytosis, GERD, Obesity s/p bariatric surgery, ischemic colitis s/p left hemicolectomy, pAF and DVT/PE on Eliquis, HFpEF,  vW Disease, recently had  2 hospitalizations at [facility] in the past 2 months for CAP, and also diagnosed with pericarditis, and HFpEF s/p PO abx.  Patient returns to  ED on 11/30 with worsening c/o back pain, CP, SOB and productive cough. Found to have CHF exacerbation with superimposed PNA, pericarditis, and pericardio/ pleural effusions. Now admitted to [facility] for functional deficits and initiation of a multidisciplinary rehab program consisting focused on functional mobility, transfers and ADLs.    #Pericardial effusion 2/2 pericarditis   #Pleural effusion suspected  2/2 interferon alpha  #Acute respiratory failure with hypoxia  -S/p NC, monitor O2  -CT Chest- Moderately large simple pericardial effusion. Small to moderate right and small left pleural effusions with associated compressive atelectasis and Superimposed pneumonia.  -S/p pericardial drain (removed on 12/4)  -S/p b/l pigtail catheters (removed 12/5 and 12/6)  -S/p steroid taper: c/w Prednisone 20mg HS x1 day start 12/13, Prednisone 10mg x 4 days start on 12/14; Prednisone 5mg x 5 days start on 12/18.  -Colchicine 0.6mg daily x 3 months  -Ibuprofen 600mg q8h 400mg prn now  -F/u outpatient cardiology     Comprehensive Multidisciplinary Rehab Program:  - comprehensive rehab program, 3 hours a day, 5 days a week.  - PT 2hr/day: Focused on improving strength, endurance, coordination, balance, functional mobility, and transfers  - OT 1hr/day: Focused on improving strength, fine motor skills, coordination, posture and ADLs.    - Activity Limitations: Decreased social, vocational and leisure activities, decreased self care and ADLs, decreased mobility, decreased ability to manage household and finances.     -----------------------------------------------------------------------------------  Concurrent Medical Problems    #Leukocytosis  #Sepsis- PNA  #Essential thrombocythemia   - + abnormal  RBC, platelet morphology - Suspected transformation  -S/p bone biopsy 12/10--> f/u outpatient for results and  begin Jakafi treatment   -S/p Cefipime abx   -Blood cultures and pleural/pericardial fluid cultures negative  -Intolerant to anagrelide due to afib, on hydrea until about 6-12 months ago with no response, most recently on pegylated IFN alpha (Besrema) for the past 6 months- intolerant as well.  Stopped 2 months ago.   -Trend CBC, no fevers  -Monitor platelets    #vW disease  -Receives DDAVP prior to procedures.     #HTN/HLD  -Rosuvastatin 20mg HS   -Metoprolol 25mg daily   -Enalapril 20mg daily     #pAFIB  -Amiodarone 200mg daily   -Metoprolol 25mg daily     #Hypothyroidism  -Synthroid 88mcg daily     #Chronic anemia  -Monitor H/H (12/12- Hgb/Hct- 8.6/30.1)  -Transfuse if Hbg <7 PRN    #Dry eyes/Allergies  #Cough  -Artificial tears q4h PRN  -Fluticasone nasal spray BID  -Guaifenesin/dextromethorphan  q4h PRN    #Pain management  -Tylenol PRN  -Flexeril 10mg TID PRN  -Dilaudid 4mg q4h PRN      #Mood/Cognition  Neuropsychology consult PRN  -Wellbutrin 150mg daily   -Lamictal 200mg daily     #Sleep:   Maintain quiet hours and low stim environment.  Melatonin 3mg HS PRN to maximize participation in therapy during the day.     #GI/Bowel  #N/V  Senna QHS, Miralax PRN Daily, Bisacodyl 5mg daily PRN, Lactulose PRN, Simethicone PRN- hold for hx diarrhea  GI ppx: Pantoprazole 40mg daily   -Meclizine 25mg BID PRN  -Zofran 4mg TID PRN    #Skin/Pressure Injury:   - Skin assessment on admission: R buttocks biopsy puncture site, w/ gauze and tegaderm, CDI, b/l flank drain sites, MARILOU, abd drain site, MARILOU, abd bruising- healing well    #Diet/Supplements  Current Diet: Regular diet   Nutrition consult   -Lactobacillus tab BID to daily  -Multivitamin daily     #Precautions / PROPHYLAXIS:   - Falls, Cardiac  - ortho: Weight bearing status: WBAT   - Lungs: Incentive Spirometer   - DVT ppx: Heparin 5000U TID  - B/L calf tenderness on admission--> BLE DUPLEX US neg DVT  - Pressure injury/Skin: OOB to Chair, PT/OT      ---------------  Code Status: FULL  Emergency Contact:    Outpatient Follow-up (Specialty/Name of physician):    Shelby Dailey  Hematology  [address], [place] [address]  Phone: [phone]  Fax: [phone]  Follow Up Time: 1 week    Ravinder Abdi  Cardiovascular Disease  [address], [address]  [place] [address]  Phone: [phone]  Fax: [phone]  Follow Up Time: 2 weeks

## 2024-12-16 NOTE — PROVIDER CONTACT NOTE (OTHER) - ASSESSMENT
Pt reported multiple BMs more than 10 times, "it's loose like a diarrhea" as per pt. PR=008/63, HR=61, T=97.7F, RR=16, SpO2=97%.
Pt alert, vital signs as documented   pt alert and oriented, c/o nausea and headache 7/10, new onset   PERRLA

## 2024-12-16 NOTE — PROVIDER CONTACT NOTE (MEDICATION) - SITUATION
Patient asymptomatic. BP-148/53 HR-53. tEMP-97.4 02-97. No complaints of nausea, chest pain or vomiting.

## 2024-12-16 NOTE — DISCHARGE NOTE PROVIDER - NSDCFUSCHEDAPPT_GEN_ALL_CORE_FT
Ravinder Abdi  [facility] Physician [facility]  CARDIOLOGY [address]  Scheduled Appointment: 12/24/2024    Carolina Valencia  [facility] Physician [facility]  INTMED [address]  Scheduled Appointment: 01/02/2025    Shelby Dailey  [facility] Physician [facility]  [facility]  Scheduled Appointment: 01/02/2025

## 2024-12-16 NOTE — PROGRESS NOTE ADULT - NS ATTEND AMEND GEN_ALL_CORE FT
Patient with no acute issues   Discussed at IDR rounds and consultants and in team conference  Continue current medical management - active issues-vomited today - s/o zofran, fluids   Continue therapy- active - working on endurance, balance strengthening and gait training.

## 2024-12-16 NOTE — DISCHARGE NOTE PROVIDER - HOSPITAL COURSE
63 y/o  F with PMHx of HTN, HLD, hypothyroidism, primary lymphedema, Essential  thrombocytosis, GERD, Obesity s/p bariatric surgery, ischemic colitis s/p left hemicolectomy, pAF and DVT/PE on Eliquis, HFpEF,  vW Disease, recently had  2 hospitalizations at [facility] in the past 2 months for CAP, and also diagnosed with pericarditis, and HFpEF s/p PO abx.  Patient returns to  ED on 11/30 with worsening c/o back pain, CP, SOB and productive cough. Patient was found to be in respiratory distress and hypoxic requiring 4L NC and in HF exacerbation. CT imaging showed moderately large simple pericardial effusion. Small to moderate right and  small left pleural effusions with associated compressive atelectasis with suspected superimposed pneumonia. Hospital course c/b pericarditis 2/2 pegylated interferon and pericardial effusions. TTE found large pericardial effusions with signs of early tamponade.  S/p pericardial drain and B/l chest tube  placement. Cytology cultures are negative. Started on empiric cefepime, colchicine,  ibuprofen and steroid taper. Of note, patient had an episode of pAFIB, treated with Amiodarone,  Heme/onc was consulted and suspect advancement/transformation of Essential Thrombocytosis, Patient failed/did not tolerate previous Tx. Now s/p BM Bx on 12/10 will need to f/u  with Dr Dailey outpatient to discuss results and being treatment with Jakafi.  Patient optimized and was evaluated by PM&R and therapy for functional deficits, gait/ADL impairments and acute rehabilitation was recommended. Patient was cleared for discharge to [facility] IRU on 12/12/24.      65 y/o  F with PMHx of HTN, HLD, hypothyroidism, primary lymphedema, Essential  thrombocytosis, GERD, Obesity s/p bariatric surgery, ischemic colitis s/p left hemicolectomy, pAF and DVT/PE on Eliquis, HFpEF,  vW Disease, recently had  2 hospitalizations at [facility] in the past 2 months for CAP, and also diagnosed with pericarditis, and HFpEF s/p PO abx.  Patient returns to  ED on 11/30 with worsening c/o back pain, CP, SOB and productive cough. Patient was found to be in respiratory distress and hypoxic requiring 4L NC and in HF exacerbation. CT imaging showed moderately large simple pericardial effusion. Small to moderate right and  small left pleural effusions with associated compressive atelectasis with suspected superimposed pneumonia. Hospital course c/b pericarditis 2/2 pegylated interferon and pericardial effusions. TTE found large pericardial effusions with signs of early tamponade.  S/p pericardial drain and B/l chest tube  placement. Cytology cultures are negative. Started on empiric cefepime, colchicine,  ibuprofen and steroid taper. Of note, patient had an episode of pAFIB, treated with Amiodarone,  Heme/onc was consulted and suspect advancement/transformation of Essential Thrombocytosis, Patient failed/did not tolerate previous Tx. Now s/p BM Bx on 12/10 will need to f/u  with Dr Dailey outpatient to discuss results and being treatment with Jakafi.  Patient optimized and was evaluated by PM&R and therapy for functional deficits, gait/ADL impairments and acute rehabilitation was recommended. Patient was cleared for discharge to [facility] IRU on 12/12/24.  At [facility] rehab patient completed comprehensive PT OT program and reached her rehab goals on 12/20. While at rehab evaluated by hospitalist and heme. Medications adjusted. Cleared for dc home on 12/20. Outpt follow up.

## 2024-12-16 NOTE — PROGRESS NOTE ADULT - SUBJECTIVE AND OBJECTIVE BOX
HPI:  65 y/o  F with PMHx of HTN, HLD, hypothyroidism, primary lymphedema, Essential  thrombocytosis, GERD, Obesity s/p bariatric surgery, ischemic colitis s/p left hemicolectomy, pAF and DVT/PE on Eliquis, HFpEF,  vW Disease, recently had  2 hospitalizations at [facility] in the past 2 months for CAP, and also diagnosed with pericarditis, and HFpEF s/p PO abx.  Patient returns to  ED on 11/30 with worsening c/o back pain, CP, SOB and productive cough. Patient was found to be in respiratory distress and hypoxic requiring 4L NC and in HF exacerbation. CT imaging showed moderately large simple pericardial effusion. Small to moderate right and  small left pleural effusions with associated compressive atelectasis with suspected superimposed pneumonia. Hospital course c/b pericarditis 2/2 pegylated interferon and pericardial effusions. TTE found large pericardial effusions with signs of early tamponade.  S/p pericardial drain and B/l chest tube  placement. Cytology cultures are negative. Started on empiric cefepime, colchicine,  ibuprofen and steroid taper. Of note, patient had an episode of pAFIB, treated with Amiodarone,  Heme/onc was consulted and suspect advancement/transformation of Essential Thrombocytosis, Patient failed/did not tolerate previous Tx. Now s/p BM Bx on 12/10 will need to f/u  with Dr Dailey outpatient to discuss results and being treatment with Jakafi.  Patient optimized and was evaluated by PM&R and therapy for functional deficits, gait/ADL impairments and acute rehabilitation was recommended. Patient was cleared for discharge to [facility] IRU on 12/12/24.       HPI: Patient NAD this am,  Reports diarrhea resolved. Hold lax.   Pain controlled, no chest pain, no N/V, no Fevers/Chills. No other new ROS  Has been tolerating rehabilitation program. Strength improved. VSS.   Motrin to prn.       MEDICATIONS  (STANDING):  aMIOdarone    Tablet 200 milliGRAM(s) Oral daily  buPROPion XL (24-Hour) . 150 milliGRAM(s) Oral daily  colchicine 0.6 milliGRAM(s) Oral daily  enalapril 20 milliGRAM(s) Oral daily  fluticasone propionate 50 MICROgram(s)/spray Nasal Spray 1 Spray(s) Both Nostrils two times a day  heparin   Injectable 5000 Unit(s) SubCutaneous every 8 hours  lactobacillus acidophilus 1 Tablet(s) Oral daily  lamoTRIgine 200 milliGRAM(s) Oral daily  levothyroxine 88 MICROGram(s) Oral daily  metoprolol succinate ER 25 milliGRAM(s) Oral daily  multivitamin 1 Tablet(s) Oral daily  pantoprazole    Tablet 40 milliGRAM(s) Oral before breakfast  predniSONE   Tablet 10 milliGRAM(s) Oral daily  rosuvastatin 20 milliGRAM(s) Oral at bedtime    MEDICATIONS  (PRN):  acetaminophen     Tablet .. 650 milliGRAM(s) Oral every 6 hours PRN Mild Pain (1 - 3)  aluminum hydroxide/magnesium hydroxide/simethicone Suspension 30 milliLiter(s) Oral every 4 hours PRN Dyspepsia  artificial tears (preservative free) Ophthalmic Solution 1 Drop(s) Both EYES every 4 hours PRN Dry Eyes  cyclobenzaprine 10 milliGRAM(s) Oral three times a day PRN Muscle Spasm  guaifenesin/dextromethorphan Oral Liquid 10 milliLiter(s) Oral every 4 hours PRN Cough  hydrocortisone hemorrhoidal Suppository 1 Suppository(s) Rectal two times a day PRN rectal pain  HYDROmorphone   Tablet 4 milliGRAM(s) Oral every 4 hours PRN Severe Pain (7 - 10)  ibuprofen  Tablet. 400 milliGRAM(s) Oral every 8 hours PRN Moderate Pain (4 - 6)  loperamide 2 milliGRAM(s) Oral three times a day PRN Diarrhea  meclizine 25 milliGRAM(s) Oral two times a day PRN Dizziness  melatonin 3 milliGRAM(s) Oral at bedtime PRN Insomnia  ondansetron   Disintegrating Tablet 4 milliGRAM(s) Oral two times a day PRN Nausea and/or Vomiting  simethicone 80 milliGRAM(s) Chew every 6 hours PRN Gas                            10.4   24.68 )-----------( 963      ( 16 Dec 2024 05:17 )             35.6     12-16    136  |  99  |  10  ----------------------------<  70  4.7   |  31  |  0.69    Ca    9.3      16 Dec 2024 05:17    TPro  6.8  /  Alb  2.8[L]  /  TBili  0.3  /  DBili  x   /  AST  14  /  ALT  21  /  AlkPhos  93  12-16    Urinalysis Basic - ( 16 Dec 2024 05:17 )    Color: x / Appearance: x / SG: x / pH: x  Gluc: 70 mg/dL / Ketone: x  / Bili: x / Urobili: x   Blood: x / Protein: x / Nitrite: x   Leuk Esterase: x / RBC: x / WBC x   Sq Epi: x / Non Sq Epi: x / Bacteria: x      Vital Signs Last 24 Hrs  T(C): 36.7 (16 Dec 2024 08:29), Max: 36.7 (16 Dec 2024 08:29)  T(F): 98 (16 Dec 2024 08:29), Max: 98 (16 Dec 2024 08:29)  HR: 62 (16 Dec 2024 08:29) (53 - 62)  BP: 155/56 (16 Dec 2024 08:29) (138/55 - 155/56)  BP(mean): --  RR: 16 (16 Dec 2024 08:29) (16 - 16)  SpO2: 98% (16 Dec 2024 08:29) (95% - 99%)    Parameters below as of 16 Dec 2024 08:29  Patient On (Oxygen Delivery Method): room air      In NAD  HEENT- EOMI  Heart- RRR, S1S2  Lungs- no respiratory distress  Abd- + BS, NT  Ext- No calf pain  Neuro- Exam unchanged  Improving motor strength LEs  impaired sensation b/l feet        Continue comprehensive acute rehab program consisting of 3hrs/day of OT/PT

## 2024-12-16 NOTE — DISCHARGE NOTE PROVIDER - NSDCCPCAREPLAN_GEN_ALL_CORE_FT
PRINCIPAL DISCHARGE DIAGNOSIS  Diagnosis: Pleural effusion  Assessment and Plan of Treatment:       SECONDARY DISCHARGE DIAGNOSES  Diagnosis: H/O pericarditis  Assessment and Plan of Treatment:     Diagnosis: Essential thrombocytosis  Assessment and Plan of Treatment:     Diagnosis: Paroxysmal atrial fibrillation  Assessment and Plan of Treatment:      PRINCIPAL DISCHARGE DIAGNOSIS  Diagnosis: Pleural effusion  Assessment and Plan of Treatment: Follow up pulmonary in 1 week. Follow up cardiology.      SECONDARY DISCHARGE DIAGNOSES  Diagnosis: H/O pericarditis  Assessment and Plan of Treatment: Continue Colchicine, follow up Cardiology.    Diagnosis: Essential thrombocytosis  Assessment and Plan of Treatment: follow up heme onc in 1 week. Percocet for pain    Diagnosis: Paroxysmal atrial fibrillation  Assessment and Plan of Treatment: Follow up cardiology, on Amiodarone and Toprol

## 2024-12-16 NOTE — DISCHARGE NOTE PROVIDER - NSDCMRMEDTOKEN_GEN_ALL_CORE_FT
acetaminophen 325 mg oral tablet: 2 tab(s) orally every 6 hours As needed Mild Pain (1 - 3)  amiodarone 200 mg oral tablet: 1 tab(s) orally once a day  buPROPion 150 mg/24 hours (XL) oral tablet, extended release: 1 tab(s) orally once a day  colchicine 0.6 mg oral tablet: 1 tab(s) orally once a day  enalapril 20 mg oral tablet: 1 tab(s) orally once a day  ibuprofen 400 mg oral tablet: 1 tab(s) orally every 8 hours As needed Moderate Pain (4 - 6)  lamoTRIgine 200 mg oral tablet: 1 tab(s) orally once a day  levothyroxine 88 mcg (0.088 mg) oral tablet: 1 tab(s) orally once a day  linaclotide 72 mcg oral capsule: 1 cap(s) orally once a day as needed for  constipation  liraglutide 18 mg/3 mL subcutaneous solution: 0.6 milligram(s) subcutaneously once a day as needed for  metoprolol succinate 25 mg oral tablet, extended release: 1 tab(s) orally once a day  Multiple Vitamins oral tablet: 1 tab(s) orally once a day  ocular lubricant preservative-free ophthalmic solution: 1 drop(s) to each affected eye every 4 hours As needed Dry Eyes  ondansetron 4 mg oral tablet, disintegratin tab(s) orally 2 times a day As needed Nausea and/or Vomiting  pantoprazole 40 mg oral delayed release tablet: 1 tab(s) orally once a day (before a meal)  polyethylene glycol 3350 oral powder for reconstitution: 17 gram(s) orally once a day  predniSONE 5 mg oral tablet: 1 tab(s) orally once a day  rosuvastatin 20 mg oral tablet: 1 tab(s) orally once a day (at bedtime)   acetaminophen 325 mg oral tablet: 2 tab(s) orally every 6 hours As needed Mild Pain (1 - 3)  amiodarone 200 mg oral tablet: 1 tab(s) orally once a day  buPROPion 150 mg/24 hours (XL) oral tablet, extended release: 1 tab(s) orally once a day  colchicine 0.6 mg oral tablet: 1 tab(s) orally once a day  diphenoxylate-atropine 2.5 mg-0.025 mg oral tablet: 1 tab(s) orally 2 times a day as needed for Diarrhea MDD: 2 tabs  enalapril 20 mg oral tablet: 1 tab(s) orally once a day  ibuprofen 400 mg oral tablet: 1 tab(s) orally every 8 hours As needed Moderate Pain (4 - 6)  lamoTRIgine 200 mg oral tablet: 1 tab(s) orally once a day  levothyroxine 88 mcg (0.088 mg) oral tablet: 1 tab(s) orally once a day  linaclotide 72 mcg oral capsule: 1 cap(s) orally once a day as needed for  constipation  liraglutide 18 mg/3 mL subcutaneous solution: 0.6 milligram(s) subcutaneously once a day as needed for  metoprolol succinate 25 mg oral tablet, extended release: 1 tab(s) orally once a day  Multiple Vitamins oral tablet: 1 tab(s) orally once a day  ocular lubricant preservative-free ophthalmic solution: 1 drop(s) to each affected eye every 4 hours As needed Dry Eyes  ondansetron 4 mg oral tablet, disintegratin tab(s) orally 2 times a day As needed Nausea and/or Vomiting  oxycodone-acetaminophen 5 mg-325 mg oral tablet: 1 tab(s) orally 3 times a day as needed for Severe Pain (7 - 10) MDD: 2 tabs  pantoprazole 40 mg oral delayed release tablet: 1 tab(s) orally once a day (before a meal)  polyethylene glycol 3350 oral powder for reconstitution: 17 gram(s) orally once a day  predniSONE 5 mg oral tablet: 1 tab(s) orally once a day  rosuvastatin 20 mg oral tablet: 1 tab(s) orally once a day (at bedtime)

## 2024-12-16 NOTE — PROVIDER CONTACT NOTE (OTHER) - SITUATION
Pt reported multiple BMs more than 10 times, "it's loose like a diarrhea" as per pt.
Pt alert and oriented, c/o nausea and headache 7/10

## 2024-12-16 NOTE — PROGRESS NOTE ADULT - ASSESSMENT
63 y/o  F with PMHx of HTN, HLD, hypothyroidism, primary lymphedema, Essential  thrombocytosis, GERD, Obesity s/p bariatric surgery, ischemic colitis s/p left hemicolectomy, pAF and DVT/PE on Eliquis, HFpEF,  vW Disease, recent acute hospitalization for CHF exacerbation with superimposed PNA, pericarditis, and pericardio/ pleural effusions. Now admitted to [facility] for multidisciplinary rehab program     #Pericardial effusion 2/2 pericarditis   #Pleural effusion suspected  2/2 interferon alpha  #Acute respiratory failure with hypoxia  -S/p NC, monitor O2  -CT Chest- Moderately large simple pericardial effusion. Small to moderate right and small left pleural effusions with associated compressive atelectasis and Superimposed pneumonia.  -S/p pericardial drain (removed on 12/4)  -S/p b/l pigtail catheters (removed 12/5 and 12/6)  -S/p steroid taper: c/w Prednisone 20mg HS x1 day start 12/13, Prednisone 10mg x 4 days start on 12/14; Prednisone 5mg x 5 days start on 12/18.  -Colchicine 0.6mg daily x 3 months  -Ibuprofen 600mg q8h  -F/u outpatient cardiology   -Comprehensive Multidisciplinary Rehab Program    #Leukocytosis  #Essential thrombocythemia   - + abnormal  RBC, platelet morphology - Suspected transformation  -S/p bone biopsy 12/10--> f/u outpatient for results and  begin Jakafi treatment    -Blood cultures and pleural/pericardial fluid cultures negative  -Intolerant to anagrelide due to afib, on hydrea until about 6-12 months ago with no response, most recently on pegylated IFN alpha (Besrema) for the past 6 months- intolerant as well.  Stopped 2 months ago.   -Trend CBC, monitor for fevers  -Monitor platelets    #vW disease  -Receives DDAVP prior to procedures.     #HTN/HLD  -Rosuvastatin 20mg HS   -Metoprolol 25mg daily   -Enalapril 20mg daily     #pAFIB  -Amiodarone 200mg daily   -Metoprolol 25mg daily     #Hypothyroidism  -Synthroid 88mcg daily     #Chronic anemia  -Monitor H/H  -Transfuse if Hbg <7 PRN    #Dry eyes/Allergies  #Cough  -Artificial tears q4h PRN  -Fluticasone nasal spray BID  -Guaifenesin/dextromethorphan  q4h PRN    #Mood/Cognition  Neuropsychology consult PRN  -Wellbutrin 150mg daily   -Lamictal 200mg daily       #Sepsis- PNA  -resolved  -S/p Cefipime abx      #GI ppx  - Protonix    #DVT ppx  - Hep SQ

## 2024-12-17 ENCOUNTER — TRANSCRIPTION ENCOUNTER (OUTPATIENT)
Age: 64
End: 2024-12-17

## 2024-12-17 DIAGNOSIS — D47.3 ESSENTIAL (HEMORRHAGIC) THROMBOCYTHEMIA: ICD-10-CM

## 2024-12-17 LAB — HEMATOPATHOLOGY REPORT: SIGNIFICANT CHANGE UP

## 2024-12-17 PROCEDURE — 99232 SBSQ HOSP IP/OBS MODERATE 35: CPT

## 2024-12-17 PROCEDURE — 99254 IP/OBS CNSLTJ NEW/EST MOD 60: CPT

## 2024-12-17 RX ORDER — ACETAMINOPHEN 500MG 500 MG/1
325 TABLET, COATED ORAL ONCE
Refills: 0 | Status: DISCONTINUED | OUTPATIENT
Start: 2024-12-17 | End: 2024-12-17

## 2024-12-17 RX ORDER — OXYCODONE HYDROCHLORIDE 30 MG/1
5 TABLET ORAL ONCE
Refills: 0 | Status: DISCONTINUED | OUTPATIENT
Start: 2024-12-17 | End: 2024-12-17

## 2024-12-17 RX ADMIN — ONDANSETRON HYDROCHLORIDE 4 MILLIGRAM(S): 4 TABLET, FILM COATED ORAL at 20:00

## 2024-12-17 RX ADMIN — Medication 88 MICROGRAM(S): at 05:31

## 2024-12-17 RX ADMIN — HYDROMORPHONE HYDROCHLORIDE 4 MILLIGRAM(S): 2 TABLET ORAL at 00:01

## 2024-12-17 RX ADMIN — PREDNISONE 10 MILLIGRAM(S): 20 TABLET ORAL at 05:31

## 2024-12-17 RX ADMIN — LAMOTRIGINE 200 MILLIGRAM(S): 50 TABLET, EXTENDED RELEASE ORAL at 14:36

## 2024-12-17 RX ADMIN — Medication 5000 UNIT(S): at 14:36

## 2024-12-17 RX ADMIN — Medication 0.6 MILLIGRAM(S): at 14:36

## 2024-12-17 RX ADMIN — ENALAPRIL MALEATE 20 MILLIGRAM(S): 2.5 TABLET ORAL at 05:31

## 2024-12-17 RX ADMIN — METOPROLOL TARTRATE 25 MILLIGRAM(S): 100 TABLET, FILM COATED ORAL at 05:31

## 2024-12-17 RX ADMIN — Medication 5000 UNIT(S): at 05:31

## 2024-12-17 RX ADMIN — PANTOPRAZOLE SODIUM 40 MILLIGRAM(S): 40 TABLET, DELAYED RELEASE ORAL at 05:31

## 2024-12-17 RX ADMIN — Medication 400 MILLIGRAM(S): at 14:28

## 2024-12-17 RX ADMIN — Medication 400 MILLIGRAM(S): at 13:37

## 2024-12-17 RX ADMIN — ROSUVASTATIN CALCIUM 20 MILLIGRAM(S): 5 TABLET, FILM COATED ORAL at 20:18

## 2024-12-17 RX ADMIN — Medication 5000 UNIT(S): at 20:18

## 2024-12-17 RX ADMIN — Medication 150 MILLIGRAM(S): at 14:36

## 2024-12-17 RX ADMIN — Medication 1 TABLET(S): at 14:36

## 2024-12-17 RX ADMIN — HYDROMORPHONE HYDROCHLORIDE 4 MILLIGRAM(S): 2 TABLET ORAL at 00:46

## 2024-12-17 RX ADMIN — AMIODARONE HYDROCHLORIDE 200 MILLIGRAM(S): 200 TABLET ORAL at 05:30

## 2024-12-17 NOTE — DISCHARGE NOTE NURSING/CASE MANAGEMENT/SOCIAL WORK - FINANCIAL ASSISTANCE
[facility] provides services at a reduced cost to those who are determined to be eligible through [facility]’s financial assistance program. Information regarding [facility]’s financial assistance program can be found by going to [email or web address] or by calling [phone].

## 2024-12-17 NOTE — DISCHARGE NOTE NURSING/CASE MANAGEMENT/SOCIAL WORK - NSDCVIVACCINE_GEN_ALL_CORE_FT
Onset: Seem to worsen on 5/5 PM  Location/description: Earlier in the week, had been out of town and got sick; was seen in [place], never was tested for Covid at [facility]; grandma who they visited tested yesterday and tested + with covid; mom, dad and patient tested positive with home covid test; bad cough, yellow and green drainage from nose;   Associated Symptoms: not struggling to breathe, coughs overnight- elevated head underneath the mattress  What improves/worsens symptoms: humidifier and tried to raise head of bed  Symptom specific medications: eye gtt abx for 10 days was prescribed in [place]  Intake and Output: drinking well and eating well; normal output  Activity level: alert when awake, very active  Temperature (route and time): no fever  Weight:   Wt Readings from Last 1 Encounters:   04/08/22 6.974 kg (15 lb 6 oz) (11 %, Z= -1.21)*     * Growth percentiles are based on WHO (Boys, 0-2 years) data.        Recent visits (last 3-4 weeks) for same reason or recent surgery:  Seen on 5/3/22    PLAN:  Home Care Advice provided    Patient/Caller agrees to follow recommendations.    Reason for Disposition  • [1] COVID-19 diagnosed by positive lab test AND [2] mild symptoms (cough, fever or others) AND [3] no complications or SOB    Protocols used: CORONAVIRUS (COVID-19) DIAGNOSED OR [id number]       influenza, injectable, quadrivalent, preservative free; 09-Oct-2021 12:11; Magdalene Foster (RN); [employer]; [id number] (Exp. Date: 30-Jun-2022); IntraMuscular; Deltoid Right.; 0.5 milliLiter(s); VIS (VIS Published: 15-Aug-2019, VIS Presented: 09-Oct-2021);   rabies, intradermal injection; 17-Sep-2023 14:26; Dany Lewis (RN); [employer]; [id number] (Exp. Date: 17-Feb-2025); IntraMuscular.; Deltoid Left...; 1 milliLiter(s); VIS (VIS Published: 17-Oct-2009, VIS Presented: 17-Sep-2023);

## 2024-12-17 NOTE — PROGRESS NOTE ADULT - SUBJECTIVE AND OBJECTIVE BOX
HPI:  63 y/o  F with PMHx of HTN, HLD, hypothyroidism, primary lymphedema, Essential  thrombocytosis, GERD, Obesity s/p bariatric surgery, ischemic colitis s/p left hemicolectomy, pAF and DVT/PE on Eliquis, HFpEF,  vW Disease, recently had  2 hospitalizations at [facility] in the past 2 months for CAP, and also diagnosed with pericarditis, and HFpEF s/p PO abx.  Patient returns to  ED on 11/30 with worsening c/o back pain, CP, SOB and productive cough. Patient was found to be in respiratory distress and hypoxic requiring 4L NC and in HF exacerbation. CT imaging showed moderately large simple pericardial effusion. Small to moderate right and  small left pleural effusions with associated compressive atelectasis with suspected superimposed pneumonia. Hospital course c/b pericarditis 2/2 pegylated interferon and pericardial effusions. TTE found large pericardial effusions with signs of early tamponade.  S/p pericardial drain and B/l chest tube  placement. Cytology cultures are negative. Started on empiric cefepime, colchicine,  ibuprofen and steroid taper. Of note, patient had an episode of pAFIB, treated with Amiodarone,  Heme/onc was consulted and suspect advancement/transformation of Essential Thrombocytosis, Patient failed/did not tolerate previous Tx. Now s/p BM Bx on 12/10 will need to f/u  with Dr Dailey outpatient to discuss results and being treatment with Jakafi.  Patient optimized and was evaluated by PM&R and therapy for functional deficits, gait/ADL impairments and acute rehabilitation was recommended. Patient was cleared for discharge to [facility] IRU on 12/12/24.      HPI: Patient NAD this am,  Reports diarrhea resolved. Hold lax.   Pain/LEs/bone, sharp pain on deep inspiration, no N/V, no Fevers/Chills. No other new ROS  Has been tolerating rehabilitation program. Strength improved. VSS.   Motrin to prn. Percocet prn-dc dilaudid      MEDICATIONS  (STANDING):  aMIOdarone    Tablet 200 milliGRAM(s) Oral daily  buPROPion XL (24-Hour) . 150 milliGRAM(s) Oral daily  colchicine 0.6 milliGRAM(s) Oral daily  enalapril 20 milliGRAM(s) Oral daily  fluticasone propionate 50 MICROgram(s)/spray Nasal Spray 1 Spray(s) Both Nostrils two times a day  heparin   Injectable 5000 Unit(s) SubCutaneous every 8 hours  lactobacillus acidophilus 1 Tablet(s) Oral daily  lamoTRIgine 200 milliGRAM(s) Oral daily  levothyroxine 88 MICROGram(s) Oral daily  metoprolol succinate ER 25 milliGRAM(s) Oral daily  pantoprazole    Tablet 40 milliGRAM(s) Oral before breakfast  rosuvastatin 20 milliGRAM(s) Oral at bedtime    MEDICATIONS  (PRN):  acetaminophen     Tablet .. 650 milliGRAM(s) Oral every 6 hours PRN Mild Pain (1 - 3)  aluminum hydroxide/magnesium hydroxide/simethicone Suspension 30 milliLiter(s) Oral every 4 hours PRN Dyspepsia  artificial tears (preservative free) Ophthalmic Solution 1 Drop(s) Both EYES every 4 hours PRN Dry Eyes  cyclobenzaprine 10 milliGRAM(s) Oral three times a day PRN Muscle Spasm  guaifenesin/dextromethorphan Oral Liquid 10 milliLiter(s) Oral every 4 hours PRN Cough  hydrocortisone hemorrhoidal Suppository 1 Suppository(s) Rectal two times a day PRN rectal pain  HYDROmorphone   Tablet 4 milliGRAM(s) Oral every 4 hours PRN Severe Pain (7 - 10)  ibuprofen  Tablet. 400 milliGRAM(s) Oral every 8 hours PRN Moderate Pain (4 - 6)  loperamide 2 milliGRAM(s) Oral three times a day PRN Diarrhea  meclizine 25 milliGRAM(s) Oral two times a day PRN Dizziness  melatonin 3 milliGRAM(s) Oral at bedtime PRN Insomnia  ondansetron   Disintegrating Tablet 4 milliGRAM(s) Oral two times a day PRN Nausea and/or Vomiting  simethicone 80 milliGRAM(s) Chew every 6 hours PRN Gas                            10.4   24.68 )-----------( 963      ( 16 Dec 2024 05:17 )             35.6     12-16    136  |  99  |  10  ----------------------------<  70  4.7   |  31  |  0.69    Ca    9.3      16 Dec 2024 05:17    TPro  6.8  /  Alb  2.8[L]  /  TBili  0.3  /  DBili  x   /  AST  14  /  ALT  21  /  AlkPhos  93  12-16    Urinalysis Basic - ( 16 Dec 2024 05:17 )    Color: x / Appearance: x / SG: x / pH: x  Gluc: 70 mg/dL / Ketone: x  / Bili: x / Urobili: x   Blood: x / Protein: x / Nitrite: x   Leuk Esterase: x / RBC: x / WBC x   Sq Epi: x / Non Sq Epi: x / Bacteria: x      Vital Signs Last 24 Hrs  T(C): 36.4 (17 Dec 2024 05:27), Max: 36.6 (16 Dec 2024 19:59)  T(F): 97.5 (17 Dec 2024 05:27), Max: 97.8 (16 Dec 2024 19:59)  HR: 54 (17 Dec 2024 05:27) (54 - 61)  BP: 148/61 (17 Dec 2024 05:27) (148/61 - 153/54)  BP(mean): --  RR: 16 (17 Dec 2024 05:27) (16 - 16)  SpO2: 96% (17 Dec 2024 05:27) (96% - 97%)    Parameters below as of 17 Dec 2024 05:27  Patient On (Oxygen Delivery Method): room air      In NAD  HEENT- EOMI  Heart- RRR, S1S2  Lungs- no respiratory distress  Abd- + BS, NT  Ext- No calf pain, LE bone pain persisting  Neuro- Exam unchanged  Improving motor strength LEs  impaired sensation b/l feet        - comprehensive acute rehab program consisting of 3hrs/day of OT/PT and SLP.

## 2024-12-17 NOTE — PROGRESS NOTE ADULT - ASSESSMENT
65 y/o  F with PMHx of HTN, HLD, hypothyroidism, primary lymphedema, Essential  thrombocytosis, GERD, Obesity s/p bariatric surgery, ischemic colitis s/p left hemicolectomy, pAF and DVT/PE on Eliquis, HFpEF,  vW Disease, recent acute hospitalization for CHF exacerbation with superimposed PNA, pericarditis, and pericardio/ pleural effusions. Now admitted to [facility] for multidisciplinary rehab program     #Pericardial effusion 2/2 pericarditis   #Pleural effusion suspected  2/2 interferon alpha  #Acute respiratory failure with hypoxia  -S/p NC, monitor O2  -CT Chest- Moderately large simple pericardial effusion. Small to moderate right and small left pleural effusions with associated compressive atelectasis and Superimposed pneumonia.  -S/p pericardial drain (removed on 12/4)  -S/p b/l pigtail catheters (removed 12/5 and 12/6)  -S/p steroid taper: c/w Prednisone 20mg HS x1 day start 12/13, Prednisone 10mg x 4 days start on 12/14; Prednisone 5mg x 5 days start on 12/18.  -Colchicine 0.6mg daily x 3 months  -Ibuprofen 600mg q8h PRN  -F/u outpatient cardiology   -Comprehensive Multidisciplinary Rehab Program    #Leukocytosis  #Essential thrombocythemia   - + abnormal  RBC, platelet morphology - Suspected transformation  -S/p bone biopsy 12/10-->inpatient heme consulted to review results  - f/u outpatient with hematologist and  begin Jakafi treatment    -Blood cultures and pleural/pericardial fluid cultures negative  -Intolerant to anagrelide due to afib, on hydrea until about 6-12 months ago with no response, most recently on pegylated IFN alpha (Besrema) for the past 6 months- intolerant as well.  Stopped 2 months ago.   -Trend CBC, monitor for fevers  -Monitor platelets    #vW disease  -Receives DDAVP prior to procedures.     #HTN/HLD  -Rosuvastatin 20mg HS   -Metoprolol 25mg daily   -Enalapril 20mg daily   -Start on amlodipine 5mg    #pAFIB  -Amiodarone 200mg daily   -Metoprolol 25mg daily     #Hypothyroidism  -Synthroid 88mcg daily     #Chronic anemia  -Monitor H/H  -Transfuse if Hbg <7 PRN    #Dry eyes/Allergies  #Cough  -Artificial tears q4h PRN  -Fluticasone nasal spray BID  -Guaifenesin/dextromethorphan  q4h PRN    #Mood/Cognition  Neuropsychology consult PRN  -Wellbutrin 150mg daily   -Lamictal 200mg daily       #Sepsis- PNA  -resolved  -S/p Cefipime abx      #GI ppx  - Protonix    #DVT ppx  - Hep SQ 65 y/o  F with PMHx of HTN, HLD, hypothyroidism, primary lymphedema, Essential  thrombocytosis, GERD, Obesity s/p bariatric surgery, ischemic colitis s/p left hemicolectomy, pAF and DVT/PE on Eliquis, HFpEF,  vW Disease, recent acute hospitalization for CHF exacerbation with superimposed PNA, pericarditis, and pericardio/ pleural effusions. Now admitted to [facility] for multidisciplinary rehab program     #Pericardial effusion 2/2 pericarditis   #Pleural effusion suspected  2/2 interferon alpha  #Acute respiratory failure with hypoxia  -S/p NC, monitor O2  -CT Chest- Moderately large simple pericardial effusion. Small to moderate right and small left pleural effusions with associated compressive atelectasis and Superimposed pneumonia.  -S/p pericardial drain (removed on 12/4)  -S/p b/l pigtail catheters (removed 12/5 and 12/6)  -S/p steroid taper: c/w Prednisone 20mg HS x1 day start 12/13, Prednisone 10mg x 4 days start on 12/14; Prednisone 5mg x 5 days start on 12/18.  -Colchicine 0.6mg daily x 3 months  -Ibuprofen 600mg q8h PRN  -F/u outpatient cardiology   -Comprehensive Multidisciplinary Rehab Program    #Leukocytosis  #Essential thrombocythemia   - + abnormal  RBC, platelet morphology - Suspected transformation  -S/p bone biopsy 12/10-->inpatient heme consulted to flow cytometry results  - f/u outpatient  to review path results  and  begin Jakafi treatment    -Blood cultures and pleural/pericardial fluid cultures negative  -Intolerant to anagrelide due to afib, on hydrea until about 6-12 months ago with no response, most recently on pegylated IFN alpha (Besrema) for the past 6 months- intolerant as well.  Stopped 2 months ago.   -Trend CBC, monitor for fevers  -Monitor platelets    #vW disease  -Receives DDAVP prior to procedures.     #HTN/HLD  -Rosuvastatin 20mg HS   -Metoprolol 25mg daily   -Enalapril 20mg daily   -Start on amlodipine 5mg    #pAFIB  -Amiodarone 200mg daily   -Metoprolol 25mg daily     #Hypothyroidism  -Synthroid 88mcg daily     #Chronic anemia  -Monitor H/H  -Transfuse if Hbg <7 PRN    #Dry eyes/Allergies  #Cough  -Artificial tears q4h PRN  -Fluticasone nasal spray BID  -Guaifenesin/dextromethorphan  q4h PRN    #Mood/Cognition  Neuropsychology consult PRN  -Wellbutrin 150mg daily   -Lamictal 200mg daily       #Sepsis- PNA  -resolved  -S/p Cefipime abx      #GI ppx  - Protonix    #DVT ppx  - Hep SQ

## 2024-12-17 NOTE — PROGRESS NOTE ADULT - SUBJECTIVE AND OBJECTIVE BOX
PROGRESS NOTE:     Patient is a 64y old  Female who presents with a chief complaint of Pericarditis/Pericardial effusions (16 Dec 2024 10:31)          SUBJECTIVE & OBJECTIVE:   Pt seen and examined at bedside in AM    no overnight events.       REVIEW OF SYSTEMS: remaining ROS negative     PHYSICAL EXAM:  VITALS:  Vital Signs Last 24 Hrs  T(C): 36.4 (17 Dec 2024 05:27), Max: 36.6 (16 Dec 2024 12:46)  T(F): 97.5 (17 Dec 2024 05:27), Max: 97.9 (16 Dec 2024 12:46)  HR: 54 (17 Dec 2024 05:27) (54 - 74)  BP: 148/61 (17 Dec 2024 05:27) (148/56 - 153/54)  BP(mean): --  RR: 16 (17 Dec 2024 05:27) (16 - 16)  SpO2: 96% (17 Dec 2024 05:27) (96% - 97%)    Parameters below as of 17 Dec 2024 05:27  Patient On (Oxygen Delivery Method): room air          GENERAL: NAD,  no increased WOB  HEAD:  Atraumatic, Normocephalic  EYES: EOMI, conjunctiva and sclera clear  ENMT: Moist mucous membranes  NECK: Supple, No JVD  NERVOUS SYSTEM:  Alert & Oriented   CHEST/LUNG: decrease breath sound at right base No rales, rhonchi, wheezing  HEART: Regular rate and rhythm  ABDOMEN: Soft, Nontender, Nondistended; Bowel sounds present  EXTREMITIES:  No clubbing, cyanosis, calf tenderness      MEDICATIONS  (STANDING):  aMIOdarone    Tablet 200 milliGRAM(s) Oral daily  buPROPion XL (24-Hour) . 150 milliGRAM(s) Oral daily  colchicine 0.6 milliGRAM(s) Oral daily  enalapril 20 milliGRAM(s) Oral daily  fluticasone propionate 50 MICROgram(s)/spray Nasal Spray 1 Spray(s) Both Nostrils two times a day  heparin   Injectable 5000 Unit(s) SubCutaneous every 8 hours  lactobacillus acidophilus 1 Tablet(s) Oral daily  lamoTRIgine 200 milliGRAM(s) Oral daily  levothyroxine 88 MICROGram(s) Oral daily  metoprolol succinate ER 25 milliGRAM(s) Oral daily  pantoprazole    Tablet 40 milliGRAM(s) Oral before breakfast  rosuvastatin 20 milliGRAM(s) Oral at bedtime    MEDICATIONS  (PRN):  acetaminophen     Tablet .. 650 milliGRAM(s) Oral every 6 hours PRN Mild Pain (1 - 3)  aluminum hydroxide/magnesium hydroxide/simethicone Suspension 30 milliLiter(s) Oral every 4 hours PRN Dyspepsia  artificial tears (preservative free) Ophthalmic Solution 1 Drop(s) Both EYES every 4 hours PRN Dry Eyes  cyclobenzaprine 10 milliGRAM(s) Oral three times a day PRN Muscle Spasm  guaifenesin/dextromethorphan Oral Liquid 10 milliLiter(s) Oral every 4 hours PRN Cough  hydrocortisone hemorrhoidal Suppository 1 Suppository(s) Rectal two times a day PRN rectal pain  HYDROmorphone   Tablet 4 milliGRAM(s) Oral every 4 hours PRN Severe Pain (7 - 10)  ibuprofen  Tablet. 400 milliGRAM(s) Oral every 8 hours PRN Moderate Pain (4 - 6)  loperamide 2 milliGRAM(s) Oral three times a day PRN Diarrhea  meclizine 25 milliGRAM(s) Oral two times a day PRN Dizziness  melatonin 3 milliGRAM(s) Oral at bedtime PRN Insomnia  ondansetron   Disintegrating Tablet 4 milliGRAM(s) Oral two times a day PRN Nausea and/or Vomiting  simethicone 80 milliGRAM(s) Chew every 6 hours PRN Gas        Allergies    sulfa drugs (Vomiting; Nausea)  IV Contrast (Hives)    Intolerances              LABS:                           10.4   24.68 )-----------( 963      ( 16 Dec 2024 05:17 )             35.6     12-16    136  |  99  |  10  ----------------------------<  70  4.7   |  31  |  0.69    Ca    9.3      16 Dec 2024 05:17    TPro  6.8  /  Alb  2.8[L]  /  TBili  0.3  /  DBili  x   /  AST  14  /  ALT  21  /  AlkPhos  93  12-16      Urinalysis Basic - ( 16 Dec 2024 05:17 )    Color: x / Appearance: x / SG: x / pH: x  Gluc: 70 mg/dL / Ketone: x  / Bili: x / Urobili: x   Blood: x / Protein: x / Nitrite: x   Leuk Esterase: x / RBC: x / WBC x   Sq Epi: x / Non Sq Epi: x / Bacteria: x      CAPILLARY BLOOD GLUCOSE                    RECENT CULTURES:          RADIOLOGY & ADDITIONAL TESTS:

## 2024-12-17 NOTE — CONSULT NOTE ADULT - PROBLEM SELECTOR RECOMMENDATION 9
Patient with history of essential thrombocythemia for the past 23 years, followed by Dr. Mann Urbina at [facility]. Intolerant to anagrelide due to afib, on hydrea until about 6-12 months ago with decreased response,  most recently on pegylated IFN alpha for the past 6 months-intolerant as well. Stopped >2 months ago.   Seen by Dr. Shelby Dailey at [facility] and BM procedure done 12/11/2024.  Bone Marrow aspirate flow cytometry-MORPHOLOGY: Trilineage hyperplasia with maturation, increased pronormoblasts, 2% blasts, 2% basophils, 3% lymphocytes; mild dyerythropoiesis and dysgranulopoiesis   CYTOSPIN: Hypercellular with maturing and mature myeloid elements with increased basophils, no   significant lymphocytosis or immaturity; pronormoblasts and megakaryocytes present.    The myeloid immunophenotypic findings show decreased myeloid granularity, slightly increased   myeloblasts,1.3%, with partial CD56 positivity, monocytes normal in proportion with CD56   positivity, two intensities of CD64, and increased proportion of intermediate subtype (may be seen   in infection, acute and chronic inflammatory disorder including autoimmune), and normal myeloid   antigen maturation pattern with an increase in proportion of basophils and positive CD56.   - The lymphocyte immunophenotypic findings show lymphopenia.     --reviewed with patient BM aspirate flow cytometry results. Correlation with histology is necessary-pending biopsy path report.   Patient's questions answered at this time. She stated she will be following up with Dr. Dailey upon discharge for further discussion/management. They are considering Jakafi.   I have informed Dr. Dailey of patient's rehab status.
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
## 2024-12-17 NOTE — DISCHARGE NOTE NURSING/CASE MANAGEMENT/SOCIAL WORK - PATIENT PORTAL LINK FT
<--- Click to Launch ICDx for PreOp, PostOp and Procedure
You can access the FollowMyHealth Patient Portal offered by [facility] by registering at the following website: [email or web address]. By joining [employer]’s FollowMyHealth portal, you will also be able to view your health information using other applications (apps) compatible with our system.

## 2024-12-17 NOTE — CONSULT NOTE ADULT - REASON FOR ADMISSION
Pericarditis/Pericardial effusions

## 2024-12-17 NOTE — CONSULT NOTE ADULT - ASSESSMENT
Family/visitors are not present. Patient is alert and oriented to person, place, time, and situation. Speech is fluent and comprehensible. Auditory comprehension for conversation is functionally intact. Hallucinations or delusions are not elicited.      She reports discomfort when taking deep breaths and discomfort from site of a recent bone marrow biopsy, as well as a feeling as if her "tongue is too big" for her mouth. She has a desire to eat, though becomes full quickly. She reports longstanding difficulties with sleep, sometimes associated with pain. Last night, she slept well.     Emotional functioning and behavioral history: Mood is reported as mildly sad, and affect is normal. She indicates trying to maintain a positive outlook while acknowledging the difficulties of the past couple of months being in and out of the hospital. She indicates still being in a state of "denial" about her change in functioning.     On self-report screening of recent mood, her responses suggest minimal anxiety (GILMA-7 = 1/21) and minimal/mild depression (PHQ-9 = 7/27), including fear that something awful may happen to her. She does not report ideation, plan, or intent to harm self/other. Thoughts are logical and future-oriented.       She reports a history of bipolar disorder with onset in her early 20's. She feels mood is stable on her current medications. She is established with a psychiatrist (Dr. Tillman) and psychologist (Dr. Moreno), and has made both aware of her present health concerns. She does not report use of alcohol, tobacco, or substances.     Psychosocial history: She completed a doctorate in English Literature from [employer]. She worked as a teacher for many years. She is recently retired. She resides alone. She has multiple pet cats and dogs. Family lives locally and is supportive.     Patient was an animal rescue volunteer for many years. Most recently, she has volunteered at a nature [employer] in [place] and hopes to return to this at some point. She also enjoys arts and crafts (adult coloring books, rock painting).     Impression: Patient with recent Pericarditis/Pericardial effusions. There is a history of bipolar mood disorder, managed effectively on her present treatment regimen.     Presently, mood is reported as stable. She acknowledges a degree of sadness associated with her present situation, as well as appropriate concern for her health. She indicates daily tiredness has been an ongoing limiting factor. She is seeing signs of progress within the past week and she is motivated to participate in acute rehabilitation.     Themes discussed include relevant psychosocial/health history, change in functioning, mood, interaction between thoughts and feelings, and her goals. Support and encouragement are provided. She expresses appreciation for session.       Plan: Follow-up supportive therapy to address adjustment to change in functioning.     Recreation/creative arts therapy is recommended to increase leisure and task mastery experiences.     Maintain quiet hours and low stim environment.    Continued outpatient follow-up with her previously established psychiatry and psychology providers post-discharge.     Neuropsychology remains available through discharge.     
a/p:    65 y/o  F with PMHx of HTN, HLD, hypothyroidism, primary lymphedema, Essential  thrombocytosis, GERD, Obesity s/p bariatric surgery, ischemic colitis s/p left hemicolectomy, pAF and DVT/PE on Eliquis, HFpEF,  vW Disease, recent acute hospitalization for CHF exacerbation with superimposed PNA, pericarditis, and pericardio/ pleural effusions. Now admitted to [facility] for multidisciplinary rehab program     #Pericardial effusion 2/2 pericarditis   #Pleural effusion suspected  2/2 interferon alpha  #Acute respiratory failure with hypoxia  -S/p NC, monitor O2  -CT Chest- Moderately large simple pericardial effusion. Small to moderate right and small left pleural effusions with associated compressive atelectasis and Superimposed pneumonia.  -S/p pericardial drain (removed on 12/4)  -S/p b/l pigtail catheters (removed 12/5 and 12/6)  -S/p steroid taper: c/w Prednisone 20mg HS x1 day start 12/13, Prednisone 10mg x 4 days start on 12/14; Prednisone 5mg x 5 days start on 12/18.  -Colchicine 0.6mg daily x 3 months  -Ibuprofen 600mg q8h  -F/u outpatient cardiology     # Comprehensive Multidisciplinary Rehab Program  - as per floor protocol  - pain management    #Leukocytosis  #Sepsis- PNA  #Essential thrombocythemia   - + abnormal  RBC, platelet morphology - Suspected transformation  -S/p bone biopsy 12/10--> f/u outpatient for results and  begin Jakafi treatment   -S/p Cefipime abx   -Blood cultures and pleural/pericardial fluid cultures negative  -Intolerant to anagrelide due to afib, on hydrea until about 6-12 months ago with no response, most recently on pegylated IFN alpha (Besrema) for the past 6 months- intolerant as well.  Stopped 2 months ago.   -Trend CBC, monitor for fevers  -Monitor platelets    #vW disease  -Receives DDAVP prior to procedures.     #HTN/HLD  -Rosuvastatin 20mg HS   -Metoprolol 25mg daily   -Enalapril 20mg daily     #pAFIB  -Amiodarone 200mg daily   -Metoprolol 25mg daily     #Hypothyroidism  -Synthroid 88mcg daily     #Chronic anemia  -Monitor H/H  -Transfuse if Hbg <7 PRN    #Dry eyes/Allergies  #Cough  -Artificial tears q4h PRN  -Fluticasone nasal spray BID  -Guaifenesin/dextromethorphan  q4h PRN    #Pain management  -Tylenol PRN  -Flexeril 10mg TID PRN  -Dilaudid 4mg q4h PRN      #Mood/Cognition  Neuropsychology consult PRN  -Wellbutrin 150mg daily   -Lamictal 200mg daily     #Diet/Supplements  Current Diet: Regular diet   Nutrition consult   -Lactobacillus tab BID   -Multivitamin daily     d/w rehab team, will follow    
65 y/o  F with PMHx of HTN, HLD, hypothyroidism, primary lymphedema, Essential  thrombocytosis, GERD, Obesity s/p bariatric surgery, ischemic colitis s/p left hemicolectomy, pAF and DVT/PE on Eliquis, HFpEF,  vW Disease, recently had  2 hospitalizations at [facility] in the past 2 months for CAP, and also diagnosed with pericarditis, and HFpEF s/p PO abx.  Patient returns to  ED on 11/30 with worsening c/o back pain, CP, SOB and productive cough. Patient was found to be in respiratory distress and hypoxic requiring 4L NC and in HF exacerbation. CT imaging showed moderately large simple pericardial effusion. Small to moderate right and  small left pleural effusions with associated compressive atelectasis with suspected superimposed pneumonia. Hospital course c/b pericarditis 2/2 pegylated interferon and pericardial effusions. TTE found large pericardial effusions with signs of early tamponade.  S/p pericardial drain and B/l chest tube  placement. Cytology cultures are negative. Started on empiric cefepime, colchicine,  ibuprofen and steroid taper. Of note, patient had an episode of pAFIB, treated with Amiodarone,  Heme/onc was consulted and suspect advancement/transformation of Essential Thrombocytosis, Patient failed/did not tolerate previous Tx. Now s/p BM Bx on 12/10 will need to f/u  with Dr Dailey outpatient to discuss results and being treatment with Jakafi.  Patient optimized and was evaluated by PM&R and therapy for functional deficits, gait/ADL impairments and acute rehabilitation was recommended. Patient was cleared for discharge to [facility] IRU on 12/12/24.  Asked by hospitalist to see patient for h/o ET.

## 2024-12-17 NOTE — CHART NOTE - NSCHARTNOTEFT_GEN_A_CORE
Met with patient alone at bedside for brief (<15 minute) follow-up session.     Patient reports that she is making progress in her acute rehabilitation and is feeling stronger. She reports discharge to home is anticipated for later this week.     Patient indicates she "had a moment" yesterday, where she felt upset about her situation. She indicates feeling better today. No additional concerns reported.     Themes discussed include change in functioning, patient's concerns about her health, and adaptive coping strategies which have been helpful to her in the past.     Neuropsychology will continue to follow and remains available.
Brief Nutrition Follow Up Note  Source: Medical Record [X] Patient [X] Family [ ]         Dietitian consulted for nutrition support. Pt is on a regular diet. Pt with poor PO intake, c/o nausea. Discussed food preferences to optimize PO intake. pt amenable to Ensure supplement. Suggest Ensure Plus High Protein (provides 350 kcal, 20 g protein/serving). RD to continue to obtain food preferences and honor as able.    Monitoring & Evaluation:   [X] Weights   [X] PO Intake   [X] Follow Up (Per Protocol)  [X] Tolerance to Diet Prescription   [X] Other: Labs     RD Remains Available.  Mahogany Newman RD

## 2024-12-17 NOTE — PROGRESS NOTE ADULT - ASSESSMENT
KITTY JIMENEZ is a 63 y/o  F with PMHx of HTN, HLD, hypothyroidism, primary lymphedema, Essential  thrombocytosis, GERD, Obesity s/p bariatric surgery, ischemic colitis s/p left hemicolectomy, pAF and DVT/PE on Eliquis, HFpEF,  vW Disease, recently had  2 hospitalizations at [facility] in the past 2 months for CAP, and also diagnosed with pericarditis, and HFpEF s/p PO abx.  Patient returns to  ED on 11/30 with worsening c/o back pain, CP, SOB and productive cough. Found to have CHF exacerbation with superimposed PNA, pericarditis, and pericardio/ pleural effusions. Now admitted to [facility] for functional deficits and initiation of a multidisciplinary rehab program consisting focused on functional mobility, transfers and ADLs.    #Pericardial effusion 2/2 pericarditis   #Pleural effusion suspected  2/2 interferon alpha  #Acute respiratory failure with hypoxia  -S/p NC, monitor O2  -CT Chest- Moderately large simple pericardial effusion. Small to moderate right and small left pleural effusions with associated compressive atelectasis and Superimposed pneumonia.  -S/p pericardial drain (removed on 12/4)  -S/p b/l pigtail catheters (removed 12/5 and 12/6)  -S/p steroid taper: c/w Prednisone 20mg HS x1 day start 12/13, Prednisone 10mg x 4 days start on 12/14; Prednisone 5mg x 5 days start on 12/18.  -Colchicine 0.6mg daily x 3 months  -Ibuprofen 600mg q8h 400mg prn now. Dilaudid to Percocet  -F/u outpatient cardiology     Comprehensive Multidisciplinary Rehab Program:  - comprehensive rehab program, 3 hours a day, 5 days a week.  - PT 2hr/day: Focused on improving strength, endurance, coordination, balance, functional mobility, and transfers  - OT 1hr/day: Focused on improving strength, fine motor skills, coordination, posture and ADLs.    - Activity Limitations: Decreased social, vocational and leisure activities, decreased self care and ADLs, decreased mobility, decreased ability to manage household and finances.     -----------------------------------------------------------------------------------  Concurrent Medical Problems    #Leukocytosis  #Sepsis- PNA  #Essential thrombocythemia - heme appreciated- cyto resulted/path pending  - + abnormal  RBC, platelet morphology - Suspected transformation  -S/p bone biopsy 12/10--> f/u outpatient for results and  begin Jakafi treatment   -S/p Cefipime abx   -Blood cultures and pleural/pericardial fluid cultures negative  -Intolerant to anagrelide due to afib, on hydrea until about 6-12 months ago with no response, most recently on pegylated IFN alpha (Besrema) for the past 6 months- intolerant as well.  Stopped 2 months ago.   -Trend CBC, no fevers  -Monitor platelets    #vW disease  -Receives DDAVP prior to procedures.     #HTN/HLD  -Rosuvastatin 20mg HS   -Metoprolol 25mg daily   -Enalapril 20mg daily     #pAFIB  -Amiodarone 200mg daily   -Metoprolol 25mg daily     #Hypothyroidism  -Synthroid 88mcg daily     #Chronic anemia  -Monitor H/H (12/12- Hgb/Hct- 8.6/30.1)  -Transfuse if Hbg <7 PRN    #Dry eyes/Allergies  #Cough  -Artificial tears q4h PRN  -Fluticasone nasal spray BID  -Guaifenesin/dextromethorphan  q4h PRN    #Pain management  -Tylenol PRN  -Flexeril 10mg TID PRN  -Dilaudid 4mg q4h PRN to Percocet per pt requests      #Mood/Cognition  Neuropsychology consult PRN  -Wellbutrin 150mg daily   -Lamictal 200mg daily     #Sleep:   Maintain quiet hours and low stim environment.  Melatonin 3mg HS PRN to maximize participation in therapy during the day.     #GI/Bowel  #N/V  Senna QHS, Miralax PRN Daily, Bisacodyl 5mg daily PRN, Lactulose PRN, Simethicone PRN- hold for hx diarrhea  GI ppx: Pantoprazole 40mg daily   -Meclizine 25mg BID PRN  -Zofran 4mg TID PRN    #Skin/Pressure Injury:   - Skin assessment on admission: R buttocks biopsy puncture site, w/ gauze and tegaderm, CDI, b/l flank drain sites, MARILOU, abd drain site, MARILOU, abd bruising- healing well    #Diet/Supplements  Current Diet: Regular diet   Nutrition consult   -Lactobacillus tab BID to daily  -Multivitamin daily     #Precautions / PROPHYLAXIS:   - Falls, Cardiac  - ortho: Weight bearing status: WBAT   - Lungs: Incentive Spirometer   - DVT ppx: Heparin 5000U TID  - B/L calf tenderness on admission--> BLE DUPLEX US neg DVT  - Pressure injury/Skin: OOB to Chair, PT/OT      ---------------  Code Status: FULL  Emergency Contact:    Outpatient Follow-up (Specialty/Name of physician):    Shelby Dailey  Hematology  [address], [place] [address]  Phone: [phone]  Fax: [phone]  Follow Up Time: 1 week    Ravinder Abdi  Cardiovascular Disease  [address], [address]  [place] [address]  Phone: [phone]  Fax: [phone]  Follow Up Time: 2 weeks

## 2024-12-18 DIAGNOSIS — Z79.01 LONG TERM (CURRENT) USE OF ANTICOAGULANTS: ICD-10-CM

## 2024-12-18 DIAGNOSIS — E78.5 HYPERLIPIDEMIA, UNSPECIFIED: ICD-10-CM

## 2024-12-18 DIAGNOSIS — J96.01 ACUTE RESPIRATORY FAILURE WITH HYPOXIA: ICD-10-CM

## 2024-12-18 DIAGNOSIS — J90 PLEURAL EFFUSION, NOT ELSEWHERE CLASSIFIED: ICD-10-CM

## 2024-12-18 DIAGNOSIS — Z79.02 LONG TERM (CURRENT) USE OF ANTITHROMBOTICS/ANTIPLATELETS: ICD-10-CM

## 2024-12-18 DIAGNOSIS — I11.0 HYPERTENSIVE HEART DISEASE WITH HEART FAILURE: ICD-10-CM

## 2024-12-18 DIAGNOSIS — I48.0 PAROXYSMAL ATRIAL FIBRILLATION: ICD-10-CM

## 2024-12-18 DIAGNOSIS — J15.69 PNEUMONIA DUE TO OTHER GRAM-NEGATIVE BACTERIA: ICD-10-CM

## 2024-12-18 DIAGNOSIS — I31.4 CARDIAC TAMPONADE: ICD-10-CM

## 2024-12-18 DIAGNOSIS — Z91.041 RADIOGRAPHIC DYE ALLERGY STATUS: ICD-10-CM

## 2024-12-18 DIAGNOSIS — I89.0 LYMPHEDEMA, NOT ELSEWHERE CLASSIFIED: ICD-10-CM

## 2024-12-18 DIAGNOSIS — J98.11 ATELECTASIS: ICD-10-CM

## 2024-12-18 DIAGNOSIS — D64.9 ANEMIA, UNSPECIFIED: ICD-10-CM

## 2024-12-18 DIAGNOSIS — Z88.2 ALLERGY STATUS TO SULFONAMIDES: ICD-10-CM

## 2024-12-18 DIAGNOSIS — Z98.84 BARIATRIC SURGERY STATUS: ICD-10-CM

## 2024-12-18 DIAGNOSIS — Z90.710 ACQUIRED ABSENCE OF BOTH CERVIX AND UTERUS: ICD-10-CM

## 2024-12-18 DIAGNOSIS — Z79.890 HORMONE REPLACEMENT THERAPY: ICD-10-CM

## 2024-12-18 DIAGNOSIS — D47.3 ESSENTIAL (HEMORRHAGIC) THROMBOCYTHEMIA: ICD-10-CM

## 2024-12-18 DIAGNOSIS — Z86.718 PERSONAL HISTORY OF OTHER VENOUS THROMBOSIS AND EMBOLISM: ICD-10-CM

## 2024-12-18 DIAGNOSIS — E03.9 HYPOTHYROIDISM, UNSPECIFIED: ICD-10-CM

## 2024-12-18 DIAGNOSIS — D68.00 VON WILLEBRAND DISEASE, UNSPECIFIED: ICD-10-CM

## 2024-12-18 DIAGNOSIS — I50.33 ACUTE ON CHRONIC DIASTOLIC (CONGESTIVE) HEART FAILURE: ICD-10-CM

## 2024-12-18 PROCEDURE — 99232 SBSQ HOSP IP/OBS MODERATE 35: CPT

## 2024-12-18 PROCEDURE — 99232 SBSQ HOSP IP/OBS MODERATE 35: CPT | Mod: GC

## 2024-12-18 RX ADMIN — ENALAPRIL MALEATE 20 MILLIGRAM(S): 2.5 TABLET ORAL at 06:35

## 2024-12-18 RX ADMIN — Medication 1 TABLET(S): at 15:15

## 2024-12-18 RX ADMIN — Medication 2 MILLIGRAM(S): at 07:41

## 2024-12-18 RX ADMIN — Medication 1 TABLET(S): at 12:58

## 2024-12-18 RX ADMIN — ROSUVASTATIN CALCIUM 20 MILLIGRAM(S): 5 TABLET, FILM COATED ORAL at 21:48

## 2024-12-18 RX ADMIN — Medication 2 MILLIGRAM(S): at 09:39

## 2024-12-18 RX ADMIN — Medication 1 SPRAY(S): at 06:35

## 2024-12-18 RX ADMIN — Medication 5000 UNIT(S): at 21:47

## 2024-12-18 RX ADMIN — Medication 5000 UNIT(S): at 06:34

## 2024-12-18 RX ADMIN — Medication 150 MILLIGRAM(S): at 15:15

## 2024-12-18 RX ADMIN — Medication 5000 UNIT(S): at 15:15

## 2024-12-18 RX ADMIN — Medication 88 MICROGRAM(S): at 06:35

## 2024-12-18 RX ADMIN — AMIODARONE HYDROCHLORIDE 200 MILLIGRAM(S): 200 TABLET ORAL at 06:35

## 2024-12-18 RX ADMIN — LAMOTRIGINE 200 MILLIGRAM(S): 50 TABLET, EXTENDED RELEASE ORAL at 15:15

## 2024-12-18 RX ADMIN — METOPROLOL TARTRATE 25 MILLIGRAM(S): 100 TABLET, FILM COATED ORAL at 06:35

## 2024-12-18 RX ADMIN — PREDNISONE 5 MILLIGRAM(S): 20 TABLET ORAL at 06:34

## 2024-12-18 RX ADMIN — Medication 0.6 MILLIGRAM(S): at 15:15

## 2024-12-18 RX ADMIN — PANTOPRAZOLE SODIUM 40 MILLIGRAM(S): 40 TABLET, DELAYED RELEASE ORAL at 06:34

## 2024-12-18 RX ADMIN — Medication 1 TABLET(S): at 19:23

## 2024-12-18 NOTE — PROGRESS NOTE ADULT - ASSESSMENT
KITTY JIMENEZ is a 65 y/o  F with PMHx of HTN, HLD, hypothyroidism, primary lymphedema, Essential  thrombocytosis, GERD, Obesity s/p bariatric surgery, ischemic colitis s/p left hemicolectomy, pAF and DVT/PE on Eliquis, HFpEF,  vW Disease, recently had  2 hospitalizations at [facility] in the past 2 months for CAP, and also diagnosed with pericarditis, and HFpEF s/p PO abx.  Patient returns to  ED on 11/30 with worsening c/o back pain, CP, SOB and productive cough. Found to have CHF exacerbation with superimposed PNA, pericarditis, and pericardio/ pleural effusions. Now admitted to [facility] for functional deficits and initiation of a multidisciplinary rehab program consisting focused on functional mobility, transfers and ADLs.    #Pericardial effusion 2/2 pericarditis   #Pleural effusion suspected  2/2 interferon alpha  #Acute respiratory failure with hypoxia  -S/p NC, monitor O2  -CT Chest- Moderately large simple pericardial effusion. Small to moderate right and small left pleural effusions with associated compressive atelectasis and Superimposed pneumonia.  -S/p pericardial drain (removed on 12/4)  -S/p b/l pigtail catheters (removed 12/5 and 12/6)  -S/p steroid taper: c/w Prednisone 20mg HS x1 day start 12/13, Prednisone 10mg x 4 days start on 12/14; Prednisone 5mg x 5 days start on 12/18.  -Colchicine 0.6mg daily x 3 months  -Ibuprofen 600mg q8h 400mg prn now. Dilaudid to Percocet  -F/u outpatient cardiology     Comprehensive Multidisciplinary Rehab Program:  - comprehensive rehab program, 3 hours a day, 5 days a week.  - PT 2hr/day: Focused on improving strength, endurance, coordination, balance, functional mobility, and transfers  - OT 1hr/day: Focused on improving strength, fine motor skills, coordination, posture and ADLs.    - Activity Limitations: Decreased social, vocational and leisure activities, decreased self care and ADLs, decreased mobility, decreased ability to manage household and finances.     -----------------------------------------------------------------------------------  Concurrent Medical Problems    #Leukocytosis  #Sepsis- PNA  #Essential thrombocythemia - heme appreciated- cyto resulted/path pending  - + abnormal  RBC, platelet morphology - Suspected transformation  -S/p bone biopsy 12/10--> f/u outpatient for results and  begin Jakafi treatment   -S/p Cefipime abx   -Blood cultures and pleural/pericardial fluid cultures negative  -Intolerant to anagrelide due to afib, on hydrea until about 6-12 months ago with no response, most recently on pegylated IFN alpha (Besrema) for the past 6 months- intolerant as well.  Stopped 2 months ago.   -Trend CBC, no fevers  -Monitor platelets    #vW disease  -Receives DDAVP prior to procedures.     #HTN/HLD  -Rosuvastatin 20mg HS   -Metoprolol 25mg daily   -Enalapril 20mg daily     #pAFIB  -Amiodarone 200mg daily   -Metoprolol 25mg daily     #Hypothyroidism  -Synthroid 88mcg daily     #Chronic anemia  -Monitor H/H (12/12- Hgb/Hct- 8.6/30.1)  -Transfuse if Hbg <7 PRN    #Dry eyes/Allergies  #Cough  -Artificial tears q4h PRN  -Fluticasone nasal spray BID  -Guaifenesin/dextromethorphan  q4h PRN    #Pain management  -Tylenol PRN  -Flexeril 10mg TID PRN  -Dilaudid 4mg q4h PRN to Percocet per pt requests      #Mood/Cognition  Neuropsychology consult PRN  -Wellbutrin 150mg daily   -Lamictal 200mg daily     #Sleep:   Maintain quiet hours and low stim environment.  Melatonin 3mg HS PRN to maximize participation in therapy during the day.     #GI/Bowel  #N/V  Senna QHS, Miralax PRN Daily, Bisacodyl 5mg daily PRN, Lactulose PRN, Simethicone PRN- hold for hx diarrhea  GI ppx: Pantoprazole 40mg daily   -Meclizine 25mg BID PRN  -Zofran 4mg TID PRN    #Skin/Pressure Injury:   - Skin assessment on admission: R buttocks biopsy puncture site, w/ gauze and tegaderm, CDI, b/l flank drain sites, MARILOU, abd drain site, MARILOU, abd bruising- healing well    #Diet/Supplements  Current Diet: Regular diet   Nutrition consult   -Lactobacillus tab BID to daily  -Multivitamin daily     #Precautions / PROPHYLAXIS:   - Falls, Cardiac  - ortho: Weight bearing status: WBAT   - Lungs: Incentive Spirometer   - DVT ppx: Heparin 5000U TID  - B/L calf tenderness on admission--> BLE DUPLEX US neg DVT  - Pressure injury/Skin: OOB to Chair, PT/OT      ---------------  Code Status: FULL  Emergency Contact:    Outpatient Follow-up (Specialty/Name of physician):    Shelby Dailey  Hematology  [address], [place] [address]  Phone: [phone]  Fax: [phone]  Follow Up Time: 1 week    Ravinder Abdi  Cardiovascular Disease  [address], [address]  [place] [address]  Phone: [phone]  Fax: [phone]  Follow Up Time: 2 weeks

## 2024-12-18 NOTE — PROGRESS NOTE ADULT - SUBJECTIVE AND OBJECTIVE BOX
PROGRESS NOTE:     Patient is a 64y old  Female who presents with a chief complaint of Pericarditis/Pericardial effusions (16 Dec 2024 10:31)          SUBJECTIVE & OBJECTIVE:   Pt seen and examined at bedside in AM. reports of multiple BM. hx of IBS    no overnight events.       REVIEW OF SYSTEMS: remaining ROS negative     PHYSICAL EXAM:  VITALS:  Vital Signs Last 24 Hrs  T(C): 36.6 (17 Dec 2024 20:03), Max: 36.6 (17 Dec 2024 20:03)  T(F): 97.8 (17 Dec 2024 20:03), Max: 97.8 (17 Dec 2024 20:03)  HR: 57 (18 Dec 2024 06:33) (56 - 57)  BP: 157/63 (18 Dec 2024 06:33) (147/56 - 157/63)  BP(mean): --  RR: 16 (17 Dec 2024 20:03) (16 - 16)  SpO2: 98% (17 Dec 2024 20:03) (98% - 98%)    Parameters below as of 17 Dec 2024 20:03  Patient On (Oxygen Delivery Method): room air          GENERAL: NAD,  no increased WOB  HEAD:  Atraumatic, Normocephalic  EYES: EOMI, conjunctiva and sclera clear  ENMT: Moist mucous membranes  NECK: Supple, No JVD  NERVOUS SYSTEM:  Alert & Oriented   CHEST/LUNG: decrease breath sound at right base No rales, rhonchi, wheezing  HEART: Regular rate and rhythm  ABDOMEN: Soft, Nontender, Nondistended; Bowel sounds present  EXTREMITIES:  No clubbing, cyanosis, calf tenderness      MEDICATIONS  (STANDING):  aMIOdarone    Tablet 200 milliGRAM(s) Oral daily  buPROPion XL (24-Hour) . 150 milliGRAM(s) Oral daily  colchicine 0.6 milliGRAM(s) Oral daily  enalapril 20 milliGRAM(s) Oral daily  fluticasone propionate 50 MICROgram(s)/spray Nasal Spray 1 Spray(s) Both Nostrils two times a day  heparin   Injectable 5000 Unit(s) SubCutaneous every 8 hours  lactobacillus acidophilus 1 Tablet(s) Oral daily  lamoTRIgine 200 milliGRAM(s) Oral daily  levothyroxine 88 MICROGram(s) Oral daily  metoprolol succinate ER 25 milliGRAM(s) Oral daily  pantoprazole    Tablet 40 milliGRAM(s) Oral before breakfast  predniSONE   Tablet 5 milliGRAM(s) Oral daily  rosuvastatin 20 milliGRAM(s) Oral at bedtime    MEDICATIONS  (PRN):  acetaminophen     Tablet .. 650 milliGRAM(s) Oral every 6 hours PRN Mild Pain (1 - 3)  aluminum hydroxide/magnesium hydroxide/simethicone Suspension 30 milliLiter(s) Oral every 4 hours PRN Dyspepsia  artificial tears (preservative free) Ophthalmic Solution 1 Drop(s) Both EYES every 4 hours PRN Dry Eyes  cyclobenzaprine 10 milliGRAM(s) Oral three times a day PRN Muscle Spasm  diphenoxylate/atropine 1 Tablet(s) Oral every 6 hours PRN Diarrhea  guaifenesin/dextromethorphan Oral Liquid 10 milliLiter(s) Oral every 4 hours PRN Cough  hydrocortisone hemorrhoidal Suppository 1 Suppository(s) Rectal two times a day PRN rectal pain  ibuprofen  Tablet. 400 milliGRAM(s) Oral every 8 hours PRN Moderate Pain (4 - 6)  meclizine 25 milliGRAM(s) Oral two times a day PRN Dizziness  melatonin 3 milliGRAM(s) Oral at bedtime PRN Insomnia  ondansetron   Disintegrating Tablet 4 milliGRAM(s) Oral two times a day PRN Nausea and/or Vomiting  oxycodone    5 mG/acetaminophen 325 mG 1 Tablet(s) Oral every 6 hours PRN Severe Pain (7 - 10)  simethicone 80 milliGRAM(s) Chew every 6 hours PRN Gas          Allergies    sulfa drugs (Vomiting; Nausea)  IV Contrast (Hives)    Intolerances              LABS:                           10.4   24.68 )-----------( 963      ( 16 Dec 2024 05:17 )             35.6     12-16    136  |  99  |  10  ----------------------------<  70  4.7   |  31  |  0.69    Ca    9.3      16 Dec 2024 05:17    TPro  6.8  /  Alb  2.8[L]  /  TBili  0.3  /  DBili  x   /  AST  14  /  ALT  21  /  AlkPhos  93  12-16      Urinalysis Basic - ( 16 Dec 2024 05:17 )    Color: x / Appearance: x / SG: x / pH: x  Gluc: 70 mg/dL / Ketone: x  / Bili: x / Urobili: x   Blood: x / Protein: x / Nitrite: x   Leuk Esterase: x / RBC: x / WBC x   Sq Epi: x / Non Sq Epi: x / Bacteria: x      CAPILLARY BLOOD GLUCOSE                    RECENT CULTURES:          RADIOLOGY & ADDITIONAL TESTS:

## 2024-12-18 NOTE — PROGRESS NOTE ADULT - ASSESSMENT
65 y/o  F with PMHx of HTN, HLD, hypothyroidism, primary lymphedema, Essential  thrombocytosis, GERD, Obesity s/p bariatric surgery, ischemic colitis s/p left hemicolectomy, pAF and DVT/PE on Eliquis, HFpEF,  vW Disease, recent acute hospitalization for CHF exacerbation with superimposed PNA, pericarditis, and pericardio/ pleural effusions. Now admitted to [facility] for multidisciplinary rehab program     #Pericardial effusion 2/2 pericarditis   #Pleural effusion suspected  2/2 interferon alpha  #Acute respiratory failure with hypoxia  -S/p NC, monitor O2  -CT Chest- Moderately large simple pericardial effusion. Small to moderate right and small left pleural effusions with associated compressive atelectasis and Superimposed pneumonia.  -S/p pericardial drain (removed on 12/4)  -S/p b/l pigtail catheters (removed 12/5 and 12/6)  -S/p steroid taper: c/w Prednisone 20mg HS x1 day start 12/13, Prednisone 10mg x 4 days start on 12/14; Prednisone 5mg x 5 days start on 12/18.  -Colchicine 0.6mg daily x 3 months  -Ibuprofen 600mg q8h PRN  -F/u outpatient cardiology   -Comprehensive Multidisciplinary Rehab Program    #Leukocytosis  #Essential thrombocythemia   - + abnormal  RBC, platelet morphology - Suspected transformation  -S/p bone biopsy 12/10--> f/u outpatient  to review path results  and  begin Jakafi treatment    -Blood cultures and pleural/pericardial fluid cultures negative  -Intolerant to anagrelide due to afib, on hydrea until about 6-12 months ago with no response, most recently on pegylated IFN alpha (Besrema) for the past 6 months- intolerant as well.  Stopped 2 months ago.   -Trend CBC, monitor for fevers  -Monitor platelets    #vW disease  -Receives DDAVP prior to procedures.     #HTN/HLD  -Rosuvastatin 20mg HS   -Metoprolol 25mg daily   -Enalapril 20mg daily   -Start on amlodipine 5mg    #pAFIB  -Amiodarone 200mg daily   -Metoprolol 25mg daily     #Hypothyroidism  -Synthroid 88mcg daily     #Irritable Bowel Syndrome  -lomotil PRN    #Chronic anemia  -Monitor H/H  -Transfuse if Hbg <7 PRN    #Dry eyes/Allergies  #Cough  -Artificial tears q4h PRN  -Fluticasone nasal spray BID  -Guaifenesin/dextromethorphan  q4h PRN    #Mood/Cognition  Neuropsychology consult PRN  -Wellbutrin 150mg daily   -Lamictal 200mg daily       #Sepsis- PNA  -resolved  -S/p Cefipime abx      #GI ppx  - Protonix    #DVT ppx  - Hep SQ

## 2024-12-18 NOTE — PROGRESS NOTE ADULT - NS ATTEND AMEND GEN_ALL_CORE FT
Patient with no acute issues   Discussed at IDR rounds and consultants and in team conference  Continue current medical management - active issues-none  Continue therapy- active - working on endurance, balance strengthening and gait training.

## 2024-12-18 NOTE — PROGRESS NOTE ADULT - PROBLEM SELECTOR PLAN 1
Patient with history of essential thrombocythemia for the past 23 years, followed by Dr. Mann Urbina at [facility]. Intolerant to anagrelide due to afib, on hydrea until about 6-12 months ago with decreased response,  most recently on pegylated IFN alpha for the past 6 months-intolerant as well. Stopped >2 months ago.   Seen by Dr. Shelby Dailey at [facility] and BM procedure done 12/11/2024.  Bone Marrow aspirate flow cytometry-MORPHOLOGY: Trilineage hyperplasia with maturation, increased pronormoblasts, 2% blasts, 2% basophils, 3% lymphocytes; mild dyerythropoiesis and dysgranulopoiesis   CYTOSPIN: Hypercellular with maturing and mature myeloid elements with increased basophils, no   significant lymphocytosis or immaturity; pronormoblasts and megakaryocytes present.  Per hematopathology. BM aspirate/biopsy findings-consistent with the clinical history of essential   thrombocythemia. There is no significant fibrosis, however the presence of hypercellularity, slightly increased blasts (less than 10%), and mild megaloblastic morphology of the myeloid and erythroid lineages is unusual and may be related to progression (not classified as fibrotic or accelerated), therapy, or comorbidities. Correlation with cytogenetics and somatic mutation analysis may be helpful. Comprehensive report with results of pending ancillary studies pending.   --I have reviewed with patient BM reports thus far and her questions answered at this time. She has an appt. with Dr. Dailey scheduled post discharge to review further and plan management.

## 2024-12-18 NOTE — PROGRESS NOTE ADULT - ASSESSMENT
63 y/o  F with PMHx of HTN, HLD, hypothyroidism, primary lymphedema, Essential  thrombocytosis, GERD, Obesity s/p bariatric surgery, ischemic colitis s/p left hemicolectomy, pAF and DVT/PE on Eliquis, HFpEF,  vW Disease, recently had  2 hospitalizations at [facility] in the past 2 months for CAP, and also diagnosed with pericarditis, and HFpEF s/p PO abx.  Patient returns to  ED on 11/30 with worsening c/o back pain, CP, SOB and productive cough. Patient was found to be in respiratory distress and hypoxic requiring 4L NC and in HF exacerbation. CT imaging showed moderately large simple pericardial effusion. Small to moderate right and  small left pleural effusions with associated compressive atelectasis with suspected superimposed pneumonia. Hospital course c/b pericarditis 2/2 pegylated interferon and pericardial effusions. TTE found large pericardial effusions with signs of early tamponade.  S/p pericardial drain and B/l chest tube  placement. Cytology cultures are negative. Started on empiric cefepime, colchicine,  ibuprofen and steroid taper. Of note, patient had an episode of pAFIB, treated with Amiodarone,  Heme/onc was consulted and suspect advancement/transformation of Essential Thrombocytosis, Patient failed/did not tolerate previous Tx. Now s/p BM Bx on 12/10 will need to f/u  with Dr Dailey outpatient to discuss results and being treatment with Jakafi.  Patient optimized and was evaluated by PM&R and therapy for functional deficits, gait/ADL impairments and acute rehabilitation was recommended. Patient was cleared for discharge to [facility] IRU on 12/12/24.  Asked by hospitalist to see patient for h/o ET.

## 2024-12-18 NOTE — PROGRESS NOTE ADULT - SUBJECTIVE AND OBJECTIVE BOX
HPI:  63 y/o  F with PMHx of HTN, HLD, hypothyroidism, primary lymphedema, Essential  thrombocytosis, GERD, Obesity s/p bariatric surgery, ischemic colitis s/p left hemicolectomy, pAF and DVT/PE on Eliquis, HFpEF,  vW Disease, recently had  2 hospitalizations at [facility] in the past 2 months for CAP, and also diagnosed with pericarditis, and HFpEF s/p PO abx.  Patient returns to  ED on 11/30 with worsening c/o back pain, CP, SOB and productive cough. Patient was found to be in respiratory distress and hypoxic requiring 4L NC and in HF exacerbation. CT imaging showed moderately large simple pericardial effusion. Small to moderate right and  small left pleural effusions with associated compressive atelectasis with suspected superimposed pneumonia. Hospital course c/b pericarditis 2/2 pegylated interferon and pericardial effusions. TTE found large pericardial effusions with signs of early tamponade.  S/p pericardial drain and B/l chest tube  placement. Cytology cultures are negative. Started on empiric cefepime, colchicine,  ibuprofen and steroid taper. Of note, patient had an episode of pAFIB, treated with Amiodarone,  Heme/onc was consulted and suspect advancement/transformation of Essential Thrombocytosis, Patient failed/did not tolerate previous Tx. Now s/p BM Bx on 12/10 will need to f/u  with Dr Dailey outpatient to discuss results and being treatment with Jakafi.  Patient optimized and was evaluated by PM&R and therapy for functional deficits, gait/ADL impairments and acute rehabilitation was recommended. Patient was cleared for discharge to [facility] IRU on 12/12/24.      HPI: Patient NAD this am, Hx diarrhea. Hold lax. Lomotil added per pt request-home med  Pain/LEs/bone, sharp pain on deep inspiration, no N/V, no Fevers/Chills. No other new ROS  Has been tolerating rehabilitation program. Strength improved. VSS.   Motrin to prn. Percocet prn-dc dilaudid  Heme appreciated       MEDICATIONS  (STANDING):  aMIOdarone    Tablet 200 milliGRAM(s) Oral daily  buPROPion XL (24-Hour) . 150 milliGRAM(s) Oral daily  colchicine 0.6 milliGRAM(s) Oral daily  enalapril 20 milliGRAM(s) Oral daily  fluticasone propionate 50 MICROgram(s)/spray Nasal Spray 1 Spray(s) Both Nostrils two times a day  heparin   Injectable 5000 Unit(s) SubCutaneous every 8 hours  lactobacillus acidophilus 1 Tablet(s) Oral daily  lamoTRIgine 200 milliGRAM(s) Oral daily  levothyroxine 88 MICROGram(s) Oral daily  metoprolol succinate ER 25 milliGRAM(s) Oral daily  pantoprazole    Tablet 40 milliGRAM(s) Oral before breakfast  predniSONE   Tablet 5 milliGRAM(s) Oral daily  rosuvastatin 20 milliGRAM(s) Oral at bedtime    MEDICATIONS  (PRN):  acetaminophen     Tablet .. 650 milliGRAM(s) Oral every 6 hours PRN Mild Pain (1 - 3)  aluminum hydroxide/magnesium hydroxide/simethicone Suspension 30 milliLiter(s) Oral every 4 hours PRN Dyspepsia  artificial tears (preservative free) Ophthalmic Solution 1 Drop(s) Both EYES every 4 hours PRN Dry Eyes  cyclobenzaprine 10 milliGRAM(s) Oral three times a day PRN Muscle Spasm  diphenoxylate/atropine 1 Tablet(s) Oral every 6 hours PRN Diarrhea  guaifenesin/dextromethorphan Oral Liquid 10 milliLiter(s) Oral every 4 hours PRN Cough  hydrocortisone hemorrhoidal Suppository 1 Suppository(s) Rectal two times a day PRN rectal pain  ibuprofen  Tablet. 400 milliGRAM(s) Oral every 8 hours PRN Moderate Pain (4 - 6)  meclizine 25 milliGRAM(s) Oral two times a day PRN Dizziness  melatonin 3 milliGRAM(s) Oral at bedtime PRN Insomnia  ondansetron   Disintegrating Tablet 4 milliGRAM(s) Oral two times a day PRN Nausea and/or Vomiting  oxycodone    5 mG/acetaminophen 325 mG 1 Tablet(s) Oral every 6 hours PRN Severe Pain (7 - 10)  simethicone 80 milliGRAM(s) Chew every 6 hours PRN Gas      Vital Signs Last 24 Hrs  T(C): 36.9 (18 Dec 2024 09:35), Max: 36.9 (18 Dec 2024 09:35)  T(F): 98.4 (18 Dec 2024 09:35), Max: 98.4 (18 Dec 2024 09:35)  HR: 62 (18 Dec 2024 09:35) (56 - 62)  BP: 115/65 (18 Dec 2024 09:35) (115/65 - 157/63)  BP(mean): --  RR: 16 (18 Dec 2024 09:35) (16 - 16)  SpO2: 99% (18 Dec 2024 09:35) (98% - 99%)    Parameters below as of 18 Dec 2024 09:35  Patient On (Oxygen Delivery Method): room air      In NAD  HEENT- EOMI  Heart- RRR, S1S2  Lungs- no respiratory distress  Abd- + BS, NT  Ext- No calf pain, LE bone pain persisting  Neuro- Exam unchanged  Improving motor strength LEs  impaired sensation b/l feet        Continue comprehensive acute rehab program consisting of 3hrs/day of OT/PT
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
## 2024-12-19 LAB
ALBUMIN SERPL ELPH-MCNC: 2.8 G/DL — LOW (ref 3.3–5)
ALP SERPL-CCNC: 80 U/L — SIGNIFICANT CHANGE UP (ref 40–120)
ALT FLD-CCNC: 17 U/L — SIGNIFICANT CHANGE UP (ref 10–45)
ANION GAP SERPL CALC-SCNC: 7 MMOL/L — SIGNIFICANT CHANGE UP (ref 5–17)
AST SERPL-CCNC: 17 U/L — SIGNIFICANT CHANGE UP (ref 10–40)
BASOPHILS # BLD AUTO: 0.76 K/UL — HIGH (ref 0–0.2)
BASOPHILS NFR BLD AUTO: 3.4 % — HIGH (ref 0–2)
BILIRUB SERPL-MCNC: 0.4 MG/DL — SIGNIFICANT CHANGE UP (ref 0.2–1.2)
BUN SERPL-MCNC: 9 MG/DL — SIGNIFICANT CHANGE UP (ref 7–23)
CALCIUM SERPL-MCNC: 9.4 MG/DL — SIGNIFICANT CHANGE UP (ref 8.4–10.5)
CHLORIDE SERPL-SCNC: 101 MMOL/L — SIGNIFICANT CHANGE UP (ref 96–108)
CO2 SERPL-SCNC: 29 MMOL/L — SIGNIFICANT CHANGE UP (ref 22–31)
CREAT SERPL-MCNC: 0.76 MG/DL — SIGNIFICANT CHANGE UP (ref 0.5–1.3)
EGFR: 89 ML/MIN/1.73M2 — SIGNIFICANT CHANGE UP
EOSINOPHIL # BLD AUTO: 0.58 K/UL — HIGH (ref 0–0.5)
EOSINOPHIL NFR BLD AUTO: 2.6 % — SIGNIFICANT CHANGE UP (ref 0–6)
GLUCOSE SERPL-MCNC: 82 MG/DL — SIGNIFICANT CHANGE UP (ref 70–99)
HCT VFR BLD CALC: 34 % — LOW (ref 34.5–45)
HGB BLD-MCNC: 9.9 G/DL — LOW (ref 11.5–15.5)
IMM GRANULOCYTES NFR BLD AUTO: 20.7 % — HIGH (ref 0–0.9)
LYMPHOCYTES # BLD AUTO: 11.1 % — LOW (ref 13–44)
LYMPHOCYTES # BLD AUTO: 2.46 K/UL — SIGNIFICANT CHANGE UP (ref 1–3.3)
MCHC RBC-ENTMCNC: 24.4 PG — LOW (ref 27–34)
MCHC RBC-ENTMCNC: 29.1 G/DL — LOW (ref 32–36)
MCV RBC AUTO: 84 FL — SIGNIFICANT CHANGE UP (ref 80–100)
MONOCYTES # BLD AUTO: 1.11 K/UL — HIGH (ref 0–0.9)
MONOCYTES NFR BLD AUTO: 5 % — SIGNIFICANT CHANGE UP (ref 2–14)
NEUTROPHILS # BLD AUTO: 12.7 K/UL — HIGH (ref 1.8–7.4)
NEUTROPHILS NFR BLD AUTO: 57.2 % — SIGNIFICANT CHANGE UP (ref 43–77)
NRBC # BLD: 2 /100 WBCS — HIGH (ref 0–0)
PLATELET # BLD AUTO: 813 K/UL — HIGH (ref 150–400)
POTASSIUM SERPL-MCNC: 4.3 MMOL/L — SIGNIFICANT CHANGE UP (ref 3.5–5.3)
POTASSIUM SERPL-SCNC: 4.3 MMOL/L — SIGNIFICANT CHANGE UP (ref 3.5–5.3)
PROT SERPL-MCNC: 6.1 G/DL — SIGNIFICANT CHANGE UP (ref 6–8.3)
RBC # BLD: 4.05 M/UL — SIGNIFICANT CHANGE UP (ref 3.8–5.2)
RBC # FLD: 21.2 % — HIGH (ref 10.3–14.5)
SODIUM SERPL-SCNC: 137 MMOL/L — SIGNIFICANT CHANGE UP (ref 135–145)
WBC # BLD: 22.21 K/UL — HIGH (ref 3.8–10.5)
WBC # FLD AUTO: 22.21 K/UL — HIGH (ref 3.8–10.5)

## 2024-12-19 PROCEDURE — 99232 SBSQ HOSP IP/OBS MODERATE 35: CPT

## 2024-12-19 PROCEDURE — 99232 SBSQ HOSP IP/OBS MODERATE 35: CPT | Mod: GC

## 2024-12-19 RX ORDER — PREDNISONE 20 MG/1
1 TABLET ORAL
Qty: 3 | Refills: 0
Start: 2024-12-19 | End: 2024-12-21

## 2024-12-19 RX ORDER — PANTOPRAZOLE SODIUM 40 MG/1
1 TABLET, DELAYED RELEASE ORAL
Qty: 30 | Refills: 0
Start: 2024-12-19 | End: 2025-01-17

## 2024-12-19 RX ORDER — OXYCODONE AND ACETAMINOPHEN 5; 325 MG/1; MG/1
1 TABLET ORAL
Qty: 15 | Refills: 0
Start: 2024-12-19 | End: 2024-12-23

## 2024-12-19 RX ORDER — METOPROLOL TARTRATE 100 MG/1
1 TABLET, FILM COATED ORAL
Qty: 30 | Refills: 0
Start: 2024-12-19 | End: 2025-01-17

## 2024-12-19 RX ORDER — ROSUVASTATIN CALCIUM 5 MG/1
1 TABLET, FILM COATED ORAL
Qty: 30 | Refills: 0
Start: 2024-12-19 | End: 2025-01-17

## 2024-12-19 RX ORDER — AMIODARONE HYDROCHLORIDE 200 MG/1
1 TABLET ORAL
Qty: 30 | Refills: 0
Start: 2024-12-19 | End: 2025-01-17

## 2024-12-19 RX ORDER — ENALAPRIL MALEATE 2.5 MG/1
1 TABLET ORAL
Qty: 30 | Refills: 0
Start: 2024-12-19 | End: 2025-01-17

## 2024-12-19 RX ORDER — COLCHICINE 0.6 MG
1 TABLET ORAL
Qty: 30 | Refills: 0
Start: 2024-12-19 | End: 2025-01-17

## 2024-12-19 RX ORDER — LEVOTHYROXINE SODIUM 150 MCG
1 TABLET ORAL
Qty: 30 | Refills: 0
Start: 2024-12-19 | End: 2025-01-17

## 2024-12-19 RX ADMIN — AMIODARONE HYDROCHLORIDE 200 MILLIGRAM(S): 200 TABLET ORAL at 06:00

## 2024-12-19 RX ADMIN — PANTOPRAZOLE SODIUM 40 MILLIGRAM(S): 40 TABLET, DELAYED RELEASE ORAL at 05:59

## 2024-12-19 RX ADMIN — Medication 1 TABLET(S): at 15:15

## 2024-12-19 RX ADMIN — Medication 0.6 MILLIGRAM(S): at 15:31

## 2024-12-19 RX ADMIN — ROSUVASTATIN CALCIUM 20 MILLIGRAM(S): 5 TABLET, FILM COATED ORAL at 22:29

## 2024-12-19 RX ADMIN — Medication 5000 UNIT(S): at 21:34

## 2024-12-19 RX ADMIN — PREDNISONE 5 MILLIGRAM(S): 20 TABLET ORAL at 05:59

## 2024-12-19 RX ADMIN — Medication 1 SPRAY(S): at 06:00

## 2024-12-19 RX ADMIN — Medication 88 MICROGRAM(S): at 05:59

## 2024-12-19 RX ADMIN — METOPROLOL TARTRATE 25 MILLIGRAM(S): 100 TABLET, FILM COATED ORAL at 06:00

## 2024-12-19 RX ADMIN — ENALAPRIL MALEATE 20 MILLIGRAM(S): 2.5 TABLET ORAL at 05:59

## 2024-12-19 RX ADMIN — LAMOTRIGINE 200 MILLIGRAM(S): 50 TABLET, EXTENDED RELEASE ORAL at 15:31

## 2024-12-19 RX ADMIN — ONDANSETRON HYDROCHLORIDE 4 MILLIGRAM(S): 4 TABLET, FILM COATED ORAL at 20:36

## 2024-12-19 RX ADMIN — Medication 5000 UNIT(S): at 15:15

## 2024-12-19 RX ADMIN — Medication 5000 UNIT(S): at 05:59

## 2024-12-19 RX ADMIN — Medication 150 MILLIGRAM(S): at 15:31

## 2024-12-19 NOTE — PROGRESS NOTE ADULT - SUBJECTIVE AND OBJECTIVE BOX
PROGRESS NOTE:     Patient is a 64y old  Female who presents with a chief complaint of Pericarditis/Pericardial effusions (16 Dec 2024 10:31)          SUBJECTIVE & OBJECTIVE:   Pt seen and examined at bedside in AM.   no overnight events.       REVIEW OF SYSTEMS: remaining ROS negative     PHYSICAL EXAM:  VITALS:  Vital Signs Last 24 Hrs  T(C): 36.7 (19 Dec 2024 08:15), Max: 36.9 (18 Dec 2024 09:35)  T(F): 98.1 (19 Dec 2024 08:15), Max: 98.4 (18 Dec 2024 09:35)  HR: 56 (19 Dec 2024 08:15) (56 - 62)  BP: 145/62 (19 Dec 2024 08:15) (115/65 - 150/66)  BP(mean): --  RR: 16 (19 Dec 2024 08:15) (16 - 16)  SpO2: 97% (19 Dec 2024 08:15) (97% - 99%)    Parameters below as of 19 Dec 2024 08:15  Patient On (Oxygen Delivery Method): room air          GENERAL: NAD,  no increased WOB  HEAD:  Atraumatic, Normocephalic  EYES: EOMI, conjunctiva and sclera clear  ENMT: Moist mucous membranes  NECK: Supple, No JVD  NERVOUS SYSTEM:  Alert & Oriented   CHEST/LUNG: decrease breath sound at right base; No rales, rhonchi, wheezing  HEART: Regular rate and rhythm  ABDOMEN: Soft, Nontender, Nondistended; Bowel sounds present  EXTREMITIES:  No clubbing, cyanosis, calf tenderness      MEDICATIONS  (STANDING):  aMIOdarone    Tablet 200 milliGRAM(s) Oral daily  buPROPion XL (24-Hour) . 150 milliGRAM(s) Oral daily  colchicine 0.6 milliGRAM(s) Oral daily  enalapril 20 milliGRAM(s) Oral daily  fluticasone propionate 50 MICROgram(s)/spray Nasal Spray 1 Spray(s) Both Nostrils two times a day  heparin   Injectable 5000 Unit(s) SubCutaneous every 8 hours  lactobacillus acidophilus 1 Tablet(s) Oral daily  lamoTRIgine 200 milliGRAM(s) Oral daily  levothyroxine 88 MICROGram(s) Oral daily  metoprolol succinate ER 25 milliGRAM(s) Oral daily  pantoprazole    Tablet 40 milliGRAM(s) Oral before breakfast  predniSONE   Tablet 5 milliGRAM(s) Oral daily  rosuvastatin 20 milliGRAM(s) Oral at bedtime    MEDICATIONS  (PRN):  acetaminophen     Tablet .. 650 milliGRAM(s) Oral every 6 hours PRN Mild Pain (1 - 3)  aluminum hydroxide/magnesium hydroxide/simethicone Suspension 30 milliLiter(s) Oral every 4 hours PRN Dyspepsia  artificial tears (preservative free) Ophthalmic Solution 1 Drop(s) Both EYES every 4 hours PRN Dry Eyes  cyclobenzaprine 10 milliGRAM(s) Oral three times a day PRN Muscle Spasm  diphenoxylate/atropine 1 Tablet(s) Oral every 6 hours PRN Diarrhea  guaifenesin/dextromethorphan Oral Liquid 10 milliLiter(s) Oral every 4 hours PRN Cough  hydrocortisone hemorrhoidal Suppository 1 Suppository(s) Rectal two times a day PRN rectal pain  ibuprofen  Tablet. 400 milliGRAM(s) Oral every 8 hours PRN Moderate Pain (4 - 6)  meclizine 25 milliGRAM(s) Oral two times a day PRN Dizziness  melatonin 3 milliGRAM(s) Oral at bedtime PRN Insomnia  ondansetron   Disintegrating Tablet 4 milliGRAM(s) Oral two times a day PRN Nausea and/or Vomiting  oxycodone    5 mG/acetaminophen 325 mG 1 Tablet(s) Oral every 6 hours PRN Severe Pain (7 - 10)  simethicone 80 milliGRAM(s) Chew every 6 hours PRN Gas          Allergies    sulfa drugs (Vomiting; Nausea)  IV Contrast (Hives)    Intolerances              LABS:                                      9.9    22.21 )-----------( 813      ( 19 Dec 2024 06:09 )             34.0     12-19    137  |  101  |  9   ----------------------------<  82  4.3   |  29  |  0.76    Ca    9.4      19 Dec 2024 06:09    TPro  6.1  /  Alb  2.8[L]  /  TBili  0.4  /  DBili  x   /  AST  17  /  ALT  17  /  AlkPhos  80  12-19    LIVER FUNCTIONS - ( 19 Dec 2024 06:09 )  Alb: 2.8 g/dL / Pro: 6.1 g/dL / ALK PHOS: 80 U/L / ALT: 17 U/L / AST: 17 U/L / GGT: x             Urinalysis Basic - ( 19 Dec 2024 06:09 )    Color: x / Appearance: x / SG: x / pH: x  Gluc: 82 mg/dL / Ketone: x  / Bili: x / Urobili: x   Blood: x / Protein: x / Nitrite: x   Leuk Esterase: x / RBC: x / WBC x   Sq Epi: x / Non Sq Epi: x / Bacteria: x        CAPILLARY BLOOD GLUCOSE                    RECENT CULTURES:          RADIOLOGY & ADDITIONAL TESTS:

## 2024-12-19 NOTE — PROGRESS NOTE ADULT - ASSESSMENT
KITTY JIMENEZ is a 63 y/o  F with PMHx of HTN, HLD, hypothyroidism, primary lymphedema, Essential  thrombocytosis, GERD, Obesity s/p bariatric surgery, ischemic colitis s/p left hemicolectomy, pAF and DVT/PE on Eliquis, HFpEF,  vW Disease, recently had  2 hospitalizations at [facility] in the past 2 months for CAP, and also diagnosed with pericarditis, and HFpEF s/p PO abx.  Patient returns to  ED on 11/30 with worsening c/o back pain, CP, SOB and productive cough. Found to have CHF exacerbation with superimposed PNA, pericarditis, and pericardio/ pleural effusions. Now admitted to [facility] for functional deficits and initiation of a multidisciplinary rehab program consisting focused on functional mobility, transfers and ADLs.    #Pericardial effusion 2/2 pericarditis   #Pleural effusion suspected  2/2 interferon alpha  #Acute respiratory failure with hypoxia  -S/p NC, monitor O2  -CT Chest- Moderately large simple pericardial effusion. Small to moderate right and small left pleural effusions with associated compressive atelectasis and Superimposed pneumonia.  -S/p pericardial drain (removed on 12/4)  -S/p b/l pigtail catheters (removed 12/5 and 12/6)  -S/p steroid taper: c/w Prednisone 20mg HS x1 day start 12/13, Prednisone 10mg x 4 days start on 12/14; Prednisone 5mg x 5 days start on 12/18.  -Colchicine 0.6mg daily x 3 months  -Ibuprofen 200mg q8h 400mg prn now.   -F/u outpatient cardiology     Comprehensive Multidisciplinary Rehab Program:  - comprehensive rehab program, 3 hours a day, 5 days a week.  - PT 2hr/day: Focused on improving strength, endurance, coordination, balance, functional mobility, and transfers  - OT 1hr/day: Focused on improving strength, fine motor skills, coordination, posture and ADLs.    - Activity Limitations: Decreased social, vocational and leisure activities, decreased self care and ADLs, decreased mobility, decreased ability to manage household and finances.     -----------------------------------------------------------------------------------  Concurrent Medical Problems    #Leukocytosis  #Sepsis- PNA  #Essential thrombocythemia - heme appreciated- cyto resulted/path pending  - + abnormal  RBC, platelet morphology - Suspected transformation  -S/p bone biopsy 12/10--> f/u outpatient for results and  begin Jakafi treatment   -S/p Cefipime abx   -Blood cultures and pleural/pericardial fluid cultures negative  -Intolerant to anagrelide due to afib, on hydrea until about 6-12 months ago with no response, most recently on pegylated IFN alpha (Besrema) for the past 6 months- intolerant as well.  Stopped 2 months ago.   -Trend CBC, no fevers  -Monitor platelets    #vW disease  -Receives DDAVP prior to procedures.     #HTN/HLD  -Rosuvastatin 20mg HS   -Metoprolol 25mg daily   -Enalapril 20mg daily     #pAFIB  -Amiodarone 200mg daily   -Metoprolol 25mg daily     #Hypothyroidism  -Synthroid 88mcg daily     #Chronic anemia  -Monitor H/H (12/12- Hgb/Hct- 8.6/30.1)  -Transfuse if Hbg <7 PRN    #Dry eyes/Allergies  #Cough  -Artificial tears q4h PRN  -Fluticasone nasal spray BID  -Guaifenesin/dextromethorphan  q4h PRN    #Pain management  -Tylenol PRN  -Flexeril 10mg TID PRN  -Dilaudid 4mg q4h PRN to Percocet per pt requests      #Mood/Cognition  Neuropsychology consult PRN  -Wellbutrin 150mg daily   -Lamictal 200mg daily     #Sleep:   Maintain quiet hours and low stim environment.  Melatonin 3mg HS PRN to maximize participation in therapy during the day.     #GI/Bowel  #N/V  Senna QHS, Miralax PRN Daily, Bisacodyl 5mg daily PRN, Lactulose PRN, Simethicone PRN- hold for hx diarrhea  GI ppx: Pantoprazole 40mg daily   -Meclizine 25mg BID PRN  -Zofran 4mg TID PRN    #Skin/Pressure Injury:   - Skin assessment on admission: R buttocks biopsy puncture site, w/ gauze and tegaderm, CDI, b/l flank drain sites, MARILOU, abd drain site, MARILOU, abd bruising- healing well    #Diet/Supplements  Current Diet: Regular diet   Nutrition consult   -Lactobacillus tab BID to daily  -Multivitamin daily     #Precautions / PROPHYLAXIS:   - Falls, Cardiac  - ortho: Weight bearing status: WBAT   - Lungs: Incentive Spirometer   - DVT ppx: Heparin 5000U TID  - B/L calf tenderness on admission--> BLE DUPLEX US neg DVT  - Pressure injury/Skin: OOB to Chair, PT/OT      ---------------  Code Status: FULL  Emergency Contact:    Outpatient Follow-up (Specialty/Name of physician):    Shelby Dailey  Hematology  [address], [place] [address]  Phone: [phone]  Fax: [phone]  Follow Up Time: 1 week    Ravinder Abdi  Cardiovascular Disease  [address]  [place] [address]  Phone: [phone]  Fax: [phone]  Follow Up Time: 2 weeks

## 2024-12-19 NOTE — PROGRESS NOTE ADULT - SUBJECTIVE AND OBJECTIVE BOX
HPI:  65 y/o  F with PMHx of HTN, HLD, hypothyroidism, primary lymphedema, Essential  thrombocytosis, GERD, Obesity s/p bariatric surgery, ischemic colitis s/p left hemicolectomy, pAF and DVT/PE on Eliquis, HFpEF,  vW Disease, recently had  2 hospitalizations at [facility] in the past 2 months for CAP, and also diagnosed with pericarditis, and HFpEF s/p PO abx.  Patient returns to  ED on 11/30 with worsening c/o back pain, CP, SOB and productive cough. Patient was found to be in respiratory distress and hypoxic requiring 4L NC and in HF exacerbation. CT imaging showed moderately large simple pericardial effusion. Small to moderate right and  small left pleural effusions with associated compressive atelectasis with suspected superimposed pneumonia. Hospital course c/b pericarditis 2/2 pegylated interferon and pericardial effusions. TTE found large pericardial effusions with signs of early tamponade.  S/p pericardial drain and B/l chest tube  placement. Cytology cultures are negative. Started on empiric cefepime, colchicine,  ibuprofen and steroid taper. Of note, patient had an episode of pAFIB, treated with Amiodarone,  Heme/onc was consulted and suspect advancement/transformation of Essential Thrombocytosis, Patient failed/did not tolerate previous Tx. Now s/p BM Bx on 12/10 will need to f/u  with Dr Dailey outpatient to discuss results and being treatment with Jakafi.  Patient optimized and was evaluated by PM&R and therapy for functional deficits, gait/ADL impairments and acute rehabilitation was recommended. Patient was cleared for discharge to [facility] IRU on 12/12/24.      HPI: Patient NAD this am, Hx diarrhea. Hold lax. Lomotil added per pt request-home med  Pain/LEs/bone, sharp pain on deep inspiration, no N/V, no Fevers/Chills. No other new ROS  Has been tolerating rehabilitation program. Strength improved. VSS.   Motrin to prn. Percocet prn-dc dilaudid  Heme appreciated       MEDICATIONS  (STANDING):  aMIOdarone    Tablet 200 milliGRAM(s) Oral daily  buPROPion XL (24-Hour) . 150 milliGRAM(s) Oral daily  colchicine 0.6 milliGRAM(s) Oral daily  enalapril 20 milliGRAM(s) Oral daily  fluticasone propionate 50 MICROgram(s)/spray Nasal Spray 1 Spray(s) Both Nostrils two times a day  heparin   Injectable 5000 Unit(s) SubCutaneous every 8 hours  lactobacillus acidophilus 1 Tablet(s) Oral daily  lamoTRIgine 200 milliGRAM(s) Oral daily  levothyroxine 88 MICROGram(s) Oral daily  metoprolol succinate ER 25 milliGRAM(s) Oral daily  pantoprazole    Tablet 40 milliGRAM(s) Oral before breakfast  predniSONE   Tablet 5 milliGRAM(s) Oral daily  rosuvastatin 20 milliGRAM(s) Oral at bedtime    MEDICATIONS  (PRN):  acetaminophen     Tablet .. 650 milliGRAM(s) Oral every 6 hours PRN Mild Pain (1 - 3)  aluminum hydroxide/magnesium hydroxide/simethicone Suspension 30 milliLiter(s) Oral every 4 hours PRN Dyspepsia  artificial tears (preservative free) Ophthalmic Solution 1 Drop(s) Both EYES every 4 hours PRN Dry Eyes  cyclobenzaprine 10 milliGRAM(s) Oral three times a day PRN Muscle Spasm  diphenoxylate/atropine 1 Tablet(s) Oral every 6 hours PRN Diarrhea  guaifenesin/dextromethorphan Oral Liquid 10 milliLiter(s) Oral every 4 hours PRN Cough  hydrocortisone hemorrhoidal Suppository 1 Suppository(s) Rectal two times a day PRN rectal pain  ibuprofen  Tablet. 400 milliGRAM(s) Oral every 8 hours PRN Moderate Pain (4 - 6)  meclizine 25 milliGRAM(s) Oral two times a day PRN Dizziness  melatonin 3 milliGRAM(s) Oral at bedtime PRN Insomnia  ondansetron   Disintegrating Tablet 4 milliGRAM(s) Oral two times a day PRN Nausea and/or Vomiting  oxycodone    5 mG/acetaminophen 325 mG 1 Tablet(s) Oral every 6 hours PRN Severe Pain (7 - 10)  simethicone 80 milliGRAM(s) Chew every 6 hours PRN Gas      Vital Signs Last 24 Hrs  T(C): 36.7 (19 Dec 2024 08:15), Max: 36.8 (18 Dec 2024 21:46)  T(F): 98.1 (19 Dec 2024 08:15), Max: 98.2 (18 Dec 2024 21:46)  HR: 56 (19 Dec 2024 08:15) (56 - 59)  BP: 145/62 (19 Dec 2024 08:15) (142/63 - 150/66)  BP(mean): --  RR: 16 (19 Dec 2024 08:15) (16 - 16)  SpO2: 97% (19 Dec 2024 08:15) (97% - 99%)    Parameters below as of 19 Dec 2024 08:15  Patient On (Oxygen Delivery Method): room air        In NAD  HEENT- EOMI  Heart- RRR, S1S2  Lungs- no respiratory distress  Abd- + BS, NT  Ext- No calf pain, LE bone pain persisting  Neuro- Exam unchanged  Improving motor strength LEs  impaired sensation b/l feet

## 2024-12-19 NOTE — PROGRESS NOTE ADULT - ASSESSMENT
63 y/o  F with PMHx of HTN, HLD, hypothyroidism, primary lymphedema, Essential  thrombocytosis, GERD, Obesity s/p bariatric surgery, ischemic colitis s/p left hemicolectomy, pAF and DVT/PE on Eliquis, HFpEF,  vW Disease, recent acute hospitalization for CHF exacerbation with superimposed PNA, pericarditis, and pericardio/ pleural effusions. Now admitted to [facility] for multidisciplinary rehab program     #Pericardial effusion 2/2 pericarditis   #Pleural effusion suspected  2/2 interferon alpha  #Acute respiratory failure with hypoxia  -S/p NC, monitor O2  -CT Chest- Moderately large simple pericardial effusion. Small to moderate right and small left pleural effusions with associated compressive atelectasis and Superimposed pneumonia.  -S/p pericardial drain (removed on 12/4)  -S/p b/l pigtail catheters (removed 12/5 and 12/6)  -S/p steroid taper: c/w Prednisone 20mg HS x1 day start 12/13, Prednisone 10mg x 4 days start on 12/14; Prednisone 5mg x 5 days start on 12/18.  -Colchicine 0.6mg daily x 3 months  -Ibuprofen 600mg q8h PRN  -F/u outpatient cardiology   -Comprehensive Multidisciplinary Rehab Program    #Leukocytosis  #Essential thrombocythemia   - + abnormal  RBC, platelet morphology - Suspected transformation  -S/p bone biopsy 12/10--> f/u outpatient  to review path results  and  begin Jakafi treatment    -Blood cultures and pleural/pericardial fluid cultures negative  -Intolerant to anagrelide due to afib, on hydrea until about 6-12 months ago with no response, most recently on pegylated IFN alpha (Besrema) for the past 6 months- intolerant as well.  Stopped 2 months ago.   -Trend CBC, monitor for fevers  -Monitor platelets    #vW disease  -Receives DDAVP prior to procedures.     #HTN/HLD  -Rosuvastatin 20mg HS   -Metoprolol 25mg daily   -Enalapril 20mg daily   -Start on amlodipine 5mg    #pAFIB  -Amiodarone 200mg daily   -Metoprolol 25mg daily     #Hypothyroidism  -Synthroid 88mcg daily     #Irritable Bowel Syndrome  -lomotil PRN    #Chronic anemia  -Monitor H/H  -Transfuse if Hbg <7 PRN    #Dry eyes/Allergies  #Cough  -Artificial tears q4h PRN  -Fluticasone nasal spray BID  -Guaifenesin/dextromethorphan  q4h PRN    #Mood/Cognition  Neuropsychology consult PRN  -Wellbutrin 150mg daily   -Lamictal 200mg daily       #Sepsis- PNA  -resolved  -S/p Cefipime abx      #GI ppx  - Protonix    #DVT ppx  - Hep SQ

## 2024-12-20 VITALS
SYSTOLIC BLOOD PRESSURE: 146 MMHG | TEMPERATURE: 98 F | OXYGEN SATURATION: 99 % | HEART RATE: 65 BPM | DIASTOLIC BLOOD PRESSURE: 72 MMHG | RESPIRATION RATE: 16 BRPM

## 2024-12-20 PROCEDURE — 93970 EXTREMITY STUDY: CPT

## 2024-12-20 PROCEDURE — 80053 COMPREHEN METABOLIC PANEL: CPT

## 2024-12-20 PROCEDURE — 85025 COMPLETE CBC W/AUTO DIFF WBC: CPT

## 2024-12-20 PROCEDURE — 97116 GAIT TRAINING THERAPY: CPT | Mod: GP

## 2024-12-20 PROCEDURE — 97165 OT EVAL LOW COMPLEX 30 MIN: CPT | Mod: GO

## 2024-12-20 PROCEDURE — 97161 PT EVAL LOW COMPLEX 20 MIN: CPT | Mod: GP

## 2024-12-20 PROCEDURE — 87635 SARS-COV-2 COVID-19 AMP PRB: CPT

## 2024-12-20 PROCEDURE — 99239 HOSP IP/OBS DSCHRG MGMT >30: CPT | Mod: GC

## 2024-12-20 PROCEDURE — 97110 THERAPEUTIC EXERCISES: CPT | Mod: GP

## 2024-12-20 PROCEDURE — 94640 AIRWAY INHALATION TREATMENT: CPT

## 2024-12-20 PROCEDURE — 36415 COLL VENOUS BLD VENIPUNCTURE: CPT

## 2024-12-20 PROCEDURE — 97535 SELF CARE MNGMENT TRAINING: CPT | Mod: GO

## 2024-12-20 PROCEDURE — 93005 ELECTROCARDIOGRAM TRACING: CPT

## 2024-12-20 PROCEDURE — 99232 SBSQ HOSP IP/OBS MODERATE 35: CPT

## 2024-12-20 PROCEDURE — 97530 THERAPEUTIC ACTIVITIES: CPT | Mod: GP

## 2024-12-20 RX ADMIN — AMIODARONE HYDROCHLORIDE 200 MILLIGRAM(S): 200 TABLET ORAL at 06:03

## 2024-12-20 RX ADMIN — ACETAMINOPHEN 500MG 650 MILLIGRAM(S): 500 TABLET, COATED ORAL at 00:54

## 2024-12-20 RX ADMIN — METOPROLOL TARTRATE 25 MILLIGRAM(S): 100 TABLET, FILM COATED ORAL at 08:49

## 2024-12-20 RX ADMIN — Medication 150 MILLIGRAM(S): at 11:09

## 2024-12-20 RX ADMIN — Medication 5000 UNIT(S): at 06:04

## 2024-12-20 RX ADMIN — Medication 1 SPRAY(S): at 06:04

## 2024-12-20 RX ADMIN — ACETAMINOPHEN, DIPHENHYDRAMINE HCL, PHENYLEPHRINE HCL 3 MILLIGRAM(S): 325; 25; 5 TABLET ORAL at 00:54

## 2024-12-20 RX ADMIN — ACETAMINOPHEN 500MG 650 MILLIGRAM(S): 500 TABLET, COATED ORAL at 01:54

## 2024-12-20 RX ADMIN — Medication 88 MICROGRAM(S): at 07:15

## 2024-12-20 RX ADMIN — Medication 1 TABLET(S): at 11:09

## 2024-12-20 RX ADMIN — Medication 0.6 MILLIGRAM(S): at 11:09

## 2024-12-20 RX ADMIN — PANTOPRAZOLE SODIUM 40 MILLIGRAM(S): 40 TABLET, DELAYED RELEASE ORAL at 06:03

## 2024-12-20 RX ADMIN — LAMOTRIGINE 200 MILLIGRAM(S): 50 TABLET, EXTENDED RELEASE ORAL at 11:09

## 2024-12-20 RX ADMIN — ENALAPRIL MALEATE 20 MILLIGRAM(S): 2.5 TABLET ORAL at 08:34

## 2024-12-20 RX ADMIN — ONDANSETRON HYDROCHLORIDE 4 MILLIGRAM(S): 4 TABLET, FILM COATED ORAL at 11:08

## 2024-12-20 RX ADMIN — PREDNISONE 5 MILLIGRAM(S): 20 TABLET ORAL at 06:03

## 2024-12-20 NOTE — PROGRESS NOTE ADULT - SUBJECTIVE AND OBJECTIVE BOX
PROGRESS NOTE:     Patient is a 64y old  Female who presents with a chief complaint of Pericarditis/Pericardial effusions (16 Dec 2024 10:31)          SUBJECTIVE & OBJECTIVE:   Pt seen and examined at bedside in AM.   no overnight events.       REVIEW OF SYSTEMS: remaining ROS negative     PHYSICAL EXAM:  VITALS:  Vital Signs Last 24 Hrs  T(C): 36.6 (19 Dec 2024 20:41), Max: 36.6 (19 Dec 2024 20:41)  T(F): 97.8 (19 Dec 2024 20:41), Max: 97.8 (19 Dec 2024 20:41)  HR: 52 (20 Dec 2024 06:02) (52 - 58)  BP: 146/77 (20 Dec 2024 06:02) (146/77 - 148/63)  BP(mean): --  RR: 16 (20 Dec 2024 06:02) (16 - 16)  SpO2: 96% (20 Dec 2024 06:02) (96% - 97%)    Parameters below as of 20 Dec 2024 06:02  Patient On (Oxygen Delivery Method): room air          GENERAL: NAD,  no increased WOB  HEAD:  Atraumatic, Normocephalic  EYES: EOMI, conjunctiva and sclera clear  ENMT: Moist mucous membranes  NECK: Supple, No JVD  NERVOUS SYSTEM:  Alert & Oriented   CHEST/LUNG: decrease breath sound at right base; No rales, rhonchi, wheezing  HEART: Regular rate and rhythm  ABDOMEN: Soft, Nontender, Nondistended; Bowel sounds present  EXTREMITIES:  No clubbing, cyanosis, calf tenderness      MEDICATIONS  (STANDING):  aMIOdarone    Tablet 200 milliGRAM(s) Oral daily  buPROPion XL (24-Hour) . 150 milliGRAM(s) Oral daily  colchicine 0.6 milliGRAM(s) Oral daily  enalapril 20 milliGRAM(s) Oral daily  fluticasone propionate 50 MICROgram(s)/spray Nasal Spray 1 Spray(s) Both Nostrils two times a day  heparin   Injectable 5000 Unit(s) SubCutaneous every 8 hours  lactobacillus acidophilus 1 Tablet(s) Oral daily  lamoTRIgine 200 milliGRAM(s) Oral daily  levothyroxine 88 MICROGram(s) Oral daily  metoprolol succinate ER 25 milliGRAM(s) Oral daily  pantoprazole    Tablet 40 milliGRAM(s) Oral before breakfast  predniSONE   Tablet 5 milliGRAM(s) Oral daily  rosuvastatin 20 milliGRAM(s) Oral at bedtime    MEDICATIONS  (PRN):  acetaminophen     Tablet .. 650 milliGRAM(s) Oral every 6 hours PRN Mild Pain (1 - 3)  aluminum hydroxide/magnesium hydroxide/simethicone Suspension 30 milliLiter(s) Oral every 4 hours PRN Dyspepsia  artificial tears (preservative free) Ophthalmic Solution 1 Drop(s) Both EYES every 4 hours PRN Dry Eyes  cyclobenzaprine 10 milliGRAM(s) Oral three times a day PRN Muscle Spasm  diphenoxylate/atropine 1 Tablet(s) Oral every 6 hours PRN Diarrhea  guaifenesin/dextromethorphan Oral Liquid 10 milliLiter(s) Oral every 4 hours PRN Cough  hydrocortisone hemorrhoidal Suppository 1 Suppository(s) Rectal two times a day PRN rectal pain  ibuprofen  Tablet. 400 milliGRAM(s) Oral every 8 hours PRN Moderate Pain (4 - 6)  meclizine 25 milliGRAM(s) Oral two times a day PRN Dizziness  melatonin 3 milliGRAM(s) Oral at bedtime PRN Insomnia  ondansetron   Disintegrating Tablet 4 milliGRAM(s) Oral two times a day PRN Nausea and/or Vomiting  oxycodone    5 mG/acetaminophen 325 mG 1 Tablet(s) Oral every 6 hours PRN Severe Pain (7 - 10)  simethicone 80 milliGRAM(s) Chew every 6 hours PRN Gas            Allergies    sulfa drugs (Vomiting; Nausea)  IV Contrast (Hives)    Intolerances              LABS:                                      9.9    22.21 )-----------( 813      ( 19 Dec 2024 06:09 )             34.0     12-19    137  |  101  |  9   ----------------------------<  82  4.3   |  29  |  0.76    Ca    9.4      19 Dec 2024 06:09    TPro  6.1  /  Alb  2.8[L]  /  TBili  0.4  /  DBili  x   /  AST  17  /  ALT  17  /  AlkPhos  80  12-19    LIVER FUNCTIONS - ( 19 Dec 2024 06:09 )  Alb: 2.8 g/dL / Pro: 6.1 g/dL / ALK PHOS: 80 U/L / ALT: 17 U/L / AST: 17 U/L / GGT: x             Urinalysis Basic - ( 19 Dec 2024 06:09 )    Color: x / Appearance: x / SG: x / pH: x  Gluc: 82 mg/dL / Ketone: x  / Bili: x / Urobili: x   Blood: x / Protein: x / Nitrite: x   Leuk Esterase: x / RBC: x / WBC x   Sq Epi: x / Non Sq Epi: x / Bacteria: x        CAPILLARY BLOOD GLUCOSE                    RECENT CULTURES:          RADIOLOGY & ADDITIONAL TESTS:

## 2024-12-20 NOTE — PROGRESS NOTE ADULT - REASON FOR ADMISSION
Pericarditis/Pericardial effusions

## 2024-12-20 NOTE — PROGRESS NOTE ADULT - ASSESSMENT
65 y/o  F with PMHx of HTN, HLD, hypothyroidism, primary lymphedema, Essential  thrombocytosis, GERD, Obesity s/p bariatric surgery, ischemic colitis s/p left hemicolectomy, pAF and DVT/PE on Eliquis, HFpEF,  vW Disease, recent acute hospitalization for CHF exacerbation with superimposed PNA, pericarditis, and pericardio/ pleural effusions. Now admitted to [facility] for multidisciplinary rehab program     #Pericardial effusion 2/2 pericarditis   #Pleural effusion suspected  2/2 interferon alpha  #Acute respiratory failure with hypoxia  -S/p NC, monitor O2  -CT Chest- Moderately large simple pericardial effusion. Small to moderate right and small left pleural effusions with associated compressive atelectasis and Superimposed pneumonia.  -S/p pericardial drain (removed on 12/4)  -S/p b/l pigtail catheters (removed 12/5 and 12/6)  -S/p steroid taper: c/w Prednisone 20mg HS x1 day start 12/13, Prednisone 10mg x 4 days start on 12/14; Prednisone 5mg x 5 days start on 12/18.  -Colchicine 0.6mg daily x 3 months  -Ibuprofen 600mg q8h PRN  -F/u outpatient cardiology   -Comprehensive Multidisciplinary Rehab Program    #Leukocytosis  #Essential thrombocythemia   - + abnormal  RBC, platelet morphology - Suspected transformation  -S/p bone biopsy 12/10--> f/u outpatient  to review path results  and  begin Jakafi treatment    -Blood cultures and pleural/pericardial fluid cultures negative  -Intolerant to anagrelide due to afib, on hydrea until about 6-12 months ago with no response, most recently on pegylated IFN alpha (Besrema) for the past 6 months- intolerant as well.  Stopped 2 months ago.   -Trend CBC, monitor for fevers  -Monitor platelets    #vW disease  -Receives DDAVP prior to procedures.     #HTN/HLD  -Rosuvastatin 20mg HS   -Metoprolol 25mg daily   -Enalapril 20mg daily       #pAFIB  -Amiodarone 200mg daily   -Metoprolol 25mg daily     #Hypothyroidism  -Synthroid 88mcg daily     #Irritable Bowel Syndrome  -lomotil PRN    #Chronic anemia  -Monitor H/H  -Transfuse if Hbg <7 PRN    #Dry eyes/Allergies  #Cough  -Artificial tears q4h PRN  -Fluticasone nasal spray BID  -Guaifenesin/dextromethorphan  q4h PRN    #Mood/Cognition  Neuropsychology consult PRN  -Wellbutrin 150mg daily   -Lamictal 200mg daily       #Sepsis- PNA  -resolved  -S/p Cefipime abx      #GI ppx  - Protonix    #DVT ppx  - Hep SQ

## 2024-12-20 NOTE — PROGRESS NOTE ADULT - PROVIDER SPECIALTY LIST ADULT
Hospitalist
Physiatry
Rehab Medicine
Hospitalist
Physiatry
Rehab Medicine
Hospitalist
Hospitalist
Physiatry
Physiatry
Heme/Onc
Hospitalist

## 2024-12-23 ENCOUNTER — APPOINTMENT (OUTPATIENT)
Dept: HEMATOLOGY ONCOLOGY | Facility: CLINIC | Age: 64
End: 2024-12-23

## 2024-12-23 LAB
CHROM ANALY OVERALL INTERP SPEC-IMP: SIGNIFICANT CHANGE UP
ONKOSIGHT MYELOID SEQUENCE: (no result)

## 2024-12-24 ENCOUNTER — APPOINTMENT (OUTPATIENT)
Dept: CARDIOLOGY | Facility: CLINIC | Age: 64
End: 2024-12-24
Payer: COMMERCIAL

## 2024-12-24 ENCOUNTER — NON-APPOINTMENT (OUTPATIENT)
Age: 64
End: 2024-12-24

## 2024-12-24 VITALS
RESPIRATION RATE: 14 BRPM | BODY MASS INDEX: 25.69 KG/M2 | WEIGHT: 145 LBS | HEART RATE: 70 BPM | SYSTOLIC BLOOD PRESSURE: 133 MMHG | DIASTOLIC BLOOD PRESSURE: 67 MMHG | HEIGHT: 63 IN | OXYGEN SATURATION: 98 %

## 2024-12-24 VITALS
BODY MASS INDEX: 25.52 KG/M2 | WEIGHT: 144 LBS | HEIGHT: 63 IN | HEART RATE: 70 BPM | SYSTOLIC BLOOD PRESSURE: 130 MMHG | DIASTOLIC BLOOD PRESSURE: 74 MMHG | OXYGEN SATURATION: 100 %

## 2024-12-24 DIAGNOSIS — I31.39 OTHER PERICARDIAL EFFUSION (NONINFLAMMATORY): ICD-10-CM

## 2024-12-24 DIAGNOSIS — I30.0 ACUTE NONSPECIFIC IDIOPATHIC PERICARDITIS: ICD-10-CM

## 2024-12-24 PROCEDURE — 99215 OFFICE O/P EST HI 40 MIN: CPT

## 2024-12-24 PROCEDURE — 93000 ELECTROCARDIOGRAM COMPLETE: CPT

## 2024-12-24 PROCEDURE — G2211 COMPLEX E/M VISIT ADD ON: CPT | Mod: NC

## 2024-12-24 RX ORDER — PANTOPRAZOLE 40 MG/1
40 TABLET, DELAYED RELEASE ORAL DAILY
Refills: 0 | Status: ACTIVE | COMMUNITY

## 2024-12-24 RX ORDER — LEVOTHYROXINE SODIUM 0.09 MG/1
88 TABLET ORAL DAILY
Refills: 0 | Status: ACTIVE | COMMUNITY

## 2024-12-24 RX ORDER — AMIODARONE HYDROCHLORIDE 200 MG/1
200 TABLET ORAL DAILY
Refills: 0 | Status: ACTIVE | COMMUNITY

## 2024-12-24 RX ORDER — PREDNISONE 5 MG/1
5 TABLET ORAL DAILY
Refills: 0 | Status: ACTIVE | COMMUNITY

## 2024-12-24 RX ORDER — COLCHICINE 0.6 MG/1
0.6 TABLET ORAL DAILY
Refills: 0 | Status: ACTIVE | COMMUNITY

## 2024-12-24 RX ORDER — ENALAPRIL MALEATE 10 MG/1
10 TABLET ORAL
Qty: 180 | Refills: 3 | Status: ACTIVE | COMMUNITY

## 2024-12-24 RX ORDER — POLYETHYLENE GLYCOL 3350 17 G/17G
17 POWDER, FOR SOLUTION ORAL DAILY
Refills: 0 | Status: ACTIVE | COMMUNITY

## 2024-12-24 RX ORDER — LAMOTRIGINE 200 MG/1
200 TABLET ORAL DAILY
Refills: 0 | Status: ACTIVE | COMMUNITY

## 2024-12-24 RX ORDER — METOPROLOL SUCCINATE 25 MG/1
25 TABLET, EXTENDED RELEASE ORAL DAILY
Refills: 0 | Status: ACTIVE | COMMUNITY

## 2024-12-24 RX ORDER — ROSUVASTATIN CALCIUM 20 MG/1
20 TABLET, FILM COATED ORAL AT BEDTIME
Refills: 0 | Status: ACTIVE | COMMUNITY

## 2024-12-24 RX ORDER — BUPROPION HYDROCHLORIDE 300 MG/1
300 TABLET, EXTENDED RELEASE ORAL DAILY
Refills: 0 | Status: ACTIVE | COMMUNITY

## 2024-12-24 RX ORDER — HYDROCHLOROTHIAZIDE 25 MG/1
25 TABLET ORAL DAILY
Qty: 90 | Refills: 1 | Status: ACTIVE | COMMUNITY
Start: 2024-12-24 | End: 1900-01-01

## 2024-12-31 ENCOUNTER — OUTPATIENT (OUTPATIENT)
Dept: OUTPATIENT SERVICES | Facility: HOSPITAL | Age: 64
LOS: 1 days | Discharge: HOME | End: 2024-12-31

## 2024-12-31 DIAGNOSIS — Z98.890 OTHER SPECIFIED POSTPROCEDURAL STATES: Chronic | ICD-10-CM

## 2024-12-31 DIAGNOSIS — D64.9 ANEMIA, UNSPECIFIED: ICD-10-CM

## 2024-12-31 DIAGNOSIS — Z98.84 BARIATRIC SURGERY STATUS: Chronic | ICD-10-CM

## 2024-12-31 DIAGNOSIS — Z90.89 ACQUIRED ABSENCE OF OTHER ORGANS: Chronic | ICD-10-CM

## 2024-12-31 DIAGNOSIS — Z90.711 ACQUIRED ABSENCE OF UTERUS WITH REMAINING CERVICAL STUMP: Chronic | ICD-10-CM

## 2024-12-31 LAB
CRP SERPL-MCNC: <3 MG/L
ERYTHROCYTE [SEDIMENTATION RATE] IN BLOOD BY WESTERGREN METHOD: 6 MM/HR

## 2025-01-02 ENCOUNTER — RESULT REVIEW (OUTPATIENT)
Age: 65
End: 2025-01-02

## 2025-01-02 ENCOUNTER — APPOINTMENT (OUTPATIENT)
Dept: HEMATOLOGY ONCOLOGY | Facility: CLINIC | Age: 65
End: 2025-01-02

## 2025-01-02 ENCOUNTER — APPOINTMENT (OUTPATIENT)
Dept: CARDIOLOGY | Facility: CLINIC | Age: 65
End: 2025-01-02

## 2025-01-02 ENCOUNTER — APPOINTMENT (OUTPATIENT)
Dept: INTERNAL MEDICINE | Facility: CLINIC | Age: 65
End: 2025-01-02
Payer: COMMERCIAL

## 2025-01-02 ENCOUNTER — NON-APPOINTMENT (OUTPATIENT)
Age: 65
End: 2025-01-02

## 2025-01-02 ENCOUNTER — APPOINTMENT (OUTPATIENT)
Dept: INFUSION THERAPY | Facility: CLINIC | Age: 65
End: 2025-01-02

## 2025-01-02 VITALS
DIASTOLIC BLOOD PRESSURE: 72 MMHG | SYSTOLIC BLOOD PRESSURE: 156 MMHG | OXYGEN SATURATION: 97 % | WEIGHT: 140 LBS | HEIGHT: 63 IN | BODY MASS INDEX: 24.8 KG/M2 | TEMPERATURE: 97.6 F | HEART RATE: 75 BPM

## 2025-01-02 VITALS
BODY MASS INDEX: 24.63 KG/M2 | HEART RATE: 69 BPM | OXYGEN SATURATION: 97 % | WEIGHT: 139 LBS | TEMPERATURE: 97.6 F | HEIGHT: 63 IN | DIASTOLIC BLOOD PRESSURE: 62 MMHG | SYSTOLIC BLOOD PRESSURE: 132 MMHG

## 2025-01-02 DIAGNOSIS — E61.1 IRON DEFICIENCY: ICD-10-CM

## 2025-01-02 DIAGNOSIS — D47.3 ESSENTIAL (HEMORRHAGIC) THROMBOCYTHEMIA: ICD-10-CM

## 2025-01-02 LAB
BASOPHILS # BLD AUTO: 0.47 K/UL — HIGH (ref 0–0.2)
BASOPHILS NFR BLD AUTO: 4 % — HIGH (ref 0–2)
DACRYOCYTES BLD QL SMEAR: SLIGHT — SIGNIFICANT CHANGE UP
ELLIPTOCYTES BLD QL SMEAR: SLIGHT — SIGNIFICANT CHANGE UP
EOSINOPHIL # BLD AUTO: 0.12 K/UL — SIGNIFICANT CHANGE UP (ref 0–0.5)
EOSINOPHIL NFR BLD AUTO: 1 % — SIGNIFICANT CHANGE UP (ref 0–6)
GIANT PLATELETS BLD QL SMEAR: PRESENT — SIGNIFICANT CHANGE UP
HCT VFR BLD CALC: 35 % — SIGNIFICANT CHANGE UP (ref 34.5–45)
HGB BLD-MCNC: 10.9 G/DL — LOW (ref 11.5–15.5)
HYPOCHROMIA BLD QL: SLIGHT — SIGNIFICANT CHANGE UP
LG PLATELETS BLD QL AUTO: SIGNIFICANT CHANGE UP
LYMPHOCYTES # BLD AUTO: 1.06 K/UL — SIGNIFICANT CHANGE UP (ref 1–3.3)
LYMPHOCYTES # BLD AUTO: 9 % — LOW (ref 13–44)
MACROCYTES BLD QL: SLIGHT — SIGNIFICANT CHANGE UP
MCHC RBC-ENTMCNC: 24.8 PG — LOW (ref 27–34)
MCHC RBC-ENTMCNC: 31.1 G/DL — LOW (ref 32–36)
MCV RBC AUTO: 79.7 FL — LOW (ref 80–100)
METAMYELOCYTES # FLD: 4 % — HIGH (ref 0–0)
METAMYELOCYTES NFR BLD: 4 % — HIGH (ref 0–0)
MICROCYTES BLD QL: SLIGHT — SIGNIFICANT CHANGE UP
MONOCYTES # BLD AUTO: 0.82 K/UL — SIGNIFICANT CHANGE UP (ref 0–0.9)
MONOCYTES NFR BLD AUTO: 7 % — SIGNIFICANT CHANGE UP (ref 2–14)
MYELOCYTES NFR BLD: 1 % — HIGH (ref 0–0)
NEUTROPHILS # BLD AUTO: 8.7 K/UL — HIGH (ref 1.8–7.4)
NEUTROPHILS NFR BLD AUTO: 74 % — SIGNIFICANT CHANGE UP (ref 43–77)
NEUTS HYPERSEG # BLD: PRESENT — SIGNIFICANT CHANGE UP
NRBC # BLD: 1 /100 WBCS — HIGH (ref 0–0)
NRBC # BLD: SIGNIFICANT CHANGE UP /100 WBCS (ref 0–0)
NRBC BLD-RTO: 1 /100 WBCS — HIGH (ref 0–0)
NRBC BLD-RTO: SIGNIFICANT CHANGE UP /100 WBCS (ref 0–0)
PLAT MORPH BLD: NORMAL — SIGNIFICANT CHANGE UP
PLATELET # BLD AUTO: 796 K/UL — HIGH (ref 150–400)
POLYCHROMASIA BLD QL SMEAR: SLIGHT — SIGNIFICANT CHANGE UP
RBC # BLD: 4.39 M/UL — SIGNIFICANT CHANGE UP (ref 3.8–5.2)
RBC # FLD: 21.1 % — HIGH (ref 10.3–14.5)
RBC BLD AUTO: ABNORMAL
TARGETS BLD QL SMEAR: SLIGHT — SIGNIFICANT CHANGE UP
WBC # BLD: 11.76 K/UL — HIGH (ref 3.8–10.5)
WBC # FLD AUTO: 11.76 K/UL — HIGH (ref 3.8–10.5)

## 2025-01-02 PROCEDURE — G2211 COMPLEX E/M VISIT ADD ON: CPT | Mod: NC

## 2025-01-02 PROCEDURE — 99214 OFFICE O/P EST MOD 30 MIN: CPT

## 2025-01-02 PROCEDURE — 93306 TTE W/DOPPLER COMPLETE: CPT

## 2025-01-03 DIAGNOSIS — D50.9 IRON DEFICIENCY ANEMIA, UNSPECIFIED: ICD-10-CM

## 2025-01-07 PROBLEM — E61.1 IRON DEFICIENCY: Status: ACTIVE | Noted: 2025-01-07

## 2025-01-07 LAB
ALBUMIN SERPL ELPH-MCNC: 4 G/DL
ALP BLD-CCNC: 121 U/L
ALT SERPL-CCNC: 18 U/L
ANION GAP SERPL CALC-SCNC: 13 MMOL/L
APTT BLD: 31.8 SEC
AST SERPL-CCNC: 17 U/L
BILIRUB SERPL-MCNC: 0.2 MG/DL
BUN SERPL-MCNC: 24 MG/DL
CALCIUM SERPL-MCNC: 9.5 MG/DL
CHLORIDE SERPL-SCNC: 102 MMOL/L
CO2 SERPL-SCNC: 22 MMOL/L
CREAT SERPL-MCNC: 0.76 MG/DL
EGFR: 89 ML/MIN/1.73M2
EPO SERPL-MCNC: 7.9 MIU/ML
FERRITIN SERPL-MCNC: 52 NG/ML
FOLATE SERPL-MCNC: 12.6 NG/ML
GLUCOSE SERPL-MCNC: 52 MG/DL
HAPTOGLOB SERPL-MCNC: 271 MG/DL
INR PPP: 1.09 RATIO
IRON SATN MFR SERPL: 4 %
IRON SERPL-MCNC: 16 UG/DL
LDH SERPL-CCNC: 538 U/L
POTASSIUM SERPL-SCNC: 4.7 MMOL/L
PROT SERPL-MCNC: 6.8 G/DL
PT BLD: 12.8 SEC
SODIUM SERPL-SCNC: 136 MMOL/L
TIBC SERPL-MCNC: 440 UG/DL
UIBC SERPL-MCNC: 423 UG/DL
VIT B12 SERPL-MCNC: 605 PG/ML
VWF AG PPP IA-ACNC: 185 %
VWF:RCO ACT/NOR PPP PL AGG: 132 %

## 2025-01-08 DIAGNOSIS — D47.3 ESSENTIAL (HEMORRHAGIC) THROMBOCYTHEMIA: ICD-10-CM

## 2025-01-14 DIAGNOSIS — M79.10 MYALGIA, UNSPECIFIED SITE: ICD-10-CM

## 2025-01-14 DIAGNOSIS — M25.50 PAIN IN UNSPECIFIED JOINT: ICD-10-CM

## 2025-01-15 ENCOUNTER — APPOINTMENT (OUTPATIENT)
Dept: INFUSION THERAPY | Facility: CLINIC | Age: 65
End: 2025-01-15

## 2025-01-15 ENCOUNTER — RESULT REVIEW (OUTPATIENT)
Age: 65
End: 2025-01-15

## 2025-01-15 VITALS
SYSTOLIC BLOOD PRESSURE: 136 MMHG | WEIGHT: 140 LBS | BODY MASS INDEX: 24.8 KG/M2 | TEMPERATURE: 98.5 F | HEIGHT: 63 IN | HEART RATE: 67 BPM | OXYGEN SATURATION: 97 % | DIASTOLIC BLOOD PRESSURE: 69 MMHG

## 2025-01-15 LAB
BASOPHILS # BLD AUTO: 0.73 K/UL — HIGH (ref 0–0.2)
BASOPHILS NFR BLD AUTO: 7.2 % — HIGH (ref 0–2)
BIZARRE PLATELETS BLD QL SMEAR: PRESENT — SIGNIFICANT CHANGE UP
DACRYOCYTES BLD QL SMEAR: SLIGHT — SIGNIFICANT CHANGE UP
ELLIPTOCYTES BLD QL SMEAR: SLIGHT — SIGNIFICANT CHANGE UP
EOSINOPHIL # BLD AUTO: 0.58 K/UL — HIGH (ref 0–0.5)
EOSINOPHIL NFR BLD AUTO: 5.7 % — SIGNIFICANT CHANGE UP (ref 0–6)
GIANT PLATELETS BLD QL SMEAR: PRESENT — SIGNIFICANT CHANGE UP
HCT VFR BLD CALC: 39.7 % — SIGNIFICANT CHANGE UP (ref 34.5–45)
HGB BLD-MCNC: 12.1 G/DL — SIGNIFICANT CHANGE UP (ref 11.5–15.5)
HOWELL-JOLLY BOD BLD QL SMEAR: PRESENT — SIGNIFICANT CHANGE UP
HYPOCHROMIA BLD QL: SLIGHT — SIGNIFICANT CHANGE UP
LG PLATELETS BLD QL AUTO: SIGNIFICANT CHANGE UP
LYMPHOCYTES # BLD AUTO: 0.4 K/UL — LOW (ref 1–3.3)
LYMPHOCYTES # BLD AUTO: 3.9 % — LOW (ref 13–44)
LYMPHOCYTES # SPEC AUTO: 1.1 % — HIGH (ref 0–0)
MACROCYTES BLD QL: SLIGHT — SIGNIFICANT CHANGE UP
MCHC RBC-ENTMCNC: 25.1 PG — LOW (ref 27–34)
MCHC RBC-ENTMCNC: 30.5 G/DL — LOW (ref 32–36)
MCV RBC AUTO: 82.2 FL — SIGNIFICANT CHANGE UP (ref 80–100)
MICROCYTES BLD QL: SLIGHT — SIGNIFICANT CHANGE UP
MONOCYTES # BLD AUTO: 0.95 K/UL — HIGH (ref 0–0.9)
MONOCYTES NFR BLD AUTO: 9.4 % — SIGNIFICANT CHANGE UP (ref 2–14)
MYELOCYTES NFR BLD: 0.6 % — HIGH (ref 0–0)
NEUTROPHILS # BLD AUTO: 6.9 K/UL — SIGNIFICANT CHANGE UP (ref 1.8–7.4)
NEUTROPHILS NFR BLD AUTO: 63.6 % — SIGNIFICANT CHANGE UP (ref 43–77)
NEUTS BAND # BLD: 4.4 % — SIGNIFICANT CHANGE UP (ref 0–8)
NEUTS BAND NFR BLD: 4.4 % — SIGNIFICANT CHANGE UP (ref 0–8)
NRBC # BLD: 3 /100 WBCS — HIGH (ref 0–0)
NRBC # BLD: SIGNIFICANT CHANGE UP /100 WBCS (ref 0–0)
NRBC BLD-RTO: 3 /100 WBCS — HIGH (ref 0–0)
NRBC BLD-RTO: SIGNIFICANT CHANGE UP /100 WBCS (ref 0–0)
OVALOCYTES BLD QL SMEAR: SLIGHT — SIGNIFICANT CHANGE UP
PLAT MORPH BLD: ABNORMAL
PLATELET # BLD AUTO: 712 K/UL — HIGH (ref 150–400)
POIKILOCYTOSIS BLD QL AUTO: SIGNIFICANT CHANGE UP
POLYCHROMASIA BLD QL SMEAR: SIGNIFICANT CHANGE UP
PROMYELOCYTES # FLD: 0.6 % — HIGH (ref 0–0)
PROMYELOCYTES NFR BLD: 0.6 % — HIGH (ref 0–0)
RBC # BLD: 4.83 M/UL — SIGNIFICANT CHANGE UP (ref 3.8–5.2)
RBC # FLD: 22.8 % — HIGH (ref 10.3–14.5)
RBC BLD AUTO: ABNORMAL
SCHISTOCYTES BLD QL AUTO: SLIGHT — SIGNIFICANT CHANGE UP
STOMATOCYTES BLD QL SMEAR: SLIGHT — SIGNIFICANT CHANGE UP
TARGETS BLD QL SMEAR: SLIGHT — SIGNIFICANT CHANGE UP
VARIANT LYMPHS # BLD: 3.5 % — SIGNIFICANT CHANGE UP (ref 0–6)
VARIANT LYMPHS NFR BLD MANUAL: 3.5 % — SIGNIFICANT CHANGE UP (ref 0–6)
WBC # BLD: 10.14 K/UL — SIGNIFICANT CHANGE UP (ref 3.8–10.5)
WBC # FLD AUTO: 10.14 K/UL — SIGNIFICANT CHANGE UP (ref 3.8–10.5)

## 2025-01-21 ENCOUNTER — TRANSCRIPTION ENCOUNTER (OUTPATIENT)
Age: 65
End: 2025-01-21

## 2025-01-22 ENCOUNTER — RESULT REVIEW (OUTPATIENT)
Age: 65
End: 2025-01-22

## 2025-01-22 ENCOUNTER — APPOINTMENT (OUTPATIENT)
Dept: INFUSION THERAPY | Facility: CLINIC | Age: 65
End: 2025-01-22

## 2025-01-22 ENCOUNTER — APPOINTMENT (OUTPATIENT)
Dept: ULTRASOUND IMAGING | Facility: CLINIC | Age: 65
End: 2025-01-22

## 2025-01-22 ENCOUNTER — OUTPATIENT (OUTPATIENT)
Dept: OUTPATIENT SERVICES | Facility: HOSPITAL | Age: 65
LOS: 1 days | End: 2025-01-22
Payer: COMMERCIAL

## 2025-01-22 VITALS
WEIGHT: 143.13 LBS | OXYGEN SATURATION: 96 % | DIASTOLIC BLOOD PRESSURE: 70 MMHG | HEIGHT: 63 IN | TEMPERATURE: 98.2 F | HEART RATE: 74 BPM | SYSTOLIC BLOOD PRESSURE: 144 MMHG | BODY MASS INDEX: 25.36 KG/M2

## 2025-01-22 DIAGNOSIS — Z98.84 BARIATRIC SURGERY STATUS: Chronic | ICD-10-CM

## 2025-01-22 DIAGNOSIS — Z98.890 OTHER SPECIFIED POSTPROCEDURAL STATES: Chronic | ICD-10-CM

## 2025-01-22 DIAGNOSIS — D47.3 ESSENTIAL (HEMORRHAGIC) THROMBOCYTHEMIA: ICD-10-CM

## 2025-01-22 LAB
HCT VFR BLD CALC: 38.5 % — SIGNIFICANT CHANGE UP (ref 34.5–45)
HGB BLD-MCNC: 11.9 G/DL — SIGNIFICANT CHANGE UP (ref 11.5–15.5)
MCHC RBC-ENTMCNC: 25.4 PG — LOW (ref 27–34)
MCHC RBC-ENTMCNC: 30.9 G/DL — LOW (ref 32–36)
MCV RBC AUTO: 82.3 FL — SIGNIFICANT CHANGE UP (ref 80–100)
PLATELET # BLD AUTO: 573 K/UL — HIGH (ref 150–400)
RBC # BLD: 4.68 M/UL — SIGNIFICANT CHANGE UP (ref 3.8–5.2)
RBC # FLD: 23.8 % — HIGH (ref 10.3–14.5)
WBC # BLD: 12.17 K/UL — HIGH (ref 3.8–10.5)
WBC # FLD AUTO: 12.17 K/UL — HIGH (ref 3.8–10.5)

## 2025-01-22 PROCEDURE — 93971 EXTREMITY STUDY: CPT

## 2025-01-22 PROCEDURE — 93971 EXTREMITY STUDY: CPT | Mod: 26,RT

## 2025-01-23 LAB
BASOPHILS # BLD AUTO: 0.73 K/UL — HIGH (ref 0–0.2)
BASOPHILS NFR BLD AUTO: 6 % — HIGH (ref 0–2)
BLASTS # FLD: 4 % — HIGH (ref 0–0)
BLASTS NFR BLD: 4 % — HIGH (ref 0–0)
ELLIPTOCYTES BLD QL SMEAR: SLIGHT — SIGNIFICANT CHANGE UP
EOSINOPHIL # BLD AUTO: 0 K/UL — SIGNIFICANT CHANGE UP (ref 0–0.5)
EOSINOPHIL NFR BLD AUTO: 0 % — SIGNIFICANT CHANGE UP (ref 0–6)
GIANT PLATELETS BLD QL SMEAR: PRESENT — SIGNIFICANT CHANGE UP
HYPOCHROMIA BLD QL: SLIGHT — SIGNIFICANT CHANGE UP
LG PLATELETS BLD QL AUTO: SIGNIFICANT CHANGE UP
LYMPHOCYTES # BLD AUTO: 1.22 K/UL — SIGNIFICANT CHANGE UP (ref 1–3.3)
LYMPHOCYTES # BLD AUTO: 10 % — LOW (ref 13–44)
MACROCYTES BLD QL: SIGNIFICANT CHANGE UP
MICROCYTES BLD QL: SLIGHT — SIGNIFICANT CHANGE UP
MONOCYTES # BLD AUTO: 1.7 K/UL — HIGH (ref 0–0.9)
MONOCYTES NFR BLD AUTO: 14 % — SIGNIFICANT CHANGE UP (ref 2–14)
MYELOCYTES NFR BLD: 2 % — HIGH (ref 0–0)
NEUTROPHILS # BLD AUTO: 7.42 K/UL — HIGH (ref 1.8–7.4)
NEUTROPHILS NFR BLD AUTO: 61 % — SIGNIFICANT CHANGE UP (ref 43–77)
NRBC # BLD: 1 /100 WBCS — HIGH (ref 0–0)
NRBC # BLD: SIGNIFICANT CHANGE UP /100 WBCS (ref 0–0)
NRBC BLD-RTO: 1 /100 WBCS — HIGH (ref 0–0)
NRBC BLD-RTO: SIGNIFICANT CHANGE UP /100 WBCS (ref 0–0)
PLAT MORPH BLD: NORMAL — SIGNIFICANT CHANGE UP
POIKILOCYTOSIS BLD QL AUTO: SLIGHT — SIGNIFICANT CHANGE UP
POLYCHROMASIA BLD QL SMEAR: SIGNIFICANT CHANGE UP
RBC BLD AUTO: ABNORMAL
SCHISTOCYTES BLD QL AUTO: SLIGHT — SIGNIFICANT CHANGE UP
STOMATOCYTES BLD QL SMEAR: SLIGHT — SIGNIFICANT CHANGE UP
VARIANT LYMPHS # BLD: 3 % — SIGNIFICANT CHANGE UP (ref 0–6)
VARIANT LYMPHS NFR BLD MANUAL: 3 % — SIGNIFICANT CHANGE UP (ref 0–6)

## 2025-01-23 RX ORDER — OXYCODONE AND ACETAMINOPHEN 5; 325 MG/1; MG/1
5-325 TABLET ORAL
Qty: 30 | Refills: 0 | Status: ACTIVE | COMMUNITY
Start: 2025-01-23 | End: 1900-01-01

## 2025-01-27 ENCOUNTER — TRANSCRIPTION ENCOUNTER (OUTPATIENT)
Age: 65
End: 2025-01-27

## 2025-01-28 ENCOUNTER — RESULT REVIEW (OUTPATIENT)
Age: 65
End: 2025-01-28

## 2025-01-28 ENCOUNTER — APPOINTMENT (OUTPATIENT)
Dept: INFUSION THERAPY | Facility: CLINIC | Age: 65
End: 2025-01-28

## 2025-01-28 ENCOUNTER — APPOINTMENT (OUTPATIENT)
Dept: CARDIOLOGY | Facility: CLINIC | Age: 65
End: 2025-01-28

## 2025-01-28 ENCOUNTER — APPOINTMENT (OUTPATIENT)
Dept: CARDIOLOGY | Facility: CLINIC | Age: 65
End: 2025-01-28
Payer: COMMERCIAL

## 2025-01-28 ENCOUNTER — NON-APPOINTMENT (OUTPATIENT)
Age: 65
End: 2025-01-28

## 2025-01-28 VITALS
BODY MASS INDEX: 25.96 KG/M2 | OXYGEN SATURATION: 97 % | HEART RATE: 75 BPM | HEIGHT: 63 IN | DIASTOLIC BLOOD PRESSURE: 70 MMHG | SYSTOLIC BLOOD PRESSURE: 168 MMHG

## 2025-01-28 VITALS — SYSTOLIC BLOOD PRESSURE: 140 MMHG | DIASTOLIC BLOOD PRESSURE: 72 MMHG

## 2025-01-28 VITALS
OXYGEN SATURATION: 95 % | BODY MASS INDEX: 25.97 KG/M2 | TEMPERATURE: 97.8 F | HEART RATE: 73 BPM | HEIGHT: 63 IN | DIASTOLIC BLOOD PRESSURE: 64 MMHG | SYSTOLIC BLOOD PRESSURE: 167 MMHG | WEIGHT: 146.56 LBS

## 2025-01-28 DIAGNOSIS — E78.5 HYPERLIPIDEMIA, UNSPECIFIED: ICD-10-CM

## 2025-01-28 DIAGNOSIS — D47.3 ESSENTIAL (HEMORRHAGIC) THROMBOCYTHEMIA: ICD-10-CM

## 2025-01-28 DIAGNOSIS — I31.39 OTHER PERICARDIAL EFFUSION (NONINFLAMMATORY): ICD-10-CM

## 2025-01-28 DIAGNOSIS — R94.31 ABNORMAL ELECTROCARDIOGRAM [ECG] [EKG]: ICD-10-CM

## 2025-01-28 DIAGNOSIS — I48.0 PAROXYSMAL ATRIAL FIBRILLATION: ICD-10-CM

## 2025-01-28 DIAGNOSIS — I10 ESSENTIAL (PRIMARY) HYPERTENSION: ICD-10-CM

## 2025-01-28 DIAGNOSIS — I47.20 VENTRICULAR TACHYCARDIA, UNSPECIFIED: ICD-10-CM

## 2025-01-28 LAB
ANISOCYTOSIS BLD QL: SIGNIFICANT CHANGE UP
BASOPHILS # BLD AUTO: 1.04 K/UL — HIGH (ref 0–0.2)
BASOPHILS NFR BLD AUTO: 10 % — HIGH (ref 0–2)
BIZARRE PLATELETS BLD QL SMEAR: PRESENT — SIGNIFICANT CHANGE UP
BLASTS # FLD: 7 % — HIGH (ref 0–0)
BLASTS NFR BLD: 7 % — HIGH (ref 0–0)
ELLIPTOCYTES BLD QL SMEAR: SLIGHT — SIGNIFICANT CHANGE UP
EOSINOPHIL # BLD AUTO: 0.31 K/UL — SIGNIFICANT CHANGE UP (ref 0–0.5)
EOSINOPHIL NFR BLD AUTO: 3 % — SIGNIFICANT CHANGE UP (ref 0–6)
GIANT PLATELETS BLD QL SMEAR: PRESENT — SIGNIFICANT CHANGE UP
HCT VFR BLD CALC: 39.8 % — SIGNIFICANT CHANGE UP (ref 34.5–45)
HGB BLD-MCNC: 12.1 G/DL — SIGNIFICANT CHANGE UP (ref 11.5–15.5)
LG PLATELETS BLD QL AUTO: SIGNIFICANT CHANGE UP
LYMPHOCYTES # BLD AUTO: 1.45 K/UL — SIGNIFICANT CHANGE UP (ref 1–3.3)
LYMPHOCYTES # BLD AUTO: 14 % — SIGNIFICANT CHANGE UP (ref 13–44)
MACROCYTES BLD QL: SIGNIFICANT CHANGE UP
MCHC RBC-ENTMCNC: 25.5 PG — LOW (ref 27–34)
MCHC RBC-ENTMCNC: 30.4 G/DL — LOW (ref 32–36)
MCV RBC AUTO: 83.8 FL — SIGNIFICANT CHANGE UP (ref 80–100)
METAMYELOCYTES # FLD: 5 % — HIGH (ref 0–0)
METAMYELOCYTES NFR BLD: 5 % — HIGH (ref 0–0)
MICROCYTES BLD QL: SLIGHT — SIGNIFICANT CHANGE UP
MONOCYTES # BLD AUTO: 0.62 K/UL — SIGNIFICANT CHANGE UP (ref 0–0.9)
MONOCYTES NFR BLD AUTO: 6 % — SIGNIFICANT CHANGE UP (ref 2–14)
MYELOCYTES NFR BLD: 2 % — HIGH (ref 0–0)
NEUTROPHILS # BLD AUTO: 5.29 K/UL — SIGNIFICANT CHANGE UP (ref 1.8–7.4)
NEUTROPHILS NFR BLD AUTO: 51 % — SIGNIFICANT CHANGE UP (ref 43–77)
NRBC # BLD: 6 /100 WBCS — HIGH (ref 0–0)
NRBC # BLD: SIGNIFICANT CHANGE UP /100 WBCS (ref 0–0)
NRBC BLD-RTO: 6 /100 WBCS — HIGH (ref 0–0)
NRBC BLD-RTO: SIGNIFICANT CHANGE UP /100 WBCS (ref 0–0)
PLAT MORPH BLD: ABNORMAL
PLATELET # BLD AUTO: 550 K/UL — HIGH (ref 150–400)
POIKILOCYTOSIS BLD QL AUTO: SLIGHT — SIGNIFICANT CHANGE UP
POLYCHROMASIA BLD QL SMEAR: SLIGHT — SIGNIFICANT CHANGE UP
PROMYELOCYTES # FLD: 2 % — HIGH (ref 0–0)
PROMYELOCYTES NFR BLD: 2 % — HIGH (ref 0–0)
RBC # BLD: 4.75 M/UL — SIGNIFICANT CHANGE UP (ref 3.8–5.2)
RBC # FLD: 23.9 % — HIGH (ref 10.3–14.5)
RBC BLD AUTO: ABNORMAL
SCHISTOCYTES BLD QL AUTO: SLIGHT — SIGNIFICANT CHANGE UP
STOMATOCYTES BLD QL SMEAR: SLIGHT — SIGNIFICANT CHANGE UP
TARGETS BLD QL SMEAR: SLIGHT — SIGNIFICANT CHANGE UP
WBC # BLD: 10.38 K/UL — SIGNIFICANT CHANGE UP (ref 3.8–10.5)
WBC # FLD AUTO: 10.38 K/UL — SIGNIFICANT CHANGE UP (ref 3.8–10.5)

## 2025-01-28 PROCEDURE — 93000 ELECTROCARDIOGRAM COMPLETE: CPT

## 2025-01-28 PROCEDURE — G2211 COMPLEX E/M VISIT ADD ON: CPT | Mod: NC

## 2025-01-28 PROCEDURE — 99214 OFFICE O/P EST MOD 30 MIN: CPT

## 2025-01-29 DIAGNOSIS — D69.3 IMMUNE THROMBOCYTOPENIC PURPURA: ICD-10-CM

## 2025-02-05 ENCOUNTER — APPOINTMENT (OUTPATIENT)
Dept: INFUSION THERAPY | Facility: CLINIC | Age: 65
End: 2025-02-05

## 2025-02-05 VITALS
HEART RATE: 71 BPM | BODY MASS INDEX: 25.69 KG/M2 | WEIGHT: 145 LBS | DIASTOLIC BLOOD PRESSURE: 78 MMHG | TEMPERATURE: 98.7 F | SYSTOLIC BLOOD PRESSURE: 151 MMHG | HEIGHT: 63 IN | OXYGEN SATURATION: 97 %

## 2025-02-12 ENCOUNTER — APPOINTMENT (OUTPATIENT)
Dept: INFUSION THERAPY | Facility: CLINIC | Age: 65
End: 2025-02-12

## 2025-02-13 ENCOUNTER — TRANSCRIPTION ENCOUNTER (OUTPATIENT)
Age: 65
End: 2025-02-13

## 2025-03-06 ENCOUNTER — TRANSCRIPTION ENCOUNTER (OUTPATIENT)
Age: 65
End: 2025-03-06

## 2025-03-09 ENCOUNTER — OUTPATIENT (OUTPATIENT)
Dept: OUTPATIENT SERVICES | Facility: HOSPITAL | Age: 65
LOS: 1 days | Discharge: HOME | End: 2025-03-09

## 2025-03-09 DIAGNOSIS — Z98.84 BARIATRIC SURGERY STATUS: Chronic | ICD-10-CM

## 2025-03-09 DIAGNOSIS — D50.9 IRON DEFICIENCY ANEMIA, UNSPECIFIED: ICD-10-CM

## 2025-03-09 DIAGNOSIS — D47.3 ESSENTIAL (HEMORRHAGIC) THROMBOCYTHEMIA: ICD-10-CM

## 2025-03-09 DIAGNOSIS — Z98.890 OTHER SPECIFIED POSTPROCEDURAL STATES: Chronic | ICD-10-CM

## 2025-03-09 DIAGNOSIS — Z90.89 ACQUIRED ABSENCE OF OTHER ORGANS: Chronic | ICD-10-CM

## 2025-03-09 DIAGNOSIS — Z90.711 ACQUIRED ABSENCE OF UTERUS WITH REMAINING CERVICAL STUMP: Chronic | ICD-10-CM

## 2025-03-09 DIAGNOSIS — D69.3 IMMUNE THROMBOCYTOPENIC PURPURA: ICD-10-CM

## 2025-03-09 DIAGNOSIS — E61.1 IRON DEFICIENCY: ICD-10-CM

## 2025-03-14 ENCOUNTER — RESULT REVIEW (OUTPATIENT)
Age: 65
End: 2025-03-14

## 2025-03-14 ENCOUNTER — APPOINTMENT (OUTPATIENT)
Dept: HEMATOLOGY ONCOLOGY | Facility: CLINIC | Age: 65
End: 2025-03-14

## 2025-03-14 ENCOUNTER — LABORATORY RESULT (OUTPATIENT)
Age: 65
End: 2025-03-14

## 2025-03-14 VITALS
BODY MASS INDEX: 25.52 KG/M2 | SYSTOLIC BLOOD PRESSURE: 144 MMHG | TEMPERATURE: 98.3 F | HEART RATE: 72 BPM | WEIGHT: 144 LBS | OXYGEN SATURATION: 96 % | HEIGHT: 63 IN | DIASTOLIC BLOOD PRESSURE: 73 MMHG

## 2025-03-14 DIAGNOSIS — I73.81 ERYTHROMELALGIA: ICD-10-CM

## 2025-03-14 DIAGNOSIS — D68.00 VON WILLEBRAND DISEASE, UNSPECIFIED: ICD-10-CM

## 2025-03-14 LAB
AGGLUTINATION: PRESENT — SIGNIFICANT CHANGE UP
ANISOCYTOSIS BLD QL: SIGNIFICANT CHANGE UP
BASOPHILS # BLD AUTO: 0.32 K/UL — HIGH (ref 0–0.2)
BASOPHILS NFR BLD AUTO: 7 % — HIGH (ref 0–2)
BIZARRE PLATELETS BLD QL SMEAR: PRESENT — SIGNIFICANT CHANGE UP
BLASTS # FLD: 6 % — HIGH (ref 0–0)
BLASTS NFR BLD: 6 % — HIGH (ref 0–0)
ELLIPTOCYTES BLD QL SMEAR: SLIGHT — SIGNIFICANT CHANGE UP
EOSINOPHIL # BLD AUTO: 0 K/UL — SIGNIFICANT CHANGE UP (ref 0–0.5)
EOSINOPHIL NFR BLD AUTO: 0 % — SIGNIFICANT CHANGE UP (ref 0–6)
GIANT PLATELETS BLD QL SMEAR: PRESENT — SIGNIFICANT CHANGE UP
HCT VFR BLD CALC: 35.2 % — SIGNIFICANT CHANGE UP (ref 34.5–45)
HGB BLD-MCNC: 11 G/DL — LOW (ref 11.5–15.5)
LG PLATELETS BLD QL AUTO: SIGNIFICANT CHANGE UP
LYMPHOCYTES # BLD AUTO: 0.32 K/UL — LOW (ref 1–3.3)
LYMPHOCYTES # BLD AUTO: 7 % — LOW (ref 13–44)
MACROCYTES BLD QL: SIGNIFICANT CHANGE UP
MCHC RBC-ENTMCNC: 28.3 PG — SIGNIFICANT CHANGE UP (ref 27–34)
MCHC RBC-ENTMCNC: 31.3 G/DL — LOW (ref 32–36)
MCV RBC AUTO: 90.5 FL — SIGNIFICANT CHANGE UP (ref 80–100)
MICROCYTES BLD QL: SLIGHT — SIGNIFICANT CHANGE UP
MONOCYTES # BLD AUTO: 0.59 K/UL — SIGNIFICANT CHANGE UP (ref 0–0.9)
MONOCYTES NFR BLD AUTO: 13 % — SIGNIFICANT CHANGE UP (ref 2–14)
NEUTROPHILS # BLD AUTO: 2.89 K/UL — SIGNIFICANT CHANGE UP (ref 1.8–7.4)
NEUTROPHILS NFR BLD AUTO: 62 % — SIGNIFICANT CHANGE UP (ref 43–77)
NEUTS BAND # BLD: 2 % — SIGNIFICANT CHANGE UP (ref 0–8)
NEUTS BAND NFR BLD: 2 % — SIGNIFICANT CHANGE UP (ref 0–8)
NEUTS HYPERSEG # BLD: PRESENT — SIGNIFICANT CHANGE UP
NEUTS VAC BLD QL SMEAR: SIGNIFICANT CHANGE UP
NRBC # BLD: 1 /100 WBCS — HIGH (ref 0–0)
NRBC BLD AUTO-RTO: SIGNIFICANT CHANGE UP /100 WBCS (ref 0–0)
NRBC BLD-RTO: 1 /100 WBCS — HIGH (ref 0–0)
PLAT MORPH BLD: ABNORMAL
PLATELET # BLD AUTO: 227 K/UL — SIGNIFICANT CHANGE UP (ref 150–400)
POIKILOCYTOSIS BLD QL AUTO: SLIGHT — SIGNIFICANT CHANGE UP
POLYCHROMASIA BLD QL SMEAR: SLIGHT — SIGNIFICANT CHANGE UP
RBC # BLD: 3.89 M/UL — SIGNIFICANT CHANGE UP (ref 3.8–5.2)
RBC # FLD: 24.3 % — HIGH (ref 10.3–14.5)
RBC BLD AUTO: ABNORMAL
SCHISTOCYTES BLD QL AUTO: SLIGHT — SIGNIFICANT CHANGE UP
STOMATOCYTES BLD QL SMEAR: SLIGHT — SIGNIFICANT CHANGE UP
VARIANT LYMPHS # BLD: 3 % — SIGNIFICANT CHANGE UP (ref 0–6)
VARIANT LYMPHS NFR BLD MANUAL: 3 % — SIGNIFICANT CHANGE UP (ref 0–6)
WBC # BLD: 4.51 K/UL — SIGNIFICANT CHANGE UP (ref 3.8–10.5)
WBC # FLD AUTO: 4.51 K/UL — SIGNIFICANT CHANGE UP (ref 3.8–10.5)

## 2025-03-14 PROCEDURE — 38221 DX BONE MARROW BIOPSIES: CPT | Mod: LT

## 2025-03-14 PROCEDURE — 99215 OFFICE O/P EST HI 40 MIN: CPT

## 2025-03-14 RX ORDER — HYDROXYUREA 500 MG/1
500 CAPSULE ORAL DAILY
Qty: 30 | Refills: 0 | Status: ACTIVE | COMMUNITY
Start: 2025-03-14 | End: 1900-01-01

## 2025-03-14 RX ORDER — OXYCODONE AND ACETAMINOPHEN 5; 325 MG/1; MG/1
5-325 TABLET ORAL EVERY 6 HOURS
Qty: 90 | Refills: 0 | Status: ACTIVE | COMMUNITY
Start: 2025-03-14 | End: 1900-01-01

## 2025-03-15 LAB
ALBUMIN SERPL ELPH-MCNC: 4.2 G/DL
ALP BLD-CCNC: 138 U/L
ALT SERPL-CCNC: 17 U/L
ANION GAP SERPL CALC-SCNC: 14 MMOL/L
AST SERPL-CCNC: 18 U/L
BILIRUB SERPL-MCNC: 0.2 MG/DL
BUN SERPL-MCNC: 24 MG/DL
CALCIUM SERPL-MCNC: 9 MG/DL
CHLORIDE SERPL-SCNC: 100 MMOL/L
CO2 SERPL-SCNC: 25 MMOL/L
CREAT SERPL-MCNC: 0.71 MG/DL
EGFRCR SERPLBLD CKD-EPI 2021: 96 ML/MIN/1.73M2
FERRITIN SERPL-MCNC: 511 NG/ML
GLUCOSE SERPL-MCNC: 90 MG/DL
HAPTOGLOB SERPL-MCNC: 323 MG/DL
IRON SATN MFR SERPL: 10 %
IRON SERPL-MCNC: 34 UG/DL
LDH SERPL-CCNC: 533 U/L
POTASSIUM SERPL-SCNC: 4.4 MMOL/L
PROT SERPL-MCNC: 6.5 G/DL
SODIUM SERPL-SCNC: 138 MMOL/L
TIBC SERPL-MCNC: 336 UG/DL
UIBC SERPL-MCNC: 301 UG/DL

## 2025-03-20 ENCOUNTER — RESULT REVIEW (OUTPATIENT)
Age: 65
End: 2025-03-20

## 2025-03-20 ENCOUNTER — LABORATORY RESULT (OUTPATIENT)
Age: 65
End: 2025-03-20

## 2025-03-20 ENCOUNTER — APPOINTMENT (OUTPATIENT)
Dept: HEMATOLOGY ONCOLOGY | Facility: CLINIC | Age: 65
End: 2025-03-20

## 2025-03-20 DIAGNOSIS — D47.3 ESSENTIAL (HEMORRHAGIC) THROMBOCYTHEMIA: ICD-10-CM

## 2025-03-20 LAB
AGGLUTINATION: PRESENT — SIGNIFICANT CHANGE UP
ANISOCYTOSIS BLD QL: SIGNIFICANT CHANGE UP
BASOPHILS # BLD AUTO: 0.36 K/UL — HIGH (ref 0–0.2)
BASOPHILS NFR BLD AUTO: 8 % — HIGH (ref 0–2)
BIZARRE PLATELETS BLD QL SMEAR: PRESENT — SIGNIFICANT CHANGE UP
BLASTS # FLD: 5 % — HIGH (ref 0–0)
BLASTS NFR BLD: 5 % — HIGH (ref 0–0)
DACRYOCYTES BLD QL SMEAR: SLIGHT — SIGNIFICANT CHANGE UP
ELLIPTOCYTES BLD QL SMEAR: SLIGHT — SIGNIFICANT CHANGE UP
EOSINOPHIL # BLD AUTO: 0.05 K/UL — SIGNIFICANT CHANGE UP (ref 0–0.5)
EOSINOPHIL NFR BLD AUTO: 1 % — SIGNIFICANT CHANGE UP (ref 0–6)
GIANT PLATELETS BLD QL SMEAR: PRESENT — SIGNIFICANT CHANGE UP
HCT VFR BLD CALC: 37.2 % — SIGNIFICANT CHANGE UP (ref 34.5–45)
HGB BLD-MCNC: 11.6 G/DL — SIGNIFICANT CHANGE UP (ref 11.5–15.5)
HYPOCHROMIA BLD QL: SLIGHT — SIGNIFICANT CHANGE UP
HYPOGRANULAR PLT: PRESENT
LG PLATELETS BLD QL AUTO: SIGNIFICANT CHANGE UP
LYMPHOCYTES # BLD AUTO: 0.41 K/UL — LOW (ref 1–3.3)
LYMPHOCYTES # BLD AUTO: 9 % — LOW (ref 13–44)
MACROCYTES BLD QL: SIGNIFICANT CHANGE UP
MCHC RBC-ENTMCNC: 28.2 PG — SIGNIFICANT CHANGE UP (ref 27–34)
MCHC RBC-ENTMCNC: 31.2 G/DL — LOW (ref 32–36)
MCV RBC AUTO: 90.3 FL — SIGNIFICANT CHANGE UP (ref 80–100)
METAMYELOCYTES # FLD: 1 % — HIGH (ref 0–0)
METAMYELOCYTES NFR BLD: 1 % — HIGH (ref 0–0)
MICROCYTES BLD QL: SLIGHT — SIGNIFICANT CHANGE UP
MONOCYTES # BLD AUTO: 0.73 K/UL — SIGNIFICANT CHANGE UP (ref 0–0.9)
MONOCYTES NFR BLD AUTO: 16 % — HIGH (ref 2–14)
NEUTROPHILS # BLD AUTO: 2.59 K/UL — SIGNIFICANT CHANGE UP (ref 1.8–7.4)
NEUTROPHILS NFR BLD AUTO: 56 % — SIGNIFICANT CHANGE UP (ref 43–77)
NEUTS BAND # BLD: 1 % — SIGNIFICANT CHANGE UP (ref 0–8)
NEUTS BAND NFR BLD: 1 % — SIGNIFICANT CHANGE UP (ref 0–8)
NEUTS HYPERSEG # BLD: PRESENT — SIGNIFICANT CHANGE UP
NEUTS VAC BLD QL SMEAR: SIGNIFICANT CHANGE UP
NRBC # BLD: 2 /100 WBCS — HIGH (ref 0–0)
NRBC BLD AUTO-RTO: SIGNIFICANT CHANGE UP /100 WBCS (ref 0–0)
NRBC BLD-RTO: 2 /100 WBCS — HIGH (ref 0–0)
OVALOCYTES BLD QL SMEAR: SLIGHT — SIGNIFICANT CHANGE UP
PLAT MORPH BLD: ABNORMAL
PLATELET # BLD AUTO: 222 K/UL — SIGNIFICANT CHANGE UP (ref 150–400)
POIKILOCYTOSIS BLD QL AUTO: SLIGHT — SIGNIFICANT CHANGE UP
POLYCHROMASIA BLD QL SMEAR: SLIGHT — SIGNIFICANT CHANGE UP
PROMYELOCYTES # FLD: 2 % — HIGH (ref 0–0)
PROMYELOCYTES NFR BLD: 2 % — HIGH (ref 0–0)
RBC # BLD: 4.12 M/UL — SIGNIFICANT CHANGE UP (ref 3.8–5.2)
RBC # FLD: 24.1 % — HIGH (ref 10.3–14.5)
RBC BLD AUTO: ABNORMAL
SCHISTOCYTES BLD QL AUTO: SLIGHT — SIGNIFICANT CHANGE UP
STOMATOCYTES BLD QL SMEAR: SLIGHT — SIGNIFICANT CHANGE UP
VARIANT LYMPHS # BLD: 1 % — SIGNIFICANT CHANGE UP (ref 0–6)
VARIANT LYMPHS NFR BLD MANUAL: 1 % — SIGNIFICANT CHANGE UP (ref 0–6)
WBC # BLD: 4.54 K/UL — SIGNIFICANT CHANGE UP (ref 3.8–10.5)
WBC # FLD AUTO: 4.54 K/UL — SIGNIFICANT CHANGE UP (ref 3.8–10.5)

## 2025-03-24 DIAGNOSIS — I73.81 ERYTHROMELALGIA: ICD-10-CM

## 2025-03-24 DIAGNOSIS — D68.00 VON WILLEBRAND DISEASE, UNSPECIFIED: ICD-10-CM

## 2025-03-25 ENCOUNTER — NON-APPOINTMENT (OUTPATIENT)
Age: 65
End: 2025-03-25

## 2025-03-25 ENCOUNTER — APPOINTMENT (OUTPATIENT)
Dept: CARDIOLOGY | Facility: CLINIC | Age: 65
End: 2025-03-25
Payer: COMMERCIAL

## 2025-03-25 VITALS
DIASTOLIC BLOOD PRESSURE: 62 MMHG | RESPIRATION RATE: 14 BRPM | HEIGHT: 63 IN | OXYGEN SATURATION: 98 % | SYSTOLIC BLOOD PRESSURE: 124 MMHG | HEART RATE: 67 BPM | BODY MASS INDEX: 25.52 KG/M2 | WEIGHT: 144 LBS

## 2025-03-25 VITALS
OXYGEN SATURATION: 98 % | HEIGHT: 63 IN | HEART RATE: 67 BPM | DIASTOLIC BLOOD PRESSURE: 62 MMHG | SYSTOLIC BLOOD PRESSURE: 124 MMHG

## 2025-03-25 DIAGNOSIS — I48.0 PAROXYSMAL ATRIAL FIBRILLATION: ICD-10-CM

## 2025-03-25 DIAGNOSIS — I30.0 ACUTE NONSPECIFIC IDIOPATHIC PERICARDITIS: ICD-10-CM

## 2025-03-25 DIAGNOSIS — I10 ESSENTIAL (PRIMARY) HYPERTENSION: ICD-10-CM

## 2025-03-25 PROCEDURE — G2211 COMPLEX E/M VISIT ADD ON: CPT | Mod: NC

## 2025-03-25 PROCEDURE — 93000 ELECTROCARDIOGRAM COMPLETE: CPT

## 2025-03-25 PROCEDURE — 99214 OFFICE O/P EST MOD 30 MIN: CPT

## 2025-03-27 ENCOUNTER — RESULT REVIEW (OUTPATIENT)
Age: 65
End: 2025-03-27

## 2025-03-27 ENCOUNTER — APPOINTMENT (OUTPATIENT)
Dept: HEMATOLOGY ONCOLOGY | Facility: CLINIC | Age: 65
End: 2025-03-27

## 2025-03-27 VITALS
TEMPERATURE: 98.7 F | DIASTOLIC BLOOD PRESSURE: 66 MMHG | SYSTOLIC BLOOD PRESSURE: 136 MMHG | HEART RATE: 72 BPM | BODY MASS INDEX: 26.4 KG/M2 | HEIGHT: 63 IN | OXYGEN SATURATION: 97 % | WEIGHT: 149 LBS

## 2025-03-27 PROBLEM — D75.81 MYELOFIBROSIS: Status: ACTIVE | Noted: 2025-03-27

## 2025-03-27 LAB
AGGLUTINATION: PRESENT — SIGNIFICANT CHANGE UP
ANISOCYTOSIS BLD QL: SIGNIFICANT CHANGE UP
BASOPHILS # BLD AUTO: 0.37 K/UL — HIGH (ref 0–0.2)
BASOPHILS NFR BLD AUTO: 9 % — HIGH (ref 0–2)
BIZARRE PLATELETS BLD QL SMEAR: PRESENT — SIGNIFICANT CHANGE UP
BLASTS # FLD: 4 % — HIGH (ref 0–0)
BLASTS NFR BLD: 4 % — HIGH (ref 0–0)
DACRYOCYTES BLD QL SMEAR: SLIGHT — SIGNIFICANT CHANGE UP
ELLIPTOCYTES BLD QL SMEAR: SLIGHT — SIGNIFICANT CHANGE UP
EOSINOPHIL # BLD AUTO: 0 K/UL — SIGNIFICANT CHANGE UP (ref 0–0.5)
EOSINOPHIL NFR BLD AUTO: 0 % — SIGNIFICANT CHANGE UP (ref 0–6)
GIANT PLATELETS BLD QL SMEAR: PRESENT — SIGNIFICANT CHANGE UP
HCT VFR BLD CALC: 36.1 % — SIGNIFICANT CHANGE UP (ref 34.5–45)
HGB BLD-MCNC: 11.3 G/DL — LOW (ref 11.5–15.5)
HYPOCHROMIA BLD QL: SLIGHT — SIGNIFICANT CHANGE UP
HYPOGRANULAR PLT: PRESENT
LG PLATELETS BLD QL AUTO: SIGNIFICANT CHANGE UP
LYMPHOCYTES # BLD AUTO: 0.45 K/UL — LOW (ref 1–3.3)
LYMPHOCYTES # BLD AUTO: 11 % — LOW (ref 13–44)
MACROCYTES BLD QL: SLIGHT — SIGNIFICANT CHANGE UP
MCHC RBC-ENTMCNC: 28.2 PG — SIGNIFICANT CHANGE UP (ref 27–34)
MCHC RBC-ENTMCNC: 31.3 G/DL — LOW (ref 32–36)
MCV RBC AUTO: 90 FL — SIGNIFICANT CHANGE UP (ref 80–100)
METAMYELOCYTES # FLD: 1 % — HIGH (ref 0–0)
METAMYELOCYTES NFR BLD: 1 % — HIGH (ref 0–0)
MICROCYTES BLD QL: SLIGHT — SIGNIFICANT CHANGE UP
MONOCYTES # BLD AUTO: 0.16 K/UL — SIGNIFICANT CHANGE UP (ref 0–0.9)
MONOCYTES NFR BLD AUTO: 4 % — SIGNIFICANT CHANGE UP (ref 2–14)
NEUTROPHILS # BLD AUTO: 2.57 K/UL — SIGNIFICANT CHANGE UP (ref 1.8–7.4)
NEUTROPHILS NFR BLD AUTO: 62 % — SIGNIFICANT CHANGE UP (ref 43–77)
NEUTS BAND # BLD: 1 % — SIGNIFICANT CHANGE UP (ref 0–8)
NEUTS BAND NFR BLD: 1 % — SIGNIFICANT CHANGE UP (ref 0–8)
NEUTS HYPERSEG # BLD: PRESENT — SIGNIFICANT CHANGE UP
NEUTS VAC BLD QL SMEAR: SLIGHT — SIGNIFICANT CHANGE UP
NRBC # BLD: 6 /100 WBCS — HIGH (ref 0–0)
NRBC BLD AUTO-RTO: SIGNIFICANT CHANGE UP /100 WBCS (ref 0–0)
NRBC BLD-RTO: 6 /100 WBCS — HIGH (ref 0–0)
OVALOCYTES BLD QL SMEAR: SLIGHT — SIGNIFICANT CHANGE UP
PLAT MORPH BLD: ABNORMAL
PLATELET # BLD AUTO: 208 K/UL — SIGNIFICANT CHANGE UP (ref 150–400)
POIKILOCYTOSIS BLD QL AUTO: SIGNIFICANT CHANGE UP
POLYCHROMASIA BLD QL SMEAR: SLIGHT — SIGNIFICANT CHANGE UP
PROMYELOCYTES # FLD: 1 % — HIGH (ref 0–0)
PROMYELOCYTES NFR BLD: 1 % — HIGH (ref 0–0)
RBC # BLD: 4.01 M/UL — SIGNIFICANT CHANGE UP (ref 3.8–5.2)
RBC # FLD: 24 % — HIGH (ref 10.3–14.5)
RBC BLD AUTO: ABNORMAL
SCHISTOCYTES BLD QL AUTO: SLIGHT — SIGNIFICANT CHANGE UP
STOMATOCYTES BLD QL SMEAR: SLIGHT — SIGNIFICANT CHANGE UP
TARGETS BLD QL SMEAR: SLIGHT — SIGNIFICANT CHANGE UP
VARIANT LYMPHS # BLD: 7 % — HIGH (ref 0–6)
VARIANT LYMPHS NFR BLD MANUAL: 7 % — HIGH (ref 0–6)
WBC # BLD: 4.08 K/UL — SIGNIFICANT CHANGE UP (ref 3.8–10.5)
WBC # FLD AUTO: 4.08 K/UL — SIGNIFICANT CHANGE UP (ref 3.8–10.5)

## 2025-03-27 PROCEDURE — 99214 OFFICE O/P EST MOD 30 MIN: CPT

## 2025-03-27 RX ORDER — OXYCODONE 5 MG/1
5 TABLET ORAL EVERY 4 HOURS
Qty: 180 | Refills: 0 | Status: ACTIVE | COMMUNITY
Start: 2025-03-27 | End: 1900-01-01

## 2025-03-27 RX ORDER — FENTANYL 12 UG/H
12 PATCH, EXTENDED RELEASE TRANSDERMAL
Qty: 6 | Refills: 0 | Status: ACTIVE | COMMUNITY
Start: 2025-03-27 | End: 1900-01-01

## 2025-03-27 RX ORDER — NALOXONE HYDROCHLORIDE NASAL 4 MG/.1ML
4 SPRAY NASAL
Qty: 1 | Refills: 2 | Status: ACTIVE | COMMUNITY
Start: 2025-03-27 | End: 1900-01-01

## 2025-03-30 LAB
ALBUMIN SERPL ELPH-MCNC: 4.1 G/DL
ALP BLD-CCNC: 137 U/L
ALT SERPL-CCNC: 14 U/L
ANION GAP SERPL CALC-SCNC: 14 MMOL/L
AST SERPL-CCNC: 19 U/L
BILIRUB SERPL-MCNC: 0.2 MG/DL
BUN SERPL-MCNC: 20 MG/DL
CALCIUM SERPL-MCNC: 9.3 MG/DL
CHLORIDE SERPL-SCNC: 100 MMOL/L
CO2 SERPL-SCNC: 24 MMOL/L
CREAT SERPL-MCNC: 0.7 MG/DL
EGFRCR SERPLBLD CKD-EPI 2021: 98 ML/MIN/1.73M2
FERRITIN SERPL-MCNC: 382 NG/ML
GLUCOSE SERPL-MCNC: 90 MG/DL
HAPTOGLOB SERPL-MCNC: 277 MG/DL
LDH SERPL-CCNC: 634 U/L
POTASSIUM SERPL-SCNC: 4.3 MMOL/L
PROT SERPL-MCNC: 6.4 G/DL
SODIUM SERPL-SCNC: 137 MMOL/L
URATE SERPL-MCNC: 5.1 MG/DL

## 2025-03-31 ENCOUNTER — TRANSCRIPTION ENCOUNTER (OUTPATIENT)
Age: 65
End: 2025-03-31

## 2025-03-31 RX ORDER — HYDROCHLOROTHIAZIDE 25 MG/1
25 TABLET ORAL TWICE DAILY
Qty: 180 | Refills: 1 | Status: ACTIVE | COMMUNITY
Start: 1900-01-01 | End: 1900-01-01

## 2025-04-03 NOTE — H&P PST ADULT - PHONE #
[place] [phone]    CONSENT:  This visit is being performed virtually via Video visit using [employer].     Clinical Location: [facility] Telehealth Visit - Home office  Patient is located at home in the [facility]    Avery García [facility] in [place], [facility] in  [place], [phone].      [phone]

## 2025-04-04 ENCOUNTER — RESULT REVIEW (OUTPATIENT)
Age: 65
End: 2025-04-04

## 2025-04-04 ENCOUNTER — NON-APPOINTMENT (OUTPATIENT)
Age: 65
End: 2025-04-04

## 2025-04-04 ENCOUNTER — APPOINTMENT (OUTPATIENT)
Dept: INTERNAL MEDICINE | Facility: CLINIC | Age: 65
End: 2025-04-04
Payer: COMMERCIAL

## 2025-04-04 ENCOUNTER — APPOINTMENT (OUTPATIENT)
Dept: HEMATOLOGY ONCOLOGY | Facility: CLINIC | Age: 65
End: 2025-04-04

## 2025-04-04 VITALS
OXYGEN SATURATION: 99 % | DIASTOLIC BLOOD PRESSURE: 51 MMHG | HEART RATE: 59 BPM | RESPIRATION RATE: 16 BRPM | TEMPERATURE: 98.3 F | BODY MASS INDEX: 24.98 KG/M2 | HEIGHT: 63 IN | WEIGHT: 141 LBS | SYSTOLIC BLOOD PRESSURE: 126 MMHG

## 2025-04-04 DIAGNOSIS — J90 PLEURAL EFFUSION, NOT ELSEWHERE CLASSIFIED: ICD-10-CM

## 2025-04-04 DIAGNOSIS — R06.09 OTHER FORMS OF DYSPNEA: ICD-10-CM

## 2025-04-04 DIAGNOSIS — D75.81 MYELOFIBROSIS: ICD-10-CM

## 2025-04-04 DIAGNOSIS — I31.39 OTHER PERICARDIAL EFFUSION (NONINFLAMMATORY): ICD-10-CM

## 2025-04-04 LAB
AGGLUTINATION: PRESENT — SIGNIFICANT CHANGE UP
ANISOCYTOSIS BLD QL: SIGNIFICANT CHANGE UP
BASOPHILS # BLD AUTO: 0.37 K/UL — HIGH (ref 0–0.2)
BASOPHILS NFR BLD AUTO: 8 % — HIGH (ref 0–2)
BIZARRE PLATELETS BLD QL SMEAR: PRESENT — SIGNIFICANT CHANGE UP
BLASTS # FLD: 2 % — HIGH (ref 0–0)
BLASTS NFR BLD: 2 % — HIGH (ref 0–0)
DACRYOCYTES BLD QL SMEAR: SLIGHT — SIGNIFICANT CHANGE UP
ELLIPTOCYTES BLD QL SMEAR: SLIGHT — SIGNIFICANT CHANGE UP
EOSINOPHIL # BLD AUTO: 0.05 K/UL — SIGNIFICANT CHANGE UP (ref 0–0.5)
EOSINOPHIL NFR BLD AUTO: 1 % — SIGNIFICANT CHANGE UP (ref 0–6)
GIANT PLATELETS BLD QL SMEAR: PRESENT — SIGNIFICANT CHANGE UP
HCT VFR BLD CALC: 33.3 % — LOW (ref 34.5–45)
HGB BLD-MCNC: 10.6 G/DL — LOW (ref 11.5–15.5)
HYPOCHROMIA BLD QL: SLIGHT — SIGNIFICANT CHANGE UP
HYPOGRANULAR PLT: PRESENT
LG PLATELETS BLD QL AUTO: SIGNIFICANT CHANGE UP
LYMPHOCYTES # BLD AUTO: 0.56 K/UL — LOW (ref 1–3.3)
LYMPHOCYTES # BLD AUTO: 12 % — LOW (ref 13–44)
MACROCYTES BLD QL: SLIGHT — SIGNIFICANT CHANGE UP
MCHC RBC-ENTMCNC: 28.6 PG — SIGNIFICANT CHANGE UP (ref 27–34)
MCHC RBC-ENTMCNC: 31.8 G/DL — LOW (ref 32–36)
MCV RBC AUTO: 90 FL — SIGNIFICANT CHANGE UP (ref 80–100)
MICROCYTES BLD QL: SLIGHT — SIGNIFICANT CHANGE UP
MONOCYTES # BLD AUTO: 0.42 K/UL — SIGNIFICANT CHANGE UP (ref 0–0.9)
MONOCYTES NFR BLD AUTO: 9 % — SIGNIFICANT CHANGE UP (ref 2–14)
NEUTROPHILS # BLD AUTO: 2.69 K/UL — SIGNIFICANT CHANGE UP (ref 1.8–7.4)
NEUTROPHILS NFR BLD AUTO: 55 % — SIGNIFICANT CHANGE UP (ref 43–77)
NEUTS BAND # BLD: 3 % — SIGNIFICANT CHANGE UP (ref 0–8)
NEUTS BAND NFR BLD: 3 % — SIGNIFICANT CHANGE UP (ref 0–8)
NEUTS HYPERSEG # BLD: PRESENT — SIGNIFICANT CHANGE UP
NEUTS VAC BLD QL SMEAR: SLIGHT — SIGNIFICANT CHANGE UP
NRBC # BLD: 3 /100 WBCS — HIGH (ref 0–0)
NRBC BLD AUTO-RTO: SIGNIFICANT CHANGE UP /100 WBCS (ref 0–0)
NRBC BLD-RTO: 3 /100 WBCS — HIGH (ref 0–0)
OVALOCYTES BLD QL SMEAR: SLIGHT — SIGNIFICANT CHANGE UP
PLAT MORPH BLD: ABNORMAL
PLATELET # BLD AUTO: 210 K/UL — SIGNIFICANT CHANGE UP (ref 150–400)
POIKILOCYTOSIS BLD QL AUTO: SIGNIFICANT CHANGE UP
POLYCHROMASIA BLD QL SMEAR: SIGNIFICANT CHANGE UP
PROMYELOCYTES # FLD: 1 % — HIGH (ref 0–0)
PROMYELOCYTES NFR BLD: 1 % — HIGH (ref 0–0)
RBC # BLD: 3.7 M/UL — LOW (ref 3.8–5.2)
RBC # FLD: 24.1 % — HIGH (ref 10.3–14.5)
RBC BLD AUTO: ABNORMAL
SCHISTOCYTES BLD QL AUTO: SIGNIFICANT CHANGE UP
STOMATOCYTES BLD QL SMEAR: SLIGHT — SIGNIFICANT CHANGE UP
TARGETS BLD QL SMEAR: SLIGHT — SIGNIFICANT CHANGE UP
VARIANT LYMPHS # BLD: 9 % — HIGH (ref 0–6)
VARIANT LYMPHS NFR BLD MANUAL: 9 % — HIGH (ref 0–6)
WBC # BLD: 4.63 K/UL — SIGNIFICANT CHANGE UP (ref 3.8–10.5)
WBC # FLD AUTO: 4.63 K/UL — SIGNIFICANT CHANGE UP (ref 3.8–10.5)

## 2025-04-04 PROCEDURE — 99214 OFFICE O/P EST MOD 30 MIN: CPT | Mod: 25

## 2025-04-04 PROCEDURE — 94060 EVALUATION OF WHEEZING: CPT

## 2025-04-04 PROCEDURE — 94729 DIFFUSING CAPACITY: CPT

## 2025-04-04 PROCEDURE — ZZZZZ: CPT

## 2025-04-04 PROCEDURE — 94727 GAS DIL/WSHOT DETER LNG VOL: CPT

## 2025-04-07 DIAGNOSIS — D75.81 MYELOFIBROSIS: ICD-10-CM

## 2025-04-10 ENCOUNTER — APPOINTMENT (OUTPATIENT)
Dept: CT IMAGING | Facility: CLINIC | Age: 65
End: 2025-04-10
Payer: COMMERCIAL

## 2025-04-10 ENCOUNTER — OUTPATIENT (OUTPATIENT)
Dept: OUTPATIENT SERVICES | Facility: HOSPITAL | Age: 65
LOS: 1 days | End: 2025-04-10
Payer: COMMERCIAL

## 2025-04-10 DIAGNOSIS — Z98.890 OTHER SPECIFIED POSTPROCEDURAL STATES: Chronic | ICD-10-CM

## 2025-04-10 DIAGNOSIS — Z98.84 BARIATRIC SURGERY STATUS: Chronic | ICD-10-CM

## 2025-04-10 DIAGNOSIS — J90 PLEURAL EFFUSION, NOT ELSEWHERE CLASSIFIED: ICD-10-CM

## 2025-04-10 DIAGNOSIS — Z90.89 ACQUIRED ABSENCE OF OTHER ORGANS: Chronic | ICD-10-CM

## 2025-04-10 DIAGNOSIS — Z90.711 ACQUIRED ABSENCE OF UTERUS WITH REMAINING CERVICAL STUMP: Chronic | ICD-10-CM

## 2025-04-10 DIAGNOSIS — I31.39 OTHER PERICARDIAL EFFUSION (NONINFLAMMATORY): ICD-10-CM

## 2025-04-10 PROCEDURE — 71250 CT THORAX DX C-: CPT | Mod: 26

## 2025-04-10 PROCEDURE — 71250 CT THORAX DX C-: CPT

## 2025-04-11 ENCOUNTER — APPOINTMENT (OUTPATIENT)
Dept: PHYSICAL MEDICINE AND REHAB | Facility: CLINIC | Age: 65
End: 2025-04-11

## 2025-04-12 ENCOUNTER — OUTPATIENT (OUTPATIENT)
Dept: OUTPATIENT SERVICES | Facility: HOSPITAL | Age: 65
LOS: 1 days | Discharge: HOME | End: 2025-04-12

## 2025-04-12 DIAGNOSIS — Z90.711 ACQUIRED ABSENCE OF UTERUS WITH REMAINING CERVICAL STUMP: Chronic | ICD-10-CM

## 2025-04-12 DIAGNOSIS — Z98.890 OTHER SPECIFIED POSTPROCEDURAL STATES: Chronic | ICD-10-CM

## 2025-04-12 DIAGNOSIS — Z98.84 BARIATRIC SURGERY STATUS: Chronic | ICD-10-CM

## 2025-04-12 DIAGNOSIS — Z90.89 ACQUIRED ABSENCE OF OTHER ORGANS: Chronic | ICD-10-CM

## 2025-04-12 DIAGNOSIS — D69.3 IMMUNE THROMBOCYTOPENIC PURPURA: ICD-10-CM

## 2025-04-14 RX ORDER — PREDNISONE 20 MG/1
20 TABLET ORAL
Qty: 10 | Refills: 0 | Status: ACTIVE | COMMUNITY
Start: 2025-04-14 | End: 1900-01-01

## 2025-04-15 ENCOUNTER — APPOINTMENT (OUTPATIENT)
Dept: HEMATOLOGY ONCOLOGY | Facility: CLINIC | Age: 65
End: 2025-04-15
Payer: COMMERCIAL

## 2025-04-15 VITALS
HEART RATE: 63 BPM | RESPIRATION RATE: 16 BRPM | OXYGEN SATURATION: 94 % | DIASTOLIC BLOOD PRESSURE: 53 MMHG | SYSTOLIC BLOOD PRESSURE: 126 MMHG | BODY MASS INDEX: 25.61 KG/M2 | HEIGHT: 63.23 IN | WEIGHT: 146.36 LBS | TEMPERATURE: 97.9 F

## 2025-04-15 DIAGNOSIS — D47.3 ESSENTIAL (HEMORRHAGIC) THROMBOCYTHEMIA: ICD-10-CM

## 2025-04-15 PROBLEM — Z76.82 STEM CELL TRANSPLANT CANDIDATE: Status: ACTIVE | Noted: 2025-04-15

## 2025-04-15 PROCEDURE — 99215 OFFICE O/P EST HI 40 MIN: CPT

## 2025-04-16 LAB
CMV IGG SERPL QL: >10 U/ML
CMV IGG SERPL-IMP: POSITIVE
CMV IGM SERPL QL: 9 AU/ML
CMV IGM SERPL QL: NEGATIVE

## 2025-04-21 ENCOUNTER — NON-APPOINTMENT (OUTPATIENT)
Age: 65
End: 2025-04-21

## 2025-04-24 RX ORDER — OXYCODONE 10 MG/1
10 TABLET ORAL EVERY 4 HOURS
Qty: 75 | Refills: 0 | Status: ACTIVE | COMMUNITY
Start: 2025-04-14 | End: 1900-01-01

## 2025-04-25 ENCOUNTER — APPOINTMENT (OUTPATIENT)
Dept: HEMATOLOGY ONCOLOGY | Facility: CLINIC | Age: 65
End: 2025-04-25

## 2025-04-25 ENCOUNTER — RESULT REVIEW (OUTPATIENT)
Age: 65
End: 2025-04-25

## 2025-04-25 LAB
AGGLUTINATION: PRESENT — SIGNIFICANT CHANGE UP
ANISOCYTOSIS BLD QL: SIGNIFICANT CHANGE UP
BASOPHILS # BLD AUTO: 0.29 K/UL — HIGH (ref 0–0.2)
BASOPHILS NFR BLD AUTO: 7 % — HIGH (ref 0–2)
BIZARRE PLATELETS BLD QL SMEAR: PRESENT — SIGNIFICANT CHANGE UP
BLASTS # FLD: 10 % — HIGH (ref 0–0)
BLASTS NFR BLD: 10 % — HIGH (ref 0–0)
DACRYOCYTES BLD QL SMEAR: SLIGHT — SIGNIFICANT CHANGE UP
ELLIPTOCYTES BLD QL SMEAR: SIGNIFICANT CHANGE UP
EOSINOPHIL # BLD AUTO: 0 K/UL — SIGNIFICANT CHANGE UP (ref 0–0.5)
EOSINOPHIL NFR BLD AUTO: 0 % — SIGNIFICANT CHANGE UP (ref 0–6)
GIANT PLATELETS BLD QL SMEAR: PRESENT — SIGNIFICANT CHANGE UP
HCT VFR BLD CALC: 30.6 % — LOW (ref 34.5–45)
HGB BLD-MCNC: 9.6 G/DL — LOW (ref 11.5–15.5)
HYPOCHROMIA BLD QL: SIGNIFICANT CHANGE UP
HYPOGRANULAR PLT: PRESENT
LG PLATELETS BLD QL AUTO: SIGNIFICANT CHANGE UP
LYMPHOCYTES # BLD AUTO: 0.37 K/UL — LOW (ref 1–3.3)
LYMPHOCYTES # BLD AUTO: 9 % — LOW (ref 13–44)
LYMPHOCYTES # SPEC AUTO: 1 % — HIGH (ref 0–0)
MACROCYTES BLD QL: SLIGHT — SIGNIFICANT CHANGE UP
MCHC RBC-ENTMCNC: 28.7 PG — SIGNIFICANT CHANGE UP (ref 27–34)
MCHC RBC-ENTMCNC: 31.4 G/DL — LOW (ref 32–36)
MCV RBC AUTO: 91.6 FL — SIGNIFICANT CHANGE UP (ref 80–100)
METAMYELOCYTES # FLD: 7 % — HIGH (ref 0–0)
METAMYELOCYTES NFR BLD: 7 % — HIGH (ref 0–0)
MICROCYTES BLD QL: SLIGHT — SIGNIFICANT CHANGE UP
MONOCYTES # BLD AUTO: 0.25 K/UL — SIGNIFICANT CHANGE UP (ref 0–0.9)
MONOCYTES NFR BLD AUTO: 6 % — SIGNIFICANT CHANGE UP (ref 2–14)
MYELOCYTES NFR BLD: 2 % — HIGH (ref 0–0)
NEUTROPHILS # BLD AUTO: 2.38 K/UL — SIGNIFICANT CHANGE UP (ref 1.8–7.4)
NEUTROPHILS NFR BLD AUTO: 58 % — SIGNIFICANT CHANGE UP (ref 43–77)
NEUTS HYPERSEG # BLD: PRESENT — SIGNIFICANT CHANGE UP
NEUTS VAC BLD QL SMEAR: SLIGHT — SIGNIFICANT CHANGE UP
NRBC # BLD: 12 /100 WBCS — HIGH (ref 0–0)
NRBC BLD AUTO-RTO: SIGNIFICANT CHANGE UP /100 WBCS (ref 0–0)
NRBC BLD-RTO: 12 /100 WBCS — HIGH (ref 0–0)
OVALOCYTES BLD QL SMEAR: SLIGHT — SIGNIFICANT CHANGE UP
PLAT MORPH BLD: ABNORMAL
PLATELET # BLD AUTO: 152 K/UL — SIGNIFICANT CHANGE UP (ref 150–400)
POIKILOCYTOSIS BLD QL AUTO: SIGNIFICANT CHANGE UP
POLYCHROMASIA BLD QL SMEAR: SIGNIFICANT CHANGE UP
RBC # BLD: 3.34 M/UL — LOW (ref 3.8–5.2)
RBC # FLD: 23.9 % — HIGH (ref 10.3–14.5)
RBC BLD AUTO: ABNORMAL
SCHISTOCYTES BLD QL AUTO: SIGNIFICANT CHANGE UP
STOMATOCYTES BLD QL SMEAR: SIGNIFICANT CHANGE UP
TARGETS BLD QL SMEAR: SLIGHT — SIGNIFICANT CHANGE UP
WBC # BLD: 4.11 K/UL — SIGNIFICANT CHANGE UP (ref 3.8–10.5)
WBC # FLD AUTO: 4.11 K/UL — SIGNIFICANT CHANGE UP (ref 3.8–10.5)

## 2025-04-29 ENCOUNTER — TRANSCRIPTION ENCOUNTER (OUTPATIENT)
Age: 65
End: 2025-04-29

## 2025-04-29 LAB
ALBUMIN SERPL ELPH-MCNC: 3.8 G/DL
ALP BLD-CCNC: 108 U/L
ALT SERPL-CCNC: 11 U/L
ANION GAP SERPL CALC-SCNC: 12 MMOL/L
AST SERPL-CCNC: 16 U/L
BILIRUB SERPL-MCNC: 0.5 MG/DL
BUN SERPL-MCNC: 17 MG/DL
CALCIUM SERPL-MCNC: 9.1 MG/DL
CHLORIDE SERPL-SCNC: 100 MMOL/L
CO2 SERPL-SCNC: 27 MMOL/L
CREAT SERPL-MCNC: 0.71 MG/DL
EGFRCR SERPLBLD CKD-EPI 2021: 96 ML/MIN/1.73M2
FERRITIN SERPL-MCNC: 483 NG/ML
GLUCOSE SERPL-MCNC: 83 MG/DL
IRON SATN MFR SERPL: 20 %
IRON SERPL-MCNC: 59 UG/DL
LDH SERPL-CCNC: 564 U/L
POTASSIUM SERPL-SCNC: 4 MMOL/L
PROT SERPL-MCNC: 6 G/DL
SODIUM SERPL-SCNC: 139 MMOL/L
TIBC SERPL-MCNC: 291 UG/DL
UIBC SERPL-MCNC: 232 UG/DL

## 2025-04-30 ENCOUNTER — APPOINTMENT (OUTPATIENT)
Dept: HEMATOLOGY ONCOLOGY | Facility: CLINIC | Age: 65
End: 2025-04-30
Payer: COMMERCIAL

## 2025-04-30 ENCOUNTER — RESULT REVIEW (OUTPATIENT)
Age: 65
End: 2025-04-30

## 2025-04-30 ENCOUNTER — OUTPATIENT (OUTPATIENT)
Dept: OUTPATIENT SERVICES | Facility: HOSPITAL | Age: 65
LOS: 1 days | End: 2025-04-30
Payer: COMMERCIAL

## 2025-04-30 ENCOUNTER — LABORATORY RESULT (OUTPATIENT)
Age: 65
End: 2025-04-30

## 2025-04-30 ENCOUNTER — APPOINTMENT (OUTPATIENT)
Dept: RADIOLOGY | Facility: IMAGING CENTER | Age: 65
End: 2025-04-30
Payer: COMMERCIAL

## 2025-04-30 VITALS
DIASTOLIC BLOOD PRESSURE: 69 MMHG | RESPIRATION RATE: 16 BRPM | HEART RATE: 63 BPM | OXYGEN SATURATION: 97 % | SYSTOLIC BLOOD PRESSURE: 137 MMHG | WEIGHT: 143.3 LBS | BODY MASS INDEX: 25.2 KG/M2 | TEMPERATURE: 97.5 F

## 2025-04-30 DIAGNOSIS — Z90.711 ACQUIRED ABSENCE OF UTERUS WITH REMAINING CERVICAL STUMP: Chronic | ICD-10-CM

## 2025-04-30 DIAGNOSIS — Z98.890 OTHER SPECIFIED POSTPROCEDURAL STATES: Chronic | ICD-10-CM

## 2025-04-30 DIAGNOSIS — Z98.84 BARIATRIC SURGERY STATUS: Chronic | ICD-10-CM

## 2025-04-30 DIAGNOSIS — D75.81 MYELOFIBROSIS: ICD-10-CM

## 2025-04-30 DIAGNOSIS — Z76.82 AWAITING ORGAN TRANSPLANT STATUS: ICD-10-CM

## 2025-04-30 LAB
ALBUMIN SERPL ELPH-MCNC: 4.1 G/DL
ALP BLD-CCNC: 119 U/L
ALT SERPL-CCNC: 14 U/L
ANION GAP SERPL CALC-SCNC: 13 MMOL/L
ANISOCYTOSIS BLD QL: SIGNIFICANT CHANGE UP
AST SERPL-CCNC: 17 U/L
BASOPHILS # BLD AUTO: 0.28 K/UL — HIGH (ref 0–0.2)
BASOPHILS NFR BLD AUTO: 5.5 % — HIGH (ref 0–2)
BILIRUB SERPL-MCNC: 0.6 MG/DL
BLASTS # FLD: 5.5 % — HIGH (ref 0–0)
BLASTS NFR BLD: 5.5 % — HIGH (ref 0–0)
BUN SERPL-MCNC: 25 MG/DL
CALCIUM SERPL-MCNC: 9.4 MG/DL
CHLORIDE SERPL-SCNC: 99 MMOL/L
CO2 SERPL-SCNC: 24 MMOL/L
CREAT SERPL-MCNC: 0.87 MG/DL
DACRYOCYTES BLD QL SMEAR: SIGNIFICANT CHANGE UP
EGFRCR SERPLBLD CKD-EPI 2021: 75 ML/MIN/1.73M2
ELLIPTOCYTES BLD QL SMEAR: SIGNIFICANT CHANGE UP
EOSINOPHIL # BLD AUTO: 0.03 K/UL — SIGNIFICANT CHANGE UP (ref 0–0.5)
EOSINOPHIL NFR BLD AUTO: 0.5 % — SIGNIFICANT CHANGE UP (ref 0–6)
GLUCOSE SERPL-MCNC: 118 MG/DL
HCT VFR BLD CALC: 33.1 % — LOW (ref 34.5–45)
HGB BLD-MCNC: 10.2 G/DL — LOW (ref 11.5–15.5)
LDH SERPL-CCNC: 534 U/L
LG PLATELETS BLD QL AUTO: SIGNIFICANT CHANGE UP
LYMPHOCYTES # BLD AUTO: 0.65 K/UL — LOW (ref 1–3.3)
LYMPHOCYTES # BLD AUTO: 13 % — SIGNIFICANT CHANGE UP (ref 13–44)
MAGNESIUM SERPL-MCNC: 2.1 MG/DL
MCHC RBC-ENTMCNC: 28.8 PG — SIGNIFICANT CHANGE UP (ref 27–34)
MCHC RBC-ENTMCNC: 30.8 G/DL — LOW (ref 32–36)
MCV RBC AUTO: 93.5 FL — SIGNIFICANT CHANGE UP (ref 80–100)
METAMYELOCYTES # FLD: 0.5 % — HIGH (ref 0–0)
METAMYELOCYTES NFR BLD: 0.5 % — HIGH (ref 0–0)
MONOCYTES # BLD AUTO: 0.23 K/UL — SIGNIFICANT CHANGE UP (ref 0–0.9)
MONOCYTES NFR BLD AUTO: 4.5 % — SIGNIFICANT CHANGE UP (ref 2–14)
MYELOCYTES NFR BLD: 0.5 % — HIGH (ref 0–0)
NEUTROPHILS # BLD AUTO: 3.5 K/UL — SIGNIFICANT CHANGE UP (ref 1.8–7.4)
NEUTROPHILS NFR BLD AUTO: 70 % — SIGNIFICANT CHANGE UP (ref 43–77)
NRBC # BLD: 6 /100 WBCS — HIGH (ref 0–0)
NRBC BLD AUTO-RTO: SIGNIFICANT CHANGE UP /100 WBCS (ref 0–0)
NRBC BLD-RTO: 6 /100 WBCS — HIGH (ref 0–0)
PHOSPHATE SERPL-MCNC: 4.6 MG/DL
PLAT MORPH BLD: ABNORMAL
PLATELET # BLD AUTO: 160 K/UL — SIGNIFICANT CHANGE UP (ref 150–400)
POIKILOCYTOSIS BLD QL AUTO: SIGNIFICANT CHANGE UP
POLYCHROMASIA BLD QL SMEAR: SLIGHT — SIGNIFICANT CHANGE UP
POTASSIUM SERPL-SCNC: 4.7 MMOL/L
PROT SERPL-MCNC: 6.8 G/DL
RBC # BLD: 3.54 M/UL — LOW (ref 3.8–5.2)
RBC # FLD: 24.4 % — HIGH (ref 10.3–14.5)
RBC BLD AUTO: ABNORMAL
SCHISTOCYTES BLD QL AUTO: SLIGHT — SIGNIFICANT CHANGE UP
SODIUM SERPL-SCNC: 136 MMOL/L
WBC # BLD: 5 K/UL — SIGNIFICANT CHANGE UP (ref 3.8–10.5)
WBC # FLD AUTO: 5 K/UL — SIGNIFICANT CHANGE UP (ref 3.8–10.5)

## 2025-04-30 PROCEDURE — 71046 X-RAY EXAM CHEST 2 VIEWS: CPT | Mod: 26

## 2025-04-30 PROCEDURE — 71046 X-RAY EXAM CHEST 2 VIEWS: CPT

## 2025-04-30 PROCEDURE — 93010 ELECTROCARDIOGRAM REPORT: CPT

## 2025-04-30 PROCEDURE — 99215 OFFICE O/P EST HI 40 MIN: CPT

## 2025-05-01 LAB
APTT BLD: 32.3 SEC
EBV EA AB SER IA-ACNC: 108 U/ML
EBV EA AB TITR SER IF: NEGATIVE
EBV EA IGG SER QL IA: 8.31 U/ML
EBV EA IGG SER-ACNC: POSITIVE
EBV EA IGM SER IA-ACNC: NEGATIVE
EBV PATRN SPEC IB-IMP: NORMAL
EBV VCA IGG SER IA-ACNC: 139 U/ML
EBV VCA IGM SER QL IA: <10 U/ML
EPSTEIN-BARR VIRUS CAPSID ANTIGEN IGG: POSITIVE
HAV IGM SER QL: NONREACTIVE
HBV SURFACE AB SER QL: NONREACTIVE
HSV 1+2 IGG SER IA-IMP: NEGATIVE
HSV 1+2 IGG SER IA-IMP: POSITIVE
HSV1 IGG SER QL: 39.4 INDEX
HSV2 IGG SER QL: 0.07 INDEX
INR PPP: 1.17 RATIO
MEV IGG FLD QL IA: >300 AU/ML
MEV IGG+IGM SER-IMP: POSITIVE
PT BLD: 13.8 SEC
RUBV IGG FLD-ACNC: 1.99 INDEX
RUBV IGG SER-IMP: POSITIVE
T GONDII AB SER-IMP: NEGATIVE
T GONDII AB SER-IMP: NEGATIVE
T GONDII IGG SER QL: <3 IU/ML
T GONDII IGM SER QL: <3 AU/ML
VZV AB TITR SER: POSITIVE
VZV IGG SER IF-ACNC: 40.1 S/CO

## 2025-05-02 LAB
HGB A MFR BLD: 94.9 %
HGB A2 MFR BLD: 2.5 %
HGB F MFR BLD: 2.6 %
HGB FRACT BLD-IMP: NORMAL

## 2025-05-03 LAB
M TB IFN-G BLD-IMP: NEGATIVE
QUANTIFERON TB PLUS MITOGEN MINUS NIL: 3.8 IU/ML
QUANTIFERON TB PLUS NIL: 0.04 IU/ML
QUANTIFERON TB PLUS TB1 MINUS NIL: 0 IU/ML
QUANTIFERON TB PLUS TB2 MINUS NIL: 0 IU/ML

## 2025-05-05 ENCOUNTER — TRANSCRIPTION ENCOUNTER (OUTPATIENT)
Age: 65
End: 2025-05-05

## 2025-05-07 NOTE — H&P PST ADULT - DOES THIS PATIENT HAVE A HISTORY OF OR HAS BEEN DX WITH HEART FAILURE?
Admission Reconciliation is Completed  Discharge Reconciliation is Not Complete unknown Admission Reconciliation is Completed  Discharge Reconciliation is Completed

## 2025-05-09 RX ORDER — DEXAMETHASONE 4 MG/1
4 TABLET ORAL
Qty: 6 | Refills: 0 | Status: ACTIVE | COMMUNITY
Start: 2025-05-09 | End: 1900-01-01

## 2025-05-12 ENCOUNTER — APPOINTMENT (OUTPATIENT)
Dept: CV DIAGNOSITCS | Facility: HOSPITAL | Age: 65
End: 2025-05-12

## 2025-05-12 ENCOUNTER — RESULT REVIEW (OUTPATIENT)
Age: 65
End: 2025-05-12

## 2025-05-12 ENCOUNTER — APPOINTMENT (OUTPATIENT)
Dept: HEMATOLOGY ONCOLOGY | Facility: CLINIC | Age: 65
End: 2025-05-12

## 2025-05-12 ENCOUNTER — OUTPATIENT (OUTPATIENT)
Dept: OUTPATIENT SERVICES | Facility: HOSPITAL | Age: 65
LOS: 1 days | End: 2025-05-12
Payer: COMMERCIAL

## 2025-05-12 ENCOUNTER — NON-APPOINTMENT (OUTPATIENT)
Age: 65
End: 2025-05-12

## 2025-05-12 DIAGNOSIS — D75.81 MYELOFIBROSIS: ICD-10-CM

## 2025-05-12 DIAGNOSIS — Z90.711 ACQUIRED ABSENCE OF UTERUS WITH REMAINING CERVICAL STUMP: Chronic | ICD-10-CM

## 2025-05-12 DIAGNOSIS — Z90.89 ACQUIRED ABSENCE OF OTHER ORGANS: Chronic | ICD-10-CM

## 2025-05-12 DIAGNOSIS — Z98.84 BARIATRIC SURGERY STATUS: Chronic | ICD-10-CM

## 2025-05-12 DIAGNOSIS — Z98.890 OTHER SPECIFIED POSTPROCEDURAL STATES: Chronic | ICD-10-CM

## 2025-05-12 LAB
ANISOCYTOSIS BLD QL: SIGNIFICANT CHANGE UP
BASOPHILS # BLD AUTO: 0.28 K/UL — HIGH (ref 0–0.2)
BASOPHILS NFR BLD AUTO: 3 % — HIGH (ref 0–2)
BLASTS # FLD: 7 % — HIGH (ref 0–0)
BLASTS NFR BLD: 7 % — HIGH (ref 0–0)
DACRYOCYTES BLD QL SMEAR: SIGNIFICANT CHANGE UP
ELLIPTOCYTES BLD QL SMEAR: SIGNIFICANT CHANGE UP
EOSINOPHIL # BLD AUTO: 0 K/UL — SIGNIFICANT CHANGE UP (ref 0–0.5)
EOSINOPHIL NFR BLD AUTO: 0 % — SIGNIFICANT CHANGE UP (ref 0–6)
HCT VFR BLD CALC: 30.9 % — LOW (ref 34.5–45)
HGB BLD-MCNC: 9.4 G/DL — LOW (ref 11.5–15.5)
LDH SERPL-CCNC: 787 U/L
LG PLATELETS BLD QL AUTO: SIGNIFICANT CHANGE UP
LYMPHOCYTES # BLD AUTO: 1.22 K/UL — SIGNIFICANT CHANGE UP (ref 1–3.3)
LYMPHOCYTES # BLD AUTO: 13 % — SIGNIFICANT CHANGE UP (ref 13–44)
MCHC RBC-ENTMCNC: 28.7 PG — SIGNIFICANT CHANGE UP (ref 27–34)
MCHC RBC-ENTMCNC: 30.4 G/DL — LOW (ref 32–36)
MCV RBC AUTO: 94.5 FL — SIGNIFICANT CHANGE UP (ref 80–100)
METAMYELOCYTES # FLD: 3 % — HIGH (ref 0–0)
METAMYELOCYTES NFR BLD: 3 % — HIGH (ref 0–0)
MONOCYTES # BLD AUTO: 0.85 K/UL — SIGNIFICANT CHANGE UP (ref 0–0.9)
MONOCYTES NFR BLD AUTO: 9 % — SIGNIFICANT CHANGE UP (ref 2–14)
MYELOCYTES NFR BLD: 1 % — HIGH (ref 0–0)
NEUTROPHILS # BLD AUTO: 6.02 K/UL — SIGNIFICANT CHANGE UP (ref 1.8–7.4)
NEUTROPHILS NFR BLD AUTO: 64 % — SIGNIFICANT CHANGE UP (ref 43–77)
NRBC # BLD: 4 /100 WBCS — HIGH (ref 0–0)
NRBC BLD AUTO-RTO: SIGNIFICANT CHANGE UP /100 WBCS (ref 0–0)
NRBC BLD-RTO: 4 /100 WBCS — HIGH (ref 0–0)
PLAT MORPH BLD: ABNORMAL
PLATELET # BLD AUTO: 179 K/UL — SIGNIFICANT CHANGE UP (ref 150–400)
POIKILOCYTOSIS BLD QL AUTO: SIGNIFICANT CHANGE UP
POLYCHROMASIA BLD QL SMEAR: SLIGHT — SIGNIFICANT CHANGE UP
RBC # BLD: 3.27 M/UL — LOW (ref 3.8–5.2)
RBC # FLD: 23.6 % — HIGH (ref 10.3–14.5)
RBC BLD AUTO: ABNORMAL
SCHISTOCYTES BLD QL AUTO: SLIGHT — SIGNIFICANT CHANGE UP
WBC # BLD: 9.41 K/UL — SIGNIFICANT CHANGE UP (ref 3.8–10.5)
WBC # FLD AUTO: 9.41 K/UL — SIGNIFICANT CHANGE UP (ref 3.8–10.5)

## 2025-05-12 PROCEDURE — 86900 BLOOD TYPING SEROLOGIC ABO: CPT

## 2025-05-12 PROCEDURE — 76376 3D RENDER W/INTRP POSTPROCES: CPT

## 2025-05-12 PROCEDURE — 93306 TTE W/DOPPLER COMPLETE: CPT

## 2025-05-12 PROCEDURE — 93356 MYOCRD STRAIN IMG SPCKL TRCK: CPT

## 2025-05-12 PROCEDURE — 86850 RBC ANTIBODY SCREEN: CPT

## 2025-05-12 PROCEDURE — 86901 BLOOD TYPING SEROLOGIC RH(D): CPT

## 2025-05-12 PROCEDURE — 93306 TTE W/DOPPLER COMPLETE: CPT | Mod: 26

## 2025-05-12 PROCEDURE — 76376 3D RENDER W/INTRP POSTPROCES: CPT | Mod: 26

## 2025-05-14 ENCOUNTER — APPOINTMENT (OUTPATIENT)
Dept: HEMATOLOGY ONCOLOGY | Facility: CLINIC | Age: 65
End: 2025-05-14
Payer: COMMERCIAL

## 2025-05-14 VITALS
BODY MASS INDEX: 25.24 KG/M2 | HEART RATE: 55 BPM | OXYGEN SATURATION: 95 % | RESPIRATION RATE: 16 BRPM | WEIGHT: 143.52 LBS | TEMPERATURE: 97.2 F | DIASTOLIC BLOOD PRESSURE: 53 MMHG | SYSTOLIC BLOOD PRESSURE: 133 MMHG

## 2025-05-14 DIAGNOSIS — D47.3 ESSENTIAL (HEMORRHAGIC) THROMBOCYTHEMIA: ICD-10-CM

## 2025-05-14 DIAGNOSIS — D75.81 MYELOFIBROSIS: ICD-10-CM

## 2025-05-14 PROCEDURE — 99213 OFFICE O/P EST LOW 20 MIN: CPT

## 2025-05-14 RX ORDER — NALOXONE HYDROCHLORIDE NASAL 4 MG/.1ML
4 SPRAY NASAL
Qty: 1 | Refills: 2 | Status: ACTIVE | COMMUNITY
Start: 2025-05-14 | End: 1900-01-01

## 2025-05-15 RX ORDER — FENTANYL 50 UG/H
50 PATCH, EXTENDED RELEASE TRANSDERMAL
Qty: 15 | Refills: 0 | Status: ACTIVE | COMMUNITY
Start: 2025-05-14 | End: 1900-01-01

## 2025-05-20 ENCOUNTER — APPOINTMENT (OUTPATIENT)
Dept: HEMATOLOGY ONCOLOGY | Facility: CLINIC | Age: 65
End: 2025-05-20

## 2025-05-20 ENCOUNTER — OUTPATIENT (OUTPATIENT)
Dept: OUTPATIENT SERVICES | Facility: HOSPITAL | Age: 65
LOS: 1 days | Discharge: HOME | End: 2025-05-20
Payer: COMMERCIAL

## 2025-05-20 DIAGNOSIS — Z98.890 OTHER SPECIFIED POSTPROCEDURAL STATES: Chronic | ICD-10-CM

## 2025-05-20 DIAGNOSIS — E61.1 IRON DEFICIENCY: ICD-10-CM

## 2025-05-20 DIAGNOSIS — D75.81 MYELOFIBROSIS: ICD-10-CM

## 2025-05-20 DIAGNOSIS — D69.3 IMMUNE THROMBOCYTOPENIC PURPURA: ICD-10-CM

## 2025-05-20 DIAGNOSIS — D47.3 ESSENTIAL (HEMORRHAGIC) THROMBOCYTHEMIA: ICD-10-CM

## 2025-05-20 DIAGNOSIS — D68.00 VON WILLEBRAND DISEASE, UNSPECIFIED: ICD-10-CM

## 2025-05-20 DIAGNOSIS — Z90.711 ACQUIRED ABSENCE OF UTERUS WITH REMAINING CERVICAL STUMP: Chronic | ICD-10-CM

## 2025-05-20 DIAGNOSIS — Z90.89 ACQUIRED ABSENCE OF OTHER ORGANS: Chronic | ICD-10-CM

## 2025-05-20 DIAGNOSIS — Z98.84 BARIATRIC SURGERY STATUS: Chronic | ICD-10-CM

## 2025-05-20 DIAGNOSIS — D50.9 IRON DEFICIENCY ANEMIA, UNSPECIFIED: ICD-10-CM

## 2025-05-21 ENCOUNTER — APPOINTMENT (OUTPATIENT)
Dept: HEMATOLOGY ONCOLOGY | Facility: CLINIC | Age: 65
End: 2025-05-21

## 2025-05-21 ENCOUNTER — RESULT REVIEW (OUTPATIENT)
Age: 65
End: 2025-05-21

## 2025-05-21 ENCOUNTER — TRANSCRIPTION ENCOUNTER (OUTPATIENT)
Age: 65
End: 2025-05-21

## 2025-05-21 LAB
ANISOCYTOSIS BLD QL: SIGNIFICANT CHANGE UP
BASOPHILS # BLD AUTO: 0 K/UL — SIGNIFICANT CHANGE UP (ref 0–0.2)
BASOPHILS NFR BLD AUTO: 0 % — SIGNIFICANT CHANGE UP (ref 0–2)
BLASTS # FLD: 9 % — HIGH (ref 0–0)
BLASTS NFR BLD: 9 % — HIGH (ref 0–0)
DACRYOCYTES BLD QL SMEAR: SLIGHT — SIGNIFICANT CHANGE UP
ELLIPTOCYTES BLD QL SMEAR: SLIGHT — SIGNIFICANT CHANGE UP
EOSINOPHIL # BLD AUTO: 0.32 K/UL — SIGNIFICANT CHANGE UP (ref 0–0.5)
EOSINOPHIL NFR BLD AUTO: 3 % — SIGNIFICANT CHANGE UP (ref 0–6)
GIANT PLATELETS BLD QL SMEAR: PRESENT — SIGNIFICANT CHANGE UP
HCT VFR BLD CALC: 32.4 % — LOW (ref 34.5–45)
HGB BLD-MCNC: 10.1 G/DL — LOW (ref 11.5–15.5)
LG PLATELETS BLD QL AUTO: SIGNIFICANT CHANGE UP
LYMPHOCYTES # BLD AUTO: 0.95 K/UL — LOW (ref 1–3.3)
LYMPHOCYTES # BLD AUTO: 9 % — LOW (ref 13–44)
MACROCYTES BLD QL: SLIGHT — SIGNIFICANT CHANGE UP
MCHC RBC-ENTMCNC: 28.9 PG — SIGNIFICANT CHANGE UP (ref 27–34)
MCHC RBC-ENTMCNC: 31.2 G/DL — LOW (ref 32–36)
MCV RBC AUTO: 92.8 FL — SIGNIFICANT CHANGE UP (ref 80–100)
METAMYELOCYTES # FLD: 6 % — HIGH (ref 0–0)
METAMYELOCYTES NFR BLD: 6 % — HIGH (ref 0–0)
MICROCYTES BLD QL: SLIGHT — SIGNIFICANT CHANGE UP
MONOCYTES # BLD AUTO: 1.48 K/UL — HIGH (ref 0–0.9)
MONOCYTES NFR BLD AUTO: 14 % — SIGNIFICANT CHANGE UP (ref 2–14)
NEUTROPHILS # BLD AUTO: 6.14 K/UL — SIGNIFICANT CHANGE UP (ref 1.8–7.4)
NEUTROPHILS NFR BLD AUTO: 57 % — SIGNIFICANT CHANGE UP (ref 43–77)
NEUTS BAND # BLD: 1 % — SIGNIFICANT CHANGE UP (ref 0–8)
NEUTS BAND NFR BLD: 1 % — SIGNIFICANT CHANGE UP (ref 0–8)
NRBC # BLD: 13 /100 WBCS — HIGH (ref 0–0)
NRBC BLD AUTO-RTO: SIGNIFICANT CHANGE UP /100 WBCS (ref 0–0)
NRBC BLD-RTO: 13 /100 WBCS — HIGH (ref 0–0)
PLAT MORPH BLD: NORMAL — SIGNIFICANT CHANGE UP
PLATELET # BLD AUTO: 173 K/UL — SIGNIFICANT CHANGE UP (ref 150–400)
POIKILOCYTOSIS BLD QL AUTO: SIGNIFICANT CHANGE UP
POLYCHROMASIA BLD QL SMEAR: SLIGHT — SIGNIFICANT CHANGE UP
RBC # BLD: 3.49 M/UL — LOW (ref 3.8–5.2)
RBC # FLD: 23.8 % — HIGH (ref 10.3–14.5)
RBC BLD AUTO: ABNORMAL
SCHISTOCYTES BLD QL AUTO: SIGNIFICANT CHANGE UP
VARIANT LYMPHS # BLD: 1 % — SIGNIFICANT CHANGE UP (ref 0–6)
VARIANT LYMPHS NFR BLD MANUAL: 1 % — SIGNIFICANT CHANGE UP (ref 0–6)
WBC # BLD: 10.59 K/UL — HIGH (ref 3.8–10.5)
WBC # FLD AUTO: 10.59 K/UL — HIGH (ref 3.8–10.5)

## 2025-05-22 ENCOUNTER — NON-APPOINTMENT (OUTPATIENT)
Age: 65
End: 2025-05-22

## 2025-05-22 ENCOUNTER — TRANSCRIPTION ENCOUNTER (OUTPATIENT)
Age: 65
End: 2025-05-22

## 2025-05-23 ENCOUNTER — TRANSCRIPTION ENCOUNTER (OUTPATIENT)
Age: 65
End: 2025-05-23

## 2025-05-23 RX ORDER — OXYCODONE HYDROCHLORIDE 15 MG/1
15 TABLET ORAL EVERY 4 HOURS
Qty: 90 | Refills: 0 | Status: ACTIVE | COMMUNITY
Start: 2025-05-23 | End: 1900-01-01

## 2025-05-29 ENCOUNTER — NON-APPOINTMENT (OUTPATIENT)
Age: 65
End: 2025-05-29

## 2025-05-29 RX ORDER — AMOXICILLIN 500 MG/1
500 CAPSULE ORAL TWICE DAILY
Qty: 14 | Refills: 0 | Status: ACTIVE | COMMUNITY
Start: 2025-05-29 | End: 1900-01-01

## 2025-06-04 ENCOUNTER — APPOINTMENT (OUTPATIENT)
Dept: HEMATOLOGY ONCOLOGY | Facility: CLINIC | Age: 65
End: 2025-06-04

## 2025-06-05 ENCOUNTER — NON-APPOINTMENT (OUTPATIENT)
Age: 65
End: 2025-06-05

## 2025-06-05 DIAGNOSIS — Z76.82 AWAITING ORGAN TRANSPLANT STATUS: ICD-10-CM

## 2025-06-06 ENCOUNTER — NON-APPOINTMENT (OUTPATIENT)
Age: 65
End: 2025-06-06

## 2025-06-11 ENCOUNTER — OUTPATIENT (OUTPATIENT)
Dept: OUTPATIENT SERVICES | Facility: HOSPITAL | Age: 65
LOS: 1 days | Discharge: HOME | End: 2025-06-11

## 2025-06-11 ENCOUNTER — NON-APPOINTMENT (OUTPATIENT)
Age: 65
End: 2025-06-11

## 2025-06-11 DIAGNOSIS — I73.81 ERYTHROMELALGIA: ICD-10-CM

## 2025-06-11 DIAGNOSIS — Z90.89 ACQUIRED ABSENCE OF OTHER ORGANS: Chronic | ICD-10-CM

## 2025-06-11 DIAGNOSIS — D47.3 ESSENTIAL (HEMORRHAGIC) THROMBOCYTHEMIA: ICD-10-CM

## 2025-06-11 DIAGNOSIS — Z90.711 ACQUIRED ABSENCE OF UTERUS WITH REMAINING CERVICAL STUMP: Chronic | ICD-10-CM

## 2025-06-11 DIAGNOSIS — Z98.890 OTHER SPECIFIED POSTPROCEDURAL STATES: Chronic | ICD-10-CM

## 2025-06-11 DIAGNOSIS — E61.1 IRON DEFICIENCY: ICD-10-CM

## 2025-06-11 DIAGNOSIS — D69.3 IMMUNE THROMBOCYTOPENIC PURPURA: ICD-10-CM

## 2025-06-11 DIAGNOSIS — D68.00 VON WILLEBRAND DISEASE, UNSPECIFIED: ICD-10-CM

## 2025-06-11 DIAGNOSIS — Z98.84 BARIATRIC SURGERY STATUS: Chronic | ICD-10-CM

## 2025-06-11 DIAGNOSIS — D50.9 IRON DEFICIENCY ANEMIA, UNSPECIFIED: ICD-10-CM

## 2025-06-11 DIAGNOSIS — D75.81 MYELOFIBROSIS: ICD-10-CM

## 2025-06-12 ENCOUNTER — RESULT REVIEW (OUTPATIENT)
Age: 65
End: 2025-06-12

## 2025-06-12 ENCOUNTER — LABORATORY RESULT (OUTPATIENT)
Age: 65
End: 2025-06-12

## 2025-06-12 ENCOUNTER — APPOINTMENT (OUTPATIENT)
Dept: HEMATOLOGY ONCOLOGY | Facility: CLINIC | Age: 65
End: 2025-06-12
Payer: COMMERCIAL

## 2025-06-12 ENCOUNTER — TRANSCRIPTION ENCOUNTER (OUTPATIENT)
Age: 65
End: 2025-06-12

## 2025-06-12 VITALS
BODY MASS INDEX: 24.93 KG/M2 | RESPIRATION RATE: 16 BRPM | DIASTOLIC BLOOD PRESSURE: 52 MMHG | TEMPERATURE: 98.1 F | SYSTOLIC BLOOD PRESSURE: 124 MMHG | HEART RATE: 57 BPM | OXYGEN SATURATION: 99 % | WEIGHT: 141.74 LBS

## 2025-06-12 LAB
ALBUMIN SERPL ELPH-MCNC: 3.7 G/DL
ALP BLD-CCNC: 98 U/L
ALT SERPL-CCNC: 10 U/L
ANION GAP SERPL CALC-SCNC: 14 MMOL/L
ANISOCYTOSIS BLD QL: SIGNIFICANT CHANGE UP
APTT BLD: 31.4 SEC
AST SERPL-CCNC: 18 U/L
BASOPHILS # BLD AUTO: 0 K/UL — SIGNIFICANT CHANGE UP (ref 0–0.2)
BASOPHILS NFR BLD AUTO: 0 % — SIGNIFICANT CHANGE UP (ref 0–2)
BILIRUB SERPL-MCNC: 0.6 MG/DL
BLASTS # FLD: 10 % — HIGH (ref 0–0)
BLASTS NFR BLD: 10 % — HIGH (ref 0–0)
BUN SERPL-MCNC: 29 MG/DL
CALCIUM SERPL-MCNC: 9 MG/DL
CHLORIDE SERPL-SCNC: 98 MMOL/L
CO2 SERPL-SCNC: 24 MMOL/L
CREAT SERPL-MCNC: 1.11 MG/DL
DACRYOCYTES BLD QL SMEAR: SLIGHT — SIGNIFICANT CHANGE UP
EGFRCR SERPLBLD CKD-EPI 2021: 56 ML/MIN/1.73M2
ELLIPTOCYTES BLD QL SMEAR: SLIGHT — SIGNIFICANT CHANGE UP
EOSINOPHIL # BLD AUTO: 0 K/UL — SIGNIFICANT CHANGE UP (ref 0–0.5)
EOSINOPHIL NFR BLD AUTO: 0 % — SIGNIFICANT CHANGE UP (ref 0–6)
GIANT PLATELETS BLD QL SMEAR: PRESENT — SIGNIFICANT CHANGE UP
GLUCOSE SERPL-MCNC: 112 MG/DL
HCT VFR BLD CALC: 26 % — LOW (ref 34.5–45)
HGB BLD-MCNC: 7.8 G/DL — LOW (ref 11.5–15.5)
HYPOCHROMIA BLD QL: SLIGHT — SIGNIFICANT CHANGE UP
HYPOGRANULAR PLT: PRESENT
INR PPP: 1.23 RATIO
LDH SERPL-CCNC: 582 U/L
LG PLATELETS BLD QL AUTO: SIGNIFICANT CHANGE UP
LYMPHOCYTES # BLD AUTO: 0.9 K/UL — LOW (ref 1–3.3)
LYMPHOCYTES # BLD AUTO: 16 % — SIGNIFICANT CHANGE UP (ref 13–44)
MACROCYTES BLD QL: SLIGHT — SIGNIFICANT CHANGE UP
MAGNESIUM SERPL-MCNC: 2.1 MG/DL
MCHC RBC-ENTMCNC: 28.7 PG — SIGNIFICANT CHANGE UP (ref 27–34)
MCHC RBC-ENTMCNC: 30 G/DL — LOW (ref 32–36)
MCV RBC AUTO: 95.6 FL — SIGNIFICANT CHANGE UP (ref 80–100)
MICROCYTES BLD QL: SLIGHT — SIGNIFICANT CHANGE UP
MONOCYTES # BLD AUTO: 0.4 K/UL — SIGNIFICANT CHANGE UP (ref 0–0.9)
MONOCYTES NFR BLD AUTO: 7 % — SIGNIFICANT CHANGE UP (ref 2–14)
MYELOCYTES NFR BLD: 2 % — HIGH (ref 0–0)
NEUTROPHILS # BLD AUTO: 3.67 K/UL — SIGNIFICANT CHANGE UP (ref 1.8–7.4)
NEUTROPHILS NFR BLD AUTO: 65 % — SIGNIFICANT CHANGE UP (ref 43–77)
NRBC # BLD: 5 /100 WBCS — HIGH (ref 0–0)
NRBC BLD AUTO-RTO: SIGNIFICANT CHANGE UP /100 WBCS (ref 0–0)
NRBC BLD-RTO: 5 /100 WBCS — HIGH (ref 0–0)
PHOSPHATE SERPL-MCNC: 4.5 MG/DL
PLAT MORPH BLD: ABNORMAL
PLATELET # BLD AUTO: 118 K/UL — LOW (ref 150–400)
POIKILOCYTOSIS BLD QL AUTO: SIGNIFICANT CHANGE UP
POLYCHROMASIA BLD QL SMEAR: SLIGHT — SIGNIFICANT CHANGE UP
POTASSIUM SERPL-SCNC: 3.5 MMOL/L
PROT SERPL-MCNC: 6.8 G/DL
PT BLD: 14.5 SEC
RBC # BLD: 2.72 M/UL — LOW (ref 3.8–5.2)
RBC # FLD: 22.5 % — HIGH (ref 10.3–14.5)
RBC BLD AUTO: ABNORMAL
SCHISTOCYTES BLD QL AUTO: SLIGHT — SIGNIFICANT CHANGE UP
SODIUM SERPL-SCNC: 136 MMOL/L
WBC # BLD: 5.65 K/UL — SIGNIFICANT CHANGE UP (ref 3.8–10.5)
WBC # FLD AUTO: 5.65 K/UL — SIGNIFICANT CHANGE UP (ref 3.8–10.5)

## 2025-06-12 PROCEDURE — 38222 DX BONE MARROW BX & ASPIR: CPT

## 2025-06-14 ENCOUNTER — TRANSCRIPTION ENCOUNTER (OUTPATIENT)
Age: 65
End: 2025-06-14

## 2025-06-14 RX ORDER — POSACONAZOLE 100 MG/1
100 TABLET, DELAYED RELEASE ORAL DAILY
Qty: 90 | Refills: 0 | Status: ACTIVE | COMMUNITY
Start: 2025-06-14 | End: 1900-01-01

## 2025-06-14 RX ORDER — ACYCLOVIR 800 MG/1
800 TABLET ORAL
Qty: 60 | Refills: 0 | Status: ACTIVE | COMMUNITY
Start: 2025-06-14 | End: 1900-01-01

## 2025-06-14 RX ORDER — PANTOPRAZOLE 40 MG/1
40 TABLET, DELAYED RELEASE ORAL DAILY
Qty: 30 | Refills: 0 | Status: ACTIVE | COMMUNITY
Start: 2025-06-14 | End: 1900-01-01

## 2025-06-14 RX ORDER — ATOVAQUONE 750 MG/5ML
750 SUSPENSION ORAL DAILY
Qty: 300 | Refills: 0 | Status: ACTIVE | COMMUNITY
Start: 2025-06-14 | End: 1900-01-01

## 2025-06-14 RX ORDER — URSODIOL 300 MG/1
300 CAPSULE ORAL
Qty: 60 | Refills: 0 | Status: ACTIVE | COMMUNITY
Start: 2025-06-14 | End: 1900-01-01

## 2025-06-14 RX ORDER — ONDANSETRON 8 MG/1
8 TABLET, ORALLY DISINTEGRATING ORAL EVERY 8 HOURS
Qty: 30 | Refills: 0 | Status: ACTIVE | COMMUNITY
Start: 2025-06-14 | End: 1900-01-01

## 2025-06-14 RX ORDER — LETERMOVIR 480 MG/1
480 TABLET, FILM COATED ORAL
Qty: 5 | Refills: 0 | Status: ACTIVE | COMMUNITY
Start: 2025-06-14 | End: 1900-01-01

## 2025-06-16 ENCOUNTER — NON-APPOINTMENT (OUTPATIENT)
Age: 65
End: 2025-06-16

## 2025-06-17 ENCOUNTER — RESULT REVIEW (OUTPATIENT)
Age: 65
End: 2025-06-17

## 2025-06-17 ENCOUNTER — APPOINTMENT (OUTPATIENT)
Dept: HEMATOLOGY ONCOLOGY | Facility: CLINIC | Age: 65
End: 2025-06-17
Payer: COMMERCIAL

## 2025-06-17 VITALS
OXYGEN SATURATION: 97 % | SYSTOLIC BLOOD PRESSURE: 136 MMHG | WEIGHT: 141.07 LBS | DIASTOLIC BLOOD PRESSURE: 61 MMHG | HEART RATE: 81 BPM | RESPIRATION RATE: 16 BRPM | TEMPERATURE: 98.5 F | BODY MASS INDEX: 24.81 KG/M2

## 2025-06-17 LAB
ALBUMIN SERPL ELPH-MCNC: 3.8 G/DL
ALP BLD-CCNC: 94 U/L
ALT SERPL-CCNC: 9 U/L
ANION GAP SERPL CALC-SCNC: 13 MMOL/L
ANISOCYTOSIS BLD QL: SIGNIFICANT CHANGE UP
AST SERPL-CCNC: 17 U/L
BASOPHILS # BLD AUTO: 0.18 K/UL — SIGNIFICANT CHANGE UP (ref 0–0.2)
BASOPHILS NFR BLD AUTO: 2 % — SIGNIFICANT CHANGE UP (ref 0–2)
BILIRUB SERPL-MCNC: 0.6 MG/DL
BLASTS # FLD: 9 % — HIGH (ref 0–0)
BLASTS NFR BLD: 9 % — HIGH (ref 0–0)
BUN SERPL-MCNC: 20 MG/DL
CALCIUM SERPL-MCNC: 8.9 MG/DL
CHLORIDE SERPL-SCNC: 96 MMOL/L
CO2 SERPL-SCNC: 25 MMOL/L
CREAT SERPL-MCNC: 0.98 MG/DL
DACRYOCYTES BLD QL SMEAR: SLIGHT — SIGNIFICANT CHANGE UP
EGFRCR SERPLBLD CKD-EPI 2021: 65 ML/MIN/1.73M2
ELLIPTOCYTES BLD QL SMEAR: SLIGHT — SIGNIFICANT CHANGE UP
EOSINOPHIL # BLD AUTO: 0 K/UL — SIGNIFICANT CHANGE UP (ref 0–0.5)
EOSINOPHIL NFR BLD AUTO: 0 % — SIGNIFICANT CHANGE UP (ref 0–6)
GIANT PLATELETS BLD QL SMEAR: PRESENT — SIGNIFICANT CHANGE UP
GLUCOSE SERPL-MCNC: 132 MG/DL
HCT VFR BLD CALC: 26.4 % — LOW (ref 34.5–45)
HGB BLD-MCNC: 7.9 G/DL — LOW (ref 11.5–15.5)
HYPOCHROMIA BLD QL: SLIGHT — SIGNIFICANT CHANGE UP
HYPOGRANULAR PLT: PRESENT
LDH SERPL-CCNC: 584 U/L
LG PLATELETS BLD QL AUTO: SIGNIFICANT CHANGE UP
LYMPHOCYTES # BLD AUTO: 1.37 K/UL — SIGNIFICANT CHANGE UP (ref 1–3.3)
LYMPHOCYTES # BLD AUTO: 15 % — SIGNIFICANT CHANGE UP (ref 13–44)
MACROCYTES BLD QL: SLIGHT — SIGNIFICANT CHANGE UP
MCHC RBC-ENTMCNC: 28.5 PG — SIGNIFICANT CHANGE UP (ref 27–34)
MCHC RBC-ENTMCNC: 29.9 G/DL — LOW (ref 32–36)
MCV RBC AUTO: 95.3 FL — SIGNIFICANT CHANGE UP (ref 80–100)
MICROCYTES BLD QL: SLIGHT — SIGNIFICANT CHANGE UP
MONOCYTES # BLD AUTO: 0.73 K/UL — SIGNIFICANT CHANGE UP (ref 0–0.9)
MONOCYTES NFR BLD AUTO: 8 % — SIGNIFICANT CHANGE UP (ref 2–14)
NEUTROPHILS # BLD AUTO: 6.01 K/UL — SIGNIFICANT CHANGE UP (ref 1.8–7.4)
NEUTROPHILS NFR BLD AUTO: 66 % — SIGNIFICANT CHANGE UP (ref 43–77)
NRBC # BLD: 7 /100 WBCS — HIGH (ref 0–0)
NRBC BLD AUTO-RTO: SIGNIFICANT CHANGE UP /100 WBCS (ref 0–0)
NRBC BLD-RTO: 7 /100 WBCS — HIGH (ref 0–0)
PLAT MORPH BLD: ABNORMAL
PLATELET # BLD AUTO: 125 K/UL — LOW (ref 150–400)
POIKILOCYTOSIS BLD QL AUTO: SIGNIFICANT CHANGE UP
POLYCHROMASIA BLD QL SMEAR: SLIGHT — SIGNIFICANT CHANGE UP
POTASSIUM SERPL-SCNC: 3.9 MMOL/L
PROT SERPL-MCNC: 6.6 G/DL
RBC # BLD: 2.77 M/UL — LOW (ref 3.8–5.2)
RBC # FLD: 22.6 % — HIGH (ref 10.3–14.5)
RBC BLD AUTO: ABNORMAL
SCHISTOCYTES BLD QL AUTO: SLIGHT — SIGNIFICANT CHANGE UP
SODIUM SERPL-SCNC: 134 MMOL/L
WBC # BLD: 9.1 K/UL — SIGNIFICANT CHANGE UP (ref 3.8–10.5)
WBC # FLD AUTO: 9.1 K/UL — SIGNIFICANT CHANGE UP (ref 3.8–10.5)

## 2025-06-17 PROCEDURE — 99213 OFFICE O/P EST LOW 20 MIN: CPT

## 2025-06-19 RX ORDER — SODIUM BICARBONATE 1 MEQ/ML
10 SYRINGE (ML) INTRAVENOUS
Refills: 0 | Status: DISCONTINUED | OUTPATIENT
Start: 2025-06-20 | End: 2025-07-15

## 2025-06-19 RX ORDER — URSODIOL 300 MG/1
300 CAPSULE ORAL
Refills: 0 | Status: DISCONTINUED | OUTPATIENT
Start: 2025-06-20 | End: 2025-06-30

## 2025-06-19 NOTE — H&P ADULT - PROBLEM SELECTOR PLAN 1
1. Admit to BMTU, TLC placement in IR    2. Day - 6 through day -2 - antiemetics    3. Day - 6 through day -5 - thiotepa 5mg/kg IV administered over 3 hours x 2 doses. Maintain skin precautions while on thiotepa    4. Day - 4 - rabbit ATG 1mg/kg IV given over 6 hours once   5. Day -3 - rabbit ATG 1.5mg/kg IV given per ATG policy    6. Day -2 - rabbit ATG 2.5mg/kg IV given per ATG policy - premedications and rATG emergency medications as per orders.    7. Day - 4 through day - 2 - fludarabine 50mg/m2 given over 30 minutes every 24 hours x 3 doses    8. Day - 4 through day -2 - busulfan 3.2mg/kg IV given over 3 hours q 24 hours x 3 doses. Administered second following fludarabine. Do not give acetaminophen on busulfan days. 9. Day -5 through day -1 - keppra 500mgpo q 12 hours , start 12 hours prior to busulfan and continue for 24 post last dose.    10. Day 0 - infuse HPC product, start hydration 2 hours prior and continue for 24 hours post infusion of cells    11. Bortezomib - 1.3mg/m2 IV given 6 hours after HPC infusion    12. Day +3 - bortezomib 1.3mg/m2 given 72 hours after initial dose   13. Day + 3 - PTCy hydration. Start 2 hours prior to CTX and continue for 24 hours post administration of last dose    14. Day + 3, +4 - CTX 37.5mg/kg IV given over 2 hours every 24 hours x 2 doses    15. Abatacept days +5, 14, 28   16. Day +5 - start zarxio 5mcg/kg.day (actual weight) daily

## 2025-06-19 NOTE — H&P ADULT - NS PANP COMMENT GEN_ALL_CORE FT
64 year old female with hx of myelofibrosis presents for a MUD allogeneic SCT (DP Permissive) conditioning with Flu/Bu/Thiotepa/rATG...ABC gvhd prophylaxis  1. Admit to BMT service, TLC placement in IR    2. Day - 6 through day -2 - antiemetics    3. Day - 6 through day -5 - thiotepa 5mg/kg IV administered over 3 hours x 2 doses. Maintain skin precautions while on thiotepa    4. Day - 4 - rabbit ATG 1mg/kg IV given over 6 hours once   5. Day -3 - rabbit ATG 1.5mg/kg IV given per ATG policy    6. Day -2 - rabbit ATG 2.5mg/kg IV given per ATG policy - premedications and rATG emergency medications as per orders.    7. Day - 4 through day - 2 - fludarabine 50mg/m2 given over 30 minutes every 24 hours x 3 doses    8. Day - 4 through day -2 - busulfan 3.2mg/kg IV given over 3 hours q 24 hours x 3 doses. Administered second following fludarabine. Do not give acetaminophen on busulfan days. 9. Day -5 through day -1 - keppra 500mgpo q 12 hours , start 12 hours prior to busulfan and continue for 24 post last dose.    10. Day 0 - infuse HPC product, start hydration 2 hours prior and continue for 24 hours post infusion of cells    11. Bortezomib - 1.3mg/m2 IV given 6 hours after HPC infusion    12. Day +3 - bortezomib 1.3mg/m2 given 72 hours after initial dose   13. Day + 3 - PTCy hydration. Start 2 hours prior to CTX and continue for 24 hours post administration of last dose    14. Day + 3, +4 - CTX 37.5mg/kg IV given over 2 hours every 24 hours x 2 doses    15. Abatacept days +5, 14, 28   16. Day +5 - start zarxio 5mcg/kg.day (actual weight) daily  1. Antiviral prophylaxis with acyclovir 800mg po BID to start on admission    2. Posaconazole and levaquin to be started once neutropenic    3. Bactrim SS for PCP prophylaxis post engraftment    4. Monitor CMV PCR post engraftment    5. EBV, adenovirus monitoring weekly    6. Ambulation for DVT prophylaxis  7. mouth and skin care  8. transfusion and electrolyte support

## 2025-06-19 NOTE — H&P ADULT - HISTORY OF PRESENT ILLNESS
64 year old female with a history of essential thrombocytopenia (23 years) who was being followed by Dr. Croft at [facility]. Patient has been on hydrea for many years with no repsponse then treated with pegylated IFN alpha (Besrema) x 6 months, but became intolerant due to significant complications which required multiple hospitalizations in the fall-winter of 2024. On 11/30/24 patient presented to [facility] ED with worsening back pain, chest pain, SOB and productive cough requiring oxygen, as well as HF exacerbation. a CT was performed showing moderately large pericardial effusion, bilateral pleural effusions and suspected superimposed PNA. At that time her hospital course was complicated  with pericarditis likely secondary to pegylated interferon and pericardial effusions. 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 Patient was then sent to [facility] (12/12/24-12/20/2024). During that time a BMBx (12/11/24) was performed showing ET with 4-6% blasts, complex karyotype and NGS with JAK2. Patient then followed up with Dr. Dailey who performed a repeat BMBx (3/14/25) that was consistent with myelofibrosis. Now, patient presents for a MUD allogeneic SCT (DP Permissive) conditioning with Flu/Bu/Thiothepa/rATG and GVHD ppx with ABC. Patient feels well today and has no complaints at this time.

## 2025-06-19 NOTE — H&P ADULT - ASSESSMENT
64 year old female with hx of myelofibrosis presents for a MUD allogeneic SCT (DP Permissive) conditioning with Flu/Bu/Thiotepa/rATG.

## 2025-06-20 ENCOUNTER — INPATIENT (INPATIENT)
Facility: HOSPITAL | Age: 65
LOS: 24 days | Discharge: HOME HEALTH | DRG: 842 | End: 2025-07-15
Attending: INTERNAL MEDICINE | Admitting: INTERNAL MEDICINE
Payer: COMMERCIAL

## 2025-06-20 VITALS
HEIGHT: 62.6 IN | DIASTOLIC BLOOD PRESSURE: 56 MMHG | HEART RATE: 65 BPM | WEIGHT: 142.2 LBS | SYSTOLIC BLOOD PRESSURE: 122 MMHG | TEMPERATURE: 99 F | OXYGEN SATURATION: 97 % | RESPIRATION RATE: 18 BRPM

## 2025-06-20 DIAGNOSIS — Z98.84 BARIATRIC SURGERY STATUS: Chronic | ICD-10-CM

## 2025-06-20 DIAGNOSIS — Z98.890 OTHER SPECIFIED POSTPROCEDURAL STATES: Chronic | ICD-10-CM

## 2025-06-20 DIAGNOSIS — Z29.9 ENCOUNTER FOR PROPHYLACTIC MEASURES, UNSPECIFIED: ICD-10-CM

## 2025-06-20 DIAGNOSIS — D75.81 MYELOFIBROSIS: ICD-10-CM

## 2025-06-20 DIAGNOSIS — Z94.84 STEM CELLS TRANSPLANT STATUS: ICD-10-CM

## 2025-06-20 DIAGNOSIS — Z90.89 ACQUIRED ABSENCE OF OTHER ORGANS: Chronic | ICD-10-CM

## 2025-06-20 DIAGNOSIS — Z90.711 ACQUIRED ABSENCE OF UTERUS WITH REMAINING CERVICAL STUMP: Chronic | ICD-10-CM

## 2025-06-20 LAB
ALBUMIN SERPL ELPH-MCNC: 3.2 G/DL — LOW (ref 3.3–5)
ALP SERPL-CCNC: 86 U/L — SIGNIFICANT CHANGE UP (ref 40–120)
ALT FLD-CCNC: 8 U/L — LOW (ref 10–45)
ANION GAP SERPL CALC-SCNC: 13 MMOL/L — SIGNIFICANT CHANGE UP (ref 5–17)
ANISOCYTOSIS BLD QL: SLIGHT — SIGNIFICANT CHANGE UP
APPEARANCE UR: CLEAR — SIGNIFICANT CHANGE UP
APPEARANCE UR: CLEAR — SIGNIFICANT CHANGE UP
APTT BLD: 29.6 SEC — SIGNIFICANT CHANGE UP (ref 26.1–36.8)
AST SERPL-CCNC: 10 U/L — SIGNIFICANT CHANGE UP (ref 10–40)
BACTERIA # UR AUTO: NEGATIVE /HPF — SIGNIFICANT CHANGE UP
BACTERIA # UR AUTO: NEGATIVE /HPF — SIGNIFICANT CHANGE UP
BASOPHILS # BLD AUTO: 0.1 K/UL — SIGNIFICANT CHANGE UP (ref 0–0.2)
BASOPHILS # BLD MANUAL: 0.23 K/UL — HIGH (ref 0–0.2)
BASOPHILS NFR BLD AUTO: 1.5 % — SIGNIFICANT CHANGE UP (ref 0–2)
BASOPHILS NFR BLD MANUAL: 3.4 % — HIGH (ref 0–2)
BILIRUB DIRECT SERPL-MCNC: 0.2 MG/DL — SIGNIFICANT CHANGE UP (ref 0–0.3)
BILIRUB INDIRECT FLD-MCNC: 0.4 MG/DL — SIGNIFICANT CHANGE UP (ref 0.2–1)
BILIRUB SERPL-MCNC: 0.6 MG/DL — SIGNIFICANT CHANGE UP (ref 0.2–1.2)
BILIRUB UR-MCNC: NEGATIVE — SIGNIFICANT CHANGE UP
BILIRUB UR-MCNC: NEGATIVE — SIGNIFICANT CHANGE UP
BLASTS # FLD: 5.9 % — HIGH (ref 0–0)
BLASTS NFR BLD: 5.9 % — HIGH (ref 0–0)
BLD GP AB SCN SERPL QL: NEGATIVE — SIGNIFICANT CHANGE UP
BUN SERPL-MCNC: 28 MG/DL — HIGH (ref 7–23)
CALCIUM SERPL-MCNC: 8.5 MG/DL — SIGNIFICANT CHANGE UP (ref 8.4–10.5)
CAST: 0 /LPF — SIGNIFICANT CHANGE UP (ref 0–4)
CAST: 4 /LPF — SIGNIFICANT CHANGE UP (ref 0–4)
CHLORIDE SERPL-SCNC: 97 MMOL/L — SIGNIFICANT CHANGE UP (ref 96–108)
CO2 SERPL-SCNC: 23 MMOL/L — SIGNIFICANT CHANGE UP (ref 22–31)
COLOR SPEC: YELLOW — SIGNIFICANT CHANGE UP
COLOR SPEC: YELLOW — SIGNIFICANT CHANGE UP
CREAT SERPL-MCNC: 1.03 MG/DL — SIGNIFICANT CHANGE UP (ref 0.5–1.3)
DACRYOCYTES BLD QL SMEAR: SLIGHT — SIGNIFICANT CHANGE UP
DIFF PNL FLD: NEGATIVE — SIGNIFICANT CHANGE UP
DIFF PNL FLD: NEGATIVE — SIGNIFICANT CHANGE UP
EGFR: 61 ML/MIN/1.73M2 — SIGNIFICANT CHANGE UP
EGFR: 61 ML/MIN/1.73M2 — SIGNIFICANT CHANGE UP
EOSINOPHIL # BLD AUTO: 0.02 K/UL — SIGNIFICANT CHANGE UP (ref 0–0.5)
EOSINOPHIL # BLD MANUAL: 0.12 K/UL — SIGNIFICANT CHANGE UP (ref 0–0.5)
EOSINOPHIL NFR BLD AUTO: 0.3 % — SIGNIFICANT CHANGE UP (ref 0–6)
EOSINOPHIL NFR BLD MANUAL: 1.7 % — SIGNIFICANT CHANGE UP (ref 0–6)
FLUAV AG NPH QL: SIGNIFICANT CHANGE UP
FLUBV AG NPH QL: SIGNIFICANT CHANGE UP
GLUCOSE SERPL-MCNC: 101 MG/DL — HIGH (ref 70–99)
GLUCOSE UR QL: NEGATIVE MG/DL — SIGNIFICANT CHANGE UP
GLUCOSE UR QL: NEGATIVE MG/DL — SIGNIFICANT CHANGE UP
HCT VFR BLD CALC: 22.4 % — LOW (ref 34.5–45)
HGB BLD-MCNC: 6.6 G/DL — CRITICAL LOW (ref 11.5–15.5)
IMM GRANULOCYTES # BLD AUTO: 1.02 K/UL — HIGH (ref 0–0.07)
IMM GRANULOCYTES NFR BLD AUTO: 15 % — HIGH (ref 0–0.9)
INR BLD: 1.39 RATIO — HIGH (ref 0.85–1.16)
KETONES UR QL: NEGATIVE MG/DL — SIGNIFICANT CHANGE UP
KETONES UR QL: NEGATIVE MG/DL — SIGNIFICANT CHANGE UP
LDH SERPL L TO P-CCNC: 506 U/L — HIGH (ref 50–242)
LEUKOCYTE ESTERASE UR-ACNC: ABNORMAL
LEUKOCYTE ESTERASE UR-ACNC: ABNORMAL
LYMPHOCYTES # BLD AUTO: 0.89 K/UL — LOW (ref 1–3.3)
LYMPHOCYTES # BLD MANUAL: 1.03 K/UL — SIGNIFICANT CHANGE UP (ref 1–3.3)
LYMPHOCYTES NFR BLD AUTO: 13 % — SIGNIFICANT CHANGE UP (ref 13–44)
LYMPHOCYTES NFR BLD MANUAL: 15.1 % — SIGNIFICANT CHANGE UP (ref 13–44)
MACROCYTES BLD QL: SLIGHT — SIGNIFICANT CHANGE UP
MAGNESIUM SERPL-MCNC: 1.9 MG/DL — SIGNIFICANT CHANGE UP (ref 1.6–2.6)
MANUAL BLAST #: 0.4 K/UL — HIGH (ref 0–0)
MANUAL MYELOCYTE #: 0.12 K/UL — HIGH (ref 0–0)
MANUAL NRBC #: 0.61 K/UL — HIGH (ref 0–0)
MCHC RBC-ENTMCNC: 28.1 PG — SIGNIFICANT CHANGE UP (ref 27–34)
MCHC RBC-ENTMCNC: 29.5 G/DL — LOW (ref 32–36)
MCV RBC AUTO: 95.3 FL — SIGNIFICANT CHANGE UP (ref 80–100)
MONOCYTES # BLD AUTO: 1.11 K/UL — HIGH (ref 0–0.9)
MONOCYTES # BLD MANUAL: 0.63 K/UL — SIGNIFICANT CHANGE UP (ref 0–0.9)
MONOCYTES NFR BLD AUTO: 16.3 % — HIGH (ref 2–14)
MONOCYTES NFR BLD MANUAL: 9.2 % — SIGNIFICANT CHANGE UP (ref 2–14)
MRSA PCR RESULT.: SIGNIFICANT CHANGE UP
MYELOCYTES NFR BLD: 1.7 % — HIGH (ref 0–0)
NEUTROPHILS # BLD AUTO: 3.68 K/UL — SIGNIFICANT CHANGE UP (ref 1.8–7.4)
NEUTROPHILS # BLD MANUAL: 4.3 K/UL — SIGNIFICANT CHANGE UP (ref 1.8–7.4)
NEUTROPHILS NFR BLD AUTO: 53.9 % — SIGNIFICANT CHANGE UP (ref 43–77)
NEUTROPHILS NFR BLD MANUAL: 63 % — SIGNIFICANT CHANGE UP (ref 43–77)
NITRITE UR-MCNC: NEGATIVE — SIGNIFICANT CHANGE UP
NITRITE UR-MCNC: NEGATIVE — SIGNIFICANT CHANGE UP
NRBC # BLD AUTO: 0.57 K/UL — HIGH (ref 0–0)
NRBC # BLD: 9 /100 WBCS — HIGH (ref 0–0)
NRBC # FLD: 0.57 K/UL — HIGH (ref 0–0)
NRBC BLD AUTO-RTO: 8 /100 WBCS — HIGH (ref 0–0)
NRBC BLD-RTO: 9 /100 WBCS — HIGH (ref 0–0)
OVALOCYTES BLD QL SMEAR: SLIGHT — SIGNIFICANT CHANGE UP
PH UR: 5.5 — SIGNIFICANT CHANGE UP (ref 5–8)
PH UR: 5.5 — SIGNIFICANT CHANGE UP (ref 5–8)
PHOSPHATE SERPL-MCNC: 3.9 MG/DL — SIGNIFICANT CHANGE UP (ref 2.5–4.5)
PLAT MORPH BLD: ABNORMAL
PLATELET # BLD AUTO: 112 K/UL — LOW (ref 150–400)
PMV BLD: SIGNIFICANT CHANGE UP FL (ref 7–13)
POIKILOCYTOSIS BLD QL AUTO: SLIGHT — SIGNIFICANT CHANGE UP
POTASSIUM SERPL-MCNC: 3.9 MMOL/L — SIGNIFICANT CHANGE UP (ref 3.5–5.3)
POTASSIUM SERPL-SCNC: 3.9 MMOL/L — SIGNIFICANT CHANGE UP (ref 3.5–5.3)
PROT SERPL-MCNC: 5.9 G/DL — LOW (ref 6–8.3)
PROT UR-MCNC: NEGATIVE MG/DL — SIGNIFICANT CHANGE UP
PROT UR-MCNC: SIGNIFICANT CHANGE UP MG/DL
PROTHROM AB SERPL-ACNC: 15.8 SEC — HIGH (ref 9.9–13.4)
RBC # BLD: 2.35 M/UL — LOW (ref 3.8–5.2)
RBC # FLD: 22.4 % — HIGH (ref 10.3–14.5)
RBC BLD AUTO: ABNORMAL
RBC CASTS # UR COMP ASSIST: 1 /HPF — SIGNIFICANT CHANGE UP (ref 0–4)
RBC CASTS # UR COMP ASSIST: 1 /HPF — SIGNIFICANT CHANGE UP (ref 0–4)
RH IG SCN BLD-IMP: POSITIVE — SIGNIFICANT CHANGE UP
RSV RNA NPH QL NAA+NON-PROBE: SIGNIFICANT CHANGE UP
S AUREUS DNA NOSE QL NAA+PROBE: SIGNIFICANT CHANGE UP
SARS-COV-2 RNA SPEC QL NAA+PROBE: SIGNIFICANT CHANGE UP
SCHISTOCYTES BLD QL AUTO: SLIGHT — SIGNIFICANT CHANGE UP
SODIUM SERPL-SCNC: 133 MMOL/L — LOW (ref 135–145)
SOURCE RESPIRATORY: SIGNIFICANT CHANGE UP
SP GR SPEC: 1.01 — SIGNIFICANT CHANGE UP (ref 1–1.03)
SP GR SPEC: 1.02 — SIGNIFICANT CHANGE UP (ref 1–1.03)
SQUAMOUS # UR AUTO: 2 /HPF — SIGNIFICANT CHANGE UP (ref 0–5)
SQUAMOUS # UR AUTO: 4 /HPF — SIGNIFICANT CHANGE UP (ref 0–5)
UROBILINOGEN FLD QL: 1 MG/DL — SIGNIFICANT CHANGE UP (ref 0.2–1)
UROBILINOGEN FLD QL: 1 MG/DL — SIGNIFICANT CHANGE UP (ref 0.2–1)
WBC # BLD: 6.82 K/UL — SIGNIFICANT CHANGE UP (ref 3.8–10.5)
WBC # FLD AUTO: 6.82 K/UL — SIGNIFICANT CHANGE UP (ref 3.8–10.5)
WBC UR QL: 5 /HPF — SIGNIFICANT CHANGE UP (ref 0–5)
WBC UR QL: 7 /HPF — HIGH (ref 0–5)

## 2025-06-20 PROCEDURE — 36556 INSERT NON-TUNNEL CV CATH: CPT | Mod: RT

## 2025-06-20 PROCEDURE — 99223 1ST HOSP IP/OBS HIGH 75: CPT

## 2025-06-20 PROCEDURE — 71045 X-RAY EXAM CHEST 1 VIEW: CPT | Mod: 26

## 2025-06-20 PROCEDURE — 36556 INSERT NON-TUNNEL CV CATH: CPT

## 2025-06-20 PROCEDURE — 76937 US GUIDE VASCULAR ACCESS: CPT | Mod: 26

## 2025-06-20 PROCEDURE — 93010 ELECTROCARDIOGRAM REPORT: CPT

## 2025-06-20 PROCEDURE — 77001 FLUOROGUIDE FOR VEIN DEVICE: CPT | Mod: 26

## 2025-06-20 RX ORDER — BUPROPION HYDROBROMIDE 522 MG/1
300 TABLET, EXTENDED RELEASE ORAL DAILY
Refills: 0 | Status: DISCONTINUED | OUTPATIENT
Start: 2025-06-20 | End: 2025-07-15

## 2025-06-20 RX ORDER — OXYCODONE HYDROCHLORIDE 30 MG/1
10 TABLET ORAL EVERY 4 HOURS
Refills: 0 | Status: DISCONTINUED | OUTPATIENT
Start: 2025-06-20 | End: 2025-06-23

## 2025-06-20 RX ORDER — ALPRAZOLAM 0.5 MG
0.25 TABLET, EXTENDED RELEASE 24 HR ORAL ONCE
Refills: 0 | Status: DISCONTINUED | OUTPATIENT
Start: 2025-06-20 | End: 2025-06-20

## 2025-06-20 RX ORDER — ONDANSETRON HCL/PF 4 MG/2 ML
16 VIAL (ML) INJECTION EVERY 24 HOURS
Refills: 0 | Status: COMPLETED | OUTPATIENT
Start: 2025-06-30 | End: 2025-07-01

## 2025-06-20 RX ORDER — SENNA 187 MG
2 TABLET ORAL AT BEDTIME
Refills: 0 | Status: DISCONTINUED | OUTPATIENT
Start: 2025-06-20 | End: 2025-06-24

## 2025-06-20 RX ORDER — LEVOTHYROXINE SODIUM 300 MCG
88 TABLET ORAL DAILY
Refills: 0 | Status: DISCONTINUED | OUTPATIENT
Start: 2025-06-20 | End: 2025-07-15

## 2025-06-20 RX ORDER — HYDROMORPHONE/SOD CHLOR,ISO/PF 2 MG/10 ML
0.5 SYRINGE (ML) INJECTION ONCE
Refills: 0 | Status: DISCONTINUED | OUTPATIENT
Start: 2025-06-20 | End: 2025-06-22

## 2025-06-20 RX ORDER — FOSAPREPITANT 150 MG/5ML
150 INJECTION, POWDER, LYOPHILIZED, FOR SOLUTION INTRAVENOUS ONCE
Refills: 0 | Status: COMPLETED | OUTPATIENT
Start: 2025-06-30 | End: 2025-06-30

## 2025-06-20 RX ORDER — LISINOPRIL 5 MG/1
10 TABLET ORAL EVERY 12 HOURS
Refills: 0 | Status: DISCONTINUED | OUTPATIENT
Start: 2025-06-20 | End: 2025-07-03

## 2025-06-20 RX ORDER — METOPROLOL SUCCINATE 50 MG/1
12.5 TABLET, EXTENDED RELEASE ORAL DAILY
Refills: 0 | Status: DISCONTINUED | OUTPATIENT
Start: 2025-06-20 | End: 2025-06-20

## 2025-06-20 RX ORDER — ONDANSETRON HCL/PF 4 MG/2 ML
16 VIAL (ML) INJECTION EVERY 24 HOURS
Refills: 0 | Status: COMPLETED | OUTPATIENT
Start: 2025-06-21 | End: 2025-06-25

## 2025-06-20 RX ORDER — POLYETHYLENE GLYCOL 3350 17 G/17G
17 POWDER, FOR SOLUTION ORAL DAILY
Refills: 0 | Status: DISCONTINUED | OUTPATIENT
Start: 2025-06-20 | End: 2025-07-15

## 2025-06-20 RX ORDER — DEXAMETHASONE 0.5 MG/1
10 TABLET ORAL EVERY 24 HOURS
Refills: 0 | Status: COMPLETED | OUTPATIENT
Start: 2025-06-21 | End: 2025-06-25

## 2025-06-20 RX ORDER — METOPROLOL SUCCINATE 50 MG/1
12.5 TABLET, EXTENDED RELEASE ORAL DAILY
Refills: 0 | Status: DISCONTINUED | OUTPATIENT
Start: 2025-06-20 | End: 2025-06-21

## 2025-06-20 RX ADMIN — Medication 1 APPLICATION(S): at 17:44

## 2025-06-20 RX ADMIN — URSODIOL 300 MILLIGRAM(S): 300 CAPSULE ORAL at 17:17

## 2025-06-20 RX ADMIN — Medication 5 MILLILITER(S): at 20:13

## 2025-06-20 RX ADMIN — LISINOPRIL 10 MILLIGRAM(S): 5 TABLET ORAL at 19:00

## 2025-06-20 RX ADMIN — Medication 5 MILLILITER(S): at 09:17

## 2025-06-20 RX ADMIN — POLYETHYLENE GLYCOL 3350 17 GRAM(S): 17 POWDER, FOR SOLUTION ORAL at 17:44

## 2025-06-20 RX ADMIN — Medication 5 MILLILITER(S): at 11:21

## 2025-06-20 RX ADMIN — Medication 10 MILLILITER(S): at 09:17

## 2025-06-20 RX ADMIN — Medication 10 MILLILITER(S): at 11:21

## 2025-06-20 RX ADMIN — Medication 15 MILLILITER(S): at 17:17

## 2025-06-20 RX ADMIN — Medication 10 MILLILITER(S): at 20:13

## 2025-06-20 RX ADMIN — BUPROPION HYDROBROMIDE 300 MILLIGRAM(S): 522 TABLET, EXTENDED RELEASE ORAL at 18:59

## 2025-06-20 RX ADMIN — OXYCODONE HYDROCHLORIDE 10 MILLIGRAM(S): 30 TABLET ORAL at 21:30

## 2025-06-20 RX ADMIN — Medication 40 MILLIGRAM(S): at 09:17

## 2025-06-20 RX ADMIN — Medication 10 MILLILITER(S): at 15:24

## 2025-06-20 RX ADMIN — Medication 5 MILLILITER(S): at 15:24

## 2025-06-20 RX ADMIN — Medication 2 TABLET(S): at 22:04

## 2025-06-20 RX ADMIN — OXYCODONE HYDROCHLORIDE 10 MILLIGRAM(S): 30 TABLET ORAL at 20:27

## 2025-06-20 RX ADMIN — Medication 800 MILLIGRAM(S): at 17:17

## 2025-06-20 NOTE — PATIENT PROFILE ADULT - FALL HARM RISK - RISK INTERVENTIONS
Assistance OOB with selected safe patient handling equipment/Assistance with ambulation/Communicate Fall Risk and Risk Factors to all staff, patient, and family/Reinforce activity limits and safety measures with patient and family/Toileting schedule using arm’s reach rule for commode and bathroom/Visual Cue: Yellow wristband/Bed in lowest position, wheels locked, appropriate side rails in place/Call bell, personal items and telephone in reach/Instruct patient to call for assistance before getting out of bed or chair/Non-slip footwear when patient is out of bed/Bode to call system/Physically safe environment - no spills, clutter or unnecessary equipment/Purposeful Proactive Rounding/Room/bathroom lighting operational, light cord in reach

## 2025-06-20 NOTE — DIETITIAN INITIAL EVALUATION ADULT - ENERGY INTAKE
Adequate (%) - Pt reports good appetite/PO intake; is amenable to receiving protein-fortified smoothies and Ensure Max 1x/day.

## 2025-06-20 NOTE — DIETITIAN INITIAL EVALUATION ADULT - ADD RECOMMEND
1. Continue current diet order: regular diet   2. Recommend protein-fortified smoothie of day 2x/day (Monday-Friday, 300 sarah 28 gm protein) and Ensure Max 1x/day.   3. Monitor and encourage PO intake. Encourage use of daily menus. Honor dietary preferences as expressed as able.

## 2025-06-20 NOTE — DIETITIAN INITIAL EVALUATION ADULT - PERTINENT MEDS FT
MEDICATIONS  (STANDING):  acyclovir   Oral Tab/Cap 800 milliGRAM(s) Oral every 12 hours  Biotene Dry Mouth Oral Rinse 5 milliLiter(s) Swish and Spit five times a day  chlorhexidine 0.12% Liquid 15 milliLiter(s) Swish and Spit two times a day  pantoprazole    Tablet 40 milliGRAM(s) Oral before breakfast  phytonadione   Solution 5 milliGRAM(s) Oral <User Schedule>  senna 2 Tablet(s) Oral at bedtime  sodium bicarbonate Mouth Rinse 10 milliLiter(s) Swish and Spit five times a day  ursodiol Capsule 300 milliGRAM(s) Oral two times a day    MEDICATIONS  (PRN):  ALPRAZolam 0.25 milliGRAM(s) Oral once PRN on call to IR  HYDROmorphone  Injectable 0.5 milliGRAM(s) IV Push once PRN on call to IR  polyethylene glycol 3350 17 Gram(s) Oral daily PRN Constipation

## 2025-06-20 NOTE — DIETITIAN INITIAL EVALUATION ADULT - REASON INDICATOR FOR ASSESSMENT
Nutrition consult warranted for: MST score 2 or more and new BMT admission    Information obtained from: electronic medical record and patient with family at bedside   Chart reviewed, events noted.

## 2025-06-20 NOTE — DIETITIAN INITIAL EVALUATION ADULT - PHYSCIAL ASSESSMENT
Weight Hx Per:  - Source: patient   - UBW: 140 pounds     Weight Hx Per [facility] HIE: no available weights to assess    Current Admission Weights:  - Dosing weight: 142.3 pounds/64.5 kg (06/20)    Weight Change:  - none noted     **  Will continue to monitor weight trends as available/able.     IBW: 110 pounds    %IBW: 129%

## 2025-06-20 NOTE — PRE PROCEDURE NOTE - PRE PROCEDURE EVALUATION
Interventional Radiology    HPI: 64 year old female with hx of myelofibrosis presents for a MUD allogeneic SCT (DP Permissive) conditioning with Flu/Bu/Thiotepa/rATG. Presents to IR for non tunneled TLC for treatment.     Allergies: sulfa drugs (Vomiting; Nausea)  IV Contrast (Hives)    Medications (Abx/Cardiac/Anticoagulation/Blood Products)      Data:  159  64.5  T(C): 37.1  HR: 60  BP: 106/59  RR: 18  SpO2: 97%    Exam  General: No acute distress  Chest: Non labored breathing  Abdomen: Non-distended  Extremities: No swelling, warm    -WBC 9.10 / HgB 7.9 / Hct 26.4 / Plt 125      Imaging: reviewed    Plan: 64y Female presents for Non-tunneled triple lumen central venous catheter.    -Risks/Benefits/alternatives explained with the patient and/or healthcare proxy and witnessed informed consent obtained.

## 2025-06-20 NOTE — PRE-OP CHECKLIST - RESPIRATORY RATE (BREATHS/MIN)
18 Isotretinoin Pregnancy And Lactation Text: This medication is Pregnancy Category X and is considered extremely dangerous during pregnancy. It is unknown if it is excreted in breast milk.

## 2025-06-21 LAB
ALBUMIN SERPL ELPH-MCNC: 3.3 G/DL — SIGNIFICANT CHANGE UP (ref 3.3–5)
ALP SERPL-CCNC: 89 U/L — SIGNIFICANT CHANGE UP (ref 40–120)
ALT FLD-CCNC: 7 U/L — LOW (ref 10–45)
ANION GAP SERPL CALC-SCNC: 15 MMOL/L — SIGNIFICANT CHANGE UP (ref 5–17)
AST SERPL-CCNC: 12 U/L — SIGNIFICANT CHANGE UP (ref 10–40)
BASOPHILS # BLD AUTO: 0.19 K/UL — SIGNIFICANT CHANGE UP (ref 0–0.2)
BASOPHILS # BLD MANUAL: 0.21 K/UL — HIGH (ref 0–0.2)
BASOPHILS NFR BLD AUTO: 2.4 % — HIGH (ref 0–2)
BASOPHILS NFR BLD MANUAL: 2.6 % — HIGH (ref 0–2)
BILIRUB SERPL-MCNC: 0.6 MG/DL — SIGNIFICANT CHANGE UP (ref 0.2–1.2)
BLASTS # FLD: 5.1 % — HIGH (ref 0–0)
BLASTS NFR BLD: 5.1 % — HIGH (ref 0–0)
BUN SERPL-MCNC: 20 MG/DL — SIGNIFICANT CHANGE UP (ref 7–23)
CALCIUM SERPL-MCNC: 8.9 MG/DL — SIGNIFICANT CHANGE UP (ref 8.4–10.5)
CHLORIDE SERPL-SCNC: 97 MMOL/L — SIGNIFICANT CHANGE UP (ref 96–108)
CO2 SERPL-SCNC: 22 MMOL/L — SIGNIFICANT CHANGE UP (ref 22–31)
CREAT SERPL-MCNC: 0.81 MG/DL — SIGNIFICANT CHANGE UP (ref 0.5–1.3)
EGFR: 82 ML/MIN/1.73M2 — SIGNIFICANT CHANGE UP
EGFR: 82 ML/MIN/1.73M2 — SIGNIFICANT CHANGE UP
EOSINOPHIL # BLD AUTO: 0.02 K/UL — SIGNIFICANT CHANGE UP (ref 0–0.5)
EOSINOPHIL # BLD MANUAL: 0 K/UL — SIGNIFICANT CHANGE UP (ref 0–0.5)
EOSINOPHIL NFR BLD AUTO: 0.3 % — SIGNIFICANT CHANGE UP (ref 0–6)
EOSINOPHIL NFR BLD MANUAL: 0 % — SIGNIFICANT CHANGE UP (ref 0–6)
GIANT PLATELETS BLD QL SMEAR: PRESENT
GLUCOSE SERPL-MCNC: 112 MG/DL — HIGH (ref 70–99)
HCT VFR BLD CALC: 27.1 % — LOW (ref 34.5–45)
HGB BLD-MCNC: 8.2 G/DL — LOW (ref 11.5–15.5)
IMM GRANULOCYTES # BLD AUTO: 1.21 K/UL — HIGH (ref 0–0.07)
IMM GRANULOCYTES NFR BLD AUTO: 15.3 % — HIGH (ref 0–0.9)
IMMATURE PLATELET FRACTION #: 24.2 K/UL — HIGH (ref 4.7–11.1)
IMMATURE PLATELET FRACTION %: 22 % — HIGH (ref 1.6–4.9)
LDH SERPL L TO P-CCNC: 573 U/L — HIGH (ref 50–242)
LYMPHOCYTES # BLD AUTO: 0.81 K/UL — LOW (ref 1–3.3)
LYMPHOCYTES # BLD MANUAL: 1.09 K/UL — SIGNIFICANT CHANGE UP (ref 1–3.3)
LYMPHOCYTES NFR BLD AUTO: 10.2 % — LOW (ref 13–44)
LYMPHOCYTES NFR BLD MANUAL: 13.7 % — SIGNIFICANT CHANGE UP (ref 13–44)
MAGNESIUM SERPL-MCNC: 1.9 MG/DL — SIGNIFICANT CHANGE UP (ref 1.6–2.6)
MANUAL BLAST #: 0.4 K/UL — HIGH (ref 0–0)
MANUAL METAMYELOCYTE #: 0.07 K/UL — HIGH (ref 0–0)
MANUAL NRBC #: 0.63 K/UL — HIGH (ref 0–0)
MCHC RBC-ENTMCNC: 28.4 PG — SIGNIFICANT CHANGE UP (ref 27–34)
MCHC RBC-ENTMCNC: 30.3 G/DL — LOW (ref 32–36)
MCV RBC AUTO: 93.8 FL — SIGNIFICANT CHANGE UP (ref 80–100)
METAMYELOCYTES # FLD: 0.9 % — HIGH (ref 0–0)
METAMYELOCYTES NFR BLD: 0.9 % — HIGH (ref 0–0)
MONOCYTES # BLD AUTO: 1.21 K/UL — HIGH (ref 0–0.9)
MONOCYTES # BLD MANUAL: 0.48 K/UL — SIGNIFICANT CHANGE UP (ref 0–0.9)
MONOCYTES NFR BLD AUTO: 15.3 % — HIGH (ref 2–14)
MONOCYTES NFR BLD MANUAL: 6 % — SIGNIFICANT CHANGE UP (ref 2–14)
NEUTROPHILS # BLD AUTO: 4.49 K/UL — SIGNIFICANT CHANGE UP (ref 1.8–7.4)
NEUTROPHILS # BLD MANUAL: 5.69 K/UL — SIGNIFICANT CHANGE UP (ref 1.8–7.4)
NEUTROPHILS NFR BLD AUTO: 56.5 % — SIGNIFICANT CHANGE UP (ref 43–77)
NEUTROPHILS NFR BLD MANUAL: 71.7 % — SIGNIFICANT CHANGE UP (ref 43–77)
NRBC # BLD AUTO: 0.62 K/UL — HIGH (ref 0–0)
NRBC # BLD: 8 /100 WBCS — HIGH (ref 0–0)
NRBC # FLD: 0.62 K/UL — HIGH (ref 0–0)
NRBC BLD AUTO-RTO: 8 /100 WBCS — HIGH (ref 0–0)
NRBC BLD-RTO: 8 /100 WBCS — HIGH (ref 0–0)
PHOSPHATE SERPL-MCNC: 3.6 MG/DL — SIGNIFICANT CHANGE UP (ref 2.5–4.5)
PLAT MORPH BLD: ABNORMAL
PLATELET # BLD AUTO: 110 K/UL — LOW (ref 150–400)
PMV BLD: SIGNIFICANT CHANGE UP FL (ref 7–13)
POTASSIUM SERPL-MCNC: 3.8 MMOL/L — SIGNIFICANT CHANGE UP (ref 3.5–5.3)
POTASSIUM SERPL-SCNC: 3.8 MMOL/L — SIGNIFICANT CHANGE UP (ref 3.5–5.3)
PROT SERPL-MCNC: 6.3 G/DL — SIGNIFICANT CHANGE UP (ref 6–8.3)
RAPID RVP RESULT: SIGNIFICANT CHANGE UP
RBC # BLD: 2.89 M/UL — LOW (ref 3.8–5.2)
RBC # FLD: 21.2 % — HIGH (ref 10.3–14.5)
RBC BLD AUTO: SIGNIFICANT CHANGE UP
SARS-COV-2 RNA SPEC QL NAA+PROBE: SIGNIFICANT CHANGE UP
SODIUM SERPL-SCNC: 134 MMOL/L — LOW (ref 135–145)
WBC # BLD: 7.93 K/UL — SIGNIFICANT CHANGE UP (ref 3.8–10.5)
WBC # FLD AUTO: 7.93 K/UL — SIGNIFICANT CHANGE UP (ref 3.8–10.5)

## 2025-06-21 PROCEDURE — 99233 SBSQ HOSP IP/OBS HIGH 50: CPT

## 2025-06-21 RX ORDER — THIOTEPA 100 MG/1
285 INJECTION, POWDER, LYOPHILIZED, FOR SOLUTION INTRACAVITARY; INTRAVENOUS; INTRAVESICAL EVERY 24 HOURS
Refills: 0 | Status: COMPLETED | OUTPATIENT
Start: 2025-06-21 | End: 2025-06-22

## 2025-06-21 RX ORDER — METHYLPREDNISOLONE ACETATE 80 MG/ML
55 INJECTION, SUSPENSION INTRA-ARTICULAR; INTRALESIONAL; INTRAMUSCULAR; SOFT TISSUE EVERY 24 HOURS
Refills: 0 | Status: COMPLETED | OUTPATIENT
Start: 2025-06-23 | End: 2025-06-25

## 2025-06-21 RX ORDER — ALBUTEROL SULFATE 2.5 MG/3ML
2 VIAL, NEBULIZER (ML) INHALATION ONCE
Refills: 0 | Status: DISCONTINUED | OUTPATIENT
Start: 2025-06-23 | End: 2025-06-26

## 2025-06-21 RX ORDER — ACETAMINOPHEN 500 MG/5ML
650 LIQUID (ML) ORAL ONCE
Refills: 0 | Status: COMPLETED | OUTPATIENT
Start: 2025-06-27 | End: 2025-06-27

## 2025-06-21 RX ORDER — DIPHENHYDRAMINE HCL 12.5MG/5ML
25 ELIXIR ORAL EVERY 24 HOURS
Refills: 0 | Status: COMPLETED | OUTPATIENT
Start: 2025-06-23 | End: 2025-06-25

## 2025-06-21 RX ORDER — DIPHENHYDRAMINE HCL 12.5MG/5ML
50 ELIXIR ORAL ONCE
Refills: 0 | Status: DISCONTINUED | OUTPATIENT
Start: 2025-06-23 | End: 2025-06-26

## 2025-06-21 RX ORDER — HYDROCORTISONE 20 MG
100 TABLET ORAL ONCE
Refills: 0 | Status: DISCONTINUED | OUTPATIENT
Start: 2025-06-23 | End: 2025-06-26

## 2025-06-21 RX ORDER — DIPHENHYDRAMINE HCL 12.5MG/5ML
25 ELIXIR ORAL ONCE
Refills: 0 | Status: COMPLETED | OUTPATIENT
Start: 2025-06-27 | End: 2025-06-27

## 2025-06-21 RX ORDER — METOPROLOL SUCCINATE 50 MG/1
12.5 TABLET, EXTENDED RELEASE ORAL DAILY
Refills: 0 | Status: DISCONTINUED | OUTPATIENT
Start: 2025-06-21 | End: 2025-07-05

## 2025-06-21 RX ORDER — LEVETIRACETAM 10 MG/ML
500 INJECTION, SOLUTION INTRAVENOUS EVERY 12 HOURS
Refills: 0 | Status: COMPLETED | OUTPATIENT
Start: 2025-06-22 | End: 2025-06-26

## 2025-06-21 RX ORDER — PROCHLORPERAZINE 25 MG
10 SUPPOSITORY, RECTAL RECTAL EVERY 6 HOURS
Refills: 0 | Status: DISCONTINUED | OUTPATIENT
Start: 2025-06-21 | End: 2025-06-26

## 2025-06-21 RX ORDER — FILGRASTIM 300 UG/.5ML
300 INJECTION, SOLUTION INTRAVENOUS; SUBCUTANEOUS EVERY 24 HOURS
Refills: 0 | Status: DISCONTINUED | OUTPATIENT
Start: 2025-07-02 | End: 2025-07-14

## 2025-06-21 RX ADMIN — Medication 5 MILLILITER(S): at 08:55

## 2025-06-21 RX ADMIN — OXYCODONE HYDROCHLORIDE 10 MILLIGRAM(S): 30 TABLET ORAL at 09:52

## 2025-06-21 RX ADMIN — Medication 5 MILLILITER(S): at 11:18

## 2025-06-21 RX ADMIN — Medication 88 MICROGRAM(S): at 05:19

## 2025-06-21 RX ADMIN — LISINOPRIL 10 MILLIGRAM(S): 5 TABLET ORAL at 05:20

## 2025-06-21 RX ADMIN — OXYCODONE HYDROCHLORIDE 10 MILLIGRAM(S): 30 TABLET ORAL at 00:00

## 2025-06-21 RX ADMIN — OXYCODONE HYDROCHLORIDE 10 MILLIGRAM(S): 30 TABLET ORAL at 20:56

## 2025-06-21 RX ADMIN — OXYCODONE HYDROCHLORIDE 10 MILLIGRAM(S): 30 TABLET ORAL at 19:48

## 2025-06-21 RX ADMIN — OXYCODONE HYDROCHLORIDE 10 MILLIGRAM(S): 30 TABLET ORAL at 09:22

## 2025-06-21 RX ADMIN — OXYCODONE HYDROCHLORIDE 10 MILLIGRAM(S): 30 TABLET ORAL at 06:15

## 2025-06-21 RX ADMIN — Medication 10 MILLILITER(S): at 11:18

## 2025-06-21 RX ADMIN — METOPROLOL SUCCINATE 12.5 MILLIGRAM(S): 50 TABLET, EXTENDED RELEASE ORAL at 05:18

## 2025-06-21 RX ADMIN — Medication 15 MILLILITER(S): at 05:25

## 2025-06-21 RX ADMIN — Medication 116 MILLIGRAM(S): at 11:10

## 2025-06-21 RX ADMIN — LISINOPRIL 10 MILLIGRAM(S): 5 TABLET ORAL at 17:12

## 2025-06-21 RX ADMIN — Medication 2 TABLET(S): at 21:27

## 2025-06-21 RX ADMIN — OXYCODONE HYDROCHLORIDE 10 MILLIGRAM(S): 30 TABLET ORAL at 01:01

## 2025-06-21 RX ADMIN — Medication 10 MILLILITER(S): at 19:22

## 2025-06-21 RX ADMIN — Medication 10 MILLILITER(S): at 16:26

## 2025-06-21 RX ADMIN — DEXAMETHASONE 100 MILLIGRAM(S): 0.5 TABLET ORAL at 11:22

## 2025-06-21 RX ADMIN — BUPROPION HYDROBROMIDE 300 MILLIGRAM(S): 522 TABLET, EXTENDED RELEASE ORAL at 11:10

## 2025-06-21 RX ADMIN — Medication 5 MILLILITER(S): at 16:26

## 2025-06-21 RX ADMIN — Medication 10 MILLILITER(S): at 00:08

## 2025-06-21 RX ADMIN — OXYCODONE HYDROCHLORIDE 10 MILLIGRAM(S): 30 TABLET ORAL at 05:13

## 2025-06-21 RX ADMIN — Medication 5 MILLILITER(S): at 19:21

## 2025-06-21 RX ADMIN — Medication 5 MILLILITER(S): at 00:07

## 2025-06-21 RX ADMIN — Medication 40 MILLIGRAM(S): at 05:14

## 2025-06-21 RX ADMIN — Medication 15 MILLILITER(S): at 17:12

## 2025-06-21 RX ADMIN — URSODIOL 300 MILLIGRAM(S): 300 CAPSULE ORAL at 05:19

## 2025-06-21 RX ADMIN — Medication 10 MILLILITER(S): at 08:55

## 2025-06-21 RX ADMIN — Medication 800 MILLIGRAM(S): at 05:18

## 2025-06-21 RX ADMIN — THIOTEPA 285 MILLIGRAM(S): 100 INJECTION, POWDER, LYOPHILIZED, FOR SOLUTION INTRACAVITARY; INTRAVENOUS; INTRAVESICAL at 12:00

## 2025-06-21 RX ADMIN — Medication 800 MILLIGRAM(S): at 17:12

## 2025-06-21 RX ADMIN — URSODIOL 300 MILLIGRAM(S): 300 CAPSULE ORAL at 17:12

## 2025-06-21 NOTE — PROGRESS NOTE ADULT - NS PANP COMMENT GEN_ALL_CORE FT
64 year old female with hx of myelofibrosis presents for a MUD allogeneic SCT (DP Permissive) conditioning with Flu/Bu/Thiotepa/rATG...ABC gvhd prophylaxis  1. Admit to BMT service, TLC placement in IR    2. Day - 6 through day -2 - antiemetics    3. Day - 6 through day -5 - thiotepa 5mg/kg IV administered over 3 hours x 2 doses. Maintain skin precautions while on thiotepa    4. Day - 4 - rabbit ATG 1mg/kg IV given over 6 hours once   5. Day -3 - rabbit ATG 1.5mg/kg IV given per ATG policy    6. Day -2 - rabbit ATG 2.5mg/kg IV given per ATG policy - premedications and rATG emergency medications as per orders.    7. Day - 4 through day - 2 - fludarabine 50mg/m2 given over 30 minutes every 24 hours x 3 doses    8. Day - 4 through day -2 - busulfan 3.2mg/kg IV given over 3 hours q 24 hours x 3 doses. Administered second following fludarabine. Do not give acetaminophen on busulfan days. 9. Day -5 through day -1 - keppra 500mgpo q 12 hours , start 12 hours prior to busulfan and continue for 24 post last dose.    10. Day 0 - infuse HPC product, start hydration 2 hours prior and continue for 24 hours post infusion of cells    11. Bortezomib - 1.3mg/m2 IV given 6 hours after HPC infusion    12. Day +3 - bortezomib 1.3mg/m2 given 72 hours after initial dose   13. Day + 3 - PTCy hydration. Start 2 hours prior to CTX and continue for 24 hours post administration of last dose    14. Day + 3, +4 - CTX 37.5mg/kg IV given over 2 hours every 24 hours x 2 doses    15. Abatacept days +5, 14, 28   16. Day +5 - start zarxio 5mcg/kg.day (actual weight) daily  1. Antiviral prophylaxis with acyclovir 800mg po BID to start on admission    2. Posaconazole and levaquin to be started once neutropenic    3. Bactrim SS for PCP prophylaxis post engraftment    4. Monitor CMV PCR post engraftment    5. EBV, adenovirus monitoring weekly    6. Ambulation for DVT prophylaxis  7. mouth and skin care  8. transfusion and electrolyte support 64 year old female with hx of myelofibrosis presents for a MUD allogeneic SCT (DP Permissive) conditioning with Flu/Bu/Thiotepa/rATG...ABC gvhd prophylaxis  1. Admit to BMT service, TLC placement in IR    2. Day - 6 through day -2 - antiemetics    3. Day - 6 through day -5 - thiotepa 5mg/kg IV administered over 3 hours x 2 doses. Maintain skin precautions while on thiotepa    4. Day - 4 - rabbit ATG 1mg/kg IV given over 6 hours once   5. Day -3 - rabbit ATG 1.5mg/kg IV given per ATG policy    6. Day -2 - rabbit ATG 2.5mg/kg IV given per ATG policy - premedications and rATG emergency medications as per orders.    7. Day - 4 through day - 2 - fludarabine 50mg/m2 given over 30 minutes every 24 hours x 3 doses    8. Day - 4 through day -2 - busulfan 3.2mg/kg IV given over 3 hours q 24 hours x 3 doses. Administered second following fludarabine. Do not give acetaminophen on busulfan days. 9. Day -5 through day -1 - keppra 500mgpo q 12 hours , start 12 hours prior to busulfan and continue for 24 post last dose.    10. Day 0 - infuse HPC product, start hydration 2 hours prior and continue for 24 hours post infusion of cells    11. Bortezomib - 1.3mg/m2 IV given 6 hours after HPC infusion    12. Day +3 - bortezomib 1.3mg/m2 given 72 hours after initial dose   13. Day + 3 - PTCy hydration. Start 2 hours prior to CTX and continue for 24 hours post administration of last dose    14. Day + 3, +4 - CTX 37.5mg/kg IV given over 2 hours every 24 hours x 2 doses    15. Abatacept days +5, 14, 28   16. Day +5 - start zarxio 5mcg/kg.day (actual weight) daily  1. Antiviral prophylaxis with acyclovir 800mg po BID to start on admission    2. Posaconazole and levaquin to be started once neutropenic    3. Bactrim SS for PCP prophylaxis post engraftment    4. Monitor CMV PCR post engraftment    5. EBV, adenovirus monitoring weekly    6. Ambulation for DVT prophylaxis  7. mouth and skin care  8. transfusion and electrolyte support  suspect temp related to disease process

## 2025-06-21 NOTE — PROGRESS NOTE ADULT - SUBJECTIVE AND OBJECTIVE BOX
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 prep regimen for treatment of myelofibrosis    Disease: myelofibrosis   Prep regimen: Flu / Bu / Thiotepa / rATG   GVHD ppx: ABC: PTCy days +3, +4, Bortezomib days 0, +3, PTCy days +5, +14, +28   ABO/CMV:   Recipient:   A+/CMV+   Donor:   B+/CMV+     S: Patient seen and examined with HPC Transplant Team:     O: Vitals:   Vital Signs Last 24 Hrs  T(C): 37.7 (2025 04:59), Max: 38.3 (2025 17:45)  T(F): 99.8 (2025 04:59), Max: 100.9 (2025 17:45)  HR: 74 (2025 04:59) (60 - 76)  BP: 130/65 (2025 04:59) (106/59 - 135/62)  BP(mean): --  RR: 18 (2025 04:59) (17 - 18)  SpO2: 95% (2025 04:59) (94% - 98%)    Parameters below as of 2025 04:59  Patient On (Oxygen Delivery Method): room air      Admit weight: 64.5kg   Daily Weight in k (2025 07:45)    Intake / Output:   06-20 @ 07:01  -  06-21 @ 07:00  --------------------------------------------------------  IN: 1891 mL / OUT: 2250 mL / NET: -359 mL          PE:   Oropharynx: no erythema or ulcerations   Oral Mucositis:              -                                          Grade: n/a   CVS: S1, S2 RRR   Lungs: CTA throughout bilaterally   Abdomen: + BS x 4, soft, NT, ND   Extremities: no edema   Gastric Mucositis:                 -                                 Grade: n/a   Intestinal Mucositis:                   -                           Grade: n/a   Skin: no rash   TLC: CDI   Neuro: A&Ox 3      Labs:   CBC Full  -  ( 2025 06:44 )  WBC Count : 7.93 K/uL  Hemoglobin : 8.2 g/dL  Hematocrit : 27.1 %  Platelet Count - Automated : 110 K/uL  Mean Cell Volume : 93.8 fl  Mean Cell Hemoglobin : 28.4 pg  Mean Cell Hemoglobin Concentration : 30.3 g/dL  Auto Neutrophil # : x  Auto Lymphocyte # : x  Auto Monocyte # : x  Auto Eosinophil # : x  Auto Basophil # : x  Auto Neutrophil % : x  Auto Lymphocyte % : x  Auto Monocyte % : x  Auto Eosinophil % : x  Auto Basophil % : x                          8.2    7.93  )-----------( 110      ( 2025 06:44 )             27.1         134[L]  |  97  |  20  ----------------------------<  112[H]  3.8   |  22  |  0.81    Ca    8.9      2025 06:45  Phos  3.6       Mg     1.9         TPro  6.3  /  Alb  3.3  /  TBili  0.6  /  DBili  x   /  AST  12  /  ALT  7[L]  /  AlkPhos  89      PT/INR - ( 2025 17:54 )   PT: 15.8 sec;   INR: 1.39 ratio         PTT - ( 2025 17:54 )  PTT:29.6 sec  LIVER FUNCTIONS - ( 2025 06:45 )  Alb: 3.3 g/dL / Pro: 6.3 g/dL / ALK PHOS: 89 U/L / ALT: 7 U/L / AST: 12 U/L / GGT: x           Lactate Dehydrogenase, Serum: 573 U/L ( @ 06:45)  Lactate Dehydrogenase, Serum: 506 U/L ( @ 17:54)      Meds:   Antimicrobials:   acyclovir   Oral Tab/Cap 800 milliGRAM(s) Oral every 12 hours      Heme / Onc:       GI:  pantoprazole    Tablet 40 milliGRAM(s) Oral before breakfast  polyethylene glycol 3350 17 Gram(s) Oral daily PRN  senna 2 Tablet(s) Oral at bedtime  sodium bicarbonate Mouth Rinse 10 milliLiter(s) Swish and Spit five times a day  ursodiol Capsule 300 milliGRAM(s) Oral two times a day      Cardiovascular:   hydrochlorothiazide 25 milliGRAM(s) Oral daily  lisinopril 10 milliGRAM(s) Oral every 12 hours  metoprolol succinate ER 12.5 milliGRAM(s) Oral daily      Immunologic:       Other medications:   Biotene Dry Mouth Oral Rinse 5 milliLiter(s) Swish and Spit five times a day  buPROPion XL (24-Hour) . 300 milliGRAM(s) Oral daily  chlorhexidine 0.12% Liquid 15 milliLiter(s) Swish and Spit two times a day  chlorhexidine 4% Liquid 1 Application(s) Topical <User Schedule>  dexAMETHasone  IVPB 10 milliGRAM(s) IV Intermittent every 24 hours  fentaNYL   Patch  50 MICROgram(s)/Hr. 1 Patch Transdermal every 48 hours  levothyroxine 88 MICROGram(s) Oral daily  ondansetron  IVPB 16 milliGRAM(s) IV Intermittent every 24 hours  phytonadione   Solution 5 milliGRAM(s) Oral <User Schedule>      PRN:   ALPRAZolam 0.25 milliGRAM(s) Oral once PRN  HYDROmorphone  Injectable 0.5 milliGRAM(s) IV Push once PRN  oxyCODONE    IR 10 milliGRAM(s) Oral every 4 hours PRN  polyethylene glycol 3350 17 Gram(s) Oral daily PRN  sodium chloride 0.9% lock flush 10 milliLiter(s) IV Push every 1 hour PRN    A/P: 64 year old female with hx of myelofibrosis presents for a MUD allogeneic SCT (DP Permissive) conditioning with Flu/Bu/Thiotepa/rATG.  Today is day - 6   - Thiotepa 1/2. Maintain skin precautions during thiotepa administration and for 48 hours after completion (days .6 through day -3). Chronic pain issues - continue home fentanyl patch. Febrile overnight, tmax 100.9. Cultures  pending. RVP negative. CXR pending official read, no obvious consolidations. Vital signs are stable.     1. Infectious Disease:   acyclovir   Oral Tab/Cap 800 milliGRAM(s) Oral every 12 hours    2. VOD Prophylaxis:   ursodiol Capsule 300 milliGRAM(s) Oral two times a day    3. GI Prophylaxis:    pantoprazole    Tablet 40 milliGRAM(s) Oral before breakfast    4. Mouthcare - NS / NaHCO3 rinses, Biotene; Skin care     5. GVHD prophylaxis   ABC:   PTCy days +3, +4, Bortezomib days 0, +3, PTCy days +5, +14, +28     6. Transfuse & replete electrolytes prn     7. IV hydration, daily weights, strict I&O, prn diuresis     8. PO intake as tolerated, nutrition follow up as needed, MVI, folic acid     9. Antiemetics, anti-diarrhea medications:   ondansetron  IVPB 16 milliGRAM(s) IV Intermittent every 24 hours  dexAMETHasone  IVPB 10 milliGRAM(s) IV Intermittent every 24 hours  compazine 10mg IV q 6 hours prn nausea / vomiting     10. OOB as tolerated, physical therapy consult if needed     11. Monitor coags / fibrinogen 2x week, vitamin K as needed   phytonadione   Solution 5 milliGRAM(s) Oral <User Schedule>    12. Monitor closely for clinical changes, monitor for fevers     13. Emotional support provided, plan of care discussed and questions addressed     14. Patient education done regarding chemotherapy prep, plan of care, restrictions and discharge planning     15. Continue regular social work input     I have written the above note for Dr. Gandhi who performed service with me in the room.   Marry Buchanan NP-C ([phone])    I have seen and examined patient with NP, I agree with above note as scribed.                    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 prep regimen for treatment of myelofibrosis    Disease: myelofibrosis   Prep regimen: Flu / Bu / Thiotepa / rATG   GVHD ppx: ABC: PTCy days +3, +4, Bortezomib days 0, +3, PTCy days +5, +14, +28   ABO/CMV:   Recipient:   A+/CMV+   Donor:   B+/CMV+     S: Patient seen and examined with HPC Transplant Team:   + chronic pain - legs and joints     O: Vitals:   Vital Signs Last 24 Hrs  T(C): 37.7 (2025 04:59), Max: 38.3 (2025 17:45)  T(F): 99.8 (2025 04:59), Max: 100.9 (2025 17:45)  HR: 74 (2025 04:59) (60 - 76)  BP: 130/65 (2025 04:59) (106/59 - 135/62)  BP(mean): --  RR: 18 (2025 04:59) (17 - 18)  SpO2: 95% (2025 04:59) (94% - 98%)    Parameters below as of 2025 04:59  Patient On (Oxygen Delivery Method): room air      Admit weight: 64.5kg   Daily Weight in k (2025 07:45)    Intake / Output:   06-20 @ 07:01  -  06-21 @ 07:00  --------------------------------------------------------  IN: 1891 mL / OUT: 2250 mL / NET: -359 mL          PE:   Oropharynx: no erythema or ulcerations   Oral Mucositis:              -                                          Grade: n/a   CVS: S1, S2 RRR   Lungs: CTA throughout bilaterally   Abdomen: + BS x 4, soft, NT, ND   Extremities: no edema   Gastric Mucositis:                 -                                 Grade: n/a   Intestinal Mucositis:                   -                           Grade: n/a   Skin: no rash   TLC: CDI   Neuro: A&Ox 3      Labs:   CBC Full  -  ( 2025 06:44 )  WBC Count : 7.93 K/uL  Hemoglobin : 8.2 g/dL  Hematocrit : 27.1 %  Platelet Count - Automated : 110 K/uL  Mean Cell Volume : 93.8 fl  Mean Cell Hemoglobin : 28.4 pg  Mean Cell Hemoglobin Concentration : 30.3 g/dL  Auto Neutrophil # : x  Auto Lymphocyte # : x  Auto Monocyte # : x  Auto Eosinophil # : x  Auto Basophil # : x  Auto Neutrophil % : x  Auto Lymphocyte % : x  Auto Monocyte % : x  Auto Eosinophil % : x  Auto Basophil % : x                          8.2    7.93  )-----------( 110      ( 2025 06:44 )             27.1         134[L]  |  97  |  20  ----------------------------<  112[H]  3.8   |  22  |  0.81    Ca    8.9      2025 06:45  Phos  3.6       Mg     1.9         TPro  6.3  /  Alb  3.3  /  TBili  0.6  /  DBili  x   /  AST  12  /  ALT  7[L]  /  AlkPhos  89      PT/INR - ( 2025 17:54 )   PT: 15.8 sec;   INR: 1.39 ratio         PTT - ( 2025 17:54 )  PTT:29.6 sec  LIVER FUNCTIONS - ( 2025 06:45 )  Alb: 3.3 g/dL / Pro: 6.3 g/dL / ALK PHOS: 89 U/L / ALT: 7 U/L / AST: 12 U/L / GGT: x           Lactate Dehydrogenase, Serum: 573 U/L ( @ 06:45)  Lactate Dehydrogenase, Serum: 506 U/L ( @ 17:54)    Cultures:   Cultures pending 25     Respiratory Viral Panel with COVID-19 by SCARLET (25 @ 21:02)    Rapid RVP Result: [facility]   SARS-CoV-2: [facility]: This Respiratory Panel uses polymerase chain reaction (PCR) to detect for  adenovirus; coronavirus (HKU1, NL63, 229E, OC43); human metapneumovirus  (hMPV); human enterovirus/rhinovirus (Entero/RV); influenza A; influenza  A/H1; influenza A/H3; influenza A/H1-2009; influenza B; parainfluenza  viruses 1, 2, 3, 4; respiratory syncytial virus; Mycoplasma pneumoniae;  Chlamydophila pneumoniae; and SARS-CoV-2.    Radiology:   ACC: [id number] EXAM:  XR CHEST PORTABLE URGENT 1V   ORDERED BY: SARAH REEVES   PROCEDURE DATE:  2025    IMPRESSION:  Clear lungs.    Meds:   Antimicrobials:   acyclovir   Oral Tab/Cap 800 milliGRAM(s) Oral every 12 hours      Heme / Onc:       GI:  pantoprazole    Tablet 40 milliGRAM(s) Oral before breakfast  polyethylene glycol 3350 17 Gram(s) Oral daily PRN  senna 2 Tablet(s) Oral at bedtime  sodium bicarbonate Mouth Rinse 10 milliLiter(s) Swish and Spit five times a day  ursodiol Capsule 300 milliGRAM(s) Oral two times a day      Cardiovascular:   hydrochlorothiazide 25 milliGRAM(s) Oral daily  lisinopril 10 milliGRAM(s) Oral every 12 hours  metoprolol succinate ER 12.5 milliGRAM(s) Oral daily      Immunologic:       Other medications:   Biotene Dry Mouth Oral Rinse 5 milliLiter(s) Swish and Spit five times a day  buPROPion XL (24-Hour) . 300 milliGRAM(s) Oral daily  chlorhexidine 0.12% Liquid 15 milliLiter(s) Swish and Spit two times a day  chlorhexidine 4% Liquid 1 Application(s) Topical <User Schedule>  dexAMETHasone  IVPB 10 milliGRAM(s) IV Intermittent every 24 hours  fentaNYL   Patch  50 MICROgram(s)/Hr. 1 Patch Transdermal every 48 hours  levothyroxine 88 MICROGram(s) Oral daily  ondansetron  IVPB 16 milliGRAM(s) IV Intermittent every 24 hours  phytonadione   Solution 5 milliGRAM(s) Oral <User Schedule>      PRN:   ALPRAZolam 0.25 milliGRAM(s) Oral once PRN  HYDROmorphone  Injectable 0.5 milliGRAM(s) IV Push once PRN  oxyCODONE    IR 10 milliGRAM(s) Oral every 4 hours PRN  polyethylene glycol 3350 17 Gram(s) Oral daily PRN  sodium chloride 0.9% lock flush 10 milliLiter(s) IV Push every 1 hour PRN    A/P: 64 year old female with hx of myelofibrosis presents for a MUD allogeneic SCT (DP Permissive) conditioning with Flu/Bu/Thiotepa/rATG.  Today is day - 6   - Thiotepa 1/2. Maintain skin precautions during thiotepa administration and for 48 hours after completion (days .6 through day -3). Chronic pain issues - continue home fentanyl patch. Febrile overnight, tmax 100.9. Cultures  pending. RVP negative. CXR pending official read, no obvious consolidations. Vital signs are stable.     1. Infectious Disease:   acyclovir   Oral Tab/Cap 800 milliGRAM(s) Oral every 12 hours    2. VOD Prophylaxis:   ursodiol Capsule 300 milliGRAM(s) Oral two times a day    3. GI Prophylaxis:    pantoprazole    Tablet 40 milliGRAM(s) Oral before breakfast    4. Mouthcare - NS / NaHCO3 rinses, Biotene; Skin care     5. GVHD prophylaxis   ABC:   PTCy days +3, +4, Bortezomib days 0, +3, PTCy days +5, +14, +28     6. Transfuse & replete electrolytes prn     7. IV hydration, daily weights, strict I&O, prn diuresis     8. PO intake as tolerated, nutrition follow up as needed, MVI, folic acid     9. Antiemetics, anti-diarrhea medications:   ondansetron  IVPB 16 milliGRAM(s) IV Intermittent every 24 hours  dexAMETHasone  IVPB 10 milliGRAM(s) IV Intermittent every 24 hours  compazine 10mg IV q 6 hours prn nausea / vomiting     10. OOB as tolerated, physical therapy consult if needed     11. Monitor coags / fibrinogen 2x week, vitamin K as needed   phytonadione   Solution 5 milliGRAM(s) Oral <User Schedule>    12. Monitor closely for clinical changes, monitor for fevers     13. Emotional support provided, plan of care discussed and questions addressed     14. Patient education done regarding chemotherapy prep, plan of care, restrictions and discharge planning     15. Continue regular social work input     I have written the above note for Dr. Gandhi who performed service with me in the room.   Marry Buchanan NP-C ([phone])    I have seen and examined patient with NP, I agree with above note as scribed.

## 2025-06-21 NOTE — PROGRESS NOTE ADULT - REASON FOR ADMISSION
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

## 2025-06-22 LAB
ALBUMIN SERPL ELPH-MCNC: 3.2 G/DL — LOW (ref 3.3–5)
ALP SERPL-CCNC: 85 U/L — SIGNIFICANT CHANGE UP (ref 40–120)
ALT FLD-CCNC: 7 U/L — LOW (ref 10–45)
ANION GAP SERPL CALC-SCNC: 13 MMOL/L — SIGNIFICANT CHANGE UP (ref 5–17)
AST SERPL-CCNC: 10 U/L — SIGNIFICANT CHANGE UP (ref 10–40)
BASOPHILS # BLD AUTO: 0.15 K/UL — SIGNIFICANT CHANGE UP (ref 0–0.2)
BASOPHILS # BLD MANUAL: 0.35 K/UL — HIGH (ref 0–0.2)
BASOPHILS NFR BLD AUTO: 2.9 % — HIGH (ref 0–2)
BASOPHILS NFR BLD MANUAL: 6.8 % — HIGH (ref 0–2)
BILIRUB SERPL-MCNC: 0.8 MG/DL — SIGNIFICANT CHANGE UP (ref 0.2–1.2)
BLASTS # FLD: 5.9 % — HIGH (ref 0–0)
BLASTS NFR BLD: 5.9 % — HIGH (ref 0–0)
BUN SERPL-MCNC: 18 MG/DL — SIGNIFICANT CHANGE UP (ref 7–23)
CALCIUM SERPL-MCNC: 8.8 MG/DL — SIGNIFICANT CHANGE UP (ref 8.4–10.5)
CHLORIDE SERPL-SCNC: 100 MMOL/L — SIGNIFICANT CHANGE UP (ref 96–108)
CO2 SERPL-SCNC: 23 MMOL/L — SIGNIFICANT CHANGE UP (ref 22–31)
CREAT SERPL-MCNC: 0.73 MG/DL — SIGNIFICANT CHANGE UP (ref 0.5–1.3)
EGFR: 93 ML/MIN/1.73M2 — SIGNIFICANT CHANGE UP
EGFR: 93 ML/MIN/1.73M2 — SIGNIFICANT CHANGE UP
EOSINOPHIL # BLD AUTO: 0.01 K/UL — SIGNIFICANT CHANGE UP (ref 0–0.5)
EOSINOPHIL # BLD MANUAL: 0 K/UL — SIGNIFICANT CHANGE UP (ref 0–0.5)
EOSINOPHIL NFR BLD AUTO: 0.2 % — SIGNIFICANT CHANGE UP (ref 0–6)
EOSINOPHIL NFR BLD MANUAL: 0 % — SIGNIFICANT CHANGE UP (ref 0–6)
GIANT PLATELETS BLD QL SMEAR: PRESENT
GLUCOSE SERPL-MCNC: 106 MG/DL — HIGH (ref 70–99)
HCT VFR BLD CALC: 26.4 % — LOW (ref 34.5–45)
HGB BLD-MCNC: 8 G/DL — LOW (ref 11.5–15.5)
HYPOCHROMIA BLD QL: SLIGHT — SIGNIFICANT CHANGE UP
IMM GRANULOCYTES # BLD AUTO: 0.72 K/UL — HIGH (ref 0–0.07)
IMM GRANULOCYTES NFR BLD AUTO: 13.8 % — HIGH (ref 0–0.9)
LDH SERPL L TO P-CCNC: 541 U/L — HIGH (ref 50–242)
LYMPHOCYTES # BLD AUTO: 0.52 K/UL — LOW (ref 1–3.3)
LYMPHOCYTES # BLD MANUAL: 0.71 K/UL — LOW (ref 1–3.3)
LYMPHOCYTES NFR BLD AUTO: 10 % — LOW (ref 13–44)
LYMPHOCYTES NFR BLD MANUAL: 13.6 % — SIGNIFICANT CHANGE UP (ref 13–44)
MAGNESIUM SERPL-MCNC: 2 MG/DL — SIGNIFICANT CHANGE UP (ref 1.6–2.6)
MANUAL BLAST #: 0.31 K/UL — HIGH (ref 0–0)
MANUAL NEUTROPHIL BANDS #: 0.04 K/UL — SIGNIFICANT CHANGE UP (ref 0–0.84)
MANUAL NRBC #: 0.21 K/UL — HIGH (ref 0–0)
MCHC RBC-ENTMCNC: 28.6 PG — SIGNIFICANT CHANGE UP (ref 27–34)
MCHC RBC-ENTMCNC: 30.3 G/DL — LOW (ref 32–36)
MCV RBC AUTO: 94.3 FL — SIGNIFICANT CHANGE UP (ref 80–100)
MONOCYTES # BLD AUTO: 1.17 K/UL — HIGH (ref 0–0.9)
MONOCYTES # BLD MANUAL: 0.35 K/UL — SIGNIFICANT CHANGE UP (ref 0–0.9)
MONOCYTES NFR BLD AUTO: 22.4 % — HIGH (ref 2–14)
MONOCYTES NFR BLD MANUAL: 6.8 % — SIGNIFICANT CHANGE UP (ref 2–14)
NEUTROPHILS # BLD AUTO: 2.65 K/UL — SIGNIFICANT CHANGE UP (ref 1.8–7.4)
NEUTROPHILS # BLD MANUAL: 3.45 K/UL — SIGNIFICANT CHANGE UP (ref 1.8–7.4)
NEUTROPHILS NFR BLD AUTO: 50.7 % — SIGNIFICANT CHANGE UP (ref 43–77)
NEUTROPHILS NFR BLD MANUAL: 66.1 % — SIGNIFICANT CHANGE UP (ref 43–77)
NEUTS BAND # BLD: 0.8 % — SIGNIFICANT CHANGE UP (ref 0–8)
NEUTS BAND NFR BLD: 0.8 % — SIGNIFICANT CHANGE UP (ref 0–8)
NRBC # BLD AUTO: 0.29 K/UL — HIGH (ref 0–0)
NRBC # BLD: 4 /100 WBCS — HIGH (ref 0–0)
NRBC # FLD: 0.29 K/UL — HIGH (ref 0–0)
NRBC BLD AUTO-RTO: 6 /100 WBCS — HIGH (ref 0–0)
NRBC BLD-RTO: 4 /100 WBCS — HIGH (ref 0–0)
PHOSPHATE SERPL-MCNC: 4.2 MG/DL — SIGNIFICANT CHANGE UP (ref 2.5–4.5)
PLAT MORPH BLD: ABNORMAL
PLATELET # BLD AUTO: 106 K/UL — LOW (ref 150–400)
PMV BLD: SIGNIFICANT CHANGE UP FL (ref 7–13)
POLYCHROMASIA BLD QL SMEAR: SLIGHT — SIGNIFICANT CHANGE UP
POTASSIUM SERPL-MCNC: 3.9 MMOL/L — SIGNIFICANT CHANGE UP (ref 3.5–5.3)
POTASSIUM SERPL-SCNC: 3.9 MMOL/L — SIGNIFICANT CHANGE UP (ref 3.5–5.3)
PROT SERPL-MCNC: 6 G/DL — SIGNIFICANT CHANGE UP (ref 6–8.3)
RBC # BLD: 2.8 M/UL — LOW (ref 3.8–5.2)
RBC # FLD: 21.2 % — HIGH (ref 10.3–14.5)
RBC BLD AUTO: SIGNIFICANT CHANGE UP
SODIUM SERPL-SCNC: 136 MMOL/L — SIGNIFICANT CHANGE UP (ref 135–145)
WBC # BLD: 5.22 K/UL — SIGNIFICANT CHANGE UP (ref 3.8–10.5)
WBC # FLD AUTO: 5.22 K/UL — SIGNIFICANT CHANGE UP (ref 3.8–10.5)

## 2025-06-22 PROCEDURE — 99233 SBSQ HOSP IP/OBS HIGH 50: CPT

## 2025-06-22 RX ORDER — BISACODYL 5 MG
5 TABLET, DELAYED RELEASE (ENTERIC COATED) ORAL ONCE
Refills: 0 | Status: DISCONTINUED | OUTPATIENT
Start: 2025-06-22 | End: 2025-06-22

## 2025-06-22 RX ADMIN — Medication 10 MILLILITER(S): at 19:03

## 2025-06-22 RX ADMIN — THIOTEPA 285 MILLIGRAM(S): 100 INJECTION, POWDER, LYOPHILIZED, FOR SOLUTION INTRACAVITARY; INTRAVENOUS; INTRAVESICAL at 12:55

## 2025-06-22 RX ADMIN — Medication 800 MILLIGRAM(S): at 17:18

## 2025-06-22 RX ADMIN — URSODIOL 300 MILLIGRAM(S): 300 CAPSULE ORAL at 06:18

## 2025-06-22 RX ADMIN — Medication 10 MILLIGRAM(S): at 06:15

## 2025-06-22 RX ADMIN — OXYCODONE HYDROCHLORIDE 10 MILLIGRAM(S): 30 TABLET ORAL at 20:10

## 2025-06-22 RX ADMIN — Medication 10 MILLILITER(S): at 15:43

## 2025-06-22 RX ADMIN — LISINOPRIL 10 MILLIGRAM(S): 5 TABLET ORAL at 17:39

## 2025-06-22 RX ADMIN — Medication 10 MILLILITER(S): at 23:17

## 2025-06-22 RX ADMIN — LEVETIRACETAM 500 MILLIGRAM(S): 10 INJECTION, SOLUTION INTRAVENOUS at 17:18

## 2025-06-22 RX ADMIN — OXYCODONE HYDROCHLORIDE 10 MILLIGRAM(S): 30 TABLET ORAL at 19:02

## 2025-06-22 RX ADMIN — Medication 15 MILLILITER(S): at 06:19

## 2025-06-22 RX ADMIN — Medication 5 MILLILITER(S): at 12:47

## 2025-06-22 RX ADMIN — Medication 5 MILLILITER(S): at 23:18

## 2025-06-22 RX ADMIN — Medication 10 MILLILITER(S): at 12:48

## 2025-06-22 RX ADMIN — Medication 5 MILLILITER(S): at 19:03

## 2025-06-22 RX ADMIN — LISINOPRIL 10 MILLIGRAM(S): 5 TABLET ORAL at 06:18

## 2025-06-22 RX ADMIN — DEXAMETHASONE 100 MILLIGRAM(S): 0.5 TABLET ORAL at 11:11

## 2025-06-22 RX ADMIN — LEVETIRACETAM 500 MILLIGRAM(S): 10 INJECTION, SOLUTION INTRAVENOUS at 06:18

## 2025-06-22 RX ADMIN — Medication 116 MILLIGRAM(S): at 11:11

## 2025-06-22 RX ADMIN — Medication 10 MILLILITER(S): at 08:27

## 2025-06-22 RX ADMIN — Medication 88 MICROGRAM(S): at 06:18

## 2025-06-22 RX ADMIN — Medication 5 MILLILITER(S): at 08:27

## 2025-06-22 RX ADMIN — Medication 15 MILLILITER(S): at 17:18

## 2025-06-22 RX ADMIN — Medication 5 MILLILITER(S): at 15:43

## 2025-06-22 RX ADMIN — METOPROLOL SUCCINATE 12.5 MILLIGRAM(S): 50 TABLET, EXTENDED RELEASE ORAL at 06:19

## 2025-06-22 RX ADMIN — URSODIOL 300 MILLIGRAM(S): 300 CAPSULE ORAL at 17:22

## 2025-06-22 RX ADMIN — Medication 2 TABLET(S): at 23:16

## 2025-06-22 RX ADMIN — Medication 40 MILLIGRAM(S): at 06:18

## 2025-06-22 RX ADMIN — BUPROPION HYDROBROMIDE 300 MILLIGRAM(S): 522 TABLET, EXTENDED RELEASE ORAL at 12:46

## 2025-06-22 RX ADMIN — Medication 800 MILLIGRAM(S): at 06:18

## 2025-06-22 NOTE — PROGRESS NOTE ADULT - SUBJECTIVE AND OBJECTIVE BOX
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 / Bu / Thiotepa / rATG prep regimen for treatment of myelofibrosis    Disease: myelofibrosis   Prep regimen: Flu / Bu / Thiotepa / rATG   GVHD ppx: ABC: PTCy days +3, +4, Bortezomib days 0, +3, PTCy days +5, +14, +28   ABO/CMV:   Recipient:   A+/CMV+   Donor:   B+/CMV+      S: Patient seen and examined with HPC Transplant Team:          Vital Signs Last 24 Hrs  T(C): 36.6 (22 Jun 2025 06:04), Max: 37.8 (21 Jun 2025 11:27)  T(F): 97.8 (22 Jun 2025 06:04), Max: 100 (21 Jun 2025 11:27)  HR: 70 (22 Jun 2025 06:04) (69 - 72)  BP: 124/65 (22 Jun 2025 06:04) (124/65 - 151/57)  BP(mean): --  RR: 18 (22 Jun 2025 06:04) (17 - 18)  SpO2: 98% (22 Jun 2025 06:04) (95% - 98%)    Parameters below as of 22 Jun 2025 06:04  Patient On (Oxygen Delivery Method): room air      Admit weight: 64.5    Daily Weight in kG:     Intake / Output:   06-20 @ 07:01 - 06-21 @ 07:00  --------------------------------------------------------  IN: 1891 mL / OUT: 2250 mL / NET: -359 mL    06-21 @ 07:01 - 06-22 @ 06:51  --------------------------------------------------------  IN: 2280 mL / OUT: 1900 mL / NET: 380 mL    PE:   Oropharynx: no erythema or ulcerations   Oral Mucositis:              -                                          Grade: n/a   CVS: S1, S2 RRR   Lungs: CTA throughout bilaterally   Abdomen: + BS x 4, soft, NT, ND   Extremities: no edema   Gastric Mucositis:                 -                                 Grade: n/a   Intestinal Mucositis:                   -                           Grade: n/a   Skin: no rash   TLC: CDI   Neuro: A&Ox 3      Labs:     P    Cultures:     Respiratory Viral Panel with COVID-19 by SCARLET (06.20.25 @ 21:02)   Rapid RVP Result: [id number]: NotDetec:      Culture - Blood (06.20.25 @ 18:45)   Specimen Source: Blood Blood-Catheter  Culture Results: No growth at 24 hoursCulture - Blood (06.20.25 @ 18:40)   Specimen Source: Blood Blood-Peripheral  Culture Results: No growth at 24 hours    Radiology:     < from: Xray Chest 1 View- PORTABLE-Urgent (Xray Chest 1 View- PORTABLE-Urgent .) (06.20.25 @ 19:28) >  IMPRESSION: Clear lungs.      Meds:   Antimicrobials:   acyclovir   Oral Tab/Cap 800 milliGRAM(s) Oral every 12 hours      Heme / Onc:   thiotepa IVPB (eMAR) 285 milliGRAM(s) IV Intermittent every 24 hours      GI:  pantoprazole    Tablet 40 milliGRAM(s) Oral before breakfast  polyethylene glycol 3350 17 Gram(s) Oral daily PRN  senna 2 Tablet(s) Oral at bedtime  sodium bicarbonate Mouth Rinse 10 milliLiter(s) Swish and Spit five times a day  ursodiol Capsule 300 milliGRAM(s) Oral two times a day      Cardiovascular:   hydrochlorothiazide 25 milliGRAM(s) Oral daily  lisinopril 10 milliGRAM(s) Oral every 12 hours  metoprolol succinate ER 12.5 milliGRAM(s) Oral daily      Immunologic:       Other medications:   Biotene Dry Mouth Oral Rinse 5 milliLiter(s) Swish and Spit five times a day  buPROPion XL (24-Hour) . 300 milliGRAM(s) Oral daily  chlorhexidine 0.12% Liquid 15 milliLiter(s) Swish and Spit two times a day  chlorhexidine 4% Liquid 1 Application(s) Topical <User Schedule>  dexAMETHasone  IVPB 10 milliGRAM(s) IV Intermittent every 24 hours  diphenhydrAMINE 25 milliGRAM(s) Oral once  fentaNYL   Patch  50 MICROgram(s)/Hr. 1 Patch Transdermal every 48 hours  levETIRAcetam 500 milliGRAM(s) Oral every 12 hours  levothyroxine 88 MICROGram(s) Oral daily  ondansetron  IVPB 16 milliGRAM(s) IV Intermittent every 24 hours  phytonadione   Solution 5 milliGRAM(s) Oral <User Schedule>      PRN:   ALPRAZolam 0.25 milliGRAM(s) Oral once PRN  HYDROmorphone  Injectable 0.5 milliGRAM(s) IV Push once PRN  oxyCODONE    IR 10 milliGRAM(s) Oral every 4 hours PRN  polyethylene glycol 3350 17 Gram(s) Oral daily PRN  prochlorperazine   Injectable 10 milliGRAM(s) IV Push every 6 hours PRN  sodium chloride 0.9% lock flush 10 milliLiter(s) IV Push every 1 hour PRN      A/P: 64 year old female with hx of myelofibrosis presents for a MUD allogeneic SCT (DP Permissive) conditioning with Flu/Bu/Thiotepa/rATG.  Today is day - 5   6/21- Thiotepa 1/2. Maintain skin precautions during thiotepa administration and for 48 hours after completion (days .6 through day -3). Chronic pain issues - continue home fentanyl patch. Febrile overnight, tmax 100.9. Cultures  pending. RVP negative. CXR pending official read, no obvious consolidations. Vital signs are stable.     1. Infectious Disease:   acyclovir   Oral Tab/Cap 800 milliGRAM(s) Oral every 12 hours    2. VOD Prophylaxis:   ursodiol Capsule 300 milliGRAM(s) Oral two times a day    3. GI Prophylaxis:    pantoprazole    Tablet 40 milliGRAM(s) Oral before breakfast    4. Mouthcare - NS / NaHCO3 rinses, Biotene; Skin care     5. GVHD prophylaxis   ABC:   PTCy days +3, +4, Bortezomib days 0, +3, PTCy days +5, +14, +28     6. Transfuse & replete electrolytes prn     7. IV hydration, daily weights, strict I&O, prn diuresis     8. PO intake as tolerated, nutrition follow up as needed, MVI, folic acid     9. Antiemetics, anti-diarrhea medications:   ondansetron  IVPB 16 milliGRAM(s) IV Intermittent every 24 hours  dexAMETHasone  IVPB 10 milliGRAM(s) IV Intermittent every 24 hours  compazine 10mg IV q 6 hours prn nausea / vomiting     10. OOB as tolerated, physical therapy consult if needed     11. Monitor coags / fibrinogen 2x week, vitamin K as needed   phytonadione   Solution 5 milliGRAM(s) Oral <User Schedule>    12. Monitor closely for clinical changes, monitor for fevers     13. Emotional support provided, plan of care discussed and questions addressed     14. Patient education done regarding chemotherapy prep, plan of care, restrictions and discharge planning     15. Continue regular social work input     I have written the above note for Dr. Gandhi who performed service with me in the room.   Jalen Kay PA-C ([phone])    I have seen and examined patient with PA, I agree with above note as scribed.                    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 prep regimen for treatment of myelofibrosis    Disease: myelofibrosis   Prep regimen: Flu / Bu / Thiotepa / rATG   GVHD ppx: ABC: PTCy days +3, +4, Bortezomib days 0, +3, PTCy days +5, +14, +28   ABO/CMV:   Recipient:   A+/CMV+   Donor:   B+/CMV+      S: Patient seen and examined with HPC Transplant Team:   +leg pains, controlled w meds  + constipation (no BM x 5d), then had large BM prior to rounds        Vital Signs Last 24 Hrs  T(C): 36.6 (2025 06:04), Max: 37.8 (2025 11:27)  T(F): 97.8 (2025 06:04), Max: 100 (2025 11:27)  HR: 70 (2025 06:04) (69 - 72)  BP: 124/65 (2025 06:04) (124/65 - 151/57)  BP(mean): --  RR: 18 (2025 06:04) (17 - 18)  SpO2: 98% (2025 06:04) (95% - 98%)    Parameters below as of 2025 06:04  Patient On (Oxygen Delivery Method): room air      Admit weight: 64.5    Daily Weight in k.6kg    Intake / Output:    @ 07: @ 07:00  --------------------------------------------------------  IN: 1891 mL / OUT: 2250 mL / NET: -359 mL     @ 07:  -   @ 06:51  --------------------------------------------------------  IN: 2280 mL / OUT: 1900 mL / NET: 380 mL    PE:   Oropharynx: no erythema or ulcerations   Oral Mucositis:              -                                          Grade: n/a   CVS: S1, S2 RRR   Lungs: CTA throughout bilaterally   Abdomen: + BS x 4, soft, NT, ND   Extremities: no edema   Gastric Mucositis:                 -                                 Grade: n/a   Intestinal Mucositis:                   -                           Grade: n/a   Skin: no rash   TLC: CDI   Neuro: A&Ox 3      Labs:                           8.0    5.22  )-----------( 106      ( 2025 06:35 )             26.4     2025 06:35    136    |  100    |  18     ----------------------------<  106    3.9     |  23     |  0.73     Ca    8.8        2025 06:35  Phos  4.2       2025 06:35  Mg     2.0       2025 06:35    TPro  6.0    /  Alb  3.2    /  TBili  0.8    /  DBili  x      /  AST  10     /  ALT  7      /  AlkPhos  85     2025 06:35    PT/INR - ( 2025 17:54 )   PT: 15.8 sec;   INR: 1.39 ratio    PTT - ( 2025 17:54 )  PTT:29.6 sec    LIVER FUNCTIONS - ( 2025 06:35 )  Alb: 3.2 g/dL / Pro: 6.0 g/dL / ALK PHOS: 85 U/L / ALT: 7 U/L / AST: 10 U/L / GGT: x           Urinalysis Basic - ( 2025 06:35 )    Color: x / Appearance: x / SG: x / pH: x  Gluc: 106 mg/dL / Ketone: x  / Bili: x / Urobili: x   Blood: x / Protein: x / Nitrite: x   Leuk Esterase: x / RBC: x / WBC x   Sq Epi: x / Non Sq Epi: x / Bacteria: x      Cultures:   Respiratory Viral Panel with COVID-19 by [facility] (25 @ 21:02)   Rapid RVP Result: [id number]: NotDetec:  Culture - Blood (25 @ 18:45)   Specimen Source: Blood Blood-Catheter  Culture Results: No growth at 24 hoursCulture - Blood (25 @ 18:40)   Specimen Source: Blood Blood-Peripheral  Culture Results: No growth at 24 hours    Radiology:     < from: Xray Chest 1 View- PORTABLE-Urgent (Xray Chest 1 View- PORTABLE-Urgent .) (25 @ 19:28) >  IMPRESSION: Clear lungs.      Meds:   Antimicrobials:   acyclovir   Oral Tab/Cap 800 milliGRAM(s) Oral every 12 hours      Heme / Onc:   thiotepa IVPB (eMAR) 285 milliGRAM(s) IV Intermittent every 24 hours      GI:  pantoprazole    Tablet 40 milliGRAM(s) Oral before breakfast  polyethylene glycol 3350 17 Gram(s) Oral daily PRN  senna 2 Tablet(s) Oral at bedtime  sodium bicarbonate Mouth Rinse 10 milliLiter(s) Swish and Spit five times a day  ursodiol Capsule 300 milliGRAM(s) Oral two times a day      Cardiovascular:   hydrochlorothiazide 25 milliGRAM(s) Oral daily  lisinopril 10 milliGRAM(s) Oral every 12 hours  metoprolol succinate ER 12.5 milliGRAM(s) Oral daily      Immunologic:       Other medications:   Biotene Dry Mouth Oral Rinse 5 milliLiter(s) Swish and Spit five times a day  buPROPion XL (24-Hour) . 300 milliGRAM(s) Oral daily  chlorhexidine 0.12% Liquid 15 milliLiter(s) Swish and Spit two times a day  chlorhexidine 4% Liquid 1 Application(s) Topical <User Schedule>  dexAMETHasone  IVPB 10 milliGRAM(s) IV Intermittent every 24 hours  diphenhydrAMINE 25 milliGRAM(s) Oral once  fentaNYL   Patch  50 MICROgram(s)/Hr. 1 Patch Transdermal every 48 hours  levETIRAcetam 500 milliGRAM(s) Oral every 12 hours  levothyroxine 88 MICROGram(s) Oral daily  ondansetron  IVPB 16 milliGRAM(s) IV Intermittent every 24 hours  phytonadione   Solution 5 milliGRAM(s) Oral <User Schedule>      PRN:   ALPRAZolam 0.25 milliGRAM(s) Oral once PRN  HYDROmorphone  Injectable 0.5 milliGRAM(s) IV Push once PRN  oxyCODONE    IR 10 milliGRAM(s) Oral every 4 hours PRN  polyethylene glycol 3350 17 Gram(s) Oral daily PRN  prochlorperazine   Injectable 10 milliGRAM(s) IV Push every 6 hours PRN  sodium chloride 0.9% lock flush 10 milliLiter(s) IV Push every 1 hour PRN      A/P: 64 year old female with hx of myelofibrosis presents for a MUD allogeneic SCT (DP Permissive) conditioning with Flu/Bu/Thiotepa/rATG.  Today is day - 5   - Thiotepa /. Maintain skin precautions during thiotepa administration and for 48 hours after completion (days .6 through day -3). Chronic pain issues - continue home fentanyl patch. Febrile overnight, tmax 100.9. Cultures  pending. RVP negative. CXR pending official read, no obvious consolidations. Vital signs are stable.     1. Infectious Disease:   acyclovir   Oral Tab/Cap 800 milliGRAM(s) Oral every 12 hours    2. VOD Prophylaxis:   ursodiol Capsule 300 milliGRAM(s) Oral two times a day    3. GI Prophylaxis:    pantoprazole    Tablet 40 milliGRAM(s) Oral before breakfast    4. Mouthcare - NS / NaHCO3 rinses, Biotene; Skin care     5. GVHD prophylaxis   ABC: PTCy days +3, +4, Bortezomib days 0, +3, PTCy days +5, +14, +28     6. Transfuse & replete electrolytes prn     7. IV hydration, daily weights, strict I&O, prn diuresis     8. PO intake as tolerated, nutrition follow up as needed, MVI, folic acid     9. Antiemetics, anti-diarrhea medications:   ondansetron  IVPB 16 milliGRAM(s) IV Intermittent every 24 hours  dexAMETHasone  IVPB 10 milliGRAM(s) IV Intermittent every 24 hours  compazine 10mg IV q 6 hours prn nausea / vomiting     10. OOB as tolerated, physical therapy consult if needed     11. Monitor coags 2x week, vitamin K BIW  phytonadione   Solution 5 milliGRAM(s) Oral <User Schedule>    12. Monitor closely for clinical changes, monitor for fevers     13. Emotional support provided, plan of care discussed and questions addressed     14. Patient education done regarding chemotherapy prep, plan of care, restrictions and discharge planning     15. Continue regular social work input     I have written the above note for Dr. Gandhi who performed service with me in the room.   Jalen Kay PA-C ([phone])    I have seen and examined patient with PA, I agree with above note as scribed.

## 2025-06-22 NOTE — PROGRESS NOTE ADULT - NS PANP COMMENT GEN_ALL_CORE FT
64 year old female with hx of myelofibrosis presents for a MUD allogeneic SCT (DP Permissive) conditioning with Flu/Bu/Thiotepa/rATG...ABC gvhd prophylaxis  1. Admit to BMT service, TLC placement in IR    2. Day - 6 through day -2 - antiemetics    3. Day - 6 through day -5 - thiotepa 5mg/kg IV administered over 3 hours x 2 doses. Maintain skin precautions while on thiotepa    4. Day - 4 - rabbit ATG 1mg/kg IV given over 6 hours once   5. Day -3 - rabbit ATG 1.5mg/kg IV given per ATG policy    6. Day -2 - rabbit ATG 2.5mg/kg IV given per ATG policy - premedications and rATG emergency medications as per orders.    7. Day - 4 through day - 2 - fludarabine 50mg/m2 given over 30 minutes every 24 hours x 3 doses    8. Day - 4 through day -2 - busulfan 3.2mg/kg IV given over 3 hours q 24 hours x 3 doses. Administered second following fludarabine. Do not give acetaminophen on busulfan days. 9. Day -5 through day -1 - keppra 500mgpo q 12 hours , start 12 hours prior to busulfan and continue for 24 post last dose.    10. Day 0 - infuse HPC product, start hydration 2 hours prior and continue for 24 hours post infusion of cells    11. Bortezomib - 1.3mg/m2 IV given 6 hours after HPC infusion    12. Day +3 - bortezomib 1.3mg/m2 given 72 hours after initial dose   13. Day + 3 - PTCy hydration. Start 2 hours prior to CTX and continue for 24 hours post administration of last dose    14. Day + 3, +4 - CTX 37.5mg/kg IV given over 2 hours every 24 hours x 2 doses    15. Abatacept days +5, 14, 28   16. Day +5 - start zarxio 5mcg/kg.day (actual weight) daily  1. Antiviral prophylaxis with acyclovir 800mg po BID to start on admission    2. Posaconazole and levaquin to be started once neutropenic    3. Bactrim SS for PCP prophylaxis post engraftment    4. Monitor CMV PCR post engraftment    5. EBV, adenovirus monitoring weekly    6. Ambulation for DVT prophylaxis  7. mouth and skin care  8. transfusion and electrolyte support 64 year old female with hx of myelofibrosis presents for a MUD allogeneic SCT (DP Permissive) conditioning with Flu/Bu/Thiotepa/rATG...ABC gvhd prophylaxis..day -5  1. Admit to BMT service, TLC placement in IR    2. Day - 6 through day -2 - antiemetics    3. Day - 6 through day -5 - thiotepa 5mg/kg IV administered over 3 hours x 2 doses. Maintain skin precautions while on thiotepa    4. Day - 4 - rabbit ATG 1mg/kg IV given over 6 hours once   5. Day -3 - rabbit ATG 1.5mg/kg IV given per ATG policy    6. Day -2 - rabbit ATG 2.5mg/kg IV given per ATG policy - premedications and rATG emergency medications as per orders.    7. Day - 4 through day - 2 - fludarabine 50mg/m2 given over 30 minutes every 24 hours x 3 doses    8. Day - 4 through day -2 - busulfan 3.2mg/kg IV given over 3 hours q 24 hours x 3 doses. Administered second following fludarabine. Do not give acetaminophen on busulfan days. 9. Day -5 through day -1 - keppra 500mgpo q 12 hours , start 12 hours prior to busulfan and continue for 24 post last dose.    10. Day 0 - infuse HPC product, start hydration 2 hours prior and continue for 24 hours post infusion of cells    11. Bortezomib - 1.3mg/m2 IV given 6 hours after HPC infusion    12. Day +3 - bortezomib 1.3mg/m2 given 72 hours after initial dose   13. Day + 3 - PTCy hydration. Start 2 hours prior to CTX and continue for 24 hours post administration of last dose    14. Day + 3, +4 - CTX 37.5mg/kg IV given over 2 hours every 24 hours x 2 doses    15. Abatacept days +5, 14, 28   16. Day +5 - start zarxio 5mcg/kg.day (actual weight) daily  1. Antiviral prophylaxis with acyclovir 800mg po BID to start on admission    2. Posaconazole and levaquin to be started once neutropenic    3. Bactrim SS for PCP prophylaxis post engraftment    4. Monitor CMV PCR post engraftment    5. EBV, adenovirus monitoring weekly    6. Ambulation for DVT prophylaxis  7. mouth and skin care  8. transfusion and electrolyte support  9. pain control with duragesic patch and oxy prn breakthrough  suspect temps related to disease process, cx negative, rvp neg  Seen in IDR, data reviewed, quests addressed

## 2025-06-23 LAB
ALBUMIN SERPL ELPH-MCNC: 2.9 G/DL — LOW (ref 3.3–5)
ALP SERPL-CCNC: 76 U/L — SIGNIFICANT CHANGE UP (ref 40–120)
ALT FLD-CCNC: <5 U/L — LOW (ref 10–45)
ANION GAP SERPL CALC-SCNC: 13 MMOL/L — SIGNIFICANT CHANGE UP (ref 5–17)
ANISOCYTOSIS BLD QL: SLIGHT — SIGNIFICANT CHANGE UP
APTT BLD: 27.4 SEC — SIGNIFICANT CHANGE UP (ref 26.1–36.8)
AST SERPL-CCNC: 8 U/L — LOW (ref 10–40)
BASOPHILS # BLD AUTO: 0.06 K/UL — SIGNIFICANT CHANGE UP (ref 0–0.2)
BASOPHILS # BLD MANUAL: 0.16 K/UL — SIGNIFICANT CHANGE UP (ref 0–0.2)
BASOPHILS NFR BLD AUTO: 2.7 % — HIGH (ref 0–2)
BASOPHILS NFR BLD MANUAL: 7.4 % — HIGH (ref 0–2)
BILIRUB DIRECT SERPL-MCNC: 0.2 MG/DL — SIGNIFICANT CHANGE UP (ref 0–0.3)
BILIRUB INDIRECT FLD-MCNC: 0.2 MG/DL — SIGNIFICANT CHANGE UP (ref 0.2–1)
BILIRUB SERPL-MCNC: 0.4 MG/DL — SIGNIFICANT CHANGE UP (ref 0.2–1.2)
BLASTS # FLD: 2.5 % — HIGH (ref 0–0)
BLASTS NFR BLD: 2.5 % — HIGH (ref 0–0)
BLD GP AB SCN SERPL QL: NEGATIVE — SIGNIFICANT CHANGE UP
BUN SERPL-MCNC: 32 MG/DL — HIGH (ref 7–23)
CALCIUM SERPL-MCNC: 8.1 MG/DL — LOW (ref 8.4–10.5)
CHLORIDE SERPL-SCNC: 100 MMOL/L — SIGNIFICANT CHANGE UP (ref 96–108)
CO2 SERPL-SCNC: 22 MMOL/L — SIGNIFICANT CHANGE UP (ref 22–31)
CREAT SERPL-MCNC: 0.92 MG/DL — SIGNIFICANT CHANGE UP (ref 0.5–1.3)
CULTURE RESULTS: ABNORMAL
DACRYOCYTES BLD QL SMEAR: SLIGHT — SIGNIFICANT CHANGE UP
EGFR: 70 ML/MIN/1.73M2 — SIGNIFICANT CHANGE UP
EGFR: 70 ML/MIN/1.73M2 — SIGNIFICANT CHANGE UP
EOSINOPHIL # BLD AUTO: 0 K/UL — SIGNIFICANT CHANGE UP (ref 0–0.5)
EOSINOPHIL # BLD MANUAL: 0 K/UL — SIGNIFICANT CHANGE UP (ref 0–0.5)
EOSINOPHIL NFR BLD AUTO: 0 % — SIGNIFICANT CHANGE UP (ref 0–6)
EOSINOPHIL NFR BLD MANUAL: 0 % — SIGNIFICANT CHANGE UP (ref 0–6)
GIANT PLATELETS BLD QL SMEAR: PRESENT
GLUCOSE SERPL-MCNC: 77 MG/DL — SIGNIFICANT CHANGE UP (ref 70–99)
HCT VFR BLD CALC: 24.8 % — LOW (ref 34.5–45)
HGB BLD-MCNC: 7.4 G/DL — LOW (ref 11.5–15.5)
IMM GRANULOCYTES # BLD AUTO: 0.21 K/UL — HIGH (ref 0–0.07)
IMM GRANULOCYTES NFR BLD AUTO: 9.5 % — HIGH (ref 0–0.9)
INR BLD: 1.29 RATIO — HIGH (ref 0.85–1.16)
LDH SERPL L TO P-CCNC: 487 U/L — HIGH (ref 50–242)
LYMPHOCYTES # BLD AUTO: 0.25 K/UL — LOW (ref 1–3.3)
LYMPHOCYTES # BLD MANUAL: 0.31 K/UL — LOW (ref 1–3.3)
LYMPHOCYTES NFR BLD AUTO: 11.4 % — LOW (ref 13–44)
LYMPHOCYTES NFR BLD MANUAL: 14 % — SIGNIFICANT CHANGE UP (ref 13–44)
MACROCYTES BLD QL: SLIGHT — SIGNIFICANT CHANGE UP
MAGNESIUM SERPL-MCNC: 2 MG/DL — SIGNIFICANT CHANGE UP (ref 1.6–2.6)
MANUAL BLAST #: 0.06 K/UL — HIGH (ref 0–0)
MANUAL METAMYELOCYTE #: 0.02 K/UL — HIGH (ref 0–0)
MANUAL NRBC #: 0.02 K/UL — HIGH (ref 0–0)
MCHC RBC-ENTMCNC: 28.1 PG — SIGNIFICANT CHANGE UP (ref 27–34)
MCHC RBC-ENTMCNC: 29.8 G/DL — LOW (ref 32–36)
MCV RBC AUTO: 94.3 FL — SIGNIFICANT CHANGE UP (ref 80–100)
METAMYELOCYTES # FLD: 0.8 % — HIGH (ref 0–0)
METAMYELOCYTES NFR BLD: 0.8 % — HIGH (ref 0–0)
MICROCYTES BLD QL: SLIGHT — SIGNIFICANT CHANGE UP
MONOCYTES # BLD AUTO: 0.34 K/UL — SIGNIFICANT CHANGE UP (ref 0–0.9)
MONOCYTES # BLD MANUAL: 0.09 K/UL — SIGNIFICANT CHANGE UP (ref 0–0.9)
MONOCYTES NFR BLD AUTO: 15.5 % — HIGH (ref 2–14)
MONOCYTES NFR BLD MANUAL: 4.1 % — SIGNIFICANT CHANGE UP (ref 2–14)
NEUTROPHILS # BLD AUTO: 1.34 K/UL — LOW (ref 1.8–7.4)
NEUTROPHILS # BLD MANUAL: 1.57 K/UL — LOW (ref 1.8–7.4)
NEUTROPHILS NFR BLD AUTO: 60.9 % — SIGNIFICANT CHANGE UP (ref 43–77)
NEUTROPHILS NFR BLD MANUAL: 71.2 % — SIGNIFICANT CHANGE UP (ref 43–77)
NRBC # BLD AUTO: 0.05 K/UL — HIGH (ref 0–0)
NRBC # BLD: 1 /100 WBCS — HIGH (ref 0–0)
NRBC # FLD: 0.05 K/UL — HIGH (ref 0–0)
NRBC BLD AUTO-RTO: 2 /100 WBCS — HIGH (ref 0–0)
NRBC BLD-RTO: 1 /100 WBCS — HIGH (ref 0–0)
OVALOCYTES BLD QL SMEAR: SLIGHT — SIGNIFICANT CHANGE UP
PHOSPHATE SERPL-MCNC: 4.7 MG/DL — HIGH (ref 2.5–4.5)
PLAT MORPH BLD: ABNORMAL
PLATELET # BLD AUTO: 92 K/UL — LOW (ref 150–400)
PMV BLD: SIGNIFICANT CHANGE UP FL (ref 7–13)
POIKILOCYTOSIS BLD QL AUTO: SLIGHT — SIGNIFICANT CHANGE UP
POLYCHROMASIA BLD QL SMEAR: SLIGHT — SIGNIFICANT CHANGE UP
POTASSIUM SERPL-MCNC: 4 MMOL/L — SIGNIFICANT CHANGE UP (ref 3.5–5.3)
POTASSIUM SERPL-SCNC: 4 MMOL/L — SIGNIFICANT CHANGE UP (ref 3.5–5.3)
PROT SERPL-MCNC: 5.5 G/DL — LOW (ref 6–8.3)
PROTHROM AB SERPL-ACNC: 14.7 SEC — HIGH (ref 9.9–13.4)
RBC # BLD: 2.63 M/UL — LOW (ref 3.8–5.2)
RBC # FLD: 20.8 % — HIGH (ref 10.3–14.5)
RBC BLD AUTO: ABNORMAL
RH IG SCN BLD-IMP: POSITIVE — SIGNIFICANT CHANGE UP
SCHISTOCYTES BLD QL AUTO: SLIGHT — SIGNIFICANT CHANGE UP
SODIUM SERPL-SCNC: 135 MMOL/L — SIGNIFICANT CHANGE UP (ref 135–145)
SPECIMEN SOURCE: SIGNIFICANT CHANGE UP
WBC # BLD: 2.2 K/UL — LOW (ref 3.8–10.5)
WBC # FLD AUTO: 2.2 K/UL — LOW (ref 3.8–10.5)

## 2025-06-23 PROCEDURE — 99233 SBSQ HOSP IP/OBS HIGH 50: CPT

## 2025-06-23 RX ORDER — FLUDARABINE PHOSPHATE 25 MG/ML
80 INJECTION, SOLUTION INTRAVENOUS EVERY 24 HOURS
Refills: 0 | Status: COMPLETED | OUTPATIENT
Start: 2025-06-23 | End: 2025-06-25

## 2025-06-23 RX ORDER — BUSULFAN 2 MG/1
175 TABLET, FILM COATED ORAL DAILY
Refills: 0 | Status: COMPLETED | OUTPATIENT
Start: 2025-06-23 | End: 2025-06-25

## 2025-06-23 RX ORDER — LORAZEPAM 4 MG/ML
0.5 VIAL (ML) INJECTION EVERY 6 HOURS
Refills: 0 | Status: DISCONTINUED | OUTPATIENT
Start: 2025-06-23 | End: 2025-06-27

## 2025-06-23 RX ORDER — ONDANSETRON HCL/PF 4 MG/2 ML
4 VIAL (ML) INJECTION ONCE
Refills: 0 | Status: COMPLETED | OUTPATIENT
Start: 2025-06-23 | End: 2025-06-23

## 2025-06-23 RX ADMIN — LISINOPRIL 10 MILLIGRAM(S): 5 TABLET ORAL at 17:27

## 2025-06-23 RX ADMIN — Medication 5 MILLILITER(S): at 07:51

## 2025-06-23 RX ADMIN — Medication 800 MILLIGRAM(S): at 05:22

## 2025-06-23 RX ADMIN — LEVETIRACETAM 500 MILLIGRAM(S): 10 INJECTION, SOLUTION INTRAVENOUS at 17:27

## 2025-06-23 RX ADMIN — URSODIOL 300 MILLIGRAM(S): 300 CAPSULE ORAL at 17:27

## 2025-06-23 RX ADMIN — Medication 25 MILLIGRAM(S): at 16:50

## 2025-06-23 RX ADMIN — BUPROPION HYDROBROMIDE 300 MILLIGRAM(S): 522 TABLET, EXTENDED RELEASE ORAL at 12:31

## 2025-06-23 RX ADMIN — Medication 10 MILLIGRAM(S): at 17:27

## 2025-06-23 RX ADMIN — Medication 10 MILLIGRAM(S): at 05:21

## 2025-06-23 RX ADMIN — Medication 10 MILLILITER(S): at 07:51

## 2025-06-23 RX ADMIN — Medication 5 MILLILITER(S): at 15:27

## 2025-06-23 RX ADMIN — Medication 15 MILLILITER(S): at 18:34

## 2025-06-23 RX ADMIN — Medication 10 MILLILITER(S): at 23:38

## 2025-06-23 RX ADMIN — Medication 25 MILLIGRAM(S): at 16:32

## 2025-06-23 RX ADMIN — Medication 88 MICROGRAM(S): at 05:23

## 2025-06-23 RX ADMIN — Medication 800 MILLIGRAM(S): at 17:27

## 2025-06-23 RX ADMIN — Medication 5 MILLIGRAM(S): at 07:51

## 2025-06-23 RX ADMIN — METOPROLOL SUCCINATE 12.5 MILLIGRAM(S): 50 TABLET, EXTENDED RELEASE ORAL at 05:23

## 2025-06-23 RX ADMIN — Medication 40 MILLIGRAM(S): at 05:25

## 2025-06-23 RX ADMIN — URSODIOL 300 MILLIGRAM(S): 300 CAPSULE ORAL at 05:22

## 2025-06-23 RX ADMIN — Medication 15 MILLILITER(S): at 05:24

## 2025-06-23 RX ADMIN — Medication 5 MILLILITER(S): at 23:30

## 2025-06-23 RX ADMIN — Medication 25 MILLIGRAM(S): at 13:52

## 2025-06-23 RX ADMIN — OXYCODONE HYDROCHLORIDE 10 MILLIGRAM(S): 30 TABLET ORAL at 03:03

## 2025-06-23 RX ADMIN — Medication 10 MILLIGRAM(S): at 12:10

## 2025-06-23 RX ADMIN — Medication 5 MILLILITER(S): at 12:31

## 2025-06-23 RX ADMIN — Medication 10 MILLILITER(S): at 15:28

## 2025-06-23 RX ADMIN — Medication 10 MILLIGRAM(S): at 23:30

## 2025-06-23 RX ADMIN — DEXAMETHASONE 100 MILLIGRAM(S): 0.5 TABLET ORAL at 09:50

## 2025-06-23 RX ADMIN — Medication 116 MILLIGRAM(S): at 09:50

## 2025-06-23 RX ADMIN — Medication 0.5 MILLIGRAM(S): at 14:17

## 2025-06-23 RX ADMIN — Medication 5 MILLILITER(S): at 20:41

## 2025-06-23 RX ADMIN — BUSULFAN 175 MILLIGRAM(S): 2 TABLET, FILM COATED ORAL at 11:08

## 2025-06-23 RX ADMIN — FLUDARABINE PHOSPHATE 80 MILLIGRAM(S): 25 INJECTION, SOLUTION INTRAVENOUS at 10:26

## 2025-06-23 RX ADMIN — LISINOPRIL 10 MILLIGRAM(S): 5 TABLET ORAL at 05:22

## 2025-06-23 RX ADMIN — Medication 4 MILLIGRAM(S): at 21:41

## 2025-06-23 RX ADMIN — Medication 10 MILLILITER(S): at 20:41

## 2025-06-23 RX ADMIN — Medication 10 MILLILITER(S): at 12:31

## 2025-06-23 RX ADMIN — METHYLPREDNISOLONE ACETATE 55 MILLIGRAM(S): 80 INJECTION, SUSPENSION INTRA-ARTICULAR; INTRALESIONAL; INTRAMUSCULAR; SOFT TISSUE at 13:51

## 2025-06-23 RX ADMIN — OXYCODONE HYDROCHLORIDE 10 MILLIGRAM(S): 30 TABLET ORAL at 04:05

## 2025-06-23 RX ADMIN — LEVETIRACETAM 500 MILLIGRAM(S): 10 INJECTION, SOLUTION INTRAVENOUS at 05:22

## 2025-06-23 NOTE — PROGRESS NOTE ADULT - SUBJECTIVE AND OBJECTIVE BOX
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 / Bu / Thiotepa / rATG prep regimen for treatment of myelofibrosis    Disease: myelofibrosis   Prep regimen: Flu / Bu / Thiotepa / rATG   GVHD ppx: ABC: PTCy days +3, +4, Bortezomib days 0, +3, PTCy days +5, +14, +28   ABO/CMV:   Recipient:   A+/CMV+   Donor:   B+/CMV+      S: Patient seen and examined with HPC Transplant Team:       O:    Vital Signs Last 24 Hrs  T(C): 36.7 (23 Jun 2025 05:19), Max: 36.7 (23 Jun 2025 05:19)  T(F): 98.1 (23 Jun 2025 05:19), Max: 98.1 (23 Jun 2025 05:19)  HR: 64 (23 Jun 2025 05:19) (60 - 69)  BP: 114/60 (23 Jun 2025 05:19) (111/58 - 125/56)  BP(mean): --  RR: 18 (23 Jun 2025 05:19) (17 - 18)  SpO2: 98% (23 Jun 2025 05:19) (95% - 100%)    Parameters below as of 23 Jun 2025 05:19  Patient On (Oxygen Delivery Method): room air      Admit weight: 64.5kg   Daily Weight in kG:     Intake / Output:   06-22 @ 07:01  -  06-23 @ 07:00  --------------------------------------------------------  IN: 1932 mL / OUT: 1750 mL / NET: 182 mL    PE:   Oropharynx: no erythema or ulcerations   Oral Mucositis:              -                                          Grade: n/a   CVS: S1, S2 RRR   Lungs: CTA throughout bilaterally   Abdomen: + BS x 4, soft, NT, ND   Extremities: no edema   Gastric Mucositis:                 -                                 Grade: n/a   Intestinal Mucositis:                   -                           Grade: n/a   Skin: no rash   TLC: CDI   Neuro: A&Ox 3      Labs:   CBC Full  -  ( 23 Jun 2025 06:33 )  WBC Count : 2.20 K/uL  Hemoglobin : 7.4 g/dL  Hematocrit : 24.8 %  Platelet Count - Automated : 92 K/uL  Mean Cell Volume : 94.3 fl  Mean Cell Hemoglobin : 28.1 pg  Mean Cell Hemoglobin Concentration : 29.8 g/dL  Auto Neutrophil # : x  Auto Lymphocyte # : x  Auto Monocyte # : x  Auto Eosinophil # : x  Auto Basophil # : x  Auto Neutrophil % : x  Auto Lymphocyte % : x  Auto Monocyte % : x  Auto Eosinophil % : x  Auto Basophil % : x                          7.4    2.20  )-----------( 92       ( 23 Jun 2025 06:33 )             24.8       CMP - p        Cultures:   Respiratory Viral Panel with COVID-19 by SCARLET (06.20.25 @ 21:02)   Rapid RVP Result: [id number]: NotDetec:    Culture - Blood (06.20.25 @ 18:45)   Specimen Source: Blood Blood-Catheter    Culture Results: No growth at 24 hoursCulture - Blood (06.20.25 @ 18:40)   Specimen Source: Blood Blood-Peripheral  Culture Results: No growth at 24 hours    Culture - Urine (06.20.25 @ 21:01)   Specimen Source: Clean Catch Clean Catch (Midstream)  Culture Results: 10,000 - 49,000 CFU/mL Gram Positive Cocci in Pairs and Chains   <10,000 CFU/ml Normal Urogenital cah present    Radiology:     < from: Xray Chest 1 View- PORTABLE-Urgent (Xray Chest 1 View- PORTABLE-Urgent .) (06.20.25 @ 19:28) >  IMPRESSION: Clear lungs.        Meds:   Antimicrobials:   acyclovir   Oral Tab/Cap 800 milliGRAM(s) Oral every 12 hours      Heme / Onc:       GI:  pantoprazole    Tablet 40 milliGRAM(s) Oral before breakfast  polyethylene glycol 3350 17 Gram(s) Oral daily PRN  senna 2 Tablet(s) Oral at bedtime  sodium bicarbonate Mouth Rinse 10 milliLiter(s) Swish and Spit five times a day  ursodiol Capsule 300 milliGRAM(s) Oral two times a day      Cardiovascular:   EPINEPHrine     1 mG/mL Injectable 0.3 milliGRAM(s) IntraMuscular once PRN  hydrochlorothiazide 25 milliGRAM(s) Oral daily  lisinopril 10 milliGRAM(s) Oral every 12 hours  metoprolol succinate ER 12.5 milliGRAM(s) Oral daily      Immunologic:       Other medications:   Biotene Dry Mouth Oral Rinse 5 milliLiter(s) Swish and Spit five times a day  buPROPion XL (24-Hour) . 300 milliGRAM(s) Oral daily  chlorhexidine 0.12% Liquid 15 milliLiter(s) Swish and Spit two times a day  chlorhexidine 4% Liquid 1 Application(s) Topical <User Schedule>  dexAMETHasone  IVPB 10 milliGRAM(s) IV Intermittent every 24 hours  diphenhydrAMINE 25 milliGRAM(s) Oral once  diphenhydrAMINE Injectable 25 milliGRAM(s) IV Push every 24 hours  fentaNYL   Patch  50 MICROgram(s)/Hr. 1 Patch Transdermal every 48 hours  levETIRAcetam 500 milliGRAM(s) Oral every 12 hours  levothyroxine 88 MICROGram(s) Oral daily  methylPREDNISolone sodium succinate Injectable 55 milliGRAM(s) IV Push every 24 hours  ondansetron  IVPB 16 milliGRAM(s) IV Intermittent every 24 hours  phytonadione   Solution 5 milliGRAM(s) Oral <User Schedule>      PRN:   albuterol    90 MICROgram(s) HFA Inhaler 2 Puff(s) Inhalation once PRN  ALPRAZolam 0.25 milliGRAM(s) Oral once PRN  diphenhydrAMINE Injectable 50 milliGRAM(s) IV Push once PRN  EPINEPHrine     1 mG/mL Injectable 0.3 milliGRAM(s) IntraMuscular once PRN  hydrocortisone sodium succinate Injectable 100 milliGRAM(s) IV Push once PRN  oxyCODONE    IR 10 milliGRAM(s) Oral every 4 hours PRN  polyethylene glycol 3350 17 Gram(s) Oral daily PRN  prochlorperazine   Injectable 10 milliGRAM(s) IV Push every 6 hours PRN  sodium chloride 0.9% lock flush 10 milliLiter(s) IV Push every 1 hour PRN      A/P: 64 year old female with hx of myelofibrosis presents for a MUD allogeneic SCT (DP Permissive) conditioning with Flu/Bu/Thiotepa/rATG.  Today is day - 4   6/21- Thiotepa 1/2. Maintain skin precautions during thiotepa administration and for 48 hours after completion (days .6 through day -3). Chronic pain issues - continue home fentanyl patch. Febrile overnight, tmax 100.9. Cultures  pending. RVP negative. CXR pending official read, no obvious consolidations. Vital signs are stable.     1. Infectious Disease:   acyclovir   Oral Tab/Cap 800 milliGRAM(s) Oral every 12 hours    2. VOD Prophylaxis:   ursodiol Capsule 300 milliGRAM(s) Oral two times a day    3. GI Prophylaxis:    pantoprazole    Tablet 40 milliGRAM(s) Oral before breakfast    4. Mouthcare - NS / NaHCO3 rinses, Biotene; Skin care     5. GVHD prophylaxis   ABC: PTCy days +3, +4, Bortezomib days 0, +3, PTCy days +5, +14, +28     6. Transfuse & replete electrolytes prn     7. IV hydration, daily weights, strict I&O, prn diuresis     8. PO intake as tolerated, nutrition follow up as needed, MVI, folic acid     9. Antiemetics, anti-diarrhea medications:   ondansetron  IVPB 16 milliGRAM(s) IV Intermittent every 24 hours  dexAMETHasone  IVPB 10 milliGRAM(s) IV Intermittent every 24 hours  compazine 10mg IV q 6 hours prn nausea / vomiting     10. OOB as tolerated, physical therapy consult if needed     11. Monitor coags 2x week, vitamin K BIW  phytonadione   Solution 5 milliGRAM(s) Oral <User Schedule>    12. Monitor closely for clinical changes, monitor for fevers     13. Emotional support provided, plan of care discussed and questions addressed     14. Patient education done regarding chemotherapy prep, plan of care, restrictions and discharge planning     15. Continue regular social work input     I have written the above note for Dr. Pope who performed service with me in the room.   Jalen Kay PA-C ([phone])    I have seen and examined patient with PA, I agree with above note as scribed.                      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 prep regimen for treatment of myelofibrosis    Disease: myelofibrosis   Prep regimen: Flu / Bu / Thiotepa / rATG   GVHD ppx: ABC: PTCy days +3, +4, Bortezomib days 0, +3, PTCy days +5, +14, +28   ABO/CMV:   Recipient:   A+/CMV+   Donor:   B+/CMV+      S: Patient seen and examined with HPC Transplant Team:   +Nausea  +leg pains (relieved w meds)    Vital Signs Last 24 Hrs  T(C): 36.7 (2025 05:19), Max: 36.7 (2025 05:19)  T(F): 98.1 (2025 05:19), Max: 98.1 (2025 05:19)  HR: 64 (2025 05:19) (60 - 69)  BP: 114/60 (2025 05:19) (111/58 - 125/56)  BP(mean): --  RR: 18 (2025 05:19) (17 - 18)  SpO2: 98% (2025 05:19) (95% - 100%)    Parameters below as of 2025 05:19  Patient On (Oxygen Delivery Method): room air    Admit weight: 64.5kg   Daily Weight in k.7kg    Intake / Output:   - @ 07:01  -  06 @ 07:00  --------------------------------------------------------  IN: 1932 mL / OUT: 1750 mL / NET: 182 mL    PE:   Oropharynx: no erythema or ulcerations   Oral Mucositis:              -                                          Grade: n/a   CVS: S1, S2 RRR   Lungs: CTA throughout bilaterally   Abdomen: + BS x 4, soft, NT, ND   Extremities: no edema   Gastric Mucositis:                 -                                 Grade: n/a   Intestinal Mucositis:                   -                           Grade: n/a   Skin: no rash   TLC: CDI   Neuro: A&Ox 3      Labs:                             7.4    2.20  )-----------( 92       ( 2025 06:33 )             24.8     2025 06:32    135    |  100    |  32     ----------------------------<  77     4.0     |  22     |  0.92     Ca    8.1        2025 06:32  Phos  4.7       2025 06:32  Mg     2.0       2025 06:32    TPro  5.5    /  Alb  2.9    /  TBili  0.4    /  DBili  0.2    /  AST  8      /  ALT  <5     /  AlkPhos  76     2025 06:32    PT/INR - ( 2025 06:32 )   PT: 14.7 sec;   INR: 1.29 ratio         PTT - ( 2025 06:32 )  PTT:27.4 sec  CAPILLARY BLOOD GLUCOSE        LIVER FUNCTIONS - ( 2025 06:32 )  Alb: 2.9 g/dL / Pro: 5.5 g/dL / ALK PHOS: 76 U/L / ALT: <5 U/L / AST: 8 U/L / GGT: x           Urinalysis Basic - ( 2025 06:32 )    Color: x / Appearance: x / SG: x / pH: x  Gluc: 77 mg/dL / Ketone: x  / Bili: x / Urobili: x   Blood: x / Protein: x / Nitrite: x   Leuk Esterase: x / RBC: x / WBC x   Sq Epi: x / Non Sq Epi: x / Bacter      Cultures:   Respiratory Viral Panel with COVID-19 by SCARLET (25 @ 21:02)   Rapid RVP Result: [id number]: NotDetec:    Culture - Blood (25 @ 18:45)   Specimen Source: Blood Blood-Catheter    Culture Results: No growth at 24 hoursCulture - Blood (25 @ 18:40)   Specimen Source: Blood Blood-Peripheral  Culture Results: No growth at 24 hours    Culture - Urine (25 @ 21:01)   Specimen Source: Clean Catch Clean Catch (Midstream)  Culture Results: 10,000 - 49,000 CFU/mL Gram Positive Cocci in Pairs and Chains   <10,000 CFU/ml Normal Urogenital cha present    Radiology:     < from: Xray Chest 1 View- PORTABLE-Urgent (Xray Chest 1 View- PORTABLE-Urgent .) (25 @ 19:28) >  IMPRESSION: Clear lungs.    Meds:   Antimicrobials:   acyclovir   Oral Tab/Cap 800 milliGRAM(s) Oral every 12 hours      Heme / Onc:       GI:  pantoprazole    Tablet 40 milliGRAM(s) Oral before breakfast  polyethylene glycol 3350 17 Gram(s) Oral daily PRN  senna 2 Tablet(s) Oral at bedtime  sodium bicarbonate Mouth Rinse 10 milliLiter(s) Swish and Spit five times a day  ursodiol Capsule 300 milliGRAM(s) Oral two times a day      Cardiovascular:   EPINEPHrine     1 mG/mL Injectable 0.3 milliGRAM(s) IntraMuscular once PRN  hydrochlorothiazide 25 milliGRAM(s) Oral daily  lisinopril 10 milliGRAM(s) Oral every 12 hours  metoprolol succinate ER 12.5 milliGRAM(s) Oral daily      Immunologic:       Other medications:   Biotene Dry Mouth Oral Rinse 5 milliLiter(s) Swish and Spit five times a day  buPROPion XL (24-Hour) . 300 milliGRAM(s) Oral daily  chlorhexidine 0.12% Liquid 15 milliLiter(s) Swish and Spit two times a day  chlorhexidine 4% Liquid 1 Application(s) Topical <User Schedule>  dexAMETHasone  IVPB 10 milliGRAM(s) IV Intermittent every 24 hours  diphenhydrAMINE 25 milliGRAM(s) Oral once  diphenhydrAMINE Injectable 25 milliGRAM(s) IV Push every 24 hours  fentaNYL   Patch  50 MICROgram(s)/Hr. 1 Patch Transdermal every 48 hours  levETIRAcetam 500 milliGRAM(s) Oral every 12 hours  levothyroxine 88 MICROGram(s) Oral daily  methylPREDNISolone sodium succinate Injectable 55 milliGRAM(s) IV Push every 24 hours  ondansetron  IVPB 16 milliGRAM(s) IV Intermittent every 24 hours  phytonadione   Solution 5 milliGRAM(s) Oral <User Schedule>      PRN:   albuterol    90 MICROgram(s) HFA Inhaler 2 Puff(s) Inhalation once PRN  ALPRAZolam 0.25 milliGRAM(s) Oral once PRN  diphenhydrAMINE Injectable 50 milliGRAM(s) IV Push once PRN  EPINEPHrine     1 mG/mL Injectable 0.3 milliGRAM(s) IntraMuscular once PRN  hydrocortisone sodium succinate Injectable 100 milliGRAM(s) IV Push once PRN  oxyCODONE    IR 10 milliGRAM(s) Oral every 4 hours PRN  polyethylene glycol 3350 17 Gram(s) Oral daily PRN  prochlorperazine   Injectable 10 milliGRAM(s) IV Push every 6 hours PRN  sodium chloride 0.9% lock flush 10 milliLiter(s) IV Push every 1 hour PRN      A/P: 64 year old female with hx of myelofibrosis presents for a MUD allogeneic SCT (DP Permissive) conditioning with Flu/Bu/Thiotepa/rATG.  Today is day - 4   - Thiotepa /. Maintain skin precautions during thiotepa administration and for 48 hours after completion (days .6 through day -3). Chronic pain issues - continue home fentanyl patch. Febrile overnight, tmax 100.9. Cultures  pending. RVP negative. CXR pending official read, no obvious consolidations. Vital signs are stable.    D/C HCTZ    1. Infectious Disease:   acyclovir   Oral Tab/Cap 800 milliGRAM(s) Oral every 12 hours    2. VOD Prophylaxis:   ursodiol Capsule 300 milliGRAM(s) Oral two times a day    3. GI Prophylaxis:    pantoprazole    Tablet 40 milliGRAM(s) Oral before breakfast    4. Mouthcare - NS / NaHCO3 rinses, Biotene; Skin care     5. GVHD prophylaxis   ABC: PTCy days +3, +4, Bortezomib days 0, +3, PTCy days +5, +14, +28     6. Transfuse & replete electrolytes prn     7. IV hydration, daily weights, strict I&O, prn diuresis     8. PO intake as tolerated, nutrition follow up as needed, MVI, folic acid     9. Antiemetics, anti-diarrhea medications:   ondansetron  IVPB 16 milliGRAM(s) IV Intermittent every 24 hours  dexAMETHasone  IVPB 10 milliGRAM(s) IV Intermittent every 24 hours  compazine 10mg IV q 6 hours prn nausea / vomiting     10. OOB as tolerated, physical therapy consult if needed     11. Monitor coags 2x week, vitamin K BIW  phytonadione   Solution 5 milliGRAM(s) Oral <User Schedule>    12. Monitor closely for clinical changes, monitor for fevers     13. Emotional support provided, plan of care discussed and questions addressed     14. Patient education done regarding chemotherapy prep, plan of care, restrictions and discharge planning     15. Continue regular social work input     I have written the above note for Dr. Pope who performed service with me in the room.   Jalen Kay PA-C ([phone])    I have seen and examined patient with PA, I agree with above note as scribed.                      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 prep regimen for treatment of myelofibrosis    Disease: myelofibrosis   Prep regimen: Flu / Bu / Thiotepa / rATG   GVHD ppx: ABC: PTCy days +3, +4, Bortezomib days 0, +3, PTCy days +5, +14, +28   ABO/CMV:   Recipient:   A+/CMV+   Donor:   B+/CMV+      S: Patient seen and examined with HPC Transplant Team:   +Nausea  +leg pains (relieved w meds)    Vital Signs Last 24 Hrs  T(C): 36.7 (2025 05:19), Max: 36.7 (2025 05:19)  T(F): 98.1 (2025 05:19), Max: 98.1 (2025 05:19)  HR: 64 (2025 05:19) (60 - 69)  BP: 114/60 (2025 05:19) (111/58 - 125/56)  BP(mean): --  RR: 18 (2025 05:19) (17 - 18)  SpO2: 98% (2025 05:19) (95% - 100%)    Parameters below as of 2025 05:19  Patient On (Oxygen Delivery Method): room air    Admit weight: 64.5kg   Daily Weight in k.7kg    Intake / Output:   - @ 07:01  -  06 @ 07:00  --------------------------------------------------------  IN: 1932 mL / OUT: 1750 mL / NET: 182 mL    PE:   Oropharynx: no erythema or ulcerations   Oral Mucositis:              -                                          Grade: n/a   CVS: S1, S2 RRR   Lungs: CTA throughout bilaterally   Abdomen: + BS x 4, soft, NT, ND   Extremities: no edema   Gastric Mucositis:                 -                                 Grade: n/a   Intestinal Mucositis:                   -                           Grade: n/a   Skin: no rash   TLC: CDI   Neuro: A&Ox 3      Labs:                             7.4    2.20  )-----------( 92       ( 2025 06:33 )             24.8     2025 06:32    135    |  100    |  32     ----------------------------<  77     4.0     |  22     |  0.92     Ca    8.1        2025 06:32  Phos  4.7       2025 06:32  Mg     2.0       2025 06:32    TPro  5.5    /  Alb  2.9    /  TBili  0.4    /  DBili  0.2    /  AST  8      /  ALT  <5     /  AlkPhos  76     2025 06:32    PT/INR - ( 2025 06:32 )   PT: 14.7 sec;   INR: 1.29 ratio         PTT - ( 2025 06:32 )  PTT:27.4 sec  CAPILLARY BLOOD GLUCOSE        LIVER FUNCTIONS - ( 2025 06:32 )  Alb: 2.9 g/dL / Pro: 5.5 g/dL / ALK PHOS: 76 U/L / ALT: <5 U/L / AST: 8 U/L / GGT: x           Urinalysis Basic - ( 2025 06:32 )    Color: x / Appearance: x / SG: x / pH: x  Gluc: 77 mg/dL / Ketone: x  / Bili: x / Urobili: x   Blood: x / Protein: x / Nitrite: x   Leuk Esterase: x / RBC: x / WBC x   Sq Epi: x / Non Sq Epi: x / Bacter      Cultures:   Respiratory Viral Panel with COVID-19 by SCARLET (25 @ 21:02)   Rapid RVP Result: [id number]: NotDetec:    Culture - Blood (25 @ 18:45)   Specimen Source: Blood Blood-Catheter    Culture Results: No growth at 24 hoursCulture - Blood (25 @ 18:40)   Specimen Source: Blood Blood-Peripheral  Culture Results: No growth at 24 hours    Culture - Urine (25 @ 21:01)   Specimen Source: Clean Catch Clean Catch (Midstream)  Culture Results: 10,000 - 49,000 CFU/mL Gram Positive Cocci in Pairs and Chains   <10,000 CFU/ml Normal Urogenital cha present    Radiology:     < from: Xray Chest 1 View- PORTABLE-Urgent (Xray Chest 1 View- PORTABLE-Urgent .) (25 @ 19:28) >  IMPRESSION: Clear lungs.    Meds:   Antimicrobials:   acyclovir   Oral Tab/Cap 800 milliGRAM(s) Oral every 12 hours      Heme / Onc:       GI:  pantoprazole    Tablet 40 milliGRAM(s) Oral before breakfast  polyethylene glycol 3350 17 Gram(s) Oral daily PRN  senna 2 Tablet(s) Oral at bedtime  sodium bicarbonate Mouth Rinse 10 milliLiter(s) Swish and Spit five times a day  ursodiol Capsule 300 milliGRAM(s) Oral two times a day      Cardiovascular:   EPINEPHrine     1 mG/mL Injectable 0.3 milliGRAM(s) IntraMuscular once PRN  hydrochlorothiazide 25 milliGRAM(s) Oral daily  lisinopril 10 milliGRAM(s) Oral every 12 hours  metoprolol succinate ER 12.5 milliGRAM(s) Oral daily      Immunologic:       Other medications:   Biotene Dry Mouth Oral Rinse 5 milliLiter(s) Swish and Spit five times a day  buPROPion XL (24-Hour) . 300 milliGRAM(s) Oral daily  chlorhexidine 0.12% Liquid 15 milliLiter(s) Swish and Spit two times a day  chlorhexidine 4% Liquid 1 Application(s) Topical <User Schedule>  dexAMETHasone  IVPB 10 milliGRAM(s) IV Intermittent every 24 hours  diphenhydrAMINE 25 milliGRAM(s) Oral once  diphenhydrAMINE Injectable 25 milliGRAM(s) IV Push every 24 hours  fentaNYL   Patch  50 MICROgram(s)/Hr. 1 Patch Transdermal every 48 hours  levETIRAcetam 500 milliGRAM(s) Oral every 12 hours  levothyroxine 88 MICROGram(s) Oral daily  methylPREDNISolone sodium succinate Injectable 55 milliGRAM(s) IV Push every 24 hours  ondansetron  IVPB 16 milliGRAM(s) IV Intermittent every 24 hours  phytonadione   Solution 5 milliGRAM(s) Oral <User Schedule>      PRN:   albuterol    90 MICROgram(s) HFA Inhaler 2 Puff(s) Inhalation once PRN  ALPRAZolam 0.25 milliGRAM(s) Oral once PRN  diphenhydrAMINE Injectable 50 milliGRAM(s) IV Push once PRN  EPINEPHrine     1 mG/mL Injectable 0.3 milliGRAM(s) IntraMuscular once PRN  hydrocortisone sodium succinate Injectable 100 milliGRAM(s) IV Push once PRN  oxyCODONE    IR 10 milliGRAM(s) Oral every 4 hours PRN  polyethylene glycol 3350 17 Gram(s) Oral daily PRN  prochlorperazine   Injectable 10 milliGRAM(s) IV Push every 6 hours PRN  sodium chloride 0.9% lock flush 10 milliLiter(s) IV Push every 1 hour PRN      A/P: 64 year old female with hx of myelofibrosis presents for a MUD allogeneic SCT (DP Permissive) conditioning with Flu/Bu/Thiotepa/rATG.  Today is day - 4   - Thiotepa 1/. Maintain skin precautions during thiotepa administration and for 48 hours after completion (days .6 through day -3). Chronic pain issues - continue home fentanyl patch. Febrile overnight, tmax 100.9. Cultures  pending. RVP negative. CXR pending official read, no obvious consolidations. Vital signs are stable.    D/C HCTZ. Fludarabine 1/3, Busulfan 1/3, rATG 1/3. No Tylenol on busulfan days. Continue keppra ppx for 24 hours post infusion of last dose of busulfan. No acute events overnight, vital signs are stable.     1. Infectious Disease:   acyclovir   Oral Tab/Cap 800 milliGRAM(s) Oral every 12 hours    2. VOD Prophylaxis:   ursodiol Capsule 300 milliGRAM(s) Oral two times a day    3. GI Prophylaxis:    pantoprazole    Tablet 40 milliGRAM(s) Oral before breakfast    4. Mouthcare - NS / NaHCO3 rinses, Biotene; Skin care     5. GVHD prophylaxis   ABC: PTCy days +3, +4, Bortezomib days 0, +3, PTCy days +5, +14, +28     6. Transfuse & replete electrolytes prn     7. IV hydration, daily weights, strict I&O, prn diuresis     8. PO intake as tolerated, nutrition follow up as needed, MVI, folic acid     9. Antiemetics, anti-diarrhea medications:   ondansetron  IVPB 16 milliGRAM(s) IV Intermittent every 24 hours  dexAMETHasone  IVPB 10 milliGRAM(s) IV Intermittent every 24 hours  compazine 10mg IV q 6 hours prn nausea / vomiting     10. OOB as tolerated, physical therapy consult if needed     11. Monitor coags 2x week, vitamin K BIW  phytonadione   Solution 5 milliGRAM(s) Oral <User Schedule>    12. Monitor closely for clinical changes, monitor for fevers     13. Emotional support provided, plan of care discussed and questions addressed     14. Patient education done regarding chemotherapy prep, plan of care, restrictions and discharge planning     15. Continue regular social work input     I have written the above note for Dr. Pope who performed service with me in the room.   Jalen Kay PA-C ([phone])    I have seen and examined patient with PA, I agree with above note as scribed.

## 2025-06-23 NOTE — CHART NOTE - NSCHARTNOTEFT_GEN_A_CORE
Medicine PA Fever Note    Notified by RN patient with temperature 100.9F. Pt. seen and examined at bedside with Chills and mild Nausea. Patient is alert, NAD. Denies HA, CP, SOB, cough, or abd pain. Patient endorses having Rigors earlier during the day, However, on exam was unable to notice any rigor at this time.     VITAL SIGNS:  T(C): 38.3 (06-23-25 @ 21:40), Max: 38.3 (06-23-25 @ 21:40)  HR: 100 (06-23-25 @ 21:40) (62 - 100)  BP: 155/81 (06-23-25 @ 21:40) (103/52 - 176/67)  RR: 18 (06-23-25 @ 21:40) (18 - 18)  SpO2: 99% (06-23-25 @ 21:40) (95% - 100%)  Wt(kg): --      LABORATORY:                          7.4    2.20  )-----------( 92       ( 23 Jun 2025 06:33 )             24.8       06-23    135  |  100  |  32[H]  ----------------------------<  77  4.0   |  22  |  0.92    Ca    8.1[L]      23 Jun 2025 06:32  Phos  4.7     06-23  Mg     2.0     06-23      PHYSICAL EXAM:    Constitutional: AOx3. NAD.    Respiratory: clear lungs bilaterally. No wheezing, rhonchi, or crackles.    Cardiovascular: S1 S2. No murmurs.    Gastrointestinal: BS X4 active. soft. nontender.    Extremities/Vascular: +2 pulses bilaterally. No BLE edema.      ASSESSMENT/PLAN:   HPI:  64 year old female with a history of essential thrombocytopenia (23 years) who was being followed by Dr. Croft at [facility]. Patient has been on hydrea for many years with no repsponse then treated with pegylated IFN alpha (Besrema) x 6 months, but became intolerant due to significant complications which required multiple hospitalizations in the fall-winter of 2024. On 11/30/24 patient presented to [facility] ED with worsening back pain, chest pain, SOB and productive cough requiring oxygen, as well as HF exacerbation. a CT was performed showing moderately large pericardial effusion, bilateral pleural effusions and suspected superimposed PNA. At that time her hospital course was complicated  with pericarditis likely secondary to pegylated interferon and pericardial effusions. 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 Patient was then sent to [facility] (12/12/24-12/20/2024). During that time a BMBx (12/11/24) was performed showing ET with 4-6% blasts, complex karyotype and NGS with JAK2. Patient then followed up with Dr. Dailey who performed a repeat BMBx (3/14/25) that was consistent with myelofibrosis. Now, patient presents for a MUD allogeneic SCT (DP Permissive) conditioning with Flu/Bu/Thiothepa/rATG and GVHD ppx with ABC. Patient feels well today and has no complaints at this time. (19 Jun 2025 14:07)      # Fever  - Cooling measures prn for pyrexia  - NO BC x2, UA/UCX, CXR per attending  - ATG was finished at approx 10:30PM, VS remains stable otherwise  - Will C/w antiemetic meds as ordered for Nausea.   - Will continue to monitor temp curve  - Will endorse primary team in AM  Spoke with Dr. Pope who agrees with Plan above.       Erin Gaona PA-C   Department of Medicine  [facility] Medicine PA Fever Note    Notified by RN patient with temperature 100.9F. Pt. seen and examined at bedside with Chills and mild Nausea. Patient is alert, NAD. Denies HA, CP, SOB, cough, or abd pain. Patient endorses having Rigors earlier during the day, However, on exam was unable to notice any rigor at this time.     VITAL SIGNS:  T(C): 38.3 (06-23-25 @ 21:40), Max: 38.3 (06-23-25 @ 21:40)  HR: 100 (06-23-25 @ 21:40) (62 - 100)  BP: 155/81 (06-23-25 @ 21:40) (103/52 - 176/67)  RR: 18 (06-23-25 @ 21:40) (18 - 18)  SpO2: 99% (06-23-25 @ 21:40) (95% - 100%)  Wt(kg): --      LABORATORY:                          7.4    2.20  )-----------( 92       ( 23 Jun 2025 06:33 )             24.8       06-23    135  |  100  |  32[H]  ----------------------------<  77  4.0   |  22  |  0.92    Ca    8.1[L]      23 Jun 2025 06:32  Phos  4.7     06-23  Mg     2.0     06-23      PHYSICAL EXAM:    Constitutional: AOx3. NAD.    Respiratory: clear lungs bilaterally. No wheezing, rhonchi, or crackles.    Cardiovascular: S1 S2. No murmurs.    Gastrointestinal: BS X4 active. soft. nontender.    Extremities/Vascular: +2 pulses bilaterally. No BLE edema.      ASSESSMENT/PLAN:   HPI:  64 year old female with a history of essential thrombocytopenia (23 years) who was being followed by Dr. Croft at [facility]. Patient has been on hydrea for many years with no repsponse then treated with pegylated IFN alpha (Besrema) x 6 months, but became intolerant due to significant complications which required multiple hospitalizations in the fall-winter of 2024. On 11/30/24 patient presented to [facility] ED with worsening back pain, chest pain, SOB and productive cough requiring oxygen, as well as HF exacerbation. a CT was performed showing moderately large pericardial effusion, bilateral pleural effusions and suspected superimposed PNA. At that time her hospital course was complicated  with pericarditis likely secondary to pegylated interferon and pericardial effusions. 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 Patient was then sent to [facility] (12/12/24-12/20/2024). During that time a BMBx (12/11/24) was performed showing ET with 4-6% blasts, complex karyotype and NGS with JAK2. Patient then followed up with Dr. Dailey who performed a repeat BMBx (3/14/25) that was consistent with myelofibrosis. Now, patient presents for a MUD allogeneic SCT (DP Permissive) conditioning with Flu/Bu/Thiothepa/rATG and GVHD ppx with ABC. Patient feels well today and has no complaints at this time. (19 Jun 2025 14:07)      # Fever  - Cooling measures prn for pyrexia  - NO BC x2, UA/UCX, CXR per attending  - ATG was finished at approx 10:30PM, VS remains stable otherwise  - Will C/w antiemetic meds as ordered for Nausea.   - Will continue to monitor temp curve  - Will endorse primary team in AM  Spoke with Dr. Pope who agrees with Plan above.     ADDENDUM 5/24/2025 5:10AM  At midnight pt temp 103.8F. Patient was assess at bedside w/o any sign of discomfort. NO RIGORS or chills at time of exam. Other VS were stable. Cooling measures prn for pyrexia. On AM Vitals, Pt temp is now 100.8F.   Pt also endorses having a large watery diarrhea this AM. Will follow up lytes on am labs.         Erin Gaona PA-C   Department of Medicine  [facility]

## 2025-06-23 NOTE — PROGRESS NOTE ADULT - NS PANP COMMENT GEN_ALL_CORE FT
64 year old female with hx of myelofibrosis presents for a MUD allogeneic SCT (DP Permissive) conditioning with Flu/Bu/Thiotepa/rATG...ABC gvhd prophylaxis..day -5  1. Admit to BMT service, TLC placement in IR    2. Day - 6 through day -2 - antiemetics    3. Day - 6 through day -5 - thiotepa 5mg/kg IV administered over 3 hours x 2 doses. Maintain skin precautions while on thiotepa    4. Day - 4 - rabbit ATG 1mg/kg IV given over 6 hours once   5. Day -3 - rabbit ATG 1.5mg/kg IV given per ATG policy    6. Day -2 - rabbit ATG 2.5mg/kg IV given per ATG policy - premedications and rATG emergency medications as per orders.    7. Day - 4 through day - 2 - fludarabine 50mg/m2 given over 30 minutes every 24 hours x 3 doses    8. Day - 4 through day -2 - busulfan 3.2mg/kg IV given over 3 hours q 24 hours x 3 doses. Administered second following fludarabine. Do not give acetaminophen on busulfan days. 9. Day -5 through day -1 - keppra 500mgpo q 12 hours , start 12 hours prior to busulfan and continue for 24 post last dose.    10. Day 0 - infuse HPC product, start hydration 2 hours prior and continue for 24 hours post infusion of cells    11. Bortezomib - 1.3mg/m2 IV given 6 hours after HPC infusion    12. Day +3 - bortezomib 1.3mg/m2 given 72 hours after initial dose   13. Day + 3 - PTCy hydration. Start 2 hours prior to CTX and continue for 24 hours post administration of last dose    14. Day + 3, +4 - CTX 37.5mg/kg IV given over 2 hours every 24 hours x 2 doses    15. Abatacept days +5, 14, 28   16. Day +5 - start zarxio 5mcg/kg.day (actual weight) daily  1. Antiviral prophylaxis with acyclovir 800mg po BID to start on admission    2. Posaconazole and levaquin to be started once neutropenic    3. Bactrim SS for PCP prophylaxis post engraftment    4. Monitor CMV PCR post engraftment    5. EBV, adenovirus monitoring weekly    6. Ambulation for DVT prophylaxis  7. mouth and skin care  8. transfusion and electrolyte support  9. pain control with duragesic patch and oxy prn breakthrough  suspect temps related to disease process, cx negative, rvp neg  Seen in IDR, data reviewed, quests addressed I rounded with the team including nurses, ACPs and PharmD.  I reviewed the data in depth.   I saw and examined the patient myself.   I reviewed and confirmed the above note.  The patient is day -4 prior to allogeneic HSCT.  The patient is doing well with no complaints except for her pain.   The vitals are stable. The oral cavity is clear. The line site is clean. The lungs are clear. There is no LE edema.  Overall, there are no specific issues. The patient is on appropriate therapy at this stage.   I answered the patient’s questions.  I encouraged po intake and ambulation.  YEIMI Godoy MD

## 2025-06-23 NOTE — PROVIDER CONTACT NOTE (OTHER) - ACTION/TREATMENT ORDERED:
provider notified  administer compazine PRN early, see provider to RN, continue vitals per ATG policy, next vitals at 1740

## 2025-06-24 LAB
ALBUMIN SERPL ELPH-MCNC: 2.9 G/DL — LOW (ref 3.3–5)
ALBUMIN SERPL ELPH-MCNC: 3 G/DL — LOW (ref 3.3–5)
ALP SERPL-CCNC: 105 U/L — SIGNIFICANT CHANGE UP (ref 40–120)
ALP SERPL-CCNC: 117 U/L — SIGNIFICANT CHANGE UP (ref 40–120)
ALT FLD-CCNC: 12 U/L — SIGNIFICANT CHANGE UP (ref 10–45)
ALT FLD-CCNC: 12 U/L — SIGNIFICANT CHANGE UP (ref 10–45)
ANION GAP SERPL CALC-SCNC: 15 MMOL/L — SIGNIFICANT CHANGE UP (ref 5–17)
ANION GAP SERPL CALC-SCNC: 16 MMOL/L — SIGNIFICANT CHANGE UP (ref 5–17)
AST SERPL-CCNC: 32 U/L — SIGNIFICANT CHANGE UP (ref 10–40)
AST SERPL-CCNC: 34 U/L — SIGNIFICANT CHANGE UP (ref 10–40)
BASOPHILS # BLD AUTO: 0.03 K/UL — SIGNIFICANT CHANGE UP (ref 0–0.2)
BASOPHILS # BLD MANUAL: 0.07 K/UL — SIGNIFICANT CHANGE UP (ref 0–0.2)
BASOPHILS NFR BLD AUTO: 3.1 % — HIGH (ref 0–2)
BASOPHILS NFR BLD MANUAL: 7.4 % — HIGH (ref 0–2)
BILIRUB SERPL-MCNC: 0.4 MG/DL — SIGNIFICANT CHANGE UP (ref 0.2–1.2)
BILIRUB SERPL-MCNC: 0.4 MG/DL — SIGNIFICANT CHANGE UP (ref 0.2–1.2)
BUN SERPL-MCNC: 34 MG/DL — HIGH (ref 7–23)
BUN SERPL-MCNC: 35 MG/DL — HIGH (ref 7–23)
CALCIUM SERPL-MCNC: 7.6 MG/DL — LOW (ref 8.4–10.5)
CALCIUM SERPL-MCNC: 7.9 MG/DL — LOW (ref 8.4–10.5)
CHLORIDE SERPL-SCNC: 96 MMOL/L — SIGNIFICANT CHANGE UP (ref 96–108)
CHLORIDE SERPL-SCNC: 97 MMOL/L — SIGNIFICANT CHANGE UP (ref 96–108)
CO2 SERPL-SCNC: 15 MMOL/L — LOW (ref 22–31)
CO2 SERPL-SCNC: 17 MMOL/L — LOW (ref 22–31)
CREAT SERPL-MCNC: 1.01 MG/DL — SIGNIFICANT CHANGE UP (ref 0.5–1.3)
CREAT SERPL-MCNC: 1.06 MG/DL — SIGNIFICANT CHANGE UP (ref 0.5–1.3)
EGFR: 59 ML/MIN/1.73M2 — LOW
EGFR: 59 ML/MIN/1.73M2 — LOW
EGFR: 63 ML/MIN/1.73M2 — SIGNIFICANT CHANGE UP
EGFR: 63 ML/MIN/1.73M2 — SIGNIFICANT CHANGE UP
EOSINOPHIL # BLD AUTO: 0 K/UL — SIGNIFICANT CHANGE UP (ref 0–0.5)
EOSINOPHIL # BLD MANUAL: 0 K/UL — SIGNIFICANT CHANGE UP (ref 0–0.5)
EOSINOPHIL NFR BLD AUTO: 0 % — SIGNIFICANT CHANGE UP (ref 0–6)
EOSINOPHIL NFR BLD MANUAL: 0 % — SIGNIFICANT CHANGE UP (ref 0–6)
GIANT PLATELETS BLD QL SMEAR: PRESENT
GLUCOSE SERPL-MCNC: 121 MG/DL — HIGH (ref 70–99)
GLUCOSE SERPL-MCNC: 145 MG/DL — HIGH (ref 70–99)
HCT VFR BLD CALC: 24 % — LOW (ref 34.5–45)
HGB BLD-MCNC: 7.4 G/DL — LOW (ref 11.5–15.5)
IMM GRANULOCYTES # BLD AUTO: 0.05 K/UL — SIGNIFICANT CHANGE UP (ref 0–0.07)
IMM GRANULOCYTES NFR BLD AUTO: 5.1 % — HIGH (ref 0–0.9)
IMMATURE PLATELET FRACTION #: 10.1 K/UL — SIGNIFICANT CHANGE UP (ref 4.7–11.1)
IMMATURE PLATELET FRACTION %: 17.4 % — HIGH (ref 1.6–4.9)
LDH SERPL L TO P-CCNC: 608 U/L — HIGH (ref 50–242)
LYMPHOCYTES # BLD AUTO: 0.04 K/UL — LOW (ref 1–3.3)
LYMPHOCYTES # BLD MANUAL: 0 K/UL — LOW (ref 1–3.3)
LYMPHOCYTES NFR BLD AUTO: 4.1 % — LOW (ref 13–44)
LYMPHOCYTES NFR BLD MANUAL: 0 % — LOW (ref 13–44)
MAGNESIUM SERPL-MCNC: 1.5 MG/DL — LOW (ref 1.6–2.6)
MCHC RBC-ENTMCNC: 28.4 PG — SIGNIFICANT CHANGE UP (ref 27–34)
MCHC RBC-ENTMCNC: 30.8 G/DL — LOW (ref 32–36)
MCV RBC AUTO: 92 FL — SIGNIFICANT CHANGE UP (ref 80–100)
MONOCYTES # BLD AUTO: 0.03 K/UL — SIGNIFICANT CHANGE UP (ref 0–0.9)
MONOCYTES # BLD MANUAL: 0 K/UL — SIGNIFICANT CHANGE UP (ref 0–0.9)
MONOCYTES NFR BLD AUTO: 3.1 % — SIGNIFICANT CHANGE UP (ref 2–14)
MONOCYTES NFR BLD MANUAL: 0 % — LOW (ref 2–14)
NEUTROPHILS # BLD AUTO: 0.83 K/UL — LOW (ref 1.8–7.4)
NEUTROPHILS # BLD MANUAL: 0.91 K/UL — LOW (ref 1.8–7.4)
NEUTROPHILS NFR BLD AUTO: 84.6 % — HIGH (ref 43–77)
NEUTROPHILS NFR BLD MANUAL: 92.6 % — HIGH (ref 43–77)
NRBC # BLD AUTO: 0 K/UL — SIGNIFICANT CHANGE UP (ref 0–0)
NRBC # FLD: 0 K/UL — SIGNIFICANT CHANGE UP (ref 0–0)
NRBC BLD AUTO-RTO: 0 /100 WBCS — SIGNIFICANT CHANGE UP (ref 0–0)
PHOSPHATE SERPL-MCNC: 3.4 MG/DL — SIGNIFICANT CHANGE UP (ref 2.5–4.5)
PLAT MORPH BLD: ABNORMAL
PLATELET # BLD AUTO: 58 K/UL — LOW (ref 150–400)
PMV BLD: SIGNIFICANT CHANGE UP FL (ref 7–13)
POTASSIUM SERPL-MCNC: 3.6 MMOL/L — SIGNIFICANT CHANGE UP (ref 3.5–5.3)
POTASSIUM SERPL-MCNC: 3.8 MMOL/L — SIGNIFICANT CHANGE UP (ref 3.5–5.3)
POTASSIUM SERPL-SCNC: 3.6 MMOL/L — SIGNIFICANT CHANGE UP (ref 3.5–5.3)
POTASSIUM SERPL-SCNC: 3.8 MMOL/L — SIGNIFICANT CHANGE UP (ref 3.5–5.3)
PROT SERPL-MCNC: 5.1 G/DL — LOW (ref 6–8.3)
PROT SERPL-MCNC: 5.6 G/DL — LOW (ref 6–8.3)
RBC # BLD: 2.61 M/UL — LOW (ref 3.8–5.2)
RBC # FLD: 20.9 % — HIGH (ref 10.3–14.5)
RBC BLD AUTO: SIGNIFICANT CHANGE UP
SODIUM SERPL-SCNC: 127 MMOL/L — LOW (ref 135–145)
SODIUM SERPL-SCNC: 129 MMOL/L — LOW (ref 135–145)
WBC # BLD: 0.98 K/UL — CRITICAL LOW (ref 3.8–10.5)
WBC # FLD AUTO: 0.98 K/UL — CRITICAL LOW (ref 3.8–10.5)

## 2025-06-24 PROCEDURE — 99233 SBSQ HOSP IP/OBS HIGH 50: CPT

## 2025-06-24 RX ORDER — MAGNESIUM SULFATE 500 MG/ML
2 SYRINGE (ML) INJECTION ONCE
Refills: 0 | Status: COMPLETED | OUTPATIENT
Start: 2025-06-24 | End: 2025-06-24

## 2025-06-24 RX ORDER — VANCOMYCIN HCL IN 5 % DEXTROSE 1.5G/250ML
125 PLASTIC BAG, INJECTION (ML) INTRAVENOUS EVERY 12 HOURS
Refills: 0 | Status: DISCONTINUED | OUTPATIENT
Start: 2025-06-24 | End: 2025-07-13

## 2025-06-24 RX ADMIN — URSODIOL 300 MILLIGRAM(S): 300 CAPSULE ORAL at 17:41

## 2025-06-24 RX ADMIN — Medication 10 MILLILITER(S): at 08:41

## 2025-06-24 RX ADMIN — BUPROPION HYDROBROMIDE 300 MILLIGRAM(S): 522 TABLET, EXTENDED RELEASE ORAL at 12:10

## 2025-06-24 RX ADMIN — LISINOPRIL 10 MILLIGRAM(S): 5 TABLET ORAL at 17:41

## 2025-06-24 RX ADMIN — LEVETIRACETAM 500 MILLIGRAM(S): 10 INJECTION, SOLUTION INTRAVENOUS at 17:40

## 2025-06-24 RX ADMIN — Medication 15 MILLILITER(S): at 05:09

## 2025-06-24 RX ADMIN — FLUDARABINE PHOSPHATE 80 MILLIGRAM(S): 25 INJECTION, SOLUTION INTRAVENOUS at 10:23

## 2025-06-24 RX ADMIN — Medication 125 MILLIGRAM(S): at 17:40

## 2025-06-24 RX ADMIN — Medication 0.5 MILLIGRAM(S): at 18:42

## 2025-06-24 RX ADMIN — DEXAMETHASONE 100 MILLIGRAM(S): 0.5 TABLET ORAL at 09:29

## 2025-06-24 RX ADMIN — Medication 5 MILLILITER(S): at 12:11

## 2025-06-24 RX ADMIN — Medication 5 MILLILITER(S): at 08:41

## 2025-06-24 RX ADMIN — Medication 10 MILLILITER(S): at 23:09

## 2025-06-24 RX ADMIN — Medication 10 MILLILITER(S): at 20:06

## 2025-06-24 RX ADMIN — Medication 5 MILLILITER(S): at 16:57

## 2025-06-24 RX ADMIN — Medication 15 MILLILITER(S): at 17:41

## 2025-06-24 RX ADMIN — Medication 25 GRAM(S): at 12:10

## 2025-06-24 RX ADMIN — METHYLPREDNISOLONE ACETATE 55 MILLIGRAM(S): 80 INJECTION, SUSPENSION INTRA-ARTICULAR; INTRALESIONAL; INTRAMUSCULAR; SOFT TISSUE at 14:00

## 2025-06-24 RX ADMIN — Medication 5 MILLILITER(S): at 20:06

## 2025-06-24 RX ADMIN — Medication 5 MILLILITER(S): at 23:09

## 2025-06-24 RX ADMIN — LEVETIRACETAM 500 MILLIGRAM(S): 10 INJECTION, SOLUTION INTRAVENOUS at 05:10

## 2025-06-24 RX ADMIN — Medication 10 MILLIGRAM(S): at 20:57

## 2025-06-24 RX ADMIN — Medication 25 MILLIGRAM(S): at 14:00

## 2025-06-24 RX ADMIN — Medication 10 MILLILITER(S): at 16:58

## 2025-06-24 RX ADMIN — METOPROLOL SUCCINATE 12.5 MILLIGRAM(S): 50 TABLET, EXTENDED RELEASE ORAL at 05:10

## 2025-06-24 RX ADMIN — Medication 10 MILLIGRAM(S): at 14:50

## 2025-06-24 RX ADMIN — Medication 10 MILLILITER(S): at 12:10

## 2025-06-24 RX ADMIN — BUSULFAN 175 MILLIGRAM(S): 2 TABLET, FILM COATED ORAL at 11:02

## 2025-06-24 RX ADMIN — Medication 800 MILLIGRAM(S): at 05:09

## 2025-06-24 RX ADMIN — URSODIOL 300 MILLIGRAM(S): 300 CAPSULE ORAL at 05:09

## 2025-06-24 RX ADMIN — Medication 116 MILLIGRAM(S): at 09:28

## 2025-06-24 RX ADMIN — Medication 88 MICROGRAM(S): at 05:10

## 2025-06-24 RX ADMIN — Medication 800 MILLIGRAM(S): at 17:41

## 2025-06-24 RX ADMIN — Medication 40 MILLIGRAM(S): at 05:10

## 2025-06-24 RX ADMIN — LISINOPRIL 10 MILLIGRAM(S): 5 TABLET ORAL at 05:10

## 2025-06-24 NOTE — PROGRESS NOTE ADULT - SUBJECTIVE AND OBJECTIVE BOX
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    O: Vitals:   Vital Signs Last 24 Hrs  T(C): 38.2 (24 Jun 2025 05:00), Max: 39.9 (24 Jun 2025 00:46)  T(F): 100.8 (24 Jun 2025 05:00), Max: 103.8 (24 Jun 2025 00:46)  HR: 87 (24 Jun 2025 05:00) (62 - 100)  BP: 116/56 (24 Jun 2025 05:00) (103/52 - 176/67)  BP(mean): --  RR: 17 (24 Jun 2025 05:00) (17 - 18)  SpO2: 94% (24 Jun 2025 05:00) (94% - 100%)    Parameters below as of 24 Jun 2025 05:00  Patient On (Oxygen Delivery Method): room air    Admit weight: 64.5kg  Daily Weight in kG:  (24 Jun 2025)    Intake / Output:   06-23 @ 07:01  -  06-24 @ 07:00  --------------------------------------------------------  IN: 2125 mL / OUT: 2850 mL / NET: -725 mL      PE:   Oropharynx: no erythema or ulcerations   Oral Mucositis:              -                                          Grade: n/a   CVS: S1, S2 RRR   Lungs: CTA throughout bilaterally   Abdomen: + BS x 4, soft, NT, ND   Extremities: no edema   Gastric Mucositis:                 -                                 Grade: n/a   Intestinal Mucositis:                   -                           Grade: n/a   Skin: no rash   TLC: CDI   Neuro: A&Ox 3      Labs:               Cultures:   Respiratory Viral Panel with COVID-19 by SCARLET (06.20.25 @ 21:02)   Rapid RVP Result: [id number]: NotDetec:    Culture - Blood (06.20.25 @ 18:45)   Specimen Source: Blood Blood-Catheter    Culture Results: No growth at 24 hoursCulture - Blood (06.20.25 @ 18:40)   Specimen Source: Blood Blood-Peripheral  Culture Results: No growth at 24 hours    Culture - Urine (06.20.25 @ 21:01)   Specimen Source: Clean Catch Clean Catch (Midstream)  Culture Results: 10,000 - 49,000 CFU/mL Gram Positive Cocci in Pairs and Chains   <10,000 CFU/ml Normal Urogenital cha present    Radiology:   < from: Xray Chest 1 View- PORTABLE-Urgent (Xray Chest 1 View- PORTABLE-Urgent .) (06.20.25 @ 19:28) >  IMPRESSION: Clear lungs.      Meds:   Antimicrobials:   acyclovir Oral Tab/Cap 800 milliGRAM(s) Oral every 12 hours      Heme / Onc:   busulfan IVPB (eMAR) 175 milliGRAM(s) IV Intermittent daily  fludarabine IVPB (eMAR) 80 milliGRAM(s) IV Intermittent every 24 hours      GI:  pantoprazole    Tablet 40 milliGRAM(s) Oral before breakfast  polyethylene glycol 3350 17 Gram(s) Oral daily PRN  senna 2 Tablet(s) Oral at bedtime  sodium bicarbonate Mouth Rinse 10 milliLiter(s) Swish and Spit five times a day  ursodiol Capsule 300 milliGRAM(s) Oral two times a day      Cardiovascular:   EPINEPHrine     1 mG/mL Injectable 0.3 milliGRAM(s) IntraMuscular once PRN  lisinopril 10 milliGRAM(s) Oral every 12 hours  metoprolol succinate ER 12.5 milliGRAM(s) Oral daily      Immunologic:       Other medications:   Biotene Dry Mouth Oral Rinse 5 milliLiter(s) Swish and Spit five times a day  buPROPion XL (24-Hour) . 300 milliGRAM(s) Oral daily  chlorhexidine 0.12% Liquid 15 milliLiter(s) Swish and Spit two times a day  chlorhexidine 4% Liquid 1 Application(s) Topical <User Schedule>  dexAMETHasone  IVPB 10 milliGRAM(s) IV Intermittent every 24 hours  diphenhydrAMINE 25 milliGRAM(s) Oral once  diphenhydrAMINE Injectable 25 milliGRAM(s) IV Push every 24 hours  fentaNYL   Patch  50 MICROgram(s)/Hr. 1 Patch Transdermal every 48 hours  levETIRAcetam 500 milliGRAM(s) Oral every 12 hours  levothyroxine 88 MICROGram(s) Oral daily  methylPREDNISolone sodium succinate Injectable 55 milliGRAM(s) IV Push every 24 hours  ondansetron  IVPB 16 milliGRAM(s) IV Intermittent every 24 hours  phytonadione   Solution 5 milliGRAM(s) Oral <User Schedule>      PRN:   albuterol    90 MICROgram(s) HFA Inhaler 2 Puff(s) Inhalation once PRN  ALPRAZolam 0.25 milliGRAM(s) Oral once PRN  diphenhydrAMINE Injectable 50 milliGRAM(s) IV Push once PRN  EPINEPHrine     1 mG/mL Injectable 0.3 milliGRAM(s) IntraMuscular once PRN  hydrocortisone sodium succinate Injectable 100 milliGRAM(s) IV Push once PRN  LORazepam     Tablet 0.5 milliGRAM(s) Oral every 6 hours PRN  oxyCODONE    IR 10 milliGRAM(s) Oral every 4 hours PRN  polyethylene glycol 3350 17 Gram(s) Oral daily PRN  prochlorperazine   Injectable 10 milliGRAM(s) IV Push every 6 hours PRN  sodium chloride 0.9% lock flush 10 milliLiter(s) IV Push every 1 hour PRN    A/P: 64 year old female with hx of myelofibrosis presents for a MUD allogeneic SCT (DP Permissive) conditioning with Flu/Bu/Thiotepa/rATG.  Today is day -3   6/21- Thiotepa 1/2. Maintain skin precautions during thiotepa administration and for 48 hours after completion (days .6 through day -3). Chronic pain issues - continue home fentanyl patch. Febrile overnight, tmax 100.9. Cultures  pending. RVP negative. CXR pending official read, no obvious consolidations. Vital signs are stable.   6/23 D/C HCTZ. Fludarabine 1/3, Busulfan 1/3, rATG 1/3. No Tylenol on busulfan days. Continue keppra ppx for 24 hours post infusion of last dose of busulfan. No acute events overnight, vital signs are stable.   6/24- overnight patient was febrile during ATG, no cultures/abx as fever likely secondary to ATG. Patient was otherwise stable overnight. Fludarabine 2/3, Busulfan 2/3 and rATG 2/3. NO Tyelnol or Azoles until Day 0.    1. Infectious Disease:   acyclovir  Oral Tab/Cap 800 milliGRAM(s) Oral every 12 hours    2. VOD Prophylaxis:   ursodiol Capsule 300 milliGRAM(s) Oral two times a day    3. GI Prophylaxis:    pantoprazole Tablet 40 milliGRAM(s) Oral before breakfast    4. Mouthcare - NS / NaHCO3 rinses, Biotene; Skin care     5. GVHD prophylaxis   ABC: PTCy days +3, +4, Bortezomib days 0, +3, PTCy days +5, +14, +28     6. Transfuse & replete electrolytes prn     7. IV hydration, daily weights, strict I&O, prn diuresis     8. PO intake as tolerated, nutrition follow up as needed, MVI, folic acid     9. Antiemetics, anti-diarrhea medications:   ondansetron  IVPB 16 milliGRAM(s) IV Intermittent every 24 hours  dexAMETHasone  IVPB 10 milliGRAM(s) IV Intermittent every 24 hours  compazine 10mg IV q 6 hours prn nausea / vomiting     10. OOB as tolerated, physical therapy consult if needed     11. Monitor coags 2x week, vitamin K BIW  phytonadione Solution 5 milliGRAM(s) Oral <User Schedule>    12. Monitor closely for clinical changes, monitor for fevers     13. Emotional support provided, plan of care discussed and questions addressed     14. Patient education done regarding chemotherapy prep, plan of care, restrictions and discharge planning     15. Continue regular social work input     I have written the above note for Dr. Pope who performed service with me in the room.   Bobo Barriga PA-C ([phone])    I have seen and examined patient with PA, I agree with above note as scribed.                    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 prep regimen for treatment of myelofibrosis    Disease: myelofibrosis   Prep regimen: Flu / Bu / Thiotepa / rATG   GVHD ppx: ABC: PTCy days +3, +4, Bortezomib days 0, +3, PTCy days +5, +14, +28   ABO/CMV:   Recipient:   A+/CMV+   Donor:   B+/CMV+      S: Patient seen and examined with HPC Transplant Team:   Overnight patient was febrile while receiving ATG.  Patient feeling better this morning, but complains of loose BM.    O: Vitals:   Vital Signs Last 24 Hrs  T(C): 38.2 (2025 05:00), Max: 39.9 (2025 00:46)  T(F): 100.8 (2025 05:00), Max: 103.8 (2025 00:46)  HR: 87 (2025 05:00) (62 - 100)  BP: 116/56 (2025 05:00) (103/52 - 176/67)  BP(mean): --  RR: 17 (2025 05:00) (17 - 18)  SpO2: 94% (2025 05:00) (94% - 100%)    Parameters below as of 2025 05:00  Patient On (Oxygen Delivery Method): room air    Admit weight: 64.5kg  Daily Weight in k.5 (2025)    Intake / Output:    @ 07:01  -   @ 07:00  --------------------------------------------------------  IN: 2125 mL / OUT: 2850 mL / NET: -725 mL      PE:   Oropharynx: no erythema or ulcerations   Oral Mucositis:              -                                        Grade: n/a   CVS: S1, S2 RRR   Lungs: CTA throughout bilaterally   Abdomen: + BS x 4, soft, NT, ND   Extremities: no edema   Gastric Mucositis:                 -                                Grade: n/a   Intestinal Mucositis:                   -                           Grade: n/a   Skin: no rash   TLC: CDI   Neuro: A&Ox 3      Labs:   CBC Full  -  ( 2025 06:34 )  WBC Count : 0.98 K/uL  RBC Count : 2.61 M/uL  Hemoglobin : 7.4 g/dL  Hematocrit : 24.0 %  Platelet Count - Automated : 58 K/uL  Mean Cell Volume : 92.0 fl  Mean Cell Hemoglobin : 28.4 pg  Mean Cell Hemoglobin Concentration : 30.8 g/dL  Auto Neutrophil # : 0.83 K/uL  Auto Lymphocyte # : 0.04 K/uL  Auto Monocyte # : 0.03 K/uL  Auto Eosinophil # : 0.00 K/uL  Auto Basophil # : 0.03 K/uL  Auto Neutrophil % : 84.6 %  Auto Lymphocyte % : 4.1 %  Auto Monocyte % : 3.1 %  Auto Eosinophil % : 0.0 %  Auto Basophil % : 3.1 %                          7.4    0.98  )-----------( 58       ( 2025 06:34 )             24.0     06-24    129[L]  |  97  |  35[H]  ----------------------------<  121[H]  3.8   |  17[L]  |  1.06    Ca    7.6[L]      2025 08:57  Phos  3.4     -  Mg     1.5     -    TPro  5.1[L]  /  Alb  2.9[L]  /  TBili  0.4  /  DBili  x   /  AST  32  /  ALT  12  /  AlkPhos  105  -    Cultures:   Respiratory Viral Panel with COVID-19 by SCARLET (25 @ 21:02)   Rapid RVP Result: [id number]: NotDetec:    Culture - Blood (25 @ 18:45)   Specimen Source: Blood Blood-Catheter    Culture Results: No growth at 24 hoursCulture - Blood (25 @ 18:40)   Specimen Source: Blood Blood-Peripheral  Culture Results: No growth at 24 hours    Culture - Urine (25 @ 21:01)   Specimen Source: Clean Catch Clean Catch (Midstream)  Culture Results: 10,000 - 49,000 CFU/mL Gram Positive Cocci in Pairs and Chains   <10,000 CFU/ml Normal Urogenital cha present    Radiology:   < from: Xray Chest 1 View- PORTABLE-Urgent (Xray Chest 1 View- PORTABLE-Urgent .) (25 @ 19:28) >  IMPRESSION: Clear lungs.      Meds:   Antimicrobials:   acyclovir Oral Tab/Cap 800 milliGRAM(s) Oral every 12 hours      Heme / Onc:   busulfan IVPB (eMAR) 175 milliGRAM(s) IV Intermittent daily  fludarabine IVPB (eMAR) 80 milliGRAM(s) IV Intermittent every 24 hours      GI:  pantoprazole    Tablet 40 milliGRAM(s) Oral before breakfast  polyethylene glycol 3350 17 Gram(s) Oral daily PRN  senna 2 Tablet(s) Oral at bedtime  sodium bicarbonate Mouth Rinse 10 milliLiter(s) Swish and Spit five times a day  ursodiol Capsule 300 milliGRAM(s) Oral two times a day      Cardiovascular:   EPINEPHrine     1 mG/mL Injectable 0.3 milliGRAM(s) IntraMuscular once PRN  lisinopril 10 milliGRAM(s) Oral every 12 hours  metoprolol succinate ER 12.5 milliGRAM(s) Oral daily      Immunologic:       Other medications:   Biotene Dry Mouth Oral Rinse 5 milliLiter(s) Swish and Spit five times a day  buPROPion XL (24-Hour) . 300 milliGRAM(s) Oral daily  chlorhexidine 0.12% Liquid 15 milliLiter(s) Swish and Spit two times a day  chlorhexidine 4% Liquid 1 Application(s) Topical <User Schedule>  dexAMETHasone  IVPB 10 milliGRAM(s) IV Intermittent every 24 hours  diphenhydrAMINE 25 milliGRAM(s) Oral once  diphenhydrAMINE Injectable 25 milliGRAM(s) IV Push every 24 hours  fentaNYL   Patch  50 MICROgram(s)/Hr. 1 Patch Transdermal every 48 hours  levETIRAcetam 500 milliGRAM(s) Oral every 12 hours  levothyroxine 88 MICROGram(s) Oral daily  methylPREDNISolone sodium succinate Injectable 55 milliGRAM(s) IV Push every 24 hours  ondansetron  IVPB 16 milliGRAM(s) IV Intermittent every 24 hours  phytonadione   Solution 5 milliGRAM(s) Oral <User Schedule>      PRN:   albuterol    90 MICROgram(s) HFA Inhaler 2 Puff(s) Inhalation once PRN  ALPRAZolam 0.25 milliGRAM(s) Oral once PRN  diphenhydrAMINE Injectable 50 milliGRAM(s) IV Push once PRN  EPINEPHrine     1 mG/mL Injectable 0.3 milliGRAM(s) IntraMuscular once PRN  hydrocortisone sodium succinate Injectable 100 milliGRAM(s) IV Push once PRN  LORazepam     Tablet 0.5 milliGRAM(s) Oral every 6 hours PRN  oxyCODONE    IR 10 milliGRAM(s) Oral every 4 hours PRN  polyethylene glycol 3350 17 Gram(s) Oral daily PRN  prochlorperazine   Injectable 10 milliGRAM(s) IV Push every 6 hours PRN  sodium chloride 0.9% lock flush 10 milliLiter(s) IV Push every 1 hour PRN    A/P: 64 year old female with hx of myelofibrosis presents for a MUD allogeneic SCT (DP Permissive) conditioning with Flu/Bu/Thiotepa/rATG.  Today is day -3   - Thiotepa 1/. Maintain skin precautions during thiotepa administration and for 48 hours after completion (days .6 through day -3). Chronic pain issues - continue home fentanyl patch. Febrile overnight, tmax 100.9. Cultures  pending. RVP negative. CXR pending official read, no obvious consolidations. Vital signs are stable.    D/C HCTZ. Fludarabine 1/3, Busulfan 13, rATG 1/3. No Tylenol on busulfan days. Continue keppra ppx for 24 hours post infusion of last dose of busulfan. No acute events overnight, vital signs are stable.   - overnight patient was febrile during ATG, no cultures/abx as fever likely secondary to ATG. Patient was otherwise stable overnight. Fludarabine 2/3, Busulfan 2/3 and rATG 2/3. NO Tylenol or Azoles until Day 0. Continue with supportive care.    1. Infectious Disease:   acyclovir Oral Tab/Cap 800 milliGRAM(s) Oral every 12 hours  Levaquin 500mg oral Daily  Vancomycin 125mg every 12 hours oral    2. VOD Prophylaxis:   ursodiol Capsule 300 milliGRAM(s) Oral two times a day    3. GI Prophylaxis:    pantoprazole Tablet 40 milliGRAM(s) Oral before breakfast    4. Mouthcare - NS / NaHCO3 rinses, Biotene; Skin care     5. GVHD prophylaxis   ABC: PTCy days +3, +4, Bortezomib days 0, +3, PTCy days +5, +14, +28     6. Transfuse & replete electrolytes prn     7. IV hydration, daily weights, strict I&O, prn diuresis     8. PO intake as tolerated, nutrition follow up as needed, MVI, folic acid     9. Antiemetics, anti-diarrhea medications:   ondansetron  IVPB 16 milliGRAM(s) IV Intermittent every 24 hours  dexAMETHasone  IVPB 10 milliGRAM(s) IV Intermittent every 24 hours  compazine 10mg IV q 6 hours prn nausea / vomiting     10. OOB as tolerated, physical therapy consult if needed     11. Monitor coags 2x week, vitamin K BIW  phytonadione Solution 5 milliGRAM(s) Oral <User Schedule>    12. Monitor closely for clinical changes, monitor for fevers     13. Emotional support provided, plan of care discussed and questions addressed     14. Patient education done regarding chemotherapy prep, plan of care, restrictions and discharge planning     15. Continue regular social work input     I have written the above note for Dr. Pope who performed service with me in the room.   Bobo Barriga PA-C ([phone])    I have seen and examined patient with PA, I agree with above note as scribed.

## 2025-06-24 NOTE — PROGRESS NOTE ADULT - NS PANP COMMENT GEN_ALL_CORE FT
I rounded with the team including nurses, ACPs and PharmD.  I reviewed the data in depth.   I saw and examined the patient myself.   I reviewed and confirmed the above note.  The patient is day -4 prior to allogeneic HSCT.  The patient is doing well with no complaints except for her pain.   The vitals are stable. The oral cavity is clear. The line site is clean. The lungs are clear. There is no LE edema.  Overall, there are no specific issues. The patient is on appropriate therapy at this stage.   I answered the patient’s questions.  I encouraged po intake and ambulation.  YEIMI Godoy MD I rounded with the team including nurses, ACPs and PharmD.  I reviewed the data in depth.   I saw and examined the patient myself.   I reviewed and confirmed the above note.  The patient is day -3 prior to allogeneic HSCT.  The patient is doing well. She had fever last night following r-ATG. She has diarrhea this morning.   The vitals are stable. The oral cavity is clear. The line site is clean. The lungs are clear. There is no LE edema.  Overall, there are no specific issues. The patient is on appropriate therapy at this stage.   I answered the patient’s questions.  I encouraged po intake and ambulation.  YEIMI Godoy MD

## 2025-06-25 LAB
ALBUMIN SERPL ELPH-MCNC: 2.6 G/DL — LOW (ref 3.3–5)
ALP SERPL-CCNC: 94 U/L — SIGNIFICANT CHANGE UP (ref 40–120)
ALT FLD-CCNC: 13 U/L — SIGNIFICANT CHANGE UP (ref 10–45)
ANION GAP SERPL CALC-SCNC: 14 MMOL/L — SIGNIFICANT CHANGE UP (ref 5–17)
AST SERPL-CCNC: 23 U/L — SIGNIFICANT CHANGE UP (ref 10–40)
BASOPHILS # BLD AUTO: SIGNIFICANT CHANGE UP (ref 0–0.2)
BASOPHILS NFR BLD AUTO: SIGNIFICANT CHANGE UP (ref 0–2)
BILIRUB DIRECT SERPL-MCNC: 0.1 MG/DL — SIGNIFICANT CHANGE UP (ref 0–0.3)
BILIRUB INDIRECT FLD-MCNC: 0.2 MG/DL — SIGNIFICANT CHANGE UP (ref 0.2–1)
BILIRUB SERPL-MCNC: 0.3 MG/DL — SIGNIFICANT CHANGE UP (ref 0.2–1.2)
BLD GP AB SCN SERPL QL: NEGATIVE — SIGNIFICANT CHANGE UP
BUN SERPL-MCNC: 25 MG/DL — HIGH (ref 7–23)
CALCIUM SERPL-MCNC: 8.2 MG/DL — LOW (ref 8.4–10.5)
CHLORIDE SERPL-SCNC: 103 MMOL/L — SIGNIFICANT CHANGE UP (ref 96–108)
CO2 SERPL-SCNC: 17 MMOL/L — LOW (ref 22–31)
CREAT SERPL-MCNC: 0.68 MG/DL — SIGNIFICANT CHANGE UP (ref 0.5–1.3)
CULTURE RESULTS: SIGNIFICANT CHANGE UP
EGFR: 98 ML/MIN/1.73M2 — SIGNIFICANT CHANGE UP
EGFR: 98 ML/MIN/1.73M2 — SIGNIFICANT CHANGE UP
EOSINOPHIL # BLD AUTO: SIGNIFICANT CHANGE UP (ref 0–0.5)
EOSINOPHIL NFR BLD AUTO: SIGNIFICANT CHANGE UP (ref 0–6)
G6PD RBC-CCNC: 25 U/G HGB — HIGH (ref 7–20.5)
GLUCOSE SERPL-MCNC: 119 MG/DL — HIGH (ref 70–99)
HCT VFR BLD CALC: 22.3 % — LOW (ref 34.5–45)
HGB BLD-MCNC: 6.8 G/DL — CRITICAL LOW (ref 11.5–15.5)
IMM GRANULOCYTES # BLD AUTO: SIGNIFICANT CHANGE UP (ref 0–0.07)
IMM GRANULOCYTES NFR BLD AUTO: SIGNIFICANT CHANGE UP (ref 0–0.9)
IMMATURE PLATELET FRACTION #: 4.8 K/UL — SIGNIFICANT CHANGE UP (ref 4.7–11.1)
IMMATURE PLATELET FRACTION %: 12.9 % — HIGH (ref 1.6–4.9)
LDH SERPL L TO P-CCNC: 497 U/L — HIGH (ref 50–242)
LYMPHOCYTES # BLD AUTO: SIGNIFICANT CHANGE UP (ref 1–3.3)
LYMPHOCYTES NFR BLD AUTO: SIGNIFICANT CHANGE UP (ref 13–44)
MAGNESIUM SERPL-MCNC: 2.2 MG/DL — SIGNIFICANT CHANGE UP (ref 1.6–2.6)
MCHC RBC-ENTMCNC: 28.1 PG — SIGNIFICANT CHANGE UP (ref 27–34)
MCHC RBC-ENTMCNC: 30.5 G/DL — LOW (ref 32–36)
MCV RBC AUTO: 92.1 FL — SIGNIFICANT CHANGE UP (ref 80–100)
MONOCYTES # BLD AUTO: SIGNIFICANT CHANGE UP (ref 0–0.9)
MONOCYTES NFR BLD AUTO: SIGNIFICANT CHANGE UP (ref 2–14)
NEUTROPHILS # BLD AUTO: SIGNIFICANT CHANGE UP (ref 1.8–7.4)
NEUTROPHILS NFR BLD AUTO: SIGNIFICANT CHANGE UP (ref 43–77)
NRBC # BLD AUTO: 0 K/UL — SIGNIFICANT CHANGE UP (ref 0–0)
NRBC # FLD: 0 K/UL — SIGNIFICANT CHANGE UP (ref 0–0)
NRBC BLD AUTO-RTO: 0 /100 WBCS — SIGNIFICANT CHANGE UP (ref 0–0)
PHOSPHATE SERPL-MCNC: 3.3 MG/DL — SIGNIFICANT CHANGE UP (ref 2.5–4.5)
PLATELET # BLD AUTO: 37 K/UL — LOW (ref 150–400)
PMV BLD: SIGNIFICANT CHANGE UP FL (ref 7–13)
POTASSIUM SERPL-MCNC: 3.7 MMOL/L — SIGNIFICANT CHANGE UP (ref 3.5–5.3)
POTASSIUM SERPL-SCNC: 3.7 MMOL/L — SIGNIFICANT CHANGE UP (ref 3.5–5.3)
PROT SERPL-MCNC: 5.2 G/DL — LOW (ref 6–8.3)
RBC # BLD: 2.42 M/UL — LOW (ref 3.8–5.2)
RBC # FLD: 19.9 % — HIGH (ref 10.3–14.5)
RH IG SCN BLD-IMP: POSITIVE — SIGNIFICANT CHANGE UP
SODIUM SERPL-SCNC: 134 MMOL/L — LOW (ref 135–145)
SPECIMEN SOURCE: SIGNIFICANT CHANGE UP
WBC # BLD: 0.03 K/UL — CRITICAL LOW (ref 3.8–10.5)
WBC # FLD AUTO: 0.03 K/UL — CRITICAL LOW (ref 3.8–10.5)

## 2025-06-25 PROCEDURE — 99233 SBSQ HOSP IP/OBS HIGH 50: CPT

## 2025-06-25 RX ORDER — FENTANYL CITRATE-0.9 % NACL/PF 100MCG/2ML
1 SYRINGE (ML) INTRAVENOUS
Refills: 0 | Status: DISCONTINUED | OUTPATIENT
Start: 2025-06-26 | End: 2025-06-26

## 2025-06-25 RX ORDER — MELATONIN 5 MG
3 TABLET ORAL ONCE
Refills: 0 | Status: COMPLETED | OUTPATIENT
Start: 2025-06-25 | End: 2025-06-25

## 2025-06-25 RX ADMIN — Medication 5 MILLILITER(S): at 08:22

## 2025-06-25 RX ADMIN — Medication 15 MILLILITER(S): at 18:05

## 2025-06-25 RX ADMIN — Medication 10 MILLIGRAM(S): at 10:21

## 2025-06-25 RX ADMIN — LISINOPRIL 10 MILLIGRAM(S): 5 TABLET ORAL at 06:06

## 2025-06-25 RX ADMIN — LEVETIRACETAM 500 MILLIGRAM(S): 10 INJECTION, SOLUTION INTRAVENOUS at 06:07

## 2025-06-25 RX ADMIN — Medication 88 MICROGRAM(S): at 06:08

## 2025-06-25 RX ADMIN — DEXAMETHASONE 100 MILLIGRAM(S): 0.5 TABLET ORAL at 09:28

## 2025-06-25 RX ADMIN — Medication 15 MILLILITER(S): at 06:21

## 2025-06-25 RX ADMIN — Medication 10 MILLILITER(S): at 08:22

## 2025-06-25 RX ADMIN — Medication 3 MILLIGRAM(S): at 01:30

## 2025-06-25 RX ADMIN — Medication 5 MILLILITER(S): at 16:31

## 2025-06-25 RX ADMIN — Medication 1 APPLICATION(S): at 06:21

## 2025-06-25 RX ADMIN — Medication 10 MILLIGRAM(S): at 16:29

## 2025-06-25 RX ADMIN — Medication 5 MILLILITER(S): at 11:32

## 2025-06-25 RX ADMIN — Medication 10 MILLIGRAM(S): at 23:55

## 2025-06-25 RX ADMIN — Medication 125 MILLIGRAM(S): at 06:05

## 2025-06-25 RX ADMIN — Medication 125 MILLIGRAM(S): at 18:04

## 2025-06-25 RX ADMIN — Medication 800 MILLIGRAM(S): at 06:07

## 2025-06-25 RX ADMIN — BUSULFAN 175 MILLIGRAM(S): 2 TABLET, FILM COATED ORAL at 10:55

## 2025-06-25 RX ADMIN — METHYLPREDNISOLONE ACETATE 55 MILLIGRAM(S): 80 INJECTION, SUSPENSION INTRA-ARTICULAR; INTRALESIONAL; INTRAMUSCULAR; SOFT TISSUE at 13:38

## 2025-06-25 RX ADMIN — Medication 116 MILLIGRAM(S): at 09:41

## 2025-06-25 RX ADMIN — METOPROLOL SUCCINATE 12.5 MILLIGRAM(S): 50 TABLET, EXTENDED RELEASE ORAL at 06:06

## 2025-06-25 RX ADMIN — Medication 5 MILLILITER(S): at 20:34

## 2025-06-25 RX ADMIN — Medication 5 MILLILITER(S): at 23:41

## 2025-06-25 RX ADMIN — Medication 10 MILLILITER(S): at 16:29

## 2025-06-25 RX ADMIN — LEVETIRACETAM 500 MILLIGRAM(S): 10 INJECTION, SOLUTION INTRAVENOUS at 18:04

## 2025-06-25 RX ADMIN — BUPROPION HYDROBROMIDE 300 MILLIGRAM(S): 522 TABLET, EXTENDED RELEASE ORAL at 11:31

## 2025-06-25 RX ADMIN — Medication 25 MILLIGRAM(S): at 13:38

## 2025-06-25 RX ADMIN — LISINOPRIL 10 MILLIGRAM(S): 5 TABLET ORAL at 18:04

## 2025-06-25 RX ADMIN — Medication 800 MILLIGRAM(S): at 18:04

## 2025-06-25 RX ADMIN — URSODIOL 300 MILLIGRAM(S): 300 CAPSULE ORAL at 06:06

## 2025-06-25 RX ADMIN — URSODIOL 300 MILLIGRAM(S): 300 CAPSULE ORAL at 18:04

## 2025-06-25 RX ADMIN — Medication 10 MILLILITER(S): at 20:34

## 2025-06-25 RX ADMIN — Medication 10 MILLILITER(S): at 11:31

## 2025-06-25 RX ADMIN — Medication 40 MILLIGRAM(S): at 06:06

## 2025-06-25 RX ADMIN — Medication 10 MILLILITER(S): at 23:41

## 2025-06-25 RX ADMIN — FLUDARABINE PHOSPHATE 80 MILLIGRAM(S): 25 INJECTION, SOLUTION INTRAVENOUS at 10:11

## 2025-06-25 NOTE — PROGRESS NOTE ADULT - REASON FOR ADMISSION
64 year old female with hx of myelofibrosis presents for a MUD allogeneic SCT (DP Permissive) conditioning with Flu/Bu/Thiotepa/rATG. 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

## 2025-06-25 NOTE — CHART NOTE - NSCHARTNOTEFT_GEN_A_CORE
NUTRITION FOLLOW UP NOTE    PATIENT SEEN FOR: BMTU Nutrition follow up     SOURCE: [x] Patient  [x] Current Medical Record  [x] RN  [] Family/support person at bedside  [] Patient unavailable/inappropriate  [x] Other: Interdisciplinary rounds     CHART REVIEWED/EVENTS NOTED.  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 Today is day -2.     DIET ORDER:   Diet, Regular:   Supplement Feeding Modality:  Oral  Ensure Max Cans or Servings Per Day:  1       Frequency:  Daily (25)      CURRENT DIET ORDER IS:  [x] Appropriate:   [] Inadequate:  [] Other:    NUTRITION INTAKE/PROVISION:  [x] PO: Pt reports decreased appetite/PO intake x 3 days. Reports consuming Ensure Max shake and protein fortified smoothies daily. Did not voice any food preferences at this time.   [] Enteral Nutrition:  [] Parenteral Nutrition:    ANTHROPOMETRICS:  Drug Dosing Weight  Height (cm): 159 (2025 07:13)  Weight (kg): 64.5 (2025 07:13)  BMI (kg/m2): 25.5 (2025 07:13)  BSA (m2): 1.67 (2025 07:13)  Weights:   Daily Weight in k.9 (-), Weight in k.5 (-24), Weight in k.7 (-), Weight in k.6 (-), Weight in k (-21)   ** Weight fluctuating - Weight changes likely secondary to fluid shifts. RD to continue to monitor weight trends as able.     NUTRITIONALLY PERTINENT MEDICATIONS:  MEDICATIONS  (STANDING):  acyclovir   Oral Tab/Cap  levoFLOXacin  Tablet  levothyroxine  lisinopril  methylPREDNISolone sodium succinate Injectable  metoprolol succinate ER  pantoprazole    Tablet  phytonadione   Solution  sodium bicarbonate Mouth Rinse  ursodiol Capsule  vancomycin    Solution       NUTRITIONALLY PERTINENT LABS:   Na134 mmol/L[L] Glu 119 mg/dL[H] K+ 3.7 mmol/L Cr  0.68 mg/dL BUN 25 mg/dL[H] - Phos 3.3 mg/dL  Alb 2.6 g/dL[L]06-25 ALT 13 U/L AST 23 U/L Alkaline Phosphatase 94 U/L            Finger Sticks:      NUTRITIONALLY PERTINENT MEDICATIONS/LABS:  [x] Reviewed  [x] Relevant notes on medications/labs:  - Ordered for levothyroxine     EDEMA:  [x] Reviewed  [] Relevant notes:    GI/ I&O:  [x] Reviewed  [] Relevant notes:  [] Other:    SKIN:   [x] No pressure injuries documented, per nursing flowsheet  [] Pressure injury previously noted  [] Change in pressure injury documentation:  [] Other:    ESTIMATED NEEDS:  [x] No change:  [] Updated:  Energy:  7561-7646 kcal/day (30-35 kcal/kg)  Protein:  77.4-90.3 g/day (1.2-1.4 g/kg)  Fluid:   ml/day or [x] defer to team  Based on: dosing weight 64.5 kg     NUTRITION DIAGNOSIS:  [x] Prior Dx: Increased Nutrient Needs   [] New Dx:    EDUCATION:  [x] Yes: Reinforced BMT nutrition recommendations: food safety recommendations, increased protein-energy needs, small frequent meals as tolerated.   [] Not appropriate/warranted    NUTRITION CARE PLAN:  1. Diet: regular diet   2. Supplements: Ensure Max 1x/day and protein-fortified smoothie 2x/day   3. Multivitamin/mineral supplementation: deferred to team   4: Monitor and encourage PO intake. Encourage use of daily menus. Honor dietary preferences as expressed as able.     [] Achieved - Continue current nutrition intervention(s)  [] Current medical condition precludes nutrition intervention at this time.    MONITORING AND EVALUATION:   RD remains available upon request and will follow up per protocol.    Ann-Marie Gallagher RDN CDN (available on TEAMS)

## 2025-06-25 NOTE — PROGRESS NOTE ADULT - SUBJECTIVE AND OBJECTIVE BOX
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 prep regimen for treatment of myelofibrosis    Disease: myelofibrosis   Prep regimen: Flu / Bu / Thiotepa / rATG   GVHD ppx: ABC: PTCy days +3, +4, Bortezomib days 0, +3, PTCy days +5, +14, +28   ABO/CMV:   Recipient:   A+/CMV+   Donor:   B+/CMV+      S: Patient seen and examined with HPC Transplant Team:     O: Vitals:   Vital Signs Last 24 Hrs  T(C): 36.9 (2025 05:50), Max: 36.9 (2025 05:50)  T(F): 98.4 (2025 05:50), Max: 98.4 (2025 05:50)  HR: 72 (2025 05:50) (64 - 75)  BP: 125/67 (2025 05:50) (111/52 - 129/52)  BP(mean): --  RR: 18 (2025 05:50) (17 - 18)  SpO2: 96% (2025 05:50) (95% - 98%)    Parameters below as of 2025 05:50  Patient On (Oxygen Delivery Method): room air        Admit weight: 64.5kg  Daily     Daily Weight in k.5 (2025 08:27)    Intake / Output:    @ 07:01  -   @ 07:00  --------------------------------------------------------  IN: 1296 mL / OUT: 2620 mL / NET: -1324 mL        PE:   Oropharynx: no erythema or ulcerations   Oral Mucositis:              -                                        Grade: n/a   CVS: S1, S2 RRR   Lungs: CTA throughout bilaterally   Abdomen: + BS x 4, soft, NT, ND   Extremities: no edema   Gastric Mucositis:                 -                                Grade: n/a   Intestinal Mucositis:                   -                           Grade: n/a   Skin: no rash   TLC: CDI   Neuro: A&Ox 3          Labs:       Cultures:   Respiratory Viral Panel with COVID-19 by SCARLET (25 @ 21:02)   Rapid RVP Result: [id number]: NotDetec:    Culture - Blood (25 @ 18:45)   Specimen Source: Blood Blood-Catheter    Culture Results: No growth at 24 hoursCulture - Blood (25 @ 18:40)   Specimen Source: Blood Blood-Peripheral  Culture Results: No growth at 24 hours    Culture - Urine (25 @ 21:01)   Specimen Source: Clean Catch Clean Catch (Midstream)  Culture Results: 10,000 - 49,000 CFU/mL Gram Positive Cocci in Pairs and Chains   <10,000 CFU/ml Normal Urogenital cha present    Radiology:   < from: Xray Chest 1 View- PORTABLE-Urgent (Xray Chest 1 View- PORTABLE-Urgent .) (25 @ 19:28) >  IMPRESSION: Clear lungs.        Meds:   Antimicrobials:   acyclovir   Oral Tab/Cap 800 milliGRAM(s) Oral every 12 hours  levoFLOXacin  Tablet 500 milliGRAM(s) Oral every 24 hours  vancomycin    Solution 125 milliGRAM(s) Oral every 12 hours      Heme / Onc:   busulfan IVPB (eMAR) 175 milliGRAM(s) IV Intermittent daily  fludarabine IVPB (eMAR) 80 milliGRAM(s) IV Intermittent every 24 hours      GI:  pantoprazole    Tablet 40 milliGRAM(s) Oral before breakfast  polyethylene glycol 3350 17 Gram(s) Oral daily PRN  sodium bicarbonate Mouth Rinse 10 milliLiter(s) Swish and Spit five times a day  ursodiol Capsule 300 milliGRAM(s) Oral two times a day      Cardiovascular:   EPINEPHrine     1 mG/mL Injectable 0.3 milliGRAM(s) IntraMuscular once PRN  lisinopril 10 milliGRAM(s) Oral every 12 hours  metoprolol succinate ER 12.5 milliGRAM(s) Oral daily      Immunologic:       Other medications:   Biotene Dry Mouth Oral Rinse 5 milliLiter(s) Swish and Spit five times a day  buPROPion XL (24-Hour) . 300 milliGRAM(s) Oral daily  chlorhexidine 0.12% Liquid 15 milliLiter(s) Swish and Spit two times a day  chlorhexidine 4% Liquid 1 Application(s) Topical <User Schedule>  dexAMETHasone  IVPB 10 milliGRAM(s) IV Intermittent every 24 hours  diphenhydrAMINE 25 milliGRAM(s) Oral once  diphenhydrAMINE Injectable 25 milliGRAM(s) IV Push every 24 hours  fentaNYL   Patch  50 MICROgram(s)/Hr. 1 Patch Transdermal every 48 hours  levETIRAcetam 500 milliGRAM(s) Oral every 12 hours  levothyroxine 88 MICROGram(s) Oral daily  methylPREDNISolone sodium succinate Injectable 55 milliGRAM(s) IV Push every 24 hours  ondansetron  IVPB 16 milliGRAM(s) IV Intermittent every 24 hours  phytonadione   Solution 5 milliGRAM(s) Oral <User Schedule>      PRN:   albuterol    90 MICROgram(s) HFA Inhaler 2 Puff(s) Inhalation once PRN  ALPRAZolam 0.25 milliGRAM(s) Oral once PRN  diphenhydrAMINE Injectable 50 milliGRAM(s) IV Push once PRN  EPINEPHrine     1 mG/mL Injectable 0.3 milliGRAM(s) IntraMuscular once PRN  hydrocortisone sodium succinate Injectable 100 milliGRAM(s) IV Push once PRN  LORazepam     Tablet 0.5 milliGRAM(s) Oral every 6 hours PRN  oxyCODONE    IR 10 milliGRAM(s) Oral every 4 hours PRN  polyethylene glycol 3350 17 Gram(s) Oral daily PRN  prochlorperazine   Injectable 10 milliGRAM(s) IV Push every 6 hours PRN  sodium chloride 0.9% lock flush 10 milliLiter(s) IV Push every 1 hour PRN        A/P: 64 year old female with hx of myelofibrosis presents for a MUD allogeneic SCT (DP Permissive) conditioning with Flu/Bu/Thiotepa/rATG.  Today is day -2  - Thiotepa 1/2. Maintain skin precautions during thiotepa administration and for 48 hours after completion (days .6 through day -3). Chronic pain issues - continue home fentanyl patch. Febrile overnight, tmax 100.9. Cultures  pending. RVP negative. CXR pending official read, no obvious consolidations. Vital signs are stable.    D/C HCTZ. Fludarabine 1/3, Busulfan 1/3, rATG 1/3. No Tylenol on busulfan days. Continue keppra ppx for 24 hours post infusion of last dose of busulfan. No acute events overnight, vital signs are stable.   - overnight patient was febrile during ATG, no cultures/abx as fever likely secondary to ATG. Patient was otherwise stable overnight. Fludarabine 2/3, Busulfan 2/3 and rATG 2/3. NO Tylenol or Azoles until Day 0. Continue with supportive care.    1. Infectious Disease:   acyclovir Oral Tab/Cap 800 milliGRAM(s) Oral every 12 hours  Levaquin 500mg oral Daily  Vancomycin 125mg every 12 hours oral    2. VOD Prophylaxis:   ursodiol Capsule 300 milliGRAM(s) Oral two times a day    3. GI Prophylaxis:    pantoprazole Tablet 40 milliGRAM(s) Oral before breakfast    4. Mouthcare - NS / NaHCO3 rinses, Biotene; Skin care     5. GVHD prophylaxis   ABC: PTCy days +3, +4, Bortezomib days 0, +3, PTCy days +5, +14, +28     6. Transfuse & replete electrolytes prn     7. IV hydration, daily weights, strict I&O, prn diuresis     8. PO intake as tolerated, nutrition follow up as needed, MVI, folic acid     9. Antiemetics, anti-diarrhea medications:   ondansetron  IVPB 16 milliGRAM(s) IV Intermittent every 24 hours  dexAMETHasone  IVPB 10 milliGRAM(s) IV Intermittent every 24 hours  compazine 10mg IV q 6 hours prn nausea / vomiting     10. OOB as tolerated, physical therapy consult if needed     11. Monitor coags 2x week, vitamin K BIW  phytonadione Solution 5 milliGRAM(s) Oral <User Schedule>    12. Monitor closely for clinical changes, monitor for fevers     13. Emotional support provided, plan of care discussed and questions addressed     14. Patient education done regarding chemotherapy prep, plan of care, restrictions and discharge planning     15. Continue regular social work input     I have written the above note for Dr. Pope who performed service with me in the room.   Yesenia Dejesus PA-C ([phone])    I have seen and examined patient with PA, I agree with above note as scribed.                        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 prep regimen for treatment of myelofibrosis    Disease: myelofibrosis   Prep regimen: Flu / Bu / Thiotepa / rATG   GVHD ppx: ABC: PTCy days +3, +4, Bortezomib days 0, +3, PTCy days +5, +14, +28   ABO/CMV:   Recipient:   A+/CMV+   Donor:   B+/CMV+      S: Patient seen and examined with HPC Transplant Team:     O: Vitals:   Vital Signs Last 24 Hrs  T(C): 36.9 (2025 05:50), Max: 36.9 (2025 05:50)  T(F): 98.4 (2025 05:50), Max: 98.4 (2025 05:50)  HR: 72 (2025 05:50) (64 - 75)  BP: 125/67 (2025 05:50) (111/52 - 129/52)  BP(mean): --  RR: 18 (2025 05:50) (17 - 18)  SpO2: 96% (2025 05:50) (95% - 98%)    Parameters below as of 2025 05:50  Patient On (Oxygen Delivery Method): room air        Admit weight: 64.5kg  Daily Weight in k.5 (2025 08:27)    Intake / Output:    @ 07:01  -   @ 07:00  --------------------------------------------------------  IN: 1296 mL / OUT: 2620 mL / NET: -1324 mL        PE:   Oropharynx: no erythema or ulcerations   Oral Mucositis:              -                                        Grade: n/a   CVS: S1, S2 RRR   Lungs: CTA throughout bilaterally   Abdomen: + BS x 4, soft, NT, ND   Extremities: no edema   Gastric Mucositis:                 -                                Grade: n/a   Intestinal Mucositis:                   -                           Grade: n/a   Skin: no rash   TLC: CDI   Neuro: A&Ox 3        Labs:       Cultures:   Respiratory Viral Panel with COVID-19 by [facility]AR[facility] (25 @ 21:02)   Rapid RVP Result: [id number]: NotDetec:    Culture - Blood (25 @ 18:45)   Specimen Source: Blood Blood-Catheter    Culture Results: No growth at 24 hoursCulture - Blood (25 @ 18:40)   Specimen Source: Blood Blood-Peripheral  Culture Results: No growth at 24 hours    Culture - Urine (25 @ 21:01)   Specimen Source: Clean Catch Clean Catch (Midstream)  Culture Results: 10,000 - 49,000 CFU/mL Gram Positive Cocci in Pairs and Chains   <10,000 CFU/ml Normal Urogenital cha present    Radiology:   < from: Xray Chest 1 View- PORTABLE-Urgent (Xray Chest 1 View- PORTABLE-Urgent .) (25 @ 19:28) >  IMPRESSION: Clear lungs.        Meds:   Antimicrobials:   acyclovir   Oral Tab/Cap 800 milliGRAM(s) Oral every 12 hours  levoFLOXacin  Tablet 500 milliGRAM(s) Oral every 24 hours  vancomycin    Solution 125 milliGRAM(s) Oral every 12 hours      Heme / Onc:   busulfan IVPB (eMAR) 175 milliGRAM(s) IV Intermittent daily  fludarabine IVPB (eMAR) 80 milliGRAM(s) IV Intermittent every 24 hours      GI:  pantoprazole    Tablet 40 milliGRAM(s) Oral before breakfast  polyethylene glycol 3350 17 Gram(s) Oral daily PRN  sodium bicarbonate Mouth Rinse 10 milliLiter(s) Swish and Spit five times a day  ursodiol Capsule 300 milliGRAM(s) Oral two times a day      Cardiovascular:   EPINEPHrine     1 mG/mL Injectable 0.3 milliGRAM(s) IntraMuscular once PRN  lisinopril 10 milliGRAM(s) Oral every 12 hours  metoprolol succinate ER 12.5 milliGRAM(s) Oral daily      Immunologic:       Other medications:   Biotene Dry Mouth Oral Rinse 5 milliLiter(s) Swish and Spit five times a day  buPROPion XL (24-Hour) . 300 milliGRAM(s) Oral daily  chlorhexidine 0.12% Liquid 15 milliLiter(s) Swish and Spit two times a day  chlorhexidine 4% Liquid 1 Application(s) Topical <User Schedule>  dexAMETHasone  IVPB 10 milliGRAM(s) IV Intermittent every 24 hours  diphenhydrAMINE 25 milliGRAM(s) Oral once  diphenhydrAMINE Injectable 25 milliGRAM(s) IV Push every 24 hours  fentaNYL   Patch  50 MICROgram(s)/Hr. 1 Patch Transdermal every 48 hours  levETIRAcetam 500 milliGRAM(s) Oral every 12 hours  levothyroxine 88 MICROGram(s) Oral daily  methylPREDNISolone sodium succinate Injectable 55 milliGRAM(s) IV Push every 24 hours  ondansetron  IVPB 16 milliGRAM(s) IV Intermittent every 24 hours  phytonadione   Solution 5 milliGRAM(s) Oral <User Schedule>      PRN:   albuterol    90 MICROgram(s) HFA Inhaler 2 Puff(s) Inhalation once PRN  ALPRAZolam 0.25 milliGRAM(s) Oral once PRN  diphenhydrAMINE Injectable 50 milliGRAM(s) IV Push once PRN  EPINEPHrine     1 mG/mL Injectable 0.3 milliGRAM(s) IntraMuscular once PRN  hydrocortisone sodium succinate Injectable 100 milliGRAM(s) IV Push once PRN  LORazepam     Tablet 0.5 milliGRAM(s) Oral every 6 hours PRN  oxyCODONE    IR 10 milliGRAM(s) Oral every 4 hours PRN  polyethylene glycol 3350 17 Gram(s) Oral daily PRN  prochlorperazine   Injectable 10 milliGRAM(s) IV Push every 6 hours PRN  sodium chloride 0.9% lock flush 10 milliLiter(s) IV Push every 1 hour PRN        A/P: 64 year old female with hx of myelofibrosis presents for a MUD allogeneic SCT (DP Permissive) conditioning with Flu/Bu/Thiotepa/rATG.  Today is day -2  - Thiotepa 1/2. Maintain skin precautions during thiotepa administration and for 48 hours after completion (days .6 through day -3). Chronic pain issues - continue home fentanyl patch. Febrile overnight, tmax 100.9. Cultures  pending. RVP negative. CXR pending official read, no obvious consolidations. Vital signs are stable.    D/C HCTZ. Fludarabine 1/3, Busulfan 1/3, rATG 1/3. No Tylenol on busulfan days. Continue keppra ppx for 24 hours post infusion of last dose of busulfan. No acute events overnight, vital signs are stable.   - overnight patient was febrile during ATG, no cultures/abx as fever likely secondary to ATG. Patient was otherwise stable overnight. Fludarabine 2/3, Busulfan 2/3 and rATG 2/3. NO Tylenol or Azoles until Day 0. Continue with supportive care.  - Fludarabine 3/3, busulfan 3/3, rATG 3/3. No acute events overnight. Vital signs are stable.     1. Infectious Disease:   acyclovir Oral Tab/Cap 800 milliGRAM(s) Oral every 12 hours  Levaquin 500mg oral Daily  Vancomycin 125mg every 12 hours oral    2. VOD Prophylaxis:   ursodiol Capsule 300 milliGRAM(s) Oral two times a day    3. GI Prophylaxis:    pantoprazole Tablet 40 milliGRAM(s) Oral before breakfast    4. Mouthcare - NS / NaHCO3 rinses, Biotene; Skin care     5. GVHD prophylaxis   ABC: PTCy days +3, +4, Bortezomib days 0, +3, PTCy days +5, +14, +28     6. Transfuse & replete electrolytes prn     7. IV hydration, daily weights, strict I&O, prn diuresis     8. PO intake as tolerated, nutrition follow up as needed, MVI, folic acid     9. Antiemetics, anti-diarrhea medications:   prochlorperazine   Injectable 10 milliGRAM(s) IV Push every 6 hours PRN  ondansetron  IVPB 16 milliGRAM(s) IV Intermittent every 24 hours  dexAMETHasone  IVPB 10 milliGRAM(s) IV Intermittent every 24 hours    10. OOB as tolerated, physical therapy consult if needed     11. Monitor coags 2x week, vitamin K BIW  phytonadione Solution 5 milliGRAM(s) Oral <User Schedule>    12. Monitor closely for clinical changes, monitor for fevers     13. Emotional support provided, plan of care discussed and questions addressed     14. Patient education done regarding chemotherapy prep, plan of care, restrictions and discharge planning     15. Continue regular social work input     I have written the above note for Dr. Pope who performed service with me in the room.   Yesenia Dejesus PA-C ([phone])    I have seen and examined patient with PA, I agree with above note as scribed.                        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 prep regimen for treatment of myelofibrosis    Disease: myelofibrosis   Prep regimen: Flu / Bu / Thiotepa / rATG   GVHD ppx: ABC: PTCy days +3, +4, Bortezomib days 0, +3, PTCy days +5, +14, +28   ABO/CMV:   Recipient:   A+/CMV+   Donor:   B+/CMV+      S: Patient seen and examined with HPC Transplant Team:   Chronic pain somewhat improved  +intermittent nausea   +fatigue      O: Vitals:   Vital Signs Last 24 Hrs  T(C): 36.9 (2025 05:50), Max: 36.9 (2025 05:50)  T(F): 98.4 (2025 05:50), Max: 98.4 (2025 05:50)  HR: 72 (2025 05:50) (64 - 75)  BP: 125/67 (2025 05:50) (111/52 - 129/52)  BP(mean): --  RR: 18 (2025 05:50) (17 - 18)  SpO2: 96% (2025 05:50) (95% - 98%)    Parameters below as of 2025 05:50  Patient On (Oxygen Delivery Method): room air        Admit weight: 64.5kg  Daily Weight in k.5 (2025 08:27)    Intake / Output:    @ 07:01  -   @ 07:00  --------------------------------------------------------  IN: 1296 mL / OUT: 2620 mL / NET: -1324 mL        PE:   Oropharynx: no erythema or ulcerations   Oral Mucositis:              -                                        Grade: n/a   CVS: S1, S2 RRR   Lungs: CTA throughout bilaterally   Abdomen: + BS x 4, soft, NT, ND   Extremities: no edema   Gastric Mucositis:                 -                                Grade: n/a   Intestinal Mucositis:                   -                           Grade: n/a   Skin: no rash   TLC: CDI   Neuro: A&Ox 3        Labs:                         6.8    0.03  )-----------( 37       ( 2025 06:42 )             22.3       134[L]  |  103  |  25[H]  ----------------------------<  119[H]  3.7   |  17[L]  |  0.68    Ca    8.2[L]      2025 06:40  Phos  3.3       Mg     2.2         TPro  5.2[L]  /  Alb  2.6[L]  /  TBili  0.3  /  DBili  0.1  /  AST  23  /  ALT  13  /  AlkPhos  94        Cultures:   Respiratory Viral Panel with COVID-19 by [facility] (25 @ 21:02)   Rapid RVP Result: [id number]: NotDetec:    Culture - Blood (25 @ 18:45)   Specimen Source: Blood Blood-Catheter    Culture Results: No growth at 24 hoursCulture - Blood (25 @ 18:40)   Specimen Source: Blood Blood-Peripheral  Culture Results: No growth at 24 hours    Culture - Urine (25 @ 21:01)   Specimen Source: Clean Catch Clean Catch (Midstream)  Culture Results: 10,000 - 49,000 CFU/mL Gram Positive Cocci in Pairs and Chains   <10,000 CFU/ml Normal Urogenital cha present    Radiology:   < from: Xray Chest 1 View- PORTABLE-Urgent (Xray Chest 1 View- PORTABLE-Urgent .) (25 @ 19:28) >  IMPRESSION: Clear lungs.        Meds:   Antimicrobials:   acyclovir   Oral Tab/Cap 800 milliGRAM(s) Oral every 12 hours  levoFLOXacin  Tablet 500 milliGRAM(s) Oral every 24 hours  vancomycin    Solution 125 milliGRAM(s) Oral every 12 hours      Heme / Onc:   busulfan IVPB (eMAR) 175 milliGRAM(s) IV Intermittent daily  fludarabine IVPB (eMAR) 80 milliGRAM(s) IV Intermittent every 24 hours      GI:  pantoprazole    Tablet 40 milliGRAM(s) Oral before breakfast  polyethylene glycol 3350 17 Gram(s) Oral daily PRN  sodium bicarbonate Mouth Rinse 10 milliLiter(s) Swish and Spit five times a day  ursodiol Capsule 300 milliGRAM(s) Oral two times a day      Cardiovascular:   EPINEPHrine     1 mG/mL Injectable 0.3 milliGRAM(s) IntraMuscular once PRN  lisinopril 10 milliGRAM(s) Oral every 12 hours  metoprolol succinate ER 12.5 milliGRAM(s) Oral daily      Immunologic:       Other medications:   Biotene Dry Mouth Oral Rinse 5 milliLiter(s) Swish and Spit five times a day  buPROPion XL (24-Hour) . 300 milliGRAM(s) Oral daily  chlorhexidine 0.12% Liquid 15 milliLiter(s) Swish and Spit two times a day  chlorhexidine 4% Liquid 1 Application(s) Topical <User Schedule>  dexAMETHasone  IVPB 10 milliGRAM(s) IV Intermittent every 24 hours  diphenhydrAMINE 25 milliGRAM(s) Oral once  diphenhydrAMINE Injectable 25 milliGRAM(s) IV Push every 24 hours  fentaNYL   Patch  50 MICROgram(s)/Hr. 1 Patch Transdermal every 48 hours  levETIRAcetam 500 milliGRAM(s) Oral every 12 hours  levothyroxine 88 MICROGram(s) Oral daily  methylPREDNISolone sodium succinate Injectable 55 milliGRAM(s) IV Push every 24 hours  ondansetron  IVPB 16 milliGRAM(s) IV Intermittent every 24 hours  phytonadione   Solution 5 milliGRAM(s) Oral <User Schedule>      PRN:   albuterol    90 MICROgram(s) HFA Inhaler 2 Puff(s) Inhalation once PRN  ALPRAZolam 0.25 milliGRAM(s) Oral once PRN  diphenhydrAMINE Injectable 50 milliGRAM(s) IV Push once PRN  EPINEPHrine     1 mG/mL Injectable 0.3 milliGRAM(s) IntraMuscular once PRN  hydrocortisone sodium succinate Injectable 100 milliGRAM(s) IV Push once PRN  LORazepam     Tablet 0.5 milliGRAM(s) Oral every 6 hours PRN  oxyCODONE    IR 10 milliGRAM(s) Oral every 4 hours PRN  polyethylene glycol 3350 17 Gram(s) Oral daily PRN  prochlorperazine   Injectable 10 milliGRAM(s) IV Push every 6 hours PRN  sodium chloride 0.9% lock flush 10 milliLiter(s) IV Push every 1 hour PRN        A/P: 64 year old female with hx of myelofibrosis presents for a MUD allogeneic SCT (DP Permissive) conditioning with Flu/Bu/Thiotepa/rATG.  Today is day -2  - Thiotepa 1/. Maintain skin precautions during thiotepa administration and for 48 hours after completion (days .6 through day -3). Chronic pain issues - continue home fentanyl patch. Febrile overnight, tmax 100.9. Cultures  pending. RVP negative. CXR pending official read, no obvious consolidations. Vital signs are stable.    D/C HCTZ. Fludarabine 13, Busulfan 13, rATG 1/3. No Tylenol on busulfan days. Continue keppra ppx for 24 hours post infusion of last dose of busulfan. No acute events overnight, vital signs are stable.   - overnight patient was febrile during ATG, no cultures/abx as fever likely secondary to ATG. Patient was otherwise stable overnight. Fludarabine 2/3, Busulfan 2/3 and rATG 2/3. NO Tylenol or Azoles until Day 0. Continue with supportive care.  - Fludarabine 3/3, busulfan 3/3, rATG 3/3. No acute events overnight. Vital signs are stable.     1. Infectious Disease:   acyclovir Oral Tab/Cap 800 milliGRAM(s) Oral every 12 hours  Levaquin 500mg oral Daily  Vancomycin 125mg every 12 hours oral    2. VOD Prophylaxis:   ursodiol Capsule 300 milliGRAM(s) Oral two times a day    3. GI Prophylaxis:    pantoprazole Tablet 40 milliGRAM(s) Oral before breakfast    4. Mouthcare - NS / NaHCO3 rinses, Biotene; Skin care     5. GVHD prophylaxis   ABC: PTCy days +3, +4, Bortezomib days 0, +3, PTCy days +5, +14, +28     6. Transfuse & replete electrolytes prn     7. IV hydration, daily weights, strict I&O, prn diuresis     8. PO intake as tolerated, nutrition follow up as needed, MVI, folic acid     9. Antiemetics, anti-diarrhea medications:   prochlorperazine   Injectable 10 milliGRAM(s) IV Push every 6 hours PRN  ondansetron  IVPB 16 milliGRAM(s) IV Intermittent every 24 hours  dexAMETHasone  IVPB 10 milliGRAM(s) IV Intermittent every 24 hours    10. OOB as tolerated, physical therapy consult if needed     11. Monitor coags 2x week, vitamin K BIW  phytonadione Solution 5 milliGRAM(s) Oral <User Schedule>    12. Monitor closely for clinical changes, monitor for fevers     13. Emotional support provided, plan of care discussed and questions addressed     14. Patient education done regarding chemotherapy prep, plan of care, restrictions and discharge planning     15. Continue regular social work input     I have written the above note for Dr. Pope who performed service with me in the room.   Yesenia Dejesus PA-C ([phone])    I have seen and examined patient with PA, I agree with above note as scribed.

## 2025-06-25 NOTE — PROGRESS NOTE ADULT - NS PANP COMMENT GEN_ALL_CORE FT
I rounded with the team including nurses, ACPs and PharmD.  I reviewed the data in depth.   I saw and examined the patient myself.   I reviewed and confirmed the above note.  The patient is day -3 prior to allogeneic HSCT.  The patient is doing well. She had fever last night following r-ATG. She has diarrhea this morning.   The vitals are stable. The oral cavity is clear. The line site is clean. The lungs are clear. There is no LE edema.  Overall, there are no specific issues. The patient is on appropriate therapy at this stage.   I answered the patient’s questions.  I encouraged po intake and ambulation.  YEIMI Godoy MD I rounded with the team including nurses, ACPs and PharmD.  I reviewed the data in depth.   I saw and examined the patient myself.   I reviewed and confirmed the above note.  The patient is day - 2 prior to allogeneic HSCT.  The patient is doing well. She had fever last night following r-ATG. She has diarrhea this morning.   The vitals are stable. The oral cavity is clear. The line site is clean. The lungs are clear. There is no LE edema.  Overall, there are no specific issues. The patient is on appropriate therapy at this stage.   I answered the patient’s questions.  I encouraged po intake and ambulation.  YEIMI Godoy MD I rounded with the team including nurses, ACPs and PharmD.  I reviewed the data in depth.   I saw and examined the patient myself.   I reviewed and confirmed the above note.  The patient is day - 2 prior to allogeneic HSCT.  The patient is doing well. Her pain is better controlled. She has nausea.   The vitals are stable. The oral cavity is clear. The line site is clean. The lungs are clear. There is no LE edema.  Overall, there are no specific issues. The patient is on appropriate therapy at this stage. We will taper off her analgesics.   I answered the patient’s questions.  I encouraged po intake and ambulation.  YEIMI Godoy MD

## 2025-06-26 ENCOUNTER — APPOINTMENT (OUTPATIENT)
Dept: CARDIOLOGY | Facility: CLINIC | Age: 65
End: 2025-06-26

## 2025-06-26 LAB
ALBUMIN SERPL ELPH-MCNC: 2.4 G/DL — LOW (ref 3.3–5)
ALP SERPL-CCNC: 84 U/L — SIGNIFICANT CHANGE UP (ref 40–120)
ALT FLD-CCNC: 12 U/L — SIGNIFICANT CHANGE UP (ref 10–45)
ANION GAP SERPL CALC-SCNC: 15 MMOL/L — SIGNIFICANT CHANGE UP (ref 5–17)
APTT BLD: 27.9 SEC — SIGNIFICANT CHANGE UP (ref 26.1–36.8)
AST SERPL-CCNC: 18 U/L — SIGNIFICANT CHANGE UP (ref 10–40)
BASOPHILS # BLD AUTO: SIGNIFICANT CHANGE UP (ref 0–0.2)
BASOPHILS NFR BLD AUTO: SIGNIFICANT CHANGE UP (ref 0–2)
BILIRUB SERPL-MCNC: 0.3 MG/DL — SIGNIFICANT CHANGE UP (ref 0.2–1.2)
BUN SERPL-MCNC: 19 MG/DL — SIGNIFICANT CHANGE UP (ref 7–23)
CALCIUM SERPL-MCNC: 8.2 MG/DL — LOW (ref 8.4–10.5)
CHLORIDE SERPL-SCNC: 107 MMOL/L — SIGNIFICANT CHANGE UP (ref 96–108)
CO2 SERPL-SCNC: 15 MMOL/L — LOW (ref 22–31)
CREAT SERPL-MCNC: 0.62 MG/DL — SIGNIFICANT CHANGE UP (ref 0.5–1.3)
CULTURE RESULTS: SIGNIFICANT CHANGE UP
EGFR: 100 ML/MIN/1.73M2 — SIGNIFICANT CHANGE UP
EGFR: 100 ML/MIN/1.73M2 — SIGNIFICANT CHANGE UP
EOSINOPHIL # BLD AUTO: SIGNIFICANT CHANGE UP (ref 0–0.5)
EOSINOPHIL NFR BLD AUTO: SIGNIFICANT CHANGE UP (ref 0–6)
GLUCOSE SERPL-MCNC: 131 MG/DL — HIGH (ref 70–99)
HCT VFR BLD CALC: 25.6 % — LOW (ref 34.5–45)
HGB BLD-MCNC: 8 G/DL — LOW (ref 11.5–15.5)
IMM GRANULOCYTES # BLD AUTO: SIGNIFICANT CHANGE UP (ref 0–0.07)
IMM GRANULOCYTES NFR BLD AUTO: SIGNIFICANT CHANGE UP (ref 0–0.9)
IMMATURE PLATELET FRACTION #: 2.9 K/UL — LOW (ref 4.7–11.1)
IMMATURE PLATELET FRACTION %: 11.2 % — HIGH (ref 1.6–4.9)
INR BLD: 1.29 RATIO — HIGH (ref 0.85–1.16)
LDH SERPL L TO P-CCNC: 401 U/L — HIGH (ref 50–242)
LYMPHOCYTES # BLD AUTO: SIGNIFICANT CHANGE UP (ref 1–3.3)
LYMPHOCYTES NFR BLD AUTO: SIGNIFICANT CHANGE UP (ref 13–44)
MAGNESIUM SERPL-MCNC: 1.8 MG/DL — SIGNIFICANT CHANGE UP (ref 1.6–2.6)
MCHC RBC-ENTMCNC: 28.7 PG — SIGNIFICANT CHANGE UP (ref 27–34)
MCHC RBC-ENTMCNC: 31.3 G/DL — LOW (ref 32–36)
MCV RBC AUTO: 91.8 FL — SIGNIFICANT CHANGE UP (ref 80–100)
MONOCYTES # BLD AUTO: SIGNIFICANT CHANGE UP (ref 0–0.9)
MONOCYTES NFR BLD AUTO: SIGNIFICANT CHANGE UP (ref 2–14)
NEUTROPHILS # BLD AUTO: SIGNIFICANT CHANGE UP (ref 1.8–7.4)
NEUTROPHILS NFR BLD AUTO: SIGNIFICANT CHANGE UP (ref 43–77)
NRBC # BLD AUTO: 0 K/UL — SIGNIFICANT CHANGE UP (ref 0–0)
NRBC # FLD: 0 K/UL — SIGNIFICANT CHANGE UP (ref 0–0)
NRBC BLD AUTO-RTO: 0 /100 WBCS — SIGNIFICANT CHANGE UP (ref 0–0)
PHOSPHATE SERPL-MCNC: 2.5 MG/DL — SIGNIFICANT CHANGE UP (ref 2.5–4.5)
PLATELET # BLD AUTO: 26 K/UL — LOW (ref 150–400)
PMV BLD: SIGNIFICANT CHANGE UP FL (ref 7–13)
POTASSIUM SERPL-MCNC: 3.3 MMOL/L — LOW (ref 3.5–5.3)
POTASSIUM SERPL-SCNC: 3.3 MMOL/L — LOW (ref 3.5–5.3)
PROT SERPL-MCNC: 5 G/DL — LOW (ref 6–8.3)
PROTHROM AB SERPL-ACNC: 14.8 SEC — HIGH (ref 9.9–13.4)
RBC # BLD: 2.79 M/UL — LOW (ref 3.8–5.2)
RBC # FLD: 18.5 % — HIGH (ref 10.3–14.5)
SODIUM SERPL-SCNC: 137 MMOL/L — SIGNIFICANT CHANGE UP (ref 135–145)
SPECIMEN SOURCE: SIGNIFICANT CHANGE UP
WBC # BLD: 0.04 K/UL — CRITICAL LOW (ref 3.8–10.5)
WBC # FLD AUTO: 0.04 K/UL — CRITICAL LOW (ref 3.8–10.5)

## 2025-06-26 PROCEDURE — 99233 SBSQ HOSP IP/OBS HIGH 50: CPT

## 2025-06-26 RX ORDER — ACETAMINOPHEN 500 MG/5ML
650 LIQUID (ML) ORAL EVERY 6 HOURS
Refills: 0 | Status: DISCONTINUED | OUTPATIENT
Start: 2025-06-27 | End: 2025-07-15

## 2025-06-26 RX ORDER — ONDANSETRON HCL/PF 4 MG/2 ML
8 VIAL (ML) INJECTION EVERY 8 HOURS
Refills: 0 | Status: DISCONTINUED | OUTPATIENT
Start: 2025-06-26 | End: 2025-07-15

## 2025-06-26 RX ORDER — VORICONAZOLE 200 MG/1
200 TABLET, FILM COATED ORAL EVERY 12 HOURS
Refills: 0 | Status: DISCONTINUED | OUTPATIENT
Start: 2025-06-26 | End: 2025-06-30

## 2025-06-26 RX ORDER — PROCHLORPERAZINE 25 MG
10 SUPPOSITORY, RECTAL RECTAL EVERY 6 HOURS
Refills: 0 | Status: DISCONTINUED | OUTPATIENT
Start: 2025-06-26 | End: 2025-07-15

## 2025-06-26 RX ADMIN — Medication 1 APPLICATION(S): at 06:06

## 2025-06-26 RX ADMIN — Medication 10 MILLILITER(S): at 15:21

## 2025-06-26 RX ADMIN — Medication 800 MILLIGRAM(S): at 17:43

## 2025-06-26 RX ADMIN — Medication 10 MILLIGRAM(S): at 23:27

## 2025-06-26 RX ADMIN — Medication 15 MILLILITER(S): at 06:20

## 2025-06-26 RX ADMIN — Medication 800 MILLIGRAM(S): at 05:59

## 2025-06-26 RX ADMIN — Medication 5 MILLIGRAM(S): at 08:29

## 2025-06-26 RX ADMIN — Medication 10 MILLILITER(S): at 11:46

## 2025-06-26 RX ADMIN — LEVETIRACETAM 500 MILLIGRAM(S): 10 INJECTION, SOLUTION INTRAVENOUS at 06:00

## 2025-06-26 RX ADMIN — Medication 10 MILLILITER(S): at 19:58

## 2025-06-26 RX ADMIN — Medication 88 MICROGRAM(S): at 05:59

## 2025-06-26 RX ADMIN — Medication 10 MILLIGRAM(S): at 17:48

## 2025-06-26 RX ADMIN — METOPROLOL SUCCINATE 12.5 MILLIGRAM(S): 50 TABLET, EXTENDED RELEASE ORAL at 06:00

## 2025-06-26 RX ADMIN — Medication 8 MILLIGRAM(S): at 09:23

## 2025-06-26 RX ADMIN — Medication 5 MILLILITER(S): at 15:21

## 2025-06-26 RX ADMIN — Medication 10 MILLIGRAM(S): at 06:00

## 2025-06-26 RX ADMIN — Medication 50 MILLIEQUIVALENT(S): at 08:28

## 2025-06-26 RX ADMIN — Medication 0.5 MILLIGRAM(S): at 13:39

## 2025-06-26 RX ADMIN — Medication 125 MILLIGRAM(S): at 06:00

## 2025-06-26 RX ADMIN — Medication 5 MILLILITER(S): at 11:47

## 2025-06-26 RX ADMIN — LISINOPRIL 10 MILLIGRAM(S): 5 TABLET ORAL at 17:44

## 2025-06-26 RX ADMIN — Medication 10 MILLIGRAM(S): at 11:46

## 2025-06-26 RX ADMIN — Medication 125 MILLIGRAM(S): at 17:44

## 2025-06-26 RX ADMIN — VORICONAZOLE 200 MILLIGRAM(S): 200 TABLET, FILM COATED ORAL at 18:46

## 2025-06-26 RX ADMIN — Medication 10 MILLIGRAM(S): at 11:40

## 2025-06-26 RX ADMIN — Medication 0.5 MILLIGRAM(S): at 21:31

## 2025-06-26 RX ADMIN — Medication 10 MILLILITER(S): at 08:29

## 2025-06-26 RX ADMIN — Medication 5 MILLILITER(S): at 08:29

## 2025-06-26 RX ADMIN — URSODIOL 300 MILLIGRAM(S): 300 CAPSULE ORAL at 05:59

## 2025-06-26 RX ADMIN — URSODIOL 300 MILLIGRAM(S): 300 CAPSULE ORAL at 17:43

## 2025-06-26 RX ADMIN — Medication 8 MILLIGRAM(S): at 16:55

## 2025-06-26 RX ADMIN — Medication 40 MILLIGRAM(S): at 06:00

## 2025-06-26 RX ADMIN — LEVETIRACETAM 500 MILLIGRAM(S): 10 INJECTION, SOLUTION INTRAVENOUS at 17:43

## 2025-06-26 RX ADMIN — BUPROPION HYDROBROMIDE 300 MILLIGRAM(S): 522 TABLET, EXTENDED RELEASE ORAL at 11:43

## 2025-06-26 RX ADMIN — Medication 15 MILLILITER(S): at 18:01

## 2025-06-26 RX ADMIN — Medication 5 MILLILITER(S): at 19:58

## 2025-06-26 RX ADMIN — Medication 40 MILLIEQUIVALENT(S): at 13:29

## 2025-06-26 RX ADMIN — Medication 0.5 MILLIGRAM(S): at 01:51

## 2025-06-26 RX ADMIN — LISINOPRIL 10 MILLIGRAM(S): 5 TABLET ORAL at 05:59

## 2025-06-26 NOTE — PROGRESS NOTE ADULT - SUBJECTIVE AND OBJECTIVE BOX
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 prep regimen for treatment of myelofibrosis    Disease: myelofibrosis   Prep regimen: Flu / Bu / Thiotepa / rATG   GVHD ppx: ABC: PTCy days +3, +4, Bortezomib days 0, +3, PTCy days +5, +14, +28   ABO/CMV:   Recipient:   A+/CMV+   Donor:   B+/CMV+     S: Patient seen and examined with HPC Transplant Team:        Vital Signs Last 24 Hrs  T(C): 36.9 (2025 05:09), Max: 36.9 (2025 05:09)  T(F): 98.5 (2025 05:09), Max: 98.5 (2025 05:09)  HR: 66 (2025 05:09) (65 - 78)  BP: 138/70 (2025 05:09) (118/64 - 138/70)  BP(mean): --  RR: 18 (2025 05:09) (18 - 18)  SpO2: 97% (2025 05:09) (96% - 98%)    Parameters below as of 2025 05:09  Patient On (Oxygen Delivery Method): room air      Admit weight: 64.5kg  Daily Weight in k.9 (2025 07:29)    Intake / Output:   06-25 @ 07:01  -  06-26 @ 07:00  --------------------------------------------------------  IN: 2295 mL / OUT: 1800 mL / NET: 495 mL    PE:   Oropharynx: no erythema or ulcerations   Oral Mucositis:              -                                        Grade: n/a   CVS: S1, S2 RRR   Lungs: CTA throughout bilaterally   Abdomen: + BS x 4, soft, NT, ND   Extremities: no edema   Gastric Mucositis:                 -                                Grade: n/a   Intestinal Mucositis:                   -                           Grade: n/a   Skin: no rash   TLC: CDI   Neuro: A&Ox 3      Labs:   CBC Full  -  ( 2025 06:30 )  WBC Count : 0.04 K/uL  Hemoglobin : 8.0 g/dL  Hematocrit : 25.6 %  Platelet Count - Automated : 26 K/uL  Mean Cell Volume : 91.8 fl  Mean Cell Hemoglobin : 28.7 pg  Mean Cell Hemoglobin Concentration : 31.3 g/dL  Auto Neutrophil # : Not measured  Auto Lymphocyte # : Not measured  Auto Monocyte # : Not measured  Auto Eosinophil # : Not measured  Auto Basophil # : Not measured  Auto Neutrophil % : Not measured Differential percentages must be correlated with absolute numbers for  clinical significance.  Auto Lymphocyte % : Not measured  Auto Monocyte % : Not measured  Auto Eosinophil % : Not measured  Auto Basophil % : Not measured                          8.0    0.04  )-----------(        ( 2025 06:30 )             25.6         137  |  107  |  19  ----------------------------<  131[H]  3.3[L]   |  15[L]  |  0.62    Ca    8.2[L]      2025 06:26  Phos  2.5       Mg     1.8         TPro  5.0[L]  /  Alb  2.4[L]  /  TBili  0.3  /  DBili  x   /  AST  18  /  ALT  12  /  AlkPhos  84      PT/INR - ( 2025 06:26 )   PT: 14.8 sec;   INR: 1.29 ratio         PTT - ( 2025 06:26 )  PTT:27.9 sec  LIVER FUNCTIONS - ( 2025 06:26 )  Alb: 2.4 g/dL / Pro: 5.0 g/dL / ALK PHOS: 84 U/L / ALT: 12 U/L / AST: 18 U/L / GGT: x           Lactate Dehydrogenase, Serum: 401 U/L ( @ 06:26)      Cultures:   Respiratory Viral Panel with COVID-19 by [facility] (25 @ 21:02)   Rapid RVP Result: [id number]: NotDetec:    Culture - Blood (25 @ 18:45)   Specimen Source: Blood Blood-Catheter    Culture Results: No growth at 24 hoursCulture - Blood (25 @ 18:40)   Specimen Source: Blood Blood-Peripheral  Culture Results: No growth at 24 hours    Culture - Urine (25 @ 21:01)   Specimen Source: Clean Catch Clean Catch (Midstream)  Culture Results: 10,000 - 49,000 CFU/mL Gram Positive Cocci in Pairs and Chains   <10,000 CFU/ml Normal Urogenital cha present    Radiology:   < from: Xray Chest 1 View- PORTABLE-Urgent (Xray Chest 1 View- PORTABLE-Urgent .) (25 @ 19:28) >  IMPRESSION: Clear lungs.        Meds:   Antimicrobials:   acyclovir   Oral Tab/Cap 800 milliGRAM(s) Oral every 12 hours  levoFLOXacin  Tablet 500 milliGRAM(s) Oral every 24 hours  vancomycin    Solution 125 milliGRAM(s) Oral every 12 hours      Heme / Onc:       GI:  pantoprazole    Tablet 40 milliGRAM(s) Oral before breakfast  polyethylene glycol 3350 17 Gram(s) Oral daily PRN  sodium bicarbonate Mouth Rinse 10 milliLiter(s) Swish and Spit five times a day  ursodiol Capsule 300 milliGRAM(s) Oral two times a day      Cardiovascular:   EPINEPHrine     1 mG/mL Injectable 0.3 milliGRAM(s) IntraMuscular once PRN  lisinopril 10 milliGRAM(s) Oral every 12 hours  metoprolol succinate ER 12.5 milliGRAM(s) Oral daily      Immunologic:       Other medications:   Biotene Dry Mouth Oral Rinse 5 milliLiter(s) Swish and Spit five times a day  buPROPion XL (24-Hour) . 300 milliGRAM(s) Oral daily  chlorhexidine 0.12% Liquid 15 milliLiter(s) Swish and Spit two times a day  chlorhexidine 4% Liquid 1 Application(s) Topical <User Schedule>  diphenhydrAMINE 25 milliGRAM(s) Oral once  fentaNYL   Patch  12 MICROgram(s)/Hr. 1 Patch Transdermal every 48 hours  levETIRAcetam 500 milliGRAM(s) Oral every 12 hours  levothyroxine 88 MICROGram(s) Oral daily  phytonadione   Solution 5 milliGRAM(s) Oral <User Schedule>  potassium chloride    Tablet ER 40 milliEquivalent(s) Oral once  potassium chloride  20 mEq/100 mL IVPB 20 milliEquivalent(s) IV Intermittent every 2 hours      PRN:   albuterol    90 MICROgram(s) HFA Inhaler 2 Puff(s) Inhalation once PRN  ALPRAZolam 0.25 milliGRAM(s) Oral once PRN  diphenhydrAMINE Injectable 50 milliGRAM(s) IV Push once PRN  EPINEPHrine     1 mG/mL Injectable 0.3 milliGRAM(s) IntraMuscular once PRN  hydrocortisone sodium succinate Injectable 100 milliGRAM(s) IV Push once PRN  LORazepam     Tablet 0.5 milliGRAM(s) Oral every 6 hours PRN  oxyCODONE    IR 10 milliGRAM(s) Oral every 4 hours PRN  polyethylene glycol 3350 17 Gram(s) Oral daily PRN  prochlorperazine   Injectable 10 milliGRAM(s) IV Push every 6 hours PRN  sodium chloride 0.9% lock flush 10 milliLiter(s) IV Push every 1 hour PRN      A/P: 64 year old female with hx of myelofibrosis presents for a MUD allogeneic SCT (DP Permissive) conditioning with Flu/Bu/Thiotepa/rATG.  Today is day -1  - Thiotepa /. Maintain skin precautions during thiotepa administration and for 48 hours after completion (days .6 through day -3). Chronic pain issues - continue home fentanyl patch. Febrile overnight, tmax 100.9. Cultures  pending. RVP negative. CXR pending official read, no obvious consolidations. Vital signs are stable.    D/C HCTZ. Fludarabine 13, Busulfan 1/3, rATG 1/3. No Tylenol on busulfan days. Continue keppra ppx for 24 hours post infusion of last dose of busulfan. No acute events overnight, vital signs are stable.   - overnight patient was febrile during ATG, no cultures/abx as fever likely secondary to ATG. Patient was otherwise stable overnight. Fludarabine 2/3, Busulfan 2/3 and rATG 2/3. NO Tylenol or Azoles until Day 0. Continue with supportive care.  - Fludarabine 3/3, busulfan 3/3, rATG 3/3. No acute events overnight. Vital signs are stable.     1. Infectious Disease:   acyclovir Oral Tab/Cap 800 milliGRAM(s) Oral every 12 hours  Levaquin 500mg oral Daily  Vancomycin 125mg every 12 hours oral    2. VOD Prophylaxis:   ursodiol Capsule 300 milliGRAM(s) Oral two times a day    3. GI Prophylaxis:    pantoprazole Tablet 40 milliGRAM(s) Oral before breakfast    4. Mouthcare - NS / NaHCO3 rinses, Biotene; Skin care     5. GVHD prophylaxis   ABC: PTCy days +3, +4, Bortezomib days 0, +3, PTCy days +5, +14, +28     6. Transfuse & replete electrolytes prn     7. IV hydration, daily weights, strict I&O, prn diuresis     8. PO intake as tolerated, nutrition follow up as needed, MVI, folic acid     9. Antiemetics, anti-diarrhea medications:   prochlorperazine   Injectable 10 milliGRAM(s) IV Push every 6 hours PRN  ondansetron  IVPB 16 milliGRAM(s) IV Intermittent every 24 hours  dexAMETHasone  IVPB 10 milliGRAM(s) IV Intermittent every 24 hours    10. OOB as tolerated, physical therapy consult if needed     11. Monitor coags 2x week, vitamin K BIW  phytonadione Solution 5 milliGRAM(s) Oral <User Schedule>    12. Monitor closely for clinical changes, monitor for fevers     13. Emotional support provided, plan of care discussed and questions addressed     14. Patient education done regarding chemotherapy prep, plan of care, restrictions and discharge planning     15. Continue regular social work input     I have written the above note for Dr. Pope who performed service with me in the room.   Jalen Kay PA-C ([phone])    I have seen and examined patient with PA, I agree with above note as scribed.                  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 prep regimen for treatment of myelofibrosis    Disease: myelofibrosis   Prep regimen: Flu / Bu / Thiotepa / rATG   GVHD ppx: ABC: PTCy days +3, +4, Bortezomib days 0, +3, PTCy days +5, +14, +28   ABO/CMV:   Recipient:   A+/CMV+   Donor:   B+/CMV+     S: Patient seen and examined with HPC Transplant Team:   +nausea/vomiting    Vital Signs Last 24 Hrs  T(C): 36.9 (2025 05:09), Max: 36.9 (2025 05:09)  T(F): 98.5 (2025 05:09), Max: 98.5 (2025 05:09)  HR: 66 (2025 05:09) (65 - 78)  BP: 138/70 (2025 05:09) (118/64 - 138/70)  BP(mean): --  RR: 18 (2025 05:09) (18 - 18)  SpO2: 97% (2025 05:09) (96% - 98%)    Parameters below as of 2025 05:09  Patient On (Oxygen Delivery Method): room air      Admit weight: 64.5kg  Daily Weight in k.9 (2025 07:29)    Intake / Output:   06-25 @ 07:01  -  06-26 @ 07:00  --------------------------------------------------------  IN: 2295 mL / OUT: 1800 mL / NET: 495 mL    PE:   Oropharynx: no erythema or ulcerations   Oral Mucositis:              -                                        Grade: n/a   CVS: S1, S2 RRR   Lungs: CTA throughout bilaterally   Abdomen: + BS x 4, soft, NT, ND   Extremities: no edema   Gastric Mucositis:                 -                                Grade: n/a   Intestinal Mucositis:                   -                           Grade: n/a   Skin: no rash   TLC: CDI   Neuro: A&O x 3      Labs:   CBC Full  -  ( 2025 06:30 )  WBC Count : 0.04 K/uL  Hemoglobin : 8.0 g/dL  Hematocrit : 25.6 %  Platelet Count - Automated : 26 K/uL  Mean Cell Volume : 91.8 fl  Mean Cell Hemoglobin : 28.7 pg  Mean Cell Hemoglobin Concentration : 31.3 g/dL  Auto Neutrophil # : Not measured  Auto Lymphocyte # : Not measured  Auto Monocyte # : Not measured  Auto Eosinophil # : Not measured  Auto Basophil # : Not measured  Auto Neutrophil % : Not measured Differential percentages must be correlated with absolute numbers for  clinical significance.  Auto Lymphocyte % : Not measured  Auto Monocyte % : Not measured  Auto Eosinophil % : Not measured  Auto Basophil % : Not measured                          8.0    0.04  )-----------(        ( 2025 06:30 )             25.6         137  |  107  |  19  ----------------------------<  131[H]  3.3[L]   |  15[L]  |  0.62    Ca    8.2[L]      2025 06:26  Phos  2.5       Mg     1.8         TPro  5.0[L]  /  Alb  2.4[L]  /  TBili  0.3  /  DBili  x   /  AST  18  /  ALT  12  /  AlkPhos  84      PT/INR - ( 2025 06:26 )   PT: 14.8 sec;   INR: 1.29 ratio         PTT - ( 2025 06:26 )  PTT:27.9 sec  LIVER FUNCTIONS - ( 2025 06:26 )  Alb: 2.4 g/dL / Pro: 5.0 g/dL / ALK PHOS: 84 U/L / ALT: 12 U/L / AST: 18 U/L / GGT: x           Lactate Dehydrogenase, Serum: 401 U/L ( @ 06:26)      Cultures:   Respiratory Viral Panel with COVID-19 by [facility] (25 @ 21:02)   Rapid RVP Result: [id number]: NotDetec:    Culture - Blood (25 @ 18:45)   Specimen Source: Blood Blood-Catheter    Culture Results: No growth at 24 hoursCulture - Blood (25 @ 18:40)   Specimen Source: Blood Blood-Peripheral  Culture Results: No growth at 24 hours    Culture - Urine (25 @ 21:01)   Specimen Source: Clean Catch Clean Catch (Midstream)  Culture Results: 10,000 - 49,000 CFU/mL Gram Positive Cocci in Pairs and Chains   <10,000 CFU/ml Normal Urogenital cha present    Radiology:   < from: Xray Chest 1 View- PORTABLE-Urgent (Xray Chest 1 View- PORTABLE-Urgent .) (25 @ 19:28) >  IMPRESSION: Clear lungs.        Meds:   Antimicrobials:   acyclovir   Oral Tab/Cap 800 milliGRAM(s) Oral every 12 hours  levoFLOXacin  Tablet 500 milliGRAM(s) Oral every 24 hours  vancomycin    Solution 125 milliGRAM(s) Oral every 12 hours      Heme / Onc:       GI:  pantoprazole    Tablet 40 milliGRAM(s) Oral before breakfast  polyethylene glycol 3350 17 Gram(s) Oral daily PRN  sodium bicarbonate Mouth Rinse 10 milliLiter(s) Swish and Spit five times a day  ursodiol Capsule 300 milliGRAM(s) Oral two times a day      Cardiovascular:   EPINEPHrine     1 mG/mL Injectable 0.3 milliGRAM(s) IntraMuscular once PRN  lisinopril 10 milliGRAM(s) Oral every 12 hours  metoprolol succinate ER 12.5 milliGRAM(s) Oral daily      Immunologic:       Other medications:   Biotene Dry Mouth Oral Rinse 5 milliLiter(s) Swish and Spit five times a day  buPROPion XL (24-Hour) . 300 milliGRAM(s) Oral daily  chlorhexidine 0.12% Liquid 15 milliLiter(s) Swish and Spit two times a day  chlorhexidine 4% Liquid 1 Application(s) Topical <User Schedule>  diphenhydrAMINE 25 milliGRAM(s) Oral once  fentaNYL   Patch  12 MICROgram(s)/Hr. 1 Patch Transdermal every 48 hours  levETIRAcetam 500 milliGRAM(s) Oral every 12 hours  levothyroxine 88 MICROGram(s) Oral daily  phytonadione   Solution 5 milliGRAM(s) Oral <User Schedule>  potassium chloride    Tablet ER 40 milliEquivalent(s) Oral once  potassium chloride  20 mEq/100 mL IVPB 20 milliEquivalent(s) IV Intermittent every 2 hours      PRN:   albuterol    90 MICROgram(s) HFA Inhaler 2 Puff(s) Inhalation once PRN  ALPRAZolam 0.25 milliGRAM(s) Oral once PRN  diphenhydrAMINE Injectable 50 milliGRAM(s) IV Push once PRN  EPINEPHrine     1 mG/mL Injectable 0.3 milliGRAM(s) IntraMuscular once PRN  hydrocortisone sodium succinate Injectable 100 milliGRAM(s) IV Push once PRN  LORazepam     Tablet 0.5 milliGRAM(s) Oral every 6 hours PRN  oxyCODONE    IR 10 milliGRAM(s) Oral every 4 hours PRN  polyethylene glycol 3350 17 Gram(s) Oral daily PRN  prochlorperazine   Injectable 10 milliGRAM(s) IV Push every 6 hours PRN  sodium chloride 0.9% lock flush 10 milliLiter(s) IV Push every 1 hour PRN      A/P: 64 year old female with hx of myelofibrosis presents for a MUD allogeneic SCT (DP Permissive) conditioning with Flu/Bu/Thiotepa/rATG.  Today is day -1  - Thiotepa /. Maintain skin precautions during thiotepa administration and for 48 hours after completion (days .6 through day -3). Chronic pain issues - continue home fentanyl patch. Febrile overnight, tmax 100.9. Cultures  pending. RVP negative. CXR pending official read, no obvious consolidations. Vital signs are stable.    D/C HCTZ. Fludarabine 13, Busulfan 1/3, rATG 1/3. No Tylenol on busulfan days. Continue keppra ppx for 24 hours post infusion of last dose of busulfan. No acute events overnight, vital signs are stable.   - overnight patient was febrile during ATG, no cultures/abx as fever likely secondary to ATG. Patient was otherwise stable overnight. Fludarabine 2/3, Busulfan 2/3 and rATG 2/3. NO Tylenol or Azoles until Day 0. Continue with supportive care.  - Fludarabine 3/3, busulfan 3/3, rATG 3/3. No acute events overnight. Vital signs are stable.     1. Infectious Disease:   acyclovir Oral Tab/Cap 800 milliGRAM(s) Oral every 12 hours  Levaquin 500mg oral Daily  Vancomycin 125mg every 12 hours oral    2. VOD Prophylaxis:   ursodiol Capsule 300 milliGRAM(s) Oral two times a day    3. GI Prophylaxis:    pantoprazole Tablet 40 milliGRAM(s) Oral before breakfast    4. Mouthcare - NS / NaHCO3 rinses, Biotene; Skin care     5. GVHD prophylaxis   ABC: PTCy days +3, +4, Bortezomib days 0, +3, PTCy days +5, +14, +28     6. Transfuse & replete electrolytes prn     7. IV hydration, daily weights, strict I&O, prn diuresis     8. PO intake as tolerated, nutrition follow up as needed, MVI, folic acid     9. Antiemetics, anti-diarrhea medications:   prochlorperazine   Injectable 10 milliGRAM(s) IV Push every 6 hours PRN  ondansetron  IVPB 16 milliGRAM(s) IV Intermittent every 24 hours  dexAMETHasone  IVPB 10 milliGRAM(s) IV Intermittent every 24 hours    10. OOB as tolerated, physical therapy consult if needed     11. Monitor coags 2x week, vitamin K BIW  phytonadione Solution 5 milliGRAM(s) Oral <User Schedule>    12. Monitor closely for clinical changes, monitor for fevers     13. Emotional support provided, plan of care discussed and questions addressed     14. Patient education done regarding chemotherapy prep, plan of care, restrictions and discharge planning     15. Continue regular social work input     I have written the above note for Dr. Pope who performed service with me in the room.   Jalen Kay PA-C ([phone])    I have seen and examined patient with PA, I agree with above note as scribed.                  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 prep regimen for treatment of myelofibrosis    Disease: myelofibrosis   Prep regimen: Flu / Bu / Thiotepa / rATG   GVHD ppx: ABC: PTCy days +3, +4, Bortezomib days 0, +3, PTCy days +5, +14, +28   ABO/CMV:   Recipient:   A+/CMV+   Donor:   B+/CMV+     S: Patient seen and examined with HPC Transplant Team:   +nausea/vomiting    Vital Signs Last 24 Hrs  T(C): 36.9 (2025 05:09), Max: 36.9 (2025 05:09)  T(F): 98.5 (2025 05:09), Max: 98.5 (2025 05:09)  HR: 66 (2025 05:09) (65 - 78)  BP: 138/70 (2025 05:09) (118/64 - 138/70)  BP(mean): --  RR: 18 (2025 05:09) (18 - 18)  SpO2: 97% (2025 05:09) (96% - 98%)    Parameters below as of 2025 05:09  Patient On (Oxygen Delivery Method): room air    Admit weight: 64.5kg  Daily Weight in k.9 (2025 07:29)    Intake / Output:   06-25 @ 07:01  -  06-26 @ 07:00  --------------------------------------------------------  IN: 2295 mL / OUT: 1800 mL / NET: 495 mL    PE:   Oropharynx: no erythema or ulcerations   Oral Mucositis:              -                                        Grade: n/a   CVS: S1, S2 RRR   Lungs: CTA throughout bilaterally   Abdomen: + BS x 4, soft, NT, ND   Extremities: no edema   Gastric Mucositis:                 -                                Grade: n/a   Intestinal Mucositis:                   -                           Grade: n/a   Skin: no rash   TLC: CDI   Neuro: A&O x 3      Labs:   CBC Full  -  ( 2025 06:30 )  WBC Count : 0.04 K/uL  Hemoglobin : 8.0 g/dL  Hematocrit : 25.6 %  Platelet Count - Automated : 26 K/uL  Mean Cell Volume : 91.8 fl  Mean Cell Hemoglobin : 28.7 pg  Mean Cell Hemoglobin Concentration : 31.3 g/dL  Auto Neutrophil # : Not measured  Auto Lymphocyte # : Not measured  Auto Monocyte # : Not measured  Auto Eosinophil # : Not measured  Auto Basophil # : Not measured  Auto Neutrophil % : Not measured Differential percentages must be correlated with absolute numbers for  clinical significance.  Auto Lymphocyte % : Not measured  Auto Monocyte % : Not measured  Auto Eosinophil % : Not measured  Auto Basophil % : Not measured                          8.0    0.04  )-----------(        ( 2025 06:30 )             25.6         137  |  107  |  19  ----------------------------<  131[H]  3.3[L]   |  15[L]  |  0.62    Ca    8.2[L]      2025 06:26  Phos  2.5       Mg     1.8         TPro  5.0[L]  /  Alb  2.4[L]  /  TBili  0.3  /  DBili  x   /  AST  18  /  ALT  12  /  AlkPhos  84      PT/INR - ( 2025 06:26 )   PT: 14.8 sec;   INR: 1.29 ratio         PTT - ( 2025 06:26 )  PTT:27.9 sec  LIVER FUNCTIONS - ( 2025 06:26 )  Alb: 2.4 g/dL / Pro: 5.0 g/dL / ALK PHOS: 84 U/L / ALT: 12 U/L / AST: 18 U/L / GGT: x           Lactate Dehydrogenase, Serum: 401 U/L ( @ 06:26)      Cultures:   Respiratory Viral Panel with COVID-19 by [facility] (25 @ 21:02)   Rapid RVP Result: [id number]: NotDetec:    Culture - Blood (25 @ 18:45)   Specimen Source: Blood Blood-Catheter    Culture Results: No growth at 24 hoursCulture - Blood (25 @ 18:40)   Specimen Source: Blood Blood-Peripheral  Culture Results: No growth     Culture - Urine (25 @ 21:01)   Specimen Source: Clean Catch Clean Catch (Midstream)  Culture Results: 10,000 - 49,000 CFU/mL Gram Positive Cocci in Pairs and Chains   <10,000 CFU/ml Normal Urogenital cha present    Radiology:   < from: Xray Chest 1 View- PORTABLE-Urgent (Xray Chest 1 View- PORTABLE-Urgent .) (25 @ 19:28) >  IMPRESSION: Clear lungs.      Meds:   Antimicrobials:   acyclovir   Oral Tab/Cap 800 milliGRAM(s) Oral every 12 hours  levoFLOXacin  Tablet 500 milliGRAM(s) Oral every 24 hours  vancomycin    Solution 125 milliGRAM(s) Oral every 12 hours      Heme / Onc:       GI:  pantoprazole    Tablet 40 milliGRAM(s) Oral before breakfast  polyethylene glycol 3350 17 Gram(s) Oral daily PRN  sodium bicarbonate Mouth Rinse 10 milliLiter(s) Swish and Spit five times a day  ursodiol Capsule 300 milliGRAM(s) Oral two times a day      Cardiovascular:   EPINEPHrine     1 mG/mL Injectable 0.3 milliGRAM(s) IntraMuscular once PRN  lisinopril 10 milliGRAM(s) Oral every 12 hours  metoprolol succinate ER 12.5 milliGRAM(s) Oral daily      Immunologic:       Other medications:   Biotene Dry Mouth Oral Rinse 5 milliLiter(s) Swish and Spit five times a day  buPROPion XL (24-Hour) . 300 milliGRAM(s) Oral daily  chlorhexidine 0.12% Liquid 15 milliLiter(s) Swish and Spit two times a day  chlorhexidine 4% Liquid 1 Application(s) Topical <User Schedule>  diphenhydrAMINE 25 milliGRAM(s) Oral once  fentaNYL   Patch  12 MICROgram(s)/Hr. 1 Patch Transdermal every 48 hours  levETIRAcetam 500 milliGRAM(s) Oral every 12 hours  levothyroxine 88 MICROGram(s) Oral daily  phytonadione   Solution 5 milliGRAM(s) Oral <User Schedule>  potassium chloride    Tablet ER 40 milliEquivalent(s) Oral once  potassium chloride  20 mEq/100 mL IVPB 20 milliEquivalent(s) IV Intermittent every 2 hours      PRN:   albuterol    90 MICROgram(s) HFA Inhaler 2 Puff(s) Inhalation once PRN  ALPRAZolam 0.25 milliGRAM(s) Oral once PRN  diphenhydrAMINE Injectable 50 milliGRAM(s) IV Push once PRN  EPINEPHrine     1 mG/mL Injectable 0.3 milliGRAM(s) IntraMuscular once PRN  hydrocortisone sodium succinate Injectable 100 milliGRAM(s) IV Push once PRN  LORazepam     Tablet 0.5 milliGRAM(s) Oral every 6 hours PRN  oxyCODONE    IR 10 milliGRAM(s) Oral every 4 hours PRN  polyethylene glycol 3350 17 Gram(s) Oral daily PRN  prochlorperazine   Injectable 10 milliGRAM(s) IV Push every 6 hours PRN  sodium chloride 0.9% lock flush 10 milliLiter(s) IV Push every 1 hour PRN      A/P: 64 year old female with hx of myelofibrosis presents for a MUD allogeneic SCT (DP Permissive) conditioning with Flu/Bu/Thiotepa/rATG.  Today is day -1  - Thiotepa 1/. Maintain skin precautions during thiotepa administration and for 48 hours after completion (days .6 through day -3). Chronic pain issues - continue home fentanyl patch. Febrile overnight, tmax 100.9. Cultures  pending. RVP negative. CXR pending official read, no obvious consolidations. Vital signs are stable.    D/C HCTZ. Fludarabine 1/3, Busulfan 1/3, rATG 1/3. No Tylenol on busulfan days. Continue keppra ppx for 24 hours post infusion of last dose of busulfan. No acute events overnight, vital signs are stable.   - overnight patient was febrile during ATG, no cultures/abx as fever likely secondary to ATG. Patient was otherwise stable overnight. Fludarabine 2/3, Busulfan 2/3 and rATG 2/3. NO Tylenol or Azoles until Day 0. Continue with supportive care.  - Fludarabine 3/3, busulfan 3/3, rATG 3/3. No acute events overnight. Vital signs are stable.    Switched Compazine to atc    1. Infectious Disease:   acyclovir Oral Tab/Cap 800 milliGRAM(s) Oral every 12 hours  Levaquin 500mg oral Daily  Vancomycin 125mg every 12 hours oral    2. VOD Prophylaxis:   ursodiol Capsule 300 milliGRAM(s) Oral two times a day    3. GI Prophylaxis:    pantoprazole Tablet 40 milliGRAM(s) Oral before breakfast    4. Mouthcare - NS / NaHCO3 rinses, Biotene; Skin care     5. GVHD prophylaxis   ABC: PTCy days +3, +4, Bortezomib days 0, +3, PTCy days +5, +14, +28     6. Transfuse & replete electrolytes prn   Replete K (IV/po)    7. IV hydration, daily weights, strict I&O, prn diuresis     8. PO intake as tolerated, nutrition follow up as needed, MVI, folic acid     9. Antiemetics, anti-diarrhea medications:   prochlorperazine   Injectable 10 milliGRAM(s) IV Push every 6 hours   ondansetron  IVPB 16 milliGRAM(s) IV Intermittent every 24 hours    10. OOB as tolerated, physical therapy consult if needed     11. Monitor coags 2x week, vitamin K BIW  phytonadione Solution 5 milliGRAM(s) Oral <User Schedule>    12. Monitor closely for clinical changes, monitor for fevers     13. Emotional support provided, plan of care discussed and questions addressed     14. Patient education done regarding chemotherapy prep, plan of care, restrictions and discharge planning     15. Continue regular social work input     I have written the above note for Dr. Pope who performed service with me in the room.   Jalen Kay PA-C ([phone])    I have seen and examined patient with PA, I agree with above note as scribed.

## 2025-06-26 NOTE — PROGRESS NOTE ADULT - NS PANP COMMENT GEN_ALL_CORE FT
I rounded with the team including nurses, ACPs and PharmD.  I reviewed the data in depth.   I saw and examined the patient myself.   I reviewed and confirmed the above note.  The patient is day - 2 prior to allogeneic HSCT.  The patient is doing well. Her pain is better controlled. She has nausea.   The vitals are stable. The oral cavity is clear. The line site is clean. The lungs are clear. There is no LE edema.  Overall, there are no specific issues. The patient is on appropriate therapy at this stage. We will taper off her analgesics.   I answered the patient’s questions.  I encouraged po intake and ambulation.  YEIMI Godoy MD I rounded with the team including nurses, ACPs and PharmD.  I reviewed the data in depth.   I saw and examined the patient myself.   I reviewed and confirmed the above note.  The patient is day - 1 prior to allogeneic HSCT.  The patient is doing well. Her pain is better controlled. She still has nausea.   The vitals are stable. The oral cavity is clear. The line site is clean. The lungs are clear. There is no LE edema.  Overall, there are no specific issues. The patient is on appropriate therapy at this stage. We will taper off her analgesics.   I answered the patient’s questions.  I encouraged po intake and ambulation.  YEIMI Godoy MD

## 2025-06-27 LAB
ALBUMIN SERPL ELPH-MCNC: 2.7 G/DL — LOW (ref 3.3–5)
ALP SERPL-CCNC: 84 U/L — SIGNIFICANT CHANGE UP (ref 40–120)
ALT FLD-CCNC: 13 U/L — SIGNIFICANT CHANGE UP (ref 10–45)
ANION GAP SERPL CALC-SCNC: 12 MMOL/L — SIGNIFICANT CHANGE UP (ref 5–17)
AST SERPL-CCNC: 20 U/L — SIGNIFICANT CHANGE UP (ref 10–40)
BASOPHILS # BLD AUTO: SIGNIFICANT CHANGE UP (ref 0–0.2)
BASOPHILS NFR BLD AUTO: SIGNIFICANT CHANGE UP (ref 0–2)
BILIRUB DIRECT SERPL-MCNC: 0.2 MG/DL — SIGNIFICANT CHANGE UP (ref 0–0.3)
BILIRUB INDIRECT FLD-MCNC: 0.2 MG/DL — SIGNIFICANT CHANGE UP (ref 0.2–1)
BILIRUB SERPL-MCNC: 0.4 MG/DL — SIGNIFICANT CHANGE UP (ref 0.2–1.2)
BLD GP AB SCN SERPL QL: NEGATIVE — SIGNIFICANT CHANGE UP
BUN SERPL-MCNC: 12 MG/DL — SIGNIFICANT CHANGE UP (ref 7–23)
CALCIUM SERPL-MCNC: 8.2 MG/DL — LOW (ref 8.4–10.5)
CHLORIDE SERPL-SCNC: 108 MMOL/L — SIGNIFICANT CHANGE UP (ref 96–108)
CO2 SERPL-SCNC: 17 MMOL/L — LOW (ref 22–31)
CREAT SERPL-MCNC: 0.59 MG/DL — SIGNIFICANT CHANGE UP (ref 0.5–1.3)
EBV DNA SERPL NAA+PROBE-ACNC: SIGNIFICANT CHANGE UP IU/ML
EBVPCR LOG: SIGNIFICANT CHANGE UP LOG10IU/ML
EGFR: 101 ML/MIN/1.73M2 — SIGNIFICANT CHANGE UP
EGFR: 101 ML/MIN/1.73M2 — SIGNIFICANT CHANGE UP
EOSINOPHIL # BLD AUTO: SIGNIFICANT CHANGE UP (ref 0–0.5)
EOSINOPHIL NFR BLD AUTO: SIGNIFICANT CHANGE UP (ref 0–6)
GLUCOSE SERPL-MCNC: 91 MG/DL — SIGNIFICANT CHANGE UP (ref 70–99)
HCT VFR BLD CALC: 25.6 % — LOW (ref 34.5–45)
HGB BLD-MCNC: 8.2 G/DL — LOW (ref 11.5–15.5)
IMM GRANULOCYTES # BLD AUTO: SIGNIFICANT CHANGE UP (ref 0–0.07)
IMM GRANULOCYTES NFR BLD AUTO: SIGNIFICANT CHANGE UP (ref 0–0.9)
IMMATURE PLATELET FRACTION #: 1.8 K/UL — LOW (ref 4.7–11.1)
IMMATURE PLATELET FRACTION %: 11.2 % — HIGH (ref 1.6–4.9)
LDH SERPL L TO P-CCNC: 346 U/L — HIGH (ref 50–242)
LYMPHOCYTES # BLD AUTO: SIGNIFICANT CHANGE UP (ref 1–3.3)
LYMPHOCYTES NFR BLD AUTO: SIGNIFICANT CHANGE UP (ref 13–44)
MAGNESIUM SERPL-MCNC: 1.6 MG/DL — SIGNIFICANT CHANGE UP (ref 1.6–2.6)
MCHC RBC-ENTMCNC: 29 PG — SIGNIFICANT CHANGE UP (ref 27–34)
MCHC RBC-ENTMCNC: 32 G/DL — SIGNIFICANT CHANGE UP (ref 32–36)
MCV RBC AUTO: 90.5 FL — SIGNIFICANT CHANGE UP (ref 80–100)
MONOCYTES # BLD AUTO: SIGNIFICANT CHANGE UP (ref 0–0.9)
MONOCYTES NFR BLD AUTO: SIGNIFICANT CHANGE UP (ref 2–14)
NEUTROPHILS # BLD AUTO: SIGNIFICANT CHANGE UP (ref 1.8–7.4)
NEUTROPHILS NFR BLD AUTO: SIGNIFICANT CHANGE UP (ref 43–77)
NRBC # BLD AUTO: 0 K/UL — SIGNIFICANT CHANGE UP (ref 0–0)
NRBC # FLD: 0 K/UL — SIGNIFICANT CHANGE UP (ref 0–0)
NRBC BLD AUTO-RTO: 0 /100 WBCS — SIGNIFICANT CHANGE UP (ref 0–0)
PHOSPHATE SERPL-MCNC: 2.4 MG/DL — LOW (ref 2.5–4.5)
PLATELET # BLD AUTO: 16 K/UL — CRITICAL LOW (ref 150–400)
PMV BLD: SIGNIFICANT CHANGE UP FL (ref 7–13)
POTASSIUM SERPL-MCNC: 3.4 MMOL/L — LOW (ref 3.5–5.3)
POTASSIUM SERPL-SCNC: 3.4 MMOL/L — LOW (ref 3.5–5.3)
PROT SERPL-MCNC: 5.2 G/DL — LOW (ref 6–8.3)
RBC # BLD: 2.83 M/UL — LOW (ref 3.8–5.2)
RBC # FLD: 18.6 % — HIGH (ref 10.3–14.5)
RH IG SCN BLD-IMP: POSITIVE — SIGNIFICANT CHANGE UP
SODIUM SERPL-SCNC: 137 MMOL/L — SIGNIFICANT CHANGE UP (ref 135–145)
WBC # BLD: 0.02 K/UL — CRITICAL LOW (ref 3.8–10.5)
WBC # FLD AUTO: 0.02 K/UL — CRITICAL LOW (ref 3.8–10.5)

## 2025-06-27 PROCEDURE — 99233 SBSQ HOSP IP/OBS HIGH 50: CPT

## 2025-06-27 PROCEDURE — 71045 X-RAY EXAM CHEST 1 VIEW: CPT | Mod: 26

## 2025-06-27 RX ORDER — PIPERACILLIN-TAZO-DEXTROSE,ISO 3.375G/5
4.5 IV SOLUTION, PIGGYBACK PREMIX FROZEN(ML) INTRAVENOUS EVERY 8 HOURS
Refills: 0 | Status: DISCONTINUED | OUTPATIENT
Start: 2025-06-28 | End: 2025-07-01

## 2025-06-27 RX ORDER — PIPERACILLIN-TAZO-DEXTROSE,ISO 3.375G/5
4.5 IV SOLUTION, PIGGYBACK PREMIX FROZEN(ML) INTRAVENOUS ONCE
Refills: 0 | Status: COMPLETED | OUTPATIENT
Start: 2025-06-27 | End: 2025-06-27

## 2025-06-27 RX ORDER — BORTEZOMIB 3.5 MG/3.5ML
2 INJECTION, POWDER, LYOPHILIZED, FOR SOLUTION INTRAVENOUS; SUBCUTANEOUS ONCE
Refills: 0 | Status: COMPLETED | OUTPATIENT
Start: 2025-06-27 | End: 2025-06-27

## 2025-06-27 RX ORDER — PIPERACILLIN-TAZO-DEXTROSE,ISO 3.375G/5
4.5 IV SOLUTION, PIGGYBACK PREMIX FROZEN(ML) INTRAVENOUS ONCE
Refills: 0 | Status: COMPLETED | OUTPATIENT
Start: 2025-06-28 | End: 2025-06-28

## 2025-06-27 RX ORDER — MELATONIN 5 MG
3 TABLET ORAL ONCE
Refills: 0 | Status: COMPLETED | OUTPATIENT
Start: 2025-06-27 | End: 2025-06-27

## 2025-06-27 RX ADMIN — Medication 5 MILLILITER(S): at 11:26

## 2025-06-27 RX ADMIN — Medication 800 MILLIGRAM(S): at 08:50

## 2025-06-27 RX ADMIN — LISINOPRIL 10 MILLIGRAM(S): 5 TABLET ORAL at 08:50

## 2025-06-27 RX ADMIN — Medication 10 MILLIGRAM(S): at 23:28

## 2025-06-27 RX ADMIN — VORICONAZOLE 200 MILLIGRAM(S): 200 TABLET, FILM COATED ORAL at 08:52

## 2025-06-27 RX ADMIN — Medication 88 MICROGRAM(S): at 08:50

## 2025-06-27 RX ADMIN — Medication 10 MILLILITER(S): at 00:08

## 2025-06-27 RX ADMIN — Medication 650 MILLIGRAM(S): at 16:31

## 2025-06-27 RX ADMIN — Medication 650 MILLIGRAM(S): at 11:23

## 2025-06-27 RX ADMIN — Medication 650 MILLIGRAM(S): at 17:19

## 2025-06-27 RX ADMIN — Medication 10 MILLILITER(S): at 20:59

## 2025-06-27 RX ADMIN — Medication 15 MILLILITER(S): at 06:09

## 2025-06-27 RX ADMIN — URSODIOL 300 MILLIGRAM(S): 300 CAPSULE ORAL at 08:50

## 2025-06-27 RX ADMIN — Medication 10 MILLIGRAM(S): at 06:08

## 2025-06-27 RX ADMIN — Medication 8 MILLIGRAM(S): at 02:55

## 2025-06-27 RX ADMIN — Medication 10 MILLIGRAM(S): at 17:59

## 2025-06-27 RX ADMIN — Medication 10 MILLILITER(S): at 23:29

## 2025-06-27 RX ADMIN — Medication 5 MILLILITER(S): at 16:31

## 2025-06-27 RX ADMIN — Medication 500000 UNIT(S): at 11:22

## 2025-06-27 RX ADMIN — Medication 25 MILLIGRAM(S): at 11:23

## 2025-06-27 RX ADMIN — Medication 1 APPLICATION(S): at 08:57

## 2025-06-27 RX ADMIN — VORICONAZOLE 200 MILLIGRAM(S): 200 TABLET, FILM COATED ORAL at 17:55

## 2025-06-27 RX ADMIN — Medication 50 MILLIEQUIVALENT(S): at 21:31

## 2025-06-27 RX ADMIN — Medication 50 MILLIEQUIVALENT(S): at 19:23

## 2025-06-27 RX ADMIN — Medication 5 MILLILITER(S): at 00:08

## 2025-06-27 RX ADMIN — Medication 0.5 MILLIGRAM(S): at 07:24

## 2025-06-27 RX ADMIN — Medication 200 GRAM(S): at 17:55

## 2025-06-27 RX ADMIN — Medication 500000 UNIT(S): at 23:29

## 2025-06-27 RX ADMIN — Medication 5 MILLILITER(S): at 23:29

## 2025-06-27 RX ADMIN — Medication 10 MILLILITER(S): at 16:31

## 2025-06-27 RX ADMIN — Medication 15 MILLILITER(S): at 18:02

## 2025-06-27 RX ADMIN — Medication 650 MILLIGRAM(S): at 23:19

## 2025-06-27 RX ADMIN — Medication 10 MILLIGRAM(S): at 11:22

## 2025-06-27 RX ADMIN — Medication 150 MILLILITER(S): at 10:05

## 2025-06-27 RX ADMIN — Medication 10 MILLILITER(S): at 08:51

## 2025-06-27 RX ADMIN — Medication 800 MILLIGRAM(S): at 17:54

## 2025-06-27 RX ADMIN — URSODIOL 300 MILLIGRAM(S): 300 CAPSULE ORAL at 17:55

## 2025-06-27 RX ADMIN — Medication 125 MILLIGRAM(S): at 17:55

## 2025-06-27 RX ADMIN — Medication 500000 UNIT(S): at 17:55

## 2025-06-27 RX ADMIN — Medication 650 MILLIGRAM(S): at 21:30

## 2025-06-27 RX ADMIN — Medication 125 MILLIGRAM(S): at 06:14

## 2025-06-27 RX ADMIN — Medication 3 MILLIGRAM(S): at 23:28

## 2025-06-27 RX ADMIN — Medication 5 MILLILITER(S): at 08:51

## 2025-06-27 RX ADMIN — METOPROLOL SUCCINATE 12.5 MILLIGRAM(S): 50 TABLET, EXTENDED RELEASE ORAL at 08:50

## 2025-06-27 RX ADMIN — Medication 50 MILLIEQUIVALENT(S): at 17:04

## 2025-06-27 RX ADMIN — BORTEZOMIB 1440 MILLIGRAM(S): 3.5 INJECTION, POWDER, LYOPHILIZED, FOR SOLUTION INTRAVENOUS; SUBCUTANEOUS at 19:23

## 2025-06-27 RX ADMIN — BUPROPION HYDROBROMIDE 300 MILLIGRAM(S): 522 TABLET, EXTENDED RELEASE ORAL at 11:26

## 2025-06-27 RX ADMIN — LISINOPRIL 10 MILLIGRAM(S): 5 TABLET ORAL at 17:54

## 2025-06-27 RX ADMIN — Medication 25 GRAM(S): at 20:58

## 2025-06-27 RX ADMIN — Medication 5 MILLILITER(S): at 21:00

## 2025-06-27 RX ADMIN — Medication 10 MILLILITER(S): at 11:26

## 2025-06-27 RX ADMIN — Medication 40 MILLIGRAM(S): at 08:51

## 2025-06-27 NOTE — PROGRESS NOTE ADULT - NS PANP COMMENT GEN_ALL_CORE FT
I rounded with the team including nurses, ACPs and PharmD.  I reviewed the data in depth.   I saw and examined the patient myself.   I reviewed and confirmed the above note.  The patient is day - 1 prior to allogeneic HSCT.  The patient is doing well. Her pain is better controlled. She still has nausea.   The vitals are stable. The oral cavity is clear. The line site is clean. The lungs are clear. There is no LE edema.  Overall, there are no specific issues. The patient is on appropriate therapy at this stage. We will taper off her analgesics.   I answered the patient’s questions.  I encouraged po intake and ambulation.  YEIMI Godoy MD I rounded with the team including nurses, ACPs and PharmD.  I reviewed the data in depth.   I saw and examined the patient myself.   I reviewed and confirmed the above note.  The patient is day 0- prior to allogeneic HSCT.  The patient is doing well. She has sore throat.   The vitals are stable. The oral cavity is significant for oral thrush. . The line site is clean. The lungs are clear. There is no LE edema.  Overall, there are no specific issues. The patient is on appropriate therapy at this stage. We will taper off her analgesics. She started voriconazole. If her thrush does not improved in the coming day or so, we will add a course of caspofungin.   I answered the patient’s questions.  I encouraged po intake and ambulation.  YEIMI Godoy MD

## 2025-06-27 NOTE — CHART NOTE - NSCHARTNOTEFT_GEN_A_CORE
[facility] CTP Procedure Note   Procedure: Cellular Therapy Product Infusion   Type of Transplant: MUD alloSCT (12/12)  Pre-Infusion Diagnosis: Myelofibrosis  Line: [facility]  Infusion Date: 06/27/25    Aliquot 1 Product identification: [id number] / [id number]  Infusion start time: 1229  Infusion completion time: 1316  Product volume (ml): 149 ml  Red blood cell volume (ml): 4.5 ml  CD34 / kg infused (10^6) = 7.49 x 10^6  TNC / kg infused (10^9) = 1.08 x 10^9  CD34 viability (%) = 99.0%  Total DMSO (ml) = N/A  Infusion reaction describe: Patient tolerated procedure well.

## 2025-06-27 NOTE — PROGRESS NOTE ADULT - SUBJECTIVE AND OBJECTIVE BOX
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      O: Vitals:   Vital Signs Last 24 Hrs  T(C): 37.1 (27 Jun 2025 05:40), Max: 37.3 (26 Jun 2025 18:28)  T(F): 98.7 (27 Jun 2025 05:40), Max: 99.1 (26 Jun 2025 18:28)  HR: 79 (27 Jun 2025 05:40) (79 - 86)  BP: 152/68 (27 Jun 2025 05:40) (138/68 - 153/68)  BP(mean): --  RR: 17 (27 Jun 2025 05:40) (17 - 18)  SpO2: 99% (27 Jun 2025 05:40) (97% - 100%)    Parameters below as of 27 Jun 2025 05:40  Patient On (Oxygen Delivery Method): room air    Admit weight: 64.5kg  Daily Weight in kG:  (27 Jun 2025)    Intake / Output:   06-26 @ 07:01  -  06-27 @ 07:00  --------------------------------------------------------  IN: 1372 mL / OUT: 1800 mL / NET: -428 mL      PE:   Oropharynx: no erythema or ulcerations   Oral Mucositis:              -                                        Grade: n/a   CVS: S1, S2 RRR   Lungs: CTA throughout bilaterally   Abdomen: + BS x 4, soft, NT, ND   Extremities: no edema   Gastric Mucositis:                 -                                Grade: n/a   Intestinal Mucositis:                   -                           Grade: n/a   Skin: no rash   TLC: CDI   Neuro: A&O x 3      Labs:     06-27    137  |  108  |  12  ----------------------------<  91  3.4[L]   |  17[L]  |  0.59    Ca    8.2[L]      27 Jun 2025 06:34  Phos  2.4     06-27  Mg     1.6     06-27    TPro  5.2[L]  /  Alb  2.7[L]  /  TBili  0.4  /  DBili  0.2  /  AST  20  /  ALT  13  /  AlkPhos  84  06-27    LIVER FUNCTIONS - ( 27 Jun 2025 06:34 )  Alb: 2.7 g/dL / Pro: 5.2 g/dL / ALK PHOS: 84 U/L / ALT: 13 U/L / AST: 20 U/L / GGT: x           Lactate Dehydrogenase, Serum: 346 U/L (06-27 @ 06:34)      Cultures:   Respiratory Viral Panel with COVID-19 by [facility] (06.20.25 @ 21:02)   Rapid RVP Result: [id number]: NotDetec:    Culture - Blood (06.20.25 @ 18:45)   Specimen Source: Blood Blood-Catheter    Culture Results: No growth at 24 hoursCulture - Blood (06.20.25 @ 18:40)   Specimen Source: Blood Blood-Peripheral  Culture Results: No growth     Culture - Urine (06.20.25 @ 21:01)   Specimen Source: Clean Catch Clean Catch (Midstream)  Culture Results: 10,000 - 49,000 CFU/mL Gram Positive Cocci in Pairs and Chains   <10,000 CFU/ml Normal Urogenital cha present    Radiology:   < from: Xray Chest 1 View- PORTABLE-Urgent (Xray Chest 1 View- PORTABLE-Urgent .) (06.20.25 @ 19:28) >  IMPRESSION: Clear lungs.      Meds:   Antimicrobials:   acyclovir   Oral Tab/Cap 800 milliGRAM(s) Oral every 12 hours  levoFLOXacin  Tablet 500 milliGRAM(s) Oral every 24 hours  vancomycin    Solution 125 milliGRAM(s) Oral every 12 hours  voriconazole 200 milliGRAM(s) Oral every 12 hours      Heme / Onc:       GI:  pantoprazole    Tablet 40 milliGRAM(s) Oral before breakfast  polyethylene glycol 3350 17 Gram(s) Oral daily PRN  sodium bicarbonate Mouth Rinse 10 milliLiter(s) Swish and Spit five times a day  ursodiol Capsule 300 milliGRAM(s) Oral two times a day      Cardiovascular:   lisinopril 10 milliGRAM(s) Oral every 12 hours  metoprolol succinate ER 12.5 milliGRAM(s) Oral daily      Immunologic:       Other medications:   acetaminophen     Tablet .. 650 milliGRAM(s) Oral once  Biotene Dry Mouth Oral Rinse 5 milliLiter(s) Swish and Spit five times a day  buPROPion XL (24-Hour) . 300 milliGRAM(s) Oral daily  chlorhexidine 0.12% Liquid 15 milliLiter(s) Swish and Spit two times a day  chlorhexidine 4% Liquid 1 Application(s) Topical <User Schedule>  diphenhydrAMINE 25 milliGRAM(s) Oral once  fentaNYL   Patch  25 MICROgram(s)/Hr. 1 Patch Transdermal every 48 hours  levothyroxine 88 MICROGram(s) Oral daily  phytonadione   Solution 5 milliGRAM(s) Oral <User Schedule>  potassium chloride    Tablet ER 40 milliEquivalent(s) Oral every 4 hours  prochlorperazine   Injectable 10 milliGRAM(s) IV Push every 6 hours  sodium chloride 0.9%. 1000 milliLiter(s) IV Continuous <Continuous>      PRN:   acetaminophen     Tablet .. 650 milliGRAM(s) Oral every 6 hours PRN  ALPRAZolam 0.25 milliGRAM(s) Oral once PRN  FIRST- Mouthwash  BLM 15 milliLiter(s) Swish and Swallow five times a day PRN  LORazepam     Tablet 0.5 milliGRAM(s) Oral every 6 hours PRN  ondansetron Injectable 8 milliGRAM(s) IV Push every 8 hours PRN  oxyCODONE    IR 10 milliGRAM(s) Oral every 4 hours PRN  polyethylene glycol 3350 17 Gram(s) Oral daily PRN  sodium chloride 0.9% lock flush 10 milliLiter(s) IV Push every 1 hour PRN    A/P: 64 year old female with hx of myelofibrosis presents for a MUD allogeneic SCT (DP Permissive) conditioning with Flu/Bu/Thiotepa/rATG.  Today is day -1  6/21- Thiotepa 1/2. Maintain skin precautions during thiotepa administration and for 48 hours after completion (days .6 through day -3). Chronic pain issues - continue home fentanyl patch. Febrile overnight, tmax 100.9. Cultures  pending. RVP negative. CXR pending official read, no obvious consolidations. Vital signs are stable.   6/23 D/C HCTZ. Fludarabine 1/3, Busulfan 1/3, rATG 1/3. No Tylenol on busulfan days. Continue keppra ppx for 24 hours post infusion of last dose of busulfan. No acute events overnight, vital signs are stable.   6/24- overnight patient was febrile during ATG, no cultures/abx as fever likely secondary to ATG. Patient was otherwise stable overnight. Fludarabine 2/3, Busulfan 2/3 and rATG 2/3. NO Tylenol or Azoles until Day 0. Continue with supportive care.  6/25- Fludarabine 3/3, busulfan 3/3, rATG 3/3. No acute events overnight. Vital signs are stable.   6/26 Switched Compazine to atc  6/27 VSS, afebrile. 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    1. Infectious Disease:   acyclovir   Oral Tab/Cap 800 milliGRAM(s) Oral every 12 hours  levoFLOXacin  Tablet 500 milliGRAM(s) Oral every 24 hours  vancomycin    Solution 125 milliGRAM(s) Oral every 12 hours  voriconazole 200 milliGRAM(s) Oral every 12 hours    2. VOD Prophylaxis:   ursodiol Capsule 300 milliGRAM(s) Oral two times a day    3. GI Prophylaxis:    pantoprazole Tablet 40 milliGRAM(s) Oral before breakfast    4. Mouthcare - NS / NaHCO3 rinses, Biotene; Skin care     5. GVHD prophylaxis   ABC: PTCy days +3, +4, Bortezomib days 0, +3, PTCy days +5, +14, +28     6. Transfuse & replete electrolytes prn   6/27 Replete K (IV/po)    7. IV hydration, daily weights, strict I&O, prn diuresis     8. PO intake as tolerated, nutrition follow up as needed, MVI, folic acid     9. Antiemetics, anti-diarrhea medications:   prochlorperazine   Injectable 10 milliGRAM(s) IV Push every 6 hours   ondansetron  IVPB 16 milliGRAM(s) IV Intermittent every 24 hours    10. OOB as tolerated, physical therapy consult if needed     11. Monitor coags 2x week, vitamin K BIW  phytonadione Solution 5 milliGRAM(s) Oral <User Schedule>    12. Monitor closely for clinical changes, monitor for fevers     13. Emotional support provided, plan of care discussed and questions addressed     14. Patient education done regarding chemotherapy prep, plan of care, restrictions and discharge planning     15. Continue regular social work input     I have written the above note for Dr. Pope who performed service with me in the room.   Bobo Barriga PA-C ([phone])    I have seen and examined patient with PA, I agree with above note as scribed.                    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 prep regimen for treatment of myelofibrosis    Disease: myelofibrosis   Prep regimen: Flu / Bu / Thiotepa / rATG   GVHD ppx: ABC: PTCy days +3, +4, Bortezomib days 0, +3, PTCy days +5, +14, +28   ABO/CMV:   Recipient:   A+/CMV+   Donor:   B+/CMV+     S: Patient seen and examined with HPC Transplant Team:   Overnight no events noted.  Patient with mouth/throat pain.  Admits to fatigue.    O: Vitals:   Vital Signs Last 24 Hrs  T(C): 37.1 (2025 05:40), Max: 37.3 (2025 18:28)  T(F): 98.7 (2025 05:40), Max: 99.1 (2025 18:28)  HR: 79 (2025 05:40) (79 - 86)  BP: 152/68 (2025 05:40) (138/68 - 153/68)  BP(mean): --  RR: 17 (2025 05:40) (17 - 18)  SpO2: 99% (2025 05:40) (97% - 100%)    Parameters below as of 2025 05:40  Patient On (Oxygen Delivery Method): room air    Admit weight: 64.5kg  Daily Weight in k.2 (2025)    Intake / Output:    @ 07:01  -   @ 07:00  --------------------------------------------------------  IN: 1372 mL / OUT: 1800 mL / NET: -428 mL      PE:   Oropharynx: no erythema or ulcerations   Oral Mucositis:              -                                      Grade: n/a   CVS: S1, S2 RRR   Lungs: CTA throughout bilaterally   Abdomen: + BS x 4, soft, NT, ND   Extremities: no edema   Gastric Mucositis:                 -                               Grade: n/a   Intestinal Mucositis:              -                               Grade: n/a   Skin: no rash   TLC: CDI   Neuro: A&O x 3      Labs:   CBC Full  -  ( 2025 06:34 )  WBC Count : 0.02 K/uL  RBC Count : 2.83 M/uL  Hemoglobin : 8.2 g/dL  Hematocrit : 25.6 %  Platelet Count - Automated : 16 K/uL  Mean Cell Volume : 90.5 fl  Mean Cell Hemoglobin : 29.0 pg  Mean Cell Hemoglobin Concentration : 32.0 g/dL  Auto Neutrophil # : Not measured  Auto Lymphocyte # : Not measured  Auto Monocyte # : Not measured  Auto Eosinophil # : Not measured  Auto Basophil # : Not measured  Auto Neutrophil % : Not measured Differential percentages must be correlated with absolute numbers for  clinical significance.  Auto Lymphocyte % : Not measured  Auto Monocyte % : Not measured  Auto Eosinophil % : Not measured  Auto Basophil % : Not measured                          8.2    0.02  )-----------( 16       ( 2025 06:34 )             25.6     -27    137  |  108  |  12  ----------------------------<  91  3.4[L]   |  17[L]  |  0.59    Ca    8.2[L]      2025 06:34  Phos  2.4       Mg     1.6         TPro  5.2[L]  /  Alb  2.7[L]  /  TBili  0.4  /  DBili  0.2  /  AST  20  /  ALT  13  /  AlkPhos  84  27    LIVER FUNCTIONS - ( 2025 06:34 )  Alb: 2.7 g/dL / Pro: 5.2 g/dL / ALK PHOS: 84 U/L / ALT: 13 U/L / AST: 20 U/L / GGT: x           Lactate Dehydrogenase, Serum: 346 U/L ( @ 06:34)      Cultures:   Respiratory Viral Panel with COVID-19 by [facility] (25 @ 21:02)   Rapid RVP Result: [id number]: NotDetec:    Culture - Blood (25 @ 18:45)   Specimen Source: Blood Blood-Catheter    Culture Results: No growth at 24 hoursCulture - Blood (25 @ 18:40)   Specimen Source: Blood Blood-Peripheral  Culture Results: No growth     Culture - Urine (25 @ 21:01)   Specimen Source: Clean Catch Clean Catch (Midstream)  Culture Results: 10,000 - 49,000 CFU/mL Gram Positive Cocci in Pairs and Chains   <10,000 CFU/ml Normal Urogenital cha present    Radiology:   < from: Xray Chest 1 View- PORTABLE-Urgent (Xray Chest 1 View- PORTABLE-Urgent .) (25 @ 19:28) >  IMPRESSION: Clear lungs.      Meds:   Antimicrobials:   acyclovir   Oral Tab/Cap 800 milliGRAM(s) Oral every 12 hours  levoFLOXacin  Tablet 500 milliGRAM(s) Oral every 24 hours  vancomycin    Solution 125 milliGRAM(s) Oral every 12 hours  voriconazole 200 milliGRAM(s) Oral every 12 hours      Heme / Onc:       GI:  pantoprazole    Tablet 40 milliGRAM(s) Oral before breakfast  polyethylene glycol 3350 17 Gram(s) Oral daily PRN  sodium bicarbonate Mouth Rinse 10 milliLiter(s) Swish and Spit five times a day  ursodiol Capsule 300 milliGRAM(s) Oral two times a day      Cardiovascular:   lisinopril 10 milliGRAM(s) Oral every 12 hours  metoprolol succinate ER 12.5 milliGRAM(s) Oral daily      Immunologic:       Other medications:   acetaminophen     Tablet .. 650 milliGRAM(s) Oral once  Biotene Dry Mouth Oral Rinse 5 milliLiter(s) Swish and Spit five times a day  buPROPion XL (24-Hour) . 300 milliGRAM(s) Oral daily  chlorhexidine 0.12% Liquid 15 milliLiter(s) Swish and Spit two times a day  chlorhexidine 4% Liquid 1 Application(s) Topical <User Schedule>  diphenhydrAMINE 25 milliGRAM(s) Oral once  fentaNYL   Patch  25 MICROgram(s)/Hr. 1 Patch Transdermal every 48 hours  levothyroxine 88 MICROGram(s) Oral daily  phytonadione   Solution 5 milliGRAM(s) Oral <User Schedule>  potassium chloride    Tablet ER 40 milliEquivalent(s) Oral every 4 hours  prochlorperazine   Injectable 10 milliGRAM(s) IV Push every 6 hours  sodium chloride 0.9%. 1000 milliLiter(s) IV Continuous <Continuous>      PRN:   acetaminophen     Tablet .. 650 milliGRAM(s) Oral every 6 hours PRN  ALPRAZolam 0.25 milliGRAM(s) Oral once PRN  FIRST- Mouthwash  BLM 15 milliLiter(s) Swish and Swallow five times a day PRN  LORazepam     Tablet 0.5 milliGRAM(s) Oral every 6 hours PRN  ondansetron Injectable 8 milliGRAM(s) IV Push every 8 hours PRN  oxyCODONE    IR 10 milliGRAM(s) Oral every 4 hours PRN  polyethylene glycol 3350 17 Gram(s) Oral daily PRN  sodium chloride 0.9% lock flush 10 milliLiter(s) IV Push every 1 hour PRN    A/P: 64 year old female with hx of myelofibrosis presents for a MUD allogeneic SCT (DP Permissive) conditioning with Flu/Bu/Thiotepa/rATG.  Today is day -1  - Thiotepa 1/2. Maintain skin precautions during thiotepa administration and for 48 hours after completion (days .6 through day -3). Chronic pain issues - continue home fentanyl patch. Febrile overnight, tmax 100.9. Cultures  pending. RVP negative. CXR pending official read, no obvious consolidations. Vital signs are stable.    D/C HCTZ. Fludarabine 1/3, Busulfan 1/3, rATG 1/3. No Tylenol on busulfan days. Continue keppra ppx for 24 hours post infusion of last dose of busulfan. No acute events overnight, vital signs are stable.   - overnight patient was febrile during ATG, no cultures/abx as fever likely secondary to ATG. Patient was otherwise stable overnight. Fludarabine 2/3, Busulfan 2/3 and rATG 2/3. NO Tylenol or Azoles until Day 0. Continue with supportive care.  - Fludarabine 3/3, busulfan 3/3, rATG 3/3. No acute events overnight. Vital signs are stable.    Switched Compazine to atc   VSS, afebrile. 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    1. Infectious Disease:   acyclovir   Oral Tab/Cap 800 milliGRAM(s) Oral every 12 hours  levoFLOXacin  Tablet 500 milliGRAM(s) Oral every 24 hours  vancomycin    Solution 125 milliGRAM(s) Oral every 12 hours  voriconazole 200 milliGRAM(s) Oral every 12 hours    2. VOD Prophylaxis:   ursodiol Capsule 300 milliGRAM(s) Oral two times a day    3. GI Prophylaxis:    pantoprazole Tablet 40 milliGRAM(s) Oral before breakfast    4. Mouthcare - NS / NaHCO3 rinses, Biotene; Skin care     5. GVHD prophylaxis   ABC: PTCy days +3, +4, Bortezomib days 0, +3, PTCy days +5, +14, +28     6. Transfuse & replete electrolytes prn    Replete K (IV/po)    7. IV hydration, daily weights, strict I&O, prn diuresis     8. PO intake as tolerated, nutrition follow up as needed, MVI, folic acid     9. Antiemetics, anti-diarrhea medications:   prochlorperazine   Injectable 10 milliGRAM(s) IV Push every 6 hours   ondansetron  IVPB 16 milliGRAM(s) IV Intermittent every 24 hours    10. OOB as tolerated, physical therapy consult if needed     11. Monitor coags 2x week, vitamin K BIW  phytonadione Solution 5 milliGRAM(s) Oral <User Schedule>    12. Monitor closely for clinical changes, monitor for fevers     13. Emotional support provided, plan of care discussed and questions addressed     14. Patient education done regarding chemotherapy prep, plan of care, restrictions and discharge planning     15. Continue regular social work input     I have written the above note for Dr. Pope who performed service with me in the room.   Bobo Barriga PA-C ([phone])    I have seen and examined patient with PA, I agree with above note as scribed.                    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 prep regimen for treatment of myelofibrosis    Disease: myelofibrosis   Prep regimen: Flu / Bu / Thiotepa / rATG   GVHD ppx: ABC: PTCy days +3, +4, Bortezomib days 0, +3, PTCy days +5, +14, +28   ABO/CMV:   Recipient:   A+/CMV+   Donor:   B+/CMV+     S: Patient seen and examined with HPC Transplant Team:   Overnight no events noted.  Patient with mouth/throat pain.  Admits to fatigue.    O: Vitals:   Vital Signs Last 24 Hrs  T(C): 37.1 (2025 05:40), Max: 37.3 (2025 18:28)  T(F): 98.7 (2025 05:40), Max: 99.1 (2025 18:28)  HR: 79 (2025 05:40) (79 - 86)  BP: 152/68 (2025 05:40) (138/68 - 153/68)  BP(mean): --  RR: 17 (2025 05:40) (17 - 18)  SpO2: 99% (2025 05:40) (97% - 100%)    Parameters below as of 2025 05:40  Patient On (Oxygen Delivery Method): room air    Admit weight: 64.5kg  Daily Weight in k.2 (2025)    Intake / Output:    @ 07:01  -   @ 07:00  --------------------------------------------------------  IN: 1372 mL / OUT: 1800 mL / NET: -428 mL      PE:   Oropharynx: no erythema or ulcerations   Oral Mucositis:              -                                      Grade: n/a   CVS: S1, S2 RRR   Lungs: CTA throughout bilaterally   Abdomen: + BS x 4, soft, NT, ND   Extremities: no edema   Gastric Mucositis:                 -                               Grade: n/a   Intestinal Mucositis:              -                               Grade: n/a   Skin: no rash   TLC: CDI   Neuro: A&O x 3      Labs:   CBC Full  -  ( 2025 06:34 )  WBC Count : 0.02 K/uL  RBC Count : 2.83 M/uL  Hemoglobin : 8.2 g/dL  Hematocrit : 25.6 %  Platelet Count - Automated : 16 K/uL  Mean Cell Volume : 90.5 fl  Mean Cell Hemoglobin : 29.0 pg  Mean Cell Hemoglobin Concentration : 32.0 g/dL  Auto Neutrophil # : Not measured  Auto Lymphocyte # : Not measured  Auto Monocyte # : Not measured  Auto Eosinophil # : Not measured  Auto Basophil # : Not measured  Auto Neutrophil % : Not measured Differential percentages must be correlated with absolute numbers for  clinical significance.  Auto Lymphocyte % : Not measured  Auto Monocyte % : Not measured  Auto Eosinophil % : Not measured  Auto Basophil % : Not measured                          8.2    0.02  )-----------( 16       ( 2025 06:34 )             25.6     -27    137  |  108  |  12  ----------------------------<  91  3.4[L]   |  17[L]  |  0.59    Ca    8.2[L]      2025 06:34  Phos  2.4       Mg     1.6         TPro  5.2[L]  /  Alb  2.7[L]  /  TBili  0.4  /  DBili  0.2  /  AST  20  /  ALT  13  /  AlkPhos  84  27    LIVER FUNCTIONS - ( 2025 06:34 )  Alb: 2.7 g/dL / Pro: 5.2 g/dL / ALK PHOS: 84 U/L / ALT: 13 U/L / AST: 20 U/L / GGT: x           Lactate Dehydrogenase, Serum: 346 U/L ( @ 06:34)      Cultures:   Respiratory Viral Panel with COVID-19 by [facility] (25 @ 21:02)   Rapid RVP Result: [id number]: NotDetec:    Culture - Blood (25 @ 18:45)   Specimen Source: Blood Blood-Catheter    Culture Results: No growth at 24 hoursCulture - Blood (25 @ 18:40)   Specimen Source: Blood Blood-Peripheral  Culture Results: No growth     Culture - Urine (25 @ 21:01)   Specimen Source: Clean Catch Clean Catch (Midstream)  Culture Results: 10,000 - 49,000 CFU/mL Gram Positive Cocci in Pairs and Chains   <10,000 CFU/ml Normal Urogenital cha present    Radiology:   < from: Xray Chest 1 View- PORTABLE-Urgent (Xray Chest 1 View- PORTABLE-Urgent .) (25 @ 19:28) >  IMPRESSION: Clear lungs.      Meds:   Antimicrobials:   acyclovir   Oral Tab/Cap 800 milliGRAM(s) Oral every 12 hours  levoFLOXacin  Tablet 500 milliGRAM(s) Oral every 24 hours  vancomycin    Solution 125 milliGRAM(s) Oral every 12 hours  voriconazole 200 milliGRAM(s) Oral every 12 hours      Heme / Onc:       GI:  pantoprazole    Tablet 40 milliGRAM(s) Oral before breakfast  polyethylene glycol 3350 17 Gram(s) Oral daily PRN  sodium bicarbonate Mouth Rinse 10 milliLiter(s) Swish and Spit five times a day  ursodiol Capsule 300 milliGRAM(s) Oral two times a day      Cardiovascular:   lisinopril 10 milliGRAM(s) Oral every 12 hours  metoprolol succinate ER 12.5 milliGRAM(s) Oral daily      Immunologic:       Other medications:   acetaminophen     Tablet .. 650 milliGRAM(s) Oral once  Biotene Dry Mouth Oral Rinse 5 milliLiter(s) Swish and Spit five times a day  buPROPion XL (24-Hour) . 300 milliGRAM(s) Oral daily  chlorhexidine 0.12% Liquid 15 milliLiter(s) Swish and Spit two times a day  chlorhexidine 4% Liquid 1 Application(s) Topical <User Schedule>  diphenhydrAMINE 25 milliGRAM(s) Oral once  fentaNYL   Patch  25 MICROgram(s)/Hr. 1 Patch Transdermal every 48 hours  levothyroxine 88 MICROGram(s) Oral daily  phytonadione   Solution 5 milliGRAM(s) Oral <User Schedule>  potassium chloride    Tablet ER 40 milliEquivalent(s) Oral every 4 hours  prochlorperazine   Injectable 10 milliGRAM(s) IV Push every 6 hours  sodium chloride 0.9%. 1000 milliLiter(s) IV Continuous <Continuous>      PRN:   acetaminophen     Tablet .. 650 milliGRAM(s) Oral every 6 hours PRN  ALPRAZolam 0.25 milliGRAM(s) Oral once PRN  FIRST- Mouthwash  BLM 15 milliLiter(s) Swish and Swallow five times a day PRN  LORazepam     Tablet 0.5 milliGRAM(s) Oral every 6 hours PRN  ondansetron Injectable 8 milliGRAM(s) IV Push every 8 hours PRN  oxyCODONE    IR 10 milliGRAM(s) Oral every 4 hours PRN  polyethylene glycol 3350 17 Gram(s) Oral daily PRN  sodium chloride 0.9% lock flush 10 milliLiter(s) IV Push every 1 hour PRN    A/P: 64 year old female with hx of myelofibrosis presents for a MUD allogeneic SCT (DP Permissive) conditioning with Flu/Bu/Thiotepa/rATG.  Today is day 0  - Thiotepa 1/2. Maintain skin precautions during thiotepa administration and for 48 hours after completion (days .6 through day -3). Chronic pain issues - continue home fentanyl patch. Febrile overnight, tmax 100.9. Cultures  pending. RVP negative. CXR pending official read, no obvious consolidations. Vital signs are stable.    D/C HCTZ. Fludarabine 1/3, Busulfan 1/3, rATG 1/3. No Tylenol on busulfan days. Continue keppra ppx for 24 hours post infusion of last dose of busulfan. No acute events overnight, vital signs are stable.   - overnight patient was febrile during ATG, no cultures/abx as fever likely secondary to ATG. Patient was otherwise stable overnight. Fludarabine 2/3, Busulfan 2/3 and rATG 2/3. NO Tylenol or Azoles until Day 0. Continue with supportive care.  - Fludarabine 3/3, busulfan 3/3, rATG 3/3. No acute events overnight. Vital signs are stable.    Switched Compazine to atc   VSS, afebrile. 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    1. Infectious Disease:   acyclovir   Oral Tab/Cap 800 milliGRAM(s) Oral every 12 hours  levoFLOXacin  Tablet 500 milliGRAM(s) Oral every 24 hours  vancomycin    Solution 125 milliGRAM(s) Oral every 12 hours  voriconazole 200 milliGRAM(s) Oral every 12 hours    2. VOD Prophylaxis:   ursodiol Capsule 300 milliGRAM(s) Oral two times a day    3. GI Prophylaxis:    pantoprazole Tablet 40 milliGRAM(s) Oral before breakfast    4. Mouthcare - NS / NaHCO3 rinses, Biotene; Skin care     5. GVHD prophylaxis   ABC: PTCy days +3, +4, Bortezomib days 0, +3, PTCy days +5, +14, +28     6. Transfuse & replete electrolytes prn    Replete K (IV/po)    7. IV hydration, daily weights, strict I&O, prn diuresis     8. PO intake as tolerated, nutrition follow up as needed, MVI, folic acid     9. Antiemetics, anti-diarrhea medications:   prochlorperazine   Injectable 10 milliGRAM(s) IV Push every 6 hours   ondansetron  IVPB 16 milliGRAM(s) IV Intermittent every 24 hours    10. OOB as tolerated, physical therapy consult if needed     11. Monitor coags 2x week, vitamin K BIW  phytonadione Solution 5 milliGRAM(s) Oral <User Schedule>    12. Monitor closely for clinical changes, monitor for fevers     13. Emotional support provided, plan of care discussed and questions addressed     14. Patient education done regarding chemotherapy prep, plan of care, restrictions and discharge planning     15. Continue regular social work input     I have written the above note for Dr. Pope who performed service with me in the room.   Bobo Barriga PA-C ([phone])    I have seen and examined patient with PA, I agree with above note as scribed.

## 2025-06-27 NOTE — PROVIDER CONTACT NOTE (OTHER) - ACTION/TREATMENT ORDERED:
Cold packs and reassess Blood Cx x2, UA/UC, CXR, start pt on Zosyn 4.5mg IVPB, cold packs and reassess

## 2025-06-28 LAB
ALBUMIN SERPL ELPH-MCNC: 2.4 G/DL — LOW (ref 3.3–5)
ALP SERPL-CCNC: 74 U/L — SIGNIFICANT CHANGE UP (ref 40–120)
ALT FLD-CCNC: 11 U/L — SIGNIFICANT CHANGE UP (ref 10–45)
ANION GAP SERPL CALC-SCNC: 11 MMOL/L — SIGNIFICANT CHANGE UP (ref 5–17)
APPEARANCE UR: ABNORMAL
AST SERPL-CCNC: 16 U/L — SIGNIFICANT CHANGE UP (ref 10–40)
BACTERIA # UR AUTO: NEGATIVE /HPF — SIGNIFICANT CHANGE UP
BASOPHILS # BLD AUTO: SIGNIFICANT CHANGE UP (ref 0–0.2)
BASOPHILS NFR BLD AUTO: SIGNIFICANT CHANGE UP (ref 0–2)
BILIRUB SERPL-MCNC: 0.4 MG/DL — SIGNIFICANT CHANGE UP (ref 0.2–1.2)
BILIRUB UR-MCNC: NEGATIVE — SIGNIFICANT CHANGE UP
BUN SERPL-MCNC: 8 MG/DL — SIGNIFICANT CHANGE UP (ref 7–23)
CALCIUM SERPL-MCNC: 7.5 MG/DL — LOW (ref 8.4–10.5)
CAST: 0 /LPF — SIGNIFICANT CHANGE UP (ref 0–4)
CHLORIDE SERPL-SCNC: 111 MMOL/L — HIGH (ref 96–108)
CO2 SERPL-SCNC: 15 MMOL/L — LOW (ref 22–31)
COLOR SPEC: YELLOW — SIGNIFICANT CHANGE UP
CREAT SERPL-MCNC: 0.64 MG/DL — SIGNIFICANT CHANGE UP (ref 0.5–1.3)
DIFF PNL FLD: NEGATIVE — SIGNIFICANT CHANGE UP
EGFR: 99 ML/MIN/1.73M2 — SIGNIFICANT CHANGE UP
EGFR: 99 ML/MIN/1.73M2 — SIGNIFICANT CHANGE UP
EOSINOPHIL # BLD AUTO: SIGNIFICANT CHANGE UP (ref 0–0.5)
EOSINOPHIL NFR BLD AUTO: SIGNIFICANT CHANGE UP (ref 0–6)
GLUCOSE SERPL-MCNC: 104 MG/DL — HIGH (ref 70–99)
GLUCOSE UR QL: NEGATIVE MG/DL — SIGNIFICANT CHANGE UP
HADV DNA FLD NAA+PROBE-LOG#: SIGNIFICANT CHANGE UP COPIES/ML
HCT VFR BLD CALC: 21.4 % — LOW (ref 34.5–45)
HGB BLD-MCNC: 6.8 G/DL — CRITICAL LOW (ref 11.5–15.5)
IMM GRANULOCYTES # BLD AUTO: SIGNIFICANT CHANGE UP (ref 0–0.07)
IMM GRANULOCYTES NFR BLD AUTO: SIGNIFICANT CHANGE UP (ref 0–0.9)
IMMATURE PLATELET FRACTION #: 0.5 K/UL — LOW (ref 4.7–11.1)
IMMATURE PLATELET FRACTION %: 6.2 % — HIGH (ref 1.6–4.9)
KETONES UR QL: NEGATIVE MG/DL — SIGNIFICANT CHANGE UP
LDH SERPL L TO P-CCNC: 327 U/L — HIGH (ref 50–242)
LEUKOCYTE ESTERASE UR-ACNC: NEGATIVE — SIGNIFICANT CHANGE UP
LYMPHOCYTES # BLD AUTO: SIGNIFICANT CHANGE UP (ref 1–3.3)
LYMPHOCYTES NFR BLD AUTO: SIGNIFICANT CHANGE UP (ref 13–44)
MAGNESIUM SERPL-MCNC: 1.4 MG/DL — LOW (ref 1.6–2.6)
MCHC RBC-ENTMCNC: 29.1 PG — SIGNIFICANT CHANGE UP (ref 27–34)
MCHC RBC-ENTMCNC: 31.8 G/DL — LOW (ref 32–36)
MCV RBC AUTO: 91.5 FL — SIGNIFICANT CHANGE UP (ref 80–100)
MONOCYTES # BLD AUTO: SIGNIFICANT CHANGE UP (ref 0–0.9)
MONOCYTES NFR BLD AUTO: SIGNIFICANT CHANGE UP (ref 2–14)
NEUTROPHILS # BLD AUTO: SIGNIFICANT CHANGE UP (ref 1.8–7.4)
NEUTROPHILS NFR BLD AUTO: SIGNIFICANT CHANGE UP (ref 43–77)
NITRITE UR-MCNC: NEGATIVE — SIGNIFICANT CHANGE UP
NRBC # BLD AUTO: 0 K/UL — SIGNIFICANT CHANGE UP (ref 0–0)
NRBC # FLD: 0 K/UL — SIGNIFICANT CHANGE UP (ref 0–0)
NRBC BLD AUTO-RTO: 0 /100 WBCS — SIGNIFICANT CHANGE UP (ref 0–0)
PH UR: 5.5 — SIGNIFICANT CHANGE UP (ref 5–8)
PHOSPHATE SERPL-MCNC: 2.2 MG/DL — LOW (ref 2.5–4.5)
PLATELET # BLD AUTO: 8 K/UL — CRITICAL LOW (ref 150–400)
PMV BLD: SIGNIFICANT CHANGE UP FL (ref 7–13)
POTASSIUM SERPL-MCNC: 3.8 MMOL/L — SIGNIFICANT CHANGE UP (ref 3.5–5.3)
POTASSIUM SERPL-SCNC: 3.8 MMOL/L — SIGNIFICANT CHANGE UP (ref 3.5–5.3)
PROT SERPL-MCNC: 4.6 G/DL — LOW (ref 6–8.3)
PROT UR-MCNC: 30 MG/DL
RBC # BLD: 2.34 M/UL — LOW (ref 3.8–5.2)
RBC # FLD: 18.3 % — HIGH (ref 10.3–14.5)
RBC CASTS # UR COMP ASSIST: 3 /HPF — SIGNIFICANT CHANGE UP (ref 0–4)
SODIUM SERPL-SCNC: 137 MMOL/L — SIGNIFICANT CHANGE UP (ref 135–145)
SP GR SPEC: 1.02 — SIGNIFICANT CHANGE UP (ref 1–1.03)
SQUAMOUS # UR AUTO: 1 /HPF — SIGNIFICANT CHANGE UP (ref 0–5)
UROBILINOGEN FLD QL: 0.2 MG/DL — SIGNIFICANT CHANGE UP (ref 0.2–1)
WBC # BLD: 0.1 K/UL — CRITICAL LOW (ref 3.8–10.5)
WBC # FLD AUTO: 0.1 K/UL — CRITICAL LOW (ref 3.8–10.5)
WBC UR QL: 1 /HPF — SIGNIFICANT CHANGE UP (ref 0–5)

## 2025-06-28 PROCEDURE — 99233 SBSQ HOSP IP/OBS HIGH 50: CPT

## 2025-06-28 RX ORDER — MAGNESIUM SULFATE 500 MG/ML
2 SYRINGE (ML) INJECTION ONCE
Refills: 0 | Status: COMPLETED | OUTPATIENT
Start: 2025-06-28 | End: 2025-06-28

## 2025-06-28 RX ORDER — CASPOFUNGIN ACETATE 5 MG/ML
INJECTION, POWDER, LYOPHILIZED, FOR SOLUTION INTRAVENOUS
Refills: 0 | Status: DISCONTINUED | OUTPATIENT
Start: 2025-06-28 | End: 2025-07-02

## 2025-06-28 RX ORDER — CASPOFUNGIN ACETATE 5 MG/ML
70 INJECTION, POWDER, LYOPHILIZED, FOR SOLUTION INTRAVENOUS ONCE
Refills: 0 | Status: COMPLETED | OUTPATIENT
Start: 2025-06-28 | End: 2025-06-28

## 2025-06-28 RX ORDER — CASPOFUNGIN ACETATE 5 MG/ML
50 INJECTION, POWDER, LYOPHILIZED, FOR SOLUTION INTRAVENOUS EVERY 24 HOURS
Refills: 0 | Status: DISCONTINUED | OUTPATIENT
Start: 2025-06-29 | End: 2025-07-02

## 2025-06-28 RX ORDER — FUROSEMIDE 10 MG/ML
40 INJECTION INTRAMUSCULAR; INTRAVENOUS ONCE
Refills: 0 | Status: COMPLETED | OUTPATIENT
Start: 2025-06-28 | End: 2025-06-28

## 2025-06-28 RX ADMIN — CASPOFUNGIN ACETATE 260 MILLIGRAM(S): 5 INJECTION, POWDER, LYOPHILIZED, FOR SOLUTION INTRAVENOUS at 11:05

## 2025-06-28 RX ADMIN — Medication 125 MILLIGRAM(S): at 17:40

## 2025-06-28 RX ADMIN — VORICONAZOLE 200 MILLIGRAM(S): 200 TABLET, FILM COATED ORAL at 17:41

## 2025-06-28 RX ADMIN — Medication 15 MILLILITER(S): at 17:49

## 2025-06-28 RX ADMIN — Medication 800 MILLIGRAM(S): at 17:42

## 2025-06-28 RX ADMIN — Medication 5 MILLILITER(S): at 23:04

## 2025-06-28 RX ADMIN — Medication 25 GRAM(S): at 02:42

## 2025-06-28 RX ADMIN — Medication 5 MILLILITER(S): at 20:02

## 2025-06-28 RX ADMIN — Medication 10 MILLIGRAM(S): at 23:05

## 2025-06-28 RX ADMIN — Medication 40 MILLIGRAM(S): at 06:18

## 2025-06-28 RX ADMIN — Medication 800 MILLIGRAM(S): at 06:03

## 2025-06-28 RX ADMIN — Medication 10 MILLILITER(S): at 15:28

## 2025-06-28 RX ADMIN — VORICONAZOLE 200 MILLIGRAM(S): 200 TABLET, FILM COATED ORAL at 06:04

## 2025-06-28 RX ADMIN — Medication 10 MILLILITER(S): at 11:06

## 2025-06-28 RX ADMIN — Medication 4.5 GRAM(S): at 06:45

## 2025-06-28 RX ADMIN — Medication 25 GRAM(S): at 09:34

## 2025-06-28 RX ADMIN — URSODIOL 300 MILLIGRAM(S): 300 CAPSULE ORAL at 06:03

## 2025-06-28 RX ADMIN — Medication 10 MILLILITER(S): at 08:18

## 2025-06-28 RX ADMIN — Medication 4.5 GRAM(S): at 01:05

## 2025-06-28 RX ADMIN — Medication 650 MILLIGRAM(S): at 10:39

## 2025-06-28 RX ADMIN — Medication 650 MILLIGRAM(S): at 02:59

## 2025-06-28 RX ADMIN — Medication 88 MICROGRAM(S): at 06:03

## 2025-06-28 RX ADMIN — LISINOPRIL 10 MILLIGRAM(S): 5 TABLET ORAL at 17:41

## 2025-06-28 RX ADMIN — LISINOPRIL 10 MILLIGRAM(S): 5 TABLET ORAL at 06:03

## 2025-06-28 RX ADMIN — Medication 1 APPLICATION(S): at 08:31

## 2025-06-28 RX ADMIN — Medication 5 MILLILITER(S): at 11:06

## 2025-06-28 RX ADMIN — Medication 25 GRAM(S): at 13:28

## 2025-06-28 RX ADMIN — Medication 10 MILLILITER(S): at 20:02

## 2025-06-28 RX ADMIN — Medication 15 MILLILITER(S): at 06:02

## 2025-06-28 RX ADMIN — Medication 650 MILLIGRAM(S): at 09:34

## 2025-06-28 RX ADMIN — Medication 10 MILLILITER(S): at 23:04

## 2025-06-28 RX ADMIN — Medication 500000 UNIT(S): at 06:03

## 2025-06-28 RX ADMIN — FUROSEMIDE 40 MILLIGRAM(S): 10 INJECTION INTRAMUSCULAR; INTRAVENOUS at 11:05

## 2025-06-28 RX ADMIN — Medication 10 MILLIGRAM(S): at 17:41

## 2025-06-28 RX ADMIN — METOPROLOL SUCCINATE 12.5 MILLIGRAM(S): 50 TABLET, EXTENDED RELEASE ORAL at 06:04

## 2025-06-28 RX ADMIN — Medication 125 MILLIGRAM(S): at 06:03

## 2025-06-28 RX ADMIN — Medication 5 MILLILITER(S): at 15:28

## 2025-06-28 RX ADMIN — Medication 10 MILLIGRAM(S): at 11:07

## 2025-06-28 RX ADMIN — Medication 5 MILLILITER(S): at 08:18

## 2025-06-28 RX ADMIN — Medication 25 GRAM(S): at 08:17

## 2025-06-28 RX ADMIN — Medication 10 MILLIGRAM(S): at 06:02

## 2025-06-28 RX ADMIN — Medication 8 MILLIGRAM(S): at 09:35

## 2025-06-28 RX ADMIN — Medication 25 GRAM(S): at 21:17

## 2025-06-28 NOTE — PROGRESS NOTE ADULT - SUBJECTIVE AND OBJECTIVE BOX
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      O: Vitals:   Vital Signs Last 24 Hrs  T(C): 38.3 (28 Jun 2025 05:17), Max: 39.5 (27 Jun 2025 21:19)  T(F): 101 (28 Jun 2025 05:17), Max: 103.1 (27 Jun 2025 21:19)  HR: 107 (28 Jun 2025 05:17) (60 - 107)  BP: 138/81 (28 Jun 2025 05:17) (132/64 - 189/64)  BP(mean): --  RR: 19 (28 Jun 2025 05:17) (18 - 19)  SpO2: 96% (28 Jun 2025 05:17) (96% - 100%)    Parameters below as of 28 Jun 2025 05:17  Patient On (Oxygen Delivery Method): room air    Admit weight: 64.5kg  Daily Weight in kG:  (28 Jun 2025)    Intake / Output:   06-26 @ 07:01 - 06-27 @ 07:00  --------------------------------------------------------  IN: 1372 mL / OUT: 1800 mL / NET: -428 mL    06-27 @ 07:01 - 06-28 @ 06:53  --------------------------------------------------------  IN: 5013 mL / OUT: 1100 mL / NET: 3913 mL      PE:   Oropharynx: no erythema or ulcerations   Oral Mucositis:              -                                      Grade: n/a   CVS: S1, S2 RRR   Lungs: CTA throughout bilaterally   Abdomen: + BS x 4, soft, NT, ND   Extremities: no edema   Gastric Mucositis:                 -                               Grade: n/a   Intestinal Mucositis:              -                               Grade: n/a   Skin: no rash   TLC: CDI   Neuro: A&O x 3      Labs:                 Cultures:   Respiratory Viral Panel with COVID-19 by [facility] (06.20.25 @ 21:02)   Rapid RVP Result: [id number]: NotDetec:    Culture - Blood (06.20.25 @ 18:45)   Specimen Source: Blood Blood-Catheter    Culture Results: No growth at 24 hoursCulture - Blood (06.20.25 @ 18:40)   Specimen Source: Blood Blood-Peripheral  Culture Results: No growth     Culture - Urine (06.20.25 @ 21:01)   Specimen Source: Clean Catch Clean Catch (Midstream)  Culture Results: 10,000 - 49,000 CFU/mL Gram Positive Cocci in Pairs and Chains   <10,000 CFU/ml Normal Urogenital cha present    Radiology:   < from: Xray Chest 1 View- PORTABLE-Urgent (Xray Chest 1 View- PORTABLE-Urgent .) (06.20.25 @ 19:28) >  IMPRESSION: Clear lungs.    Meds:   Antimicrobials:   acyclovir   Oral Tab/Cap 800 milliGRAM(s) Oral every 12 hours  nystatin    Suspension 264331 Unit(s) Oral every 6 hours  piperacillin/tazobactam IVPB.- 4.5 Gram(s) IV Intermittent once  piperacillin/tazobactam IVPB.. 4.5 Gram(s) IV Intermittent every 8 hours  vancomycin    Solution 125 milliGRAM(s) Oral every 12 hours  voriconazole 200 milliGRAM(s) Oral every 12 hours      Heme / Onc:       GI:  pantoprazole    Tablet 40 milliGRAM(s) Oral before breakfast  polyethylene glycol 3350 17 Gram(s) Oral daily PRN  sodium bicarbonate Mouth Rinse 10 milliLiter(s) Swish and Spit five times a day  ursodiol Capsule 300 milliGRAM(s) Oral two times a day      Cardiovascular:   lisinopril 10 milliGRAM(s) Oral every 12 hours  metoprolol succinate ER 12.5 milliGRAM(s) Oral daily      Immunologic:       Other medications:   Biotene Dry Mouth Oral Rinse 5 milliLiter(s) Swish and Spit five times a day  buPROPion XL (24-Hour) . 300 milliGRAM(s) Oral daily  chlorhexidine 0.12% Liquid 15 milliLiter(s) Swish and Spit two times a day  chlorhexidine 4% Liquid 1 Application(s) Topical <User Schedule>  fentaNYL   Patch  25 MICROgram(s)/Hr. 1 Patch Transdermal every 48 hours  levothyroxine 88 MICROGram(s) Oral daily  phytonadione   Solution 5 milliGRAM(s) Oral <User Schedule>  prochlorperazine   Injectable 10 milliGRAM(s) IV Push every 6 hours  sodium chloride 0.9%. 1000 milliLiter(s) IV Continuous <Continuous>      PRN:   acetaminophen     Tablet .. 650 milliGRAM(s) Oral every 6 hours PRN  FIRST- Mouthwash  BLM 15 milliLiter(s) Swish and Swallow five times a day PRN  LORazepam     Tablet 0.5 milliGRAM(s) Oral every 6 hours PRN  ondansetron Injectable 8 milliGRAM(s) IV Push every 8 hours PRN  polyethylene glycol 3350 17 Gram(s) Oral daily PRN  sodium chloride 0.9% lock flush 10 milliLiter(s) IV Push every 1 hour PRN    A/P: 64 year old female with hx of myelofibrosis presents for a MUD allogeneic SCT (DP Permissive) conditioning with Flu/Bu/Thiotepa/rATG.  Today is day +1  6/21- Thiotepa 1/2. Maintain skin precautions during thiotepa administration and for 48 hours after completion (days .6 through day -3). Chronic pain issues - continue home fentanyl patch. Febrile overnight, tmax 100.9. Cultures  pending. RVP negative. CXR pending official read, no obvious consolidations. Vital signs are stable.   6/23 D/C HCTZ. Fludarabine 1/3, Busulfan 1/3, rATG 1/3. No Tylenol on busulfan days. Continue keppra ppx for 24 hours post infusion of last dose of busulfan. No acute events overnight, vital signs are stable.   6/24- overnight patient was febrile during ATG, no cultures/abx as fever likely secondary to ATG. Patient was otherwise stable overnight. Fludarabine 2/3, Busulfan 2/3 and rATG 2/3. NO Tylenol or Azoles until Day 0. Continue with supportive care.  6/25- Fludarabine 3/3, busulfan 3/3, rATG 3/3. No acute events overnight. Vital signs are stable.   6/26 Switched Compazine to atc  6/27 VSS, afebrile. 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  6/28 VSS, persistent fevers likely due to CRS post-SCT, continues on zosyn. Cultures pending.     1. Infectious Disease:   acyclovir Oral Tab/Cap 800 milliGRAM(s) Oral every 12 hours  piperacillin/tazobactam IVPB.- 4.5 Gram(s) IV Intermittent once  piperacillin/tazobactam IVPB.. 4.5 Gram(s) IV Intermittent every 8 hours  vancomycin Solution 125 milliGRAM(s) Oral every 12 hours  voriconazole 200 milliGRAM(s) Oral every 12 hours    2. VOD Prophylaxis:   ursodiol Capsule 300 milliGRAM(s) Oral two times a day    3. GI Prophylaxis:    pantoprazole Tablet 40 milliGRAM(s) Oral before breakfast    4. Mouthcare - NS / NaHCO3 rinses, Biotene; Skin care     5. GVHD prophylaxis   ABC: PTCy days +3, +4, Bortezomib days 0, +3, PTCy days +5, +14, +28     6. Transfuse & replete electrolytes prn     7. IV hydration, daily weights, strict I&O, prn diuresis     8. PO intake as tolerated, nutrition follow up as needed, MVI, folic acid     9. Antiemetics, anti-diarrhea medications:   prochlorperazine   Injectable 10 milliGRAM(s) IV Push every 6 hours   ondansetron  IVPB 16 milliGRAM(s) IV Intermittent every 24 hours    10. OOB as tolerated, physical therapy consult if needed     11. Monitor coags 2x week, vitamin K BIW  phytonadione Solution 5 milliGRAM(s) Oral <User Schedule>    12. Monitor closely for clinical changes, monitor for fevers     13. Emotional support provided, plan of care discussed and questions addressed     14. Patient education done regarding chemotherapy prep, plan of care, restrictions and discharge planning     15. Continue regular social work input     I have written the above note for Dr. Pope who performed service with me in the room.   Bobo Barriga PA-C ([phone])    I have seen and examined patient with PA, I agree with above note as scribed.                        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 prep regimen for treatment of myelofibrosis    Disease: myelofibrosis   Prep regimen: Flu / Bu / Thiotepa / rATG   GVHD ppx: ABC: PTCy days +3, +4, Bortezomib days 0, +3, PTCy days +5, +14, +28   ABO/CMV:   Recipient:   A+/CMV+   Donor:   B+/CMV+     S: Patient seen and examined with HPC Transplant Team:   Overnight patient with persistent fevers, given tylenol and pancx.  Patient complains of throat pain.    O: Vitals:   Vital Signs Last 24 Hrs  T(C): 38.3 (2025 05:17), Max: 39.5 (2025 21:19)  T(F): 101 (2025 05:17), Max: 103.1 (2025 21:19)  HR: 107 (2025 05:17) (60 - 107)  BP: 138/81 (2025 05:17) (132/64 - 189/64)  BP(mean): --  RR: 19 (2025 05:17) (18 - 19)  SpO2: 96% (2025 05:17) (96% - 100%)    Parameters below as of 2025 05:17  Patient On (Oxygen Delivery Method): room air    Admit weight: 64.5kg  Daily Weight in k.3 (2025)    Intake / Output:    @ : @ 07:00  --------------------------------------------------------  IN: 1372 mL / OUT: 1800 mL / NET: -428 mL     @ : @ 06:53  --------------------------------------------------------  IN: 5013 mL / OUT: 1100 mL / NET: 3913 mL      PE:   Oropharynx: no erythema or ulcerations   Oral Mucositis:              -                                      Grade: n/a   CVS: S1, S2 RRR   Lungs: CTA throughout bilaterally   Abdomen: + BS x 4, soft, NT, ND   Extremities: no edema   Gastric Mucositis:                 -                               Grade: n/a   Intestinal Mucositis:              -                               Grade: n/a   Skin: no rash   TLC: CDI   Neuro: A&O x 3      Labs:   CBC Full  -  ( 2025 06:34 )  WBC Count : 0.10 K/uL  RBC Count : 2.34 M/uL  Hemoglobin : 6.8 g/dL  Hematocrit : 21.4 %  Platelet Count - Automated : 8 K/uL  Mean Cell Volume : 91.5 fl  Mean Cell Hemoglobin : 29.1 pg  Mean Cell Hemoglobin Concentration : 31.8 g/dL  Auto Neutrophil # : Not measured  Auto Lymphocyte # : Not measured  Auto Monocyte # : Not measured  Auto Eosinophil # : Not measured  Auto Basophil # : Not measured  Auto Neutrophil % : Not measured Differential percentages must be correlated with absolute numbers for  clinical significance.  Auto Lymphocyte % : Not measured  Auto Monocyte % : Not measured  Auto Eosinophil % : Not measured  Auto Basophil % : Not measured                          6.8    0.10  )-----------( 8        ( 2025 06:34 )             21.4         137  |  111[H]  |  8   ----------------------------<  104[H]  3.8   |  15[L]  |  0.64    Ca    7.5[L]      2025 06:34  Phos  2.2       Mg     1.4         TPro  4.6[L]  /  Alb  2.4[L]  /  TBili  0.4  /  DBili  x   /  AST  16  /  ALT  11  /  AlkPhos  74          Cultures:   Respiratory Viral Panel with COVID-19 by [facility] (25 @ 21:02)   Rapid RVP Result: [id number]: NotDetec:    Culture - Blood (25 @ 18:45)   Specimen Source: Blood Blood-Catheter    Culture Results: No growth at 24 hoursCulture - Blood (25 @ 18:40)   Specimen Source: Blood Blood-Peripheral  Culture Results: No growth     Culture - Urine (25 @ 21:01)   Specimen Source: Clean Catch Clean Catch (Midstream)  Culture Results: 10,000 - 49,000 CFU/mL Gram Positive Cocci in Pairs and Chains   <10,000 CFU/ml Normal Urogenital cha present    Radiology:   < from: Xray Chest 1 View- PORTABLE-Urgent (Xray Chest 1 View- PORTABLE-Urgent .) (25 @ 19:28) >  IMPRESSION: Clear lungs.    Meds:   Antimicrobials:   acyclovir   Oral Tab/Cap 800 milliGRAM(s) Oral every 12 hours  nystatin    Suspension 550462 Unit(s) Oral every 6 hours  piperacillin/tazobactam IVPB.- 4.5 Gram(s) IV Intermittent once  piperacillin/tazobactam IVPB.. 4.5 Gram(s) IV Intermittent every 8 hours  vancomycin    Solution 125 milliGRAM(s) Oral every 12 hours  voriconazole 200 milliGRAM(s) Oral every 12 hours      Heme / Onc:       GI:  pantoprazole    Tablet 40 milliGRAM(s) Oral before breakfast  polyethylene glycol 3350 17 Gram(s) Oral daily PRN  sodium bicarbonate Mouth Rinse 10 milliLiter(s) Swish and Spit five times a day  ursodiol Capsule 300 milliGRAM(s) Oral two times a day      Cardiovascular:   lisinopril 10 milliGRAM(s) Oral every 12 hours  metoprolol succinate ER 12.5 milliGRAM(s) Oral daily      Immunologic:       Other medications:   Biotene Dry Mouth Oral Rinse 5 milliLiter(s) Swish and Spit five times a day  buPROPion XL (24-Hour) . 300 milliGRAM(s) Oral daily  chlorhexidine 0.12% Liquid 15 milliLiter(s) Swish and Spit two times a day  chlorhexidine 4% Liquid 1 Application(s) Topical <User Schedule>  fentaNYL   Patch  25 MICROgram(s)/Hr. 1 Patch Transdermal every 48 hours  levothyroxine 88 MICROGram(s) Oral daily  phytonadione   Solution 5 milliGRAM(s) Oral <User Schedule>  prochlorperazine   Injectable 10 milliGRAM(s) IV Push every 6 hours  sodium chloride 0.9%. 1000 milliLiter(s) IV Continuous <Continuous>      PRN:   acetaminophen     Tablet .. 650 milliGRAM(s) Oral every 6 hours PRN  FIRST- Mouthwash  BLM 15 milliLiter(s) Swish and Swallow five times a day PRN  LORazepam     Tablet 0.5 milliGRAM(s) Oral every 6 hours PRN  ondansetron Injectable 8 milliGRAM(s) IV Push every 8 hours PRN  polyethylene glycol 3350 17 Gram(s) Oral daily PRN  sodium chloride 0.9% lock flush 10 milliLiter(s) IV Push every 1 hour PRN    A/P: 64 year old female with hx of myelofibrosis presents for a MUD allogeneic SCT (DP Permissive) conditioning with Flu/Bu/Thiotepa/rATG.  Today is day +1  - Thiotepa /. Maintain skin precautions during thiotepa administration and for 48 hours after completion (days .6 through day -3). Chronic pain issues - continue home fentanyl patch. Febrile overnight, tmax 100.9. Cultures  pending. RVP negative. CXR pending official read, no obvious consolidations. Vital signs are stable.    D/C HCTZ. Fludarabine 1/3, Busulfan 1/3, rATG 1/3. No Tylenol on busulfan days. Continue keppra ppx for 24 hours post infusion of last dose of busulfan. No acute events overnight, vital signs are stable.   - overnight patient was febrile during ATG, no cultures/abx as fever likely secondary to ATG. Patient was otherwise stable overnight. Fludarabine 2/3, Busulfan 2/3 and rATG 2/3. NO Tylenol or Azoles until Day 0. Continue with supportive care.  - Fludarabine 3/3, busulfan 3/3, rATG 3/3. No acute events overnight. Vital signs are stable.    Switched Compazine to atc   VSS, afebrile. 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   VSS, persistent fevers likely due to CRS post-SCT, continues on zosyn. Cultures pending. Hypervolemia - lasix 40mg x 1, will continue strict I/Os. Started caspofungin due to persistent thrush.    1. Infectious Disease:   acyclovir Oral Tab/Cap 800 milliGRAM(s) Oral every 12 hours  piperacillin/tazobactam IVPB.- 4.5 Gram(s) IV Intermittent once  piperacillin/tazobactam IVPB.. 4.5 Gram(s) IV Intermittent every 8 hours  vancomycin Solution 125 milliGRAM(s) Oral every 12 hours  voriconazole 200 milliGRAM(s) Oral every 12 hours  Caspofungin    2. VOD Prophylaxis:   ursodiol Capsule 300 milliGRAM(s) Oral two times a day    3. GI Prophylaxis:    pantoprazole Tablet 40 milliGRAM(s) Oral before breakfast    4. Mouthcare - NS / NaHCO3 rinses, Biotene; Skin care     5. GVHD prophylaxis   ABC: PTCy days +3, +4, Bortezomib days 0, +3, PTCy days +5, +14, +28     6. Transfuse & replete electrolytes prn    1U PRBC & 1U Plt   Hypomagnesemia - repleted    7. IV hydration, daily weights, strict I&O, prn diuresis     8. PO intake as tolerated, nutrition follow up as needed, MVI, folic acid     9. Antiemetics, anti-diarrhea medications:   prochlorperazine   Injectable 10 milliGRAM(s) IV Push every 6 hours   ondansetron  IVPB 16 milliGRAM(s) IV Intermittent every 24 hours    10. OOB as tolerated, physical therapy consult if needed     11. Monitor coags 2x week, vitamin K BIW  phytonadione Solution 5 milliGRAM(s) Oral <User Schedule>    12. Monitor closely for clinical changes, monitor for fevers     13. Emotional support provided, plan of care discussed and questions addressed     14. Patient education done regarding chemotherapy prep, plan of care, restrictions and discharge planning     15. Continue regular social work input     I have written the above note for Dr. Pope who performed service with me in the room.   Bobo Barriga PA-C ([phone])    I have seen and examined patient with PA, I agree with above note as scribed.

## 2025-06-28 NOTE — PROVIDER CONTACT NOTE (OTHER) - ACTION/TREATMENT ORDERED:
650mg acetaminophen PO, will continue to monitor. apply cold packs and recheck within an hr. will continue to monitor.

## 2025-06-28 NOTE — PROGRESS NOTE ADULT - NS PANP COMMENT GEN_ALL_CORE FT
I rounded with the team including nurses, ACPs and PharmD.  I reviewed the data in depth.   I saw and examined the patient myself.   I reviewed and confirmed the above note.  The patient is day 0- prior to allogeneic HSCT.  The patient is doing well. She has sore throat.   The vitals are stable. The oral cavity is significant for oral thrush. . The line site is clean. The lungs are clear. There is no LE edema.  Overall, there are no specific issues. The patient is on appropriate therapy at this stage. We will taper off her analgesics. She started voriconazole. If her thrush does not improved in the coming day or so, we will add a course of caspofungin.   I answered the patient’s questions.  I encouraged po intake and ambulation.  YEIMI Godoy MD I rounded with the team including nurses, ACPs and PharmD.  I reviewed the data in depth.   I saw and examined the patient myself.   I reviewed and confirmed the above note.  The patient is day 0- prior to allogeneic HSCT.  The patient has developed CRS. She has sore throat.   The oral cavity is significant for oral thrush with no improvement. The line site is clean. The lungs are clear. There is no LE edema.  Overall, there are no specific issues. The patient is on appropriate therapy at this stage. We will taper off her analgesics. We will add 5 days of caspofungin.   I answered the patient’s questions.  I encouraged po intake and ambulation.  YEIMI Godoy MD

## 2025-06-29 LAB
ALBUMIN SERPL ELPH-MCNC: 2.8 G/DL — LOW (ref 3.3–5)
ALP SERPL-CCNC: 75 U/L — SIGNIFICANT CHANGE UP (ref 40–120)
ALT FLD-CCNC: 9 U/L — LOW (ref 10–45)
ANION GAP SERPL CALC-SCNC: 13 MMOL/L — SIGNIFICANT CHANGE UP (ref 5–17)
AST SERPL-CCNC: 11 U/L — SIGNIFICANT CHANGE UP (ref 10–40)
BASOPHILS # BLD AUTO: SIGNIFICANT CHANGE UP (ref 0–0.2)
BASOPHILS NFR BLD AUTO: SIGNIFICANT CHANGE UP (ref 0–2)
BILIRUB SERPL-MCNC: 0.5 MG/DL — SIGNIFICANT CHANGE UP (ref 0.2–1.2)
BUN SERPL-MCNC: 10 MG/DL — SIGNIFICANT CHANGE UP (ref 7–23)
CALCIUM SERPL-MCNC: 7.9 MG/DL — LOW (ref 8.4–10.5)
CHLORIDE SERPL-SCNC: 108 MMOL/L — SIGNIFICANT CHANGE UP (ref 96–108)
CO2 SERPL-SCNC: 18 MMOL/L — LOW (ref 22–31)
CREAT SERPL-MCNC: 0.57 MG/DL — SIGNIFICANT CHANGE UP (ref 0.5–1.3)
CULTURE RESULTS: SIGNIFICANT CHANGE UP
EGFR: 102 ML/MIN/1.73M2 — SIGNIFICANT CHANGE UP
EGFR: 102 ML/MIN/1.73M2 — SIGNIFICANT CHANGE UP
EOSINOPHIL # BLD AUTO: SIGNIFICANT CHANGE UP (ref 0–0.5)
EOSINOPHIL NFR BLD AUTO: SIGNIFICANT CHANGE UP (ref 0–6)
GLUCOSE SERPL-MCNC: 100 MG/DL — HIGH (ref 70–99)
HCT VFR BLD CALC: 24.1 % — LOW (ref 34.5–45)
HGB BLD-MCNC: 7.6 G/DL — LOW (ref 11.5–15.5)
IMM GRANULOCYTES # BLD AUTO: SIGNIFICANT CHANGE UP (ref 0–0.07)
IMM GRANULOCYTES NFR BLD AUTO: SIGNIFICANT CHANGE UP (ref 0–0.9)
IMMATURE PLATELET FRACTION #: 0.3 K/UL — LOW (ref 4.7–11.1)
IMMATURE PLATELET FRACTION #: 0.3 K/UL — LOW (ref 4.7–11.1)
IMMATURE PLATELET FRACTION %: 1.8 % — SIGNIFICANT CHANGE UP (ref 1.6–4.9)
IMMATURE PLATELET FRACTION %: 4.6 % — SIGNIFICANT CHANGE UP (ref 1.6–4.9)
LDH SERPL L TO P-CCNC: 311 U/L — HIGH (ref 50–242)
LYMPHOCYTES # BLD AUTO: SIGNIFICANT CHANGE UP (ref 1–3.3)
LYMPHOCYTES NFR BLD AUTO: SIGNIFICANT CHANGE UP (ref 13–44)
MAGNESIUM SERPL-MCNC: 1.8 MG/DL — SIGNIFICANT CHANGE UP (ref 1.6–2.6)
MCHC RBC-ENTMCNC: 28.5 PG — SIGNIFICANT CHANGE UP (ref 27–34)
MCHC RBC-ENTMCNC: 31.5 G/DL — LOW (ref 32–36)
MCV RBC AUTO: 90.3 FL — SIGNIFICANT CHANGE UP (ref 80–100)
MONOCYTES # BLD AUTO: SIGNIFICANT CHANGE UP (ref 0–0.9)
MONOCYTES NFR BLD AUTO: SIGNIFICANT CHANGE UP (ref 2–14)
NEUTROPHILS # BLD AUTO: SIGNIFICANT CHANGE UP (ref 1.8–7.4)
NEUTROPHILS NFR BLD AUTO: SIGNIFICANT CHANGE UP (ref 43–77)
NRBC # BLD AUTO: 0 K/UL — SIGNIFICANT CHANGE UP (ref 0–0)
NRBC # FLD: 0 K/UL — SIGNIFICANT CHANGE UP (ref 0–0)
NRBC BLD AUTO-RTO: 0 /100 WBCS — SIGNIFICANT CHANGE UP (ref 0–0)
PHOSPHATE SERPL-MCNC: 2.5 MG/DL — SIGNIFICANT CHANGE UP (ref 2.5–4.5)
PLATELET # BLD AUTO: 18 K/UL — CRITICAL LOW (ref 150–400)
PLATELET # BLD AUTO: 6 K/UL — CRITICAL LOW (ref 150–400)
PMV BLD: SIGNIFICANT CHANGE UP FL (ref 7–13)
POTASSIUM SERPL-MCNC: 3.2 MMOL/L — LOW (ref 3.5–5.3)
POTASSIUM SERPL-SCNC: 3.2 MMOL/L — LOW (ref 3.5–5.3)
PROT SERPL-MCNC: 5 G/DL — LOW (ref 6–8.3)
RBC # BLD: 2.67 M/UL — LOW (ref 3.8–5.2)
RBC # FLD: 17.7 % — HIGH (ref 10.3–14.5)
SODIUM SERPL-SCNC: 139 MMOL/L — SIGNIFICANT CHANGE UP (ref 135–145)
SPECIMEN SOURCE: SIGNIFICANT CHANGE UP
WBC # BLD: 0.06 K/UL — CRITICAL LOW (ref 3.8–10.5)
WBC # FLD AUTO: 0.06 K/UL — CRITICAL LOW (ref 3.8–10.5)

## 2025-06-29 PROCEDURE — 99233 SBSQ HOSP IP/OBS HIGH 50: CPT

## 2025-06-29 RX ADMIN — Medication 650 MILLIGRAM(S): at 11:42

## 2025-06-29 RX ADMIN — Medication 10 MILLILITER(S): at 08:46

## 2025-06-29 RX ADMIN — URSODIOL 300 MILLIGRAM(S): 300 CAPSULE ORAL at 05:52

## 2025-06-29 RX ADMIN — Medication 10 MILLILITER(S): at 11:59

## 2025-06-29 RX ADMIN — Medication 5 MILLILITER(S): at 23:48

## 2025-06-29 RX ADMIN — Medication 50 MILLIEQUIVALENT(S): at 11:58

## 2025-06-29 RX ADMIN — Medication 10 MILLIGRAM(S): at 05:39

## 2025-06-29 RX ADMIN — Medication 650 MILLIGRAM(S): at 09:51

## 2025-06-29 RX ADMIN — METOPROLOL SUCCINATE 12.5 MILLIGRAM(S): 50 TABLET, EXTENDED RELEASE ORAL at 05:52

## 2025-06-29 RX ADMIN — Medication 5 MILLILITER(S): at 15:06

## 2025-06-29 RX ADMIN — Medication 125 MILLIGRAM(S): at 17:36

## 2025-06-29 RX ADMIN — Medication 88 MICROGRAM(S): at 05:52

## 2025-06-29 RX ADMIN — Medication 5 MILLILITER(S): at 11:58

## 2025-06-29 RX ADMIN — Medication 5 MILLILITER(S): at 08:46

## 2025-06-29 RX ADMIN — Medication 800 MILLIGRAM(S): at 17:36

## 2025-06-29 RX ADMIN — Medication 15 MILLILITER(S): at 09:40

## 2025-06-29 RX ADMIN — Medication 650 MILLIGRAM(S): at 22:20

## 2025-06-29 RX ADMIN — Medication 1 APPLICATION(S): at 08:47

## 2025-06-29 RX ADMIN — Medication 25 GRAM(S): at 21:42

## 2025-06-29 RX ADMIN — Medication 125 MILLIGRAM(S): at 05:51

## 2025-06-29 RX ADMIN — Medication 40 MILLIGRAM(S): at 06:04

## 2025-06-29 RX ADMIN — BUPROPION HYDROBROMIDE 300 MILLIGRAM(S): 522 TABLET, EXTENDED RELEASE ORAL at 11:58

## 2025-06-29 RX ADMIN — CASPOFUNGIN ACETATE 250 MILLIGRAM(S): 5 INJECTION, POWDER, LYOPHILIZED, FOR SOLUTION INTRAVENOUS at 09:40

## 2025-06-29 RX ADMIN — Medication 800 MILLIGRAM(S): at 05:53

## 2025-06-29 RX ADMIN — Medication 10 MILLILITER(S): at 15:06

## 2025-06-29 RX ADMIN — LISINOPRIL 10 MILLIGRAM(S): 5 TABLET ORAL at 05:52

## 2025-06-29 RX ADMIN — Medication 5 MILLILITER(S): at 19:39

## 2025-06-29 RX ADMIN — Medication 10 MILLILITER(S): at 19:39

## 2025-06-29 RX ADMIN — Medication 10 MILLIGRAM(S): at 11:58

## 2025-06-29 RX ADMIN — Medication 8 MILLIGRAM(S): at 08:32

## 2025-06-29 RX ADMIN — Medication 10 MILLIGRAM(S): at 17:36

## 2025-06-29 RX ADMIN — VORICONAZOLE 200 MILLIGRAM(S): 200 TABLET, FILM COATED ORAL at 05:52

## 2025-06-29 RX ADMIN — LISINOPRIL 10 MILLIGRAM(S): 5 TABLET ORAL at 17:35

## 2025-06-29 RX ADMIN — Medication 50 MILLIEQUIVALENT(S): at 14:26

## 2025-06-29 RX ADMIN — Medication 25 GRAM(S): at 05:51

## 2025-06-29 RX ADMIN — Medication 50 MILLIEQUIVALENT(S): at 08:32

## 2025-06-29 RX ADMIN — VORICONAZOLE 200 MILLIGRAM(S): 200 TABLET, FILM COATED ORAL at 17:35

## 2025-06-29 RX ADMIN — Medication 15 MILLILITER(S): at 17:36

## 2025-06-29 RX ADMIN — Medication 650 MILLIGRAM(S): at 21:20

## 2025-06-29 RX ADMIN — Medication 25 GRAM(S): at 14:26

## 2025-06-29 RX ADMIN — Medication 10 MILLIGRAM(S): at 23:49

## 2025-06-29 RX ADMIN — Medication 10 MILLILITER(S): at 23:48

## 2025-06-29 NOTE — PROGRESS NOTE ADULT - NS PANP COMMENT GEN_ALL_CORE FT
I rounded with the team including nurses, ACPs and PharmD.  I reviewed the data in depth.   I saw and examined the patient myself.   I reviewed and confirmed the above note.  The patient is day 0- prior to allogeneic HSCT.  The patient has developed CRS. She has sore throat.   The oral cavity is significant for oral thrush with no improvement. The line site is clean. The lungs are clear. There is no LE edema.  Overall, there are no specific issues. The patient is on appropriate therapy at this stage. We will taper off her analgesics. We will add 5 days of caspofungin.   I answered the patient’s questions.  I encouraged po intake and ambulation.  YEIMI Godoy MD I rounded with the team including nurses, ACPs and PharmD.  I reviewed the data in depth.   I saw and examined the patient myself.   I reviewed and confirmed the above note.  The patient is day +2 prior to allogeneic HSCT.  The patient has developed CRS. Her sore throat is better.   The oral cavity is clear. The line site is clean. The lungs are clear. There is no LE edema.  Overall, there are no specific issues. The patient is on appropriate therapy at this stage. We will taper off her analgesics. We will add 5 days of caspofungin.   I answered the patient’s questions.  I encouraged po intake and ambulation.  YEIMI Godoy MD

## 2025-06-29 NOTE — PROGRESS NOTE ADULT - SUBJECTIVE AND OBJECTIVE BOX
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    O: Vitals:   Vital Signs Last 24 Hrs  T(C): 37.8 (29 Jun 2025 05:17), Max: 38.9 (28 Jun 2025 08:28)  T(F): 100 (29 Jun 2025 05:17), Max: 102 (28 Jun 2025 08:28)  HR: 73 (29 Jun 2025 05:17) (70 - 103)  BP: 149/63 (29 Jun 2025 05:17) (138/65 - 160/64)  BP(mean): --  RR: 18 (29 Jun 2025 05:17) (18 - 19)  SpO2: 97% (29 Jun 2025 05:17) (96% - 99%)    Parameters below as of 29 Jun 2025 05:17  Patient On (Oxygen Delivery Method): room air    Admit weight: 64.5kg  Daily Weight in kG:  (29 Jun 2025)    Intake / Output:   06-28 @ 07:01  -  06-29 @ 07:00  --------------------------------------------------------  IN: 3046 mL / OUT: 3900 mL / NET: -854 mL      PE:   Oropharynx: no erythema or ulcerations   Oral Mucositis:              -                                      Grade: n/a   CVS: S1, S2 RRR   Lungs: CTA throughout bilaterally   Abdomen: + BS x 4, soft, NT, ND   Extremities: no edema   Gastric Mucositis:                 -                               Grade: n/a   Intestinal Mucositis:              -                               Grade: n/a   Skin: no rash   TLC: CDI   Neuro: A&O x 3      Labs:       06-29    139  |  108  |  10  ----------------------------<  100[H]  3.2[L]   |  18[L]  |  0.57    Ca    7.9[L]      29 Jun 2025 06:28  Phos  2.5     06-29  Mg     1.8     06-29    TPro  5.0[L]  /  Alb  2.8[L]  /  TBili  0.5  /  DBili  x   /  AST  11  /  ALT  9[L]  /  AlkPhos  75  06-29      LIVER FUNCTIONS - ( 29 Jun 2025 06:28 )  Alb: 2.8 g/dL / Pro: 5.0 g/dL / ALK PHOS: 75 U/L / ALT: 9 U/L / AST: 11 U/L / GGT: x           Lactate Dehydrogenase, Serum: 311 U/L (06-29 @ 06:28)      Cultures:   Respiratory Viral Panel with COVID-19 by [facility] (06.20.25 @ 21:02)   Rapid RVP Result: [id number]: NotDetec:    Culture - Blood (06.20.25 @ 18:45)   Specimen Source: Blood Blood-Catheter    Culture Results: No growth at 24 hoursCulture - Blood (06.20.25 @ 18:40)   Specimen Source: Blood Blood-Peripheral  Culture Results: No growth     Culture - Urine (06.20.25 @ 21:01)   Specimen Source: Clean Catch Clean Catch (Midstream)  Culture Results: 10,000 - 49,000 CFU/mL Gram Positive Cocci in Pairs and Chains   <10,000 CFU/ml Normal Urogenital cha present    Radiology:   < from: Xray Chest 1 View- PORTABLE-Urgent (Xray Chest 1 View- PORTABLE-Urgent .) (06.20.25 @ 19:28) >  IMPRESSION: Clear lungs.      Meds:   Antimicrobials:   acyclovir   Oral Tab/Cap 800 milliGRAM(s) Oral every 12 hours  caspofungin IVPB      caspofungin IVPB 50 milliGRAM(s) IV Intermittent every 24 hours  piperacillin/tazobactam IVPB.. 4.5 Gram(s) IV Intermittent every 8 hours  vancomycin    Solution 125 milliGRAM(s) Oral every 12 hours  voriconazole 200 milliGRAM(s) Oral every 12 hours      Heme / Onc:       GI:  pantoprazole    Tablet 40 milliGRAM(s) Oral before breakfast  polyethylene glycol 3350 17 Gram(s) Oral daily PRN  sodium bicarbonate Mouth Rinse 10 milliLiter(s) Swish and Spit five times a day  ursodiol Capsule 300 milliGRAM(s) Oral two times a day      Cardiovascular:   lisinopril 10 milliGRAM(s) Oral every 12 hours  metoprolol succinate ER 12.5 milliGRAM(s) Oral daily      Immunologic:       Other medications:   Biotene Dry Mouth Oral Rinse 5 milliLiter(s) Swish and Spit five times a day  buPROPion XL (24-Hour) . 300 milliGRAM(s) Oral daily  chlorhexidine 0.12% Liquid 15 milliLiter(s) Swish and Spit two times a day  chlorhexidine 4% Liquid 1 Application(s) Topical <User Schedule>  fentaNYL   Patch  25 MICROgram(s)/Hr. 1 Patch Transdermal every 48 hours  levothyroxine 88 MICROGram(s) Oral daily  phytonadione   Solution 5 milliGRAM(s) Oral <User Schedule>  potassium chloride  20 mEq/100 mL IVPB 20 milliEquivalent(s) IV Intermittent every 2 hours  prochlorperazine   Injectable 10 milliGRAM(s) IV Push every 6 hours  sodium chloride 0.9%. 1000 milliLiter(s) IV Continuous <Continuous>      PRN:   acetaminophen     Tablet .. 650 milliGRAM(s) Oral every 6 hours PRN  FIRST- Mouthwash  BLM 15 milliLiter(s) Swish and Swallow five times a day PRN  LORazepam     Tablet 0.5 milliGRAM(s) Oral every 6 hours PRN  ondansetron Injectable 8 milliGRAM(s) IV Push every 8 hours PRN  polyethylene glycol 3350 17 Gram(s) Oral daily PRN  sodium chloride 0.9% lock flush 10 milliLiter(s) IV Push every 1 hour PRN    A/P: 64 year old female with hx of myelofibrosis presents for a MUD allogeneic SCT (DP Permissive) conditioning with Flu/Bu/Thiotepa/rATG.  Today is day +2  6/21- Thiotepa 1/2. Maintain skin precautions during thiotepa administration and for 48 hours after completion (days .6 through day -3). Chronic pain issues - continue home fentanyl patch. Febrile overnight, tmax 100.9. Cultures  pending. RVP negative. CXR pending official read, no obvious consolidations. Vital signs are stable.   6/23 D/C HCTZ. Fludarabine 1/3, Busulfan 1/3, rATG 1/3. No Tylenol on busulfan days. Continue keppra ppx for 24 hours post infusion of last dose of busulfan. No acute events overnight, vital signs are stable.   6/24- overnight patient was febrile during ATG, no cultures/abx as fever likely secondary to ATG. Patient was otherwise stable overnight. Fludarabine 2/3, Busulfan 2/3 and rATG 2/3. NO Tylenol or Azoles until Day 0. Continue with supportive care.  6/25- Fludarabine 3/3, busulfan 3/3, rATG 3/3. No acute events overnight. Vital signs are stable.   6/26 Switched Compazine to atc  6/27 VSS, afebrile. 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  6/28 VSS, persistent fevers likely due to CRS post-SCT, continues on zosyn. Cultures pending. Hypervolemia - lasix 40mg x 1, will continue strict I/Os. Started caspofungin due to persistent thrush.    1. Infectious Disease:   acyclovir Oral Tab/Cap 800 milliGRAM(s) Oral every 12 hours  piperacillin/tazobactam IVPB.- 4.5 Gram(s) IV Intermittent once  piperacillin/tazobactam IVPB.. 4.5 Gram(s) IV Intermittent every 8 hours  vancomycin Solution 125 milliGRAM(s) Oral every 12 hours  voriconazole 200 milliGRAM(s) Oral every 12 hours  caspofungin IVPB 50 milliGRAM(s) IV Intermittent every 24 hours    2. VOD Prophylaxis:   ursodiol Capsule 300 milliGRAM(s) Oral two times a day    3. GI Prophylaxis:    pantoprazole Tablet 40 milliGRAM(s) Oral before breakfast    4. Mouthcare - NS / NaHCO3 rinses, Biotene; Skin care     5. GVHD prophylaxis   ABC: PTCy days +3, +4, Bortezomib days 0, +3, PTCy days +5, +14, +28     6. Transfuse & replete electrolytes prn   6/29 Hypokalemia - repleted    7. IV hydration, daily weights, strict I&O, prn diuresis     8. PO intake as tolerated, nutrition follow up as needed, MVI, folic acid     9. Antiemetics, anti-diarrhea medications:   prochlorperazine   Injectable 10 milliGRAM(s) IV Push every 6 hours   ondansetron  IVPB 16 milliGRAM(s) IV Intermittent every 24 hours    10. OOB as tolerated, physical therapy consult if needed     11. Monitor coags 2x week, vitamin K BIW  phytonadione Solution 5 milliGRAM(s) Oral <User Schedule>    12. Monitor closely for clinical changes, monitor for fevers     13. Emotional support provided, plan of care discussed and questions addressed     14. Patient education done regarding chemotherapy prep, plan of care, restrictions and discharge planning     15. Continue regular social work input     I have written the above note for Dr. Pope who performed service with me in the room.   Bobo Barriga PA-C ([phone])    I have seen and examined patient with PA, I agree with above note as scribed.                        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 prep regimen for treatment of myelofibrosis    Disease: myelofibrosis   Prep regimen: Flu / Bu / Thiotepa / rATG   GVHD ppx: ABC: PTCy days +3, +4, Bortezomib days 0, +3, PTCy days +5, +14, +28   ABO/CMV:   Recipient:   A+/CMV+   Donor:   B+/CMV+     S: Patient seen and examined with HPC Transplant Team:   Overnight no events noted.  Patient states that her mouth feels better, but throat still with some discomfort.    O: Vitals:   Vital Signs Last 24 Hrs  T(C): 37.8 (2025 05:17), Max: 38.9 (2025 08:28)  T(F): 100 (2025 05:17), Max: 102 (2025 08:28)  HR: 73 (2025 05:17) (70 - 103)  BP: 149/63 (2025 05:17) (138/65 - 160/64)  BP(mean): --  RR: 18 (2025 05:17) (18 - 19)  SpO2: 97% (2025 05:17) (96% - 99%)    Parameters below as of 2025 05:17  Patient On (Oxygen Delivery Method): room air    Admit weight: 64.5kg  Daily Weight in k.4 (2025)    Intake / Output:    @ 07:01  -   @ 07:00  --------------------------------------------------------  IN: 3046 mL / OUT: 3900 mL / NET: -854 mL      PE:   Oropharynx: no erythema or ulcerations   Oral Mucositis:              -                                      Grade: n/a   CVS: S1, S2 RRR   Lungs: CTA throughout bilaterally   Abdomen: + BS x 4, soft, NT, ND   Extremities: no edema   Gastric Mucositis:                 -                               Grade: n/a   Intestinal Mucositis:              -                               Grade: n/a   Skin: no rash   TLC: CDI   Neuro: A&O x 3      Labs:   CBC Full  -  ( 2025 06:28 )  WBC Count : 0.06 K/uL  RBC Count : 2.67 M/uL  Hemoglobin : 7.6 g/dL  Hematocrit : 24.1 %  Platelet Count - Automated : 6 K/uL  Mean Cell Volume : 90.3 fl  Mean Cell Hemoglobin : 28.5 pg  Mean Cell Hemoglobin Concentration : 31.5 g/dL  Auto Neutrophil # : Not measured  Auto Lymphocyte # : Not measured  Auto Monocyte # : Not measured  Auto Eosinophil # : Not measured  Auto Basophil # : Not measured  Auto Neutrophil % : Not measured Differential percentages must be correlated with absolute numbers for  clinical significance.  Auto Lymphocyte % : Not measured  Auto Monocyte % : Not measured  Auto Eosinophil % : Not measured  Auto Basophil % : Not measured                          7.6    0.06  )-----------( 6        ( 2025 06:28 )             24.1         139  |  108  |  10  ----------------------------<  100[H]  3.2[L]   |  18[L]  |  0.57    Ca    7.9[L]      2025 06:28  Phos  2.5       Mg     1.8         TPro  5.0[L]  /  Alb  2.8[L]  /  TBili  0.5  /  DBili  x   /  AST  11  /  ALT  9[L]  /  AlkPhos  75        LIVER FUNCTIONS - ( 2025 06:28 )  Alb: 2.8 g/dL / Pro: 5.0 g/dL / ALK PHOS: 75 U/L / ALT: 9 U/L / AST: 11 U/L / GGT: x           Lactate Dehydrogenase, Serum: 311 U/L ( @ 06:28)      Cultures:   Respiratory Viral Panel with COVID-19 by [facility] (25 @ 21:02)   Rapid RVP Result: [id number]: NotDetec:    Culture - Blood (25 @ 18:45)   Specimen Source: Blood Blood-Catheter    Culture Results: No growth at 24 hoursCulture - Blood (25 @ 18:40)   Specimen Source: Blood Blood-Peripheral  Culture Results: No growth     Culture - Urine (25 @ 21:01)   Specimen Source: Clean Catch Clean Catch (Midstream)  Culture Results: 10,000 - 49,000 CFU/mL Gram Positive Cocci in Pairs and Chains   <10,000 CFU/ml Normal Urogenital cha present    Radiology:   < from: Xray Chest 1 View- PORTABLE-Urgent (Xray Chest 1 View- PORTABLE-Urgent .) (25 @ 19:28) >  IMPRESSION: Clear lungs.      Meds:   Antimicrobials:   acyclovir   Oral Tab/Cap 800 milliGRAM(s) Oral every 12 hours  caspofungin IVPB      caspofungin IVPB 50 milliGRAM(s) IV Intermittent every 24 hours  piperacillin/tazobactam IVPB.. 4.5 Gram(s) IV Intermittent every 8 hours  vancomycin    Solution 125 milliGRAM(s) Oral every 12 hours  voriconazole 200 milliGRAM(s) Oral every 12 hours      Heme / Onc:       GI:  pantoprazole    Tablet 40 milliGRAM(s) Oral before breakfast  polyethylene glycol 3350 17 Gram(s) Oral daily PRN  sodium bicarbonate Mouth Rinse 10 milliLiter(s) Swish and Spit five times a day  ursodiol Capsule 300 milliGRAM(s) Oral two times a day      Cardiovascular:   lisinopril 10 milliGRAM(s) Oral every 12 hours  metoprolol succinate ER 12.5 milliGRAM(s) Oral daily      Immunologic:       Other medications:   Biotene Dry Mouth Oral Rinse 5 milliLiter(s) Swish and Spit five times a day  buPROPion XL (24-Hour) . 300 milliGRAM(s) Oral daily  chlorhexidine 0.12% Liquid 15 milliLiter(s) Swish and Spit two times a day  chlorhexidine 4% Liquid 1 Application(s) Topical <User Schedule>  fentaNYL   Patch  25 MICROgram(s)/Hr. 1 Patch Transdermal every 48 hours  levothyroxine 88 MICROGram(s) Oral daily  phytonadione   Solution 5 milliGRAM(s) Oral <User Schedule>  potassium chloride  20 mEq/100 mL IVPB 20 milliEquivalent(s) IV Intermittent every 2 hours  prochlorperazine   Injectable 10 milliGRAM(s) IV Push every 6 hours  sodium chloride 0.9%. 1000 milliLiter(s) IV Continuous <Continuous>      PRN:   acetaminophen     Tablet .. 650 milliGRAM(s) Oral every 6 hours PRN  FIRST- Mouthwash  BLM 15 milliLiter(s) Swish and Swallow five times a day PRN  LORazepam     Tablet 0.5 milliGRAM(s) Oral every 6 hours PRN  ondansetron Injectable 8 milliGRAM(s) IV Push every 8 hours PRN  polyethylene glycol 3350 17 Gram(s) Oral daily PRN  sodium chloride 0.9% lock flush 10 milliLiter(s) IV Push every 1 hour PRN    A/P: 64 year old female with hx of myelofibrosis presents for a MUD allogeneic SCT (DP Permissive) conditioning with Flu/Bu/Thiotepa/rATG.  Today is day +2  - Thiotepa /. Maintain skin precautions during thiotepa administration and for 48 hours after completion (days .6 through day -3). Chronic pain issues - continue home fentanyl patch. Febrile overnight, tmax 100.9. Cultures  pending. RVP negative. CXR pending official read, no obvious consolidations. Vital signs are stable.    D/C HCTZ. Fludarabine 1/3, Busulfan 1/3, rATG 1/3. No Tylenol on busulfan days. Continue keppra ppx for 24 hours post infusion of last dose of busulfan. No acute events overnight, vital signs are stable.   - overnight patient was febrile during ATG, no cultures/abx as fever likely secondary to ATG. Patient was otherwise stable overnight. Fludarabine 2/3, Busulfan 2/3 and rATG 2/3. NO Tylenol or Azoles until Day 0. Continue with supportive care.  - Fludarabine 3/3, busulfan 3/3, rATG 3/3. No acute events overnight. Vital signs are stable.    Switched Compazine to atc   VSS, afebrile. 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   VSS, persistent fevers likely due to CRS post-SCT, continues on zosyn. Cultures pending. Hypervolemia - lasix 40mg x 1, will continue strict I/Os. Started caspofungin due to persistent thrush.   overnight no acute events noted. VSS, febrile likely due to CRS - continue with supportive care. BCx/UCx NGTD x 24h.    1. Infectious Disease:   acyclovir Oral Tab/Cap 800 milliGRAM(s) Oral every 12 hours  piperacillin/tazobactam IVPB.- 4.5 Gram(s) IV Intermittent once  piperacillin/tazobactam IVPB.. 4.5 Gram(s) IV Intermittent every 8 hours  vancomycin Solution 125 milliGRAM(s) Oral every 12 hours  voriconazole 200 milliGRAM(s) Oral every 12 hours  caspofungin IVPB 50 milliGRAM(s) IV Intermittent every 24 hours    2. VOD Prophylaxis:   ursodiol Capsule 300 milliGRAM(s) Oral two times a day    3. GI Prophylaxis:    pantoprazole Tablet 40 milliGRAM(s) Oral before breakfast    4. Mouthcare - NS / NaHCO3 rinses, Biotene; Skin care     5. GVHD prophylaxis   ABC: PTCy days +3, +4, Bortezomib days 0, +3, PTCy days +5, +14, +28     6. Transfuse & replete electrolytes prn    Hypokalemia - repleted    7. IV hydration, daily weights, strict I&O, prn diuresis     8. PO intake as tolerated, nutrition follow up as needed, MVI, folic acid     9. Antiemetics, anti-diarrhea medications:   prochlorperazine   Injectable 10 milliGRAM(s) IV Push every 6 hours   ondansetron  IVPB 16 milliGRAM(s) IV Intermittent every 24 hours    10. OOB as tolerated, physical therapy consult if needed     11. Monitor coags 2x week, vitamin K BIW  phytonadione Solution 5 milliGRAM(s) Oral <User Schedule>    12. Monitor closely for clinical changes, monitor for fevers     13. Emotional support provided, plan of care discussed and questions addressed     14. Patient education done regarding chemotherapy prep, plan of care, restrictions and discharge planning     15. Continue regular social work input     I have written the above note for Dr. Pope who performed service with me in the room.   Bobo Barriga PA-C ([phone])    I have seen and examined patient with PA, I agree with above note as scribed.                        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 prep regimen for treatment of myelofibrosis    Disease: myelofibrosis   Prep regimen: Flu / Bu / Thiotepa / rATG   GVHD ppx: ABC: PTCy days +3, +4, Bortezomib days 0, +3, PTCy days +5, +14, +28   ABO/CMV:   Recipient:   A+/CMV+   Donor:   B+/CMV+     S: Patient seen and examined with HPC Transplant Team:   Overnight no events noted.  Patient states that her mouth feels better, but throat still with some discomfort.    O: Vitals:   Vital Signs Last 24 Hrs  T(C): 37.8 (2025 05:17), Max: 38.9 (2025 08:28)  T(F): 100 (2025 05:17), Max: 102 (2025 08:28)  HR: 73 (2025 05:17) (70 - 103)  BP: 149/63 (2025 05:17) (138/65 - 160/64)  BP(mean): --  RR: 18 (2025 05:17) (18 - 19)  SpO2: 97% (2025 05:17) (96% - 99%)    Parameters below as of 2025 05:17  Patient On (Oxygen Delivery Method): room air    Admit weight: 64.5kg  Daily Weight in k.4 (2025)    Intake / Output:    @ 07:01  -   @ 07:00  --------------------------------------------------------  IN: 3046 mL / OUT: 3900 mL / NET: -854 mL      PE:   Oropharynx: no erythema or ulcerations; +Thrush on tongue/posterior pharynx (improving)   Oral Mucositis:              -                                      Grade: n/a   CVS: S1, S2 RRR   Lungs: CTA throughout bilaterally   Abdomen: + BS x 4, soft, NT, ND   Extremities: no edema   Gastric Mucositis:                 -                               Grade: n/a   Intestinal Mucositis:              -                               Grade: n/a   Skin: no rash   TLC: CDI   Neuro: A&O x 3      Labs:   CBC Full  -  ( 2025 06:28 )  WBC Count : 0.06 K/uL  RBC Count : 2.67 M/uL  Hemoglobin : 7.6 g/dL  Hematocrit : 24.1 %  Platelet Count - Automated : 6 K/uL  Mean Cell Volume : 90.3 fl  Mean Cell Hemoglobin : 28.5 pg  Mean Cell Hemoglobin Concentration : 31.5 g/dL  Auto Neutrophil # : Not measured  Auto Lymphocyte # : Not measured  Auto Monocyte # : Not measured  Auto Eosinophil # : Not measured  Auto Basophil # : Not measured  Auto Neutrophil % : Not measured Differential percentages must be correlated with absolute numbers for  clinical significance.  Auto Lymphocyte % : Not measured  Auto Monocyte % : Not measured  Auto Eosinophil % : Not measured  Auto Basophil % : Not measured                          7.6    0.06  )-----------( 6        ( 2025 06:28 )             24.1         139  |  108  |  10  ----------------------------<  100[H]  3.2[L]   |  18[L]  |  0.57    Ca    7.9[L]      2025 06:28  Phos  2.5       Mg     1.8         TPro  5.0[L]  /  Alb  2.8[L]  /  TBili  0.5  /  DBili  x   /  AST  11  /  ALT  9[L]  /  AlkPhos  75  29      LIVER FUNCTIONS - ( 2025 06:28 )  Alb: 2.8 g/dL / Pro: 5.0 g/dL / ALK PHOS: 75 U/L / ALT: 9 U/L / AST: 11 U/L / GGT: x           Lactate Dehydrogenase, Serum: 311 U/L ( @ 06:28)      Cultures:   Respiratory Viral Panel with COVID-19 by [facility] (25 @ 21:02)   Rapid RVP Result: [id number]: NotDetec:    Culture - Blood (25 @ 18:45)   Specimen Source: Blood Blood-Catheter    Culture Results: No growth at 24 hoursCulture - Blood (25 @ 18:40)   Specimen Source: Blood Blood-Peripheral  Culture Results: No growth     Culture - Urine (25 @ 21:01)   Specimen Source: Clean Catch Clean Catch (Midstream)  Culture Results: 10,000 - 49,000 CFU/mL Gram Positive Cocci in Pairs and Chains   <10,000 CFU/ml Normal Urogenital cha present    Radiology:   < from: Xray Chest 1 View- PORTABLE-Urgent (Xray Chest 1 View- PORTABLE-Urgent .) (25 @ 19:28) >  IMPRESSION: Clear lungs.      Meds:   Antimicrobials:   acyclovir   Oral Tab/Cap 800 milliGRAM(s) Oral every 12 hours  caspofungin IVPB      caspofungin IVPB 50 milliGRAM(s) IV Intermittent every 24 hours  piperacillin/tazobactam IVPB.. 4.5 Gram(s) IV Intermittent every 8 hours  vancomycin    Solution 125 milliGRAM(s) Oral every 12 hours  voriconazole 200 milliGRAM(s) Oral every 12 hours      Heme / Onc:       GI:  pantoprazole    Tablet 40 milliGRAM(s) Oral before breakfast  polyethylene glycol 3350 17 Gram(s) Oral daily PRN  sodium bicarbonate Mouth Rinse 10 milliLiter(s) Swish and Spit five times a day  ursodiol Capsule 300 milliGRAM(s) Oral two times a day      Cardiovascular:   lisinopril 10 milliGRAM(s) Oral every 12 hours  metoprolol succinate ER 12.5 milliGRAM(s) Oral daily      Immunologic:       Other medications:   Biotene Dry Mouth Oral Rinse 5 milliLiter(s) Swish and Spit five times a day  buPROPion XL (24-Hour) . 300 milliGRAM(s) Oral daily  chlorhexidine 0.12% Liquid 15 milliLiter(s) Swish and Spit two times a day  chlorhexidine 4% Liquid 1 Application(s) Topical <User Schedule>  fentaNYL   Patch  25 MICROgram(s)/Hr. 1 Patch Transdermal every 48 hours  levothyroxine 88 MICROGram(s) Oral daily  phytonadione   Solution 5 milliGRAM(s) Oral <User Schedule>  potassium chloride  20 mEq/100 mL IVPB 20 milliEquivalent(s) IV Intermittent every 2 hours  prochlorperazine   Injectable 10 milliGRAM(s) IV Push every 6 hours  sodium chloride 0.9%. 1000 milliLiter(s) IV Continuous <Continuous>      PRN:   acetaminophen     Tablet .. 650 milliGRAM(s) Oral every 6 hours PRN  FIRST- Mouthwash  BLM 15 milliLiter(s) Swish and Swallow five times a day PRN  LORazepam     Tablet 0.5 milliGRAM(s) Oral every 6 hours PRN  ondansetron Injectable 8 milliGRAM(s) IV Push every 8 hours PRN  polyethylene glycol 3350 17 Gram(s) Oral daily PRN  sodium chloride 0.9% lock flush 10 milliLiter(s) IV Push every 1 hour PRN    A/P: 64 year old female with hx of myelofibrosis presents for a MUD allogeneic SCT (DP Permissive) conditioning with Flu/Bu/Thiotepa/rATG.  Today is day +2  - Thiotepa /. Maintain skin precautions during thiotepa administration and for 48 hours after completion (days .6 through day -3). Chronic pain issues - continue home fentanyl patch. Febrile overnight, tmax 100.9. Cultures  pending. RVP negative. CXR pending official read, no obvious consolidations. Vital signs are stable.    D/C HCTZ. Fludarabine 1/3, Busulfan 1/3, rATG 1/3. No Tylenol on busulfan days. Continue keppra ppx for 24 hours post infusion of last dose of busulfan. No acute events overnight, vital signs are stable.   - overnight patient was febrile during ATG, no cultures/abx as fever likely secondary to ATG. Patient was otherwise stable overnight. Fludarabine 2/3, Busulfan 2/3 and rATG 2/3. NO Tylenol or Azoles until Day 0. Continue with supportive care.  - Fludarabine 3/3, busulfan 3/3, rATG 3/3. No acute events overnight. Vital signs are stable.    Switched Compazine to atc   VSS, afebrile. 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   VSS, persistent fevers likely due to CRS post-SCT, continues on zosyn. Cultures pending. Hypervolemia - lasix 40mg x 1, will continue strict I/Os. Started caspofungin due to persistent thrush.   overnight no acute events noted. VSS, febrile likely due to CRS - continue with supportive care. BCx/UCx NGTD x 24h.    1. Infectious Disease:   acyclovir Oral Tab/Cap 800 milliGRAM(s) Oral every 12 hours  piperacillin/tazobactam IVPB.- 4.5 Gram(s) IV Intermittent once  piperacillin/tazobactam IVPB.. 4.5 Gram(s) IV Intermittent every 8 hours  vancomycin Solution 125 milliGRAM(s) Oral every 12 hours  voriconazole 200 milliGRAM(s) Oral every 12 hours  caspofungin IVPB 50 milliGRAM(s) IV Intermittent every 24 hours    2. VOD Prophylaxis:   ursodiol Capsule 300 milliGRAM(s) Oral two times a day    3. GI Prophylaxis:    pantoprazole Tablet 40 milliGRAM(s) Oral before breakfast    4. Mouthcare - NS / NaHCO3 rinses, Biotene; Skin care     5. GVHD prophylaxis   ABC: PTCy days +3, +4, Bortezomib days 0, +3, PTCy days +5, +14, +28     6. Transfuse & replete electrolytes prn    Hypokalemia - repleted    7. IV hydration, daily weights, strict I&O, prn diuresis     8. PO intake as tolerated, nutrition follow up as needed, MVI, folic acid     9. Antiemetics, anti-diarrhea medications:   prochlorperazine   Injectable 10 milliGRAM(s) IV Push every 6 hours   ondansetron  IVPB 16 milliGRAM(s) IV Intermittent every 24 hours    10. OOB as tolerated, physical therapy consult if needed     11. Monitor coags 2x week, vitamin K BIW  phytonadione Solution 5 milliGRAM(s) Oral <User Schedule>    12. Monitor closely for clinical changes, monitor for fevers     13. Emotional support provided, plan of care discussed and questions addressed     14. Patient education done regarding chemotherapy prep, plan of care, restrictions and discharge planning     15. Continue regular social work input     I have written the above note for Dr. Pope who performed service with me in the room.   Bobo Barriga PA-C ([phone])    I have seen and examined patient with PA, I agree with above note as scribed.

## 2025-06-29 NOTE — PROVIDER CONTACT NOTE (SEPSIS SCREENING) - SEPSIS CRITERIA TO COINCIDE WITH MEWS
WBC less than 4 or greater than 12/Heart Rate greater than 90/Temperature less than 96.8 or greater than 100.4
WBC less than 4 or greater than 12/Temperature less than 96.8 or greater than 100.4

## 2025-06-30 LAB
ALBUMIN SERPL ELPH-MCNC: 2.7 G/DL — LOW (ref 3.3–5)
ALP SERPL-CCNC: 71 U/L — SIGNIFICANT CHANGE UP (ref 40–120)
ALT FLD-CCNC: 6 U/L — LOW (ref 10–45)
ANION GAP SERPL CALC-SCNC: 11 MMOL/L — SIGNIFICANT CHANGE UP (ref 5–17)
APTT BLD: 26.7 SEC — SIGNIFICANT CHANGE UP (ref 26.1–36.8)
AST SERPL-CCNC: 10 U/L — SIGNIFICANT CHANGE UP (ref 10–40)
BASOPHILS # BLD AUTO: SIGNIFICANT CHANGE UP (ref 0–0.2)
BASOPHILS NFR BLD AUTO: SIGNIFICANT CHANGE UP (ref 0–2)
BILIRUB DIRECT SERPL-MCNC: 0.2 MG/DL — SIGNIFICANT CHANGE UP (ref 0–0.3)
BILIRUB INDIRECT FLD-MCNC: 0.3 MG/DL — SIGNIFICANT CHANGE UP (ref 0.2–1)
BILIRUB SERPL-MCNC: 0.5 MG/DL — SIGNIFICANT CHANGE UP (ref 0.2–1.2)
BLD GP AB SCN SERPL QL: NEGATIVE — SIGNIFICANT CHANGE UP
BUN SERPL-MCNC: 11 MG/DL — SIGNIFICANT CHANGE UP (ref 7–23)
CALCIUM SERPL-MCNC: 7.9 MG/DL — LOW (ref 8.4–10.5)
CHLORIDE SERPL-SCNC: 109 MMOL/L — HIGH (ref 96–108)
CO2 SERPL-SCNC: 19 MMOL/L — LOW (ref 22–31)
CREAT SERPL-MCNC: 0.56 MG/DL — SIGNIFICANT CHANGE UP (ref 0.5–1.3)
EGFR: 102 ML/MIN/1.73M2 — SIGNIFICANT CHANGE UP
EGFR: 102 ML/MIN/1.73M2 — SIGNIFICANT CHANGE UP
EOSINOPHIL # BLD AUTO: SIGNIFICANT CHANGE UP (ref 0–0.5)
EOSINOPHIL NFR BLD AUTO: SIGNIFICANT CHANGE UP (ref 0–6)
GLUCOSE SERPL-MCNC: 94 MG/DL — SIGNIFICANT CHANGE UP (ref 70–99)
HCT VFR BLD CALC: 21.9 % — LOW (ref 34.5–45)
HGB BLD-MCNC: 7.1 G/DL — LOW (ref 11.5–15.5)
IMM GRANULOCYTES # BLD AUTO: SIGNIFICANT CHANGE UP (ref 0–0.07)
IMM GRANULOCYTES NFR BLD AUTO: SIGNIFICANT CHANGE UP (ref 0–0.9)
IMMATURE PLATELET FRACTION #: 0.1 K/UL — LOW (ref 4.7–11.1)
IMMATURE PLATELET FRACTION %: 1.5 % — LOW (ref 1.6–4.9)
INR BLD: 1.23 RATIO — HIGH (ref 0.85–1.16)
LDH SERPL L TO P-CCNC: 258 U/L — HIGH (ref 50–242)
LYMPHOCYTES # BLD AUTO: SIGNIFICANT CHANGE UP (ref 1–3.3)
LYMPHOCYTES NFR BLD AUTO: SIGNIFICANT CHANGE UP (ref 13–44)
MAGNESIUM SERPL-MCNC: 1.6 MG/DL — SIGNIFICANT CHANGE UP (ref 1.6–2.6)
MCHC RBC-ENTMCNC: 29.6 PG — SIGNIFICANT CHANGE UP (ref 27–34)
MCHC RBC-ENTMCNC: 32.4 G/DL — SIGNIFICANT CHANGE UP (ref 32–36)
MCV RBC AUTO: 91.3 FL — SIGNIFICANT CHANGE UP (ref 80–100)
MONOCYTES # BLD AUTO: SIGNIFICANT CHANGE UP (ref 0–0.9)
MONOCYTES NFR BLD AUTO: SIGNIFICANT CHANGE UP (ref 2–14)
NEUTROPHILS # BLD AUTO: SIGNIFICANT CHANGE UP (ref 1.8–7.4)
NEUTROPHILS NFR BLD AUTO: SIGNIFICANT CHANGE UP (ref 43–77)
NRBC # BLD AUTO: 0 K/UL — SIGNIFICANT CHANGE UP (ref 0–0)
NRBC # FLD: 0 K/UL — SIGNIFICANT CHANGE UP (ref 0–0)
NRBC BLD AUTO-RTO: 0 /100 WBCS — SIGNIFICANT CHANGE UP (ref 0–0)
PHOSPHATE SERPL-MCNC: 2.2 MG/DL — LOW (ref 2.5–4.5)
PLATELET # BLD AUTO: 8 K/UL — CRITICAL LOW (ref 150–400)
PMV BLD: SIGNIFICANT CHANGE UP FL (ref 7–13)
POTASSIUM SERPL-MCNC: 3.6 MMOL/L — SIGNIFICANT CHANGE UP (ref 3.5–5.3)
POTASSIUM SERPL-SCNC: 3.6 MMOL/L — SIGNIFICANT CHANGE UP (ref 3.5–5.3)
PROT SERPL-MCNC: 5 G/DL — LOW (ref 6–8.3)
PROTHROM AB SERPL-ACNC: 14.1 SEC — HIGH (ref 9.9–13.4)
RBC # BLD: 2.4 M/UL — LOW (ref 3.8–5.2)
RBC # FLD: 17.5 % — HIGH (ref 10.3–14.5)
RH IG SCN BLD-IMP: POSITIVE — SIGNIFICANT CHANGE UP
SODIUM SERPL-SCNC: 139 MMOL/L — SIGNIFICANT CHANGE UP (ref 135–145)
WBC # BLD: 0.04 K/UL — CRITICAL LOW (ref 3.8–10.5)
WBC # FLD AUTO: 0.04 K/UL — CRITICAL LOW (ref 3.8–10.5)

## 2025-06-30 PROCEDURE — 87086 URINE CULTURE/COLONY COUNT: CPT

## 2025-06-30 PROCEDURE — 81001 URINALYSIS AUTO W/SCOPE: CPT

## 2025-06-30 PROCEDURE — 77001 FLUOROGUIDE FOR VEIN DEVICE: CPT

## 2025-06-30 PROCEDURE — 99233 SBSQ HOSP IP/OBS HIGH 50: CPT

## 2025-06-30 PROCEDURE — 85025 COMPLETE CBC W/AUTO DIFF WBC: CPT

## 2025-06-30 PROCEDURE — 86850 RBC ANTIBODY SCREEN: CPT

## 2025-06-30 PROCEDURE — P9040: CPT

## 2025-06-30 PROCEDURE — 86901 BLOOD TYPING SEROLOGIC RH(D): CPT

## 2025-06-30 PROCEDURE — 83735 ASSAY OF MAGNESIUM: CPT

## 2025-06-30 PROCEDURE — 76937 US GUIDE VASCULAR ACCESS: CPT

## 2025-06-30 PROCEDURE — P9037: CPT

## 2025-06-30 PROCEDURE — 85049 AUTOMATED PLATELET COUNT: CPT

## 2025-06-30 PROCEDURE — 86923 COMPATIBILITY TEST ELECTRIC: CPT

## 2025-06-30 PROCEDURE — P9100: CPT

## 2025-06-30 PROCEDURE — 83615 LACTATE (LD) (LDH) ENZYME: CPT

## 2025-06-30 PROCEDURE — 87077 CULTURE AEROBIC IDENTIFY: CPT

## 2025-06-30 PROCEDURE — 87641 MR-STAPH DNA AMP PROBE: CPT

## 2025-06-30 PROCEDURE — 80053 COMPREHEN METABOLIC PANEL: CPT

## 2025-06-30 PROCEDURE — 85610 PROTHROMBIN TIME: CPT

## 2025-06-30 PROCEDURE — C1751: CPT

## 2025-06-30 PROCEDURE — 0241U: CPT

## 2025-06-30 PROCEDURE — 82955 ASSAY OF G6PD ENZYME: CPT

## 2025-06-30 PROCEDURE — C1769: CPT

## 2025-06-30 PROCEDURE — 86900 BLOOD TYPING SEROLOGIC ABO: CPT

## 2025-06-30 PROCEDURE — 87640 STAPH A DNA AMP PROBE: CPT

## 2025-06-30 PROCEDURE — 0225U NFCT DS DNA&RNA 21 SARSCOV2: CPT

## 2025-06-30 PROCEDURE — 71045 X-RAY EXAM CHEST 1 VIEW: CPT

## 2025-06-30 PROCEDURE — 82248 BILIRUBIN DIRECT: CPT

## 2025-06-30 PROCEDURE — 87040 BLOOD CULTURE FOR BACTERIA: CPT

## 2025-06-30 PROCEDURE — C1894: CPT

## 2025-06-30 PROCEDURE — 85730 THROMBOPLASTIN TIME PARTIAL: CPT

## 2025-06-30 PROCEDURE — 36415 COLL VENOUS BLD VENIPUNCTURE: CPT

## 2025-06-30 PROCEDURE — 93005 ELECTROCARDIOGRAM TRACING: CPT

## 2025-06-30 PROCEDURE — 36556 INSERT NON-TUNNEL CV CATH: CPT

## 2025-06-30 PROCEDURE — C1887: CPT

## 2025-06-30 PROCEDURE — 87799 DETECT AGENT NOS DNA QUANT: CPT

## 2025-06-30 PROCEDURE — 84100 ASSAY OF PHOSPHORUS: CPT

## 2025-06-30 RX ORDER — FUROSEMIDE 10 MG/ML
40 INJECTION INTRAMUSCULAR; INTRAVENOUS ONCE
Refills: 0 | Status: DISCONTINUED | OUTPATIENT
Start: 2025-06-30 | End: 2025-06-30

## 2025-06-30 RX ORDER — FUROSEMIDE 10 MG/ML
20 INJECTION INTRAMUSCULAR; INTRAVENOUS ONCE
Refills: 0 | Status: COMPLETED | OUTPATIENT
Start: 2025-06-30 | End: 2025-06-30

## 2025-06-30 RX ORDER — VORICONAZOLE 200 MG/1
200 TABLET, FILM COATED ORAL ONCE
Refills: 0 | Status: COMPLETED | OUTPATIENT
Start: 2025-06-30 | End: 2025-06-30

## 2025-06-30 RX ORDER — LIDOCAINE HYDROCHLORIDE 20 MG/ML
15 JELLY TOPICAL
Refills: 0 | Status: DISCONTINUED | OUTPATIENT
Start: 2025-06-30 | End: 2025-07-15

## 2025-06-30 RX ORDER — OLANZAPINE 10 MG/1
5 TABLET ORAL AT BEDTIME
Refills: 0 | Status: COMPLETED | OUTPATIENT
Start: 2025-06-30 | End: 2025-07-02

## 2025-06-30 RX ORDER — LIDOCAINE HYDROCHLORIDE 20 MG/ML
15 JELLY TOPICAL
Refills: 0 | Status: DISCONTINUED | OUTPATIENT
Start: 2025-06-30 | End: 2025-06-30

## 2025-06-30 RX ORDER — URSODIOL 300 MG/1
300 CAPSULE ORAL
Refills: 0 | Status: DISCONTINUED | OUTPATIENT
Start: 2025-06-30 | End: 2025-07-02

## 2025-06-30 RX ORDER — VORICONAZOLE 200 MG/1
200 TABLET, FILM COATED ORAL ONCE
Refills: 0 | Status: DISCONTINUED | OUTPATIENT
Start: 2025-06-30 | End: 2025-06-30

## 2025-06-30 RX ORDER — POSACONAZOLE 100 MG/1
300 TABLET, DELAYED RELEASE ORAL DAILY
Refills: 0 | Status: DISCONTINUED | OUTPATIENT
Start: 2025-07-01 | End: 2025-07-15

## 2025-06-30 RX ORDER — BORTEZOMIB 3.5 MG/3.5ML
2 INJECTION, POWDER, LYOPHILIZED, FOR SOLUTION INTRAVENOUS; SUBCUTANEOUS ONCE
Refills: 0 | Status: COMPLETED | OUTPATIENT
Start: 2025-06-30 | End: 2025-06-30

## 2025-06-30 RX ORDER — CYCLOPHOSPHAMIDE INJECTION, SOLUTION 200 MG/ML
2125 INJECTION INTRAVENOUS EVERY 24 HOURS
Refills: 0 | Status: COMPLETED | OUTPATIENT
Start: 2025-06-30 | End: 2025-07-01

## 2025-06-30 RX ADMIN — Medication 15 MILLILITER(S): at 18:28

## 2025-06-30 RX ADMIN — Medication 10 MILLIGRAM(S): at 12:29

## 2025-06-30 RX ADMIN — Medication 5 MILLILITER(S): at 20:58

## 2025-06-30 RX ADMIN — Medication 10 MILLIGRAM(S): at 05:11

## 2025-06-30 RX ADMIN — VORICONAZOLE 200 MILLIGRAM(S): 200 TABLET, FILM COATED ORAL at 18:27

## 2025-06-30 RX ADMIN — Medication 800 MILLIGRAM(S): at 05:50

## 2025-06-30 RX ADMIN — Medication 10 MILLILITER(S): at 16:03

## 2025-06-30 RX ADMIN — Medication 5 MILLILITER(S): at 12:30

## 2025-06-30 RX ADMIN — METOPROLOL SUCCINATE 12.5 MILLIGRAM(S): 50 TABLET, EXTENDED RELEASE ORAL at 05:49

## 2025-06-30 RX ADMIN — Medication 10 MILLILITER(S): at 12:30

## 2025-06-30 RX ADMIN — FUROSEMIDE 20 MILLIGRAM(S): 10 INJECTION INTRAMUSCULAR; INTRAVENOUS at 10:14

## 2025-06-30 RX ADMIN — Medication 800 MILLIGRAM(S): at 18:27

## 2025-06-30 RX ADMIN — Medication 116 MILLIGRAM(S): at 15:05

## 2025-06-30 RX ADMIN — Medication 40 MILLIGRAM(S): at 06:04

## 2025-06-30 RX ADMIN — Medication 125 MILLIGRAM(S): at 18:28

## 2025-06-30 RX ADMIN — Medication 50 MILLIEQUIVALENT(S): at 19:34

## 2025-06-30 RX ADMIN — LIDOCAINE HYDROCHLORIDE 15 MILLILITER(S): 20 JELLY TOPICAL at 18:00

## 2025-06-30 RX ADMIN — Medication 10 MILLILITER(S): at 08:22

## 2025-06-30 RX ADMIN — OLANZAPINE 5 MILLIGRAM(S): 10 TABLET ORAL at 22:41

## 2025-06-30 RX ADMIN — Medication 5 MILLIGRAM(S): at 08:22

## 2025-06-30 RX ADMIN — Medication 5 MILLILITER(S): at 08:22

## 2025-06-30 RX ADMIN — Medication 5 MILLILITER(S): at 16:04

## 2025-06-30 RX ADMIN — URSODIOL 300 MILLIGRAM(S): 300 CAPSULE ORAL at 05:49

## 2025-06-30 RX ADMIN — Medication 15 MILLILITER(S): at 05:49

## 2025-06-30 RX ADMIN — BORTEZOMIB 1440 MILLIGRAM(S): 3.5 INJECTION, POWDER, LYOPHILIZED, FOR SOLUTION INTRAVENOUS; SUBCUTANEOUS at 19:38

## 2025-06-30 RX ADMIN — Medication 1 APPLICATION(S): at 08:22

## 2025-06-30 RX ADMIN — Medication 10 MILLILITER(S): at 20:58

## 2025-06-30 RX ADMIN — VORICONAZOLE 200 MILLIGRAM(S): 200 TABLET, FILM COATED ORAL at 05:49

## 2025-06-30 RX ADMIN — LISINOPRIL 10 MILLIGRAM(S): 5 TABLET ORAL at 18:27

## 2025-06-30 RX ADMIN — FUROSEMIDE 20 MILLIGRAM(S): 10 INJECTION INTRAMUSCULAR; INTRAVENOUS at 18:27

## 2025-06-30 RX ADMIN — CASPOFUNGIN ACETATE 250 MILLIGRAM(S): 5 INJECTION, POWDER, LYOPHILIZED, FOR SOLUTION INTRAVENOUS at 10:45

## 2025-06-30 RX ADMIN — Medication 10 MILLIGRAM(S): at 18:27

## 2025-06-30 RX ADMIN — URSODIOL 300 MILLIGRAM(S): 300 CAPSULE ORAL at 18:55

## 2025-06-30 RX ADMIN — Medication 25 GRAM(S): at 22:41

## 2025-06-30 RX ADMIN — Medication 88 MICROGRAM(S): at 05:49

## 2025-06-30 RX ADMIN — CYCLOPHOSPHAMIDE INJECTION, SOLUTION 2125 MILLIGRAM(S): 200 INJECTION INTRAVENOUS at 15:49

## 2025-06-30 RX ADMIN — Medication 250 MILLILITER(S): at 12:34

## 2025-06-30 RX ADMIN — FOSAPREPITANT 300 MILLIGRAM(S): 150 INJECTION, POWDER, LYOPHILIZED, FOR SOLUTION INTRAVENOUS at 13:48

## 2025-06-30 RX ADMIN — Medication 50 MILLIEQUIVALENT(S): at 22:51

## 2025-06-30 RX ADMIN — Medication 25 GRAM(S): at 05:48

## 2025-06-30 RX ADMIN — Medication 125 MILLIGRAM(S): at 05:49

## 2025-06-30 RX ADMIN — Medication 25 GRAM(S): at 15:05

## 2025-06-30 RX ADMIN — LISINOPRIL 10 MILLIGRAM(S): 5 TABLET ORAL at 05:49

## 2025-06-30 NOTE — PROGRESS NOTE ADULT - NS PANP COMMENT GEN_ALL_CORE FT
I rounded with the team including nurses, ACPs and PharmD.  I reviewed the data in depth.   I saw and examined the patient myself.   I reviewed and confirmed the above note.  The patient is day +2 prior to allogeneic HSCT.  The patient has developed CRS. Her sore throat is better.   The oral cavity is clear. The line site is clean. The lungs are clear. There is no LE edema.  Overall, there are no specific issues. The patient is on appropriate therapy at this stage. We will taper off her analgesics. We will add 5 days of caspofungin.   I answered the patient’s questions.  I encouraged po intake and ambulation.  YEIMI Godoy MD

## 2025-06-30 NOTE — PROGRESS NOTE ADULT - SUBJECTIVE AND OBJECTIVE BOX
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    O: Vitals:   Vital Signs Last 24 Hrs  T(C): 37.6 (30 Jun 2025 05:13), Max: 38.7 (29 Jun 2025 21:00)  T(F): 99.6 (30 Jun 2025 05:13), Max: 101.6 (29 Jun 2025 21:00)  HR: 75 (30 Jun 2025 05:13) (72 - 98)  BP: 161/65 (30 Jun 2025 05:13) (147/67 - 166/63)  BP(mean): --  RR: 20 (30 Jun 2025 05:13) (18 - 20)  SpO2: 94% (30 Jun 2025 05:13) (94% - 98%)    Parameters below as of 30 Jun 2025 05:13  Patient On (Oxygen Delivery Method): room air    Admit weight: 64.5kg   Daily Weight in kG:  (30 Jun 2025)    Intake / Output:   06-28 @ 07:01 - 06-29 @ 07:00  --------------------------------------------------------  IN: 3046 mL / OUT: 3900 mL / NET: -854 mL    06-29 @ 07:01 - 06-30 @ 06:47  --------------------------------------------------------  IN: 2768 mL / OUT: 1250 mL / NET: 1518 mL      PE:   Oropharynx: no erythema or ulcerations; +Thrush on tongue/posterior pharynx (improving)   Oral Mucositis:              -                                      Grade: n/a   CVS: S1, S2 RRR   Lungs: CTA throughout bilaterally   Abdomen: + BS x 4, soft, NT, ND   Extremities: no edema   Gastric Mucositis:                 -                               Grade: n/a   Intestinal Mucositis:              -                               Grade: n/a   Skin: no rash   TLC: CDI   Neuro: A&O x 3      Labs:             Cultures:   Respiratory Viral Panel with COVID-19 by SCARLET (06.20.25 @ 21:02)   Rapid RVP Result: [id number]: NotDetec:    Culture - Blood (06.20.25 @ 18:45)   Specimen Source: Blood Blood-Catheter    Culture Results: No growth at 24 hoursCulture - Blood (06.20.25 @ 18:40)   Specimen Source: Blood Blood-Peripheral  Culture Results: No growth     Culture - Urine (06.20.25 @ 21:01)   Specimen Source: Clean Catch Clean Catch (Midstream)  Culture Results: 10,000 - 49,000 CFU/mL Gram Positive Cocci in Pairs and Chains   <10,000 CFU/ml Normal Urogenital cha present    Radiology:   < from: Xray Chest 1 View- PORTABLE-Urgent (Xray Chest 1 View- PORTABLE-Urgent .) (06.20.25 @ 19:28) >  IMPRESSION: Clear lungs.    Meds:   Antimicrobials:   acyclovir   Oral Tab/Cap 800 milliGRAM(s) Oral every 12 hours  caspofungin IVPB      caspofungin IVPB 50 milliGRAM(s) IV Intermittent every 24 hours  piperacillin/tazobactam IVPB.. 4.5 Gram(s) IV Intermittent every 8 hours  vancomycin    Solution 125 milliGRAM(s) Oral every 12 hours  voriconazole 200 milliGRAM(s) Oral every 12 hours      Heme / Onc:       GI:  pantoprazole    Tablet 40 milliGRAM(s) Oral before breakfast  polyethylene glycol 3350 17 Gram(s) Oral daily PRN  sodium bicarbonate Mouth Rinse 10 milliLiter(s) Swish and Spit five times a day  ursodiol Capsule 300 milliGRAM(s) Oral two times a day      Cardiovascular:   lisinopril 10 milliGRAM(s) Oral every 12 hours  metoprolol succinate ER 12.5 milliGRAM(s) Oral daily      Immunologic:       Other medications:   Biotene Dry Mouth Oral Rinse 5 milliLiter(s) Swish and Spit five times a day  buPROPion XL (24-Hour) . 300 milliGRAM(s) Oral daily  chlorhexidine 0.12% Liquid 15 milliLiter(s) Swish and Spit two times a day  chlorhexidine 4% Liquid 1 Application(s) Topical <User Schedule>  fentaNYL   Patch  25 MICROgram(s)/Hr. 1 Patch Transdermal every 48 hours  fosaprepitant IVPB 150 milliGRAM(s) IV Intermittent once  levothyroxine 88 MICROGram(s) Oral daily  ondansetron  IVPB 16 milliGRAM(s) IV Intermittent every 24 hours  phytonadione   Solution 5 milliGRAM(s) Oral <User Schedule>  prochlorperazine   Injectable 10 milliGRAM(s) IV Push every 6 hours  sodium chloride 0.9%. 1000 milliLiter(s) IV Continuous <Continuous>  sodium chloride 0.9%. 1000 milliLiter(s) IV Continuous <Continuous>      PRN:   acetaminophen     Tablet .. 650 milliGRAM(s) Oral every 6 hours PRN  FIRST- Mouthwash  BLM 15 milliLiter(s) Swish and Swallow five times a day PRN  LORazepam     Tablet 0.5 milliGRAM(s) Oral every 6 hours PRN  ondansetron Injectable 8 milliGRAM(s) IV Push every 8 hours PRN  polyethylene glycol 3350 17 Gram(s) Oral daily PRN  sodium chloride 0.9% lock flush 10 milliLiter(s) IV Push every 1 hour PRN    A/P: 64 year old female with hx of myelofibrosis presents for a MUD allogeneic SCT (DP Permissive) conditioning with Flu/Bu/Thiotepa/rATG.  Today is day +3  6/21- Thiotepa 1/2. Maintain skin precautions during thiotepa administration and for 48 hours after completion (days .6 through day -3). Chronic pain issues - continue home fentanyl patch. Febrile overnight, tmax 100.9. Cultures  pending. RVP negative. CXR pending official read, no obvious consolidations. Vital signs are stable.   6/23 D/C HCTZ. Fludarabine 1/3, Busulfan 1/3, rATG 1/3. No Tylenol on busulfan days. Continue keppra ppx for 24 hours post infusion of last dose of busulfan. No acute events overnight, vital signs are stable.   6/24- overnight patient was febrile during ATG, no cultures/abx as fever likely secondary to ATG. Patient was otherwise stable overnight. Fludarabine 2/3, Busulfan 2/3 and rATG 2/3. NO Tylenol or Azoles until Day 0. Continue with supportive care.  6/25- Fludarabine 3/3, busulfan 3/3, rATG 3/3. No acute events overnight. Vital signs are stable.   6/26 Switched Compazine to atc  6/27 VSS, afebrile. 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  6/28 VSS, persistent fevers likely due to CRS post-SCT, continues on zosyn. Cultures pending. Hypervolemia - lasix 40mg x 1, will continue strict I/Os. Started caspofungin due to persistent thrush.  6/29 overnight no acute events noted. VSS, febrile likely due to CRS - continue with supportive care. BCx/UCx NGTD x 24h.  6/30 VSS, intermittently febrile likely due to CRS, infectious workup negative so far. PTCy 1/2 today. Continue to monitor thrush (improving). Continue with supportive care.    1. Infectious Disease:   acyclovir Oral Tab/Cap 800 milliGRAM(s) Oral every 12 hours  piperacillin/tazobactam IVPB.- 4.5 Gram(s) IV Intermittent once  piperacillin/tazobactam IVPB.. 4.5 Gram(s) IV Intermittent every 8 hours  vancomycin Solution 125 milliGRAM(s) Oral every 12 hours  voriconazole 200 milliGRAM(s) Oral every 12 hours  caspofungin IVPB 50 milliGRAM(s) IV Intermittent every 24 hours    2. VOD Prophylaxis:   ursodiol Capsule 300 milliGRAM(s) Oral two times a day    3. GI Prophylaxis:    pantoprazole Tablet 40 milliGRAM(s) Oral before breakfast    4. Mouthcare - NS / NaHCO3 rinses, Biotene; Skin care     5. GVHD prophylaxis   ABC: PTCy days +3, +4, Bortezomib days 0, +3, PTCy days +5, +14, +28     6. Transfuse & replete electrolytes prn     7. IV hydration, daily weights, strict I&O, prn diuresis     8. PO intake as tolerated, nutrition follow up as needed, MVI, folic acid     9. Antiemetics, anti-diarrhea medications:   prochlorperazine   Injectable 10 milliGRAM(s) IV Push every 6 hours   ondansetron  IVPB 16 milliGRAM(s) IV Intermittent every 24 hours    10. OOB as tolerated, physical therapy consult if needed     11. Monitor coags 2x week, vitamin K BIW  phytonadione Solution 5 milliGRAM(s) Oral <User Schedule>    12. Monitor closely for clinical changes, monitor for fevers     13. Emotional support provided, plan of care discussed and questions addressed     14. Patient education done regarding chemotherapy prep, plan of care, restrictions and discharge planning     15. Continue regular social work input     I have written the above note for Dr. Clarke who performed service with me in the room.   Bobo Barriga PA-C ([phone])    I have seen and examined patient with PA, I agree with above note as scribed.                      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 prep regimen for treatment of myelofibrosis    Disease: myelofibrosis   Prep regimen: Flu / Bu / Thiotepa / rATG   GVHD ppx: ABC: PTCy days +3, +4, Bortezomib days 0, +3, PTCy days +5, +14, +28   ABO/CMV:   Recipient:   A+/CMV+   Donor:   B+/CMV+     S: Patient seen and examined with HPC Transplant Team:   Overnight patient with intermittent fevers likely due to CRS, otherwise no events.  Patient complains of mouth/throat pain and loose BM. Admits to fatigue    O: Vitals:   Vital Signs Last 24 Hrs  T(C): 37.6 (2025 05:13), Max: 38.7 (2025 21:00)  T(F): 99.6 (2025 05:13), Max: 101.6 (2025 21:00)  HR: 75 (2025 05:13) (72 - 98)  BP: 161/65 (2025 05:13) (147/67 - 166/63)  BP(mean): --  RR: 20 (2025 05:13) (18 - 20)  SpO2: 94% (2025 05:13) (94% - 98%)    Parameters below as of 2025 05:13  Patient On (Oxygen Delivery Method): room air    Admit weight: 64.5kg   Daily Weight in k.9 (2025)    Intake / Output:    @ : @ 07:00  --------------------------------------------------------  IN: 3046 mL / OUT: 3900 mL / NET: -854 mL     @ 07: @ 06:47  --------------------------------------------------------  IN: 2768 mL / OUT: 1250 mL / NET: 1518 mL      PE:   Oropharynx: no erythema or ulcerations; +Thrush on tongue/posterior pharynx (improving)   Oral Mucositis: +                                                  ndGndrndanddndend:nd nd2nd CVS: S1, S2 RRR   Lungs: CTA throughout bilaterally   Abdomen: + BS x 4, soft, NT, ND   Extremities: no edema   Gastric Mucositis:                 -                              Grade: n/a   Intestinal Mucositis:              -                              Grade: n/a   Skin: no rash   TLC: CDI   Neuro: A&O x 3      Labs:   CBC Full  -  ( 2025 06:32 )  WBC Count : 0.04 K/uL  RBC Count : 2.40 M/uL  Hemoglobin : 7.1 g/dL  Hematocrit : 21.9 %  Platelet Count - Automated : 8 K/uL  Mean Cell Volume : 91.3 fl  Mean Cell Hemoglobin : 29.6 pg  Mean Cell Hemoglobin Concentration : 32.4 g/dL  Auto Neutrophil # : Not measured  Auto Lymphocyte # : Not measured  Auto Monocyte # : Not measured  Auto Eosinophil # : Not measured  Auto Basophil # : Not measured  Auto Neutrophil % : Not measured Differential percentages must be correlated with absolute numbers for  clinical significance.  Auto Lymphocyte % : Not measured  Auto Monocyte % : Not measured  Auto Eosinophil % : Not measured  Auto Basophil % : Not measured                          7.1    0.04  )-----------( 8        ( 2025 06:32 )             21.9     06-30    139  |  109[H]  |  11  ----------------------------<  94  3.6   |  19[L]  |  0.56    Ca    7.9[L]      2025 06:33  Phos  2.2     -  Mg     1.6     30    TPro  5.0[L]  /  Alb  2.7[L]  /  TBili  0.5  /  DBili  0.2  /  AST  10  /  ALT  6[L]  /  AlkPhos  71  30      Cultures:   Respiratory Viral Panel with COVID-19 by SCARLET (25 @ 21:02)   Rapid RVP Result: [id number]: NotDetec:    Culture - Blood (25 @ 18:45)   Specimen Source: Blood Blood-Catheter    Culture Results: No growth at 24 hoursCulture - Blood (25 @ 18:40)   Specimen Source: Blood Blood-Peripheral  Culture Results: No growth     Culture - Urine (25 @ 21:01)   Specimen Source: Clean Catch Clean Catch (Midstream)  Culture Results: 10,000 - 49,000 CFU/mL Gram Positive Cocci in Pairs and Chains   <10,000 CFU/ml Normal Urogenital cha present    Radiology:   < from: Xray Chest 1 View- PORTABLE-Urgent (Xray Chest 1 View- PORTABLE-Urgent .) (25 @ 19:28) >  IMPRESSION: Clear lungs.    Meds:   Antimicrobials:   acyclovir   Oral Tab/Cap 800 milliGRAM(s) Oral every 12 hours  caspofungin IVPB      caspofungin IVPB 50 milliGRAM(s) IV Intermittent every 24 hours  piperacillin/tazobactam IVPB.. 4.5 Gram(s) IV Intermittent every 8 hours  vancomycin    Solution 125 milliGRAM(s) Oral every 12 hours  voriconazole 200 milliGRAM(s) Oral every 12 hours      Heme / Onc:       GI:  pantoprazole    Tablet 40 milliGRAM(s) Oral before breakfast  polyethylene glycol 3350 17 Gram(s) Oral daily PRN  sodium bicarbonate Mouth Rinse 10 milliLiter(s) Swish and Spit five times a day  ursodiol Capsule 300 milliGRAM(s) Oral two times a day      Cardiovascular:   lisinopril 10 milliGRAM(s) Oral every 12 hours  metoprolol succinate ER 12.5 milliGRAM(s) Oral daily      Immunologic:       Other medications:   Biotene Dry Mouth Oral Rinse 5 milliLiter(s) Swish and Spit five times a day  buPROPion XL (24-Hour) . 300 milliGRAM(s) Oral daily  chlorhexidine 0.12% Liquid 15 milliLiter(s) Swish and Spit two times a day  chlorhexidine 4% Liquid 1 Application(s) Topical <User Schedule>  fentaNYL   Patch  25 MICROgram(s)/Hr. 1 Patch Transdermal every 48 hours  fosaprepitant IVPB 150 milliGRAM(s) IV Intermittent once  levothyroxine 88 MICROGram(s) Oral daily  ondansetron  IVPB 16 milliGRAM(s) IV Intermittent every 24 hours  phytonadione   Solution 5 milliGRAM(s) Oral <User Schedule>  prochlorperazine   Injectable 10 milliGRAM(s) IV Push every 6 hours  sodium chloride 0.9%. 1000 milliLiter(s) IV Continuous <Continuous>  sodium chloride 0.9%. 1000 milliLiter(s) IV Continuous <Continuous>    PRN:   acetaminophen     Tablet .. 650 milliGRAM(s) Oral every 6 hours PRN  FIRST- Mouthwash  BLM 15 milliLiter(s) Swish and Swallow five times a day PRN  LORazepam     Tablet 0.5 milliGRAM(s) Oral every 6 hours PRN  ondansetron Injectable 8 milliGRAM(s) IV Push every 8 hours PRN  polyethylene glycol 3350 17 Gram(s) Oral daily PRN  sodium chloride 0.9% lock flush 10 milliLiter(s) IV Push every 1 hour PRN    A/P: 64 year old female with hx of myelofibrosis presents for a MUD allogeneic SCT (DP Permissive) conditioning with Flu/Bu/Thiotepa/rATG.  Today is day +3  - Thiotepa /. Maintain skin precautions during thiotepa administration and for 48 hours after completion (days .6 through day -3). Chronic pain issues - continue home fentanyl patch. Febrile overnight, tmax 100.9. Cultures  pending. RVP negative. CXR pending official read, no obvious consolidations. Vital signs are stable.    D/C HCTZ. Fludarabine 13, Busulfan 1/3, rATG 1/3. No Tylenol on busulfan days. Continue keppra ppx for 24 hours post infusion of last dose of busulfan. No acute events overnight, vital signs are stable.   - overnight patient was febrile during ATG, no cultures/abx as fever likely secondary to ATG. Patient was otherwise stable overnight. Fludarabine 2/3, Busulfan 2/3 and rATG 2/3. NO Tylenol or Azoles until Day 0. Continue with supportive care.  - Fludarabine 3/3, busulfan 3/3, rATG 3/3. No acute events overnight. Vital signs are stable.    Switched Compazine to atc   VSS, afebrile. 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   VSS, persistent fevers likely due to CRS post-SCT, continues on zosyn. Cultures pending. Hypervolemia - lasix 40mg x 1, will continue strict I/Os. Started caspofungin due to persistent thrush.   overnight no acute events noted. VSS, febrile likely due to CRS - continue with supportive care. BCx/UCx NGTD x 24h.   VSS, intermittently febrile likely due to CRS, infectious workup negative so far. PTCy 1/ today. Continue to monitor thrush (improving). Changed some meds to PO suspension due to mucositis. Continue with supportive care.    1. Infectious Disease:   acyclovir Oral Tab/Cap 800 milliGRAM(s) Oral every 12 hours  piperacillin/tazobactam IVPB.- 4.5 Gram(s) IV Intermittent once  piperacillin/tazobactam IVPB.. 4.5 Gram(s) IV Intermittent every 8 hours  vancomycin Solution 125 milliGRAM(s) Oral every 12 hours  voriconazole 200 milliGRAM(s) Oral every 12 hours  caspofungin IVPB 50 milliGRAM(s) IV Intermittent every 24 hours    2. VOD Prophylaxis:   ursodiol Capsule 300 milliGRAM(s) Oral two times a day    3. GI Prophylaxis:    pantoprazole Tablet 40 milliGRAM(s) Oral before breakfast    4. Mouthcare - NS / NaHCO3 rinses, Biotene; Skin care     5. GVHD prophylaxis   ABC: PTCy days +3, +4, Bortezomib days 0, +3, PTCy days +5, +14, +28     6. Transfuse & replete electrolytes prn    1U Plt given.    7. IV hydration, daily weights, strict I&O, prn diuresis     8. PO intake as tolerated, nutrition follow up as needed, MVI, folic acid     9. Antiemetics, anti-diarrhea medications:   prochlorperazine   Injectable 10 milliGRAM(s) IV Push every 6 hours   ondansetron  IVPB 16 milliGRAM(s) IV Intermittent every 24 hours    10. OOB as tolerated, physical therapy consult if needed     11. Monitor coags 2x week, vitamin K BIW  phytonadione Solution 5 milliGRAM(s) Oral <User Schedule>    12. Monitor closely for clinical changes, monitor for fevers     13. Emotional support provided, plan of care discussed and questions addressed     14. Patient education done regarding chemotherapy prep, plan of care, restrictions and discharge planning     15. Continue regular social work input     I have written the above note for Dr. Clarke who performed service with me in the room.   Bobo Barriga PA-C ([phone])    I have seen and examined patient with PA, I agree with above note as scribed.                      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 prep regimen for treatment of myelofibrosis    Disease: myelofibrosis   Prep regimen: Flu / Bu / Thiotepa / rATG   GVHD ppx: ABC: PTCy days +3, +4, Bortezomib days 0, +3, PTCy days +5, +14, +28   ABO/CMV:   Recipient:   A+/CMV+   Donor:   B+/CMV+     S: Patient seen and examined with HPC Transplant Team:   + refractory nausea   + loose stool   + mouth pain   + fatigue   + intermittent fevers    O: Vitals:   Vital Signs Last 24 Hrs  T(C): 37.6 (2025 05:13), Max: 38.7 (2025 21:00)  T(F): 99.6 (2025 05:13), Max: 101.6 (2025 21:00)  HR: 75 (2025 05:13) (72 - 98)  BP: 161/65 (2025 05:13) (147/67 - 166/63)  BP(mean): --  RR: 20 (2025 05:13) (18 - 20)  SpO2: 94% (2025 05:13) (94% - 98%)    Parameters below as of 2025 05:13  Patient On (Oxygen Delivery Method): room air    Admit weight: 64.5kg   Daily Weight in k.9 (2025)    Intake / Output:    @ : @ 07:00  --------------------------------------------------------  IN: 3046 mL / OUT: 3900 mL / NET: -854 mL     @ 07:  -   @ 06:47  --------------------------------------------------------  IN: 2768 mL / OUT: 1250 mL / NET: 1518 mL      PE:   Oropharynx: no erythema or ulcerations; +Thrush on tongue/posterior pharynx (improving)   Oral Mucositis: +                                                  ndGndrndanddndend:nd nd2nd CVS: S1, S2 RRR   Lungs: CTA throughout bilaterally   Abdomen: + BS x 4, soft, NT, ND   Extremities: no edema   Gastric Mucositis:                 -                              Grade: n/a   Intestinal Mucositis:              -                              Grade: n/a   Skin: no rash   TLC: CDI   Neuro: A&O x 3      Labs:   CBC Full  -  ( 2025 06:32 )  WBC Count : 0.04 K/uL  RBC Count : 2.40 M/uL  Hemoglobin : 7.1 g/dL  Hematocrit : 21.9 %  Platelet Count - Automated : 8 K/uL  Mean Cell Volume : 91.3 fl  Mean Cell Hemoglobin : 29.6 pg  Mean Cell Hemoglobin Concentration : 32.4 g/dL  Auto Neutrophil # : Not measured  Auto Lymphocyte # : Not measured  Auto Monocyte # : Not measured  Auto Eosinophil # : Not measured  Auto Basophil # : Not measured  Auto Neutrophil % : Not measured Differential percentages must be correlated with absolute numbers for  clinical significance.  Auto Lymphocyte % : Not measured  Auto Monocyte % : Not measured  Auto Eosinophil % : Not measured  Auto Basophil % : Not measured                          7.1    0.04  )-----------( 8        ( 2025 06:32 )             21.9         139  |  109[H]  |  11  ----------------------------<  94  3.6   |  19[L]  |  0.56    Ca    7.9[L]      2025 06:33  Phos  2.2       Mg     1.6         TPro  5.0[L]  /  Alb  2.7[L]  /  TBili  0.5  /  DBili  0.2  /  AST  10  /  ALT  6[L]  /  AlkPhos  71        Cultures:   Culture Results:   <10,000 CFU/mL Normal Urogenital Cha (25 @ 06:31)    Culture Results:   No growth at 48 Hours (25 @ 16:36)    Culture Results:   No growth at 48 Hours (25 @ 16:30)    Respiratory Viral Panel with COVID-19 by [facility]ARLET (25 @ 21:02)   Rapid RVP Result: [id number]: NotDetec:    Culture - Blood (25 @ 18:45)   Specimen Source: Blood Blood-Catheter    Culture Results: No growth at 24 hoursCulture - Blood (25 @ 18:40)   Specimen Source: Blood Blood-Peripheral  Culture Results: No growth     Culture - Urine (25 @ 21:01)   Specimen Source: Clean Catch Clean Catch (Midstream)  Culture Results: 10,000 - 49,000 CFU/mL Gram Positive Cocci in Pairs and Chains   <10,000 CFU/ml Normal Urogenital cha present    Radiology:   ACC: [id number] EXAM:  XR CHEST PORTABLE URGENT 1V   ORDERED BY: BOBO REEVES   PROCEDURE DATE:  2025    IMPRESSION:  No focal consolidation.      Meds:   Antimicrobials:   acyclovir   Oral Tab/Cap 800 milliGRAM(s) Oral every 12 hours  caspofungin IVPB      caspofungin IVPB 50 milliGRAM(s) IV Intermittent every 24 hours  piperacillin/tazobactam IVPB.. 4.5 Gram(s) IV Intermittent every 8 hours  vancomycin    Solution 125 milliGRAM(s) Oral every 12 hours  voriconazole 200 milliGRAM(s) Oral every 12 hours      Heme / Onc:       GI:  pantoprazole    Tablet 40 milliGRAM(s) Oral before breakfast  polyethylene glycol 3350 17 Gram(s) Oral daily PRN  sodium bicarbonate Mouth Rinse 10 milliLiter(s) Swish and Spit five times a day  ursodiol Capsule 300 milliGRAM(s) Oral two times a day      Cardiovascular:   lisinopril 10 milliGRAM(s) Oral every 12 hours  metoprolol succinate ER 12.5 milliGRAM(s) Oral daily      Immunologic:       Other medications:   Biotene Dry Mouth Oral Rinse 5 milliLiter(s) Swish and Spit five times a day  buPROPion XL (24-Hour) . 300 milliGRAM(s) Oral daily  chlorhexidine 0.12% Liquid 15 milliLiter(s) Swish and Spit two times a day  chlorhexidine 4% Liquid 1 Application(s) Topical <User Schedule>  fentaNYL   Patch  25 MICROgram(s)/Hr. 1 Patch Transdermal every 48 hours  fosaprepitant IVPB 150 milliGRAM(s) IV Intermittent once  levothyroxine 88 MICROGram(s) Oral daily  ondansetron  IVPB 16 milliGRAM(s) IV Intermittent every 24 hours  phytonadione   Solution 5 milliGRAM(s) Oral <User Schedule>  prochlorperazine   Injectable 10 milliGRAM(s) IV Push every 6 hours  sodium chloride 0.9%. 1000 milliLiter(s) IV Continuous <Continuous>  sodium chloride 0.9%. 1000 milliLiter(s) IV Continuous <Continuous>    PRN:   acetaminophen     Tablet .. 650 milliGRAM(s) Oral every 6 hours PRN  FIRST- Mouthwash  BLM 15 milliLiter(s) Swish and Swallow five times a day PRN  LORazepam     Tablet 0.5 milliGRAM(s) Oral every 6 hours PRN  ondansetron Injectable 8 milliGRAM(s) IV Push every 8 hours PRN  polyethylene glycol 3350 17 Gram(s) Oral daily PRN  sodium chloride 0.9% lock flush 10 milliLiter(s) IV Push every 1 hour PRN    A/P: 64 year old female with hx of myelofibrosis presents for a MUD allogeneic SCT (DP Permissive) conditioning with Flu/Bu/Thiotepa/rATG.  Today is day +3  - Thiotepa 1/. Maintain skin precautions during thiotepa administration and for 48 hours after completion (days .6 through day -3). Chronic pain issues - continue home fentanyl patch. Febrile overnight, tmax 100.9. Cultures  pending. RVP negative. CXR pending official read, no obvious consolidations. Vital signs are stable.    D/C HCTZ. Fludarabine 13, Busulfan 13, rATG 1/3. No Tylenol on busulfan days. Continue keppra ppx for 24 hours post infusion of last dose of busulfan. No acute events overnight, vital signs are stable.   - overnight patient was febrile during ATG, no cultures/abx as fever likely secondary to ATG. Patient was otherwise stable overnight. Fludarabine 2/3, Busulfan 2/3 and rATG 2/3. NO Tylenol or Azoles until Day 0. Continue with supportive care.  - Fludarabine 3/3, busulfan 3/3, rATG 3/3. No acute events overnight. Vital signs are stable.    Switched Compazine to atc   VSS, afebrile. 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   VSS, persistent fevers likely due to CRS post-SCT, continues on zosyn. Cultures pending. Hypervolemia - lasix 40mg x 1, will continue strict I/Os. Started caspofungin due to persistent thrush.   overnight no acute events noted. VSS, febrile likely due to CRS - continue with supportive care. BCx/UCx NGTD x 24h.   VSS, intermittently febrile likely due to CRS, infectious workup negative so far. PTCy / today. I&O, daily weights, prn diuresis. Continue to monitor thrush (improving). Changed some meds to PO suspension due to mucositis. Continue with supportive care. Refractory nausea, added trial of zyprexa 5mg po QHS x 3 days. d/c primafit per attending MD.  Change voriconazole to posaconazole tomorrow.     1. Infectious Disease:   acyclovir Oral Tab/Cap 800 milliGRAM(s) Oral every 12 hours  piperacillin/tazobactam IVPB.- 4.5 Gram(s) IV Intermittent once  piperacillin/tazobactam IVPB.. 4.5 Gram(s) IV Intermittent every 8 hours  vancomycin Solution 125 milliGRAM(s) Oral every 12 hours  voriconazole 200 milliGRAM(s) Oral every 12 hours  caspofungin IVPB 50 milliGRAM(s) IV Intermittent every 24 hours    2. VOD Prophylaxis:   ursodiol Capsule 300 milliGRAM(s) Oral two times a day    3. GI Prophylaxis:    pantoprazole Tablet 40 milliGRAM(s) Oral before breakfast    4. Mouthcare - NS / NaHCO3 rinses, Biotene; Skin care     5. GVHD prophylaxis   ABC: PTCy days +3, +4, Bortezomib days 0, +3, PTCy days +5, +14, +28     6. Transfuse & replete electrolytes prn    1 unit platelets     7. IV hydration, daily weights, strict I&O, prn diuresis   Lasix 20mg IV x 1 0900    8. PO intake as tolerated, nutrition follow up as needed    9. Antiemetics, anti-diarrhea medications:   prochlorperazine   Injectable 10 milliGRAM(s) IV Push every 6 hours   ondansetron  IVPB 16 milliGRAM(s) IV Intermittent every 24 hours    10. OOB as tolerated, physical therapy consult if needed     11. Monitor coags 2x week, vitamin K BIW  phytonadione Solution 5 milliGRAM(s) Oral <User Schedule>    12. Monitor closely for clinical changes, monitor for fevers     13. Emotional support provided, plan of care discussed and questions addressed     14. Patient education done regarding plan of care, restrictions and discharge planning     15. Continue regular social work input     I have written the above note for Dr. lCarke who performed service with me in the room.   Bobo Reeves PA-C ([phone])    I have seen and examined patient with PA, I agree with above note as scribed.                      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 prep regimen for treatment of myelofibrosis    Disease: myelofibrosis   Prep regimen: Flu / Bu / Thiotepa / rATG   GVHD ppx: ABC: PTCy days +3, +4, Bortezomib days 0, +3, PTCy days +5, +14, +28   ABO/CMV:   Recipient:   A+/CMV+   Donor:   B+/CMV+     S: Patient seen and examined with [facility] Transplant Team:   + refractory nausea   + loose stool   + mouth pain   + fatigue   + intermittent fevers    O: Vitals:   Vital Signs Last 24 Hrs  T(C): 37.6 (2025 05:13), Max: 38.7 (2025 21:00)  T(F): 99.6 (2025 05:13), Max: 101.6 (2025 21:00)  HR: 75 (2025 05:13) (72 - 98)  BP: 161/65 (2025 05:13) (147/67 - 166/63)  BP(mean): --  RR: 20 (2025 05:13) (18 - 20)  SpO2: 94% (2025 05:13) (94% - 98%)    Parameters below as of 2025 05:13  Patient On (Oxygen Delivery Method): room air    Admit weight: 64.5kg   Daily Weight in k.9 (2025)    Intake / Output:    @ 07: @ 07:00  --------------------------------------------------------  IN: 3046 mL / OUT: 3900 mL / NET: -854 mL     @ 07: @ 06:47  --------------------------------------------------------  IN: 2768 mL / OUT: 1250 mL / NET: 1518 mL      PE:   Oropharynx: no erythema or ulcerations; +Thrush on tongue/posterior pharynx (improving)   Oral Mucositis: +                                                  ndGndrndanddndend:nd nd2nd CVS: S1, S2 RRR   Lungs: CTA throughout bilaterally   Abdomen: + BS x 4, soft, NT, ND   Extremities: no edema   Gastric Mucositis:                 -                              Grade: n/a   Intestinal Mucositis:              -                              Grade: n/a   Skin: no rash   TLC: CDI   Neuro: A&O x 3      Labs:   CBC Full  -  ( 2025 06:32 )  WBC Count : 0.04 K/uL  RBC Count : 2.40 M/uL  Hemoglobin : 7.1 g/dL  Hematocrit : 21.9 %  Platelet Count - Automated : 8 K/uL  Mean Cell Volume : 91.3 fl  Mean Cell Hemoglobin : 29.6 pg  Mean Cell Hemoglobin Concentration : 32.4 g/dL  Auto Neutrophil # : Not measured  Auto Lymphocyte # : Not measured  Auto Monocyte # : Not measured  Auto Eosinophil # : Not measured  Auto Basophil # : Not measured  Auto Neutrophil % : Not measured Differential percentages must be correlated with absolute numbers for  clinical significance.  Auto Lymphocyte % : Not measured  Auto Monocyte % : Not measured  Auto Eosinophil % : Not measured  Auto Basophil % : Not measured                          7.1    0.04  )-----------( 8        ( 2025 06:32 )             21.9         139  |  109[H]  |  11  ----------------------------<  94  3.6   |  19[L]  |  0.56    Ca    7.9[L]      2025 06:33  Phos  2.2       Mg     1.6         TPro  5.0[L]  /  Alb  2.7[L]  /  TBili  0.5  /  DBili  0.2  /  AST  10  /  ALT  6[L]  /  AlkPhos  71  30      Cultures:   Culture Results:   <10,000 CFU/mL Normal Urogenital Cha (25 @ 06:31)    Culture Results:   No growth at 48 Hours (25 @ 16:36)    Culture Results:   No growth at 48 Hours (25 @ 16:30)    Respiratory Viral Panel with COVID-19 by SCARLET (25 @ 21:02)   Rapid RVP Result: [id number]: NotDetec:    Culture - Blood (25 @ 18:45)   Specimen Source: Blood Blood-Catheter    Culture Results: No growth at 24 hoursCulture - Blood (25 @ 18:40)   Specimen Source: Blood Blood-Peripheral  Culture Results: No growth     Culture - Urine (25 @ 21:01)   Specimen Source: Clean Catch Clean Catch (Midstream)  Culture Results: 10,000 - 49,000 CFU/mL Gram Positive Cocci in Pairs and Chains   <10,000 CFU/ml Normal Urogenital cha present    Radiology:   ACC: [id number] EXAM:  XR CHEST PORTABLE URGENT 1V   ORDERED BY: BOBO REEVES   PROCEDURE DATE:  2025    IMPRESSION:  No focal consolidation.      Meds:   Antimicrobials:   acyclovir   Oral Tab/Cap 800 milliGRAM(s) Oral every 12 hours  caspofungin IVPB      caspofungin IVPB 50 milliGRAM(s) IV Intermittent every 24 hours  piperacillin/tazobactam IVPB.. 4.5 Gram(s) IV Intermittent every 8 hours  vancomycin    Solution 125 milliGRAM(s) Oral every 12 hours  voriconazole 200 milliGRAM(s) Oral every 12 hours      Heme / Onc:       GI:  pantoprazole    Tablet 40 milliGRAM(s) Oral before breakfast  polyethylene glycol 3350 17 Gram(s) Oral daily PRN  sodium bicarbonate Mouth Rinse 10 milliLiter(s) Swish and Spit five times a day  ursodiol Capsule 300 milliGRAM(s) Oral two times a day      Cardiovascular:   lisinopril 10 milliGRAM(s) Oral every 12 hours  metoprolol succinate ER 12.5 milliGRAM(s) Oral daily      Immunologic:       Other medications:   Biotene Dry Mouth Oral Rinse 5 milliLiter(s) Swish and Spit five times a day  buPROPion XL (24-Hour) . 300 milliGRAM(s) Oral daily  chlorhexidine 0.12% Liquid 15 milliLiter(s) Swish and Spit two times a day  chlorhexidine 4% Liquid 1 Application(s) Topical <User Schedule>  fentaNYL   Patch  25 MICROgram(s)/Hr. 1 Patch Transdermal every 48 hours  fosaprepitant IVPB 150 milliGRAM(s) IV Intermittent once  levothyroxine 88 MICROGram(s) Oral daily  ondansetron  IVPB 16 milliGRAM(s) IV Intermittent every 24 hours  phytonadione   Solution 5 milliGRAM(s) Oral <User Schedule>  prochlorperazine   Injectable 10 milliGRAM(s) IV Push every 6 hours  sodium chloride 0.9%. 1000 milliLiter(s) IV Continuous <Continuous>  sodium chloride 0.9%. 1000 milliLiter(s) IV Continuous <Continuous>    PRN:   acetaminophen     Tablet .. 650 milliGRAM(s) Oral every 6 hours PRN  FIRST- Mouthwash  BLM 15 milliLiter(s) Swish and Swallow five times a day PRN  LORazepam     Tablet 0.5 milliGRAM(s) Oral every 6 hours PRN  ondansetron Injectable 8 milliGRAM(s) IV Push every 8 hours PRN  polyethylene glycol 3350 17 Gram(s) Oral daily PRN  sodium chloride 0.9% lock flush 10 milliLiter(s) IV Push every 1 hour PRN    A/P: 64 year old female with hx of myelofibrosis presents for a MUD allogeneic SCT (DP Permissive) conditioning with Flu/Bu/Thiotepa/rATG.  Today is day +3  - Thiotepa 1/2. Maintain skin precautions during thiotepa administration and for 48 hours after completion (days .6 through day -3). Chronic pain issues - continue home fentanyl patch. Febrile overnight, tmax 100.9. Cultures  pending. RVP negative. CXR pending official read, no obvious consolidations. Vital signs are stable.    D/C HCTZ. Fludarabine 13, Busulfan 1/3, rATG 1/3. No Tylenol on busulfan days. Continue keppra ppx for 24 hours post infusion of last dose of busulfan. No acute events overnight, vital signs are stable.   - overnight patient was febrile during ATG, no cultures/abx as fever likely secondary to ATG. Patient was otherwise stable overnight. Fludarabine 2/3, Busulfan 2/3 and rATG 2/3. NO Tylenol or Azoles until Day 0. Continue with supportive care.  - Fludarabine 3/3, busulfan 3/3, rATG 3/3. No acute events overnight. Vital signs are stable.    Switched Compazine to atc   VSS, afebrile. 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   VSS, persistent fevers likely due to CRS post-SCT, continues on zosyn. Cultures pending. Hypervolemia - lasix 40mg x 1, will continue strict I/Os. Started caspofungin due to persistent thrush.   overnight no acute events noted. VSS, febrile likely due to CRS - continue with supportive care. BCx/UCx NGTD x 24h.   VSS, intermittently febrile likely due to CRS, infectious workup negative so far. PTCy  today. I&O, daily weights, prn diuresis. Continue to monitor thrush (improving). Changed some meds to PO suspension due to mucositis. Continue with supportive care. Refractory nausea, added trial of zyprexa 5mg po QHS x 3 days. d/c primafit. Change voriconazole to posaconazole tomorrow.     1. Infectious Disease:   acyclovir Oral Tab/Cap 800 milliGRAM(s) Oral every 12 hours  piperacillin/tazobactam IVPB.- 4.5 Gram(s) IV Intermittent once  piperacillin/tazobactam IVPB.. 4.5 Gram(s) IV Intermittent every 8 hours  vancomycin Solution 125 milliGRAM(s) Oral every 12 hours  voriconazole 200 milliGRAM(s) Oral every 12 hours  caspofungin IVPB 50 milliGRAM(s) IV Intermittent every 24 hours    2. VOD Prophylaxis:   ursodiol Capsule 300 milliGRAM(s) Oral two times a day    3. GI Prophylaxis:    pantoprazole Tablet 40 milliGRAM(s) Oral before breakfast    4. Mouthcare - NS / NaHCO3 rinses, Biotene; Skin care     5. GVHD prophylaxis   ABC: PTCy days +3, +4, Bortezomib days 0, +3, PTCy days +5, +14, +28     6. Transfuse & replete electrolytes prn    1 unit platelets     7. IV hydration, daily weights, strict I&O, prn diuresis   Lasix 20mg IV x 1 0900    8. PO intake as tolerated, nutrition follow up as needed    9. Antiemetics, anti-diarrhea medications:   prochlorperazine   Injectable 10 milliGRAM(s) IV Push every 6 hours   ondansetron  IVPB 16 milliGRAM(s) IV Intermittent every 24 hours    10. OOB as tolerated, physical therapy consult if needed     11. Monitor coags 2x week, vitamin K BIW  phytonadione Solution 5 milliGRAM(s) Oral <User Schedule>    12. Monitor closely for clinical changes, monitor for fevers     13. Emotional support provided, plan of care discussed and questions addressed     14. Patient education done regarding plan of care, restrictions and discharge planning     15. Continue regular social work input     I have written the above note for Dr. Clarke who performed service with me in the room.   Bobo Reeves PA-C ([phone])    I have seen and examined patient with PA, I agree with above note as scribed.

## 2025-06-30 NOTE — PROGRESS NOTE ADULT - NS PANP OPT1 GEN_ALL_CORE
I attest my time as PA/NP is greater than 50% of the total combined time spent on qualifying patient care activities by the PA/NP and attending.

## 2025-06-30 NOTE — PROGRESS NOTE ADULT - NS ATTEND AMEND GEN_ALL_CORE FT
Day + 3 s/p allo-HSCT     Overall, patient is doing ok; fatigued.   Complicated by CRS; intermittent fevers   Physical exam is remarkable for mucositis; +1 b/l LE edema   Labs reviewed;  Discussed PTCy today with hydration x 48 hours   Encouraged PO intake and importance of ambulation.   continue supportive care and antimicrobial ppx.

## 2025-07-01 LAB
ALBUMIN SERPL ELPH-MCNC: 2.7 G/DL — LOW (ref 3.3–5)
ALP SERPL-CCNC: 72 U/L — SIGNIFICANT CHANGE UP (ref 40–120)
ALT FLD-CCNC: <5 U/L — LOW (ref 10–45)
ANION GAP SERPL CALC-SCNC: 13 MMOL/L — SIGNIFICANT CHANGE UP (ref 5–17)
ANION GAP SERPL CALC-SCNC: 13 MMOL/L — SIGNIFICANT CHANGE UP (ref 5–17)
AST SERPL-CCNC: 7 U/L — LOW (ref 10–40)
BASOPHILS # BLD AUTO: SIGNIFICANT CHANGE UP (ref 0–0.2)
BASOPHILS NFR BLD AUTO: SIGNIFICANT CHANGE UP (ref 0–2)
BILIRUB SERPL-MCNC: 0.4 MG/DL — SIGNIFICANT CHANGE UP (ref 0.2–1.2)
BUN SERPL-MCNC: 10 MG/DL — SIGNIFICANT CHANGE UP (ref 7–23)
BUN SERPL-MCNC: 8 MG/DL — SIGNIFICANT CHANGE UP (ref 7–23)
CALCIUM SERPL-MCNC: 7.5 MG/DL — LOW (ref 8.4–10.5)
CALCIUM SERPL-MCNC: 7.7 MG/DL — LOW (ref 8.4–10.5)
CHLORIDE SERPL-SCNC: 112 MMOL/L — HIGH (ref 96–108)
CHLORIDE SERPL-SCNC: 113 MMOL/L — HIGH (ref 96–108)
CO2 SERPL-SCNC: 17 MMOL/L — LOW (ref 22–31)
CO2 SERPL-SCNC: 18 MMOL/L — LOW (ref 22–31)
CREAT SERPL-MCNC: 0.58 MG/DL — SIGNIFICANT CHANGE UP (ref 0.5–1.3)
CREAT SERPL-MCNC: 0.61 MG/DL — SIGNIFICANT CHANGE UP (ref 0.5–1.3)
EGFR: 100 ML/MIN/1.73M2 — SIGNIFICANT CHANGE UP
EGFR: 100 ML/MIN/1.73M2 — SIGNIFICANT CHANGE UP
EGFR: 102 ML/MIN/1.73M2 — SIGNIFICANT CHANGE UP
EGFR: 102 ML/MIN/1.73M2 — SIGNIFICANT CHANGE UP
EOSINOPHIL # BLD AUTO: SIGNIFICANT CHANGE UP (ref 0–0.5)
EOSINOPHIL NFR BLD AUTO: SIGNIFICANT CHANGE UP (ref 0–6)
GLUCOSE SERPL-MCNC: 85 MG/DL — SIGNIFICANT CHANGE UP (ref 70–99)
GLUCOSE SERPL-MCNC: 86 MG/DL — SIGNIFICANT CHANGE UP (ref 70–99)
HCT VFR BLD CALC: 21.3 % — LOW (ref 34.5–45)
HGB BLD-MCNC: 6.6 G/DL — CRITICAL LOW (ref 11.5–15.5)
IMM GRANULOCYTES # BLD AUTO: SIGNIFICANT CHANGE UP (ref 0–0.07)
IMM GRANULOCYTES NFR BLD AUTO: SIGNIFICANT CHANGE UP (ref 0–0.9)
IMMATURE PLATELET FRACTION #: 0.2 K/UL — LOW (ref 4.7–11.1)
IMMATURE PLATELET FRACTION %: 1.5 % — LOW (ref 1.6–4.9)
LDH SERPL L TO P-CCNC: 228 U/L — SIGNIFICANT CHANGE UP (ref 50–242)
LYMPHOCYTES # BLD AUTO: SIGNIFICANT CHANGE UP (ref 1–3.3)
LYMPHOCYTES NFR BLD AUTO: SIGNIFICANT CHANGE UP (ref 13–44)
MAGNESIUM SERPL-MCNC: 1.3 MG/DL — LOW (ref 1.6–2.6)
MAGNESIUM SERPL-MCNC: 2 MG/DL — SIGNIFICANT CHANGE UP (ref 1.6–2.6)
MCHC RBC-ENTMCNC: 28.6 PG — SIGNIFICANT CHANGE UP (ref 27–34)
MCHC RBC-ENTMCNC: 31 G/DL — LOW (ref 32–36)
MCV RBC AUTO: 92.2 FL — SIGNIFICANT CHANGE UP (ref 80–100)
MONOCYTES # BLD AUTO: SIGNIFICANT CHANGE UP (ref 0–0.9)
MONOCYTES NFR BLD AUTO: SIGNIFICANT CHANGE UP (ref 2–14)
NEUTROPHILS # BLD AUTO: SIGNIFICANT CHANGE UP (ref 1.8–7.4)
NEUTROPHILS NFR BLD AUTO: SIGNIFICANT CHANGE UP (ref 43–77)
NRBC # BLD AUTO: 0 K/UL — SIGNIFICANT CHANGE UP (ref 0–0)
NRBC # FLD: 0 K/UL — SIGNIFICANT CHANGE UP (ref 0–0)
NRBC BLD AUTO-RTO: 0 /100 WBCS — SIGNIFICANT CHANGE UP (ref 0–0)
PHOSPHATE SERPL-MCNC: 2.5 MG/DL — SIGNIFICANT CHANGE UP (ref 2.5–4.5)
PLATELET # BLD AUTO: 13 K/UL — CRITICAL LOW (ref 150–400)
PMV BLD: SIGNIFICANT CHANGE UP FL (ref 7–13)
POTASSIUM SERPL-MCNC: 3.5 MMOL/L — SIGNIFICANT CHANGE UP (ref 3.5–5.3)
POTASSIUM SERPL-MCNC: 3.5 MMOL/L — SIGNIFICANT CHANGE UP (ref 3.5–5.3)
POTASSIUM SERPL-SCNC: 3.5 MMOL/L — SIGNIFICANT CHANGE UP (ref 3.5–5.3)
POTASSIUM SERPL-SCNC: 3.5 MMOL/L — SIGNIFICANT CHANGE UP (ref 3.5–5.3)
PROT SERPL-MCNC: 4.9 G/DL — LOW (ref 6–8.3)
RBC # BLD: 2.31 M/UL — LOW (ref 3.8–5.2)
RBC # FLD: 17.3 % — HIGH (ref 10.3–14.5)
SODIUM SERPL-SCNC: 143 MMOL/L — SIGNIFICANT CHANGE UP (ref 135–145)
SODIUM SERPL-SCNC: 143 MMOL/L — SIGNIFICANT CHANGE UP (ref 135–145)
WBC # BLD: 0.02 K/UL — CRITICAL LOW (ref 3.8–10.5)
WBC # FLD AUTO: 0.02 K/UL — CRITICAL LOW (ref 3.8–10.5)

## 2025-07-01 PROCEDURE — 99233 SBSQ HOSP IP/OBS HIGH 50: CPT

## 2025-07-01 RX ORDER — FUROSEMIDE 10 MG/ML
40 INJECTION INTRAMUSCULAR; INTRAVENOUS ONCE
Refills: 0 | Status: DISCONTINUED | OUTPATIENT
Start: 2025-07-01 | End: 2025-07-01

## 2025-07-01 RX ORDER — FUROSEMIDE 10 MG/ML
20 INJECTION INTRAMUSCULAR; INTRAVENOUS ONCE
Refills: 0 | Status: DISCONTINUED | OUTPATIENT
Start: 2025-07-01 | End: 2025-07-01

## 2025-07-01 RX ORDER — FUROSEMIDE 10 MG/ML
20 INJECTION INTRAMUSCULAR; INTRAVENOUS ONCE
Refills: 0 | Status: COMPLETED | OUTPATIENT
Start: 2025-07-01 | End: 2025-07-01

## 2025-07-01 RX ORDER — MAGNESIUM SULFATE 500 MG/ML
2 SYRINGE (ML) INJECTION
Refills: 0 | Status: COMPLETED | OUTPATIENT
Start: 2025-07-01 | End: 2025-07-01

## 2025-07-01 RX ADMIN — Medication 800 MILLIGRAM(S): at 18:22

## 2025-07-01 RX ADMIN — Medication 250 MILLILITER(S): at 05:19

## 2025-07-01 RX ADMIN — FUROSEMIDE 20 MILLIGRAM(S): 10 INJECTION INTRAMUSCULAR; INTRAVENOUS at 20:05

## 2025-07-01 RX ADMIN — Medication 5 MILLILITER(S): at 00:10

## 2025-07-01 RX ADMIN — Medication 5 MILLILITER(S): at 13:44

## 2025-07-01 RX ADMIN — FUROSEMIDE 20 MILLIGRAM(S): 10 INJECTION INTRAMUSCULAR; INTRAVENOUS at 10:26

## 2025-07-01 RX ADMIN — Medication 10 MILLIGRAM(S): at 18:22

## 2025-07-01 RX ADMIN — FUROSEMIDE 20 MILLIGRAM(S): 10 INJECTION INTRAMUSCULAR; INTRAVENOUS at 09:42

## 2025-07-01 RX ADMIN — Medication 10 MILLIGRAM(S): at 13:44

## 2025-07-01 RX ADMIN — Medication 88 MICROGRAM(S): at 05:20

## 2025-07-01 RX ADMIN — Medication 125 MILLIGRAM(S): at 18:22

## 2025-07-01 RX ADMIN — Medication 50 MILLIEQUIVALENT(S): at 15:47

## 2025-07-01 RX ADMIN — LISINOPRIL 10 MILLIGRAM(S): 5 TABLET ORAL at 05:24

## 2025-07-01 RX ADMIN — Medication 50 MILLIEQUIVALENT(S): at 00:10

## 2025-07-01 RX ADMIN — Medication 10 MILLILITER(S): at 13:45

## 2025-07-01 RX ADMIN — BUPROPION HYDROBROMIDE 300 MILLIGRAM(S): 522 TABLET, EXTENDED RELEASE ORAL at 13:44

## 2025-07-01 RX ADMIN — Medication 5 MILLILITER(S): at 20:40

## 2025-07-01 RX ADMIN — METOPROLOL SUCCINATE 12.5 MILLIGRAM(S): 50 TABLET, EXTENDED RELEASE ORAL at 05:24

## 2025-07-01 RX ADMIN — LIDOCAINE HYDROCHLORIDE 15 MILLILITER(S): 20 JELLY TOPICAL at 05:19

## 2025-07-01 RX ADMIN — CASPOFUNGIN ACETATE 250 MILLIGRAM(S): 5 INJECTION, POWDER, LYOPHILIZED, FOR SOLUTION INTRAVENOUS at 10:26

## 2025-07-01 RX ADMIN — LIDOCAINE HYDROCHLORIDE 15 MILLILITER(S): 20 JELLY TOPICAL at 18:36

## 2025-07-01 RX ADMIN — Medication 25 GRAM(S): at 05:18

## 2025-07-01 RX ADMIN — Medication 40 MILLIGRAM(S): at 08:51

## 2025-07-01 RX ADMIN — Medication 10 MILLILITER(S): at 08:48

## 2025-07-01 RX ADMIN — Medication 25 GRAM(S): at 09:43

## 2025-07-01 RX ADMIN — Medication 10 MILLIGRAM(S): at 05:20

## 2025-07-01 RX ADMIN — Medication 5 MILLILITER(S): at 16:24

## 2025-07-01 RX ADMIN — LIDOCAINE HYDROCHLORIDE 15 MILLILITER(S): 20 JELLY TOPICAL at 00:37

## 2025-07-01 RX ADMIN — Medication 1 APPLICATION(S): at 08:50

## 2025-07-01 RX ADMIN — Medication 10 MILLILITER(S): at 16:24

## 2025-07-01 RX ADMIN — Medication 15 MILLILITER(S): at 05:20

## 2025-07-01 RX ADMIN — LIDOCAINE HYDROCHLORIDE 15 MILLILITER(S): 20 JELLY TOPICAL at 13:32

## 2025-07-01 RX ADMIN — Medication 800 MILLIGRAM(S): at 05:20

## 2025-07-01 RX ADMIN — CYCLOPHOSPHAMIDE INJECTION, SOLUTION 2125 MILLIGRAM(S): 200 INJECTION INTRAVENOUS at 15:29

## 2025-07-01 RX ADMIN — Medication 15 MILLILITER(S): at 18:22

## 2025-07-01 RX ADMIN — URSODIOL 300 MILLIGRAM(S): 300 CAPSULE ORAL at 18:22

## 2025-07-01 RX ADMIN — Medication 116 MILLIGRAM(S): at 14:54

## 2025-07-01 RX ADMIN — Medication 10 MILLILITER(S): at 20:40

## 2025-07-01 RX ADMIN — Medication 50 MILLIEQUIVALENT(S): at 18:36

## 2025-07-01 RX ADMIN — Medication 10 MILLIGRAM(S): at 00:10

## 2025-07-01 RX ADMIN — OLANZAPINE 5 MILLIGRAM(S): 10 TABLET ORAL at 22:10

## 2025-07-01 RX ADMIN — Medication 25 GRAM(S): at 12:39

## 2025-07-01 RX ADMIN — Medication 50 MILLIEQUIVALENT(S): at 20:43

## 2025-07-01 RX ADMIN — LISINOPRIL 10 MILLIGRAM(S): 5 TABLET ORAL at 18:22

## 2025-07-01 RX ADMIN — Medication 10 MILLILITER(S): at 00:10

## 2025-07-01 RX ADMIN — Medication 5 MILLILITER(S): at 08:48

## 2025-07-01 RX ADMIN — Medication 125 MILLIGRAM(S): at 05:20

## 2025-07-01 NOTE — PHYSICAL THERAPY INITIAL EVALUATION ADULT - GAIT TRAINING, PT EVAL
Pt will ambulate 200-300 ft independently with RW in 2 weeks Pt will ambulate 200-300 ft independently with no AD to increase overall endurance in 2 weeks.

## 2025-07-01 NOTE — PHYSICAL THERAPY INITIAL EVALUATION ADULT - DIAGNOSIS, PT EVAL
Decreased strength, functional mobilty and endurance Pt with overall decreased endurance, strength, and balance limiting functional mobility.

## 2025-07-01 NOTE — PHYSICAL THERAPY INITIAL EVALUATION ADULT - PERTINENT HX OF CURRENT PROBLEM, REHAB EVAL
this is a 64 year old female with hx of myelofibrosis presents for a MUD allogeneic SCT (DP Permissive) conditioning with Flu/Bu/Thiotepa/rATG. Today (7/1) is day +4.. CXR negative on 6/27

## 2025-07-01 NOTE — CHART NOTE - NSCHARTNOTEFT_GEN_A_CORE
NUTRITION FOLLOW UP NOTE    PATIENT SEEN FOR: BMTU Nutrition follow up     SOURCE: [x] Patient  [x] Current Medical Record  [x] RN  [] Family/support person at bedside  [] Patient unavailable/inappropriate  [x] Other: Interdisciplinary rounds     CHART REVIEWED/EVENTS NOTED.  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 Today is day +4.     DIET ORDER:   Diet, Regular:   Supplement Feeding Modality:  Oral  Ensure Max Cans or Servings Per Day:  1       Frequency:  Three Times a day (25 @ 18:00) [Active]        CURRENT DIET ORDER IS:  [x] Appropriate:   [] Inadequate:  [] Other:    NUTRITION INTAKE/PROVISION:  [x] PO: Pt reports decreased appetite/PO intake x 3 days. Reports consuming Ensure Max shake and protein fortified smoothies daily. Did not voice any food preferences at this time.   [] Enteral Nutrition:  [] Parenteral Nutrition:    ANTHROPOMETRICS:  Drug Dosing Weight  Height (cm): 159 (2025 07:13)  Weight (kg): 64.5 (2025 07:13)  BMI (kg/m2): 25.5 (2025 07:13)  BSA (m2): 1.67 (2025 07:13)  Weights:   Daily Weight in kG: Daily     Daily Weight in k.4 (2025 08:21), 63.9 (-25), Weight in k.5 (-24), Weight in k.7 (-23), Weight in k.6 (-22), Weight in k (-21)   ** Weight fluctuating - Weight changes likely secondary to fluid shifts. RD to continue to monitor weight trends as able.     NUTRITIONALLY PERTINENT MEDICATIONS:  MEDICATIONS  (STANDING):  acyclovir    Suspension 800 milliGRAM(s) Oral every 12 hours  Biotene Dry Mouth Oral Rinse 5 milliLiter(s) Swish and Spit five times a day  buPROPion XL (24-Hour) . 300 milliGRAM(s) Oral daily  caspofungin IVPB      caspofungin IVPB 50 milliGRAM(s) IV Intermittent every 24 hours  chlorhexidine 0.12% Liquid 15 milliLiter(s) Swish and Spit two times a day  chlorhexidine 4% Liquid 1 Application(s) Topical <User Schedule>  cyclophosphamide IVPB (eMAR) 2125 milliGRAM(s) IV Intermittent every 24 hours  fentaNYL   Patch  25 MICROgram(s)/Hr. 1 Patch Transdermal every 48 hours  furosemide   Injectable 20 milliGRAM(s) IV Push once  levothyroxine 88 MICROGram(s) Oral daily  lisinopril 10 milliGRAM(s) Oral every 12 hours  magnesium sulfate  IVPB 2 Gram(s) IV Intermittent every 2 hours  metoprolol succinate ER 12.5 milliGRAM(s) Oral daily  OLANZapine 5 milliGRAM(s) Oral at bedtime  ondansetron  IVPB 16 milliGRAM(s) IV Intermittent every 24 hours  pantoprazole    Tablet 40 milliGRAM(s) Oral before breakfast  phytonadione   Solution 5 milliGRAM(s) Oral <User Schedule>  piperacillin/tazobactam IVPB.. 4.5 Gram(s) IV Intermittent every 8 hours  posaconazole DR Tablet 300 milliGRAM(s) Oral daily  potassium chloride  20 mEq/100 mL IVPB 20 milliEquivalent(s) IV Intermittent every 2 hours  prochlorperazine   Injectable 10 milliGRAM(s) IV Push every 6 hours  sodium bicarbonate Mouth Rinse 10 milliLiter(s) Swish and Spit five times a day  sodium chloride 0.9%. 1000 milliLiter(s) (250 mL/Hr) IV Continuous <Continuous>  ursodiol Suspension 300 milliGRAM(s) Oral two times a day  vancomycin    Solution 125 milliGRAM(s) Oral every 12 hours    MEDICATIONS  (PRN):  acetaminophen     Tablet .. 650 milliGRAM(s) Oral every 6 hours PRN Temp greater or equal to 38C (100.4F), Mild Pain (1 - 3)  FIRST- Mouthwash  BLM 15 milliLiter(s) Swish and Swallow five times a day PRN mouth/ throat pain  lidocaine 2% Viscous 15 milliLiter(s) Oral five times a day PRN Mouth Care  ondansetron Injectable 8 milliGRAM(s) IV Push every 8 hours PRN nuaea (2nd line)  polyethylene glycol 3350 17 Gram(s) Oral daily PRN Constipation  sodium chloride 0.9% lock flush 10 milliLiter(s) IV Push every 1 hour PRN Pre/post blood products, medications, blood draw, and to maintain line patency         NUTRITIONALLY PERTINENT LABS:   @ 06:22: Na 143, BUN 10, Cr 0.61, BG 86, K+ 3.5, Phos 2.5, Mg 1.3[L], Alk Phos 72, ALT/SGPT <5[L], AST/SGOT 7[L], HbA1c --            Finger Sticks:      NUTRITIONALLY PERTINENT MEDICATIONS/LABS:  [x] Reviewed  [x] Relevant notes on medications/labs:  - Ordered for levothyroxine   - Hypomagnesemic.     EDEMA:  [x] Reviewed  [] Relevant notes:    GI/ I&O:  [x] Reviewed  [x] Relevant notes: Last BM: Ordered for Miralax.   [] Other:    SKIN:   [x] No pressure injuries documented, per nursing flowsheet  [] Pressure injury previously noted  [] Change in pressure injury documentation:  [] Other:    ESTIMATED NEEDS:  [x] No change:  [] Updated:  Energy:  3380-9425 kcal/day (30-35 kcal/kg)  Protein:  77.4-90.3 g/day (1.2-1.4 g/kg)  Fluid:   ml/day or [x] defer to team  Based on: dosing weight 64.5 kg     NUTRITION DIAGNOSIS:  [x] Prior Dx: Increased Nutrient Needs   [] New Dx:    EDUCATION:  [x] Yes: Reinforced BMT nutrition recommendations: food safety recommendations, increased protein-energy needs, small frequent meals as tolerated.   [] Not appropriate/warranted    NUTRITION CARE PLAN:  1. Diet: regular diet   2. Supplements: Ensure Max 1x/day and protein-fortified smoothie 2x/day   3. Multivitamin/mineral supplementation: deferred to team   4: Monitor and encourage PO intake. Encourage use of daily menus. Honor dietary preferences as expressed as able.     [] Achieved - Continue current nutrition intervention(s)  [] Current medical condition precludes nutrition intervention at this time.    MONITORING AND EVALUATION:   RD remains available upon request and will follow up per protocol.    Ann-Marie Gallagher RDN CDN (available on TEAMS) NUTRITION FOLLOW UP NOTE    PATIENT SEEN FOR: BMTU Nutrition follow up     SOURCE: [x] Patient  [x] Current Medical Record  [x] RN  [] Family/support person at bedside  [] Patient unavailable/inappropriate  [x] Other: Interdisciplinary rounds     CHART REVIEWED/EVENTS NOTED.  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 Today is day +4.     DIET ORDER:   Diet, Regular:   Supplement Feeding Modality:  Oral  Ensure Max Cans or Servings Per Day:  1       Frequency:  Three Times a day (25 @ 18:00) [Active]        CURRENT DIET ORDER IS:  [x] Appropriate:   [] Inadequate:  [] Other:    NUTRITION INTAKE/PROVISION:  [x] PO: Pt reports decreased appetite/PO intake x 5 days. Reports consuming Ensure Max shake and protein fortified smoothies daily. Did not voice any food preferences at this time.   [] Enteral Nutrition:  [] Parenteral Nutrition:    ANTHROPOMETRICS:  Drug Dosing Weight  Height (cm): 159 (2025 07:13)  Weight (kg): 64.5 (2025 07:13)  BMI (kg/m2): 25.5 (2025 07:13)  BSA (m2): 1.67 (2025 07:13)  Weights:   Daily Weight in kG: Daily     Daily Weight in k.4 (2025 08:21), 63.9 (-25), Weight in k.5 (-24), Weight in k.7 (-23), Weight in k.6 (-22), Weight in k (-21)   ** Weight fluctuating - Weight changes likely secondary to fluid shifts. RD to continue to monitor weight trends as able.     NUTRITIONALLY PERTINENT MEDICATIONS:  MEDICATIONS  (STANDING):  acyclovir    Suspension 800 milliGRAM(s) Oral every 12 hours  Biotene Dry Mouth Oral Rinse 5 milliLiter(s) Swish and Spit five times a day  buPROPion XL (24-Hour) . 300 milliGRAM(s) Oral daily  caspofungin IVPB      caspofungin IVPB 50 milliGRAM(s) IV Intermittent every 24 hours  chlorhexidine 0.12% Liquid 15 milliLiter(s) Swish and Spit two times a day  chlorhexidine 4% Liquid 1 Application(s) Topical <User Schedule>  cyclophosphamide IVPB (eMAR) 2125 milliGRAM(s) IV Intermittent every 24 hours  fentaNYL   Patch  25 MICROgram(s)/Hr. 1 Patch Transdermal every 48 hours  furosemide   Injectable 20 milliGRAM(s) IV Push once  levothyroxine 88 MICROGram(s) Oral daily  lisinopril 10 milliGRAM(s) Oral every 12 hours  magnesium sulfate  IVPB 2 Gram(s) IV Intermittent every 2 hours  metoprolol succinate ER 12.5 milliGRAM(s) Oral daily  OLANZapine 5 milliGRAM(s) Oral at bedtime  ondansetron  IVPB 16 milliGRAM(s) IV Intermittent every 24 hours  pantoprazole    Tablet 40 milliGRAM(s) Oral before breakfast  phytonadione   Solution 5 milliGRAM(s) Oral <User Schedule>  piperacillin/tazobactam IVPB.. 4.5 Gram(s) IV Intermittent every 8 hours  posaconazole DR Tablet 300 milliGRAM(s) Oral daily  potassium chloride  20 mEq/100 mL IVPB 20 milliEquivalent(s) IV Intermittent every 2 hours  prochlorperazine   Injectable 10 milliGRAM(s) IV Push every 6 hours  sodium bicarbonate Mouth Rinse 10 milliLiter(s) Swish and Spit five times a day  sodium chloride 0.9%. 1000 milliLiter(s) (250 mL/Hr) IV Continuous <Continuous>  ursodiol Suspension 300 milliGRAM(s) Oral two times a day  vancomycin    Solution 125 milliGRAM(s) Oral every 12 hours    MEDICATIONS  (PRN):  acetaminophen     Tablet .. 650 milliGRAM(s) Oral every 6 hours PRN Temp greater or equal to 38C (100.4F), Mild Pain (1 - 3)  FIRST- Mouthwash  BLM 15 milliLiter(s) Swish and Swallow five times a day PRN mouth/ throat pain  lidocaine 2% Viscous 15 milliLiter(s) Oral five times a day PRN Mouth Care  ondansetron Injectable 8 milliGRAM(s) IV Push every 8 hours PRN nuaea (2nd line)  polyethylene glycol 3350 17 Gram(s) Oral daily PRN Constipation  sodium chloride 0.9% lock flush 10 milliLiter(s) IV Push every 1 hour PRN Pre/post blood products, medications, blood draw, and to maintain line patency         NUTRITIONALLY PERTINENT LABS:   @ 06:22: Na 143, BUN 10, Cr 0.61, BG 86, K+ 3.5, Phos 2.5, Mg 1.3[L], Alk Phos 72, ALT/SGPT <5[L], AST/SGOT 7[L], HbA1c --            Finger Sticks:      NUTRITIONALLY PERTINENT MEDICATIONS/LABS:  [x] Reviewed  [x] Relevant notes on medications/labs:  - Ordered for levothyroxine   - Hypomagnesemic.     EDEMA:  [x] Reviewed  [x] Relevant notes: 1+ bilateral foot     GI/ I&O:  [x] Reviewed  [x] Relevant notes: Last BM: . Ordered for Miralax.   [] Other:    SKIN:   [x] No pressure injuries documented, per nursing flowsheet  [] Pressure injury previously noted  [] Change in pressure injury documentation:  [] Other:  - Unable to perform Nutrition Focused Physical Exam in setting of pt feeling very fatigued this morning.     ESTIMATED NEEDS:  [x] No change:  [] Updated:  Energy:  1302-6743 kcal/day (30-35 kcal/kg)  Protein:  77.4-90.3 g/day (1.2-1.4 g/kg)  Fluid:   ml/day or [x] defer to team  Based on: dosing weight 64.5 kg     NUTRITION DIAGNOSIS:  [x] Prior Dx: Increased Nutrient Needs   [x] New Dx: Inadequate protein-energy intake related to decreased ability to consume adequate protein-energy in setting of increased physiological demand for nutrients as evidenced by pt meeting <50% of estimated needs >/5 days.   Goal: Pt to meet >75% of estimated protein- energy needs during hospital stay.     EDUCATION:  [x] Yes: Reinforced BMT nutrition recommendations: food safety recommendations, increased protein-energy needs, small frequent meals as tolerated.   [] Not appropriate/warranted    NUTRITION CARE PLAN:  1. Diet: regular diet   2. Supplements: Ensure Max 1x/day and protein-fortified smoothie 2x/day   3. Multivitamin/mineral supplementation: deferred to team   4: Monitor and encourage PO intake. Encourage use of daily menus. Honor dietary preferences as expressed as able.     [] Achieved - Continue current nutrition intervention(s)  [] Current medical condition precludes nutrition intervention at this time.    MONITORING AND EVALUATION:   RD remains available upon request and will follow up per protocol.    Ann-Marie Gallagher RDN CDN (available on TEAMS)

## 2025-07-01 NOTE — PHYSICAL THERAPY INITIAL EVALUATION ADULT - PLANNED THERAPY INTERVENTIONS, PT EVAL
Pt will negotiate 1 flight of stairs indepenedently in 2 weeks./bed mobility training/gait training/strengthening/transfer training gait training/strengthening

## 2025-07-01 NOTE — PHYSICAL THERAPY INITIAL EVALUATION ADULT - NSBATHINGEQUIP_GEN_A_PT
shower chair Pt will require a shower chair upon discharge secondary to overall decreased strength and endurance./shower chair

## 2025-07-01 NOTE — PHYSICAL THERAPY INITIAL EVALUATION ADULT - ADDITIONAL COMMENTS
In a condo 1st level states no staircase. states ambulatory without an AD. may have RW from past hospitalizations though.

## 2025-07-01 NOTE — PHYSICAL THERAPY INITIAL EVALUATION ADULT - STRENGTHENING, PT EVAL
normal balance for safe upright activity in 2 weeks GOAL: Pt will improve BLEs to 4/5 to improve functional mobility in 2 weeks.

## 2025-07-01 NOTE — PROGRESS NOTE ADULT - SUBJECTIVE AND OBJECTIVE BOX
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 / Bu / Thiotepa / rATG prep regimen for treatment of myelofibrosis    Disease: myelofibrosis   Prep regimen: Flu / Bu / Thiotepa / rATG   GVHD ppx: ABC: PTCy days +3, +4, Bortezomib days 0, +3, PTCy days +5, +14, +28   ABO/CMV:   Recipient:   A+/CMV+   Donor:   B+/CMV+    S: Patient seen and examined with HPC Transplant Team:       O:   Vital Signs Last 24 Hrs  T(C): 36.7 (01 Jul 2025 05:15), Max: 37.7 (30 Jun 2025 13:16)  T(F): 98 (01 Jul 2025 05:15), Max: 99.8 (30 Jun 2025 13:16)  HR: 83 (01 Jul 2025 05:15) (67 - 93)  BP: 162/70 (01 Jul 2025 05:15) (135/69 - 162/70)  BP(mean): --  RR: 18 (01 Jul 2025 05:15) (18 - 18)  SpO2: 95% (01 Jul 2025 05:15) (94% - 99%)    Parameters below as of 01 Jul 2025 05:15  Patient On (Oxygen Delivery Method): room air      Admit weight: 64.5kg  Daily Weight in kG:     Intake / Output:   06-30 @ 07:01  -  07-01 @ 07:00  --------------------------------------------------------  IN: 6122 mL / OUT: 3556 mL / NET: 2566 mL    PE:   Oropharynx: no erythema or ulcerations; +Thrush on tongue/posterior pharynx (improving)   Oral Mucositis: +                                                  ndGndrndanddndend:nd nd2nd CVS: S1, S2 RRR   Lungs: CTA throughout bilaterally   Abdomen: + BS x 4, soft, NT, ND   Extremities: no edema   Gastric Mucositis:                 -                              Grade: n/a   Intestinal Mucositis:              -                              Grade: n/a   Skin: no rash   TLC: CDI   Neuro: A&O x 3      Labs:       CBC - p    07-01    143  |  112[H]  |  10  ----------------------------<  86  3.5   |  18[L]  |  0.61    Ca    7.5[L]      01 Jul 2025 06:22  Phos  2.5     07-01  Mg     1.3     07-01    TPro  4.9[L]  /  Alb  2.7[L]  /  TBili  0.4  /  DBili  x   /  AST  7[L]  /  ALT  <5[L]  /  AlkPhos  72  07-01    PT/INR - ( 30 Jun 2025 06:33 )   PT: 14.1 sec;   INR: 1.23 ratio         PTT - ( 30 Jun 2025 06:33 )  PTT:26.7 sec  LIVER FUNCTIONS - ( 01 Jul 2025 06:22 )  Alb: 2.7 g/dL / Pro: 4.9 g/dL / ALK PHOS: 72 U/L / ALT: <5 U/L / AST: 7 U/L / GGT: x           Lactate Dehydrogenase, Serum: 228 U/L (07-01 @ 06:22)      Cultures:   Culture Results: <10,000 CFU/mL Normal Urogenital Cha (06.28.25 @ 06:31)    Culture Results:   No growth at 5 days(06.27.25 @ 16:36)    Culture Results:   No growth at  5 days (06.27.25 @ 16:30)    Respiratory Viral Panel with COVID-19 by [facility] (06.20.25 @ 21:02)   Rapid RVP Result: [id number]: NotDetec:    Culture - Blood (06.20.25 @ 18:45)   Specimen Source: Blood Blood-Catheter    Culture Results: No growth at 24 hoursCulture - Blood (06.20.25 @ 18:40)   Specimen Source: Blood Blood-Peripheral  Culture Results: No growth     Culture - Urine (06.20.25 @ 21:01)   Specimen Source: Clean Catch Clean Catch (Midstream)  Culture Results: 10,000 - 49,000 CFU/mL Gram Positive Cocci in Pairs and Chains   <10,000 CFU/ml Normal Urogenital cha present    Radiology:   ACC: [id number] EXAM:  XR CHEST PORTABLE URGENT 1V   ORDERED BY: SARAH REEVES   PROCEDURE DATE:  06/27/2025    IMPRESSION:  No focal consolidation.        Meds:   Antimicrobials:   acyclovir    Suspension 800 milliGRAM(s) Oral every 12 hours  caspofungin IVPB      caspofungin IVPB 50 milliGRAM(s) IV Intermittent every 24 hours  piperacillin/tazobactam IVPB.. 4.5 Gram(s) IV Intermittent every 8 hours  posaconazole DR Tablet 300 milliGRAM(s) Oral daily  vancomycin    Solution 125 milliGRAM(s) Oral every 12 hours      Heme / Onc:   cyclophosphamide IVPB (eMAR) 2125 milliGRAM(s) IV Intermittent every 24 hours      GI:  pantoprazole    Tablet 40 milliGRAM(s) Oral before breakfast  polyethylene glycol 3350 17 Gram(s) Oral daily PRN  sodium bicarbonate Mouth Rinse 10 milliLiter(s) Swish and Spit five times a day  ursodiol Suspension 300 milliGRAM(s) Oral two times a day      Cardiovascular:   lisinopril 10 milliGRAM(s) Oral every 12 hours  metoprolol succinate ER 12.5 milliGRAM(s) Oral daily      Immunologic:       Other medications:   Biotene Dry Mouth Oral Rinse 5 milliLiter(s) Swish and Spit five times a day  buPROPion XL (24-Hour) . 300 milliGRAM(s) Oral daily  chlorhexidine 0.12% Liquid 15 milliLiter(s) Swish and Spit two times a day  chlorhexidine 4% Liquid 1 Application(s) Topical <User Schedule>  fentaNYL   Patch  25 MICROgram(s)/Hr. 1 Patch Transdermal every 48 hours  levothyroxine 88 MICROGram(s) Oral daily  magnesium sulfate  IVPB 2 Gram(s) IV Intermittent every 2 hours  OLANZapine 5 milliGRAM(s) Oral at bedtime  ondansetron  IVPB 16 milliGRAM(s) IV Intermittent every 24 hours  phytonadione   Solution 5 milliGRAM(s) Oral <User Schedule>  prochlorperazine   Injectable 10 milliGRAM(s) IV Push every 6 hours  sodium chloride 0.9%. 1000 milliLiter(s) IV Continuous <Continuous>      PRN:   acetaminophen     Tablet .. 650 milliGRAM(s) Oral every 6 hours PRN  FIRST- Mouthwash  BLM 15 milliLiter(s) Swish and Swallow five times a day PRN  lidocaine 2% Viscous 15 milliLiter(s) Oral five times a day PRN  ondansetron Injectable 8 milliGRAM(s) IV Push every 8 hours PRN  polyethylene glycol 3350 17 Gram(s) Oral daily PRN  sodium chloride 0.9% lock flush 10 milliLiter(s) IV Push every 1 hour PRN        A/P: 64 year old female with hx of myelofibrosis presents for a MUD allogeneic SCT (DP Permissive) conditioning with Flu/Bu/Thiotepa/rATG.  Today is day +4  6/21- Thiotepa 1/2. Maintain skin precautions during thiotepa administration and for 48 hours after completion (days .6 through day -3). Chronic pain issues - continue home fentanyl patch. Febrile overnight, tmax 100.9. Cultures  pending. RVP negative. CXR pending official read, no obvious consolidations. Vital signs are stable.   6/23 D/C HCTZ. Fludarabine 1/3, Busulfan 1/3, rATG 1/3. No Tylenol on busulfan days. Continue keppra ppx for 24 hours post infusion of last dose of busulfan. No acute events overnight, vital signs are stable.   6/24- overnight patient was febrile during ATG, no cultures/abx as fever likely secondary to ATG. Patient was otherwise stable overnight. Fludarabine 2/3, Busulfan 2/3 and rATG 2/3. NO Tylenol or Azoles until Day 0. Continue with supportive care.  6/25- Fludarabine 3/3, busulfan 3/3, rATG 3/3. No acute events overnight. Vital signs are stable.   6/26 Switched Compazine to atc  6/27 VSS, afebrile. 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  6/28 VSS, persistent fevers likely due to CRS post-SCT, continues on zosyn. Cultures pending. Hypervolemia - lasix 40mg x 1, will continue strict I/Os. Started caspofungin due to persistent thrush.  6/29 overnight no acute events noted. VSS, febrile likely due to CRS - continue with supportive care. BCx/UCx NGTD x 24h.  6/30 VSS, intermittently febrile likely due to CRS, infectious workup negative so far. PTCy 1/2 today. I&O, daily weights, prn diuresis. Continue to monitor thrush (improving). Changed some meds to PO suspension due to mucositis. Continue with supportive care. Refractory nausea, added trial of zyprexa 5mg po QHS x 3 days. d/c primafit. Change voriconazole to posaconazole tomorrow.     1. Infectious Disease:   acyclovir    Suspension 800 milliGRAM(s) Oral every 12 hours  caspofungin IVPB      caspofungin IVPB 50 milliGRAM(s) IV Intermittent every 24 hours  piperacillin/tazobactam IVPB.. 4.5 Gram(s) IV Intermittent every 8 hours  posaconazole DR Tablet 300 milliGRAM(s) Oral daily  vancomycin    Solution 125 milliGRAM(s) Oral every 12 hours    2. VOD Prophylaxis:   ursodiol Capsule 300 milliGRAM(s) Oral two times a day    3. GI Prophylaxis:    pantoprazole Tablet 40 milliGRAM(s) Oral before breakfast    4. Mouthcare - NS / NaHCO3 rinses, Biotene; Skin care     5. GVHD prophylaxis   ABC: PTCy days +3, +4, Bortezomib days 0, +3, PTCy days +5, +14, +28     6. Transfuse & replete electrolytes prn     7. IV hydration, daily weights, strict I&O, prn diuresis   Lasix 20mg IV x 1    8. PO intake as tolerated, nutrition follow up as needed    9. Antiemetics, anti-diarrhea medications:   prochlorperazine   Injectable 10 milliGRAM(s) IV Push every 6 hours   ondansetron  IVPB 16 milliGRAM(s) IV Intermittent every 24 hours    10. OOB as tolerated, physical therapy consult if needed     11. Monitor coags 2x week, vitamin K BIW  phytonadione Solution 5 milliGRAM(s) Oral <User Schedule>    12. Monitor closely for clinical changes, monitor for fevers     13. Emotional support provided, plan of care discussed and questions addressed     14. Patient education done regarding plan of care, restrictions and discharge planning     15. Continue regular social work input     I have written the above note for Dr. Clarke who performed service with me in the room.   Jalen Kay PA-C ([phone])    I have seen and examined patient with PA, I agree with above note as scribed.                    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 / Bu / Thiotepa / rATG prep regimen for treatment of myelofibrosis    Disease: myelofibrosis   Prep regimen: Flu / Bu / Thiotepa / rATG   GVHD ppx: ABC: PTCy days +3, +4, Bortezomib days 0, +3, PTCy days +5, +14, +28   ABO/CMV:   Recipient:   A+/CMV+   Donor:   B+/CMV+    S: Patient seen and examined with HPC Transplant Team:       O:   Vital Signs Last 24 Hrs  T(C): 36.7 (01 Jul 2025 05:15), Max: 37.7 (30 Jun 2025 13:16)  T(F): 98 (01 Jul 2025 05:15), Max: 99.8 (30 Jun 2025 13:16)  HR: 83 (01 Jul 2025 05:15) (67 - 93)  BP: 162/70 (01 Jul 2025 05:15) (135/69 - 162/70)  BP(mean): --  RR: 18 (01 Jul 2025 05:15) (18 - 18)  SpO2: 95% (01 Jul 2025 05:15) (94% - 99%)    Parameters below as of 01 Jul 2025 05:15  Patient On (Oxygen Delivery Method): room air      Admit weight: 64.5kg  Daily Weight in kG:     Intake / Output:   06-30 @ 07:01  -  07-01 @ 07:00  --------------------------------------------------------  IN: 6122 mL / OUT: 3556 mL / NET: 2566 mL    PE:   Oropharynx: no erythema or ulcerations; +Thrush on tongue/posterior pharynx (improving)   Oral Mucositis: +                                                  ndGndrndanddndend:nd nd2nd CVS: S1, S2 RRR   Lungs: CTA throughout bilaterally   Abdomen: + BS x 4, soft, NT, ND   Extremities: no edema   Gastric Mucositis:                 -                              Grade: n/a   Intestinal Mucositis:              -                              Grade: n/a   Skin: no rash   TLC: CDI   Neuro: A&O x 3      Labs:                         6.6    0.02  )-----------( 13       ( 01 Jul 2025 06:20 )             21.3       07-01    143  |  112[H]  |  10  ----------------------------<  86  3.5   |  18[L]  |  0.61    Ca    7.5[L]      01 Jul 2025 06:22  Phos  2.5     07-01  Mg     1.3     07-01    TPro  4.9[L]  /  Alb  2.7[L]  /  TBili  0.4  /  DBili  x   /  AST  7[L]  /  ALT  <5[L]  /  AlkPhos  72  07-01    PT/INR - ( 30 Jun 2025 06:33 )   PT: 14.1 sec;   INR: 1.23 ratio         PTT - ( 30 Jun 2025 06:33 )  PTT:26.7 sec  LIVER FUNCTIONS - ( 01 Jul 2025 06:22 )  Alb: 2.7 g/dL / Pro: 4.9 g/dL / ALK PHOS: 72 U/L / ALT: <5 U/L / AST: 7 U/L / GGT: x           Lactate Dehydrogenase, Serum: 228 U/L (07-01 @ 06:22)      Cultures:   Culture Results: <10,000 CFU/mL Normal Urogenital Cha (06.28.25 @ 06:31)    Culture Results:   No growth at 5 days(06.27.25 @ 16:36)    Culture Results:   No growth at  5 days (06.27.25 @ 16:30)    Respiratory Viral Panel with COVID-19 by [facility] (06.20.25 @ 21:02)   Rapid RVP Result: [id number]: NotDetec:    Culture - Blood (06.20.25 @ 18:45)   Specimen Source: Blood Blood-Catheter    Culture Results: No growth at 24 hoursCulture - Blood (06.20.25 @ 18:40)   Specimen Source: Blood Blood-Peripheral  Culture Results: No growth     Culture - Urine (06.20.25 @ 21:01)   Specimen Source: Clean Catch Clean Catch (Midstream)  Culture Results: 10,000 - 49,000 CFU/mL Gram Positive Cocci in Pairs and Chains   <10,000 CFU/ml Normal Urogenital cha present    Radiology:   ACC: [id number] EXAM:  XR CHEST PORTABLE URGENT 1V   ORDERED BY: SARAH REEVES   PROCEDURE DATE:  06/27/2025    IMPRESSION:  No focal consolidation.        Meds:   Antimicrobials:   acyclovir    Suspension 800 milliGRAM(s) Oral every 12 hours  caspofungin IVPB      caspofungin IVPB 50 milliGRAM(s) IV Intermittent every 24 hours  piperacillin/tazobactam IVPB.. 4.5 Gram(s) IV Intermittent every 8 hours  posaconazole DR Tablet 300 milliGRAM(s) Oral daily  vancomycin    Solution 125 milliGRAM(s) Oral every 12 hours      Heme / Onc:   cyclophosphamide IVPB (eMAR) 2125 milliGRAM(s) IV Intermittent every 24 hours      GI:  pantoprazole    Tablet 40 milliGRAM(s) Oral before breakfast  polyethylene glycol 3350 17 Gram(s) Oral daily PRN  sodium bicarbonate Mouth Rinse 10 milliLiter(s) Swish and Spit five times a day  ursodiol Suspension 300 milliGRAM(s) Oral two times a day      Cardiovascular:   lisinopril 10 milliGRAM(s) Oral every 12 hours  metoprolol succinate ER 12.5 milliGRAM(s) Oral daily      Immunologic:       Other medications:   Biotene Dry Mouth Oral Rinse 5 milliLiter(s) Swish and Spit five times a day  buPROPion XL (24-Hour) . 300 milliGRAM(s) Oral daily  chlorhexidine 0.12% Liquid 15 milliLiter(s) Swish and Spit two times a day  chlorhexidine 4% Liquid 1 Application(s) Topical <User Schedule>  fentaNYL   Patch  25 MICROgram(s)/Hr. 1 Patch Transdermal every 48 hours  levothyroxine 88 MICROGram(s) Oral daily  magnesium sulfate  IVPB 2 Gram(s) IV Intermittent every 2 hours  OLANZapine 5 milliGRAM(s) Oral at bedtime  ondansetron  IVPB 16 milliGRAM(s) IV Intermittent every 24 hours  phytonadione   Solution 5 milliGRAM(s) Oral <User Schedule>  prochlorperazine   Injectable 10 milliGRAM(s) IV Push every 6 hours  sodium chloride 0.9%. 1000 milliLiter(s) IV Continuous <Continuous>      PRN:   acetaminophen     Tablet .. 650 milliGRAM(s) Oral every 6 hours PRN  FIRST- Mouthwash  BLM 15 milliLiter(s) Swish and Swallow five times a day PRN  lidocaine 2% Viscous 15 milliLiter(s) Oral five times a day PRN  ondansetron Injectable 8 milliGRAM(s) IV Push every 8 hours PRN  polyethylene glycol 3350 17 Gram(s) Oral daily PRN  sodium chloride 0.9% lock flush 10 milliLiter(s) IV Push every 1 hour PRN        A/P: 64 year old female with hx of myelofibrosis presents for a MUD allogeneic SCT (DP Permissive) conditioning with Flu/Bu/Thiotepa/rATG.  Today is day +4  6/21- Thiotepa 1/2. Maintain skin precautions during thiotepa administration and for 48 hours after completion (days .6 through day -3). Chronic pain issues - continue home fentanyl patch. Febrile overnight, tmax 100.9. Cultures  pending. RVP negative. CXR pending official read, no obvious consolidations. Vital signs are stable.   6/23 D/C HCTZ. Fludarabine 1/3, Busulfan 1/3, rATG 1/3. No Tylenol on busulfan days. Continue keppra ppx for 24 hours post infusion of last dose of busulfan. No acute events overnight, vital signs are stable.   6/24- overnight patient was febrile during ATG, no cultures/abx as fever likely secondary to ATG. Patient was otherwise stable overnight. Fludarabine 2/3, Busulfan 2/3 and rATG 2/3. NO Tylenol or Azoles until Day 0. Continue with supportive care.  6/25- Fludarabine 3/3, busulfan 3/3, rATG 3/3. No acute events overnight. Vital signs are stable.   6/26 Switched Compazine to atc  6/27 VSS, afebrile. 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  6/28 VSS, persistent fevers likely due to CRS post-SCT, continues on zosyn. Cultures pending. Hypervolemia - lasix 40mg x 1, will continue strict I/Os. Started caspofungin due to persistent thrush.  6/29 overnight no acute events noted. VSS, febrile likely due to CRS - continue with supportive care. BCx/UCx NGTD x 24h.  6/30 VSS, intermittently febrile likely due to CRS, infectious workup negative so far. PTCy 1/2 today. I&O, daily weights, prn diuresis. Continue to monitor thrush (improving). Changed some meds to PO suspension due to mucositis. Continue with supportive care. Refractory nausea, added trial of zyprexa 5mg po QHS x 3 days. d/c primafit. Change voriconazole to posaconazole tomorrow.     1. Infectious Disease:   acyclovir    Suspension 800 milliGRAM(s) Oral every 12 hours  caspofungin IVPB      caspofungin IVPB 50 milliGRAM(s) IV Intermittent every 24 hours  piperacillin/tazobactam IVPB.. 4.5 Gram(s) IV Intermittent every 8 hours  posaconazole DR Tablet 300 milliGRAM(s) Oral daily  vancomycin    Solution 125 milliGRAM(s) Oral every 12 hours    2. VOD Prophylaxis:   ursodiol Capsule 300 milliGRAM(s) Oral two times a day    3. GI Prophylaxis:    pantoprazole Tablet 40 milliGRAM(s) Oral before breakfast    4. Mouthcare - NS / NaHCO3 rinses, Biotene; Skin care     5. GVHD prophylaxis   ABC: PTCy days +3, +4, Bortezomib days 0, +3, PTCy days +5, +14, +28     6. Transfuse & replete electrolytes prn   1 unit PRBC   magnesium sulfate 2g IV q 2 hours x 3 doses     7. IV hydration, daily weights, strict I&O, prn diuresis     8. PO intake as tolerated, nutrition follow up as needed    9. Antiemetics, anti-diarrhea medications:   prochlorperazine   Injectable 10 milliGRAM(s) IV Push every 6 hours   ondansetron  IVPB 16 milliGRAM(s) IV Intermittent every 24 hours    10. OOB as tolerated, physical therapy consult if needed     11. Monitor coags 2x week, vitamin K BIW  phytonadione Solution 5 milliGRAM(s) Oral <User Schedule>    12. Monitor closely for clinical changes, monitor for fevers     13. Emotional support provided, plan of care discussed and questions addressed     14. Patient education done regarding plan of care, restrictions and discharge planning     15. Continue regular social work input     I have written the above note for Dr. Clarke who performed service with me in the room.   Jalen Kay PA-C ([phone])    I have seen and examined patient with PA, I agree with above note as scribed.                    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 / Bu / Thiotepa / rATG prep regimen for treatment of myelofibrosis    Disease: myelofibrosis   Prep regimen: Flu / Bu / Thiotepa / rATG   GVHD ppx: ABC: PTCy days +3, +4, Bortezomib days 0, +3, PTCy days +5, +14, +28   ABO/CMV:   Recipient:   A+/CMV+   Donor:   B+/CMV+    S: Patient seen and examined with HPC Transplant Team:       O:   Vital Signs Last 24 Hrs  T(C): 36.7 (01 Jul 2025 05:15), Max: 37.7 (30 Jun 2025 13:16)  T(F): 98 (01 Jul 2025 05:15), Max: 99.8 (30 Jun 2025 13:16)  HR: 83 (01 Jul 2025 05:15) (67 - 93)  BP: 162/70 (01 Jul 2025 05:15) (135/69 - 162/70)  BP(mean): --  RR: 18 (01 Jul 2025 05:15) (18 - 18)  SpO2: 95% (01 Jul 2025 05:15) (94% - 99%)    Parameters below as of 01 Jul 2025 05:15  Patient On (Oxygen Delivery Method): room air      Admit weight: 64.5kg  Daily Weight in kG:     Intake / Output:   06-30 @ 07:01  -  07-01 @ 07:00  --------------------------------------------------------  IN: 6122 mL / OUT: 3556 mL / NET: 2566 mL    PE:   Oropharynx: no erythema or ulcerations; +Thrush on tongue/posterior pharynx (improving)   Oral Mucositis: +                                                  ndGndrndanddndend:nd nd2nd CVS: S1, S2 RRR   Lungs: CTA throughout bilaterally   Abdomen: + BS x 4, soft, NT, ND   Extremities: no edema   Gastric Mucositis:                 -                              Grade: n/a   Intestinal Mucositis:              -                              Grade: n/a   Skin: no rash   TLC: CDI   Neuro: A&O x 3      Labs:                         6.6    0.02  )-----------( 13       ( 01 Jul 2025 06:20 )             21.3       07-01    143  |  112[H]  |  10  ----------------------------<  86  3.5   |  18[L]  |  0.61    Ca    7.5[L]      01 Jul 2025 06:22  Phos  2.5     07-01  Mg     1.3     07-01    TPro  4.9[L]  /  Alb  2.7[L]  /  TBili  0.4  /  DBili  x   /  AST  7[L]  /  ALT  <5[L]  /  AlkPhos  72  07-01    PT/INR - ( 30 Jun 2025 06:33 )   PT: 14.1 sec;   INR: 1.23 ratio         PTT - ( 30 Jun 2025 06:33 )  PTT:26.7 sec  LIVER FUNCTIONS - ( 01 Jul 2025 06:22 )  Alb: 2.7 g/dL / Pro: 4.9 g/dL / ALK PHOS: 72 U/L / ALT: <5 U/L / AST: 7 U/L / GGT: x           Lactate Dehydrogenase, Serum: 228 U/L (07-01 @ 06:22)      Cultures:   Culture Results: <10,000 CFU/mL Normal Urogenital Cha (06.28.25 @ 06:31)    Culture Results:   No growth at 5 days(06.27.25 @ 16:36)    Culture Results:   No growth at  5 days (06.27.25 @ 16:30)    Respiratory Viral Panel with COVID-19 by [facility] (06.20.25 @ 21:02)   Rapid RVP Result: [id number]: NotDetec:    Culture - Blood (06.20.25 @ 18:45)   Specimen Source: Blood Blood-Catheter    Culture Results: No growth at 24 hoursCulture - Blood (06.20.25 @ 18:40)   Specimen Source: Blood Blood-Peripheral  Culture Results: No growth     Culture - Urine (06.20.25 @ 21:01)   Specimen Source: Clean Catch Clean Catch (Midstream)  Culture Results: 10,000 - 49,000 CFU/mL Gram Positive Cocci in Pairs and Chains   <10,000 CFU/ml Normal Urogenital cha present    Radiology:   ACC: [id number] EXAM:  XR CHEST PORTABLE URGENT 1V   ORDERED BY: SARAH REEVES   PROCEDURE DATE:  06/27/2025    IMPRESSION:  No focal consolidation.        Meds:   Antimicrobials:   acyclovir    Suspension 800 milliGRAM(s) Oral every 12 hours  caspofungin IVPB      caspofungin IVPB 50 milliGRAM(s) IV Intermittent every 24 hours  piperacillin/tazobactam IVPB.. 4.5 Gram(s) IV Intermittent every 8 hours  posaconazole DR Tablet 300 milliGRAM(s) Oral daily  vancomycin    Solution 125 milliGRAM(s) Oral every 12 hours      Heme / Onc:   cyclophosphamide IVPB (eMAR) 2125 milliGRAM(s) IV Intermittent every 24 hours      GI:  pantoprazole    Tablet 40 milliGRAM(s) Oral before breakfast  polyethylene glycol 3350 17 Gram(s) Oral daily PRN  sodium bicarbonate Mouth Rinse 10 milliLiter(s) Swish and Spit five times a day  ursodiol Suspension 300 milliGRAM(s) Oral two times a day      Cardiovascular:   lisinopril 10 milliGRAM(s) Oral every 12 hours  metoprolol succinate ER 12.5 milliGRAM(s) Oral daily      Immunologic:       Other medications:   Biotene Dry Mouth Oral Rinse 5 milliLiter(s) Swish and Spit five times a day  buPROPion XL (24-Hour) . 300 milliGRAM(s) Oral daily  chlorhexidine 0.12% Liquid 15 milliLiter(s) Swish and Spit two times a day  chlorhexidine 4% Liquid 1 Application(s) Topical <User Schedule>  fentaNYL   Patch  25 MICROgram(s)/Hr. 1 Patch Transdermal every 48 hours  levothyroxine 88 MICROGram(s) Oral daily  magnesium sulfate  IVPB 2 Gram(s) IV Intermittent every 2 hours  OLANZapine 5 milliGRAM(s) Oral at bedtime  ondansetron  IVPB 16 milliGRAM(s) IV Intermittent every 24 hours  phytonadione   Solution 5 milliGRAM(s) Oral <User Schedule>  prochlorperazine   Injectable 10 milliGRAM(s) IV Push every 6 hours  sodium chloride 0.9%. 1000 milliLiter(s) IV Continuous <Continuous>      PRN:   acetaminophen     Tablet .. 650 milliGRAM(s) Oral every 6 hours PRN  FIRST- Mouthwash  BLM 15 milliLiter(s) Swish and Swallow five times a day PRN  lidocaine 2% Viscous 15 milliLiter(s) Oral five times a day PRN  ondansetron Injectable 8 milliGRAM(s) IV Push every 8 hours PRN  polyethylene glycol 3350 17 Gram(s) Oral daily PRN  sodium chloride 0.9% lock flush 10 milliLiter(s) IV Push every 1 hour PRN        A/P: 64 year old female with hx of myelofibrosis presents for a MUD allogeneic SCT (DP Permissive) conditioning with Flu/Bu/Thiotepa/rATG.  Today is day +4  6/21- Thiotepa 1/2. Maintain skin precautions during thiotepa administration and for 48 hours after completion (days .6 through day -3). Chronic pain issues - continue home fentanyl patch. Febrile overnight, tmax 100.9. Cultures  pending. RVP negative. CXR pending official read, no obvious consolidations. Vital signs are stable.   6/23 D/C HCTZ. Fludarabine 1/3, Busulfan 1/3, rATG 1/3. No Tylenol on busulfan days. Continue keppra ppx for 24 hours post infusion of last dose of busulfan. No acute events overnight, vital signs are stable.   6/24- overnight patient was febrile during ATG, no cultures/abx as fever likely secondary to ATG. Patient was otherwise stable overnight. Fludarabine 2/3, Busulfan 2/3 and rATG 2/3. NO Tylenol or Azoles until Day 0. Continue with supportive care.  6/25- Fludarabine 3/3, busulfan 3/3, rATG 3/3. No acute events overnight. Vital signs are stable.   6/26 Switched Compazine to atc  6/27 VSS, afebrile. 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  6/28 VSS, persistent fevers likely due to CRS post-SCT, continues on zosyn. Cultures pending. Hypervolemia - lasix 40mg x 1, will continue strict I/Os. Started caspofungin due to persistent thrush.  6/29 overnight no acute events noted. VSS, febrile likely due to CRS - continue with supportive care. BCx/UCx NGTD x 24h.  6/30 VSS, intermittently febrile likely due to CRS, infectious workup negative so far. PTCy 1/2 today. I&O, daily weights, prn diuresis. Continue to monitor thrush (improving). Changed some meds to PO suspension due to mucositis. Continue with supportive care. Refractory nausea, added trial of zyprexa 5mg po QHS x 3 days. d/c primafit. Change voriconazole to posaconazole tomorrow.     1. Infectious Disease:   acyclovir    Suspension 800 milliGRAM(s) Oral every 12 hours  caspofungin IVPB      caspofungin IVPB 50 milliGRAM(s) IV Intermittent every 24 hours  piperacillin/tazobactam IVPB.. 4.5 Gram(s) IV Intermittent every 8 hours  posaconazole DR Tablet 300 milliGRAM(s) Oral daily  vancomycin    Solution 125 milliGRAM(s) Oral every 12 hours    2. VOD Prophylaxis:   ursodiol Capsule 300 milliGRAM(s) Oral two times a day    3. GI Prophylaxis:    pantoprazole Tablet 40 milliGRAM(s) Oral before breakfast    4. Mouthcare - NS / NaHCO3 rinses, Biotene; Skin care     5. GVHD prophylaxis   ABC: PTCy days +3, +4, Bortezomib days 0, +3, PTCy days +5, +14, +28     6. Transfuse & replete electrolytes prn   1 unit PRBC   magnesium sulfate 2g IV q 2 hours x 2 doses     7. IV hydration, daily weights, strict I&O, prn diuresis     8. PO intake as tolerated, nutrition follow up as needed    9. Antiemetics, anti-diarrhea medications:   prochlorperazine   Injectable 10 milliGRAM(s) IV Push every 6 hours   ondansetron  IVPB 16 milliGRAM(s) IV Intermittent every 24 hours    10. OOB as tolerated, physical therapy consult if needed     11. Monitor coags 2x week, vitamin K BIW  phytonadione Solution 5 milliGRAM(s) Oral <User Schedule>    12. Monitor closely for clinical changes, monitor for fevers     13. Emotional support provided, plan of care discussed and questions addressed     14. Patient education done regarding plan of care, restrictions and discharge planning     15. Continue regular social work input     I have written the above note for Dr. Clarke who performed service with me in the room.   Jalen Kay PA-C ([phone])    I have seen and examined patient with PA, I agree with above note as scribed.                    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 / Bu / Thiotepa / rATG prep regimen for treatment of myelofibrosis    Disease: myelofibrosis   Prep regimen: Flu / Bu / Thiotepa / rATG   GVHD ppx: ABC: PTCy days +3, +4, Bortezomib days 0, +3, PTCy days +5, +14, +28   ABO/CMV:   Recipient:   A+/CMV+   Donor:   B+/CMV+    S: Patient seen and examined with [facility]PC Transplant Team:   + intermittent nausea   + mouth / throat pain   + fatigue     O:   Vital Signs Last 24 Hrs  T(C): 36.7 (01 Jul 2025 05:15), Max: 37.7 (30 Jun 2025 13:16)  T(F): 98 (01 Jul 2025 05:15), Max: 99.8 (30 Jun 2025 13:16)  HR: 83 (01 Jul 2025 05:15) (67 - 93)  BP: 162/70 (01 Jul 2025 05:15) (135/69 - 162/70)  BP(mean): --  RR: 18 (01 Jul 2025 05:15) (18 - 18)  SpO2: 95% (01 Jul 2025 05:15) (94% - 99%)    Parameters below as of 01 Jul 2025 05:15  Patient On (Oxygen Delivery Method): room air      Admit weight: 64.5kg  Daily Weight in kG:     Intake / Output:   06-30 @ 07:01  -  07-01 @ 07:00  --------------------------------------------------------  IN: 6122 mL / OUT: 3556 mL / NET: 2566 mL    PE:   Oropharynx: no erythema or ulcerations; +Thrush on tongue/posterior pharynx (improving)   Oral Mucositis: +                                                  ndGndrndanddndend:nd nd2nd CVS: S1, S2 RRR   Lungs: CTA throughout bilaterally   Abdomen: + BS x 4, soft, NT, ND   Extremities: no edema   Gastric Mucositis:                 -                              Grade: n/a   Intestinal Mucositis:              -                              Grade: n/a   Skin: no rash   TLC: CDI   Neuro: A&O x 3      Labs:                         6.6    0.02  )-----------( 13       ( 01 Jul 2025 06:20 )             21.3       07-01    143  |  112[H]  |  10  ----------------------------<  86  3.5   |  18[L]  |  0.61    Ca    7.5[L]      01 Jul 2025 06:22  Phos  2.5     07-01  Mg     1.3     07-01    TPro  4.9[L]  /  Alb  2.7[L]  /  TBili  0.4  /  DBili  x   /  AST  7[L]  /  ALT  <5[L]  /  AlkPhos  72  07-01    PT/INR - ( 30 Jun 2025 06:33 )   PT: 14.1 sec;   INR: 1.23 ratio         PTT - ( 30 Jun 2025 06:33 )  PTT:26.7 sec  LIVER FUNCTIONS - ( 01 Jul 2025 06:22 )  Alb: 2.7 g/dL / Pro: 4.9 g/dL / ALK PHOS: 72 U/L / ALT: <5 U/L / AST: 7 U/L / GGT: x           Lactate Dehydrogenase, Serum: 228 U/L (07-01 @ 06:22)      Cultures:   Culture Results: <10,000 CFU/mL Normal Urogenital Cha (06.28.25 @ 06:31)    Culture Results:   No growth at 5 days(06.27.25 @ 16:36)    Culture Results:   No growth at  5 days (06.27.25 @ 16:30)    Respiratory Viral Panel with COVID-19 by [facility] (06.20.25 @ 21:02)   Rapid RVP Result: [id number]: NotDetec:    Culture - Blood (06.20.25 @ 18:45)   Specimen Source: Blood Blood-Catheter    Culture Results: No growth at 24 hoursCulture - Blood (06.20.25 @ 18:40)   Specimen Source: Blood Blood-Peripheral  Culture Results: No growth     Culture - Urine (06.20.25 @ 21:01)   Specimen Source: Clean Catch Clean Catch (Midstream)  Culture Results: 10,000 - 49,000 CFU/mL Gram Positive Cocci in Pairs and Chains   <10,000 CFU/ml Normal Urogenital cha present    Radiology:   ACC: [id number] EXAM:  XR CHEST PORTABLE URGENT 1V   ORDERED BY: SARAH REEVES   PROCEDURE DATE:  06/27/2025    IMPRESSION:  No focal consolidation.        Meds:   Antimicrobials:   acyclovir    Suspension 800 milliGRAM(s) Oral every 12 hours  caspofungin IVPB      caspofungin IVPB 50 milliGRAM(s) IV Intermittent every 24 hours  piperacillin/tazobactam IVPB.. 4.5 Gram(s) IV Intermittent every 8 hours  posaconazole DR Tablet 300 milliGRAM(s) Oral daily  vancomycin    Solution 125 milliGRAM(s) Oral every 12 hours      Heme / Onc:   cyclophosphamide IVPB (eMAR) 2125 milliGRAM(s) IV Intermittent every 24 hours      GI:  pantoprazole    Tablet 40 milliGRAM(s) Oral before breakfast  polyethylene glycol 3350 17 Gram(s) Oral daily PRN  sodium bicarbonate Mouth Rinse 10 milliLiter(s) Swish and Spit five times a day  ursodiol Suspension 300 milliGRAM(s) Oral two times a day      Cardiovascular:   lisinopril 10 milliGRAM(s) Oral every 12 hours  metoprolol succinate ER 12.5 milliGRAM(s) Oral daily      Immunologic:       Other medications:   Biotene Dry Mouth Oral Rinse 5 milliLiter(s) Swish and Spit five times a day  buPROPion XL (24-Hour) . 300 milliGRAM(s) Oral daily  chlorhexidine 0.12% Liquid 15 milliLiter(s) Swish and Spit two times a day  chlorhexidine 4% Liquid 1 Application(s) Topical <User Schedule>  fentaNYL   Patch  25 MICROgram(s)/Hr. 1 Patch Transdermal every 48 hours  levothyroxine 88 MICROGram(s) Oral daily  magnesium sulfate  IVPB 2 Gram(s) IV Intermittent every 2 hours  OLANZapine 5 milliGRAM(s) Oral at bedtime  ondansetron  IVPB 16 milliGRAM(s) IV Intermittent every 24 hours  phytonadione   Solution 5 milliGRAM(s) Oral <User Schedule>  prochlorperazine   Injectable 10 milliGRAM(s) IV Push every 6 hours  sodium chloride 0.9%. 1000 milliLiter(s) IV Continuous <Continuous>      PRN:   acetaminophen     Tablet .. 650 milliGRAM(s) Oral every 6 hours PRN  FIRST- Mouthwash  BLM 15 milliLiter(s) Swish and Swallow five times a day PRN  lidocaine 2% Viscous 15 milliLiter(s) Oral five times a day PRN  ondansetron Injectable 8 milliGRAM(s) IV Push every 8 hours PRN  polyethylene glycol 3350 17 Gram(s) Oral daily PRN  sodium chloride 0.9% lock flush 10 milliLiter(s) IV Push every 1 hour PRN        A/P: 64 year old female with hx of myelofibrosis presents for a MUD allogeneic SCT (DP Permissive) conditioning with Flu/Bu/Thiotepa/rATG.  Today is day +4  6/21- Thiotepa 1/2. Maintain skin precautions during thiotepa administration and for 48 hours after completion (days .6 through day -3). Chronic pain issues - continue home fentanyl patch. Febrile overnight, tmax 100.9. Cultures  pending. RVP negative. CXR pending official read, no obvious consolidations. Vital signs are stable.   6/23 D/C HCTZ. Fludarabine 1/3, Busulfan 1/3, rATG 1/3. No Tylenol on busulfan days. Continue keppra ppx for 24 hours post infusion of last dose of busulfan. No acute events overnight, vital signs are stable.   6/24- overnight patient was febrile during ATG, no cultures/abx as fever likely secondary to ATG. Patient was otherwise stable overnight. Fludarabine 2/3, Busulfan 2/3 and rATG 2/3. NO Tylenol or Azoles until Day 0. Continue with supportive care.  6/25- Fludarabine 3/3, busulfan 3/3, rATG 3/3. No acute events overnight. Vital signs are stable.   6/26 Switched Compazine to atc  6/27 VSS, afebrile. 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  6/28 VSS, persistent fevers likely due to CRS post-SCT, continues on zosyn. Cultures pending. Hypervolemia - lasix 40mg x 1, will continue strict I/Os. Started caspofungin due to persistent thrush.  6/29 overnight no acute events noted. VSS, febrile likely due to CRS - continue with supportive care. BCx/UCx NGTD x 24h.  6/30 VSS, intermittently febrile likely due to CRS, infectious workup negative so far. PTCy 1/2 today. I&O, daily weights, prn diuresis. Continue to monitor thrush (improving). Changed some meds to PO suspension due to mucositis. Continue with supportive care. Refractory nausea, added trial of zyprexa 5mg po QHS x 3 days. d/c primafit. Change voriconazole to posaconazole tomorrow.   7/1- No acute events overnight, vital signs are stable. Continue supportive care for chemotherapy induced oral mucositis.     1. Infectious Disease:   acyclovir    Suspension 800 milliGRAM(s) Oral every 12 hours  caspofungin IVPB      caspofungin IVPB 50 milliGRAM(s) IV Intermittent every 24 hours  piperacillin/tazobactam IVPB.. 4.5 Gram(s) IV Intermittent every 8 hours  posaconazole DR Tablet 300 milliGRAM(s) Oral daily  vancomycin    Solution 125 milliGRAM(s) Oral every 12 hours    2. VOD Prophylaxis:   ursodiol Capsule 300 milliGRAM(s) Oral two times a day    3. GI Prophylaxis:    pantoprazole Tablet 40 milliGRAM(s) Oral before breakfast    4. Mouthcare - NS / NaHCO3 rinses, Biotene; Skin care     5. GVHD prophylaxis   ABC: PTCy days +3, +4, Bortezomib days 0, +3, PTCy days +5, +14, +28     6. Transfuse & replete electrolytes prn   1 unit PRBC   magnesium sulfate 2g IV q 2 hours x 2 doses     7. IV hydration, daily weights, strict I&O, prn diuresis     8. PO intake as tolerated, nutrition follow up as needed    9. Antiemetics, anti-diarrhea medications:   prochlorperazine   Injectable 10 milliGRAM(s) IV Push every 6 hours   ondansetron  IVPB 16 milliGRAM(s) IV Intermittent every 24 hours    10. OOB as tolerated, physical therapy consult if needed     11. Monitor coags 2x week, vitamin K BIW  phytonadione Solution 5 milliGRAM(s) Oral <User Schedule>    12. Monitor closely for clinical changes, monitor for fevers     13. Emotional support provided, plan of care discussed and questions addressed     14. Patient education done regarding plan of care, restrictions and discharge planning     15. Continue regular social work input     I have written the above note for Dr. Clarke who performed service with me in the room.   Jalen Kay PA-C ([phone])    I have seen and examined patient with PA, I agree with above note as scribed.                    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) with Flu / Bu / Thiotepa / rATG prep regimen for treatment of myelofibrosis    Disease: myelofibrosis   Prep regimen: Flu / Bu / Thiotepa / rATG   GVHD ppx: ABC: PTCy days +3, +4, Bortezomib days 0, +3, PTCy days +5, +14, +28   ABO/CMV:   Recipient:   A+/CMV+   Donor:   B+/CMV+    S: Patient seen and examined with [facility]PC Transplant Team:   + intermittent nausea   + mouth / throat pain   + fatigue     O:   Vital Signs Last 24 Hrs  T(C): 36.7 (01 Jul 2025 05:15), Max: 37.7 (30 Jun 2025 13:16)  T(F): 98 (01 Jul 2025 05:15), Max: 99.8 (30 Jun 2025 13:16)  HR: 83 (01 Jul 2025 05:15) (67 - 93)  BP: 162/70 (01 Jul 2025 05:15) (135/69 - 162/70)  BP(mean): --  RR: 18 (01 Jul 2025 05:15) (18 - 18)  SpO2: 95% (01 Jul 2025 05:15) (94% - 99%)    Parameters below as of 01 Jul 2025 05:15  Patient On (Oxygen Delivery Method): room air      Admit weight: 64.5kg  Daily Weight in kG:     Intake / Output:   06-30 @ 07:01  -  07-01 @ 07:00  --------------------------------------------------------  IN: 6122 mL / OUT: 3556 mL / NET: 2566 mL    PE:   Oropharynx: no erythema or ulcerations; +Thrush on tongue/posterior pharynx (improving)   Oral Mucositis: +                                                  ndGndrndanddndend:nd nd2nd CVS: S1, S2 RRR   Lungs: CTA throughout bilaterally   Abdomen: + BS x 4, soft, NT, ND   Extremities: no edema   Gastric Mucositis:                 -                              Grade: n/a   Intestinal Mucositis:              -                              Grade: n/a   Skin: no rash   TLC: CDI   Neuro: A&O x 3      Labs:                         6.6    0.02  )-----------( 13       ( 01 Jul 2025 06:20 )             21.3       07-01    143  |  112[H]  |  10  ----------------------------<  86  3.5   |  18[L]  |  0.61    Ca    7.5[L]      01 Jul 2025 06:22  Phos  2.5     07-01  Mg     1.3     07-01    TPro  4.9[L]  /  Alb  2.7[L]  /  TBili  0.4  /  DBili  x   /  AST  7[L]  /  ALT  <5[L]  /  AlkPhos  72  07-01    PT/INR - ( 30 Jun 2025 06:33 )   PT: 14.1 sec;   INR: 1.23 ratio         PTT - ( 30 Jun 2025 06:33 )  PTT:26.7 sec  LIVER FUNCTIONS - ( 01 Jul 2025 06:22 )  Alb: 2.7 g/dL / Pro: 4.9 g/dL / ALK PHOS: 72 U/L / ALT: <5 U/L / AST: 7 U/L / GGT: x           Lactate Dehydrogenase, Serum: 228 U/L (07-01 @ 06:22)      Cultures:   Culture Results: <10,000 CFU/mL Normal Urogenital Cha (06.28.25 @ 06:31)    Culture Results:   No growth at 5 days(06.27.25 @ 16:36)    Culture Results:   No growth at  5 days (06.27.25 @ 16:30)    Respiratory Viral Panel with COVID-19 by [facility] (06.20.25 @ 21:02)   Rapid RVP Result: [id number]: NotDetec:    Culture - Blood (06.20.25 @ 18:45)   Specimen Source: Blood Blood-Catheter    Culture Results: No growth at 24 hoursCulture - Blood (06.20.25 @ 18:40)   Specimen Source: Blood Blood-Peripheral  Culture Results: No growth     Culture - Urine (06.20.25 @ 21:01)   Specimen Source: Clean Catch Clean Catch (Midstream)  Culture Results: 10,000 - 49,000 CFU/mL Gram Positive Cocci in Pairs and Chains   <10,000 CFU/ml Normal Urogenital cha present    Radiology:   ACC: [id number] EXAM:  XR CHEST PORTABLE URGENT 1V   ORDERED BY: SARAH REEVES   PROCEDURE DATE:  06/27/2025    IMPRESSION:  No focal consolidation.        Meds:   Antimicrobials:   acyclovir    Suspension 800 milliGRAM(s) Oral every 12 hours  caspofungin IVPB      caspofungin IVPB 50 milliGRAM(s) IV Intermittent every 24 hours  piperacillin/tazobactam IVPB.. 4.5 Gram(s) IV Intermittent every 8 hours  posaconazole DR Tablet 300 milliGRAM(s) Oral daily  vancomycin    Solution 125 milliGRAM(s) Oral every 12 hours      Heme / Onc:   cyclophosphamide IVPB (eMAR) 2125 milliGRAM(s) IV Intermittent every 24 hours      GI:  pantoprazole    Tablet 40 milliGRAM(s) Oral before breakfast  polyethylene glycol 3350 17 Gram(s) Oral daily PRN  sodium bicarbonate Mouth Rinse 10 milliLiter(s) Swish and Spit five times a day  ursodiol Suspension 300 milliGRAM(s) Oral two times a day      Cardiovascular:   lisinopril 10 milliGRAM(s) Oral every 12 hours  metoprolol succinate ER 12.5 milliGRAM(s) Oral daily      Immunologic:       Other medications:   Biotene Dry Mouth Oral Rinse 5 milliLiter(s) Swish and Spit five times a day  buPROPion XL (24-Hour) . 300 milliGRAM(s) Oral daily  chlorhexidine 0.12% Liquid 15 milliLiter(s) Swish and Spit two times a day  chlorhexidine 4% Liquid 1 Application(s) Topical <User Schedule>  fentaNYL   Patch  25 MICROgram(s)/Hr. 1 Patch Transdermal every 48 hours  levothyroxine 88 MICROGram(s) Oral daily  magnesium sulfate  IVPB 2 Gram(s) IV Intermittent every 2 hours  OLANZapine 5 milliGRAM(s) Oral at bedtime  ondansetron  IVPB 16 milliGRAM(s) IV Intermittent every 24 hours  phytonadione   Solution 5 milliGRAM(s) Oral <User Schedule>  prochlorperazine   Injectable 10 milliGRAM(s) IV Push every 6 hours  sodium chloride 0.9%. 1000 milliLiter(s) IV Continuous <Continuous>      PRN:   acetaminophen     Tablet .. 650 milliGRAM(s) Oral every 6 hours PRN  FIRST- Mouthwash  BLM 15 milliLiter(s) Swish and Swallow five times a day PRN  lidocaine 2% Viscous 15 milliLiter(s) Oral five times a day PRN  ondansetron Injectable 8 milliGRAM(s) IV Push every 8 hours PRN  polyethylene glycol 3350 17 Gram(s) Oral daily PRN  sodium chloride 0.9% lock flush 10 milliLiter(s) IV Push every 1 hour PRN        A/P: 64 year old female with hx of myelofibrosis presents for a MUD allogeneic SCT (DP Permissive) conditioning with Flu/Bu/Thiotepa/rATG.  Today is day +4  6/21- Thiotepa 1/2. Maintain skin precautions during thiotepa administration and for 48 hours after completion (days .6 through day -3). Chronic pain issues - continue home fentanyl patch. Febrile overnight, tmax 100.9. Cultures  pending. RVP negative. CXR pending official read, no obvious consolidations. Vital signs are stable.   6/23 D/C HCTZ. Fludarabine 1/3, Busulfan 1/3, rATG 1/3. No Tylenol on busulfan days. Continue keppra ppx for 24 hours post infusion of last dose of busulfan. No acute events overnight, vital signs are stable.   6/24- overnight patient was febrile during ATG, no cultures/abx as fever likely secondary to ATG. Patient was otherwise stable overnight. Fludarabine 2/3, Busulfan 2/3 and rATG 2/3. NO Tylenol or Azoles until Day 0. Continue with supportive care.  6/25- Fludarabine 3/3, busulfan 3/3, rATG 3/3. No acute events overnight. Vital signs are stable.   6/26 Switched Compazine to atc  6/27 VSS, afebrile. 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  6/28 VSS, persistent fevers likely due to CRS post-SCT, continues on zosyn. Cultures pending. Hypervolemia - lasix 40mg x 1, will continue strict I/Os. Started caspofungin due to persistent thrush.  6/29 overnight no acute events noted. VSS, febrile likely due to CRS - continue with supportive care. BCx/UCx NGTD x 24h.  6/30 VSS, intermittently febrile likely due to CRS, infectious workup negative so far. PTCy 1/2 today. I&O, daily weights, prn diuresis. Continue to monitor thrush (improving). Changed some meds to PO suspension due to mucositis. Continue with supportive care. Refractory nausea, added trial of zyprexa 5mg po QHS x 3 days. d/c primafit. Change voriconazole to posaconazole tomorrow.   7/1- No acute events overnight, vital signs are stable. Continue supportive care for chemotherapy induced oral mucositis.     1. Infectious Disease:   acyclovir    Suspension 800 milliGRAM(s) Oral every 12 hours  caspofungin IVPB      caspofungin IVPB 50 milliGRAM(s) IV Intermittent every 24 hours  piperacillin/tazobactam IVPB.. 4.5 Gram(s) IV Intermittent every 8 hours  posaconazole DR Tablet 300 milliGRAM(s) Oral daily  vancomycin    Solution 125 milliGRAM(s) Oral every 12 hours    2. VOD Prophylaxis:   ursodiol Capsule 300 milliGRAM(s) Oral two times a day    3. GI Prophylaxis:    pantoprazole Tablet 40 milliGRAM(s) Oral before breakfast    4. Mouthcare - NS / NaHCO3 rinses, Biotene; Skin care     5. GVHD prophylaxis   ABC: PTCy days +3, +4, Bortezomib days 0, +3, PTCy days +5, +14, +28     6. Transfuse & replete electrolytes prn   1 unit PRBC   magnesium sulfate 2g IV q 2 hours x 2 doses     7. IV hydration, daily weights, strict I&O, prn diuresis     8. PO intake as tolerated, nutrition follow up as needed    9. Antiemetics, anti-diarrhea medications:   prochlorperazine   Injectable 10 milliGRAM(s) IV Push every 6 hours   ondansetron  IVPB 16 milliGRAM(s) IV Intermittent every 24 hours    10. OOB as tolerated, physical therapy consult if needed     11. Monitor coags 2x week, vitamin K BIW  phytonadione Solution 5 milliGRAM(s) Oral <User Schedule>    12. Monitor closely for clinical changes, monitor for fevers     13. Emotional support provided, plan of care discussed and questions addressed     14. Patient education done regarding plan of care, restrictions and discharge planning     15. Continue regular social work input     I have written the above note for Dr. Clarke who performed service with me in the room.   Jalen Kay PA-C ([phone])    I have seen and examined patient with PA, I agree with above note as scribed.

## 2025-07-01 NOTE — PHYSICAL THERAPY INITIAL EVALUATION ADULT - STANDING BALANCE: DYNAMIC, REHAB EVAL
----- Message from Tarah Sadler sent at 3/7/2018  1:44 PM CST -----  Contact: pt  _x  1st Request  _  2nd Request  _  3rd Request      Who:pt    Why: pt would like to  inform the staff that infection shouldn't infection shouldn't effect  surgery     What Number to Call Back: [phone]    When to Expect a call back: (Before the end of the day)   -- if call after 3:00 call back will be tomorrow.     good balance

## 2025-07-01 NOTE — PROGRESS NOTE ADULT - NS ATTEND AMEND GEN_ALL_CORE FT
Day + 4 s/p allo-HSCT     Overall, patient is fatigued, complains of mouth pain.   Course complicated by CRS; intermittent fevers; afebrile x 48 hours;    Physical exam is remarkable for mucositis; +1 b/l LE edema   Labs reviewed;  Discussed day +4 PTCy today with hydration for another 24 hours post chemo  Discussed close monitoring of In/Outs with PRN diuresis.   Discussed with friend at bedside zhane ÁLVAREZ.   continue supportive care and antimicrobial ppx.

## 2025-07-02 LAB
ALBUMIN SERPL ELPH-MCNC: 2.7 G/DL — LOW (ref 3.3–5)
ALP SERPL-CCNC: 78 U/L — SIGNIFICANT CHANGE UP (ref 40–120)
ALT FLD-CCNC: 7 U/L — LOW (ref 10–45)
ANION GAP SERPL CALC-SCNC: 14 MMOL/L — SIGNIFICANT CHANGE UP (ref 5–17)
AST SERPL-CCNC: 9 U/L — LOW (ref 10–40)
BASOPHILS # BLD AUTO: SIGNIFICANT CHANGE UP (ref 0–0.2)
BASOPHILS NFR BLD AUTO: SIGNIFICANT CHANGE UP (ref 0–2)
BILIRUB DIRECT SERPL-MCNC: 0.2 MG/DL — SIGNIFICANT CHANGE UP (ref 0–0.3)
BILIRUB INDIRECT FLD-MCNC: 0.2 MG/DL — SIGNIFICANT CHANGE UP (ref 0.2–1)
BILIRUB SERPL-MCNC: 0.4 MG/DL — SIGNIFICANT CHANGE UP (ref 0.2–1.2)
BLD GP AB SCN SERPL QL: NEGATIVE — SIGNIFICANT CHANGE UP
BUN SERPL-MCNC: 9 MG/DL — SIGNIFICANT CHANGE UP (ref 7–23)
CALCIUM SERPL-MCNC: 7.8 MG/DL — LOW (ref 8.4–10.5)
CHLORIDE SERPL-SCNC: 115 MMOL/L — HIGH (ref 96–108)
CO2 SERPL-SCNC: 16 MMOL/L — LOW (ref 22–31)
CREAT SERPL-MCNC: 0.57 MG/DL — SIGNIFICANT CHANGE UP (ref 0.5–1.3)
CULTURE RESULTS: SIGNIFICANT CHANGE UP
CULTURE RESULTS: SIGNIFICANT CHANGE UP
EGFR: 102 ML/MIN/1.73M2 — SIGNIFICANT CHANGE UP
EGFR: 102 ML/MIN/1.73M2 — SIGNIFICANT CHANGE UP
EOSINOPHIL # BLD AUTO: SIGNIFICANT CHANGE UP (ref 0–0.5)
EOSINOPHIL NFR BLD AUTO: SIGNIFICANT CHANGE UP (ref 0–6)
GLUCOSE SERPL-MCNC: 84 MG/DL — SIGNIFICANT CHANGE UP (ref 70–99)
HCT VFR BLD CALC: 26.3 % — LOW (ref 34.5–45)
HGB BLD-MCNC: 8.4 G/DL — LOW (ref 11.5–15.5)
IMM GRANULOCYTES # BLD AUTO: SIGNIFICANT CHANGE UP (ref 0–0.07)
IMM GRANULOCYTES NFR BLD AUTO: SIGNIFICANT CHANGE UP (ref 0–0.9)
IMMATURE PLATELET FRACTION #: 0 K/UL — LOW (ref 4.7–11.1)
IMMATURE PLATELET FRACTION %: 0.3 % — LOW (ref 1.6–4.9)
LDH SERPL L TO P-CCNC: 248 U/L — HIGH (ref 50–242)
LYMPHOCYTES # BLD AUTO: SIGNIFICANT CHANGE UP (ref 1–3.3)
LYMPHOCYTES NFR BLD AUTO: SIGNIFICANT CHANGE UP (ref 13–44)
MAGNESIUM SERPL-MCNC: 1.9 MG/DL — SIGNIFICANT CHANGE UP (ref 1.6–2.6)
MCHC RBC-ENTMCNC: 28.5 PG — SIGNIFICANT CHANGE UP (ref 27–34)
MCHC RBC-ENTMCNC: 31.9 G/DL — LOW (ref 32–36)
MCV RBC AUTO: 89.2 FL — SIGNIFICANT CHANGE UP (ref 80–100)
MONOCYTES # BLD AUTO: SIGNIFICANT CHANGE UP (ref 0–0.9)
MONOCYTES NFR BLD AUTO: SIGNIFICANT CHANGE UP (ref 2–14)
NEUTROPHILS # BLD AUTO: SIGNIFICANT CHANGE UP (ref 1.8–7.4)
NEUTROPHILS NFR BLD AUTO: SIGNIFICANT CHANGE UP (ref 43–77)
NRBC # BLD AUTO: 0 K/UL — SIGNIFICANT CHANGE UP (ref 0–0)
NRBC # FLD: 0 K/UL — SIGNIFICANT CHANGE UP (ref 0–0)
NRBC BLD AUTO-RTO: 0 /100 WBCS — SIGNIFICANT CHANGE UP (ref 0–0)
PHOSPHATE SERPL-MCNC: 2.7 MG/DL — SIGNIFICANT CHANGE UP (ref 2.5–4.5)
PLATELET # BLD AUTO: 7 K/UL — CRITICAL LOW (ref 150–400)
PMV BLD: SIGNIFICANT CHANGE UP FL (ref 7–13)
POTASSIUM SERPL-MCNC: 3.3 MMOL/L — LOW (ref 3.5–5.3)
POTASSIUM SERPL-SCNC: 3.3 MMOL/L — LOW (ref 3.5–5.3)
PROT SERPL-MCNC: 5.1 G/DL — LOW (ref 6–8.3)
RBC # BLD: 2.95 M/UL — LOW (ref 3.8–5.2)
RBC # FLD: 18.2 % — HIGH (ref 10.3–14.5)
RH IG SCN BLD-IMP: POSITIVE — SIGNIFICANT CHANGE UP
SODIUM SERPL-SCNC: 145 MMOL/L — SIGNIFICANT CHANGE UP (ref 135–145)
SPECIMEN SOURCE: SIGNIFICANT CHANGE UP
SPECIMEN SOURCE: SIGNIFICANT CHANGE UP
WBC # BLD: 0.01 K/UL — CRITICAL LOW (ref 3.8–10.5)
WBC # FLD AUTO: 0.01 K/UL — CRITICAL LOW (ref 3.8–10.5)

## 2025-07-02 PROCEDURE — 99233 SBSQ HOSP IP/OBS HIGH 50: CPT

## 2025-07-02 RX ORDER — URSODIOL 300 MG/1
300 CAPSULE ORAL
Refills: 0 | Status: DISCONTINUED | OUTPATIENT
Start: 2025-07-02 | End: 2025-07-15

## 2025-07-02 RX ORDER — SIMETHICONE 80 MG
80 TABLET,CHEWABLE ORAL ONCE
Refills: 0 | Status: COMPLETED | OUTPATIENT
Start: 2025-07-02 | End: 2025-07-02

## 2025-07-02 RX ORDER — AMLODIPINE BESYLATE 10 MG/1
5 TABLET ORAL DAILY
Refills: 0 | Status: DISCONTINUED | OUTPATIENT
Start: 2025-07-02 | End: 2025-07-11

## 2025-07-02 RX ORDER — LOPERAMIDE HCL 1 MG/7.5ML
2 SOLUTION ORAL EVERY 6 HOURS
Refills: 0 | Status: DISCONTINUED | OUTPATIENT
Start: 2025-07-02 | End: 2025-07-15

## 2025-07-02 RX ORDER — ABATACEPT 125 MG/ML
570 INJECTION, SOLUTION SUBCUTANEOUS ONCE
Refills: 0 | Status: COMPLETED | OUTPATIENT
Start: 2025-07-02 | End: 2025-07-02

## 2025-07-02 RX ORDER — LANOLIN/MINERAL OIL/PETROLATUM
1 OINTMENT (GRAM) OPHTHALMIC (EYE)
Refills: 0 | Status: DISCONTINUED | OUTPATIENT
Start: 2025-07-02 | End: 2025-07-15

## 2025-07-02 RX ORDER — LETERMOVIR 480 MG/1
480 TABLET, FILM COATED ORAL DAILY
Refills: 0 | Status: DISCONTINUED | OUTPATIENT
Start: 2025-07-07 | End: 2025-07-15

## 2025-07-02 RX ORDER — FUROSEMIDE 10 MG/ML
40 INJECTION INTRAMUSCULAR; INTRAVENOUS ONCE
Refills: 0 | Status: COMPLETED | OUTPATIENT
Start: 2025-07-02 | End: 2025-07-02

## 2025-07-02 RX ORDER — LISINOPRIL 5 MG/1
10 TABLET ORAL ONCE
Refills: 0 | Status: COMPLETED | OUTPATIENT
Start: 2025-07-02 | End: 2025-07-02

## 2025-07-02 RX ORDER — URSODIOL 300 MG/1
300 CAPSULE ORAL EVERY 12 HOURS
Refills: 0 | Status: DISCONTINUED | OUTPATIENT
Start: 2025-07-02 | End: 2025-07-02

## 2025-07-02 RX ORDER — FENTANYL CITRATE-0.9 % NACL/PF 100MCG/2ML
1 SYRINGE (ML) INTRAVENOUS
Refills: 0 | Status: DISCONTINUED | OUTPATIENT
Start: 2025-07-03 | End: 2025-07-09

## 2025-07-02 RX ORDER — FUROSEMIDE 10 MG/ML
20 INJECTION INTRAMUSCULAR; INTRAVENOUS ONCE
Refills: 0 | Status: COMPLETED | OUTPATIENT
Start: 2025-07-02 | End: 2025-07-02

## 2025-07-02 RX ADMIN — Medication 5 MILLILITER(S): at 00:11

## 2025-07-02 RX ADMIN — Medication 125 MILLIGRAM(S): at 05:43

## 2025-07-02 RX ADMIN — Medication 5 MILLILITER(S): at 12:33

## 2025-07-02 RX ADMIN — Medication 250 MILLILITER(S): at 05:42

## 2025-07-02 RX ADMIN — Medication 5 MILLILITER(S): at 20:25

## 2025-07-02 RX ADMIN — Medication 5 MILLILITER(S): at 15:31

## 2025-07-02 RX ADMIN — Medication 50 MILLIEQUIVALENT(S): at 16:40

## 2025-07-02 RX ADMIN — Medication 10 MILLILITER(S): at 09:31

## 2025-07-02 RX ADMIN — LISINOPRIL 10 MILLIGRAM(S): 5 TABLET ORAL at 05:43

## 2025-07-02 RX ADMIN — LOPERAMIDE HCL 2 MILLIGRAM(S): 1 SOLUTION ORAL at 11:55

## 2025-07-02 RX ADMIN — OLANZAPINE 5 MILLIGRAM(S): 10 TABLET ORAL at 22:42

## 2025-07-02 RX ADMIN — Medication 10 MILLILITER(S): at 15:31

## 2025-07-02 RX ADMIN — Medication 80 MILLIGRAM(S): at 14:18

## 2025-07-02 RX ADMIN — URSODIOL 300 MILLIGRAM(S): 300 CAPSULE ORAL at 18:46

## 2025-07-02 RX ADMIN — Medication 10 MILLIGRAM(S): at 12:32

## 2025-07-02 RX ADMIN — Medication 8 MILLIGRAM(S): at 02:28

## 2025-07-02 RX ADMIN — LOPERAMIDE HCL 2 MILLIGRAM(S): 1 SOLUTION ORAL at 22:46

## 2025-07-02 RX ADMIN — Medication 50 MILLIEQUIVALENT(S): at 11:25

## 2025-07-02 RX ADMIN — Medication 88 MICROGRAM(S): at 05:43

## 2025-07-02 RX ADMIN — LISINOPRIL 10 MILLIGRAM(S): 5 TABLET ORAL at 13:16

## 2025-07-02 RX ADMIN — Medication 40 MILLIGRAM(S): at 05:43

## 2025-07-02 RX ADMIN — Medication 50 MILLIEQUIVALENT(S): at 13:47

## 2025-07-02 RX ADMIN — Medication 10 MILLILITER(S): at 00:11

## 2025-07-02 RX ADMIN — FUROSEMIDE 40 MILLIGRAM(S): 10 INJECTION INTRAMUSCULAR; INTRAVENOUS at 13:16

## 2025-07-02 RX ADMIN — Medication 125 MILLIGRAM(S): at 18:46

## 2025-07-02 RX ADMIN — AMLODIPINE BESYLATE 5 MILLIGRAM(S): 10 TABLET ORAL at 16:51

## 2025-07-02 RX ADMIN — FUROSEMIDE 20 MILLIGRAM(S): 10 INJECTION INTRAMUSCULAR; INTRAVENOUS at 09:33

## 2025-07-02 RX ADMIN — CASPOFUNGIN ACETATE 250 MILLIGRAM(S): 5 INJECTION, POWDER, LYOPHILIZED, FOR SOLUTION INTRAVENOUS at 11:04

## 2025-07-02 RX ADMIN — LISINOPRIL 10 MILLIGRAM(S): 5 TABLET ORAL at 18:46

## 2025-07-02 RX ADMIN — Medication 10 MILLIGRAM(S): at 05:43

## 2025-07-02 RX ADMIN — METOPROLOL SUCCINATE 12.5 MILLIGRAM(S): 50 TABLET, EXTENDED RELEASE ORAL at 05:43

## 2025-07-02 RX ADMIN — Medication 1 APPLICATION(S): at 09:33

## 2025-07-02 RX ADMIN — FILGRASTIM 300 MICROGRAM(S): 300 INJECTION, SOLUTION INTRAVENOUS; SUBCUTANEOUS at 12:33

## 2025-07-02 RX ADMIN — Medication 10 MILLIGRAM(S): at 18:05

## 2025-07-02 RX ADMIN — Medication 1 DROP(S): at 12:33

## 2025-07-02 RX ADMIN — Medication 800 MILLIGRAM(S): at 18:46

## 2025-07-02 RX ADMIN — Medication 800 MILLIGRAM(S): at 05:43

## 2025-07-02 RX ADMIN — Medication 10 MILLILITER(S): at 20:24

## 2025-07-02 RX ADMIN — BUPROPION HYDROBROMIDE 300 MILLIGRAM(S): 522 TABLET, EXTENDED RELEASE ORAL at 12:32

## 2025-07-02 RX ADMIN — LIDOCAINE HYDROCHLORIDE 15 MILLILITER(S): 20 JELLY TOPICAL at 23:06

## 2025-07-02 RX ADMIN — Medication 10 MILLIGRAM(S): at 00:12

## 2025-07-02 RX ADMIN — POSACONAZOLE 300 MILLIGRAM(S): 100 TABLET, DELAYED RELEASE ORAL at 12:32

## 2025-07-02 RX ADMIN — Medication 1 DROP(S): at 18:46

## 2025-07-02 RX ADMIN — Medication 10 MILLILITER(S): at 12:35

## 2025-07-02 RX ADMIN — ABATACEPT 100 MILLIGRAM(S): 125 INJECTION, SOLUTION SUBCUTANEOUS at 15:31

## 2025-07-02 RX ADMIN — Medication 5 MILLILITER(S): at 09:31

## 2025-07-02 RX ADMIN — Medication 15 MILLILITER(S): at 05:42

## 2025-07-02 RX ADMIN — Medication 15 MILLILITER(S): at 18:46

## 2025-07-02 RX ADMIN — URSODIOL 300 MILLIGRAM(S): 300 CAPSULE ORAL at 05:42

## 2025-07-02 NOTE — PROGRESS NOTE ADULT - SUBJECTIVE AND OBJECTIVE BOX
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 / Bu / Thiotepa / rATG prep regimen for treatment of myelofibrosis    Disease: myelofibrosis   Prep regimen: Flu / Bu / Thiotepa / rATG   GVHD ppx: ABC: PTCy days +3, +4, Bortezomib days 0, +3, PTCy days +5, +14, +28   ABO/CMV:   Recipient:   A+/CMV+   Donor:   B+/CMV+    S: Patient seen and examined with HPC Transplant Team:       O: Vitals:   Vital Signs Last 24 Hrs  T(C): 37 (02 Jul 2025 05:33), Max: 37.2 (01 Jul 2025 10:50)  T(F): 98.6 (02 Jul 2025 05:33), Max: 98.9 (01 Jul 2025 10:50)  HR: 88 (02 Jul 2025 05:33) (56 - 88)  BP: 179/67 (02 Jul 2025 05:33) (139/64 - 179/67)  BP(mean): --  RR: 18 (02 Jul 2025 05:33) (18 - 18)  SpO2: 96% (02 Jul 2025 05:33) (91% - 96%)    Parameters below as of 02 Jul 2025 05:33  Patient On (Oxygen Delivery Method): room air        Admit weight: 64.5kg       Intake / Output:   07-01 @ 07:01  -  07-02 @ 07:00  --------------------------------------------------------  IN: 7388 mL / OUT: 6900 mL / NET: 488 mL      PE:   Oropharynx: no erythema or ulcerations; +Thrush on tongue/posterior pharynx (improving)   Oral Mucositis: +                                                  ndGndrndanddndend:nd nd2nd CVS: S1, S2 RRR   Lungs: CTA throughout bilaterally   Abdomen: + BS x 4, soft, NT, ND   Extremities: no edema   Gastric Mucositis:                 -                              Grade: n/a   Intestinal Mucositis:              -                              Grade: n/a   Skin: no rash   TLC: CDI   Neuro: A&O x 3    Labs:   CBC Full  -  ( 02 Jul 2025 06:20 )  WBC Count : 0.01 K/uL  Hemoglobin : 8.4 g/dL  Hematocrit : 26.3 %  Platelet Count - Automated : 7 K/uL  Mean Cell Volume : 89.2 fl  Mean Cell Hemoglobin : 28.5 pg  Mean Cell Hemoglobin Concentration : 31.9 g/dL  Auto Neutrophil # : x  Auto Lymphocyte # : x  Auto Monocyte # : x  Auto Eosinophil # : x  Auto Basophil # : x  Auto Neutrophil % : x  Auto Lymphocyte % : x  Auto Monocyte % : x  Auto Eosinophil % : x  Auto Basophil % : x                          8.4    0.01  )-----------( 7        ( 02 Jul 2025 06:20 )             26.3     07-02    145  |  115[H]  |  9   ----------------------------<  84  3.3[L]   |  16[L]  |  0.57    Ca    7.8[L]      02 Jul 2025 06:20  Phos  2.7     07-02  Mg     1.9     07-02    TPro  5.1[L]  /  Alb  2.7[L]  /  TBili  0.4  /  DBili  0.2  /  AST  9[L]  /  ALT  7[L]  /  AlkPhos  78  07-02      LIVER FUNCTIONS - ( 02 Jul 2025 06:20 )  Alb: 2.7 g/dL / Pro: 5.1 g/dL / ALK PHOS: 78 U/L / ALT: 7 U/L / AST: 9 U/L / GGT: x           Lactate Dehydrogenase, Serum: 248 U/L (07-02 @ 06:20)      Cultures:   Culture Results: <10,000 CFU/mL Normal Urogenital Cha (06.28.25 @ 06:31)    Culture Results:   No growth at 5 days(06.27.25 @ 16:36)    Culture Results:   No growth at  5 days (06.27.25 @ 16:30)    Respiratory Viral Panel with COVID-19 by SCARLET (06.20.25 @ 21:02)   Rapid RVP Result: [id number]: NotDetec:    Culture - Blood (06.20.25 @ 18:45)   Specimen Source: Blood Blood-Catheter    Culture Results: No growth at 24 hoursCulture - Blood (06.20.25 @ 18:40)   Specimen Source: Blood Blood-Peripheral  Culture Results: No growth     Culture - Urine (06.20.25 @ 21:01)   Specimen Source: Clean Catch Clean Catch (Midstream)  Culture Results: 10,000 - 49,000 CFU/mL Gram Positive Cocci in Pairs and Chains   <10,000 CFU/ml Normal Urogenital cha present    Radiology:   ACC: [id number] EXAM:  XR CHEST PORTABLE URGENT 1V   ORDERED BY: SARAH REEVES   PROCEDURE DATE:  06/27/2025    IMPRESSION:  No focal consolidation.      Meds:   Antimicrobials:   acyclovir    Suspension 800 milliGRAM(s) Oral every 12 hours  caspofungin IVPB      caspofungin IVPB 50 milliGRAM(s) IV Intermittent every 24 hours  levoFLOXacin  Tablet 500 milliGRAM(s) Oral every 24 hours  posaconazole DR Tablet 300 milliGRAM(s) Oral daily  vancomycin    Solution 125 milliGRAM(s) Oral every 12 hours      Heme / Onc:       GI:  pantoprazole    Tablet 40 milliGRAM(s) Oral before breakfast  polyethylene glycol 3350 17 Gram(s) Oral daily PRN  sodium bicarbonate Mouth Rinse 10 milliLiter(s) Swish and Spit five times a day  ursodiol Suspension 300 milliGRAM(s) Oral two times a day      Cardiovascular:   lisinopril 10 milliGRAM(s) Oral every 12 hours  metoprolol succinate ER 12.5 milliGRAM(s) Oral daily      Immunologic:   filgrastim-sndz (ZARXIO) Injectable 300 MICROGram(s) SubCutaneous every 24 hours      Other medications:   Biotene Dry Mouth Oral Rinse 5 milliLiter(s) Swish and Spit five times a day  buPROPion XL (24-Hour) . 300 milliGRAM(s) Oral daily  chlorhexidine 0.12% Liquid 15 milliLiter(s) Swish and Spit two times a day  chlorhexidine 4% Liquid 1 Application(s) Topical <User Schedule>  fentaNYL   Patch  25 MICROgram(s)/Hr. 1 Patch Transdermal every 48 hours  levothyroxine 88 MICROGram(s) Oral daily  OLANZapine 5 milliGRAM(s) Oral at bedtime  phytonadione   Solution 5 milliGRAM(s) Oral <User Schedule>  potassium chloride  20 mEq/100 mL IVPB 20 milliEquivalent(s) IV Intermittent every 2 hours  prochlorperazine   Injectable 10 milliGRAM(s) IV Push every 6 hours  sodium chloride 0.9%. 1000 milliLiter(s) IV Continuous <Continuous>      PRN:   acetaminophen     Tablet .. 650 milliGRAM(s) Oral every 6 hours PRN  FIRST- Mouthwash  BLM 15 milliLiter(s) Swish and Swallow five times a day PRN  lidocaine 2% Viscous 15 milliLiter(s) Oral five times a day PRN  ondansetron Injectable 8 milliGRAM(s) IV Push every 8 hours PRN  polyethylene glycol 3350 17 Gram(s) Oral daily PRN  sodium chloride 0.9% lock flush 10 milliLiter(s) IV Push every 1 hour PRN    A/P: 64 year old female with hx of myelofibrosis presents for a MUD allogeneic SCT (DP Permissive) conditioning with Flu/Bu/Thiotepa/rATG.  Today is day + 5  6/21- Thiotepa 1/2. Maintain skin precautions during thiotepa administration and for 48 hours after completion (days .6 through day -3). Chronic pain issues - continue home fentanyl patch. Febrile overnight, tmax 100.9. Cultures  pending. RVP negative. CXR pending official read, no obvious consolidations. Vital signs are stable.   6/23 D/C HCTZ. Fludarabine 1/3, Busulfan 1/3, rATG 1/3. No Tylenol on busulfan days. Continue keppra ppx for 24 hours post infusion of last dose of busulfan. No acute events overnight, vital signs are stable.   6/24- overnight patient was febrile during ATG, no cultures/abx as fever likely secondary to ATG. Patient was otherwise stable overnight. Fludarabine 2/3, Busulfan 2/3 and rATG 2/3. NO Tylenol or Azoles until Day 0. Continue with supportive care.  6/25- Fludarabine 3/3, busulfan 3/3, rATG 3/3. No acute events overnight. Vital signs are stable.   6/26 Switched Compazine to atc  6/27 VSS, afebrile. 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  6/28 VSS, persistent fevers likely due to CRS post-SCT, continues on zosyn. Cultures pending. Hypervolemia - lasix 40mg x 1, will continue strict I/Os. Started caspofungin due to persistent thrush.  6/29 overnight no acute events noted. VSS, febrile likely due to CRS - continue with supportive care. BCx/UCx NGTD x 24h.  6/30 VSS, intermittently febrile likely due to CRS, infectious workup negative so far. PTCy 1/2 today. I&O, daily weights, prn diuresis. Continue to monitor thrush (improving). Changed some meds to PO suspension due to mucositis. Continue with supportive care. Refractory nausea, added trial of zyprexa 5mg po QHS x 3 days. d/c primafit. Change voriconazole to posaconazole tomorrow.   7/1- No acute events overnight, vital signs are stable. Continue supportive care for chemotherapy induced oral mucositis.     1. Infectious Disease:   acyclovir    Suspension 800 milliGRAM(s) Oral every 12 hours  caspofungin IVPB      caspofungin IVPB 50 milliGRAM(s) IV Intermittent every 24 hours  piperacillin/tazobactam IVPB.. 4.5 Gram(s) IV Intermittent every 8 hours  posaconazole DR Tablet 300 milliGRAM(s) Oral daily  vancomycin    Solution 125 milliGRAM(s) Oral every 12 hours    2. VOD Prophylaxis:   ursodiol Capsule 300 milliGRAM(s) Oral two times a day    3. GI Prophylaxis:    pantoprazole Tablet 40 milliGRAM(s) Oral before breakfast    4. Mouthcare - NS / NaHCO3 rinses, Biotene; Skin care     5. GVHD prophylaxis   ABC: PTCy days +3, +4, Bortezomib days 0, +3, PTCy days +5, +14, +28     6. Transfuse & replete electrolytes prn   1 unit PRBC   magnesium sulfate 2g IV q 2 hours x 2 doses     7. IV hydration, daily weights, strict I&O, prn diuresis     8. PO intake as tolerated, nutrition follow up as needed    9. Antiemetics, anti-diarrhea medications:   prochlorperazine   Injectable 10 milliGRAM(s) IV Push every 6 hours   ondansetron  IVPB 16 milliGRAM(s) IV Intermittent every 24 hours    10. OOB as tolerated, physical therapy consult if needed     11. Monitor coags 2x week, vitamin K BIW  phytonadione Solution 5 milliGRAM(s) Oral <User Schedule>    12. Monitor closely for clinical changes, monitor for fevers     13. Emotional support provided, plan of care discussed and questions addressed     14. Patient education done regarding plan of care, restrictions and discharge planning     15. Continue regular social work input     I have written the above note for Dr. Clarke who performed service with me in the room.   Jalen Kay PA-C ([phone])    I have seen and examined patient with PA, I agree with above note as scribed.                        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 / Bu / Thiotepa / rATG prep regimen for treatment of myelofibrosis    Disease: myelofibrosis   Prep regimen: Flu / Bu / Thiotepa / rATG   GVHD ppx: ABC: PTCy days +3, +4, Bortezomib days 0, +3, PTCy days +5, +14, +28   ABO/CMV:   Recipient:   A+/CMV+   Donor:   B+/CMV+    S: Patient seen and examined with HPC Transplant Team:   + loose stool   + mouth / throat pain   + nausea, improved with trial of zyprexa  + fatigue       O: Vitals:   Vital Signs Last 24 Hrs  T(C): 37 (02 Jul 2025 05:33), Max: 37.2 (01 Jul 2025 10:50)  T(F): 98.6 (02 Jul 2025 05:33), Max: 98.9 (01 Jul 2025 10:50)  HR: 88 (02 Jul 2025 05:33) (56 - 88)  BP: 179/67 (02 Jul 2025 05:33) (139/64 - 179/67)  BP(mean): --  RR: 18 (02 Jul 2025 05:33) (18 - 18)  SpO2: 96% (02 Jul 2025 05:33) (91% - 96%)    Parameters below as of 02 Jul 2025 05:33  Patient On (Oxygen Delivery Method): room air        Admit weight: 64.5kg       Intake / Output:   07-01 @ 07:01  -  07-02 @ 07:00  --------------------------------------------------------  IN: 7388 mL / OUT: 6900 mL / NET: 488 mL      PE:   Oropharynx: no erythema or ulcerations; +Thrush on tongue/posterior pharynx (improving)   Oral Mucositis: +                                                  ndGndrndanddndend:nd nd2nd CVS: S1, S2 RRR   Lungs: CTA throughout bilaterally   Abdomen: + BS x 4, soft, NT, ND   Extremities: no edema   Gastric Mucositis:                 -                              Grade: n/a   Intestinal Mucositis:              -                              Grade: n/a   Skin: no rash   TLC: CDI   Neuro: A&O x 3    Labs:   CBC Full  -  ( 02 Jul 2025 06:20 )  WBC Count : 0.01 K/uL  Hemoglobin : 8.4 g/dL  Hematocrit : 26.3 %  Platelet Count - Automated : 7 K/uL  Mean Cell Volume : 89.2 fl  Mean Cell Hemoglobin : 28.5 pg  Mean Cell Hemoglobin Concentration : 31.9 g/dL  Auto Neutrophil # : x  Auto Lymphocyte # : x  Auto Monocyte # : x  Auto Eosinophil # : x  Auto Basophil # : x  Auto Neutrophil % : x  Auto Lymphocyte % : x  Auto Monocyte % : x  Auto Eosinophil % : x  Auto Basophil % : x                          8.4    0.01  )-----------( 7        ( 02 Jul 2025 06:20 )             26.3     07-02    145  |  115[H]  |  9   ----------------------------<  84  3.3[L]   |  16[L]  |  0.57    Ca    7.8[L]      02 Jul 2025 06:20  Phos  2.7     07-02  Mg     1.9     07-02    TPro  5.1[L]  /  Alb  2.7[L]  /  TBili  0.4  /  DBili  0.2  /  AST  9[L]  /  ALT  7[L]  /  AlkPhos  78  07-02      LIVER FUNCTIONS - ( 02 Jul 2025 06:20 )  Alb: 2.7 g/dL / Pro: 5.1 g/dL / ALK PHOS: 78 U/L / ALT: 7 U/L / AST: 9 U/L / GGT: x           Lactate Dehydrogenase, Serum: 248 U/L (07-02 @ 06:20)      Cultures:   Culture Results: <10,000 CFU/mL Normal Urogenital Cha (06.28.25 @ 06:31)    Culture Results:   No growth at 5 days(06.27.25 @ 16:36)    Culture Results:   No growth at  5 days (06.27.25 @ 16:30)    Respiratory Viral Panel with COVID-19 by [facility] (06.20.25 @ 21:02)   Rapid RVP Result: [id number]: NotDetec:    Culture - Blood (06.20.25 @ 18:45)   Specimen Source: Blood Blood-Catheter    Culture Results: No growth at 24 hoursCulture - Blood (06.20.25 @ 18:40)   Specimen Source: Blood Blood-Peripheral  Culture Results: No growth     Culture - Urine (06.20.25 @ 21:01)   Specimen Source: Clean Catch Clean Catch (Midstream)  Culture Results: 10,000 - 49,000 CFU/mL Gram Positive Cocci in Pairs and Chains   <10,000 CFU/ml Normal Urogenital cha present    Radiology:   ACC: [id number] EXAM:  XR CHEST PORTABLE URGENT 1V   ORDERED BY: SARAH REEVES   PROCEDURE DATE:  06/27/2025    IMPRESSION:  No focal consolidation.      Meds:   Antimicrobials:   acyclovir    Suspension 800 milliGRAM(s) Oral every 12 hours  caspofungin IVPB      caspofungin IVPB 50 milliGRAM(s) IV Intermittent every 24 hours  levoFLOXacin  Tablet 500 milliGRAM(s) Oral every 24 hours  posaconazole DR Tablet 300 milliGRAM(s) Oral daily  vancomycin    Solution 125 milliGRAM(s) Oral every 12 hours      Heme / Onc:       GI:  pantoprazole    Tablet 40 milliGRAM(s) Oral before breakfast  polyethylene glycol 3350 17 Gram(s) Oral daily PRN  sodium bicarbonate Mouth Rinse 10 milliLiter(s) Swish and Spit five times a day  ursodiol Suspension 300 milliGRAM(s) Oral two times a day      Cardiovascular:   lisinopril 10 milliGRAM(s) Oral every 12 hours  metoprolol succinate ER 12.5 milliGRAM(s) Oral daily      Immunologic:   filgrastim-sndz (ZARXIO) Injectable 300 MICROGram(s) SubCutaneous every 24 hours      Other medications:   Biotene Dry Mouth Oral Rinse 5 milliLiter(s) Swish and Spit five times a day  buPROPion XL (24-Hour) . 300 milliGRAM(s) Oral daily  chlorhexidine 0.12% Liquid 15 milliLiter(s) Swish and Spit two times a day  chlorhexidine 4% Liquid 1 Application(s) Topical <User Schedule>  fentaNYL   Patch  25 MICROgram(s)/Hr. 1 Patch Transdermal every 48 hours  levothyroxine 88 MICROGram(s) Oral daily  OLANZapine 5 milliGRAM(s) Oral at bedtime  phytonadione   Solution 5 milliGRAM(s) Oral <User Schedule>  potassium chloride  20 mEq/100 mL IVPB 20 milliEquivalent(s) IV Intermittent every 2 hours  prochlorperazine   Injectable 10 milliGRAM(s) IV Push every 6 hours  sodium chloride 0.9%. 1000 milliLiter(s) IV Continuous <Continuous>      PRN:   acetaminophen     Tablet .. 650 milliGRAM(s) Oral every 6 hours PRN  FIRST- Mouthwash  BLM 15 milliLiter(s) Swish and Swallow five times a day PRN  lidocaine 2% Viscous 15 milliLiter(s) Oral five times a day PRN  ondansetron Injectable 8 milliGRAM(s) IV Push every 8 hours PRN  polyethylene glycol 3350 17 Gram(s) Oral daily PRN  sodium chloride 0.9% lock flush 10 milliLiter(s) IV Push every 1 hour PRN    A/P: 64 year old female with hx of myelofibrosis presents for a MUD allogeneic SCT (DP Permissive) conditioning with Flu/Bu/Thiotepa/rATG.  Today is day + 5  6/21- Thiotepa 1/2. Maintain skin precautions during thiotepa administration and for 48 hours after completion (days .6 through day -3). Chronic pain issues - continue home fentanyl patch. Febrile overnight, tmax 100.9. Cultures  pending. RVP negative. CXR pending official read, no obvious consolidations. Vital signs are stable.   6/23 D/C HCTZ. Fludarabine 1/3, Busulfan 1/3, rATG 1/3. No Tylenol on busulfan days. Continue keppra ppx for 24 hours post infusion of last dose of busulfan. No acute events overnight, vital signs are stable.   6/24- overnight patient was febrile during ATG, no cultures/abx as fever likely secondary to ATG. Patient was otherwise stable overnight. Fludarabine 2/3, Busulfan 2/3 and rATG 2/3. NO Tylenol or Azoles until Day 0. Continue with supportive care.  6/25- Fludarabine 3/3, busulfan 3/3, rATG 3/3. No acute events overnight. Vital signs are stable.   6/26 Switched Compazine to atc  6/27 VSS, afebrile. 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  6/28 VSS, persistent fevers likely due to CRS post-SCT, continues on zosyn. Cultures pending. Hypervolemia - lasix 40mg x 1, will continue strict I/Os. Started caspofungin due to persistent thrush.  6/29 overnight no acute events noted. VSS, febrile likely due to CRS - continue with supportive care. BCx/UCx NGTD x 24h.  6/30 VSS, intermittently febrile likely due to CRS, infectious workup negative so far. PTCy 1/2 today. I&O, daily weights, prn diuresis. Continue to monitor thrush (improving). Changed some meds to PO suspension due to mucositis. Continue with supportive care. Refractory nausea, added trial of zyprexa 5mg po QHS x 3 days. d/c primafit. Change voriconazole to posaconazole tomorrow.   7/1- No acute events overnight, vital signs are stable. Continue supportive care for chemotherapy induced oral mucositis.   7/2- No acute events overnight, vital signs are stable. Completed PTCy, hydration to be discontinued today. I&O, daily weights, prn diuresis. Continue supportive care.     1. Infectious Disease:   acyclovir    Suspension 800 milliGRAM(s) Oral every 12 hours  caspofungin IVPB      caspofungin IVPB 50 milliGRAM(s) IV Intermittent every 24 hours  piperacillin/tazobactam IVPB.. 4.5 Gram(s) IV Intermittent every 8 hours  posaconazole DR Tablet 300 milliGRAM(s) Oral daily  vancomycin    Solution 125 milliGRAM(s) Oral every 12 hours    2. VOD Prophylaxis:   ursodiol Capsule 300 milliGRAM(s) Oral two times a day    3. GI Prophylaxis:    pantoprazole Tablet 40 milliGRAM(s) Oral before breakfast    4. Mouthcare - NS / NaHCO3 rinses, Biotene; Skin care     5. GVHD prophylaxis   ABC: PTCy days +3, +4, Bortezomib days 0, +3, PTCy days +5, +14, +28     6. Transfuse & replete electrolytes prn   1 bag platelets     7. IV hydration, daily weights, strict I&O, prn diuresis     8. PO intake as tolerated, nutrition follow up as needed    9. Antiemetics, anti-diarrhea medications:   prochlorperazine   Injectable 10 milliGRAM(s) IV Push every 6 hours   ondansetron Injectable 8 milliGRAM(s) IV Push every 8 hours PRN  OLANZapine 5 milliGRAM(s) Oral at bedtime    10. OOB as tolerated, physical therapy consult if needed     11. Monitor coags 2x week, vitamin K BIW  phytonadione Solution 5 milliGRAM(s) Oral <User Schedule>    12. Monitor closely for clinical changes, monitor for fevers     13. Emotional support provided, plan of care discussed and questions addressed     14. Patient education done regarding plan of care, restrictions and discharge planning     15. Continue regular social work input     I have written the above note for Dr. Clarke who performed service with me in the room.   Jalen Kay PA-C ([phone])    I have seen and examined patient with PA, I agree with above note as scribed.                        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 / Bu / Thiotepa / rATG prep regimen for treatment of myelofibrosis    Disease: myelofibrosis   Prep regimen: Flu / Bu / Thiotepa / rATG   GVHD ppx: ABC: PTCy days +3, +4, Bortezomib days 0, +3, PTCy days +5, +14, +28   ABO/CMV:   Recipient:   A+/CMV+   Donor:   B+/CMV+    S: Patient seen and examined with HPC Transplant Team:   + loose stool   + mouth / throat pain   + nausea, improved with trial of zyprexa  + fatigue       O: Vitals:   Vital Signs Last 24 Hrs  T(C): 37 (02 Jul 2025 05:33), Max: 37.2 (01 Jul 2025 10:50)  T(F): 98.6 (02 Jul 2025 05:33), Max: 98.9 (01 Jul 2025 10:50)  HR: 88 (02 Jul 2025 05:33) (56 - 88)  BP: 179/67 (02 Jul 2025 05:33) (139/64 - 179/67)  BP(mean): --  RR: 18 (02 Jul 2025 05:33) (18 - 18)  SpO2: 96% (02 Jul 2025 05:33) (91% - 96%)    Parameters below as of 02 Jul 2025 05:33  Patient On (Oxygen Delivery Method): room air        Admit weight: 64.5kg       Intake / Output:   07-01 @ 07:01  -  07-02 @ 07:00  --------------------------------------------------------  IN: 7388 mL / OUT: 6900 mL / NET: 488 mL      PE:   Oropharynx: no erythema or ulcerations; +Thrush on tongue/posterior pharynx (improving)   Oral Mucositis: +                                                  ndGndrndanddndend:nd nd2nd CVS: S1, S2 RRR   Lungs: CTA throughout bilaterally   Abdomen: + BS x 4, soft, NT, ND   Extremities: no edema   Gastric Mucositis:                 -                              Grade: n/a   Intestinal Mucositis:              -                              Grade: n/a   Skin: no rash   TLC: CDI   Neuro: A&O x 3    Labs:   CBC Full  -  ( 02 Jul 2025 06:20 )  WBC Count : 0.01 K/uL  Hemoglobin : 8.4 g/dL  Hematocrit : 26.3 %  Platelet Count - Automated : 7 K/uL  Mean Cell Volume : 89.2 fl  Mean Cell Hemoglobin : 28.5 pg  Mean Cell Hemoglobin Concentration : 31.9 g/dL  Auto Neutrophil # : x  Auto Lymphocyte # : x  Auto Monocyte # : x  Auto Eosinophil # : x  Auto Basophil # : x  Auto Neutrophil % : x  Auto Lymphocyte % : x  Auto Monocyte % : x  Auto Eosinophil % : x  Auto Basophil % : x                          8.4    0.01  )-----------( 7        ( 02 Jul 2025 06:20 )             26.3     07-02    145  |  115[H]  |  9   ----------------------------<  84  3.3[L]   |  16[L]  |  0.57    Ca    7.8[L]      02 Jul 2025 06:20  Phos  2.7     07-02  Mg     1.9     07-02    TPro  5.1[L]  /  Alb  2.7[L]  /  TBili  0.4  /  DBili  0.2  /  AST  9[L]  /  ALT  7[L]  /  AlkPhos  78  07-02      LIVER FUNCTIONS - ( 02 Jul 2025 06:20 )  Alb: 2.7 g/dL / Pro: 5.1 g/dL / ALK PHOS: 78 U/L / ALT: 7 U/L / AST: 9 U/L / GGT: x           Lactate Dehydrogenase, Serum: 248 U/L (07-02 @ 06:20)      Cultures:   Culture Results: <10,000 CFU/mL Normal Urogenital Cha (06.28.25 @ 06:31)    Culture Results:   No growth at 5 days(06.27.25 @ 16:36)    Culture Results:   No growth at  5 days (06.27.25 @ 16:30)    Respiratory Viral Panel with COVID-19 by [facility] (06.20.25 @ 21:02)   Rapid RVP Result: [id number]: NotDetec:    Culture - Blood (06.20.25 @ 18:45)   Specimen Source: Blood Blood-Catheter    Culture Results: No growth at 24 hoursCulture - Blood (06.20.25 @ 18:40)   Specimen Source: Blood Blood-Peripheral  Culture Results: No growth     Culture - Urine (06.20.25 @ 21:01)   Specimen Source: Clean Catch Clean Catch (Midstream)  Culture Results: 10,000 - 49,000 CFU/mL Gram Positive Cocci in Pairs and Chains   <10,000 CFU/ml Normal Urogenital cha present    Radiology:   ACC: [id number] EXAM:  XR CHEST PORTABLE URGENT 1V   ORDERED BY: SARAH REEVES   PROCEDURE DATE:  06/27/2025    IMPRESSION:  No focal consolidation.      Meds:   Antimicrobials:   acyclovir    Suspension 800 milliGRAM(s) Oral every 12 hours  caspofungin IVPB      caspofungin IVPB 50 milliGRAM(s) IV Intermittent every 24 hours  levoFLOXacin  Tablet 500 milliGRAM(s) Oral every 24 hours  posaconazole DR Tablet 300 milliGRAM(s) Oral daily  vancomycin    Solution 125 milliGRAM(s) Oral every 12 hours      Heme / Onc:       GI:  pantoprazole    Tablet 40 milliGRAM(s) Oral before breakfast  polyethylene glycol 3350 17 Gram(s) Oral daily PRN  sodium bicarbonate Mouth Rinse 10 milliLiter(s) Swish and Spit five times a day  ursodiol Suspension 300 milliGRAM(s) Oral two times a day      Cardiovascular:   lisinopril 10 milliGRAM(s) Oral every 12 hours  metoprolol succinate ER 12.5 milliGRAM(s) Oral daily      Immunologic:   filgrastim-sndz (ZARXIO) Injectable 300 MICROGram(s) SubCutaneous every 24 hours      Other medications:   Biotene Dry Mouth Oral Rinse 5 milliLiter(s) Swish and Spit five times a day  buPROPion XL (24-Hour) . 300 milliGRAM(s) Oral daily  chlorhexidine 0.12% Liquid 15 milliLiter(s) Swish and Spit two times a day  chlorhexidine 4% Liquid 1 Application(s) Topical <User Schedule>  fentaNYL   Patch  25 MICROgram(s)/Hr. 1 Patch Transdermal every 48 hours  levothyroxine 88 MICROGram(s) Oral daily  OLANZapine 5 milliGRAM(s) Oral at bedtime  phytonadione   Solution 5 milliGRAM(s) Oral <User Schedule>  potassium chloride  20 mEq/100 mL IVPB 20 milliEquivalent(s) IV Intermittent every 2 hours  prochlorperazine   Injectable 10 milliGRAM(s) IV Push every 6 hours  sodium chloride 0.9%. 1000 milliLiter(s) IV Continuous <Continuous>      PRN:   acetaminophen     Tablet .. 650 milliGRAM(s) Oral every 6 hours PRN  FIRST- Mouthwash  BLM 15 milliLiter(s) Swish and Swallow five times a day PRN  lidocaine 2% Viscous 15 milliLiter(s) Oral five times a day PRN  ondansetron Injectable 8 milliGRAM(s) IV Push every 8 hours PRN  polyethylene glycol 3350 17 Gram(s) Oral daily PRN  sodium chloride 0.9% lock flush 10 milliLiter(s) IV Push every 1 hour PRN    A/P: 64 year old female with hx of myelofibrosis presents for a MUD allogeneic SCT (DP Permissive) conditioning with Flu/Bu/Thiotepa/rATG.  Today is day + 5  6/21- Thiotepa 1/2. Maintain skin precautions during thiotepa administration and for 48 hours after completion (days .6 through day -3). Chronic pain issues - continue home fentanyl patch. Febrile overnight, tmax 100.9. Cultures  pending. RVP negative. CXR pending official read, no obvious consolidations. Vital signs are stable.   6/23 D/C HCTZ. Fludarabine 1/3, Busulfan 1/3, rATG 1/3. No Tylenol on busulfan days. Continue keppra ppx for 24 hours post infusion of last dose of busulfan. No acute events overnight, vital signs are stable.   6/24- overnight patient was febrile during ATG, no cultures/abx as fever likely secondary to ATG. Patient was otherwise stable overnight. Fludarabine 2/3, Busulfan 2/3 and rATG 2/3. NO Tylenol or Azoles until Day 0. Continue with supportive care.  6/25- Fludarabine 3/3, busulfan 3/3, rATG 3/3. No acute events overnight. Vital signs are stable.   6/26 Switched Compazine to atc  6/27 VSS, afebrile. 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  6/28 VSS, persistent fevers likely due to CRS post-SCT, continues on zosyn. Cultures pending. Hypervolemia - lasix 40mg x 1, will continue strict I/Os. Started caspofungin due to persistent thrush.  6/29 overnight no acute events noted. VSS, febrile likely due to CRS - continue with supportive care. BCx/UCx NGTD x 24h.  6/30 VSS, intermittently febrile likely due to CRS, infectious workup negative so far. PTCy 1/2 today. I&O, daily weights, prn diuresis. Continue to monitor thrush (improving). Changed some meds to PO suspension due to mucositis. Continue with supportive care. Refractory nausea, added trial of zyprexa 5mg po QHS x 3 days. d/c primafit. Change voriconazole to posaconazole tomorrow.   7/1- No acute events overnight, vital signs are stable. Continue supportive care for chemotherapy induced oral mucositis.   7/2- No acute events overnight, vital signs are stable. Completed PTCy, hydration to be discontinued today. I&O, daily weights, prn diuresis. Continue supportive care. Fentanyl patch to be titrated down to 12 mcg / hr tomorrow.     1. Infectious Disease:   acyclovir    Suspension 800 milliGRAM(s) Oral every 12 hours  caspofungin IVPB      caspofungin IVPB 50 milliGRAM(s) IV Intermittent every 24 hours  piperacillin/tazobactam IVPB.. 4.5 Gram(s) IV Intermittent every 8 hours  posaconazole DR Tablet 300 milliGRAM(s) Oral daily  vancomycin    Solution 125 milliGRAM(s) Oral every 12 hours    2. VOD Prophylaxis:   ursodiol Capsule 300 milliGRAM(s) Oral two times a day    3. GI Prophylaxis:    pantoprazole Tablet 40 milliGRAM(s) Oral before breakfast    4. Mouthcare - NS / NaHCO3 rinses, Biotene; Skin care     5. GVHD prophylaxis   ABC: PTCy days +3, +4, Bortezomib days 0, +3, PTCy days +5, +14, +28     6. Transfuse & replete electrolytes prn   1 bag platelets     7. IV hydration, daily weights, strict I&O, prn diuresis     8. PO intake as tolerated, nutrition follow up as needed    9. Antiemetics, anti-diarrhea medications:   prochlorperazine   Injectable 10 milliGRAM(s) IV Push every 6 hours   ondansetron Injectable 8 milliGRAM(s) IV Push every 8 hours PRN  OLANZapine 5 milliGRAM(s) Oral at bedtime    10. OOB as tolerated, physical therapy consult if needed     11. Monitor coags 2x week, vitamin K BIW  phytonadione Solution 5 milliGRAM(s) Oral <User Schedule>    12. Monitor closely for clinical changes, monitor for fevers     13. Emotional support provided, plan of care discussed and questions addressed     14. Patient education done regarding plan of care, restrictions and discharge planning     15. Continue regular social work input     I have written the above note for Dr. Clarke who performed service with me in the room.   Jalen Kay PA-C ([phone])    I have seen and examined patient with PA, I agree with above note as scribed.                        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) with Flu / Bu / Thiotepa / rATG prep regimen for treatment of myelofibrosis    Disease: myelofibrosis   Prep regimen: Flu / Bu / Thiotepa / rATG   GVHD ppx: ABC: PTCy days +3, +4, Bortezomib days 0, +3, PTCy days +5, +14, +28   ABO/CMV:   Recipient:   A+/CMV+   Donor:   B+/CMV+    S: Patient seen and examined with [facility] Transplant Team:   + loose stool   + mouth / throat pain   + nausea, improved with trial of zyprexa  + fatigue       O: Vitals:   Vital Signs Last 24 Hrs  T(C): 37 (02 Jul 2025 05:33), Max: 37.2 (01 Jul 2025 10:50)  T(F): 98.6 (02 Jul 2025 05:33), Max: 98.9 (01 Jul 2025 10:50)  HR: 88 (02 Jul 2025 05:33) (56 - 88)  BP: 179/67 (02 Jul 2025 05:33) (139/64 - 179/67)  BP(mean): --  RR: 18 (02 Jul 2025 05:33) (18 - 18)  SpO2: 96% (02 Jul 2025 05:33) (91% - 96%)    Parameters below as of 02 Jul 2025 05:33  Patient On (Oxygen Delivery Method): room air        Admit weight: 64.5kg       Intake / Output:   07-01 @ 07:01  -  07-02 @ 07:00  --------------------------------------------------------  IN: 7388 mL / OUT: 6900 mL / NET: 488 mL      PE:   Oropharynx: no erythema or ulcerations; +Thrush on tongue/posterior pharynx (improving)   Oral Mucositis: +                                                  ndGndrndanddndend:nd nd2nd CVS: S1, S2 RRR   Lungs: CTA throughout bilaterally   Abdomen: + BS x 4, soft, NT, ND   Extremities: no edema   Gastric Mucositis:                 -                              Grade: n/a   Intestinal Mucositis:              -                              Grade: n/a   Skin: no rash   TLC: CDI   Neuro: A&O x 3    Labs:   CBC Full  -  ( 02 Jul 2025 06:20 )  WBC Count : 0.01 K/uL  Hemoglobin : 8.4 g/dL  Hematocrit : 26.3 %  Platelet Count - Automated : 7 K/uL  Mean Cell Volume : 89.2 fl  Mean Cell Hemoglobin : 28.5 pg  Mean Cell Hemoglobin Concentration : 31.9 g/dL  Auto Neutrophil # : x  Auto Lymphocyte # : x  Auto Monocyte # : x  Auto Eosinophil # : x  Auto Basophil # : x  Auto Neutrophil % : x  Auto Lymphocyte % : x  Auto Monocyte % : x  Auto Eosinophil % : x  Auto Basophil % : x                          8.4    0.01  )-----------( 7        ( 02 Jul 2025 06:20 )             26.3     07-02    145  |  115[H]  |  9   ----------------------------<  84  3.3[L]   |  16[L]  |  0.57    Ca    7.8[L]      02 Jul 2025 06:20  Phos  2.7     07-02  Mg     1.9     07-02    TPro  5.1[L]  /  Alb  2.7[L]  /  TBili  0.4  /  DBili  0.2  /  AST  9[L]  /  ALT  7[L]  /  AlkPhos  78  07-02      LIVER FUNCTIONS - ( 02 Jul 2025 06:20 )  Alb: 2.7 g/dL / Pro: 5.1 g/dL / ALK PHOS: 78 U/L / ALT: 7 U/L / AST: 9 U/L / GGT: x           Lactate Dehydrogenase, Serum: 248 U/L (07-02 @ 06:20)      Cultures:   Culture Results: <10,000 CFU/mL Normal Urogenital Cha (06.28.25 @ 06:31)    Culture Results:   No growth at 5 days(06.27.25 @ 16:36)    Culture Results:   No growth at  5 days (06.27.25 @ 16:30)    Respiratory Viral Panel with COVID-19 by SCARLET (06.20.25 @ 21:02)   Rapid RVP Result: [id number]: NotDetec:    Culture - Blood (06.20.25 @ 18:45)   Specimen Source: Blood Blood-Catheter    Culture Results: No growth at 24 hoursCulture - Blood (06.20.25 @ 18:40)   Specimen Source: Blood Blood-Peripheral  Culture Results: No growth     Culture - Urine (06.20.25 @ 21:01)   Specimen Source: Clean Catch Clean Catch (Midstream)  Culture Results: 10,000 - 49,000 CFU/mL Gram Positive Cocci in Pairs and Chains   <10,000 CFU/ml Normal Urogenital cha present    Radiology:   ACC: [id number] EXAM:  XR CHEST PORTABLE URGENT 1V   ORDERED BY: SARAH REEVES   PROCEDURE DATE:  06/27/2025    IMPRESSION:  No focal consolidation.      Meds:   Antimicrobials:   acyclovir    Suspension 800 milliGRAM(s) Oral every 12 hours  caspofungin IVPB      caspofungin IVPB 50 milliGRAM(s) IV Intermittent every 24 hours  levoFLOXacin  Tablet 500 milliGRAM(s) Oral every 24 hours  posaconazole DR Tablet 300 milliGRAM(s) Oral daily  vancomycin    Solution 125 milliGRAM(s) Oral every 12 hours      Heme / Onc:       GI:  pantoprazole    Tablet 40 milliGRAM(s) Oral before breakfast  polyethylene glycol 3350 17 Gram(s) Oral daily PRN  sodium bicarbonate Mouth Rinse 10 milliLiter(s) Swish and Spit five times a day  ursodiol Suspension 300 milliGRAM(s) Oral two times a day      Cardiovascular:   lisinopril 10 milliGRAM(s) Oral every 12 hours  metoprolol succinate ER 12.5 milliGRAM(s) Oral daily      Immunologic:   filgrastim-sndz (ZARXIO) Injectable 300 MICROGram(s) SubCutaneous every 24 hours      Other medications:   Biotene Dry Mouth Oral Rinse 5 milliLiter(s) Swish and Spit five times a day  buPROPion XL (24-Hour) . 300 milliGRAM(s) Oral daily  chlorhexidine 0.12% Liquid 15 milliLiter(s) Swish and Spit two times a day  chlorhexidine 4% Liquid 1 Application(s) Topical <User Schedule>  fentaNYL   Patch  25 MICROgram(s)/Hr. 1 Patch Transdermal every 48 hours  levothyroxine 88 MICROGram(s) Oral daily  OLANZapine 5 milliGRAM(s) Oral at bedtime  phytonadione   Solution 5 milliGRAM(s) Oral <User Schedule>  potassium chloride  20 mEq/100 mL IVPB 20 milliEquivalent(s) IV Intermittent every 2 hours  prochlorperazine   Injectable 10 milliGRAM(s) IV Push every 6 hours  sodium chloride 0.9%. 1000 milliLiter(s) IV Continuous <Continuous>      PRN:   acetaminophen     Tablet .. 650 milliGRAM(s) Oral every 6 hours PRN  FIRST- Mouthwash  BLM 15 milliLiter(s) Swish and Swallow five times a day PRN  lidocaine 2% Viscous 15 milliLiter(s) Oral five times a day PRN  ondansetron Injectable 8 milliGRAM(s) IV Push every 8 hours PRN  polyethylene glycol 3350 17 Gram(s) Oral daily PRN  sodium chloride 0.9% lock flush 10 milliLiter(s) IV Push every 1 hour PRN    A/P: 64 year old female with hx of myelofibrosis presents for a MUD allogeneic SCT (DP Permissive) conditioning with Flu/Bu/Thiotepa/rATG.  Today is day + 5  6/21- Thiotepa 1/2. Maintain skin precautions during thiotepa administration and for 48 hours after completion (days .6 through day -3). Chronic pain issues - continue home fentanyl patch. Febrile overnight, tmax 100.9. Cultures  pending. RVP negative. CXR pending official read, no obvious consolidations. Vital signs are stable.   6/23 D/C HCTZ. Fludarabine 1/3, Busulfan 1/3, rATG 1/3. No Tylenol on busulfan days. Continue keppra ppx for 24 hours post infusion of last dose of busulfan. No acute events overnight, vital signs are stable.   6/24- overnight patient was febrile during ATG, no cultures/abx as fever likely secondary to ATG. Patient was otherwise stable overnight. Fludarabine 2/3, Busulfan 2/3 and rATG 2/3. NO Tylenol or Azoles until Day 0. Continue with supportive care.  6/25- Fludarabine 3/3, busulfan 3/3, rATG 3/3. No acute events overnight. Vital signs are stable.   6/26 Switched Compazine to atc  6/27 VSS, afebrile. 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  6/28 VSS, persistent fevers likely due to CRS post-SCT, continues on zosyn. Cultures pending. Hypervolemia - lasix 40mg x 1, will continue strict I/Os. Started caspofungin due to persistent thrush.  6/29 overnight no acute events noted. VSS, febrile likely due to CRS - continue with supportive care. BCx/UCx NGTD x 24h.  6/30 VSS, intermittently febrile likely due to CRS, infectious workup negative so far. PTCy 1/2 today. I&O, daily weights, prn diuresis. Continue to monitor thrush (improving). Changed some meds to PO suspension due to mucositis. Continue with supportive care. Refractory nausea, added trial of zyprexa 5mg po QHS x 3 days. d/c primafit. Change voriconazole to posaconazole tomorrow.   7/1- No acute events overnight, vital signs are stable. Continue supportive care for chemotherapy induced oral mucositis.   7/2- No acute events overnight, vital signs are stable. Completed PTCy, hydration to be discontinued today. I&O, daily weights, prn diuresis. Continue supportive care. Fentanyl patch to be titrated down to 12 mcg / hr tomorrow.     1. Infectious Disease:   acyclovir    Suspension 800 milliGRAM(s) Oral every 12 hours  caspofungin IVPB      caspofungin IVPB 50 milliGRAM(s) IV Intermittent every 24 hours  piperacillin/tazobactam IVPB.. 4.5 Gram(s) IV Intermittent every 8 hours  posaconazole DR Tablet 300 milliGRAM(s) Oral daily  vancomycin    Solution 125 milliGRAM(s) Oral every 12 hours    2. VOD Prophylaxis:   ursodiol Capsule 300 milliGRAM(s) Oral two times a day    3. GI Prophylaxis:    pantoprazole Tablet 40 milliGRAM(s) Oral before breakfast    4. Mouthcare - NS / NaHCO3 rinses, Biotene; Skin care     5. GVHD prophylaxis   ABC: PTCy days +3, +4, Bortezomib days 0, +3, PTCy days +5, +14, +28     6. Transfuse & replete electrolytes prn   1 bag platelets     7. IV hydration, daily weights, strict I&O, prn diuresis     8. PO intake as tolerated, nutrition follow up as needed    9. Antiemetics, anti-diarrhea medications:   prochlorperazine   Injectable 10 milliGRAM(s) IV Push every 6 hours   ondansetron Injectable 8 milliGRAM(s) IV Push every 8 hours PRN  OLANZapine 5 milliGRAM(s) Oral at bedtime    10. OOB as tolerated, physical therapy consult if needed     11. Monitor coags 2x week, vitamin K BIW  phytonadione Solution 5 milliGRAM(s) Oral <User Schedule>    12. Monitor closely for clinical changes, monitor for fevers     13. Emotional support provided, plan of care discussed and questions addressed     14. Patient education done regarding plan of care, restrictions and discharge planning     15. Continue regular social work input     I have written the above note for Dr. Clarke who performed service with me in the room.   Jalen Kay PA-C ([phone])    I have seen and examined patient with PA, I agree with above note as scribed.

## 2025-07-02 NOTE — PHARMACOTHERAPY INTERVENTION NOTE - COMMENTS
64-year-old female with PMH HTN, HLD, hypothyroidism, ET, GERD, Obesity s/p bariatric surgery (2015), ischemic colitis s/p left hemicolectomy (2021), pAF and DVT/PE on Eliquis, HFpEF, vW Disease, manic depression and Myelofibrosis treated with anareglide, INF alpha and hydrea. She is admitted for an alloSCT from a MUD with MAC TBF+rATG conditioning regimen and “ABC” for GVHD as part of a clinical trial. Today is day -4.    Conditioning Regimen: TBF  Thiotepa 5 mg/kg IV given on days -6 (6/21) to -5 (6/22)  Fludarabine 50 mg/m2 IV given on days -4 (6/23) to day -2 (6/25)  Busulfan 3.2 mg/kg IV given on days -4 (6/23) to day -2 (6/25) --> Please do not administer acetaminophen/azoles for 48 hours post busulfan  rATG given on days -4 (6/23) to day -2 (6/25)    GVHD PPX: ABC Study  Bortezomib administered 6 hours post infusion of cells (6/27) & 2nd dose to be administered on day +3 (6/30)  Post transplant cyclophosphamide (37.5 mg/kg) to be administered on days +3 (6/30) and +4 (7/1)  Abatacept scheduled to be administered on days +5 (7/2), +14 (7/11) and +28 (7/25)    Please follow these steps:  Please document exact times that transplant starts & finishes to ensure accurate time for bortezomib admin  Bortezomib must be initiated 6 hours post cell infusion completion   Please communicate with pharmacy to ensure dose is available at exact time necessary for both day 0 and 3    Radha Vernon, PharmD, BCOP  Stem Cell Transplant Clinical Pharmacy Specialist  Available via Microsoft Teams
64-year-old female with PMH HTN, HLD, hypothyroidism, ET, GERD, Obesity s/p bariatric surgery (), ischemic colitis s/p left hemicolectomy (), pAF and DVT/PE on Eliquis, HFpEF, vW Disease, manic depression and Myelofibrosis treated with anareglide, INF alpha and hydrea. She is admitted for an alloSCT from a MUD with MAC TBF+rATG conditioning regimen and “ABC” for GVHD as part of a clinical trial. Today is day +5. Course has been complicated by haplostorm, thrush and refractory nausea.    Conditioning Regimen: TBF (complete)  Thiotepa 5 mg/kg IV given on days -6 () to -5 ()  Fludarabine 50 mg/m2 IV given on days -4 () to day -2 ()  Busulfan 3.2 mg/kg IV given on days -4 () to day -2 () --> Please do not administer acetaminophen/azoles for 48 hours post busulfan  rATG given on days -4 () to day -2 ()    GVHD PPX: ABC Study  Bortezomib administered 6 hours post infusion of cells () & 2nd dose to be administered on day +3 ().  Post transplant cyclophosphamide (37.5 mg/kg) to be administered on days +3 () and +4 ().  Abatacept scheduled to be administered on days +5 (), +14 () and +28 ().    Please follow these steps:  Please document exact times that transplant starts & finishes to ensure accurate time for bortezomib admin  Bortezomib must be initiated 6 hours post cell infusion completion   Please communicate with pharmacy to ensure dose is available at exact time necessary for both day 0 and 3    Started antimicrobial (pip/tazo  fever & de-escalated to levofloxacin 500 mg PO QD), and PO vanco 125 mg BID () & will continue until ANC >500 for 3 consecutive days. Started antifungal (posaconazole 300 mg PO QD) on day 0 () & will continue until day +75. Started GCSF on day +5 (7/2) & will stop once ANC >1500 for two days.     Patient is also CMV seropositive and is getting post transplant cyclophosphamide for GVHD prophylaxis, she is at high-risk for CMV reactivation. Confirmed plan is to start letermovir for prophylaxis is on day +10 (). Will monitor for DDIs accordingly, as letermovir is a CY inhibitor.    Radha Vernon, PharmD, BCOP  Stem Cell Transplant Clinical Pharmacy Specialist  Available via Microsoft Teams
64-year-old female with PMH HTN, HLD, hypothyroidism, ET, GERD, Obesity s/p bariatric surgery (2015), ischemic colitis s/p left hemicolectomy (2021), pAF and DVT/PE on Eliquis, HFpEF, vW Disease, manic depression and Myelofibrosis treated with anareglide, INF alpha and hydrea. She is admitted for an alloSCT from a MUD with MAC TBF+rATG conditioning regimen and “ABC” for GVHD as part of a clinical trial. Today is day -6. Assisted in writing paper chemotherapy orders and performed medication reconciliation as below.    Patient reports taking the following medications:  Rosuvastatin Calcium 20 MG Oral Tablet; take 1 tablet at bedtime (holding)  buPROPion HCl ER (XL) 300 MG Oral Tablet Extended Release 24 Hour; TAKE 1 TABLET DAILY  Enalapril Maleate 10 MG Oral Tablet; Take 1 tablet twice a day  fentaNYL 50 MCG/HR Transdermal Patch 72 Hour; PLACE 1 PATCH As Directed every 2 days  hydroCHLOROthiazide 25 MG Oral Tablet; Take 1 tablet twice daily (holding)  Levothyroxine Sodium 88 MCG Oral Tablet; TAKE 1 TABLET DAILY  Metoprolol Succinate ER 25 MG Oral Tablet Extended Release 24 Hour; TAKE 0.5 TABLET Daily  oxyCODONE HCl - 10 MG Oral Tablet; TAKE 1 TABLET Every 4 hours PRN        Patient reports allergies to Iodinated Contrast Media & Sulfa Drugs (will be on atovaquone)    Radha Vernon, PharmD, BCOP  Stem Cell Transplant Clinical Pharmacy Specialist  Available via [employer] Teams

## 2025-07-02 NOTE — PROGRESS NOTE ADULT - NS ATTEND AMEND GEN_ALL_CORE FT
Day + 5 s/p allo-HSCT     Overall, patient is fatigued, complains of mouth pain.   Course complicated by CRS; intermittent fevers; afebrile x 48 hours;    Physical exam is remarkable for mucositis; +1 b/l LE edema   Labs reviewed;  Discussed day +4 PTCy today with hydration for another 24 hours post chemo  Discussed close monitoring of In/Outs with PRN diuresis.   Discussed with friend at bedside zhane ÁLVAREZ.   continue supportive care and antimicrobial ppx. Day + 5 s/p allo-HSCT     Overall, patient is fatigued, complains of mouth pain. She is very sleepy in am; more active in afternoon per team.   Nausea improved with zyprexa  Course complicated by CRS; intermittent fevers; afebrile x 24 hours  Hypertensive - to be started on amlodipine  Physical exam is remarkable for mucositis; +1 b/l LE edema; swollen eyelids   Labs reviewed;  We discussed end of hydration; continue to monitor for PRN diuresis.   Discussed close monitoring of In/Outs  Discussed with brother at bedside zhane ÁLVAREZ.   continue supportive care and antimicrobial ppx.

## 2025-07-02 NOTE — PROVIDER CONTACT NOTE (OTHER) - ACTION/TREATMENT ORDERED:
Provider notified, will continue to monitor as per provider order, no new orders at this time. Nursing care on-going.

## 2025-07-03 LAB
ALBUMIN SERPL ELPH-MCNC: 2.7 G/DL — LOW (ref 3.3–5)
ALP SERPL-CCNC: 76 U/L — SIGNIFICANT CHANGE UP (ref 40–120)
ALT FLD-CCNC: 6 U/L — LOW (ref 10–45)
ANION GAP SERPL CALC-SCNC: 11 MMOL/L — SIGNIFICANT CHANGE UP (ref 5–17)
APTT BLD: 25.9 SEC — LOW (ref 26.1–36.8)
AST SERPL-CCNC: 9 U/L — LOW (ref 10–40)
BASOPHILS # BLD AUTO: SIGNIFICANT CHANGE UP (ref 0–0.2)
BASOPHILS NFR BLD AUTO: SIGNIFICANT CHANGE UP (ref 0–2)
BILIRUB SERPL-MCNC: 0.6 MG/DL — SIGNIFICANT CHANGE UP (ref 0.2–1.2)
BUN SERPL-MCNC: 11 MG/DL — SIGNIFICANT CHANGE UP (ref 7–23)
CALCIUM SERPL-MCNC: 8.1 MG/DL — LOW (ref 8.4–10.5)
CHLORIDE SERPL-SCNC: 117 MMOL/L — HIGH (ref 96–108)
CO2 SERPL-SCNC: 19 MMOL/L — LOW (ref 22–31)
CREAT SERPL-MCNC: 0.62 MG/DL — SIGNIFICANT CHANGE UP (ref 0.5–1.3)
EGFR: 100 ML/MIN/1.73M2 — SIGNIFICANT CHANGE UP
EGFR: 100 ML/MIN/1.73M2 — SIGNIFICANT CHANGE UP
EOSINOPHIL # BLD AUTO: SIGNIFICANT CHANGE UP (ref 0–0.5)
EOSINOPHIL NFR BLD AUTO: SIGNIFICANT CHANGE UP (ref 0–6)
GLUCOSE SERPL-MCNC: 100 MG/DL — HIGH (ref 70–99)
HCT VFR BLD CALC: 24 % — LOW (ref 34.5–45)
HGB BLD-MCNC: 7.6 G/DL — LOW (ref 11.5–15.5)
IMM GRANULOCYTES # BLD AUTO: 0 K/UL — SIGNIFICANT CHANGE UP (ref 0–0.07)
IMM GRANULOCYTES NFR BLD AUTO: 0 % — SIGNIFICANT CHANGE UP (ref 0–0.9)
IMMATURE PLATELET FRACTION #: 0 K/UL — LOW (ref 4.7–11.1)
IMMATURE PLATELET FRACTION %: 0.3 % — LOW (ref 1.6–4.9)
INR BLD: 1.23 RATIO — HIGH (ref 0.85–1.16)
LDH SERPL L TO P-CCNC: 274 U/L — HIGH (ref 50–242)
LYMPHOCYTES # BLD AUTO: SIGNIFICANT CHANGE UP (ref 1–3.3)
LYMPHOCYTES NFR BLD AUTO: SIGNIFICANT CHANGE UP (ref 13–44)
MAGNESIUM SERPL-MCNC: 1.7 MG/DL — SIGNIFICANT CHANGE UP (ref 1.6–2.6)
MCHC RBC-ENTMCNC: 28.5 PG — SIGNIFICANT CHANGE UP (ref 27–34)
MCHC RBC-ENTMCNC: 31.7 G/DL — LOW (ref 32–36)
MCV RBC AUTO: 89.9 FL — SIGNIFICANT CHANGE UP (ref 80–100)
MONOCYTES # BLD AUTO: SIGNIFICANT CHANGE UP (ref 0–0.9)
MONOCYTES NFR BLD AUTO: SIGNIFICANT CHANGE UP (ref 2–14)
NEUTROPHILS # BLD AUTO: SIGNIFICANT CHANGE UP (ref 1.8–7.4)
NEUTROPHILS NFR BLD AUTO: SIGNIFICANT CHANGE UP (ref 43–77)
NRBC # BLD AUTO: 0 K/UL — SIGNIFICANT CHANGE UP (ref 0–0)
NRBC # FLD: 0 K/UL — SIGNIFICANT CHANGE UP (ref 0–0)
NRBC BLD AUTO-RTO: 0 /100 WBCS — SIGNIFICANT CHANGE UP (ref 0–0)
PHOSPHATE SERPL-MCNC: 2.2 MG/DL — LOW (ref 2.5–4.5)
PLATELET # BLD AUTO: 13 K/UL — CRITICAL LOW (ref 150–400)
PMV BLD: SIGNIFICANT CHANGE UP FL (ref 7–13)
POTASSIUM SERPL-MCNC: 3.4 MMOL/L — LOW (ref 3.5–5.3)
POTASSIUM SERPL-SCNC: 3.4 MMOL/L — LOW (ref 3.5–5.3)
PROT SERPL-MCNC: 4.8 G/DL — LOW (ref 6–8.3)
PROTHROM AB SERPL-ACNC: 14 SEC — HIGH (ref 9.9–13.4)
RBC # BLD: 2.67 M/UL — LOW (ref 3.8–5.2)
RBC # FLD: 18 % — HIGH (ref 10.3–14.5)
SODIUM SERPL-SCNC: 147 MMOL/L — HIGH (ref 135–145)
WBC # BLD: 0.01 K/UL — CRITICAL LOW (ref 3.8–10.5)
WBC # FLD AUTO: 0.01 K/UL — CRITICAL LOW (ref 3.8–10.5)

## 2025-07-03 PROCEDURE — 99233 SBSQ HOSP IP/OBS HIGH 50: CPT

## 2025-07-03 RX ORDER — OLANZAPINE 10 MG/1
7.5 TABLET ORAL AT BEDTIME
Refills: 0 | Status: DISCONTINUED | OUTPATIENT
Start: 2025-07-03 | End: 2025-07-03

## 2025-07-03 RX ORDER — HYDROMORPHONE/SOD CHLOR,ISO/PF 2 MG/10 ML
0.5 SYRINGE (ML) INJECTION ONCE
Refills: 0 | Status: DISCONTINUED | OUTPATIENT
Start: 2025-07-03 | End: 2025-07-03

## 2025-07-03 RX ORDER — LISINOPRIL 5 MG/1
10 TABLET ORAL ONCE
Refills: 0 | Status: COMPLETED | OUTPATIENT
Start: 2025-07-03 | End: 2025-07-03

## 2025-07-03 RX ORDER — OLANZAPINE 10 MG/1
5 TABLET ORAL AT BEDTIME
Refills: 0 | Status: COMPLETED | OUTPATIENT
Start: 2025-07-03 | End: 2025-07-05

## 2025-07-03 RX ORDER — ACETAMINOPHEN 500 MG/5ML
1000 LIQUID (ML) ORAL ONCE
Refills: 0 | Status: COMPLETED | OUTPATIENT
Start: 2025-07-03 | End: 2025-07-03

## 2025-07-03 RX ORDER — LISINOPRIL 5 MG/1
10 TABLET ORAL
Refills: 0 | Status: DISCONTINUED | OUTPATIENT
Start: 2025-07-03 | End: 2025-07-05

## 2025-07-03 RX ORDER — SCOPOLAMINE 1 MG/3D
1 PATCH, EXTENDED RELEASE TRANSDERMAL
Refills: 0 | Status: DISCONTINUED | OUTPATIENT
Start: 2025-07-03 | End: 2025-07-08

## 2025-07-03 RX ORDER — FUROSEMIDE 10 MG/ML
20 INJECTION INTRAMUSCULAR; INTRAVENOUS ONCE
Refills: 0 | Status: COMPLETED | OUTPATIENT
Start: 2025-07-03 | End: 2025-07-03

## 2025-07-03 RX ORDER — LISINOPRIL 5 MG/1
20 TABLET ORAL
Refills: 0 | Status: DISCONTINUED | OUTPATIENT
Start: 2025-07-04 | End: 2025-07-15

## 2025-07-03 RX ADMIN — Medication 125 MILLIGRAM(S): at 17:16

## 2025-07-03 RX ADMIN — Medication 0.5 MILLIGRAM(S): at 16:43

## 2025-07-03 RX ADMIN — SCOPOLAMINE 1 PATCH: 1 PATCH, EXTENDED RELEASE TRANSDERMAL at 11:21

## 2025-07-03 RX ADMIN — POSACONAZOLE 300 MILLIGRAM(S): 100 TABLET, DELAYED RELEASE ORAL at 11:23

## 2025-07-03 RX ADMIN — Medication 10 MILLILITER(S): at 00:37

## 2025-07-03 RX ADMIN — Medication 1 PATCH: at 19:57

## 2025-07-03 RX ADMIN — Medication 10 MILLILITER(S): at 15:44

## 2025-07-03 RX ADMIN — Medication 10 MILLILITER(S): at 07:46

## 2025-07-03 RX ADMIN — Medication 5 MILLILITER(S): at 00:37

## 2025-07-03 RX ADMIN — LOPERAMIDE HCL 2 MILLIGRAM(S): 1 SOLUTION ORAL at 17:16

## 2025-07-03 RX ADMIN — OLANZAPINE 5 MILLIGRAM(S): 10 TABLET ORAL at 21:34

## 2025-07-03 RX ADMIN — BUPROPION HYDROBROMIDE 300 MILLIGRAM(S): 522 TABLET, EXTENDED RELEASE ORAL at 11:23

## 2025-07-03 RX ADMIN — Medication 1 DROP(S): at 17:16

## 2025-07-03 RX ADMIN — SCOPOLAMINE 1 PATCH: 1 PATCH, EXTENDED RELEASE TRANSDERMAL at 20:03

## 2025-07-03 RX ADMIN — LISINOPRIL 10 MILLIGRAM(S): 5 TABLET ORAL at 17:18

## 2025-07-03 RX ADMIN — Medication 1 DROP(S): at 00:38

## 2025-07-03 RX ADMIN — Medication 800 MILLIGRAM(S): at 05:27

## 2025-07-03 RX ADMIN — Medication 125 MILLIGRAM(S): at 05:27

## 2025-07-03 RX ADMIN — Medication 10 MILLIGRAM(S): at 00:37

## 2025-07-03 RX ADMIN — Medication 15 MILLILITER(S): at 17:19

## 2025-07-03 RX ADMIN — LIDOCAINE HYDROCHLORIDE 15 MILLILITER(S): 20 JELLY TOPICAL at 05:25

## 2025-07-03 RX ADMIN — Medication 10 MILLIGRAM(S): at 11:22

## 2025-07-03 RX ADMIN — AMLODIPINE BESYLATE 5 MILLIGRAM(S): 10 TABLET ORAL at 05:28

## 2025-07-03 RX ADMIN — Medication 1 DROP(S): at 06:46

## 2025-07-03 RX ADMIN — Medication 5 MILLILITER(S): at 15:44

## 2025-07-03 RX ADMIN — Medication 800 MILLIGRAM(S): at 17:17

## 2025-07-03 RX ADMIN — Medication 5 MILLILITER(S): at 11:23

## 2025-07-03 RX ADMIN — METOPROLOL SUCCINATE 12.5 MILLIGRAM(S): 50 TABLET, EXTENDED RELEASE ORAL at 05:28

## 2025-07-03 RX ADMIN — LISINOPRIL 10 MILLIGRAM(S): 5 TABLET ORAL at 05:28

## 2025-07-03 RX ADMIN — Medication 50 MILLIEQUIVALENT(S): at 09:50

## 2025-07-03 RX ADMIN — Medication 10 MILLILITER(S): at 11:23

## 2025-07-03 RX ADMIN — Medication 5 MILLIGRAM(S): at 07:45

## 2025-07-03 RX ADMIN — Medication 10 MILLILITER(S): at 23:26

## 2025-07-03 RX ADMIN — LISINOPRIL 10 MILLIGRAM(S): 5 TABLET ORAL at 09:43

## 2025-07-03 RX ADMIN — LIDOCAINE HYDROCHLORIDE 15 MILLILITER(S): 20 JELLY TOPICAL at 00:39

## 2025-07-03 RX ADMIN — URSODIOL 300 MILLIGRAM(S): 300 CAPSULE ORAL at 18:07

## 2025-07-03 RX ADMIN — Medication 5 MILLILITER(S): at 07:46

## 2025-07-03 RX ADMIN — Medication 1 APPLICATION(S): at 07:46

## 2025-07-03 RX ADMIN — Medication 50 MILLIEQUIVALENT(S): at 07:46

## 2025-07-03 RX ADMIN — Medication 1 PATCH: at 07:45

## 2025-07-03 RX ADMIN — Medication 10 MILLIGRAM(S): at 05:26

## 2025-07-03 RX ADMIN — LOPERAMIDE HCL 2 MILLIGRAM(S): 1 SOLUTION ORAL at 05:40

## 2025-07-03 RX ADMIN — Medication 10 MILLILITER(S): at 19:54

## 2025-07-03 RX ADMIN — Medication 40 MILLIGRAM(S): at 05:27

## 2025-07-03 RX ADMIN — Medication 88 MICROGRAM(S): at 05:28

## 2025-07-03 RX ADMIN — FILGRASTIM 300 MICROGRAM(S): 300 INJECTION, SOLUTION INTRAVENOUS; SUBCUTANEOUS at 11:35

## 2025-07-03 RX ADMIN — Medication 5 MILLILITER(S): at 23:27

## 2025-07-03 RX ADMIN — Medication 400 MILLIGRAM(S): at 21:18

## 2025-07-03 RX ADMIN — Medication 8 MILLIGRAM(S): at 01:00

## 2025-07-03 RX ADMIN — Medication 10 MILLIGRAM(S): at 23:25

## 2025-07-03 RX ADMIN — Medication 1000 MILLIGRAM(S): at 22:18

## 2025-07-03 RX ADMIN — FUROSEMIDE 20 MILLIGRAM(S): 10 INJECTION INTRAMUSCULAR; INTRAVENOUS at 11:21

## 2025-07-03 RX ADMIN — Medication 50 MILLIEQUIVALENT(S): at 12:51

## 2025-07-03 RX ADMIN — Medication 5 MILLILITER(S): at 19:54

## 2025-07-03 RX ADMIN — Medication 1 DROP(S): at 11:22

## 2025-07-03 RX ADMIN — Medication 0.5 MILLIGRAM(S): at 15:43

## 2025-07-03 RX ADMIN — URSODIOL 300 MILLIGRAM(S): 300 CAPSULE ORAL at 06:19

## 2025-07-03 RX ADMIN — Medication 1 DROP(S): at 23:25

## 2025-07-03 RX ADMIN — Medication 10 MILLIGRAM(S): at 17:16

## 2025-07-03 RX ADMIN — Medication 15 MILLILITER(S): at 05:26

## 2025-07-03 RX ADMIN — Medication 8 MILLIGRAM(S): at 09:50

## 2025-07-03 NOTE — PROGRESS NOTE ADULT - NS ATTEND AMEND GEN_ALL_CORE FT
Day + 6 s/p allo-HSCT     Overall, patient is fatigued, complains of mouth pain. She is sitting in bed this morning.   reports leg heaviness  Nausea improved with zyprexa; continues to have limited PO intake   Course complicated by CRS; intermittent fevers; afebrile x 48hours hours  Hypertensive - on three antihypertensive agents; likely component of opioid withdrawal (fentanyl patch 50mg at admission, now at 12.5mg)   Physical exam is remarkable for mucositis that is improved; +1 b/l LE edema;   Labs reviewed; appropriate  PRN diuresis   Discussed close monitoring of In/Outs  PT consulted; encouraged out of bed to chair as much as possible during the day  continue supportive care and antimicrobial ppx.

## 2025-07-03 NOTE — PROGRESS NOTE ADULT - SUBJECTIVE AND OBJECTIVE BOX
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 prep regimen for treatment of myelofibrosis    Disease: myelofibrosis   Prep regimen: Flu / Bu / Thiotepa / rATG   GVHD ppx: ABC: PTCy days +3, +4, Bortezomib days 0, +3, PTCy days +5, +14, +28   ABO/CMV:   Recipient:   A+/CMV+   Donor:   B+/CMV+    S: Patient seen and examined with HPC Transplant Team:       O:   Vital Signs Last 24 Hrs  T(C): 37.3 (2025 05:22), Max: 37.3 (2025 09:03)  T(F): 99.1 (2025 05:22), Max: 99.1 (2025 09:03)  HR: 84 (2025 05:22) (64 - 96)  BP: 183/72 (2025 06:45) (144/67 - 199/67)  BP(mean): --  RR: 18 (2025 05:22) (18 - 18)  SpO2: 95% (2025 05:22) (95% - 96%)    Parameters below as of 2025 05:22  Patient On (Oxygen Delivery Method): room air    Admit weight:   Daily Weight in k.7 (2025 08:58)    Intake / Output:   -02 @ 07:01  -  07-03 @ 07:00  --------------------------------------------------------  IN: 4420 mL / OUT: 3725 mL / NET: 695 mL      PE:   Oropharynx: no erythema or ulcerations; +Thrush on tongue/posterior pharynx (improving)   Oral Mucositis: +                                                  ndGndrndanddndend:nd nd2nd CVS: S1, S2 RRR   Lungs: CTA throughout bilaterally   Abdomen: + BS x 4, soft, NT, ND   Extremities: no edema   Gastric Mucositis:                 -                              Grade: n/a   Intestinal Mucositis:              -                              Grade: n/a   Skin: no rash   TLC: CDI   Neuro: A&O x 3      Labs:     CBC - p        147[H]  |  117[H]  |  11  ----------------------------<  100[H]  3.4[L]   |  19[L]  |  0.62    Ca    8.1[L]      2025 06:31  Phos  2.2       Mg     1.7         TPro  4.8[L]  /  Alb  2.7[L]  /  TBili  0.6  /  DBili  x   /  AST  9[L]  /  ALT  6[L]  /  AlkPhos  76      PT/INR - ( 2025 06:31 )   PT: 14.0 sec;   INR: 1.23 ratio         PTT - ( 2025 06:31 )  PTT:25.9 sec  LIVER FUNCTIONS - ( 2025 06:31 )  Alb: 2.7 g/dL / Pro: 4.8 g/dL / ALK PHOS: 76 U/L / ALT: 6 U/L / AST: 9 U/L / GGT: x           Lactate Dehydrogenase, Serum: 274 U/L ( @ 06:31)      Cultures:   Culture Results: <10,000 CFU/mL Normal Urogenital Cha (25 @ 06:31)    Culture Results:   No growth at 5 days(25 @ 16:36)    Culture Results:   No growth at  5 days (25 @ 16:30)    Respiratory Viral Panel with COVID-19 by SCARLET (25 @ 21:02)   Rapid RVP Result: [id number]-[id number]: NotDetec:    Culture - Blood (25 @ 18:45)   Specimen Source: Blood Blood-Catheter    Culture Results: No growth at 24 hoursCulture - Blood (25 @ 18:40)   Specimen Source: Blood Blood-Peripheral  Culture Results: No growth     Culture - Urine (25 @ 21:01)   Specimen Source: Clean Catch Clean Catch (Midstream)  Culture Results: 10,000 - 49,000 CFU/mL Gram Positive Cocci in Pairs and Chains   <10,000 CFU/ml Normal Urogenital cha present    Radiology:   ACC: [id number] EXAM:  XR CHEST PORTABLE URGENT 1V   ORDERED BY: SARAH REEVES   PROCEDURE DATE:  2025    IMPRESSION:  No focal consolidation.        Meds:   Antimicrobials:   acyclovir    Suspension 800 milliGRAM(s) Oral every 12 hours  levoFLOXacin  Tablet 500 milliGRAM(s) Oral every 24 hours  posaconazole DR Tablet 300 milliGRAM(s) Oral daily  vancomycin    Solution 125 milliGRAM(s) Oral every 12 hours      Heme / Onc:       GI:  loperamide 2 milliGRAM(s) Oral every 6 hours PRN  pantoprazole    Tablet 40 milliGRAM(s) Oral before breakfast  polyethylene glycol 3350 17 Gram(s) Oral daily PRN  sodium bicarbonate Mouth Rinse 10 milliLiter(s) Swish and Spit five times a day  ursodiol Suspension 300 milliGRAM(s) Oral two times a day      Cardiovascular:   amLODIPine   Tablet 5 milliGRAM(s) Oral daily  lisinopril 10 milliGRAM(s) Oral every 12 hours  metoprolol succinate ER 12.5 milliGRAM(s) Oral daily      Immunologic:   filgrastim-sndz (ZARXIO) Injectable 300 MICROGram(s) SubCutaneous every 24 hours      Other medications:   artificial tears (preservative free) Ophthalmic Solution 1 Drop(s) Both EYES four times a day  Biotene Dry Mouth Oral Rinse 5 milliLiter(s) Swish and Spit five times a day  buPROPion XL (24-Hour) . 300 milliGRAM(s) Oral daily  chlorhexidine 0.12% Liquid 15 milliLiter(s) Swish and Spit two times a day  chlorhexidine 4% Liquid 1 Application(s) Topical <User Schedule>  fentaNYL   Patch  12 MICROgram(s)/Hr. 1 Patch Transdermal every 48 hours  levothyroxine 88 MICROGram(s) Oral daily  phytonadione   Solution 5 milliGRAM(s) Oral <User Schedule>  potassium chloride  20 mEq/100 mL IVPB 20 milliEquivalent(s) IV Intermittent every 2 hours  prochlorperazine   Injectable 10 milliGRAM(s) IV Push every 6 hours  sodium chloride 0.9%. 1000 milliLiter(s) IV Continuous <Continuous>      PRN:   acetaminophen     Tablet .. 650 milliGRAM(s) Oral every 6 hours PRN  FIRST- Mouthwash  BLM 15 milliLiter(s) Swish and Swallow five times a day PRN  lidocaine 2% Viscous 15 milliLiter(s) Oral five times a day PRN  loperamide 2 milliGRAM(s) Oral every 6 hours PRN  ondansetron Injectable 8 milliGRAM(s) IV Push every 8 hours PRN  polyethylene glycol 3350 17 Gram(s) Oral daily PRN  sodium chloride 0.9% lock flush 10 milliLiter(s) IV Push every 1 hour PRN      A/P: 64 year old female with hx of myelofibrosis presents for a MUD allogeneic SCT (DP Permissive) conditioning with Flu/Bu/Thiotepa/rATG.  Today is day + 6  - Thiotepa 1/2. Maintain skin precautions during thiotepa administration and for 48 hours after completion (days .6 through day -3). Chronic pain issues - continue home fentanyl patch. Febrile overnight, tmax 100.9. Cultures  pending. RVP negative. CXR pending official read, no obvious consolidations. Vital signs are stable.    D/C HCTZ. Fludarabine 1/3, Busulfan 1/3, rATG 1/3. No Tylenol on busulfan days. Continue keppra ppx for 24 hours post infusion of last dose of busulfan. No acute events overnight, vital signs are stable.   - overnight patient was febrile during ATG, no cultures/abx as fever likely secondary to ATG. Patient was otherwise stable overnight. Fludarabine 2/3, Busulfan 2/3 and rATG 2/3. NO Tylenol or Azoles until Day 0. Continue with supportive care.  - Fludarabine 3/3, busulfan 3/3, rATG 3/3. No acute events overnight. Vital signs are stable.    Switched Compazine to atc   VSS, afebrile. 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   VSS, persistent fevers likely due to CRS post-SCT, continues on zosyn. Cultures pending. Hypervolemia - lasix 40mg x 1, will continue strict I/Os. Started caspofungin due to persistent thrush.   overnight no acute events noted. VSS, febrile likely due to CRS - continue with supportive care. BCx/UCx NGTD x 24h.   VSS, intermittently febrile likely due to CRS, infectious workup negative so far. PTCy 1/2 today. I&O, daily weights, prn diuresis. Continue to monitor thrush (improving). Changed some meds to PO suspension due to mucositis. Continue with supportive care. Refractory nausea, added trial of zyprexa 5mg po QHS x 3 days. d/c primafit. Change voriconazole to posaconazole tomorrow.   - No acute events overnight, vital signs are stable. Continue supportive care for chemotherapy induced oral mucositis.   - No acute events overnight, vital signs are stable. Completed PTCy, hydration to be discontinued today. I&O, daily weights, prn diuresis. Continue supportive care. Fentanyl patch to be titrated down to 12 mcg / hr tomorrow.     1. Infectious Disease:   acyclovir    Suspension 800 milliGRAM(s) Oral every 12 hours  levoFLOXacin  Tablet 500 milliGRAM(s) Oral every 24 hours  posaconazole DR Tablet 300 milliGRAM(s) Oral daily  vancomycin    Solution 125 milliGRAM(s) Oral every 12 hours    2. VOD Prophylaxis:   ursodiol Capsule 300 milliGRAM(s) Oral two times a day    3. GI Prophylaxis:    pantoprazole Tablet 40 milliGRAM(s) Oral before breakfast    4. Mouthcare - NS / NaHCO3 rinses, Biotene; Skin care     5. GVHD prophylaxis   ABC: PTCy days +3, +4, Bortezomib days 0, +3, PTCy days +5, +14, +28     6. Transfuse & replete electrolytes prn     7. IV hydration, daily weights, strict I&O, prn diuresis     8. PO intake as tolerated, nutrition follow up as needed    9. Antiemetics, anti-diarrhea medications:   prochlorperazine   Injectable 10 milliGRAM(s) IV Push every 6 hours   ondansetron Injectable 8 milliGRAM(s) IV Push every 8 hours PRN  OLANZapine 5 milliGRAM(s) Oral at bedtime    10. OOB as tolerated, physical therapy consult if needed     11. Monitor coags 2x week, vitamin K BIW  phytonadione Solution 5 milliGRAM(s) Oral <User Schedule>    12. Monitor closely for clinical changes, monitor for fevers     13. Emotional support provided, plan of care discussed and questions addressed     14. Patient education done regarding plan of care, restrictions and discharge planning     15. Continue regular social work input     I have written the above note for Dr. Clarke who performed service with me in the room.   Jalen Kay PA-C ([phone])    I have seen and examined patient with PA, I agree with above note as scribed.                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 prep regimen for treatment of myelofibrosis    Disease: myelofibrosis   Prep regimen: Flu / Bu / Thiotepa / rATG   GVHD ppx: ABC: PTCy days +3, +4, Bortezomib days 0, +3, PTCy days +5, +14, +28   ABO/CMV:   Recipient:   A+/CMV+   Donor:   B+/CMV+    S: Patient seen and examined with HPC Transplant Team:   +nausea  +loose stools  +fatigue    O:   Vital Signs Last 24 Hrs  T(C): 37.3 (2025 05:22), Max: 37.3 (2025 09:03)  T(F): 99.1 (2025 05:22), Max: 99.1 (2025 09:03)  HR: 84 (2025 05:22) (64 - 96)  BP: 183/72 (2025 06:45) (144/67 - 199/67)  BP(mean): --  RR: 18 (2025 05:22) (18 - 18)  SpO2: 95% (2025 05:22) (95% - 96%)    Parameters below as of 2025 05:22  Patient On (Oxygen Delivery Method): room air    Admit weight: 64.5kg  Daily Weight in k.6 (2025 08:58)    Intake / Output:    @ 07:01  -  -03 @ 07:00  --------------------------------------------------------  IN: 4420 mL / OUT: 3725 mL / NET: 695 mL      PE:   Oropharynx: no erythema or ulcerations; +Thrush on tongue/posterior pharynx (improving)   Oral Mucositis: +                                                  ndGndrndanddndend:nd nd2nd CVS: S1, S2 RRR   Lungs: CTA throughout bilaterally   Abdomen: + BS x 4, soft, NT, ND   Extremities: no edema   Gastric Mucositis:                 -                              Grade: n/a   Intestinal Mucositis:              -                              Grade: n/a   Skin: no rash   TLC: CDI   Neuro: A&O x 3      Labs:                         7.6    0.01  )-----------( 13       ( 2025 06:31 )             24.0     2025 06:31    147    |  117    |  11     ----------------------------<  100    3.4     |  19     |  0.62     Ca    8.1        2025 06:31  Phos  2.2       2025 06:31  Mg     1.7       2025 06:31    TPro  4.8    /  Alb  2.7    /  TBili  0.6    /  DBili  x      /  AST  9      /  ALT  6      /  AlkPhos  76     2025 06:31    PT/INR - ( 2025 06:31 )   PT: 14.0 sec;   INR: 1.23 ratio    PTT - ( 2025 06:31 )  PTT:25.9 sec      LIVER FUNCTIONS - ( 2025 06:31 )  Alb: 2.7 g/dL / Pro: 4.8 g/dL / ALK PHOS: 76 U/L / ALT: 6 U/L / AST: 9 U/L / GGT: x           Urinalysis Basic - ( 2025 06:31 )    Color: x / Appearance: x / SG: x / pH: x  Gluc: 100 mg/dL / Ketone: x  / Bili: x / Urobili: x   Blood: x / Protein: x / Nitrite: x   Leuk Esterase: x / RBC: x / WBC x   Sq Epi: x / Non Sq Epi: x / Bacteria: x      Cultures:   Culture Results: <10,000 CFU/mL Normal Urogenital Cha (25 @ 06:31)    Culture Results:   No growth at 5 days(25 @ 16:36)    Culture Results:   No growth at  5 days (25 @ 16:30)    Respiratory Viral Panel with COVID-19 by SCARLET (25 @ 21:02)   Rapid RVP Result: [id number]: NotDetec:    Culture - Blood (25 @ 18:45)   Specimen Source: Blood Blood-Catheter    Culture Results: No growth at 24 hours Culture - Blood (25 @ 18:40)   Specimen Source: Blood Blood-Peripheral  Culture Results: No growth     Culture - Urine (25 @ 21:01)   Specimen Source: Clean Catch Clean Catch (Midstream)  Culture Results: 10,000 - 49,000 CFU/mL Gram Positive Cocci in Pairs and Chains   <10,000 CFU/ml Normal Urogenital cha present    Radiology:   ACC: [id number] EXAM:  XR CHEST PORTABLE URGENT 1V   ORDERED BY: SARAH REEVES   PROCEDURE DATE:  2025    IMPRESSION:  No focal consolidation.        Meds:   Antimicrobials:   acyclovir    Suspension 800 milliGRAM(s) Oral every 12 hours  levoFLOXacin  Tablet 500 milliGRAM(s) Oral every 24 hours  posaconazole DR Tablet 300 milliGRAM(s) Oral daily  vancomycin    Solution 125 milliGRAM(s) Oral every 12 hours      Heme / Onc:       GI:  loperamide 2 milliGRAM(s) Oral every 6 hours PRN  pantoprazole    Tablet 40 milliGRAM(s) Oral before breakfast  polyethylene glycol 3350 17 Gram(s) Oral daily PRN  sodium bicarbonate Mouth Rinse 10 milliLiter(s) Swish and Spit five times a day  ursodiol Suspension 300 milliGRAM(s) Oral two times a day      Cardiovascular:   amLODIPine   Tablet 5 milliGRAM(s) Oral daily  lisinopril 10 milliGRAM(s) Oral every 12 hours  metoprolol succinate ER 12.5 milliGRAM(s) Oral daily      Immunologic:   filgrastim-sndz (ZARXIO) Injectable 300 MICROGram(s) SubCutaneous every 24 hours      Other medications:   artificial tears (preservative free) Ophthalmic Solution 1 Drop(s) Both EYES four times a day  Biotene Dry Mouth Oral Rinse 5 milliLiter(s) Swish and Spit five times a day  buPROPion XL (24-Hour) . 300 milliGRAM(s) Oral daily  chlorhexidine 0.12% Liquid 15 milliLiter(s) Swish and Spit two times a day  chlorhexidine 4% Liquid 1 Application(s) Topical <User Schedule>  fentaNYL   Patch  12 MICROgram(s)/Hr. 1 Patch Transdermal every 48 hours  levothyroxine 88 MICROGram(s) Oral daily  phytonadione   Solution 5 milliGRAM(s) Oral <User Schedule>  potassium chloride  20 mEq/100 mL IVPB 20 milliEquivalent(s) IV Intermittent every 2 hours  prochlorperazine   Injectable 10 milliGRAM(s) IV Push every 6 hours  sodium chloride 0.9%. 1000 milliLiter(s) IV Continuous <Continuous>      PRN:   acetaminophen     Tablet .. 650 milliGRAM(s) Oral every 6 hours PRN  FIRST- Mouthwash  BLM 15 milliLiter(s) Swish and Swallow five times a day PRN  lidocaine 2% Viscous 15 milliLiter(s) Oral five times a day PRN  loperamide 2 milliGRAM(s) Oral every 6 hours PRN  ondansetron Injectable 8 milliGRAM(s) IV Push every 8 hours PRN  polyethylene glycol 3350 17 Gram(s) Oral daily PRN  sodium chloride 0.9% lock flush 10 milliLiter(s) IV Push every 1 hour PRN      A/P: 64 year old female with hx of myelofibrosis presents for a MUD allogeneic SCT (DP Permissive) conditioning with Flu/Bu/Thiotepa/rATG.  Today is day +6  - Thiotepa 1/2. Maintain skin precautions during thiotepa administration and for 48 hours after completion (days .6 through day -3). Chronic pain issues - continue home fentanyl patch. Febrile overnight, tmax 100.9. Cultures  pending. RVP negative. CXR pending official read, no obvious consolidations. Vital signs are stable.    D/C HCTZ. Fludarabine 1/3, Busulfan 1/3, rATG 1/3. No Tylenol on busulfan days. Continue keppra ppx for 24 hours post infusion of last dose of busulfan. No acute events overnight, vital signs are stable.   - overnight patient was febrile during ATG, no cultures/abx as fever likely secondary to ATG. Patient was otherwise stable overnight. Fludarabine 2/3, Busulfan 2/3 and rATG 2/3. NO Tylenol or Azoles until Day 0. Continue with supportive care.  - Fludarabine 3/3, busulfan 3/3, rATG 3/3. No acute events overnight. Vital signs are stable.    Switched Compazine to atc   VSS, afebrile. 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   VSS, persistent fevers likely due to CRS post-SCT, continues on zosyn. Cultures pending. Hypervolemia - lasix 40mg x 1, will continue strict I/Os. Started caspofungin due to persistent thrush.   overnight no acute events noted. VSS, febrile likely due to CRS - continue with supportive care. BCx/UCx NGTD x 24h.   VSS, intermittently febrile likely due to CRS, infectious workup negative so far. PTCy  today. I&O, daily weights, prn diuresis. Continue to monitor thrush (improving). Changed some meds to PO suspension due to mucositis. Continue with supportive care. Refractory nausea, added trial of zyprexa 5mg po QHS x 3 days. d/c primafit. Change voriconazole to posaconazole tomorrow.   - No acute events overnight, vital signs are stable. Continue supportive care for chemotherapy induced oral mucositis.   - No acute events overnight, vital signs are stable. Completed PTCy, hydration to be discontinued today. I&O, daily weights, prn diuresis. Continue supportive care. Fentanyl patch to be titrated down to 12 mcg / hr tomorrow  7/3 Starting Scopolamine patch to decrease oral secretions. Increased Lisinopril 20mg in am, 10mg PM.      1. Infectious Disease:   acyclovir    Suspension 800 milliGRAM(s) Oral every 12 hours  levoFLOXacin  Tablet 500 milliGRAM(s) Oral every 24 hours  posaconazole DR Tablet 300 milliGRAM(s) Oral daily  vancomycin    Solution 125 milliGRAM(s) Oral every 12 hours    2. VOD Prophylaxis:   ursodiol Capsule 300 milliGRAM(s) Oral two times a day    3. GI Prophylaxis:    pantoprazole Tablet 40 milliGRAM(s) Oral before breakfast    4. Mouthcare - NS / NaHCO3 rinses, Biotene; Skin care     5. GVHD prophylaxis   ABC: PTCy days +3, +4, Bortezomib days 0, +3, PTCy days +5, +14, +28     6. Transfuse & replete electrolytes prn   Replete K+ (IV),     7. IV hydration, daily weights, strict I&O, prn diuresis   Lasix 40mg IV x 1     8. PO intake as tolerated, nutrition follow up as needed    9. Antiemetics, anti-diarrhea medications:   prochlorperazine   Injectable 10 milliGRAM(s) IV Push every 6 hours   ondansetron Injectable 8 milliGRAM(s) IV Push every 8 hours PRN  OLANZapine 5 milliGRAM(s) Oral at bedtime    10. OOB as tolerated, physical therapy consult if needed     11. Monitor coags 2x week, vitamin K BIW  phytonadione Solution 5 milliGRAM(s) Oral <User Schedule>    12. Monitor closely for clinical changes, monitor for fevers     13. Emotional support provided, plan of care discussed and questions addressed     14. Patient education done regarding plan of care, restrictions and discharge planning     15. Continue regular social work input     I have written the above note for Dr. Clarke who performed service with me in the room.   Jalen Kay PA-C ([phone])    I have seen and examined patient with PA, I agree with above note as scribed.                HPC Transplant Team                                                      C                                                                                                                                                        Chief Complaint: Allogeneic pbsct (MUD ) with Flu / Bu / Thiotepa / rATG prep regimen for treatment of myelofibrosis    Disease: myelofibrosis   Prep regimen: Flu / Bu / Thiotepa / rATG   GVHD ppx: ABC: PTCy days +3, +4, Bortezomib days 0, +3, PTCy days +5, +14, +28   ABO/CMV:   Recipient:   A+/CMV+   Donor:   B+/CMV+    S: Patient seen and examined with HPC Transplant Team:   +nausea  +loose stools  +fatigue    O:   Vital Signs Last 24 Hrs  T(C): 37.3 (2025 05:22), Max: 37.3 (2025 09:03)  T(F): 99.1 (2025 05:22), Max: 99.1 (2025 09:03)  HR: 84 (2025 05:22) (64 - 96)  BP: 183/72 (2025 06:45) (144/67 - 199/67)  BP(mean): --  RR: 18 (2025 05:22) (18 - 18)  SpO2: 95% (2025 05:22) (95% - 96%)    Parameters below as of 2025 05:22  Patient On (Oxygen Delivery Method): room air    Admit weight: 64.5kg  Daily Weight in k.6 (2025 08:58)    Intake / Output:   -02 @ 07:01  -  07-03 @ 07:00  --------------------------------------------------------  IN: 4420 mL / OUT: 3725 mL / NET: 695 mL      PE:   Oropharynx: no erythema or ulcerations; +Thrush on tongue/posterior pharynx (improving)   Oral Mucositis: +                                                  ndGndrndanddndend:nd nd2nd CVS: S1, S2 RRR   Lungs: CTA throughout bilaterally   Abdomen: + BS x 4, soft, NT, ND   Extremities: no edema   Gastric Mucositis:                 -                              Grade: n/a   Intestinal Mucositis:              -                              Grade: n/a   Skin: no rash   TLC: CDI   Neuro: A&O x 3      Labs:                         7.6    0.01  )-----------( 13       ( 2025 06:31 )             24.0     2025 06:31    147    |  117    |  11     ----------------------------<  100    3.4     |  19     |  0.62     Ca    8.1        2025 06:31  Phos  2.2       2025 06:31  Mg     1.7       2025 06:31    TPro  4.8    /  Alb  2.7    /  TBili  0.6    /  DBili  x      /  AST  9      /  ALT  6      /  AlkPhos  76     2025 06:31    PT/INR - ( 2025 06:31 )   PT: 14.0 sec;   INR: 1.23 ratio    PTT - ( 2025 06:31 )  PTT:25.9 sec      LIVER FUNCTIONS - ( 2025 06:31 )  Alb: 2.7 g/dL / Pro: 4.8 g/dL / ALK PHOS: 76 U/L / ALT: 6 U/L / AST: 9 U/L / GGT: x           Urinalysis Basic - ( 2025 06:31 )    Color: x / Appearance: x / SG: x / pH: x  Gluc: 100 mg/dL / Ketone: x  / Bili: x / Urobili: x   Blood: x / Protein: x / Nitrite: x   Leuk Esterase: x / RBC: x / WBC x   Sq Epi: x / Non Sq Epi: x / Bacteria: x      Cultures:   Culture Results: <10,000 CFU/mL Normal Urogenital Cha (25 @ 06:31)    Culture Results:   No growth at 5 days(25 @ 16:36)    Culture Results:   No growth at  5 days (25 @ 16:30)    Respiratory Viral Panel with COVID-19 by SCARLET (25 @ 21:02)   Rapid RVP Result: [id number]-1: NotDetec:    Culture - Blood (25 @ 18:45)   Specimen Source: Blood Blood-Catheter    Culture Results: No growth at 24 hours Culture - Blood (25 @ 18:40)   Specimen Source: Blood Blood-Peripheral  Culture Results: No growth     Culture - Urine (25 @ 21:01)   Specimen Source: Clean Catch Clean Catch (Midstream)  Culture Results: 10,000 - 49,000 CFU/mL Gram Positive Cocci in Pairs and Chains   <10,000 CFU/ml Normal Urogenital cha present    Radiology:   ACC: [id number] EXAM:  XR CHEST PORTABLE URGENT 1V   ORDERED BY: SARAH REEVES   PROCEDURE DATE:  2025    IMPRESSION:  No focal consolidation.        Meds:   Antimicrobials:   acyclovir    Suspension 800 milliGRAM(s) Oral every 12 hours  levoFLOXacin  Tablet 500 milliGRAM(s) Oral every 24 hours  posaconazole DR Tablet 300 milliGRAM(s) Oral daily  vancomycin    Solution 125 milliGRAM(s) Oral every 12 hours      Heme / Onc:       GI:  loperamide 2 milliGRAM(s) Oral every 6 hours PRN  pantoprazole    Tablet 40 milliGRAM(s) Oral before breakfast  polyethylene glycol 3350 17 Gram(s) Oral daily PRN  sodium bicarbonate Mouth Rinse 10 milliLiter(s) Swish and Spit five times a day  ursodiol Suspension 300 milliGRAM(s) Oral two times a day      Cardiovascular:   amLODIPine   Tablet 5 milliGRAM(s) Oral daily  lisinopril 10 milliGRAM(s) Oral every 12 hours  metoprolol succinate ER 12.5 milliGRAM(s) Oral daily      Immunologic:   filgrastim-sndz (ZARXIO) Injectable 300 MICROGram(s) SubCutaneous every 24 hours      Other medications:   artificial tears (preservative free) Ophthalmic Solution 1 Drop(s) Both EYES four times a day  Biotene Dry Mouth Oral Rinse 5 milliLiter(s) Swish and Spit five times a day  buPROPion XL (24-Hour) . 300 milliGRAM(s) Oral daily  chlorhexidine 0.12% Liquid 15 milliLiter(s) Swish and Spit two times a day  chlorhexidine 4% Liquid 1 Application(s) Topical <User Schedule>  fentaNYL   Patch  12 MICROgram(s)/Hr. 1 Patch Transdermal every 48 hours  levothyroxine 88 MICROGram(s) Oral daily  phytonadione   Solution 5 milliGRAM(s) Oral <User Schedule>  potassium chloride  20 mEq/100 mL IVPB 20 milliEquivalent(s) IV Intermittent every 2 hours  prochlorperazine   Injectable 10 milliGRAM(s) IV Push every 6 hours  sodium chloride 0.9%. 1000 milliLiter(s) IV Continuous <Continuous>      PRN:   acetaminophen     Tablet .. 650 milliGRAM(s) Oral every 6 hours PRN  FIRST- Mouthwash  BLM 15 milliLiter(s) Swish and Swallow five times a day PRN  lidocaine 2% Viscous 15 milliLiter(s) Oral five times a day PRN  loperamide 2 milliGRAM(s) Oral every 6 hours PRN  ondansetron Injectable 8 milliGRAM(s) IV Push every 8 hours PRN  polyethylene glycol 3350 17 Gram(s) Oral daily PRN  sodium chloride 0.9% lock flush 10 milliLiter(s) IV Push every 1 hour PRN      A/P: 64 year old female with hx of myelofibrosis presents for a MUD allogeneic SCT (DP Permissive) conditioning with Flu/Bu/Thiotepa/rATG.  Today is day +6  - Thiotepa 1/2. Maintain skin precautions during thiotepa administration and for 48 hours after completion (days .6 through day -3). Chronic pain issues - continue home fentanyl patch. Febrile overnight, tmax 100.9. Cultures  pending. RVP negative. CXR pending official read, no obvious consolidations. Vital signs are stable.    D/C HCTZ. Fludarabine 13, Busulfan 1/3, rATG 1/3. No Tylenol on busulfan days. Continue keppra ppx for 24 hours post infusion of last dose of busulfan. No acute events overnight, vital signs are stable.   - overnight patient was febrile during ATG, no cultures/abx as fever likely secondary to ATG. Patient was otherwise stable overnight. Fludarabine 2/3, Busulfan 2/3 and rATG 2/3. NO Tylenol or Azoles until Day 0. Continue with supportive care.  - Fludarabine 3/3, busulfan 3/3, rATG 3/3. No acute events overnight. Vital signs are stable.    Switched Compazine to atc   VSS, afebrile. 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   VSS, persistent fevers likely due to CRS post-SCT, continues on zosyn. Cultures pending. Hypervolemia - lasix 40mg x 1, will continue strict I/Os. Started caspofungin due to persistent thrush.   overnight no acute events noted. VSS, febrile likely due to CRS - continue with supportive care. BCx/UCx NGTD x 24h.   VSS, intermittently febrile likely due to CRS, infectious workup negative so far. PTCy  today. I&O, daily weights, prn diuresis. Continue to monitor thrush (improving). Changed some meds to PO suspension due to mucositis. Continue with supportive care. Refractory nausea, added trial of zyprexa 5mg po QHS x 3 days. d/c primafit. Change voriconazole to posaconazole tomorrow.   - No acute events overnight, vital signs are stable. Continue supportive care for chemotherapy induced oral mucositis.   - No acute events overnight, vital signs are stable. Completed PTCy, hydration to be discontinued today. I&O, daily weights, prn diuresis. Continue supportive care. Fentanyl patch to be titrated down to 12 mcg / hr tomorrow  7/3 Starting Scopolamine patch to decrease oral secretions. Increased Lisinopril 20mg in am, 10mg PM.      1. Infectious Disease:   acyclovir    Suspension 800 milliGRAM(s) Oral every 12 hours  levoFLOXacin  Tablet 500 milliGRAM(s) Oral every 24 hours  posaconazole DR Tablet 300 milliGRAM(s) Oral daily  vancomycin    Solution 125 milliGRAM(s) Oral every 12 hours    2. VOD Prophylaxis:   ursodiol Capsule 300 milliGRAM(s) Oral two times a day    3. GI Prophylaxis:    pantoprazole Tablet 40 milliGRAM(s) Oral before breakfast    4. Mouthcare - NS / NaHCO3 rinses, Biotene; Skin care     5. GVHD prophylaxis   ABC: PTCy days +3, +4, Bortezomib days 0, +3, PTCy days +5, +14, +28     6. Transfuse & replete electrolytes prn   Replete K+ (IV),     7. IV hydration, daily weights, strict I&O, prn diuresis   Lasix 40mg IV x 1     8. PO intake as tolerated, nutrition follow up as needed    9. Antiemetics, anti-diarrhea medications:   prochlorperazine   Injectable 10 milliGRAM(s) IV Push every 6 hours   ondansetron Injectable 8 milliGRAM(s) IV Push every 8 hours PRN  OLANZapine 5 milliGRAM(s) Oral at bedtime    10. OOB as tolerated, physical therapy consult if needed     11. Monitor coags 2x week, vitamin K BIW  phytonadione Solution 5 milliGRAM(s) Oral <User Schedule>    12. Monitor closely for clinical changes, monitor for fevers     13. Emotional support provided, plan of care discussed and questions addressed     14. Patient education done regarding plan of care, restrictions and discharge planning     15. Continue regular social work input     I have written the above note for Dr. Clarke who performed service with me in the room.   Jalen Kay PA-C ([phone])    I have seen and examined patient with PA, I agree with above note as scribed.

## 2025-07-04 LAB
ALBUMIN SERPL ELPH-MCNC: 2.7 G/DL — LOW (ref 3.3–5)
ALP SERPL-CCNC: 86 U/L — SIGNIFICANT CHANGE UP (ref 40–120)
ALT FLD-CCNC: 10 U/L — SIGNIFICANT CHANGE UP (ref 10–45)
ANION GAP SERPL CALC-SCNC: 13 MMOL/L — SIGNIFICANT CHANGE UP (ref 5–17)
AST SERPL-CCNC: 13 U/L — SIGNIFICANT CHANGE UP (ref 10–40)
BASOPHILS # BLD AUTO: SIGNIFICANT CHANGE UP (ref 0–0.2)
BASOPHILS NFR BLD AUTO: SIGNIFICANT CHANGE UP (ref 0–2)
BILIRUB DIRECT SERPL-MCNC: 0.4 MG/DL — HIGH (ref 0–0.3)
BILIRUB INDIRECT FLD-MCNC: 0.5 MG/DL — SIGNIFICANT CHANGE UP (ref 0.2–1)
BILIRUB SERPL-MCNC: 0.9 MG/DL — SIGNIFICANT CHANGE UP (ref 0.2–1.2)
BLD GP AB SCN SERPL QL: NEGATIVE — SIGNIFICANT CHANGE UP
BUN SERPL-MCNC: 18 MG/DL — SIGNIFICANT CHANGE UP (ref 7–23)
CALCIUM SERPL-MCNC: 8.4 MG/DL — SIGNIFICANT CHANGE UP (ref 8.4–10.5)
CHLORIDE SERPL-SCNC: 115 MMOL/L — HIGH (ref 96–108)
CO2 SERPL-SCNC: 19 MMOL/L — LOW (ref 22–31)
CREAT SERPL-MCNC: 0.64 MG/DL — SIGNIFICANT CHANGE UP (ref 0.5–1.3)
EBV DNA SERPL NAA+PROBE-ACNC: SIGNIFICANT CHANGE UP IU/ML
EBVPCR LOG: SIGNIFICANT CHANGE UP LOG10IU/ML
EGFR: 99 ML/MIN/1.73M2 — SIGNIFICANT CHANGE UP
EGFR: 99 ML/MIN/1.73M2 — SIGNIFICANT CHANGE UP
EOSINOPHIL # BLD AUTO: SIGNIFICANT CHANGE UP (ref 0–0.5)
EOSINOPHIL NFR BLD AUTO: SIGNIFICANT CHANGE UP (ref 0–6)
GLUCOSE SERPL-MCNC: 105 MG/DL — HIGH (ref 70–99)
HCT VFR BLD CALC: 25.4 % — LOW (ref 34.5–45)
HGB BLD-MCNC: 8.1 G/DL — LOW (ref 11.5–15.5)
IMM GRANULOCYTES # BLD AUTO: SIGNIFICANT CHANGE UP (ref 0–0.07)
IMM GRANULOCYTES NFR BLD AUTO: SIGNIFICANT CHANGE UP (ref 0–0.9)
IMMATURE PLATELET FRACTION #: 0 K/UL — LOW (ref 4.7–11.1)
IMMATURE PLATELET FRACTION %: 0.3 % — LOW (ref 1.6–4.9)
LDH SERPL L TO P-CCNC: 310 U/L — HIGH (ref 50–242)
LYMPHOCYTES # BLD AUTO: SIGNIFICANT CHANGE UP (ref 1–3.3)
LYMPHOCYTES NFR BLD AUTO: SIGNIFICANT CHANGE UP (ref 13–44)
MAGNESIUM SERPL-MCNC: 1.7 MG/DL — SIGNIFICANT CHANGE UP (ref 1.6–2.6)
MCHC RBC-ENTMCNC: 28.8 PG — SIGNIFICANT CHANGE UP (ref 27–34)
MCHC RBC-ENTMCNC: 31.9 G/DL — LOW (ref 32–36)
MCV RBC AUTO: 90.4 FL — SIGNIFICANT CHANGE UP (ref 80–100)
MONOCYTES # BLD AUTO: SIGNIFICANT CHANGE UP (ref 0–0.9)
MONOCYTES NFR BLD AUTO: SIGNIFICANT CHANGE UP (ref 2–14)
NEUTROPHILS # BLD AUTO: SIGNIFICANT CHANGE UP (ref 1.8–7.4)
NEUTROPHILS NFR BLD AUTO: SIGNIFICANT CHANGE UP (ref 43–77)
NRBC # BLD AUTO: 0 K/UL — SIGNIFICANT CHANGE UP (ref 0–0)
NRBC # FLD: 0 K/UL — SIGNIFICANT CHANGE UP (ref 0–0)
NRBC BLD AUTO-RTO: 0 /100 WBCS — SIGNIFICANT CHANGE UP (ref 0–0)
PHOSPHATE SERPL-MCNC: 2.5 MG/DL — SIGNIFICANT CHANGE UP (ref 2.5–4.5)
PLATELET # BLD AUTO: 4 K/UL — CRITICAL LOW (ref 150–400)
PMV BLD: SIGNIFICANT CHANGE UP FL (ref 7–13)
POTASSIUM SERPL-MCNC: 3.8 MMOL/L — SIGNIFICANT CHANGE UP (ref 3.5–5.3)
POTASSIUM SERPL-SCNC: 3.8 MMOL/L — SIGNIFICANT CHANGE UP (ref 3.5–5.3)
PROT SERPL-MCNC: 5.1 G/DL — LOW (ref 6–8.3)
RBC # BLD: 2.81 M/UL — LOW (ref 3.8–5.2)
RBC # FLD: 17.3 % — HIGH (ref 10.3–14.5)
RH IG SCN BLD-IMP: POSITIVE — SIGNIFICANT CHANGE UP
SODIUM SERPL-SCNC: 147 MMOL/L — HIGH (ref 135–145)
WBC # BLD: 0.01 K/UL — CRITICAL LOW (ref 3.8–10.5)
WBC # FLD AUTO: 0.01 K/UL — CRITICAL LOW (ref 3.8–10.5)

## 2025-07-04 PROCEDURE — 99233 SBSQ HOSP IP/OBS HIGH 50: CPT

## 2025-07-04 RX ORDER — ACETAMINOPHEN 500 MG/5ML
650 LIQUID (ML) ORAL ONCE
Refills: 0 | Status: COMPLETED | OUTPATIENT
Start: 2025-07-04 | End: 2025-07-04

## 2025-07-04 RX ORDER — FUROSEMIDE 10 MG/ML
20 INJECTION INTRAMUSCULAR; INTRAVENOUS ONCE
Refills: 0 | Status: COMPLETED | OUTPATIENT
Start: 2025-07-04 | End: 2025-07-04

## 2025-07-04 RX ADMIN — Medication 1 DROP(S): at 11:01

## 2025-07-04 RX ADMIN — Medication 5 MILLILITER(S): at 20:32

## 2025-07-04 RX ADMIN — Medication 5 MILLILITER(S): at 11:08

## 2025-07-04 RX ADMIN — Medication 10 MILLILITER(S): at 20:32

## 2025-07-04 RX ADMIN — OLANZAPINE 5 MILLIGRAM(S): 10 TABLET ORAL at 21:51

## 2025-07-04 RX ADMIN — SCOPOLAMINE 1 PATCH: 1 PATCH, EXTENDED RELEASE TRANSDERMAL at 07:16

## 2025-07-04 RX ADMIN — Medication 1 APPLICATION(S): at 07:36

## 2025-07-04 RX ADMIN — Medication 10 MILLILITER(S): at 07:37

## 2025-07-04 RX ADMIN — Medication 10 MILLILITER(S): at 16:46

## 2025-07-04 RX ADMIN — FUROSEMIDE 20 MILLIGRAM(S): 10 INJECTION INTRAMUSCULAR; INTRAVENOUS at 09:49

## 2025-07-04 RX ADMIN — Medication 15 MILLILITER(S): at 06:03

## 2025-07-04 RX ADMIN — LISINOPRIL 20 MILLIGRAM(S): 5 TABLET ORAL at 05:43

## 2025-07-04 RX ADMIN — Medication 10 MILLILITER(S): at 11:07

## 2025-07-04 RX ADMIN — URSODIOL 300 MILLIGRAM(S): 300 CAPSULE ORAL at 06:29

## 2025-07-04 RX ADMIN — Medication 1 DROP(S): at 17:24

## 2025-07-04 RX ADMIN — AMLODIPINE BESYLATE 5 MILLIGRAM(S): 10 TABLET ORAL at 05:43

## 2025-07-04 RX ADMIN — Medication 650 MILLIGRAM(S): at 04:43

## 2025-07-04 RX ADMIN — Medication 1 PATCH: at 07:16

## 2025-07-04 RX ADMIN — LOPERAMIDE HCL 2 MILLIGRAM(S): 1 SOLUTION ORAL at 23:30

## 2025-07-04 RX ADMIN — BUPROPION HYDROBROMIDE 300 MILLIGRAM(S): 522 TABLET, EXTENDED RELEASE ORAL at 11:02

## 2025-07-04 RX ADMIN — Medication 650 MILLIGRAM(S): at 12:02

## 2025-07-04 RX ADMIN — Medication 10 MILLIGRAM(S): at 11:01

## 2025-07-04 RX ADMIN — POSACONAZOLE 300 MILLIGRAM(S): 100 TABLET, DELAYED RELEASE ORAL at 11:02

## 2025-07-04 RX ADMIN — Medication 5 MILLILITER(S): at 07:37

## 2025-07-04 RX ADMIN — Medication 260 MILLIGRAM(S): at 11:02

## 2025-07-04 RX ADMIN — Medication 1 DROP(S): at 05:55

## 2025-07-04 RX ADMIN — LIDOCAINE HYDROCHLORIDE 15 MILLILITER(S): 20 JELLY TOPICAL at 00:28

## 2025-07-04 RX ADMIN — Medication 8 MILLIGRAM(S): at 07:35

## 2025-07-04 RX ADMIN — FILGRASTIM 300 MICROGRAM(S): 300 INJECTION, SOLUTION INTRAVENOUS; SUBCUTANEOUS at 11:42

## 2025-07-04 RX ADMIN — Medication 10 MILLIGRAM(S): at 05:46

## 2025-07-04 RX ADMIN — Medication 1 PATCH: at 19:33

## 2025-07-04 RX ADMIN — Medication 5 MILLILITER(S): at 16:47

## 2025-07-04 RX ADMIN — SCOPOLAMINE 1 PATCH: 1 PATCH, EXTENDED RELEASE TRANSDERMAL at 19:32

## 2025-07-04 RX ADMIN — Medication 10 MILLIGRAM(S): at 17:24

## 2025-07-04 RX ADMIN — Medication 650 MILLIGRAM(S): at 03:43

## 2025-07-04 RX ADMIN — METOPROLOL SUCCINATE 12.5 MILLIGRAM(S): 50 TABLET, EXTENDED RELEASE ORAL at 05:43

## 2025-07-04 RX ADMIN — Medication 15 MILLILITER(S): at 17:24

## 2025-07-04 NOTE — PROGRESS NOTE ADULT - SUBJECTIVE AND OBJECTIVE BOX
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 prep regimen for treatment of myelofibrosis    Disease: myelofibrosis   Prep regimen: Flu / Bu / Thiotepa / rATG   GVHD ppx: ABC: PTCy days +3, +4, Bortezomib days 0, +3, PTCy days +5, +14, +28   ABO/CMV:   Recipient:   A+/CMV+   Donor:   B+/CMV+    S: Patient seen and examined with HPC Transplant Team:   +generalized weakness  +throat/ mouth pain       O: Vitals:   Vital Signs Last 24 Hrs  T(C): 37.1 (2025 12:10), Max: 37.1 (2025 12:10)  T(F): 98.7 (2025 12:10), Max: 98.7 (2025 12:10)  HR: 82 (2025 12:10) (68 - 97)  BP: 180/70 (2025 12:10) (176/69 - 207/68)  BP(mean): --  RR: 18 (2025 12:10) (17 - 18)  SpO2: 96% (2025 12:10) (95% - 98%)    Parameters below as of 2025 12:10  Patient On (Oxygen Delivery Method): room air        Admit weight: 64.5kg  Daily Weight in k.7 (2025 07:50)    Intake / Output:    @ : @ 07:00  --------------------------------------------------------  IN: 880 mL / OUT: 2565 mL / NET: -1685 mL     @ :  -   @ 13:01  --------------------------------------------------------  IN: 60 mL / OUT: 750 mL / NET: -690 mL        PE:   Oropharynx: no erythema or ulcerations; +Thrush on tongue/posterior pharynx (improving)   Oral Mucositis: +                                                  ndGndrndanddndend:nd nd2nd CVS: S1, S2 RRR   Lungs: CTA throughout bilaterally   Abdomen: + BS x 4, soft, NT, ND   Extremities: no edema   Gastric Mucositis:                 -                              Grade: n/a   Intestinal Mucositis:              -                              Grade: n/a   Skin: no rash   TLC: CDI   Neuro: A&O x 3        Labs:   CBC Full  -  ( 2025 06:41 )  WBC Count : 0.01 K/uL  Hemoglobin : 8.1 g/dL  Hematocrit : 25.4 %  Platelet Count - Automated : 4 K/uL  Mean Cell Volume : 90.4 fl  Mean Cell Hemoglobin : 28.8 pg  Mean Cell Hemoglobin Concentration : 31.9 g/dL  Auto Neutrophil # : Not measured  Auto Lymphocyte # : Not measured  Auto Monocyte # : Not measured  Auto Eosinophil # : Not measured  Auto Basophil # : Not measured  Auto Neutrophil % : Not measured Differential percentages must be correlated with absolute numbers for  clinical significance.  Auto Lymphocyte % : Not measured  Auto Monocyte % : Not measured  Auto Eosinophil % : Not measured  Auto Basophil % : Not measured                          8.1    0.01  )-----------( 4        ( 2025 06:41 )             25.4     07-04    147[H]  |  115[H]  |  18  ----------------------------<  105[H]  3.8   |  19[L]  |  0.64    Ca    8.4      2025 06:41  Phos  2.5     07-04  Mg     1.7     07-04    TPro  5.1[L]  /  Alb  2.7[L]  /  TBili  0.9  /  DBili  0.4[H]  /  AST  13  /  ALT  10  /  AlkPhos  86  07-04    PT/INR - ( 2025 06:31 )   PT: 14.0 sec;   INR: 1.23 ratio         PTT - ( 2025 06:31 )  PTT:25.9 sec  LIVER FUNCTIONS - ( 2025 06:41 )  Alb: 2.7 g/dL / Pro: 5.1 g/dL / ALK PHOS: 86 U/L / ALT: 10 U/L / AST: 13 U/L / GGT: x           Lactate Dehydrogenase, Serum: 310 U/L ( @ 06:41)      Cultures:   Culture Results: <10,000 CFU/mL Normal Urogenital Cha (25 @ 06:31)    Culture Results:   No growth at 5 days(25 @ 16:36)    Culture Results:   No growth at  5 days (25 @ 16:30)    Respiratory Viral Panel with COVID-19 by SCARLET (25 @ 21:02)   Rapid RVP Result: [id number]: NotDetec:    Culture - Blood (25 @ 18:45)   Specimen Source: Blood Blood-Catheter    Culture Results: No growth at 24 hours Culture - Blood (25 @ 18:40)   Specimen Source: Blood Blood-Peripheral  Culture Results: No growth     Culture - Urine (25 @ 21:01)   Specimen Source: Clean Catch Clean Catch (Midstream)  Culture Results: 10,000 - 49,000 CFU/mL Gram Positive Cocci in Pairs and Chains   <10,000 CFU/ml Normal Urogenital cha present    Radiology:   ACC: [id number] EXAM:  XR CHEST PORTABLE URGENT 1V   ORDERED BY: SARAH REEVES   PROCEDURE DATE:  2025    IMPRESSION:  No focal consolidation.          Meds:   Antimicrobials:   acyclovir    Suspension 800 milliGRAM(s) Oral every 12 hours  levoFLOXacin  Tablet 500 milliGRAM(s) Oral every 24 hours  posaconazole DR Tablet 300 milliGRAM(s) Oral daily  vancomycin    Solution 125 milliGRAM(s) Oral every 12 hours      Heme / Onc:       GI:  loperamide 2 milliGRAM(s) Oral every 6 hours PRN  pantoprazole    Tablet 40 milliGRAM(s) Oral before breakfast  polyethylene glycol 3350 17 Gram(s) Oral daily PRN  sodium bicarbonate Mouth Rinse 10 milliLiter(s) Swish and Spit five times a day  ursodiol Suspension 300 milliGRAM(s) Oral two times a day      Cardiovascular:   amLODIPine   Tablet 5 milliGRAM(s) Oral daily  lisinopril 20 milliGRAM(s) Oral <User Schedule>  lisinopril 10 milliGRAM(s) Oral <User Schedule>  metoprolol succinate ER 12.5 milliGRAM(s) Oral daily      Immunologic:   filgrastim-sndz (ZARXIO) Injectable 300 MICROGram(s) SubCutaneous every 24 hours      Other medications:   artificial tears (preservative free) Ophthalmic Solution 1 Drop(s) Both EYES four times a day  Biotene Dry Mouth Oral Rinse 5 milliLiter(s) Swish and Spit five times a day  buPROPion XL (24-Hour) . 300 milliGRAM(s) Oral daily  chlorhexidine 0.12% Liquid 15 milliLiter(s) Swish and Spit two times a day  chlorhexidine 4% Liquid 1 Application(s) Topical <User Schedule>  fentaNYL   Patch  12 MICROgram(s)/Hr. 1 Patch Transdermal every 48 hours  levothyroxine 88 MICROGram(s) Oral daily  OLANZapine 5 milliGRAM(s) Oral at bedtime  phytonadione   Solution 5 milliGRAM(s) Oral <User Schedule>  prochlorperazine   Injectable 10 milliGRAM(s) IV Push every 6 hours  scopolamine 1 mG/72 Hr(s) Patch 1 Patch Transdermal every 72 hours  sodium chloride 0.9%. 1000 milliLiter(s) IV Continuous <Continuous>      PRN:   acetaminophen     Tablet .. 650 milliGRAM(s) Oral every 6 hours PRN  FIRST- Mouthwash  BLM 15 milliLiter(s) Swish and Swallow five times a day PRN  lidocaine 2% Viscous 15 milliLiter(s) Oral five times a day PRN  loperamide 2 milliGRAM(s) Oral every 6 hours PRN  ondansetron Injectable 8 milliGRAM(s) IV Push every 8 hours PRN  polyethylene glycol 3350 17 Gram(s) Oral daily PRN  sodium chloride 0.9% lock flush 10 milliLiter(s) IV Push every 1 hour PRN            A/P: 64 year old female with hx of myelofibrosis presents for a MUD allogeneic SCT (DP Permissive) conditioning with Flu/Bu/Thiotepa/rATG.  Today is day +7  - Thiotepa 1/2. Maintain skin precautions during thiotepa administration and for 48 hours after completion (days .6 through day -3). Chronic pain issues - continue home fentanyl patch. Febrile overnight, tmax 100.9. Cultures  pending. RVP negative. CXR pending official read, no obvious consolidations. Vital signs are stable.    D/C HCTZ. Fludarabine 1/3, Busulfan 1/3, rATG 1/3. No Tylenol on busulfan days. Continue keppra ppx for 24 hours post infusion of last dose of busulfan. No acute events overnight, vital signs are stable.   - overnight patient was febrile during ATG, no cultures/abx as fever likely secondary to ATG. Patient was otherwise stable overnight. Fludarabine 2/3, Busulfan 2/3 and rATG 2/3. NO Tylenol or Azoles until Day 0. Continue with supportive care.  - Fludarabine 3/3, busulfan 3/3, rATG 3/3. No acute events overnight. Vital signs are stable.    Switched Compazine to atc   VSS, afebrile. 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   VSS, persistent fevers likely due to CRS post-SCT, continues on zosyn. Cultures pending. Hypervolemia - lasix 40mg x 1, will continue strict I/Os. Started caspofungin due to persistent thrush.   overnight no acute events noted. VSS, febrile likely due to CRS - continue with supportive care. BCx/UCx NGTD x 24h.   VSS, intermittently febrile likely due to CRS, infectious workup negative so far. PTCy  today. I&O, daily weights, prn diuresis. Continue to monitor thrush (improving). Changed some meds to PO suspension due to mucositis. Continue with supportive care. Refractory nausea, added trial of zyprexa 5mg po QHS x 3 days. d/c primafit. Change voriconazole to posaconazole tomorrow.   - No acute events overnight, vital signs are stable. Continue supportive care for chemotherapy induced oral mucositis.   - No acute events overnight, vital signs are stable. Completed PTCy, hydration to be discontinued today. I&O, daily weights, prn diuresis. Continue supportive care. Fentanyl patch to be titrated down to 12 mcg / hr tomorrow  7/3 Starting Scopolamine patch to decrease oral secretions. Increased Lisinopril 20mg in am, 10mg PM.    -      . Infectious Disease:   acyclovir    Suspension 800 milliGRAM(s) Oral every 12 hours  levoFLOXacin  Tablet 500 milliGRAM(s) Oral every 24 hours  posaconazole DR Tablet 300 milliGRAM(s) Oral daily  vancomycin    Solution 125 milliGRAM(s) Oral every 12 hours    2. VOD Prophylaxis:   ursodiol Capsule 300 milliGRAM(s) Oral two times a day    3. GI Prophylaxis:    pantoprazole Tablet 40 milliGRAM(s) Oral before breakfast    4. Mouthcare - NS / NaHCO3 rinses, Biotene; Skin care     5. GVHD prophylaxis   ABC: PTCy days +3, +4, Bortezomib days 0, +3, PTCy days +5, +14, +28     6. Transfuse & replete electrolytes prn   Replete K+ (IV),     7. IV hydration, daily weights, strict I&O, prn diuresis   Lasix 40mg IV x 1     8. PO intake as tolerated, nutrition follow up as needed    9. Antiemetics, anti-diarrhea medications:   prochlorperazine   Injectable 10 milliGRAM(s) IV Push every 6 hours   ondansetron Injectable 8 milliGRAM(s) IV Push every 8 hours PRN  OLANZapine 5 milliGRAM(s) Oral at bedtime    10. OOB as tolerated, physical therapy consult if needed     11. Monitor coags 2x week, vitamin K BIW  phytonadione Solution 5 milliGRAM(s) Oral <User Schedule>    12. Monitor closely for clinical changes, monitor for fevers     13. Emotional support provided, plan of care discussed and questions addressed     14. Patient education done regarding plan of care, restrictions and discharge planning     15. Continue regular social work input     I have written the above note for Dr. Clarke who performed service with me in the room.   Viet Dejesus PA-C ([phone])    I have seen and examined patient with PA, I agree with above note as scribed.                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 prep regimen for treatment of myelofibrosis    Disease: myelofibrosis   Prep regimen: Flu / Bu / Thiotepa / rATG   GVHD ppx: ABC: PTCy days +3, +4, Bortezomib days 0, +3, PTCy days +5, +14, +28   ABO/CMV:   Recipient:   A+/CMV+   Donor:   B+/CMV+    S: Patient seen and examined with HPC Transplant Team:   +generalized weakness  +throat/ mouth pain       O: Vitals:   Vital Signs Last 24 Hrs  T(C): 37.1 (2025 12:10), Max: 37.1 (2025 12:10)  T(F): 98.7 (2025 12:10), Max: 98.7 (2025 12:10)  HR: 82 (2025 12:10) (68 - 97)  BP: 180/70 (2025 12:10) (176/69 - 207/68)  BP(mean): --  RR: 18 (2025 12:10) (17 - 18)  SpO2: 96% (2025 12:10) (95% - 98%)    Parameters below as of 2025 12:10  Patient On (Oxygen Delivery Method): room air        Admit weight: 64.5kg  Daily Weight in k.7 (2025 07:50)    Intake / Output:    @ : @ 07:00  --------------------------------------------------------  IN: 880 mL / OUT: 2565 mL / NET: -1685 mL     @ :  -   @ 13:01  --------------------------------------------------------  IN: 60 mL / OUT: 750 mL / NET: -690 mL        PE:   Oropharynx: no erythema or ulcerations; +Thrush on tongue/posterior pharynx (improving)   Oral Mucositis: +                                                  ndGndrndanddndend:nd nd2nd CVS: S1, S2 RRR   Lungs: CTA throughout bilaterally   Abdomen: + BS x 4, soft, NT, ND   Extremities: no edema   Gastric Mucositis:                 -                              Grade: n/a   Intestinal Mucositis:              -                              Grade: n/a   Skin: no rash   TLC: CDI   Neuro: A&O x 3        Labs:   CBC Full  -  ( 2025 06:41 )  WBC Count : 0.01 K/uL  Hemoglobin : 8.1 g/dL  Hematocrit : 25.4 %  Platelet Count - Automated : 4 K/uL  Mean Cell Volume : 90.4 fl  Mean Cell Hemoglobin : 28.8 pg  Mean Cell Hemoglobin Concentration : 31.9 g/dL  Auto Neutrophil # : Not measured  Auto Lymphocyte # : Not measured  Auto Monocyte # : Not measured  Auto Eosinophil # : Not measured  Auto Basophil # : Not measured  Auto Neutrophil % : Not measured Differential percentages must be correlated with absolute numbers for  clinical significance.  Auto Lymphocyte % : Not measured  Auto Monocyte % : Not measured  Auto Eosinophil % : Not measured  Auto Basophil % : Not measured                          8.1    0.01  )-----------( 4        ( 2025 06:41 )             25.4     07-04    147[H]  |  115[H]  |  18  ----------------------------<  105[H]  3.8   |  19[L]  |  0.64    Ca    8.4      2025 06:41  Phos  2.5     07-04  Mg     1.7     07-04    TPro  5.1[L]  /  Alb  2.7[L]  /  TBili  0.9  /  DBili  0.4[H]  /  AST  13  /  ALT  10  /  AlkPhos  86  07-04    PT/INR - ( 2025 06:31 )   PT: 14.0 sec;   INR: 1.23 ratio         PTT - ( 2025 06:31 )  PTT:25.9 sec  LIVER FUNCTIONS - ( 2025 06:41 )  Alb: 2.7 g/dL / Pro: 5.1 g/dL / ALK PHOS: 86 U/L / ALT: 10 U/L / AST: 13 U/L / GGT: x           Lactate Dehydrogenase, Serum: 310 U/L ( @ 06:41)      Cultures:   Culture Results: <10,000 CFU/mL Normal Urogenital Cha (25 @ 06:31)    Culture Results:   No growth at 5 days(25 @ 16:36)    Culture Results:   No growth at  5 days (25 @ 16:30)    Respiratory Viral Panel with COVID-19 by SCARLET (25 @ 21:02)   Rapid RVP Result: [id number]: NotDetec:    Culture - Blood (25 @ 18:45)   Specimen Source: Blood Blood-Catheter    Culture Results: No growth at 24 hours Culture - Blood (25 @ 18:40)   Specimen Source: Blood Blood-Peripheral  Culture Results: No growth     Culture - Urine (25 @ 21:01)   Specimen Source: Clean Catch Clean Catch (Midstream)  Culture Results: 10,000 - 49,000 CFU/mL Gram Positive Cocci in Pairs and Chains   <10,000 CFU/ml Normal Urogenital cha present    Radiology:   ACC: [id number] EXAM:  XR CHEST PORTABLE URGENT 1V   ORDERED BY: SARAH REEVES   PROCEDURE DATE:  2025    IMPRESSION:  No focal consolidation.          Meds:   Antimicrobials:   acyclovir    Suspension 800 milliGRAM(s) Oral every 12 hours  levoFLOXacin  Tablet 500 milliGRAM(s) Oral every 24 hours  posaconazole DR Tablet 300 milliGRAM(s) Oral daily  vancomycin    Solution 125 milliGRAM(s) Oral every 12 hours      Heme / Onc:       GI:  loperamide 2 milliGRAM(s) Oral every 6 hours PRN  pantoprazole    Tablet 40 milliGRAM(s) Oral before breakfast  polyethylene glycol 3350 17 Gram(s) Oral daily PRN  sodium bicarbonate Mouth Rinse 10 milliLiter(s) Swish and Spit five times a day  ursodiol Suspension 300 milliGRAM(s) Oral two times a day      Cardiovascular:   amLODIPine   Tablet 5 milliGRAM(s) Oral daily  lisinopril 20 milliGRAM(s) Oral <User Schedule>  lisinopril 10 milliGRAM(s) Oral <User Schedule>  metoprolol succinate ER 12.5 milliGRAM(s) Oral daily      Immunologic:   filgrastim-sndz (ZARXIO) Injectable 300 MICROGram(s) SubCutaneous every 24 hours      Other medications:   artificial tears (preservative free) Ophthalmic Solution 1 Drop(s) Both EYES four times a day  Biotene Dry Mouth Oral Rinse 5 milliLiter(s) Swish and Spit five times a day  buPROPion XL (24-Hour) . 300 milliGRAM(s) Oral daily  chlorhexidine 0.12% Liquid 15 milliLiter(s) Swish and Spit two times a day  chlorhexidine 4% Liquid 1 Application(s) Topical <User Schedule>  fentaNYL   Patch  12 MICROgram(s)/Hr. 1 Patch Transdermal every 48 hours  levothyroxine 88 MICROGram(s) Oral daily  OLANZapine 5 milliGRAM(s) Oral at bedtime  phytonadione   Solution 5 milliGRAM(s) Oral <User Schedule>  prochlorperazine   Injectable 10 milliGRAM(s) IV Push every 6 hours  scopolamine 1 mG/72 Hr(s) Patch 1 Patch Transdermal every 72 hours  sodium chloride 0.9%. 1000 milliLiter(s) IV Continuous <Continuous>      PRN:   acetaminophen     Tablet .. 650 milliGRAM(s) Oral every 6 hours PRN  FIRST- Mouthwash  BLM 15 milliLiter(s) Swish and Swallow five times a day PRN  lidocaine 2% Viscous 15 milliLiter(s) Oral five times a day PRN  loperamide 2 milliGRAM(s) Oral every 6 hours PRN  ondansetron Injectable 8 milliGRAM(s) IV Push every 8 hours PRN  polyethylene glycol 3350 17 Gram(s) Oral daily PRN  sodium chloride 0.9% lock flush 10 milliLiter(s) IV Push every 1 hour PRN            A/P: 64 year old female with hx of myelofibrosis presents for a MUD allogeneic SCT (DP Permissive) conditioning with Flu/Bu/Thiotepa/rATG.  Today is day +7  - Thiotepa 1/2. Maintain skin precautions during thiotepa administration and for 48 hours after completion (days .6 through day -3). Chronic pain issues - continue home fentanyl patch. Febrile overnight, tmax 100.9. Cultures  pending. RVP negative. CXR pending official read, no obvious consolidations. Vital signs are stable.    D/C HCTZ. Fludarabine 1/3, Busulfan 1/3, rATG 1/3. No Tylenol on busulfan days. Continue keppra ppx for 24 hours post infusion of last dose of busulfan. No acute events overnight, vital signs are stable.   - overnight patient was febrile during ATG, no cultures/abx as fever likely secondary to ATG. Patient was otherwise stable overnight. Fludarabine 2/3, Busulfan 2/3 and rATG 2/3. NO Tylenol or Azoles until Day 0. Continue with supportive care.  - Fludarabine 3/3, busulfan 3/3, rATG 3/3. No acute events overnight. Vital signs are stable.    Switched Compazine to atc   VSS, afebrile. 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   VSS, persistent fevers likely due to CRS post-SCT, continues on zosyn. Cultures pending. Hypervolemia - lasix 40mg x 1, will continue strict I/Os. Started caspofungin due to persistent thrush.   overnight no acute events noted. VSS, febrile likely due to CRS - continue with supportive care. BCx/UCx NGTD x 24h.   VSS, intermittently febrile likely due to CRS, infectious workup negative so far. PTCy  today. I&O, daily weights, prn diuresis. Continue to monitor thrush (improving). Changed some meds to PO suspension due to mucositis. Continue with supportive care. Refractory nausea, added trial of zyprexa 5mg po QHS x 3 days. d/c primafit. Change voriconazole to posaconazole tomorrow.   - No acute events overnight, vital signs are stable. Continue supportive care for chemotherapy induced oral mucositis.   - No acute events overnight, vital signs are stable. Completed PTCy, hydration to be discontinued today. I&O, daily weights, prn diuresis. Continue supportive care. Fentanyl patch to be titrated down to 12 mcg / hr tomorrow  7/3 Starting Scopolamine patch to decrease oral secretions. Increased Lisinopril 20mg in am, 10mg PM.    -No acute events; awaiting count recovery       1. Infectious Disease:   acyclovir    Suspension 800 milliGRAM(s) Oral every 12 hours  levoFLOXacin  Tablet 500 milliGRAM(s) Oral every 24 hours  posaconazole DR Tablet 300 milliGRAM(s) Oral daily  vancomycin    Solution 125 milliGRAM(s) Oral every 12 hours    2. VOD Prophylaxis:   ursodiol Capsule 300 milliGRAM(s) Oral two times a day    3. GI Prophylaxis:    pantoprazole Tablet 40 milliGRAM(s) Oral before breakfast    4. Mouthcare - NS / NaHCO3 rinses, Biotene; Skin care     5. GVHD prophylaxis   ABC: PTCy days +3, +4, Bortezomib days 0, +3, PTCy days +5, +14, +28     6. Transfuse & replete electrolytes prn       7. IV hydration, daily weights, strict I&O, prn diuresis       8. PO intake as tolerated, nutrition follow up as needed    9. Antiemetics, anti-diarrhea medications:   prochlorperazine   Injectable 10 milliGRAM(s) IV Push every 6 hours   ondansetron Injectable 8 milliGRAM(s) IV Push every 8 hours PRN  OLANZapine 5 milliGRAM(s) Oral at bedtime    10. OOB as tolerated, physical therapy consult if needed     11. Monitor coags 2x week, vitamin K BIW  phytonadione Solution 5 milliGRAM(s) Oral <User Schedule>    12. Monitor closely for clinical changes, monitor for fevers     13. Emotional support provided, plan of care discussed and questions addressed     14. Patient education done regarding plan of care, restrictions and discharge planning     15. Continue regular social work input     I have written the above note for Dr. Clarke who performed service with me in the room.   Viet Dejesus PA-C ([phone])    I have seen and examined patient with PA, I agree with above note as scribed.

## 2025-07-04 NOTE — PROGRESS NOTE ADULT - NS ATTEND AMEND GEN_ALL_CORE FT
Day + 6 s/p allo-HSCT     Overall, patient is fatigued, complains of mouth pain. She is sitting in bed this morning.   reports leg heaviness  Nausea improved with zyprexa; continues to have limited PO intake   Course complicated by CRS; intermittent fevers; afebrile x 48hours hours  Hypertensive - on three antihypertensive agents; likely component of opioid withdrawal (fentanyl patch 50mg at admission, now at 12.5mg)   Physical exam is remarkable for mucositis that is improved; +1 b/l LE edema;   Labs reviewed; appropriate  PRN diuresis   Discussed close monitoring of In/Outs  PT consulted; encouraged out of bed to chair as much as possible during the day  continue supportive care and antimicrobial ppx. Day + 7 s/p allo-HSCT     Overall, patient is fatigued, complains of mouth pain. She is sitting in bed this morning.   reports leg heaviness  Nausea improved with zyprexa; continues to have limited PO intake   Course complicated by CRS; intermittent fevers; afebrile x 48hours hours  Hypertensive - on three antihypertensive agents; likely component of opioid withdrawal (fentanyl patch 50mg at admission, now at 12.5mg)   Physical exam is remarkable for mucositis that is improved; +1 b/l LE edema;   Labs reviewed; appropriate  PRN diuresis   Discussed close monitoring of In/Outs  PT consulted; encouraged out of bed to chair as much as possible during the day  continue supportive care and antimicrobial ppx. Day + 7 s/p allo-HSCT     Overall, patient is fatigued, complains of mouth pain. She is sitting in chair this am.   reports continued leg heaviness  Nausea improved with zyprexa; continues to have limited PO intake due to mucositis  Course complicated by CRS; intermittent fevers; afebrile x 48hours   Hypertensive - on three antihypertensive agents; likely component of opioid withdrawal (fentanyl patch 50mg at admission, now at 12.5mg) - monitor closely and use of PRN as needed  Physical exam is remarkable for mucositis that is improved; +1 b/l LE edema;   Labs reviewed; appropriate  PRN diuresis   Discussed close monitoring of In/Outs  Discussed limiting use of PRN opioids especially given that we are titrating down fentanyl patch.  Discussed alternatives to use and using scale to grade her pain.  PT consulted; encouraged out of bed to chair as much as possible during the day  continue supportive care and antimicrobial ppx.

## 2025-07-05 LAB
ADD ON TEST-SPECIMEN IN LAB: SIGNIFICANT CHANGE UP
ALBUMIN SERPL ELPH-MCNC: 2.9 G/DL — LOW (ref 3.3–5)
ALP SERPL-CCNC: 90 U/L — SIGNIFICANT CHANGE UP (ref 40–120)
ALT FLD-CCNC: 14 U/L — SIGNIFICANT CHANGE UP (ref 10–45)
ANION GAP SERPL CALC-SCNC: 12 MMOL/L — SIGNIFICANT CHANGE UP (ref 5–17)
ANION GAP SERPL CALC-SCNC: 14 MMOL/L — SIGNIFICANT CHANGE UP (ref 5–17)
AST SERPL-CCNC: 15 U/L — SIGNIFICANT CHANGE UP (ref 10–40)
BASOPHILS # BLD AUTO: SIGNIFICANT CHANGE UP (ref 0–0.2)
BASOPHILS NFR BLD AUTO: SIGNIFICANT CHANGE UP (ref 0–2)
BILIRUB DIRECT SERPL-MCNC: 0.8 MG/DL — HIGH (ref 0–0.3)
BILIRUB SERPL-MCNC: 1.4 MG/DL — HIGH (ref 0.2–1.2)
BUN SERPL-MCNC: 23 MG/DL — SIGNIFICANT CHANGE UP (ref 7–23)
BUN SERPL-MCNC: 25 MG/DL — HIGH (ref 7–23)
CALCIUM SERPL-MCNC: 8.2 MG/DL — LOW (ref 8.4–10.5)
CALCIUM SERPL-MCNC: 8.2 MG/DL — LOW (ref 8.4–10.5)
CHLORIDE SERPL-SCNC: 113 MMOL/L — HIGH (ref 96–108)
CHLORIDE SERPL-SCNC: 115 MMOL/L — HIGH (ref 96–108)
CO2 SERPL-SCNC: 20 MMOL/L — LOW (ref 22–31)
CO2 SERPL-SCNC: 23 MMOL/L — SIGNIFICANT CHANGE UP (ref 22–31)
CREAT SERPL-MCNC: 0.61 MG/DL — SIGNIFICANT CHANGE UP (ref 0.5–1.3)
CREAT SERPL-MCNC: 0.62 MG/DL — SIGNIFICANT CHANGE UP (ref 0.5–1.3)
CULTURE RESULTS: SIGNIFICANT CHANGE UP
CULTURE RESULTS: SIGNIFICANT CHANGE UP
EGFR: 100 ML/MIN/1.73M2 — SIGNIFICANT CHANGE UP
EOSINOPHIL # BLD AUTO: SIGNIFICANT CHANGE UP (ref 0–0.5)
EOSINOPHIL NFR BLD AUTO: SIGNIFICANT CHANGE UP (ref 0–6)
GLUCOSE SERPL-MCNC: 103 MG/DL — HIGH (ref 70–99)
GLUCOSE SERPL-MCNC: 123 MG/DL — HIGH (ref 70–99)
HCT VFR BLD CALC: 24.7 % — LOW (ref 34.5–45)
HGB BLD-MCNC: 7.7 G/DL — LOW (ref 11.5–15.5)
IMM GRANULOCYTES # BLD AUTO: SIGNIFICANT CHANGE UP (ref 0–0.07)
IMM GRANULOCYTES NFR BLD AUTO: SIGNIFICANT CHANGE UP (ref 0–0.9)
IMMATURE PLATELET FRACTION #: 0.2 K/UL — LOW (ref 4.7–11.1)
IMMATURE PLATELET FRACTION %: 1.6 % — SIGNIFICANT CHANGE UP (ref 1.6–4.9)
LDH SERPL L TO P-CCNC: 317 U/L — HIGH (ref 50–242)
LYMPHOCYTES # BLD AUTO: SIGNIFICANT CHANGE UP (ref 1–3.3)
LYMPHOCYTES NFR BLD AUTO: SIGNIFICANT CHANGE UP (ref 13–44)
MAGNESIUM SERPL-MCNC: 1.8 MG/DL — SIGNIFICANT CHANGE UP (ref 1.6–2.6)
MCHC RBC-ENTMCNC: 28 PG — SIGNIFICANT CHANGE UP (ref 27–34)
MCHC RBC-ENTMCNC: 31.2 G/DL — LOW (ref 32–36)
MCV RBC AUTO: 89.8 FL — SIGNIFICANT CHANGE UP (ref 80–100)
MONOCYTES # BLD AUTO: SIGNIFICANT CHANGE UP (ref 0–0.9)
MONOCYTES NFR BLD AUTO: SIGNIFICANT CHANGE UP (ref 2–14)
NEUTROPHILS # BLD AUTO: SIGNIFICANT CHANGE UP (ref 1.8–7.4)
NEUTROPHILS NFR BLD AUTO: SIGNIFICANT CHANGE UP (ref 43–77)
NRBC # BLD AUTO: 0 K/UL — SIGNIFICANT CHANGE UP (ref 0–0)
NRBC # FLD: 0 K/UL — SIGNIFICANT CHANGE UP (ref 0–0)
NRBC BLD AUTO-RTO: 0 /100 WBCS — SIGNIFICANT CHANGE UP (ref 0–0)
PHOSPHATE SERPL-MCNC: 2.8 MG/DL — SIGNIFICANT CHANGE UP (ref 2.5–4.5)
PLATELET # BLD AUTO: 10 K/UL — CRITICAL LOW (ref 150–400)
PMV BLD: SIGNIFICANT CHANGE UP FL (ref 7–13)
POTASSIUM SERPL-MCNC: 3.2 MMOL/L — LOW (ref 3.5–5.3)
POTASSIUM SERPL-MCNC: 3.4 MMOL/L — LOW (ref 3.5–5.3)
POTASSIUM SERPL-SCNC: 3.2 MMOL/L — LOW (ref 3.5–5.3)
POTASSIUM SERPL-SCNC: 3.4 MMOL/L — LOW (ref 3.5–5.3)
PROT SERPL-MCNC: 5.1 G/DL — LOW (ref 6–8.3)
RBC # BLD: 2.75 M/UL — LOW (ref 3.8–5.2)
RBC # FLD: 17.2 % — HIGH (ref 10.3–14.5)
SODIUM SERPL-SCNC: 148 MMOL/L — HIGH (ref 135–145)
SODIUM SERPL-SCNC: 149 MMOL/L — HIGH (ref 135–145)
SPECIMEN SOURCE: SIGNIFICANT CHANGE UP
SPECIMEN SOURCE: SIGNIFICANT CHANGE UP
WBC # BLD: 0.01 K/UL — CRITICAL LOW (ref 3.8–10.5)
WBC # FLD AUTO: 0.01 K/UL — CRITICAL LOW (ref 3.8–10.5)

## 2025-07-05 PROCEDURE — 99233 SBSQ HOSP IP/OBS HIGH 50: CPT

## 2025-07-05 RX ORDER — SODIUM CHLORIDE 9 G/1000ML
1000 INJECTION, SOLUTION INTRAVENOUS
Refills: 0 | Status: DISCONTINUED | OUTPATIENT
Start: 2025-07-05 | End: 2025-07-06

## 2025-07-05 RX ORDER — METOPROLOL SUCCINATE 50 MG/1
5 TABLET, EXTENDED RELEASE ORAL ONCE
Refills: 0 | Status: DISCONTINUED | OUTPATIENT
Start: 2025-07-05 | End: 2025-07-05

## 2025-07-05 RX ORDER — METOPROLOL SUCCINATE 50 MG/1
12.5 TABLET, EXTENDED RELEASE ORAL ONCE
Refills: 0 | Status: COMPLETED | OUTPATIENT
Start: 2025-07-05 | End: 2025-07-05

## 2025-07-05 RX ORDER — FUROSEMIDE 10 MG/ML
20 INJECTION INTRAMUSCULAR; INTRAVENOUS ONCE
Refills: 0 | Status: COMPLETED | OUTPATIENT
Start: 2025-07-05 | End: 2025-07-05

## 2025-07-05 RX ORDER — LISINOPRIL 5 MG/1
20 TABLET ORAL
Refills: 0 | Status: DISCONTINUED | OUTPATIENT
Start: 2025-07-05 | End: 2025-07-15

## 2025-07-05 RX ORDER — METOPROLOL SUCCINATE 50 MG/1
25 TABLET, EXTENDED RELEASE ORAL DAILY
Refills: 0 | Status: DISCONTINUED | OUTPATIENT
Start: 2025-07-06 | End: 2025-07-15

## 2025-07-05 RX ADMIN — Medication 50 MILLIEQUIVALENT(S): at 17:17

## 2025-07-05 RX ADMIN — SCOPOLAMINE 1 PATCH: 1 PATCH, EXTENDED RELEASE TRANSDERMAL at 07:55

## 2025-07-05 RX ADMIN — SODIUM CHLORIDE 75 MILLILITER(S): 9 INJECTION, SOLUTION INTRAVENOUS at 08:31

## 2025-07-05 RX ADMIN — Medication 1 PATCH: at 20:00

## 2025-07-05 RX ADMIN — AMLODIPINE BESYLATE 5 MILLIGRAM(S): 10 TABLET ORAL at 05:30

## 2025-07-05 RX ADMIN — Medication 15 MILLILITER(S): at 05:32

## 2025-07-05 RX ADMIN — METOPROLOL SUCCINATE 12.5 MILLIGRAM(S): 50 TABLET, EXTENDED RELEASE ORAL at 05:30

## 2025-07-05 RX ADMIN — Medication 5 MILLILITER(S): at 08:04

## 2025-07-05 RX ADMIN — Medication 10 MILLIGRAM(S): at 17:19

## 2025-07-05 RX ADMIN — Medication 8 MILLIGRAM(S): at 08:31

## 2025-07-05 RX ADMIN — Medication 10 MILLILITER(S): at 08:05

## 2025-07-05 RX ADMIN — POSACONAZOLE 300 MILLIGRAM(S): 100 TABLET, DELAYED RELEASE ORAL at 12:42

## 2025-07-05 RX ADMIN — FUROSEMIDE 20 MILLIGRAM(S): 10 INJECTION INTRAMUSCULAR; INTRAVENOUS at 12:42

## 2025-07-05 RX ADMIN — Medication 5 MILLILITER(S): at 23:00

## 2025-07-05 RX ADMIN — Medication 10 MILLILITER(S): at 12:42

## 2025-07-05 RX ADMIN — Medication 10 MILLILITER(S): at 23:00

## 2025-07-05 RX ADMIN — SODIUM CHLORIDE 75 MILLILITER(S): 9 INJECTION, SOLUTION INTRAVENOUS at 20:04

## 2025-07-05 RX ADMIN — Medication 10 MILLILITER(S): at 17:18

## 2025-07-05 RX ADMIN — Medication 15 MILLILITER(S): at 17:18

## 2025-07-05 RX ADMIN — Medication 1 APPLICATION(S): at 08:06

## 2025-07-05 RX ADMIN — Medication 5 MILLILITER(S): at 00:18

## 2025-07-05 RX ADMIN — Medication 10 MILLIGRAM(S): at 12:41

## 2025-07-05 RX ADMIN — Medication 5 MILLILITER(S): at 17:18

## 2025-07-05 RX ADMIN — LISINOPRIL 20 MILLIGRAM(S): 5 TABLET ORAL at 05:30

## 2025-07-05 RX ADMIN — Medication 1 DROP(S): at 17:21

## 2025-07-05 RX ADMIN — Medication 10 MILLIGRAM(S): at 00:19

## 2025-07-05 RX ADMIN — FILGRASTIM 300 MICROGRAM(S): 300 INJECTION, SOLUTION INTRAVENOUS; SUBCUTANEOUS at 12:41

## 2025-07-05 RX ADMIN — Medication 800 MILLIGRAM(S): at 05:32

## 2025-07-05 RX ADMIN — Medication 1 DROP(S): at 12:42

## 2025-07-05 RX ADMIN — Medication 5 MILLILITER(S): at 12:43

## 2025-07-05 RX ADMIN — Medication 1 PATCH: at 07:58

## 2025-07-05 RX ADMIN — URSODIOL 300 MILLIGRAM(S): 300 CAPSULE ORAL at 05:31

## 2025-07-05 RX ADMIN — LISINOPRIL 20 MILLIGRAM(S): 5 TABLET ORAL at 17:18

## 2025-07-05 RX ADMIN — Medication 50 MILLIEQUIVALENT(S): at 20:04

## 2025-07-05 RX ADMIN — Medication 10 MILLIGRAM(S): at 05:30

## 2025-07-05 RX ADMIN — Medication 10 MILLILITER(S): at 00:18

## 2025-07-05 RX ADMIN — Medication 88 MICROGRAM(S): at 05:34

## 2025-07-05 RX ADMIN — Medication 40 MILLIGRAM(S): at 06:34

## 2025-07-05 RX ADMIN — Medication 1 DROP(S): at 05:33

## 2025-07-05 RX ADMIN — Medication 10 MILLILITER(S): at 20:03

## 2025-07-05 RX ADMIN — Medication 5 MILLILITER(S): at 20:03

## 2025-07-05 RX ADMIN — METOPROLOL SUCCINATE 12.5 MILLIGRAM(S): 50 TABLET, EXTENDED RELEASE ORAL at 09:59

## 2025-07-05 RX ADMIN — Medication 1 DROP(S): at 00:19

## 2025-07-05 RX ADMIN — Medication 10 MILLIGRAM(S): at 23:01

## 2025-07-05 RX ADMIN — Medication 1 PATCH: at 07:55

## 2025-07-05 RX ADMIN — SCOPOLAMINE 1 PATCH: 1 PATCH, EXTENDED RELEASE TRANSDERMAL at 20:00

## 2025-07-05 RX ADMIN — Medication 125 MILLIGRAM(S): at 05:30

## 2025-07-05 NOTE — PROGRESS NOTE ADULT - NS ATTEND AMEND GEN_ALL_CORE FT
Day + 7 s/p allo-HSCT     Overall, patient is fatigued, complains of mouth pain. She is sitting in chair this am.   reports continued leg heaviness  Nausea improved with zyprexa; continues to have limited PO intake due to mucositis  Course complicated by CRS; intermittent fevers; afebrile x 48hours   Hypertensive - on three antihypertensive agents; likely component of opioid withdrawal (fentanyl patch 50mg at admission, now at 12.5mg) - monitor closely and use of PRN as needed  Physical exam is remarkable for mucositis that is improved; +1 b/l LE edema;   Labs reviewed; appropriate  PRN diuresis   Discussed close monitoring of In/Outs  Discussed limiting use of PRN opioids especially given that we are titrating down fentanyl patch.  Discussed alternatives to use and using scale to grade her pain.  PT consulted; encouraged out of bed to chair as much as possible during the day  continue supportive care and antimicrobial ppx. Day + 8 s/p allo-HSCT     Overall, patient is fatigued, complains of mouth pain. She is sitting in chair this am.   reports continued leg heaviness  Nausea improved with zyprexa; continues to have limited PO intake due to mucositis  Course complicated by CRS; intermittent fevers; afebrile x 48hours   Hypertensive - on three antihypertensive agents; likely component of opioid withdrawal (fentanyl patch 50mg at admission, now at 12.5mg) - monitor closely and use of PRN as needed  Physical exam is remarkable for mucositis that is improved; +1 b/l LE edema;   Labs reviewed; appropriate  PRN diuresis   Discussed close monitoring of In/Outs  Discussed limiting use of PRN opioids especially given that we are titrating down fentanyl patch.  Discussed alternatives to use and using scale to grade her pain.  PT consulted; encouraged out of bed to chair as much as possible during the day  continue supportive care and antimicrobial ppx. Day + 8 s/p allo-HSCT     Overall, patient is fatigued, complains of mouth pain, leg heaviness  Nausea improved with zyprexa; continues to have limited PO intake due to mucositis  Course complicated by CRS; now resolved  Hypertensive - on three antihypertensive agents; likely component of opioid withdrawal (fentanyl patch 50mg at admission, now at 12.5mg) - monitor closely and use of PRN as needed. Increased metoprolol and lisinopril.   Physical exam is remarkable for mucositis; +1 b/l LE edema;   Labs reviewed; appropriate  Continue PRN diuresis   Discussed close monitoring of In/Outs  Discussed limiting use of PRN opioids especially given that we are titrating down fentanyl patch. Extensively discussed with patient who is in agreement to rate her pain and request appropriate analgesics.   She has a private nursing aide at bedside.   PT consulted; encouraged out of bed to chair as much as possible during the day  continue supportive care and antimicrobial ppx.

## 2025-07-05 NOTE — PROGRESS NOTE ADULT - SUBJECTIVE AND OBJECTIVE BOX
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 prep regimen for treatment of myelofibrosis    Disease: myelofibrosis   Prep regimen: Flu / Bu / Thiotepa / rATG   GVHD ppx: ABC: PTCy days +3, +4, Bortezomib days 0, +3, PTCy days +5, +14, +28   ABO/CMV:   Recipient:   A+/CMV+   Donor:   B+/CMV+    S: Patient seen and examined with HPC Transplant Team:       O: Vitals:   Vital Signs Last 24 Hrs  T(C): 36.9 (2025 05:27), Max: 37.4 (2025 17:30)  T(F): 98.4 (2025 05:27), Max: 99.4 (2025 17:30)  HR: 94 (2025 05:27) (81 - 99)  BP: 186/68 (2025 05:27) (178/71 - 195/74)  BP(mean): --  RR: 16 (2025 05:27) (16 - 18)  SpO2: 96% (2025 05:27) (95% - 98%)    Parameters below as of 2025 05:27  Patient On (Oxygen Delivery Method): room air        Admit weight: 64.5kg   Daily Weight in k.7 (2025 07:55)    Intake / Output:   -04 @ 07:01  -  -05 @ 07:00  --------------------------------------------------------  IN: 605 mL / OUT: 1700 mL / NET: -1095 mL      PE:   Oropharynx: no erythema or ulcerations; +Thrush on tongue/posterior pharynx (improving)   Oral Mucositis: +                                                  ndGndrndanddndend:nd nd2nd CVS: S1, S2 RRR   Lungs: CTA throughout bilaterally   Abdomen: + BS x 4, soft, NT, ND   Extremities: no edema   Gastric Mucositis:                 -                              Grade: n/a   Intestinal Mucositis:              -                              Grade: n/a   Skin: no rash   TLC: CDI   Neuro: A&O x 3      Labs:   CBC Full  -  ( 2025 06:31 )  WBC Count : 0.01 K/uL  Hemoglobin : 7.7 g/dL  Hematocrit : 24.7 %  Platelet Count - Automated : 10 K/uL  Mean Cell Volume : 89.8 fl  Mean Cell Hemoglobin : 28.0 pg  Mean Cell Hemoglobin Concentration : 31.2 g/dL  Auto Neutrophil # : Not measured  Auto Lymphocyte # : Not measured  Auto Monocyte # : Not measured  Auto Eosinophil # : Not measured  Auto Basophil # : Not measured  Auto Neutrophil % : Not measured Differential percentages must be correlated with absolute numbers for  clinical significance.  Auto Lymphocyte % : Not measured  Auto Monocyte % : Not measured  Auto Eosinophil % : Not measured  Auto Basophil % : Not measured                          7.7    0.01  )-----------( 10       ( 2025 06:31 )             24.7     07-05    149[H]  |  115[H]  |  23  ----------------------------<  103[H]  3.4[L]   |  20[L]  |  0.61    Ca    8.2[L]      2025 06:31  Phos  2.8     07-05  Mg     1.8     07-05    TPro  5.1[L]  /  Alb  2.9[L]  /  TBili  1.4[H]  /  DBili  0.8[H]  /  AST  15  /  ALT  14  /  AlkPhos  90  07-05      LIVER FUNCTIONS - ( 2025 06:31 )  Alb: 2.9 g/dL / Pro: 5.1 g/dL / ALK PHOS: 90 U/L / ALT: 14 U/L / AST: 15 U/L / GGT: x           Lactate Dehydrogenase, Serum: 317 U/L ( @ 06:31)    Cultures:   Culture Results: <10,000 CFU/mL Normal Urogenital Cha (25 @ 06:31)    Culture Results:   No growth at 5 days(25 @ 16:36)    Culture Results:   No growth at  5 days (25 @ 16:30)    Respiratory Viral Panel with COVID-19 by SCARLET (25 @ 21:02)   Rapid RVP Result: [id number]: NotDetec:    Culture - Blood (25 @ 18:45)   Specimen Source: Blood Blood-Catheter    Culture Results: No growth at 24 hours Culture - Blood (25 @ 18:40)   Specimen Source: Blood Blood-Peripheral  Culture Results: No growth     Culture - Urine (25 @ 21:01)   Specimen Source: Clean Catch Clean Catch (Midstream)  Culture Results: 10,000 - 49,000 CFU/mL Gram Positive Cocci in Pairs and Chains   <10,000 CFU/ml Normal Urogenital cha present    Radiology:   ACC: [id number] EXAM:  XR CHEST PORTABLE URGENT 1V   ORDERED BY: SARAH REEVES   PROCEDURE DATE:  2025    IMPRESSION:  No focal consolidation.      Meds:   Antimicrobials:   acyclovir    Suspension 800 milliGRAM(s) Oral every 12 hours  levoFLOXacin IVPB 500 milliGRAM(s) IV Intermittent every 24 hours  posaconazole DR Tablet 300 milliGRAM(s) Oral daily  vancomycin    Solution 125 milliGRAM(s) Oral every 12 hours      Heme / Onc:       GI:  loperamide 2 milliGRAM(s) Oral every 6 hours PRN  pantoprazole  Injectable 40 milliGRAM(s) IV Push daily  polyethylene glycol 3350 17 Gram(s) Oral daily PRN  sodium bicarbonate Mouth Rinse 10 milliLiter(s) Swish and Spit five times a day  ursodiol Suspension 300 milliGRAM(s) Oral two times a day      Cardiovascular:   amLODIPine   Tablet 5 milliGRAM(s) Oral daily  lisinopril 20 milliGRAM(s) Oral <User Schedule>  lisinopril 20 milliGRAM(s) Oral <User Schedule>      Immunologic:   filgrastim-sndz (ZARXIO) Injectable 300 MICROGram(s) SubCutaneous every 24 hours      Other medications:   artificial tears (preservative free) Ophthalmic Solution 1 Drop(s) Both EYES four times a day  Biotene Dry Mouth Oral Rinse 5 milliLiter(s) Swish and Spit five times a day  buPROPion XL (24-Hour) . 300 milliGRAM(s) Oral daily  chlorhexidine 0.12% Liquid 15 milliLiter(s) Swish and Spit two times a day  chlorhexidine 4% Liquid 1 Application(s) Topical <User Schedule>  dextrose 5%. 1000 milliLiter(s) IV Continuous <Continuous>  fentaNYL   Patch  12 MICROgram(s)/Hr. 1 Patch Transdermal every 48 hours  levothyroxine 88 MICROGram(s) Oral daily  OLANZapine 5 milliGRAM(s) Oral at bedtime  phytonadione   Solution 5 milliGRAM(s) Oral <User Schedule>  prochlorperazine   Injectable 10 milliGRAM(s) IV Push every 6 hours  scopolamine 1 mG/72 Hr(s) Patch 1 Patch Transdermal every 72 hours  sodium chloride 0.9%. 1000 milliLiter(s) IV Continuous <Continuous>      PRN:   acetaminophen     Tablet .. 650 milliGRAM(s) Oral every 6 hours PRN  FIRST- Mouthwash  BLM 15 milliLiter(s) Swish and Swallow five times a day PRN  lidocaine 2% Viscous 15 milliLiter(s) Oral five times a day PRN  loperamide 2 milliGRAM(s) Oral every 6 hours PRN  ondansetron Injectable 8 milliGRAM(s) IV Push every 8 hours PRN  polyethylene glycol 3350 17 Gram(s) Oral daily PRN  sodium chloride 0.9% lock flush 10 milliLiter(s) IV Push every 1 hour PRN    A/P: 64 year old female with hx of myelofibrosis presents for a MUD allogeneic SCT (DP Permissive) conditioning with Flu/Bu/Thiotepa/rATG.  Today is day + 8  - Thiotepa 1/. Maintain skin precautions during thiotepa administration and for 48 hours after completion (days .6 through day -3). Chronic pain issues - continue home fentanyl patch. Febrile overnight, tmax 100.9. Cultures  pending. RVP negative. CXR pending official read, no obvious consolidations. Vital signs are stable.    D/C HCTZ. Fludarabine 1/3, Busulfan 1/3, rATG 1/3. No Tylenol on busulfan days. Continue keppra ppx for 24 hours post infusion of last dose of busulfan. No acute events overnight, vital signs are stable.   - overnight patient was febrile during ATG, no cultures/abx as fever likely secondary to ATG. Patient was otherwise stable overnight. Fludarabine 2/3, Busulfan 2/3 and rATG 2/3. NO Tylenol or Azoles until Day 0. Continue with supportive care.  - Fludarabine 3/3, busulfan 3/3, rATG 3/3. No acute events overnight. Vital signs are stable.    Switched Compazine to atc   VSS, afebrile. 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   VSS, persistent fevers likely due to CRS post-SCT, continues on zosyn. Cultures pending. Hypervolemia - lasix 40mg x 1, will continue strict I/Os. Started caspofungin due to persistent thrush.   overnight no acute events noted. VSS, febrile likely due to CRS - continue with supportive care. BCx/UCx NGTD x 24h.   VSS, intermittently febrile likely due to CRS, infectious workup negative so far. PTCy  today. I&O, daily weights, prn diuresis. Continue to monitor thrush (improving). Changed some meds to PO suspension due to mucositis. Continue with supportive care. Refractory nausea, added trial of zyprexa 5mg po QHS x 3 days. d/c primafit. Change voriconazole to posaconazole tomorrow.   - No acute events overnight, vital signs are stable. Continue supportive care for chemotherapy induced oral mucositis.   - No acute events overnight, vital signs are stable. Completed PTCy, hydration to be discontinued today. I&O, daily weights, prn diuresis. Continue supportive care. Fentanyl patch to be titrated down to 12 mcg / hr tomorrow  7/3 Starting Scopolamine patch to decrease oral secretions. Increased Lisinopril 20mg in am, 10mg PM.    -No acute events; awaiting count recovery     1. Infectious Disease:   acyclovir    Suspension 800 milliGRAM(s) Oral every 12 hours  levoFLOXacin IVPB 500 milliGRAM(s) IV Intermittent every 24 hours  posaconazole DR Tablet 300 milliGRAM(s) Oral daily  vancomycin    Solution 125 milliGRAM(s) Oral every 12 hours    2. VOD Prophylaxis:   ursodiol Suspension 300 milliGRAM(s) Oral two times a day    3. GI Prophylaxis:    pantoprazole  Injectable 40 milliGRAM(s) IV Push daily    4. Mouthcare - NS / NaHCO3 rinses, Peridex, Biotene; Skin care     5. GVHD prophylaxis   PTCy days +3, +4  Bortezomib days 0, + 3  Abatacept days +5, +14, +28     6. Transfuse & replete electrolytes prn     7. IV hydration, daily weights, strict I&O, prn diuresis     8. PO intake as tolerated, nutrition follow up as needed    9. Antiemetics, anti-diarrhea medications:   loperamide 2 milliGRAM(s) Oral every 6 hours PRN  ondansetron Injectable 8 milliGRAM(s) IV Push every 8 hours PRN  prochlorperazine   Injectable 10 milliGRAM(s) IV Push every 6 hours  scopolamine 1 mG/72 Hr(s) Patch 1 Patch Transdermal every 72 hours  OLANZapine 5 milliGRAM(s) Oral at bedtime    10. OOB as tolerated, physical therapy consult if needed     11. Monitor coags / fibrinogen 2x week, vitamin K as needed   phytonadione   Solution 5 milliGRAM(s) Oral <User Schedule>    12. Monitor closely for clinical changes, monitor for fevers     13. Emotional support provided, plan of care discussed and questions addressed     14. Patient education done regarding plan of care, restrictions and discharge planning     15. Continue regular social work input     I have written the above note for Dr. Clarke  who performed service with me in the room.   Marry Buchanan NP-C ([phone])    I have seen and examined patient with NP, I agree with above note as scribed.                    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 prep regimen for treatment of myelofibrosis    Disease: myelofibrosis   Prep regimen: Flu / Bu / Thiotepa / rATG   GVHD ppx: ABC: PTCy days +3, +4, Bortezomib days 0, +3, PTCy days +5, +14, +28   ABO/CMV:   Recipient:   A+/CMV+   Donor:   B+/CMV+    S: Patient seen and examined with HPC Transplant Team:       O: Vitals:   Vital Signs Last 24 Hrs  T(C): 36.9 (2025 05:27), Max: 37.4 (2025 17:30)  T(F): 98.4 (2025 05:27), Max: 99.4 (2025 17:30)  HR: 94 (2025 05:27) (81 - 99)  BP: 186/68 (2025 05:27) (178/71 - 195/74)  BP(mean): --  RR: 16 (2025 05:27) (16 - 18)  SpO2: 96% (2025 05:27) (95% - 98%)    Parameters below as of 2025 05:27  Patient On (Oxygen Delivery Method): room air        Admit weight: 64.5kg   Daily Weight in k.7 (2025 07:55)    Intake / Output:   -04 @ 07:01  -  -05 @ 07:00  --------------------------------------------------------  IN: 605 mL / OUT: 1700 mL / NET: -1095 mL      PE:   Oropharynx: no erythema or ulcerations; +Thrush on tongue/posterior pharynx (improving)   Oral Mucositis: +                                                  ndGndrndanddndend:nd nd2nd CVS: S1, S2 RRR   Lungs: CTA throughout bilaterally   Abdomen: + BS x 4, soft, NT, ND   Extremities: no edema   Gastric Mucositis:                 -                              Grade: n/a   Intestinal Mucositis:              -                              Grade: n/a   Skin: no rash   TLC: CDI   Neuro: A&O x 3      Labs:   CBC Full  -  ( 2025 06:31 )  WBC Count : 0.01 K/uL  Hemoglobin : 7.7 g/dL  Hematocrit : 24.7 %  Platelet Count - Automated : 10 K/uL  Mean Cell Volume : 89.8 fl  Mean Cell Hemoglobin : 28.0 pg  Mean Cell Hemoglobin Concentration : 31.2 g/dL  Auto Neutrophil # : Not measured  Auto Lymphocyte # : Not measured  Auto Monocyte # : Not measured  Auto Eosinophil # : Not measured  Auto Basophil # : Not measured  Auto Neutrophil % : Not measured Differential percentages must be correlated with absolute numbers for  clinical significance.  Auto Lymphocyte % : Not measured  Auto Monocyte % : Not measured  Auto Eosinophil % : Not measured  Auto Basophil % : Not measured                          7.7    0.01  )-----------( 10       ( 2025 06:31 )             24.7     07-05    149[H]  |  115[H]  |  23  ----------------------------<  103[H]  3.4[L]   |  20[L]  |  0.61    Ca    8.2[L]      2025 06:31  Phos  2.8     07-05  Mg     1.8     07-05    TPro  5.1[L]  /  Alb  2.9[L]  /  TBili  1.4[H]  /  DBili  0.8[H]  /  AST  15  /  ALT  14  /  AlkPhos  90  07-05      LIVER FUNCTIONS - ( 2025 06:31 )  Alb: 2.9 g/dL / Pro: 5.1 g/dL / ALK PHOS: 90 U/L / ALT: 14 U/L / AST: 15 U/L / GGT: x           Lactate Dehydrogenase, Serum: 317 U/L ( @ 06:31)    Cultures:   Culture Results: <10,000 CFU/mL Normal Urogenital Cha (25 @ 06:31)    Culture Results:   No growth at 5 days(25 @ 16:36)    Culture Results:   No growth at  5 days (25 @ 16:30)    Respiratory Viral Panel with COVID-19 by SCARLET (25 @ 21:02)   Rapid RVP Result: [id number]: NotDetec:    Culture - Blood (25 @ 18:45)   Specimen Source: Blood Blood-Catheter    Culture Results: No growth at 24 hours Culture - Blood (25 @ 18:40)   Specimen Source: Blood Blood-Peripheral  Culture Results: No growth     Culture - Urine (25 @ 21:01)   Specimen Source: Clean Catch Clean Catch (Midstream)  Culture Results: 10,000 - 49,000 CFU/mL Gram Positive Cocci in Pairs and Chains   <10,000 CFU/ml Normal Urogenital cha present    Radiology:   ACC: [id number] EXAM:  XR CHEST PORTABLE URGENT 1V   ORDERED BY: SARAH REEVES   PROCEDURE DATE:  2025    IMPRESSION:  No focal consolidation.      Meds:   Antimicrobials:   acyclovir    Suspension 800 milliGRAM(s) Oral every 12 hours  levoFLOXacin IVPB 500 milliGRAM(s) IV Intermittent every 24 hours  posaconazole DR Tablet 300 milliGRAM(s) Oral daily  vancomycin    Solution 125 milliGRAM(s) Oral every 12 hours      Heme / Onc:       GI:  loperamide 2 milliGRAM(s) Oral every 6 hours PRN  pantoprazole  Injectable 40 milliGRAM(s) IV Push daily  polyethylene glycol 3350 17 Gram(s) Oral daily PRN  sodium bicarbonate Mouth Rinse 10 milliLiter(s) Swish and Spit five times a day  ursodiol Suspension 300 milliGRAM(s) Oral two times a day      Cardiovascular:   amLODIPine   Tablet 5 milliGRAM(s) Oral daily  lisinopril 20 milliGRAM(s) Oral <User Schedule>  lisinopril 20 milliGRAM(s) Oral <User Schedule>      Immunologic:   filgrastim-sndz (ZARXIO) Injectable 300 MICROGram(s) SubCutaneous every 24 hours      Other medications:   artificial tears (preservative free) Ophthalmic Solution 1 Drop(s) Both EYES four times a day  Biotene Dry Mouth Oral Rinse 5 milliLiter(s) Swish and Spit five times a day  buPROPion XL (24-Hour) . 300 milliGRAM(s) Oral daily  chlorhexidine 0.12% Liquid 15 milliLiter(s) Swish and Spit two times a day  chlorhexidine 4% Liquid 1 Application(s) Topical <User Schedule>  dextrose 5%. 1000 milliLiter(s) IV Continuous <Continuous>  fentaNYL   Patch  12 MICROgram(s)/Hr. 1 Patch Transdermal every 48 hours  levothyroxine 88 MICROGram(s) Oral daily  OLANZapine 5 milliGRAM(s) Oral at bedtime  phytonadione   Solution 5 milliGRAM(s) Oral <User Schedule>  prochlorperazine   Injectable 10 milliGRAM(s) IV Push every 6 hours  scopolamine 1 mG/72 Hr(s) Patch 1 Patch Transdermal every 72 hours  sodium chloride 0.9%. 1000 milliLiter(s) IV Continuous <Continuous>      PRN:   acetaminophen     Tablet .. 650 milliGRAM(s) Oral every 6 hours PRN  FIRST- Mouthwash  BLM 15 milliLiter(s) Swish and Swallow five times a day PRN  lidocaine 2% Viscous 15 milliLiter(s) Oral five times a day PRN  loperamide 2 milliGRAM(s) Oral every 6 hours PRN  ondansetron Injectable 8 milliGRAM(s) IV Push every 8 hours PRN  polyethylene glycol 3350 17 Gram(s) Oral daily PRN  sodium chloride 0.9% lock flush 10 milliLiter(s) IV Push every 1 hour PRN    A/P: 64 year old female with hx of myelofibrosis presents for a MUD allogeneic SCT (DP Permissive) conditioning with Flu/Bu/Thiotepa/rATG.  Today is day + 8  - Thiotepa 1/. Maintain skin precautions during thiotepa administration and for 48 hours after completion (days .6 through day -3). Chronic pain issues - continue home fentanyl patch. Febrile overnight, tmax 100.9. Cultures  pending. RVP negative. CXR pending official read, no obvious consolidations. Vital signs are stable.    D/C HCTZ. Fludarabine 1/3, Busulfan 1/3, rATG 1/3. No Tylenol on busulfan days. Continue keppra ppx for 24 hours post infusion of last dose of busulfan. No acute events overnight, vital signs are stable.   - overnight patient was febrile during ATG, no cultures/abx as fever likely secondary to ATG. Patient was otherwise stable overnight. Fludarabine 2/3, Busulfan 2/3 and rATG 2/3. NO Tylenol or Azoles until Day 0. Continue with supportive care.  - Fludarabine 3/3, busulfan 3/3, rATG 3/3. No acute events overnight. Vital signs are stable.    Switched Compazine to atc   VSS, afebrile. 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   VSS, persistent fevers likely due to CRS post-SCT, continues on zosyn. Cultures pending. Hypervolemia - lasix 40mg x 1, will continue strict I/Os. Started caspofungin due to persistent thrush.   overnight no acute events noted. VSS, febrile likely due to CRS - continue with supportive care. BCx/UCx NGTD x 24h.   VSS, intermittently febrile likely due to CRS, infectious workup negative so far. PTCy  today. I&O, daily weights, prn diuresis. Continue to monitor thrush (improving). Changed some meds to PO suspension due to mucositis. Continue with supportive care. Refractory nausea, added trial of zyprexa 5mg po QHS x 3 days. d/c primafit. Change voriconazole to posaconazole tomorrow.   - No acute events overnight, vital signs are stable. Continue supportive care for chemotherapy induced oral mucositis.   - No acute events overnight, vital signs are stable. Completed PTCy, hydration to be discontinued today. I&O, daily weights, prn diuresis. Continue supportive care. Fentanyl patch to be titrated down to 12 mcg / hr tomorrow  7/3 Starting Scopolamine patch to decrease oral secretions. Increased Lisinopril 20mg in am, 10mg PM.    -No acute events; awaiting count recovery     1. Infectious Disease:   acyclovir    Suspension 800 milliGRAM(s) Oral every 12 hours  levoFLOXacin IVPB 500 milliGRAM(s) IV Intermittent every 24 hours  posaconazole DR Tablet 300 milliGRAM(s) Oral daily  vancomycin    Solution 125 milliGRAM(s) Oral every 12 hours    2. VOD Prophylaxis:   ursodiol Suspension 300 milliGRAM(s) Oral two times a day    3. GI Prophylaxis:    pantoprazole  Injectable 40 milliGRAM(s) IV Push daily    4. Mouthcare - NS / NaHCO3 rinses, Peridex, Biotene; Skin care     5. GVHD prophylaxis   PTCy days +3, +4  Bortezomib days 0, + 3  Abatacept days +5, +14, +28     6. Transfuse & replete electrolytes prn     7. IV hydration, daily weights, strict I&O, prn diuresis     8. PO intake as tolerated, nutrition follow up as needed    9. Antiemetics, anti-diarrhea medications:   loperamide 2 milliGRAM(s) Oral every 6 hours PRN  ondansetron Injectable 8 milliGRAM(s) IV Push every 8 hours PRN  prochlorperazine   Injectable 10 milliGRAM(s) IV Push every 6 hours  scopolamine 1 mG/72 Hr(s) Patch 1 Patch Transdermal every 72 hours  OLANZapine 5 milliGRAM(s) Oral at bedtime    10. OOB as tolerated, physical therapy consult if needed     11. Monitor coags / fibrinogen 2x week, vitamin K as needed   phytonadione   Solution 5 milliGRAM(s) Oral <User Schedule>    12. Monitor closely for clinical changes, monitor for fevers     13. Emotional support provided, plan of care discussed and questions addressed     14. Patient education done regarding plan of care, restrictions and discharge planning     15. Continue regular social work input     I have written the above note for Dr. Clarke  who performed service with me in the room.   Marry Buchanan NP-C ([phone])    I have seen and examined patient with NP, I agree with above note as scribed.                    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 prep regimen for treatment of myelofibrosis    Disease: myelofibrosis   Prep regimen: Flu / Bu / Thiotepa / rATG   GVHD ppx: ABC: PTCy days +3, +4, Bortezomib days 0, +3, PTCy days +5, +14, +28   ABO/CMV:   Recipient:   A+/CMV+   Donor:   B+/CMV+    S: Patient seen and examined with HPC Transplant Team:   + generalized pain   + mouth pain     O: Vitals:   Vital Signs Last 24 Hrs  T(C): 36.9 (2025 05:27), Max: 37.4 (2025 17:30)  T(F): 98.4 (2025 05:27), Max: 99.4 (2025 17:30)  HR: 94 (2025 05:27) (81 - 99)  BP: 186/68 (2025 05:27) (178/71 - 195/74)  BP(mean): --  RR: 16 (2025 05:27) (16 - 18)  SpO2: 96% (2025 05:27) (95% - 98%)    Parameters below as of 2025 05:27  Patient On (Oxygen Delivery Method): room air        Admit weight: 64.5kg   Daily Weight in k.7 (2025 07:55)    Intake / Output:   -04 @ 07:01  -  07-05 @ 07:00  --------------------------------------------------------  IN: 605 mL / OUT: 1700 mL / NET: -1095 mL      PE:   Oropharynx: no erythema or ulcerations  Oral Mucositis: +                                                  ndGndrndanddndend:nd nd2nd CVS: S1, S2 RRR   Lungs: CTA throughout bilaterally   Abdomen: + BS x 4, soft, NT, ND   Extremities: no edema   Gastric Mucositis:                 -                              Grade: n/a   Intestinal Mucositis:              -                              Grade: n/a   Skin: no rash   TLC: CDI   Neuro: A&O x 3      Labs:   CBC Full  -  ( 2025 06:31 )  WBC Count : 0.01 K/uL  Hemoglobin : 7.7 g/dL  Hematocrit : 24.7 %  Platelet Count - Automated : 10 K/uL  Mean Cell Volume : 89.8 fl  Mean Cell Hemoglobin : 28.0 pg  Mean Cell Hemoglobin Concentration : 31.2 g/dL  Auto Neutrophil # : Not measured  Auto Lymphocyte # : Not measured  Auto Monocyte # : Not measured  Auto Eosinophil # : Not measured  Auto Basophil # : Not measured  Auto Neutrophil % : Not measured Differential percentages must be correlated with absolute numbers for  clinical significance.  Auto Lymphocyte % : Not measured  Auto Monocyte % : Not measured  Auto Eosinophil % : Not measured  Auto Basophil % : Not measured                          7.7    0.01  )-----------( 10       ( 2025 06:31 )             24.7     07-05    149[H]  |  115[H]  |  23  ----------------------------<  103[H]  3.4[L]   |  20[L]  |  0.61    Ca    8.2[L]      2025 06:31  Phos  2.8     07-05  Mg     1.8     07-05    TPro  5.1[L]  /  Alb  2.9[L]  /  TBili  1.4[H]  /  DBili  0.8[H]  /  AST  15  /  ALT  14  /  AlkPhos  90  07-05      LIVER FUNCTIONS - ( 2025 06:31 )  Alb: 2.9 g/dL / Pro: 5.1 g/dL / ALK PHOS: 90 U/L / ALT: 14 U/L / AST: 15 U/L / GGT: x           Lactate Dehydrogenase, Serum: 317 U/L ( @ 06:31)    Cultures:   Culture Results: <10,000 CFU/mL Normal Urogenital Cha (25 @ 06:31)    Culture Results:   No growth at 5 days(25 @ 16:36)    Culture Results:   No growth at  5 days (25 @ 16:30)    Respiratory Viral Panel with COVID-19 by SCARLET (25 @ 21:02)   Rapid RVP Result: [id number]: NotDetec:    Culture - Blood (25 @ 18:45)   Specimen Source: Blood Blood-Catheter    Culture Results: No growth at 24 hours Culture - Blood (25 @ 18:40)   Specimen Source: Blood Blood-Peripheral  Culture Results: No growth     Culture - Urine (25 @ 21:01)   Specimen Source: Clean Catch Clean Catch (Midstream)  Culture Results: 10,000 - 49,000 CFU/mL Gram Positive Cocci in Pairs and Chains   <10,000 CFU/ml Normal Urogenital cha present    Radiology:   ACC: [id number] EXAM:  XR CHEST PORTABLE URGENT 1V   ORDERED BY: SARAH REEVES   PROCEDURE DATE:  2025    IMPRESSION:  No focal consolidation.      Meds:   Antimicrobials:   acyclovir    Suspension 800 milliGRAM(s) Oral every 12 hours  levoFLOXacin IVPB 500 milliGRAM(s) IV Intermittent every 24 hours  posaconazole DR Tablet 300 milliGRAM(s) Oral daily  vancomycin    Solution 125 milliGRAM(s) Oral every 12 hours      Heme / Onc:       GI:  loperamide 2 milliGRAM(s) Oral every 6 hours PRN  pantoprazole  Injectable 40 milliGRAM(s) IV Push daily  polyethylene glycol 3350 17 Gram(s) Oral daily PRN  sodium bicarbonate Mouth Rinse 10 milliLiter(s) Swish and Spit five times a day  ursodiol Suspension 300 milliGRAM(s) Oral two times a day      Cardiovascular:   amLODIPine   Tablet 5 milliGRAM(s) Oral daily  lisinopril 20 milliGRAM(s) Oral <User Schedule>  lisinopril 20 milliGRAM(s) Oral <User Schedule>      Immunologic:   filgrastim-sndz (ZARXIO) Injectable 300 MICROGram(s) SubCutaneous every 24 hours      Other medications:   artificial tears (preservative free) Ophthalmic Solution 1 Drop(s) Both EYES four times a day  Biotene Dry Mouth Oral Rinse 5 milliLiter(s) Swish and Spit five times a day  buPROPion XL (24-Hour) . 300 milliGRAM(s) Oral daily  chlorhexidine 0.12% Liquid 15 milliLiter(s) Swish and Spit two times a day  chlorhexidine 4% Liquid 1 Application(s) Topical <User Schedule>  dextrose 5%. 1000 milliLiter(s) IV Continuous <Continuous>  fentaNYL   Patch  12 MICROgram(s)/Hr. 1 Patch Transdermal every 48 hours  levothyroxine 88 MICROGram(s) Oral daily  OLANZapine 5 milliGRAM(s) Oral at bedtime  phytonadione   Solution 5 milliGRAM(s) Oral <User Schedule>  prochlorperazine   Injectable 10 milliGRAM(s) IV Push every 6 hours  scopolamine 1 mG/72 Hr(s) Patch 1 Patch Transdermal every 72 hours  sodium chloride 0.9%. 1000 milliLiter(s) IV Continuous <Continuous>      PRN:   acetaminophen     Tablet .. 650 milliGRAM(s) Oral every 6 hours PRN  FIRST- Mouthwash  BLM 15 milliLiter(s) Swish and Swallow five times a day PRN  lidocaine 2% Viscous 15 milliLiter(s) Oral five times a day PRN  loperamide 2 milliGRAM(s) Oral every 6 hours PRN  ondansetron Injectable 8 milliGRAM(s) IV Push every 8 hours PRN  polyethylene glycol 3350 17 Gram(s) Oral daily PRN  sodium chloride 0.9% lock flush 10 milliLiter(s) IV Push every 1 hour PRN    A/P: 64 year old female with hx of myelofibrosis presents for a MUD allogeneic SCT (DP Permissive) conditioning with Flu/Bu/Thiotepa/rATG.  Today is day + 8  - Thiotepa /. Maintain skin precautions during thiotepa administration and for 48 hours after completion (days .6 through day -3). Chronic pain issues - continue home fentanyl patch. Febrile overnight, tmax 100.9. Cultures  pending. RVP negative. CXR pending official read, no obvious consolidations. Vital signs are stable.    D/C HCTZ. Fludarabine 13, Busulfan 1/3, rATG 1/3. No Tylenol on busulfan days. Continue keppra ppx for 24 hours post infusion of last dose of busulfan. No acute events overnight, vital signs are stable.   - overnight patient was febrile during ATG, no cultures/abx as fever likely secondary to ATG. Patient was otherwise stable overnight. Fludarabine 2/3, Busulfan 2/3 and rATG 2/3. NO Tylenol or Azoles until Day 0. Continue with supportive care.  - Fludarabine 3/3, busulfan 3/3, rATG 3/3. No acute events overnight. Vital signs are stable.    Switched Compazine to atc   VSS, afebrile. 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   VSS, persistent fevers likely due to CRS post-SCT, continues on zosyn. Cultures pending. Hypervolemia - lasix 40mg x 1, will continue strict I/Os. Started caspofungin due to persistent thrush.   overnight no acute events noted. VSS, febrile likely due to CRS - continue with supportive care. BCx/UCx NGTD x 24h.   VSS, intermittently febrile likely due to CRS, infectious workup negative so far. PTCy  today. I&O, daily weights, prn diuresis. Continue to monitor thrush (improving). Changed some meds to PO suspension due to mucositis. Continue with supportive care. Refractory nausea, added trial of zyprexa 5mg po QHS x 3 days. d/c primafit. Change voriconazole to posaconazole tomorrow.   - No acute events overnight, vital signs are stable. Continue supportive care for chemotherapy induced oral mucositis.   - No acute events overnight, vital signs are stable. Completed PTCy, hydration to be discontinued today. I&O, daily weights, prn diuresis. Continue supportive care. Fentanyl patch to be titrated down to 12 mcg / hr tomorrow  7/3 Starting Scopolamine patch to decrease oral secretions. Increased Lisinopril 20mg in am, 10mg PM.    -No acute events; awaiting count recovery   - no acute events overnight. Vital signs are stable. SBP remains elevated, lisinopril / metoprolol doses increased. Would use prn hydralazine if needed.     1. Infectious Disease:   acyclovir    Suspension 800 milliGRAM(s) Oral every 12 hours  levoFLOXacin IVPB 500 milliGRAM(s) IV Intermittent every 24 hours  posaconazole DR Tablet 300 milliGRAM(s) Oral daily  vancomycin    Solution 125 milliGRAM(s) Oral every 12 hours    2. VOD Prophylaxis:   ursodiol Suspension 300 milliGRAM(s) Oral two times a day    3. GI Prophylaxis:    pantoprazole  Injectable 40 milliGRAM(s) IV Push daily    4. Mouthcare - NS / NaHCO3 rinses, Peridex, Biotene; Skin care     5. GVHD prophylaxis   PTCy days +3, +4  Bortezomib days 0, + 3  Abatacept days +5, +14, +28     6. Transfuse & replete electrolytes prn     7. IV hydration, daily weights, strict I&O, prn diuresis   Lasix 20mg IV x 1     8. PO intake as tolerated, nutrition follow up as needed    9. Antiemetics, anti-diarrhea medications:   loperamide 2 milliGRAM(s) Oral every 6 hours PRN  ondansetron Injectable 8 milliGRAM(s) IV Push every 8 hours PRN  prochlorperazine   Injectable 10 milliGRAM(s) IV Push every 6 hours  scopolamine 1 mG/72 Hr(s) Patch 1 Patch Transdermal every 72 hours  OLANZapine 5 milliGRAM(s) Oral at bedtime    10. OOB as tolerated, physical therapy consult if needed     11. Monitor coags / fibrinogen 2x week, vitamin K as needed   phytonadione   Solution 5 milliGRAM(s) Oral <User Schedule>    12. Monitor closely for clinical changes, monitor for fevers     13. Emotional support provided, plan of care discussed and questions addressed     14. Patient education done regarding plan of care, restrictions and discharge planning     15. Continue regular social work input     I have written the above note for Dr. Clarke  who performed service with me in the room.   Marry Buchanan NP-C ([phone])    I have seen and examined patient with NP, I agree with above note as scribed.                    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 prep regimen for treatment of myelofibrosis    Disease: myelofibrosis   Prep regimen: Flu / Bu / Thiotepa / rATG   GVHD ppx: ABC: PTCy days +3, +4, Bortezomib days 0, +3, PTCy days +5, +14, +28   ABO/CMV:   Recipient:   A+/CMV+   Donor:   B+/CMV+    S: Patient seen and examined with [facility] Transplant Team:   + mouth pain     O: Vitals:   Vital Signs Last 24 Hrs  T(C): 36.9 (2025 05:27), Max: 37.4 (2025 17:30)  T(F): 98.4 (2025 05:27), Max: 99.4 (2025 17:30)  HR: 94 (2025 05:27) (81 - 99)  BP: 186/68 (2025 05:27) (178/71 - 195/74)  BP(mean): --  RR: 16 (2025 05:27) (16 - 18)  SpO2: 96% (2025 05:27) (95% - 98%)    Parameters below as of 2025 05:27  Patient On (Oxygen Delivery Method): room air        Admit weight: 64.5kg   Daily Weight in k.7 (2025 07:55)    Intake / Output:   07-04 @ 07:01  -  07-05 @ 07:00  --------------------------------------------------------  IN: 605 mL / OUT: 1700 mL / NET: -1095 mL      PE:   Oropharynx: no erythema or ulcerations  Oral Mucositis: +                                                  ndGndrndanddndend:nd nd2nd CVS: S1, S2 RRR   Lungs: CTA throughout bilaterally   Abdomen: + BS x 4, soft, NT, ND   Extremities: no edema   Gastric Mucositis:                 -                              Grade: n/a   Intestinal Mucositis:              -                              Grade: n/a   Skin: no rash   TLC: CDI   Neuro: A&O x 3      Labs:   CBC Full  -  ( 2025 06:31 )  WBC Count : 0.01 K/uL  Hemoglobin : 7.7 g/dL  Hematocrit : 24.7 %  Platelet Count - Automated : 10 K/uL  Mean Cell Volume : 89.8 fl  Mean Cell Hemoglobin : 28.0 pg  Mean Cell Hemoglobin Concentration : 31.2 g/dL  Auto Neutrophil # : Not measured  Auto Lymphocyte # : Not measured  Auto Monocyte # : Not measured  Auto Eosinophil # : Not measured  Auto Basophil # : Not measured  Auto Neutrophil % : Not measured Differential percentages must be correlated with absolute numbers for  clinical significance.  Auto Lymphocyte % : Not measured  Auto Monocyte % : Not measured  Auto Eosinophil % : Not measured  Auto Basophil % : Not measured                          7.7    0.01  )-----------( 10       ( 2025 06:31 )             24.7     07-    149[H]  |  115[H]  |  23  ----------------------------<  103[H]  3.4[L]   |  20[L]  |  0.61    Ca    8.2[L]      2025 06:31  Phos  2.8     07-  Mg     1.8     07-05    TPro  5.1[L]  /  Alb  2.9[L]  /  TBili  1.4[H]  /  DBili  0.8[H]  /  AST  15  /  ALT  14  /  AlkPhos  90  07-05      LIVER FUNCTIONS - ( 2025 06:31 )  Alb: 2.9 g/dL / Pro: 5.1 g/dL / ALK PHOS: 90 U/L / ALT: 14 U/L / AST: 15 U/L / GGT: x           Lactate Dehydrogenase, Serum: 317 U/L ( @ 06:31)    Cultures:   Culture Results: <10,000 CFU/mL Normal Urogenital Cha (25 @ 06:31)    Culture Results:   No growth at 5 days(25 @ 16:36)    Culture Results:   No growth at  5 days (25 @ 16:30)    Respiratory Viral Panel with COVID-19 by SCARLET (25 @ 21:02)   Rapid RVP Result: [id number]: NotDetec:    Culture - Blood (25 @ 18:45)   Specimen Source: Blood Blood-Catheter    Culture Results: No growth at 24 hours Culture - Blood (25 @ 18:40)   Specimen Source: Blood Blood-Peripheral  Culture Results: No growth     Culture - Urine (25 @ 21:01)   Specimen Source: Clean Catch Clean Catch (Midstream)  Culture Results: 10,000 - 49,000 CFU/mL Gram Positive Cocci in Pairs and Chains   <10,000 CFU/ml Normal Urogenital cha present    Radiology:   ACC: [id number] EXAM:  XR CHEST PORTABLE URGENT 1V   ORDERED BY: SARAH REEVES   PROCEDURE DATE:  2025    IMPRESSION:  No focal consolidation.      Meds:   Antimicrobials:   acyclovir    Suspension 800 milliGRAM(s) Oral every 12 hours  levoFLOXacin IVPB 500 milliGRAM(s) IV Intermittent every 24 hours  posaconazole DR Tablet 300 milliGRAM(s) Oral daily  vancomycin    Solution 125 milliGRAM(s) Oral every 12 hours      Heme / Onc:       GI:  loperamide 2 milliGRAM(s) Oral every 6 hours PRN  pantoprazole  Injectable 40 milliGRAM(s) IV Push daily  polyethylene glycol 3350 17 Gram(s) Oral daily PRN  sodium bicarbonate Mouth Rinse 10 milliLiter(s) Swish and Spit five times a day  ursodiol Suspension 300 milliGRAM(s) Oral two times a day      Cardiovascular:   amLODIPine   Tablet 5 milliGRAM(s) Oral daily  lisinopril 20 milliGRAM(s) Oral <User Schedule>  lisinopril 20 milliGRAM(s) Oral <User Schedule>      Immunologic:   filgrastim-sndz (ZARXIO) Injectable 300 MICROGram(s) SubCutaneous every 24 hours      Other medications:   artificial tears (preservative free) Ophthalmic Solution 1 Drop(s) Both EYES four times a day  Biotene Dry Mouth Oral Rinse 5 milliLiter(s) Swish and Spit five times a day  buPROPion XL (24-Hour) . 300 milliGRAM(s) Oral daily  chlorhexidine 0.12% Liquid 15 milliLiter(s) Swish and Spit two times a day  chlorhexidine 4% Liquid 1 Application(s) Topical <User Schedule>  dextrose 5%. 1000 milliLiter(s) IV Continuous <Continuous>  fentaNYL   Patch  12 MICROgram(s)/Hr. 1 Patch Transdermal every 48 hours  levothyroxine 88 MICROGram(s) Oral daily  OLANZapine 5 milliGRAM(s) Oral at bedtime  phytonadione   Solution 5 milliGRAM(s) Oral <User Schedule>  prochlorperazine   Injectable 10 milliGRAM(s) IV Push every 6 hours  scopolamine 1 mG/72 Hr(s) Patch 1 Patch Transdermal every 72 hours  sodium chloride 0.9%. 1000 milliLiter(s) IV Continuous <Continuous>      PRN:   acetaminophen     Tablet .. 650 milliGRAM(s) Oral every 6 hours PRN  FIRST- Mouthwash  BLM 15 milliLiter(s) Swish and Swallow five times a day PRN  lidocaine 2% Viscous 15 milliLiter(s) Oral five times a day PRN  loperamide 2 milliGRAM(s) Oral every 6 hours PRN  ondansetron Injectable 8 milliGRAM(s) IV Push every 8 hours PRN  polyethylene glycol 3350 17 Gram(s) Oral daily PRN  sodium chloride 0.9% lock flush 10 milliLiter(s) IV Push every 1 hour PRN    A/P: 64 year old female with hx of myelofibrosis presents for a MUD allogeneic SCT (DP Permissive) conditioning with Flu/Bu/Thiotepa/rATG.  Today is day + 8  - Thiotepa /. Maintain skin precautions during thiotepa administration and for 48 hours after completion (days .6 through day -3). Chronic pain issues - continue home fentanyl patch. Febrile overnight, tmax 100.9. Cultures  pending. RVP negative. CXR pending official read, no obvious consolidations. Vital signs are stable.    D/C HCTZ. Fludarabine 13, Busulfan 3, rATG 1/3. No Tylenol on busulfan days. Continue keppra ppx for 24 hours post infusion of last dose of busulfan. No acute events overnight, vital signs are stable.   - overnight patient was febrile during ATG, no cultures/abx as fever likely secondary to ATG. Patient was otherwise stable overnight. Fludarabine 2/3, Busulfan 2/3 and rATG 2/3. NO Tylenol or Azoles until Day 0. Continue with supportive care.  - Fludarabine 3/3, busulfan 3/3, rATG 3/3. No acute events overnight. Vital signs are stable.    Switched Compazine to atc   VSS, afebrile. 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   VSS, persistent fevers likely due to CRS post-SCT, continues on zosyn. Cultures pending. Hypervolemia - lasix 40mg x 1, will continue strict I/Os. Started caspofungin due to persistent thrush.   overnight no acute events noted. VSS, febrile likely due to CRS - continue with supportive care. BCx/UCx NGTD x 24h.   VSS, intermittently febrile likely due to CRS, infectious workup negative so far. PTCy  today. I&O, daily weights, prn diuresis. Continue to monitor thrush (improving). Changed some meds to PO suspension due to mucositis. Continue with supportive care. Refractory nausea, added trial of zyprexa 5mg po QHS x 3 days. d/c primafit. Change voriconazole to posaconazole tomorrow.   - No acute events overnight, vital signs are stable. Continue supportive care for chemotherapy induced oral mucositis.   - No acute events overnight, vital signs are stable. Completed PTCy, hydration to be discontinued today. I&O, daily weights, prn diuresis. Continue supportive care. Fentanyl patch to be titrated down to 12 mcg / hr tomorrow  7/3 Starting Scopolamine patch to decrease oral secretions. Increased Lisinopril 20mg in am, 10mg PM.    -No acute events; awaiting count recovery   - no acute events overnight. Vital signs are stable. SBP remains elevated, lisinopril / metoprolol doses increased.     1. Infectious Disease:   acyclovir    Suspension 800 milliGRAM(s) Oral every 12 hours  levoFLOXacin IVPB 500 milliGRAM(s) IV Intermittent every 24 hours  posaconazole DR Tablet 300 milliGRAM(s) Oral daily  vancomycin    Solution 125 milliGRAM(s) Oral every 12 hours    2. VOD Prophylaxis:   ursodiol Suspension 300 milliGRAM(s) Oral two times a day    3. GI Prophylaxis:    pantoprazole  Injectable 40 milliGRAM(s) IV Push daily    4. Mouthcare - NS / NaHCO3 rinses, Peridex, Biotene; Skin care     5. GVHD prophylaxis   PTCy days +3, +4  Bortezomib days 0, + 3  Abatacept days +5, +14, +28     6. Transfuse & replete electrolytes prn     7. IV hydration, daily weights, strict I&O, prn diuresis   Lasix 20mg IV x 1     8. PO intake as tolerated, nutrition follow up as needed    9. Antiemetics, anti-diarrhea medications:   loperamide 2 milliGRAM(s) Oral every 6 hours PRN  ondansetron Injectable 8 milliGRAM(s) IV Push every 8 hours PRN  prochlorperazine   Injectable 10 milliGRAM(s) IV Push every 6 hours  scopolamine 1 mG/72 Hr(s) Patch 1 Patch Transdermal every 72 hours  OLANZapine 5 milliGRAM(s) Oral at bedtime    10. OOB as tolerated, physical therapy consult if needed     11. Monitor coags / fibrinogen 2x week, vitamin K as needed   phytonadione   Solution 5 milliGRAM(s) Oral <User Schedule>    12. Monitor closely for clinical changes, monitor for fevers     13. Emotional support provided, plan of care discussed and questions addressed     14. Patient education done regarding plan of care, restrictions and discharge planning     15. Continue regular social work input     I have written the above note for Dr. Clarke  who performed service with me in the room.   Marry Buchanan NP-C ([phone])    I have seen and examined patient with NP, I agree with above note as scribed.

## 2025-07-06 LAB
ALBUMIN SERPL ELPH-MCNC: 2.9 G/DL — LOW (ref 3.3–5)
ALP SERPL-CCNC: 91 U/L — SIGNIFICANT CHANGE UP (ref 40–120)
ALT FLD-CCNC: 15 U/L — SIGNIFICANT CHANGE UP (ref 10–45)
ANION GAP SERPL CALC-SCNC: 10 MMOL/L — SIGNIFICANT CHANGE UP (ref 5–17)
AST SERPL-CCNC: 14 U/L — SIGNIFICANT CHANGE UP (ref 10–40)
BASOPHILS # BLD AUTO: SIGNIFICANT CHANGE UP (ref 0–0.2)
BASOPHILS NFR BLD AUTO: SIGNIFICANT CHANGE UP (ref 0–2)
BILIRUB SERPL-MCNC: 1.2 MG/DL — SIGNIFICANT CHANGE UP (ref 0.2–1.2)
BUN SERPL-MCNC: 22 MG/DL — SIGNIFICANT CHANGE UP (ref 7–23)
CALCIUM SERPL-MCNC: 8.2 MG/DL — LOW (ref 8.4–10.5)
CHLORIDE SERPL-SCNC: 113 MMOL/L — HIGH (ref 96–108)
CO2 SERPL-SCNC: 23 MMOL/L — SIGNIFICANT CHANGE UP (ref 22–31)
CREAT SERPL-MCNC: 0.62 MG/DL — SIGNIFICANT CHANGE UP (ref 0.5–1.3)
EGFR: 100 ML/MIN/1.73M2 — SIGNIFICANT CHANGE UP
EGFR: 100 ML/MIN/1.73M2 — SIGNIFICANT CHANGE UP
EOSINOPHIL # BLD AUTO: SIGNIFICANT CHANGE UP (ref 0–0.5)
EOSINOPHIL NFR BLD AUTO: SIGNIFICANT CHANGE UP (ref 0–6)
GLUCOSE SERPL-MCNC: 114 MG/DL — HIGH (ref 70–99)
HCT VFR BLD CALC: 21.7 % — LOW (ref 34.5–45)
HGB BLD-MCNC: 6.9 G/DL — CRITICAL LOW (ref 11.5–15.5)
IMM GRANULOCYTES # BLD AUTO: SIGNIFICANT CHANGE UP (ref 0–0.07)
IMM GRANULOCYTES NFR BLD AUTO: SIGNIFICANT CHANGE UP (ref 0–0.9)
IMMATURE PLATELET FRACTION #: 0 K/UL — LOW (ref 4.7–11.1)
IMMATURE PLATELET FRACTION %: 1.6 % — SIGNIFICANT CHANGE UP (ref 1.6–4.9)
LDH SERPL L TO P-CCNC: 285 U/L — HIGH (ref 50–242)
LYMPHOCYTES # BLD AUTO: SIGNIFICANT CHANGE UP (ref 1–3.3)
LYMPHOCYTES NFR BLD AUTO: SIGNIFICANT CHANGE UP (ref 13–44)
MAGNESIUM SERPL-MCNC: 1.7 MG/DL — SIGNIFICANT CHANGE UP (ref 1.6–2.6)
MCHC RBC-ENTMCNC: 28.8 PG — SIGNIFICANT CHANGE UP (ref 27–34)
MCHC RBC-ENTMCNC: 31.8 G/DL — LOW (ref 32–36)
MCV RBC AUTO: 90.4 FL — SIGNIFICANT CHANGE UP (ref 80–100)
MONOCYTES # BLD AUTO: SIGNIFICANT CHANGE UP (ref 0–0.9)
MONOCYTES NFR BLD AUTO: SIGNIFICANT CHANGE UP (ref 2–14)
NEUTROPHILS # BLD AUTO: SIGNIFICANT CHANGE UP (ref 1.8–7.4)
NEUTROPHILS NFR BLD AUTO: SIGNIFICANT CHANGE UP (ref 43–77)
NRBC # BLD AUTO: 0 K/UL — SIGNIFICANT CHANGE UP (ref 0–0)
NRBC # FLD: 0 K/UL — SIGNIFICANT CHANGE UP (ref 0–0)
NRBC BLD AUTO-RTO: SIGNIFICANT CHANGE UP (ref 0–0)
PHOSPHATE SERPL-MCNC: 2.6 MG/DL — SIGNIFICANT CHANGE UP (ref 2.5–4.5)
PLATELET # BLD AUTO: 3 K/UL — CRITICAL LOW (ref 150–400)
PMV BLD: SIGNIFICANT CHANGE UP FL (ref 7–13)
POTASSIUM SERPL-MCNC: 3.7 MMOL/L — SIGNIFICANT CHANGE UP (ref 3.5–5.3)
POTASSIUM SERPL-SCNC: 3.7 MMOL/L — SIGNIFICANT CHANGE UP (ref 3.5–5.3)
PROT SERPL-MCNC: 5.1 G/DL — LOW (ref 6–8.3)
RBC # BLD: 2.4 M/UL — LOW (ref 3.8–5.2)
RBC # FLD: 16.4 % — HIGH (ref 10.3–14.5)
SODIUM SERPL-SCNC: 146 MMOL/L — HIGH (ref 135–145)
WBC # BLD: 0 K/UL — CRITICAL LOW (ref 3.8–10.5)
WBC # FLD AUTO: 0 K/UL — CRITICAL LOW (ref 3.8–10.5)

## 2025-07-06 PROCEDURE — 93970 EXTREMITY STUDY: CPT | Mod: 26

## 2025-07-06 PROCEDURE — 99233 SBSQ HOSP IP/OBS HIGH 50: CPT

## 2025-07-06 RX ORDER — HYDROMORPHONE/SOD CHLOR,ISO/PF 2 MG/10 ML
0.5 SYRINGE (ML) INJECTION ONCE
Refills: 0 | Status: DISCONTINUED | OUTPATIENT
Start: 2025-07-06 | End: 2025-07-06

## 2025-07-06 RX ORDER — FUROSEMIDE 10 MG/ML
20 INJECTION INTRAMUSCULAR; INTRAVENOUS ONCE
Refills: 0 | Status: COMPLETED | OUTPATIENT
Start: 2025-07-06 | End: 2025-07-06

## 2025-07-06 RX ORDER — ACETAMINOPHEN 500 MG/5ML
650 LIQUID (ML) ORAL ONCE
Refills: 0 | Status: COMPLETED | OUTPATIENT
Start: 2025-07-06 | End: 2025-07-06

## 2025-07-06 RX ADMIN — SCOPOLAMINE 1 PATCH: 1 PATCH, EXTENDED RELEASE TRANSDERMAL at 19:44

## 2025-07-06 RX ADMIN — SCOPOLAMINE 1 PATCH: 1 PATCH, EXTENDED RELEASE TRANSDERMAL at 07:29

## 2025-07-06 RX ADMIN — Medication 1 DROP(S): at 12:15

## 2025-07-06 RX ADMIN — AMLODIPINE BESYLATE 5 MILLIGRAM(S): 10 TABLET ORAL at 05:58

## 2025-07-06 RX ADMIN — FUROSEMIDE 20 MILLIGRAM(S): 10 INJECTION INTRAMUSCULAR; INTRAVENOUS at 13:31

## 2025-07-06 RX ADMIN — Medication 5 MILLILITER(S): at 19:58

## 2025-07-06 RX ADMIN — Medication 10 MILLILITER(S): at 19:56

## 2025-07-06 RX ADMIN — URSODIOL 300 MILLIGRAM(S): 300 CAPSULE ORAL at 05:59

## 2025-07-06 RX ADMIN — SCOPOLAMINE 1 PATCH: 1 PATCH, EXTENDED RELEASE TRANSDERMAL at 12:14

## 2025-07-06 RX ADMIN — Medication 125 MILLIGRAM(S): at 06:03

## 2025-07-06 RX ADMIN — Medication 650 MILLIGRAM(S): at 14:30

## 2025-07-06 RX ADMIN — Medication 5 MILLILITER(S): at 07:40

## 2025-07-06 RX ADMIN — Medication 1 PATCH: at 19:44

## 2025-07-06 RX ADMIN — Medication 260 MILLIGRAM(S): at 13:52

## 2025-07-06 RX ADMIN — Medication 1 DROP(S): at 05:58

## 2025-07-06 RX ADMIN — Medication 10 MILLILITER(S): at 23:02

## 2025-07-06 RX ADMIN — Medication 10 MILLIGRAM(S): at 23:02

## 2025-07-06 RX ADMIN — Medication 40 MILLIGRAM(S): at 05:59

## 2025-07-06 RX ADMIN — METOPROLOL SUCCINATE 25 MILLIGRAM(S): 50 TABLET, EXTENDED RELEASE ORAL at 05:58

## 2025-07-06 RX ADMIN — Medication 5 MILLILITER(S): at 23:02

## 2025-07-06 RX ADMIN — Medication 100.5 MILLIGRAM(S): at 18:00

## 2025-07-06 RX ADMIN — SCOPOLAMINE 1 PATCH: 1 PATCH, EXTENDED RELEASE TRANSDERMAL at 11:00

## 2025-07-06 RX ADMIN — FILGRASTIM 300 MICROGRAM(S): 300 INJECTION, SOLUTION INTRAVENOUS; SUBCUTANEOUS at 12:14

## 2025-07-06 RX ADMIN — Medication 10 MILLIGRAM(S): at 12:15

## 2025-07-06 RX ADMIN — Medication 88 MICROGRAM(S): at 05:59

## 2025-07-06 RX ADMIN — Medication 10 MILLIGRAM(S): at 18:42

## 2025-07-06 RX ADMIN — Medication 1 DROP(S): at 18:42

## 2025-07-06 RX ADMIN — Medication 10 MILLILITER(S): at 07:40

## 2025-07-06 RX ADMIN — Medication 0.5 MILLIGRAM(S): at 18:45

## 2025-07-06 RX ADMIN — Medication 800 MILLIGRAM(S): at 06:01

## 2025-07-06 RX ADMIN — LISINOPRIL 20 MILLIGRAM(S): 5 TABLET ORAL at 06:01

## 2025-07-06 RX ADMIN — Medication 15 MILLILITER(S): at 06:01

## 2025-07-06 RX ADMIN — Medication 1 PATCH: at 07:29

## 2025-07-06 RX ADMIN — Medication 10 MILLIGRAM(S): at 05:59

## 2025-07-06 RX ADMIN — Medication 1 APPLICATION(S): at 07:40

## 2025-07-06 RX ADMIN — Medication 1 DROP(S): at 23:00

## 2025-07-06 NOTE — PROGRESS NOTE ADULT - SUBJECTIVE AND OBJECTIVE BOX
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 prep regimen for treatment of myelofibrosis    Disease: myelofibrosis   Prep regimen: Flu / Bu / Thiotepa / rATG   GVHD ppx: ABC: PTCy days +3, +4, Bortezomib days 0, +3, PTCy days +5, +14, +28   ABO/CMV:   Recipient:   A+/CMV+   Donor:   B+/CMV+    S: Patient seen and examined with HPC Transplant Team:   +generalized weakness  +Mouth/ throat pain     O: Vitals:   Vital Signs Last 24 Hrs  T(C): 36.8 (2025 10:40), Max: 37.4 (2025 11:30)  T(F): 98.3 (2025 10:40), Max: 99.3 (2025 11:30)  HR: 72 (2025 10:40) (72 - 103)  BP: 153/70 (2025 10:40) (153/70 - 208/71)  BP(mean): --  RR: 18 (2025 10:40) (16 - 19)  SpO2: 96% (2025 10:40) (94% - 96%)    Parameters below as of 2025 10:40  Patient On (Oxygen Delivery Method): room air        Admit weight: 64.5kg   Daily Weight in k.4 (2025 08:00)    Intake / Output:    @ 07: @ 07:00  --------------------------------------------------------  IN: 2135 mL / OUT: 1200 mL / NET: 935 mL     @ 07:  -   @ 11:19  --------------------------------------------------------  IN: 0 mL / OUT: 0 mL / NET: 0 mL        PE:   Oropharynx: + erythema + ulcerations posterior pharynx   Oral Mucositis: +                                                  stGstrstastdstest:st1st CVS: S1, S2 RRR   Lungs: CTA throughout bilaterally   Abdomen: + BS x 4, soft, NT, ND   Extremities: no edema   Gastric Mucositis:                 -                              Grade: n/a   Intestinal Mucositis:              -                              Grade: n/a   Skin: no rash   TLC: CDI   Neuro: A&O x 3          Labs:   CBC Full  -  ( 2025 06:29 )  WBC Count : 0.00 K/uL  Hemoglobin : 6.9 g/dL  Hematocrit : 21.7 %  Platelet Count - Automated : 3 K/uL  Mean Cell Volume : 90.4 fl  Mean Cell Hemoglobin : 28.8 pg  Mean Cell Hemoglobin Concentration : 31.8 g/dL  Auto Neutrophil # : Not measured  Auto Lymphocyte # : Not measured  Auto Monocyte # : Not measured  Auto Eosinophil # : Not measured  Auto Basophil # : Not measured  Auto Neutrophil % : Not measured Differential percentages must be correlated with absolute numbers for  clinical significance.  Auto Lymphocyte % : Not measured  Auto Monocyte % : Not measured  Auto Eosinophil % : Not measured  Auto Basophil % : Not measured                          6.9    0.00  )-----------( 3        ( 2025 06:29 )             21.7     07-    146[H]  |  113[H]  |  22  ----------------------------<  114[H]  3.7   |  23  |  0.62    Ca    8.2[L]      2025 06:29  Phos  2.6     07-  Mg     1.7     -    TPro  5.1[L]  /  Alb  2.9[L]  /  TBili  1.2  /  DBili  x   /  AST  14  /  ALT  15  /  AlkPhos  91  -      LIVER FUNCTIONS - ( 2025 06:29 )  Alb: 2.9 g/dL / Pro: 5.1 g/dL / ALK PHOS: 91 U/L / ALT: 15 U/L / AST: 14 U/L / GGT: x           Lactate Dehydrogenase, Serum: 285 U/L ( @ 06:29)          Cultures:   Culture Results: <10,000 CFU/mL Normal Urogenital Cha (25 @ 06:31)    Culture Results:   No growth at 5 days(25 @ 16:36)    Culture Results:   No growth at  5 days (25 @ 16:30)    Respiratory Viral Panel with COVID-19 by SCARLET (25 @ 21:02)   Rapid RVP Result: [id number]: NotDetec:    Culture - Blood (25 @ 18:45)   Specimen Source: Blood Blood-Catheter    Culture Results: No growth at 24 hours Culture - Blood (25 @ 18:40)   Specimen Source: Blood Blood-Peripheral  Culture Results: No growth     Culture - Urine (25 @ 21:01)   Specimen Source: Clean Catch Clean Catch (Midstream)  Culture Results: 10,000 - 49,000 CFU/mL Gram Positive Cocci in Pairs and Chains   <10,000 CFU/ml Normal Urogenital cha present    Radiology:   ACC: [id number] EXAM:  XR CHEST PORTABLE URGENT 1V   ORDERED BY: SARAH REEVES   PROCEDURE DATE:  2025    IMPRESSION:  No focal consolidation.        Meds:   Antimicrobials:   acyclovir    Suspension 800 milliGRAM(s) Oral every 12 hours  levoFLOXacin IVPB 500 milliGRAM(s) IV Intermittent every 24 hours  posaconazole DR Tablet 300 milliGRAM(s) Oral daily  vancomycin    Solution 125 milliGRAM(s) Oral every 12 hours      Heme / Onc:       GI:  loperamide 2 milliGRAM(s) Oral every 6 hours PRN  pantoprazole  Injectable 40 milliGRAM(s) IV Push daily  polyethylene glycol 3350 17 Gram(s) Oral daily PRN  sodium bicarbonate Mouth Rinse 10 milliLiter(s) Swish and Spit five times a day  ursodiol Suspension 300 milliGRAM(s) Oral two times a day      Cardiovascular:   amLODIPine   Tablet 5 milliGRAM(s) Oral daily  furosemide   Injectable 20 milliGRAM(s) IV Push once  lisinopril 20 milliGRAM(s) Oral <User Schedule>  lisinopril 20 milliGRAM(s) Oral <User Schedule>  metoprolol succinate ER 25 milliGRAM(s) Oral daily      Immunologic:   filgrastim-sndz (ZARXIO) Injectable 300 MICROGram(s) SubCutaneous every 24 hours      Other medications:   artificial tears (preservative free) Ophthalmic Solution 1 Drop(s) Both EYES four times a day  Biotene Dry Mouth Oral Rinse 5 milliLiter(s) Swish and Spit five times a day  buPROPion XL (24-Hour) . 300 milliGRAM(s) Oral daily  chlorhexidine 0.12% Liquid 15 milliLiter(s) Swish and Spit two times a day  chlorhexidine 4% Liquid 1 Application(s) Topical <User Schedule>  dextrose 5%. 1000 milliLiter(s) IV Continuous <Continuous>  fentaNYL   Patch  12 MICROgram(s)/Hr. 1 Patch Transdermal every 48 hours  levothyroxine 88 MICROGram(s) Oral daily  phytonadione   Solution 5 milliGRAM(s) Oral <User Schedule>  prochlorperazine   Injectable 10 milliGRAM(s) IV Push every 6 hours  scopolamine 1 mG/72 Hr(s) Patch 1 Patch Transdermal every 72 hours  sodium chloride 0.9%. 1000 milliLiter(s) IV Continuous <Continuous>      PRN:   acetaminophen     Tablet .. 650 milliGRAM(s) Oral every 6 hours PRN  FIRST- Mouthwash  BLM 15 milliLiter(s) Swish and Swallow five times a day PRN  lidocaine 2% Viscous 15 milliLiter(s) Oral five times a day PRN  loperamide 2 milliGRAM(s) Oral every 6 hours PRN  ondansetron Injectable 8 milliGRAM(s) IV Push every 8 hours PRN  polyethylene glycol 3350 17 Gram(s) Oral daily PRN  sodium chloride 0.9% lock flush 10 milliLiter(s) IV Push every 1 hour PRN          A/P: 64 year old female with hx of myelofibrosis presents for a MUD allogeneic SCT (DP Permissive) conditioning with Flu/Bu/Thiotepa/rATG.  Today is day + 9  - Thiotepa 1/2. Maintain skin precautions during thiotepa administration and for 48 hours after completion (days .6 through day -3). Chronic pain issues - continue home fentanyl patch. Febrile overnight, tmax 100.9. Cultures  pending. RVP negative. CXR pending official read, no obvious consolidations. Vital signs are stable.    D/C HCTZ. Fludarabine 1/3, Busulfan 1/3, rATG 1/3. No Tylenol on busulfan days. Continue keppra ppx for 24 hours post infusion of last dose of busulfan. No acute events overnight, vital signs are stable.   6/24- overnight patient was febrile during ATG, no cultures/abx as fever likely secondary to ATG. Patient was otherwise stable overnight. Fludarabine 2/3, Busulfan 2/3 and rATG 2/3. NO Tylenol or Azoles until Day 0. Continue with supportive care.  - Fludarabine 3/3, busulfan 3/3, rATG 3/3. No acute events overnight. Vital signs are stable.    Switched Compazine to atc   VSS, afebrile. 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   VSS, persistent fevers likely due to CRS post-SCT, continues on zosyn. Cultures pending. Hypervolemia - lasix 40mg x 1, will continue strict I/Os. Started caspofungin due to persistent thrush.   overnight no acute events noted. VSS, febrile likely due to CRS - continue with supportive care. BCx/UCx NGTD x 24h.   VSS, intermittently febrile likely due to CRS, infectious workup negative so far. PTCy /2 today. I&O, daily weights, prn diuresis. Continue to monitor thrush (improving). Changed some meds to PO suspension due to mucositis. Continue with supportive care. Refractory nausea, added trial of zyprexa 5mg po QHS x 3 days. d/c primafit. Change voriconazole to posaconazole tomorrow.   - No acute events overnight, vital signs are stable. Continue supportive care for chemotherapy induced oral mucositis.   - No acute events overnight, vital signs are stable. Completed PTCy, hydration to be discontinued today. I&O, daily weights, prn diuresis. Continue supportive care. Fentanyl patch to be titrated down to 12 mcg / hr tomorrow  7/3 Starting Scopolamine patch to decrease oral secretions. Increased Lisinopril 20mg in am, 10mg PM.    -No acute events; awaiting count recovery   - no acute events overnight. Vital signs are stable. SBP remains elevated, lisinopril / metoprolol doses increased.   : BMT team continues to educate the patient about the importance of medication taking. awaiting count recovery.     1. Infectious Disease:   acyclovir    Suspension 800 milliGRAM(s) Oral every 12 hours  levoFLOXacin IVPB 500 milliGRAM(s) IV Intermittent every 24 hours  posaconazole DR Tablet 300 milliGRAM(s) Oral daily  vancomycin    Solution 125 milliGRAM(s) Oral every 12 hours    2. VOD Prophylaxis:   ursodiol Suspension 300 milliGRAM(s) Oral two times a day    3. GI Prophylaxis:    pantoprazole  Injectable 40 milliGRAM(s) IV Push daily    4. Mouthcare - NS / NaHCO3 rinses, Peridex, Biotene; Skin care     5. GVHD prophylaxis   PTCy days +3, +4  Bortezomib days 0, + 3  Abatacept days +5, +14, +28     6. Transfuse & replete electrolytes prn     7. IV hydration, daily weights, strict I&O, prn diuresis   Lasix 20mg IV x 1     8. PO intake as tolerated, nutrition follow up as needed    9. Antiemetics, anti-diarrhea medications:   loperamide 2 milliGRAM(s) Oral every 6 hours PRN  ondansetron Injectable 8 milliGRAM(s) IV Push every 8 hours PRN  prochlorperazine   Injectable 10 milliGRAM(s) IV Push every 6 hours  scopolamine 1 mG/72 Hr(s) Patch 1 Patch Transdermal every 72 hours  OLANZapine 5 milliGRAM(s) Oral at bedtime    10. OOB as tolerated, physical therapy consult if needed     11. Monitor coags / fibrinogen 2x week, vitamin K as needed   phytonadione   Solution 5 milliGRAM(s) Oral <User Schedule>    12. Monitor closely for clinical changes, monitor for fevers     13. Emotional support provided, plan of care discussed and questions addressed     14. Patient education done regarding plan of care, restrictions and discharge planning     15. Continue regular social work input     I have written the above note for Dr. Clarke  who performed service with me in the room.   Yesenia Dejesus PA-C ([phone])    I have seen and examined patient with PA, I agree with above note as scribed.

## 2025-07-06 NOTE — PROGRESS NOTE ADULT - REASON FOR ADMISSION
64 year old female with hx of myelofibrosis presents for a MUD allogeneic SCT (DP Permissive) conditioning with Flu/Bu/Thiotepa/rATG. oral

## 2025-07-06 NOTE — PROGRESS NOTE ADULT - NS ATTEND AMEND GEN_ALL_CORE FT
Day + 8 s/p allo-HSCT     Overall, patient is fatigued, complains of mouth pain, leg heaviness  Nausea improved with zyprexa; continues to have limited PO intake due to mucositis  Course complicated by CRS; now resolved  Hypertensive - on three antihypertensive agents; likely component of opioid withdrawal (fentanyl patch 50mg at admission, now at 12.5mg) - monitor closely and use of PRN as needed. Increased metoprolol and lisinopril.   Physical exam is remarkable for mucositis; +1 b/l LE edema;   Labs reviewed; appropriate  Continue PRN diuresis   Discussed close monitoring of In/Outs  Discussed limiting use of PRN opioids especially given that we are titrating down fentanyl patch. Extensively discussed with patient who is in agreement to rate her pain and request appropriate analgesics.   She has a private nursing aide at bedside.   PT consulted; encouraged out of bed to chair as much as possible during the day  continue supportive care and antimicrobial ppx. Day + 9 s/p allo-HSCT     Overall, patient is fatigued, complains of mouth pain, leg heaviness  Nausea improved with zyprexa; continues to have limited PO intake due to mucositis  Course complicated by CRS; now resolved  Hypertensive - on three antihypertensive agents; likely component of opioid withdrawal (fentanyl patch 50mg at admission, now at 12.5mg) - monitor closely and use of PRN as needed. Increased metoprolol and lisinopril.   Physical exam is remarkable for mucositis; +1 b/l LE edema;   Labs reviewed; appropriate  Continue PRN diuresis   Discussed close monitoring of In/Outs  Discussed limiting use of PRN opioids especially given that we are titrating down fentanyl patch. Extensively discussed with patient who is in agreement to rate her pain and request appropriate analgesics.   She has a private nursing aide at bedside.   PT consulted; encouraged out of bed to chair as much as possible during the day  continue supportive care and antimicrobial ppx. Day + 9 s/p allo-HSCT     Overall, patient is fatigued, complains of mouth pain, leg "heaviness" due to fluid overload.   She is not ambulating, staying in bed majority of the day. Limited PO intake.   Course complicated by CRS; now resolved  Hypertensive - on three antihypertensive agents; likely component of opioid withdrawal (fentanyl patch 50mg at admission, now at 12.5mg) - monitor closely and use of PRN as needed. Increased metoprolol and lisinopril pm 7/5/25  Physical exam is remarkable for mucositis; +1 b/l LE edema;   Labs reviewed; appropriate  Continue PRN diuresis - discussed that ambulation and OOB to chair is necessary.   Discussed close monitoring of In/Outs  Discussed limiting use of PRN opioids especially given that we are titrating down fentanyl patch. Extensively discussed with patient who is in agreement to rate her pain and request appropriate analgesics.   She has a private nursing aide at bedside.   PT consulted; encouraged out of bed to chair as much as possible during the day  continue supportive care and antimicrobial ppx.

## 2025-07-07 LAB
ALBUMIN SERPL ELPH-MCNC: 3 G/DL — LOW (ref 3.3–5)
ALP SERPL-CCNC: 88 U/L — SIGNIFICANT CHANGE UP (ref 40–120)
ALT FLD-CCNC: 15 U/L — SIGNIFICANT CHANGE UP (ref 10–45)
ANION GAP SERPL CALC-SCNC: 12 MMOL/L — SIGNIFICANT CHANGE UP (ref 5–17)
APTT BLD: 26.6 SEC — SIGNIFICANT CHANGE UP (ref 26.1–36.8)
AST SERPL-CCNC: 15 U/L — SIGNIFICANT CHANGE UP (ref 10–40)
BASOPHILS # BLD AUTO: SIGNIFICANT CHANGE UP (ref 0–0.2)
BASOPHILS NFR BLD AUTO: SIGNIFICANT CHANGE UP (ref 0–2)
BILIRUB DIRECT SERPL-MCNC: 0.4 MG/DL — HIGH (ref 0–0.3)
BILIRUB INDIRECT FLD-MCNC: 0.4 MG/DL — SIGNIFICANT CHANGE UP (ref 0.2–1)
BILIRUB SERPL-MCNC: 0.8 MG/DL — SIGNIFICANT CHANGE UP (ref 0.2–1.2)
BLD GP AB SCN SERPL QL: NEGATIVE — SIGNIFICANT CHANGE UP
BUN SERPL-MCNC: 16 MG/DL — SIGNIFICANT CHANGE UP (ref 7–23)
CALCIUM SERPL-MCNC: 8.2 MG/DL — LOW (ref 8.4–10.5)
CHLORIDE SERPL-SCNC: 111 MMOL/L — HIGH (ref 96–108)
CO2 SERPL-SCNC: 24 MMOL/L — SIGNIFICANT CHANGE UP (ref 22–31)
CREAT SERPL-MCNC: 0.49 MG/DL — LOW (ref 0.5–1.3)
EGFR: 106 ML/MIN/1.73M2 — SIGNIFICANT CHANGE UP
EGFR: 106 ML/MIN/1.73M2 — SIGNIFICANT CHANGE UP
EOSINOPHIL # BLD AUTO: SIGNIFICANT CHANGE UP (ref 0–0.5)
EOSINOPHIL NFR BLD AUTO: SIGNIFICANT CHANGE UP (ref 0–6)
GLUCOSE SERPL-MCNC: 100 MG/DL — HIGH (ref 70–99)
HADV DNA FLD NAA+PROBE-LOG#: SIGNIFICANT CHANGE UP COPIES/ML
HCT VFR BLD CALC: 21.5 % — LOW (ref 34.5–45)
HGB BLD-MCNC: 7.1 G/DL — LOW (ref 11.5–15.5)
IMM GRANULOCYTES # BLD AUTO: SIGNIFICANT CHANGE UP (ref 0–0.07)
IMM GRANULOCYTES NFR BLD AUTO: SIGNIFICANT CHANGE UP (ref 0–0.9)
IMMATURE PLATELET FRACTION #: 0.1 K/UL — LOW (ref 4.7–11.1)
IMMATURE PLATELET FRACTION %: 0.7 % — LOW (ref 1.6–4.9)
INR BLD: 1.31 RATIO — HIGH (ref 0.85–1.16)
LDH SERPL L TO P-CCNC: 252 U/L — HIGH (ref 50–242)
LYMPHOCYTES # BLD AUTO: SIGNIFICANT CHANGE UP (ref 1–3.3)
LYMPHOCYTES NFR BLD AUTO: SIGNIFICANT CHANGE UP (ref 13–44)
MAGNESIUM SERPL-MCNC: 1.6 MG/DL — SIGNIFICANT CHANGE UP (ref 1.6–2.6)
MCHC RBC-ENTMCNC: 29 PG — SIGNIFICANT CHANGE UP (ref 27–34)
MCHC RBC-ENTMCNC: 33 G/DL — SIGNIFICANT CHANGE UP (ref 32–36)
MCV RBC AUTO: 87.8 FL — SIGNIFICANT CHANGE UP (ref 80–100)
MONOCYTES # BLD AUTO: SIGNIFICANT CHANGE UP (ref 0–0.9)
MONOCYTES NFR BLD AUTO: SIGNIFICANT CHANGE UP (ref 2–14)
NEUTROPHILS # BLD AUTO: SIGNIFICANT CHANGE UP (ref 1.8–7.4)
NEUTROPHILS NFR BLD AUTO: SIGNIFICANT CHANGE UP (ref 43–77)
NRBC # BLD AUTO: 0 K/UL — SIGNIFICANT CHANGE UP (ref 0–0)
NRBC # FLD: 0 K/UL — SIGNIFICANT CHANGE UP (ref 0–0)
NRBC BLD AUTO-RTO: SIGNIFICANT CHANGE UP (ref 0–0)
OB PNL STL: POSITIVE
PHOSPHATE SERPL-MCNC: 2.9 MG/DL — SIGNIFICANT CHANGE UP (ref 2.5–4.5)
PLATELET # BLD AUTO: 12 K/UL — CRITICAL LOW (ref 150–400)
PMV BLD: SIGNIFICANT CHANGE UP FL (ref 7–13)
POTASSIUM SERPL-MCNC: 3.2 MMOL/L — LOW (ref 3.5–5.3)
POTASSIUM SERPL-SCNC: 3.2 MMOL/L — LOW (ref 3.5–5.3)
PROT SERPL-MCNC: 4.9 G/DL — LOW (ref 6–8.3)
PROTHROM AB SERPL-ACNC: 15 SEC — HIGH (ref 9.9–13.4)
RBC # BLD: 2.45 M/UL — LOW (ref 3.8–5.2)
RBC # FLD: 17 % — HIGH (ref 10.3–14.5)
RH IG SCN BLD-IMP: POSITIVE — SIGNIFICANT CHANGE UP
SODIUM SERPL-SCNC: 147 MMOL/L — HIGH (ref 135–145)
WBC # BLD: 0 K/UL — CRITICAL LOW (ref 3.8–10.5)
WBC # FLD AUTO: 0 K/UL — CRITICAL LOW (ref 3.8–10.5)

## 2025-07-07 PROCEDURE — 99233 SBSQ HOSP IP/OBS HIGH 50: CPT

## 2025-07-07 PROCEDURE — 85730 THROMBOPLASTIN TIME PARTIAL: CPT

## 2025-07-07 PROCEDURE — 83735 ASSAY OF MAGNESIUM: CPT

## 2025-07-07 PROCEDURE — 77001 FLUOROGUIDE FOR VEIN DEVICE: CPT

## 2025-07-07 PROCEDURE — C1887: CPT

## 2025-07-07 PROCEDURE — 85049 AUTOMATED PLATELET COUNT: CPT

## 2025-07-07 PROCEDURE — 97161 PT EVAL LOW COMPLEX 20 MIN: CPT

## 2025-07-07 PROCEDURE — 87799 DETECT AGENT NOS DNA QUANT: CPT

## 2025-07-07 PROCEDURE — C1751: CPT

## 2025-07-07 PROCEDURE — 86901 BLOOD TYPING SEROLOGIC RH(D): CPT

## 2025-07-07 PROCEDURE — 93970 EXTREMITY STUDY: CPT

## 2025-07-07 PROCEDURE — 86900 BLOOD TYPING SEROLOGIC ABO: CPT

## 2025-07-07 PROCEDURE — 82955 ASSAY OF G6PD ENZYME: CPT

## 2025-07-07 PROCEDURE — 80048 BASIC METABOLIC PNL TOTAL CA: CPT

## 2025-07-07 PROCEDURE — 85610 PROTHROMBIN TIME: CPT

## 2025-07-07 PROCEDURE — 86923 COMPATIBILITY TEST ELECTRIC: CPT

## 2025-07-07 PROCEDURE — 86850 RBC ANTIBODY SCREEN: CPT

## 2025-07-07 PROCEDURE — 36556 INSERT NON-TUNNEL CV CATH: CPT

## 2025-07-07 PROCEDURE — 87086 URINE CULTURE/COLONY COUNT: CPT

## 2025-07-07 PROCEDURE — P9100: CPT

## 2025-07-07 PROCEDURE — 36415 COLL VENOUS BLD VENIPUNCTURE: CPT

## 2025-07-07 PROCEDURE — 76937 US GUIDE VASCULAR ACCESS: CPT

## 2025-07-07 PROCEDURE — 0241U: CPT

## 2025-07-07 PROCEDURE — 87640 STAPH A DNA AMP PROBE: CPT

## 2025-07-07 PROCEDURE — P9040: CPT

## 2025-07-07 PROCEDURE — P9037: CPT

## 2025-07-07 PROCEDURE — P9047: CPT

## 2025-07-07 PROCEDURE — 83615 LACTATE (LD) (LDH) ENZYME: CPT

## 2025-07-07 PROCEDURE — 80053 COMPREHEN METABOLIC PANEL: CPT

## 2025-07-07 PROCEDURE — C1894: CPT

## 2025-07-07 PROCEDURE — 71045 X-RAY EXAM CHEST 1 VIEW: CPT

## 2025-07-07 PROCEDURE — 87641 MR-STAPH DNA AMP PROBE: CPT

## 2025-07-07 PROCEDURE — 82272 OCCULT BLD FECES 1-3 TESTS: CPT

## 2025-07-07 PROCEDURE — 87040 BLOOD CULTURE FOR BACTERIA: CPT

## 2025-07-07 PROCEDURE — 0225U NFCT DS DNA&RNA 21 SARSCOV2: CPT

## 2025-07-07 PROCEDURE — 87077 CULTURE AEROBIC IDENTIFY: CPT

## 2025-07-07 PROCEDURE — 82248 BILIRUBIN DIRECT: CPT

## 2025-07-07 PROCEDURE — 93005 ELECTROCARDIOGRAM TRACING: CPT

## 2025-07-07 PROCEDURE — 85025 COMPLETE CBC W/AUTO DIFF WBC: CPT

## 2025-07-07 PROCEDURE — C1769: CPT

## 2025-07-07 PROCEDURE — 81001 URINALYSIS AUTO W/SCOPE: CPT

## 2025-07-07 PROCEDURE — 84100 ASSAY OF PHOSPHORUS: CPT

## 2025-07-07 RX ORDER — MELATONIN 5 MG
3 TABLET ORAL ONCE
Refills: 0 | Status: COMPLETED | OUTPATIENT
Start: 2025-07-07 | End: 2025-07-07

## 2025-07-07 RX ORDER — ALBUMIN (HUMAN) 12.5 G/50ML
50 INJECTION, SOLUTION INTRAVENOUS ONCE
Refills: 0 | Status: COMPLETED | OUTPATIENT
Start: 2025-07-07 | End: 2025-07-07

## 2025-07-07 RX ORDER — FUROSEMIDE 10 MG/ML
40 INJECTION INTRAMUSCULAR; INTRAVENOUS ONCE
Refills: 0 | Status: COMPLETED | OUTPATIENT
Start: 2025-07-07 | End: 2025-07-07

## 2025-07-07 RX ORDER — MAGNESIUM SULFATE 500 MG/ML
2 SYRINGE (ML) INJECTION
Refills: 0 | Status: COMPLETED | OUTPATIENT
Start: 2025-07-07 | End: 2025-07-07

## 2025-07-07 RX ADMIN — Medication 10 MILLIGRAM(S): at 12:30

## 2025-07-07 RX ADMIN — SCOPOLAMINE 1 PATCH: 1 PATCH, EXTENDED RELEASE TRANSDERMAL at 20:55

## 2025-07-07 RX ADMIN — Medication 10 MILLILITER(S): at 11:15

## 2025-07-07 RX ADMIN — Medication 5 MILLIGRAM(S): at 12:13

## 2025-07-07 RX ADMIN — Medication 15 MILLILITER(S): at 18:56

## 2025-07-07 RX ADMIN — Medication 10 MILLIGRAM(S): at 18:57

## 2025-07-07 RX ADMIN — METOPROLOL SUCCINATE 25 MILLIGRAM(S): 50 TABLET, EXTENDED RELEASE ORAL at 05:18

## 2025-07-07 RX ADMIN — Medication 1 APPLICATION(S): at 12:44

## 2025-07-07 RX ADMIN — FILGRASTIM 300 MICROGRAM(S): 300 INJECTION, SOLUTION INTRAVENOUS; SUBCUTANEOUS at 12:23

## 2025-07-07 RX ADMIN — ALBUMIN (HUMAN) 50 MILLILITER(S): 12.5 INJECTION, SOLUTION INTRAVENOUS at 13:01

## 2025-07-07 RX ADMIN — Medication 25 GRAM(S): at 11:15

## 2025-07-07 RX ADMIN — Medication 1 DROP(S): at 11:15

## 2025-07-07 RX ADMIN — Medication 1 DROP(S): at 17:05

## 2025-07-07 RX ADMIN — Medication 5 MILLILITER(S): at 23:41

## 2025-07-07 RX ADMIN — Medication 50 MILLIEQUIVALENT(S): at 15:46

## 2025-07-07 RX ADMIN — Medication 8 MILLIGRAM(S): at 22:17

## 2025-07-07 RX ADMIN — LISINOPRIL 20 MILLIGRAM(S): 5 TABLET ORAL at 05:15

## 2025-07-07 RX ADMIN — Medication 8 MILLIGRAM(S): at 13:27

## 2025-07-07 RX ADMIN — Medication 1 PATCH: at 20:54

## 2025-07-07 RX ADMIN — Medication 10 MILLILITER(S): at 21:01

## 2025-07-07 RX ADMIN — FUROSEMIDE 40 MILLIGRAM(S): 10 INJECTION INTRAMUSCULAR; INTRAVENOUS at 14:06

## 2025-07-07 RX ADMIN — Medication 5 MILLIGRAM(S): at 17:05

## 2025-07-07 RX ADMIN — LOPERAMIDE HCL 2 MILLIGRAM(S): 1 SOLUTION ORAL at 12:48

## 2025-07-07 RX ADMIN — Medication 10 MILLIGRAM(S): at 05:18

## 2025-07-07 RX ADMIN — Medication 5 MILLILITER(S): at 15:49

## 2025-07-07 RX ADMIN — Medication 10 MILLIGRAM(S): at 23:40

## 2025-07-07 RX ADMIN — Medication 5 MILLILITER(S): at 11:17

## 2025-07-07 RX ADMIN — Medication 10 MILLILITER(S): at 23:43

## 2025-07-07 RX ADMIN — Medication 5 MILLILITER(S): at 21:01

## 2025-07-07 RX ADMIN — Medication 800 MILLIGRAM(S): at 05:17

## 2025-07-07 RX ADMIN — Medication 1 DROP(S): at 05:19

## 2025-07-07 RX ADMIN — Medication 50 MILLIEQUIVALENT(S): at 09:09

## 2025-07-07 RX ADMIN — Medication 1 DROP(S): at 23:43

## 2025-07-07 RX ADMIN — Medication 25 GRAM(S): at 09:05

## 2025-07-07 RX ADMIN — Medication 40 MILLIGRAM(S): at 18:57

## 2025-07-07 RX ADMIN — Medication 125 MILLIGRAM(S): at 18:58

## 2025-07-07 RX ADMIN — Medication 5 MILLIGRAM(S): at 12:22

## 2025-07-07 RX ADMIN — Medication 50 MILLIEQUIVALENT(S): at 14:08

## 2025-07-07 RX ADMIN — Medication 10 MILLILITER(S): at 15:48

## 2025-07-07 RX ADMIN — SCOPOLAMINE 1 PATCH: 1 PATCH, EXTENDED RELEASE TRANSDERMAL at 08:04

## 2025-07-07 RX ADMIN — Medication 40 MILLIGRAM(S): at 05:19

## 2025-07-07 RX ADMIN — Medication 1 PATCH: at 07:30

## 2025-07-07 RX ADMIN — Medication 1 PATCH: at 07:32

## 2025-07-07 NOTE — PROGRESS NOTE ADULT - SUBJECTIVE AND OBJECTIVE BOX
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 / Bu / Thiotepa / rATG prep regimen for treatment of myelofibrosis    Disease: myelofibrosis   Prep regimen: Flu / Bu / Thiotepa / rATG   GVHD ppx: ABC: PTCy days +3, +4, Bortezomib days 0, +3, PTCy days +5, +14, +28   ABO/CMV:   Recipient:   A+/CMV+   Donor:   B+/CMV+    S: Patient seen and examined with HPC Transplant Team:       O:  Vital Signs Last 24 Hrs  T(C): 36.5 (07 Jul 2025 04:55), Max: 37 (06 Jul 2025 14:45)  T(F): 97.7 (07 Jul 2025 04:55), Max: 98.6 (06 Jul 2025 14:45)  HR: 86 (07 Jul 2025 04:55) (72 - 91)  BP: 189/72 (07 Jul 2025 06:48) (153/70 - 196/69)  BP(mean): --  RR: 18 (07 Jul 2025 04:55) (18 - 19)  SpO2: 94% (07 Jul 2025 04:55) (94% - 97%)    Parameters below as of 07 Jul 2025 04:55  Patient On (Oxygen Delivery Method): room air      Admit weight: 64.5kg   Daily Weight in kG:     Intake / Output:   07-06 @ 07:01  -  07-07 @ 07:00  --------------------------------------------------------  IN: 1732 mL / OUT: 1845 mL / NET: -113 mL    PE:   Oropharynx: + erythema + ulcerations posterior pharynx   Oral Mucositis: +                                                  rdGrdrrdarddrderd:rd3rd CVS: S1, S2 RRR   Lungs: CTA throughout bilaterally   Abdomen: + BS x 4, soft, NT, ND   Extremities: no edema   Gastric Mucositis:                 -                              Grade: n/a   Intestinal Mucositis:              -                              Grade: n/a   Skin: no rash   TLC: CDI   Neuro: A&O x 3      Labs:     -----        Cultures:  Culture Results: <10,000 CFU/mL Normal Urogenital Cha (06.28.25 @ 06:31)    Culture Results: No growth at 5 days(06.27.25 @ 16:36)    Culture Results: No growth at  5 days (06.27.25 @ 16:30)    Respiratory Viral Panel with COVID-19 by SCARLET (06.20.25 @ 21:02)   Rapid RVP Result: [id number]: NotDetec:    Culture - Blood (06.20.25 @ 18:45)   Specimen Source: Blood Blood-Catheter    Culture Results: No growth at 24 hours Culture - Blood (06.20.25 @ 18:40)   Specimen Source: Blood Blood-Peripheral  Culture Results: No growth     Culture - Urine (06.20.25 @ 21:01)   Specimen Source: Clean Catch Clean Catch (Midstream)  Culture Results: 10,000 - 49,000 CFU/mL Gram Positive Cocci in Pairs and Chains   <10,000 CFU/ml Normal Urogenital cha present    Radiology:   ACC: [id number] EXAM:  XR CHEST PORTABLE URGENT 1V   ORDERED BY: SARAH REEVES   PROCEDURE DATE:  06/27/2025    IMPRESSION: No focal consolidation.        Meds:   Antimicrobials:   acyclovir    Suspension 800 milliGRAM(s) Oral every 12 hours  letermovir 480 milliGRAM(s) Oral daily  levoFLOXacin IVPB 500 milliGRAM(s) IV Intermittent every 24 hours  posaconazole DR Tablet 300 milliGRAM(s) Oral daily  vancomycin    Solution 125 milliGRAM(s) Oral every 12 hours      Heme / Onc:       GI:  loperamide 2 milliGRAM(s) Oral every 6 hours PRN  pantoprazole  Injectable 40 milliGRAM(s) IV Push daily  polyethylene glycol 3350 17 Gram(s) Oral daily PRN  sodium bicarbonate Mouth Rinse 10 milliLiter(s) Swish and Spit five times a day  ursodiol Suspension 300 milliGRAM(s) Oral two times a day      Cardiovascular:   amLODIPine   Tablet 5 milliGRAM(s) Oral daily  lisinopril 20 milliGRAM(s) Oral <User Schedule>  lisinopril 20 milliGRAM(s) Oral <User Schedule>  metoprolol succinate ER 25 milliGRAM(s) Oral daily      Immunologic:   filgrastim-sndz (ZARXIO) Injectable 300 MICROGram(s) SubCutaneous every 24 hours      Other medications:   artificial tears (preservative free) Ophthalmic Solution 1 Drop(s) Both EYES four times a day  Biotene Dry Mouth Oral Rinse 5 milliLiter(s) Swish and Spit five times a day  buPROPion XL (24-Hour) . 300 milliGRAM(s) Oral daily  chlorhexidine 0.12% Liquid 15 milliLiter(s) Swish and Spit two times a day  chlorhexidine 4% Liquid 1 Application(s) Topical <User Schedule>  fentaNYL   Patch  12 MICROgram(s)/Hr. 1 Patch Transdermal every 48 hours  levothyroxine 88 MICROGram(s) Oral daily  phytonadione   Solution 5 milliGRAM(s) Oral <User Schedule>  prochlorperazine   Injectable 10 milliGRAM(s) IV Push every 6 hours  scopolamine 1 mG/72 Hr(s) Patch 1 Patch Transdermal every 72 hours  sodium chloride 0.9%. 1000 milliLiter(s) IV Continuous <Continuous>      PRN:   acetaminophen     Tablet .. 650 milliGRAM(s) Oral every 6 hours PRN  FIRST- Mouthwash  BLM 15 milliLiter(s) Swish and Swallow five times a day PRN  lidocaine 2% Viscous 15 milliLiter(s) Oral five times a day PRN  loperamide 2 milliGRAM(s) Oral every 6 hours PRN  ondansetron Injectable 8 milliGRAM(s) IV Push every 8 hours PRN  polyethylene glycol 3350 17 Gram(s) Oral daily PRN  sodium chloride 0.9% lock flush 10 milliLiter(s) IV Push every 1 hour PRN      A/P: 64 year old female with hx of myelofibrosis presents for a MUD allogeneic SCT (DP Permissive) conditioning with Flu/Bu/Thiotepa/rATG.  Today is day +10  6/21- Thiotepa 1/2. Maintain skin precautions during thiotepa administration and for 48 hours after completion (days .6 through day -3). Chronic pain issues - continue home fentanyl patch. Febrile overnight, tmax 100.9. Cultures  pending. RVP negative. CXR pending official read, no obvious consolidations. Vital signs are stable.   6/23 D/C HCTZ. Fludarabine 1/3, Busulfan 1/3, rATG 1/3. No Tylenol on busulfan days. Continue keppra ppx for 24 hours post infusion of last dose of busulfan. No acute events overnight, vital signs are stable.   6/24- overnight patient was febrile during ATG, no cultures/abx as fever likely secondary to ATG. Patient was otherwise stable overnight. Fludarabine 2/3, Busulfan 2/3 and rATG 2/3. NO Tylenol or Azoles until Day 0. Continue with supportive care.  6/25- Fludarabine 3/3, busulfan 3/3, rATG 3/3. No acute events overnight. Vital signs are stable.   6/26 Switched Compazine to atc  6/27 VSS, afebrile. 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  6/28 VSS, persistent fevers likely due to CRS post-SCT, continues on zosyn. Cultures pending. Hypervolemia - lasix 40mg x 1, will continue strict I/Os. Started caspofungin due to persistent thrush.  6/29 overnight no acute events noted. VSS, febrile likely due to CRS - continue with supportive care. BCx/UCx NGTD x 24h.  6/30 VSS, intermittently febrile likely due to CRS, infectious workup negative so far. PTCy 1/2 today. I&O, daily weights, prn diuresis. Continue to monitor thrush (improving). Changed some meds to PO suspension due to mucositis. Continue with supportive care. Refractory nausea, added trial of zyprexa 5mg po QHS x 3 days. d/c primafit. Change voriconazole to posaconazole tomorrow.   7/1- No acute events overnight, vital signs are stable. Continue supportive care for chemotherapy induced oral mucositis.   7/2- No acute events overnight, vital signs are stable. Completed PTCy, hydration to be discontinued today. I&O, daily weights, prn diuresis. Continue supportive care. Fentanyl patch to be titrated down to 12 mcg / hr tomorrow  7/3 Starting Scopolamine patch to decrease oral secretions. Increased Lisinopril 20mg in am, 10mg PM.    7/4-No acute events; awaiting count recovery   7/5- no acute events overnight. Vital signs are stable. SBP remains elevated, lisinopril / metoprolol doses increased.   7/6: BMT team continues to educate the patient about the importance of medication taking. awaiting count recovery.     1. Infectious Disease:   acyclovir    Suspension 800 milliGRAM(s) Oral every 12 hours  letermovir 480 milliGRAM(s) Oral daily  levoFLOXacin IVPB 500 milliGRAM(s) IV Intermittent every 24 hours  posaconazole DR Tablet 300 milliGRAM(s) Oral daily  vancomycin    Solution 125 milliGRAM(s) Oral every 12 hours    2. VOD Prophylaxis:   ursodiol Suspension 300 milliGRAM(s) Oral two times a day    3. GI Prophylaxis:    pantoprazole  Injectable 40 milliGRAM(s) IV Push daily    4. Mouthcare - NS / NaHCO3 rinses, Peridex, Biotene; Skin care     5. GVHD prophylaxis   PTCy days +3, +4  Bortezomib days 0, + 3  Abatacept days +5, +14, +28     6. Transfuse & replete electrolytes prn     7. IV hydration, daily weights, strict I&O, prn diuresis     8. PO intake as tolerated, nutrition follow up as needed    9. Antiemetics, anti-diarrhea medications:   loperamide 2 milliGRAM(s) Oral every 6 hours PRN  ondansetron Injectable 8 milliGRAM(s) IV Push every 8 hours PRN  prochlorperazine   Injectable 10 milliGRAM(s) IV Push every 6 hours  scopolamine 1 mG/72 Hr(s) Patch 1 Patch Transdermal every 72 hours    10. OOB as tolerated, physical therapy consult if needed     11. Monitor coags  2x week, vitamin K BIW   phytonadione   Solution 5 milliGRAM(s) Oral <User Schedule>    12. Monitor closely for clinical changes, monitor for fevers     13. Emotional support provided, plan of care discussed and questions addressed     14. Patient education done regarding plan of care, restrictions and discharge planning     15. Continue regular social work input     I have written the above note for Dr. Gandhi  who performed service with me in the room.   Jalen Kay PA-C ([phone])    I have seen and examined patient with PA, I agree with above note as scribed.                    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 prep regimen for treatment of myelofibrosis    Disease: myelofibrosis   Prep regimen: Flu / Bu / Thiotepa / rATG   GVHD ppx: ABC: PTCy days +3, +4, Bortezomib days 0, +3, PTCy days +5, +14, +28   ABO/CMV:   Recipient:   A+/CMV+   Donor:   B+/CMV+    S: Patient seen and examined with HPC Transplant Team:   +mouth and throat pain  +nausea  +loose stools (dark-black) +guaiac   + med non compliance    O:  Vital Signs Last 24 Hrs  T(C): 36.5 (2025 04:55), Max: 37 (2025 14:45)  T(F): 97.7 (2025 04:55), Max: 98.6 (2025 14:45)  HR: 86 (2025 04:55) (72 - 91)  BP: 189/72 (2025 06:48) (153/70 - 196/69)  BP(mean): --  RR: 18 (2025 04:55) (18 - 19)  SpO2: 94% (2025 04:55) (94% - 97%)    Parameters below as of 2025 04:55  Patient On (Oxygen Delivery Method): room air    Admit weight: 64.5kg   Daily Weight in k.6kg    Intake / Output:    @ 07:01  -   @ 07:00  --------------------------------------------------------  IN: 1732 mL / OUT: 1845 mL / NET: -113 mL    PE:   Oropharynx: + erythema + ulcerations posterior pharynx   Oral Mucositis: +                                                  stGstrstastdstest:st1st CVS: S1, S2 RRR   Lungs: CTA throughout bilaterally   Abdomen: + BS x 4, soft, NT, ND   Extremities: no edema   Gastric Mucositis:                 -                              Grade: n/a   Intestinal Mucositis:              -                              Grade: n/a   Skin: no rash   TLC: CDI   Neuro: A&O x 3      Labs:                         7.1    0.00  )-----------( 12       ( 2025 07:00 )             21.5     2025 07:04    147    |  111    |  16     ----------------------------<  100    3.2     |  24     |  0.49     Ca    8.2        2025 07:04  Phos  2.9       2025 07:04  Mg     1.6       2025 07:04    TPro  4.9    /  Alb  3.0    /  TBili  0.8    /  DBili  0.4    /  AST  15     /  ALT  15     /  AlkPhos  88     2025 07:04    PT/INR - ( 2025 07:01 )   PT: 15.0 sec;   INR: 1.31 ratio    PTT - ( 2025 07:01 )  PTT:26.6 sec    LIVER FUNCTIONS - ( 2025 07:04 )  Alb: 3.0 g/dL / Pro: 4.9 g/dL / ALK PHOS: 88 U/L / ALT: 15 U/L / AST: 15 U/L / GGT: x           Urinalysis Basic - ( 2025 07:04 )    Color: x / Appearance: x / SG: x / pH: x  Gluc: 100 mg/dL / Ketone: x  / Bili: x / Urobili: x   Blood: x / Protein: x / Nitrite: x   Leuk Esterase: x / RBC: x / WBC x   Sq Epi: x / Non Sq Epi: x / Bacteria: x      Cultures:  Culture Results: <10,000 CFU/mL Normal Urogenital Cha (25 @ 06:31)    Culture Results: No growth at 5 days(25 @ 16:36)    Culture Results: No growth at  5 days (25 @ 16:30)    Respiratory Viral Panel with COVID-19 by SCARLET (25 @ 21:02)   Rapid RVP Result: [id number]: NotDetec:    Culture - Blood (25 @ 18:45)   Specimen Source: Blood Blood-Catheter    Culture Results: No growth at 24 hours Culture - Blood (06.20.25 @ 18:40)   Specimen Source: Blood Blood-Peripheral  Culture Results: No growth     Culture - Urine (25 @ 21:01)   Specimen Source: Clean Catch Clean Catch (Midstream)  Culture Results: 10,000 - 49,000 CFU/mL Gram Positive Cocci in Pairs and Chains   <10,000 CFU/ml Normal Urogenital cha present    Radiology:   ACC: [id number] EXAM:  XR CHEST PORTABLE URGENT 1V   ORDERED BY: SARAH REEVES   PROCEDURE DATE:  2025    IMPRESSION: No focal consolidation.        Meds:   Antimicrobials:   acyclovir    Suspension 800 milliGRAM(s) Oral every 12 hours  letermovir 480 milliGRAM(s) Oral daily  levoFLOXacin IVPB 500 milliGRAM(s) IV Intermittent every 24 hours  posaconazole DR Tablet 300 milliGRAM(s) Oral daily  vancomycin    Solution 125 milliGRAM(s) Oral every 12 hours      Heme / Onc:       GI:  loperamide 2 milliGRAM(s) Oral every 6 hours PRN  pantoprazole  Injectable 40 milliGRAM(s) IV Push daily  polyethylene glycol 3350 17 Gram(s) Oral daily PRN  sodium bicarbonate Mouth Rinse 10 milliLiter(s) Swish and Spit five times a day  ursodiol Suspension 300 milliGRAM(s) Oral two times a day      Cardiovascular:   amLODIPine   Tablet 5 milliGRAM(s) Oral daily  lisinopril 20 milliGRAM(s) Oral <User Schedule>  lisinopril 20 milliGRAM(s) Oral <User Schedule>  metoprolol succinate ER 25 milliGRAM(s) Oral daily      Immunologic:   filgrastim-sndz (ZARXIO) Injectable 300 MICROGram(s) SubCutaneous every 24 hours      Other medications:   artificial tears (preservative free) Ophthalmic Solution 1 Drop(s) Both EYES four times a day  Biotene Dry Mouth Oral Rinse 5 milliLiter(s) Swish and Spit five times a day  buPROPion XL (24-Hour) . 300 milliGRAM(s) Oral daily  chlorhexidine 0.12% Liquid 15 milliLiter(s) Swish and Spit two times a day  chlorhexidine 4% Liquid 1 Application(s) Topical <User Schedule>  fentaNYL   Patch  12 MICROgram(s)/Hr. 1 Patch Transdermal every 48 hours  levothyroxine 88 MICROGram(s) Oral daily  phytonadione   Solution 5 milliGRAM(s) Oral <User Schedule>  prochlorperazine   Injectable 10 milliGRAM(s) IV Push every 6 hours  scopolamine 1 mG/72 Hr(s) Patch 1 Patch Transdermal every 72 hours  sodium chloride 0.9%. 1000 milliLiter(s) IV Continuous <Continuous>      PRN:   acetaminophen     Tablet .. 650 milliGRAM(s) Oral every 6 hours PRN  FIRST- Mouthwash  BLM 15 milliLiter(s) Swish and Swallow five times a day PRN  lidocaine 2% Viscous 15 milliLiter(s) Oral five times a day PRN  loperamide 2 milliGRAM(s) Oral every 6 hours PRN  ondansetron Injectable 8 milliGRAM(s) IV Push every 8 hours PRN  polyethylene glycol 3350 17 Gram(s) Oral daily PRN  sodium chloride 0.9% lock flush 10 milliLiter(s) IV Push every 1 hour PRN      A/P: 64 year old female with hx of myelofibrosis presents for a MUD allogeneic SCT (DP Permissive) conditioning with Flu/Bu/Thiotepa/rATG.  Today is day +10  - Thiotepa /. Maintain skin precautions during thiotepa administration and for 48 hours after completion (days .6 through day -3). Chronic pain issues - continue home fentanyl patch. Febrile overnight, tmax 100.9. Cultures  pending. RVP negative. CXR pending official read, no obvious consolidations. Vital signs are stable.    D/C HCTZ. Fludarabine 1/3, Busulfan 1/3, rATG 1/3. No Tylenol on busulfan days. Continue keppra ppx for 24 hours post infusion of last dose of busulfan. No acute events overnight, vital signs are stable.   - overnight patient was febrile during ATG, no cultures/abx as fever likely secondary to ATG. Patient was otherwise stable overnight. Fludarabine 2/3, Busulfan 2/3 and rATG 2/3. NO Tylenol or Azoles until Day 0. Continue with supportive care.  - Fludarabine 3/3, busulfan 3/3, rATG 3/3. No acute events overnight. Vital signs are stable.    Switched Compazine to atc   VSS, afebrile. 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   VSS, persistent fevers likely due to CRS post-SCT, continues on zosyn. Cultures pending. Hypervolemia - lasix 40mg x 1, will continue strict I/Os. Started caspofungin due to persistent thrush.   overnight no acute events noted. VSS, febrile likely due to CRS - continue with supportive care. BCx/UCx NGTD x 24h.   VSS, intermittently febrile likely due to CRS, infectious workup negative so far. PTCy 1/2 today. I&O, daily weights, prn diuresis. Continue to monitor thrush (improving). Changed some meds to PO suspension due to mucositis. Continue with supportive care. Refractory nausea, added trial of zyprexa 5mg po QHS x 3 days. d/c primafit. Change voriconazole to posaconazole tomorrow.   - No acute events overnight, vital signs are stable. Continue supportive care for chemotherapy induced oral mucositis.   - No acute events overnight, vital signs are stable. Completed PTCy, hydration to be discontinued today. I&O, daily weights, prn diuresis. Continue supportive care. Fentanyl patch to be titrated down to 12 mcg / hr tomorrow  7/3 Starting Scopolamine patch to decrease oral secretions. Increased Lisinopril 20mg in am, 10mg PM.    -No acute events; awaiting count recovery   - no acute events overnight. Vital signs are stable. SBP remains elevated, lisinopril / metoprolol doses increased.   : BMT team continues to educate the patient about the importance of medication taking. awaiting count recovery   Increased ppi q12h (+stool guaiac)    1. Infectious Disease:   acyclovir    Suspension 800 milliGRAM(s) Oral every 12 hours  letermovir 480 milliGRAM(s) Oral daily  levoFLOXacin IVPB 500 milliGRAM(s) IV Intermittent every 24 hours  posaconazole DR Tablet 300 milliGRAM(s) Oral daily  vancomycin    Solution 125 milliGRAM(s) Oral every 12 hours    2. VOD Prophylaxis:   ursodiol Suspension 300 milliGRAM(s) Oral two times a day    3. GI Prophylaxis:    pantoprazole  Injectable 40 milliGRAM(s) IV q12h    4. Mouthcare - NS / NaHCO3 rinses, Peridex, Biotene; Skin care     5. GVHD prophylaxis   PTCy days +3, +4  Bortezomib days 0, + 3  Abatacept days +5, +14, +28     6. Transfuse & replete electrolytes prn  Replete Mg/K+ (IV)     7. IV hydration, daily weights, strict I&O, prn diuresis     8. PO intake as tolerated, nutrition follow up as needed    9. Antiemetics, anti-diarrhea medications:   loperamide 2 milliGRAM(s) Oral every 6 hours PRN  ondansetron Injectable 8 milliGRAM(s) IV Push every 8 hours PRN  prochlorperazine   Injectable 10 milliGRAM(s) IV Push every 6 hours  scopolamine 1 mG/72 Hr(s) Patch 1 Patch Transdermal every 72 hours    10. OOB as tolerated, physical therapy consult if needed     11. Monitor coags  2x week, vitamin K BIW   phytonadione   Solution 5 milliGRAM(s) Oral <User Schedule>    12. Monitor closely for clinical changes, monitor for fevers     13. Emotional support provided, plan of care discussed and questions addressed     14. Patient education done regarding plan of care, restrictions and discharge planning     15. Continue regular social work input     I have written the above note for Dr. Gandhi  who performed service with me in the room.   Jalen Kay PA-C ([phone])    I have seen and examined patient with PA, I agree with above note as scribed.

## 2025-07-07 NOTE — H&P PST ADULT - MEDICATION HERBAL REMEDIES, PROFILE
PA Qulipta Renewal submitted  [facility] CODE: [id number]    Pending determination   
Qulipta 60 mg Approved; approved from 5/7/25-7/6/26  Notified [facility]; Medication processed through successfully. Will notify pt when ready to . Last picked up on 6/30/25       
no

## 2025-07-07 NOTE — CHART NOTE - NSCHARTNOTEFT_GEN_A_CORE
NUTRITION FOLLOW UP NOTE    PATIENT SEEN FOR: follow up / nutrition consult received for calorie count    SOURCE: [x] Patient  [x] Current Medical Record  [] RN  [x] Family/support person at bedside  [] Patient unavailable/inappropriate  [] Other:    CHART REVIEWED/EVENTS NOTED.  [] No changes to nutrition care plan to note  [x] Nutrition Status:  -s/p ALLO, day +10  -oral mucositis noted     DIET ORDER:   Diet, Regular:   Supplement Feeding Modality:  Oral  Ensure Max Cans or Servings Per Day:  1       Frequency:  Three Times a day (25)    CURRENT DIET ORDER IS:  [x] Appropriate:  [] Inadequate:  [] Other:    NUTRITION INTAKE/PROVISION:  [x] PO: Pt reports appetite is poor, feels thirsty but unable to drink a lot  -able to tolerate some of the Ensure max, likes both vanilla and chocolate  -likes the smoothie with the peanut butter, dislikes other flavors  -food preferences reviewed - amenable to HP apple juice, HP jello, and ice cream/pudding to maximize intake  -Pt feeling weak/not well; not amenable to nutrition-focused physical examination at time of RD visit  -calorie count sheet noted in bedside chart - Pt with no PO intake on  per documentation  [] Enteral Nutrition:  [] Parenteral Nutrition:    ANTHROPOMETRICS:  Drug Dosing Weight  Height (cm): 159 (2025 07:13)  Weight (kg): 64.5 (2025 07:13)  BMI (kg/m2): 25.5 (2025 07:13)  BSA (m2): 1.67 (2025 07:13)  Weights:   Daily Weight in k.6 (-), Weight in k.4 (), Weight in k.7 (), Weight in k.7 (), Weight in k.6 (), Weight in k.7 (), Weight in k.4 () *small wt fluctuations noted likely 2/2 edema    MEDICATIONS:  MEDICATIONS  (STANDING):  acyclovir    Suspension 800 milliGRAM(s) Oral every 12 hours  amLODIPine   Tablet 5 milliGRAM(s) Oral daily  artificial tears (preservative free) Ophthalmic Solution 1 Drop(s) Both EYES four times a day  Biotene Dry Mouth Oral Rinse 5 milliLiter(s) Swish and Spit five times a day  buPROPion XL (24-Hour) . 300 milliGRAM(s) Oral daily  chlorhexidine 0.12% Liquid 15 milliLiter(s) Swish and Spit two times a day  chlorhexidine 4% Liquid 1 Application(s) Topical <User Schedule>  fentaNYL   Patch  12 MICROgram(s)/Hr. 1 Patch Transdermal every 48 hours  filgrastim-sndz (ZARXIO) Injectable 300 MICROGram(s) SubCutaneous every 24 hours  letermovir 480 milliGRAM(s) Oral daily  levoFLOXacin IVPB 500 milliGRAM(s) IV Intermittent every 24 hours  levothyroxine 88 MICROGram(s) Oral daily  lisinopril 20 milliGRAM(s) Oral <User Schedule>  lisinopril 20 milliGRAM(s) Oral <User Schedule>  magnesium sulfate  IVPB 2 Gram(s) IV Intermittent every 1 hour  metoprolol succinate ER 25 milliGRAM(s) Oral daily  pantoprazole  Injectable 40 milliGRAM(s) IV Push daily  phytonadione   Solution 5 milliGRAM(s) Oral <User Schedule>  posaconazole DR Tablet 300 milliGRAM(s) Oral daily  potassium chloride  20 mEq/100 mL IVPB 20 milliEquivalent(s) IV Intermittent every 2 hours  prochlorperazine   Injectable 10 milliGRAM(s) IV Push every 6 hours  scopolamine 1 mG/72 Hr(s) Patch 1 Patch Transdermal every 72 hours  sodium bicarbonate Mouth Rinse 10 milliLiter(s) Swish and Spit five times a day  sodium chloride 0.9%. 1000 milliLiter(s) (250 mL/Hr) IV Continuous <Continuous>  ursodiol Suspension 300 milliGRAM(s) Oral two times a day  vancomycin    Solution 125 milliGRAM(s) Oral every 12 hours    MEDICATIONS  (PRN):  acetaminophen     Tablet .. 650 milliGRAM(s) Oral every 6 hours PRN Temp greater or equal to 38C (100.4F), Mild Pain (1 - 3)  FIRST- Mouthwash  BLM 15 milliLiter(s) Swish and Swallow five times a day PRN mouth/ throat pain  lidocaine 2% Viscous 15 milliLiter(s) Oral five times a day PRN Mouth Care  loperamide 2 milliGRAM(s) Oral every 6 hours PRN Diarrhea  ondansetron Injectable 8 milliGRAM(s) IV Push every 8 hours PRN nuaea (2nd line)  polyethylene glycol 3350 17 Gram(s) Oral daily PRN Constipation  sodium chloride 0.9% lock flush 10 milliLiter(s) IV Push every 1 hour PRN Pre/post blood products, medications, blood draw, and to maintain line patency    NUTRITIONALLY PERTINENT LABS:   Na147 mmol/L[H] Glu 100 mg/dL[H] K+ 3.2 mmol/L[L] Cr  0.49 mg/dL[L] BUN 16 mg/dL  Phos 2.9 mg/dL  Alb 3.0 g/dL[L] ALT 15 U/L AST 15 U/L Alkaline Phosphatase 88 U/L    NUTRITIONALLY PERTINENT MEDICATIONS/LABS:  [x] Reviewed  [x] Relevant notes on medications/labs:  -biotene/magic mouthwash  -compazine for nausea  -synthroid  -hypokalemia noted, ordered for repletion    EDEMA:  [x] Reviewed  [x] Relevant notes: BLE, b/l feet, and b/l hands 1+ edema (RN flowsheets )    GI/ I&O:  [x] Reviewed  [x] Relevant notes: Pt reports N/V (last episode this morning before breakfast) and diarrhea, LBM documented  on RN flowsheets - loperamide PRN  [] Other:    SKIN:   [x] No pressure injuries documented, per nursing flowsheet  [] Pressure injury previously noted  [] Change in pressure injury documentation:  [] Other:    ESTIMATED NEEDS:  [x] No change:  [] Updated:  Energy:  2450-3952 kcal/day (30-35 kcal/kg)  Protein:  77.4-90.3 g/day (1.2-1.4 g/kg)  Fluid:   ml/day or [x] defer to team  Based on: dosing weight 64.5 kg    NUTRITION DIAGNOSIS:  [x] Prior Dx: 1) increased nutrient need and 2) inadequate protein energy intake  [] New Dx:    EDUCATION:  [x] Yes: small/frequent meals and sips to ease tolerance and maximize intake; importance of protein/calorie intake of diet/supplements  [] Not appropriate/warranted    NUTRITION CARE PLAN:  1. Diet: continue regular diet; Calorie Count Initiated 7/6- - RD to f/u upon completion  2. Supplements: decrease Ensure Max to 2x/day (160 kcal, 30 g Pro/11oz) - 1 chocolate, 1 vanilla; continue smoothie of the day 2x/week (for preferred flavor, ~300 kcal, 28 g Pro/smoothie)  3. Multivitamin/mineral supplementation: defer to MD team  4. RD to add high protein/calorie nourishments to trays as discussed above  5. RD to perform nutrition-focused physical examination as able to assess for Malnutrition; nutrition GOC discussion regarding nutrition support if Pt unable to meet nutrient needs via PO diet    [] Achieved - Continue current nutrition intervention(s)  [] Current medical condition precludes nutrition intervention at this time.    MONITORING AND EVALUATION:   RD remains available upon request and will follow up per protocol.    Gladys Nogueira MPH, RD, CDN  Available on [facility] TE[facility] NUTRITION FOLLOW UP NOTE    PATIENT SEEN FOR: follow up / nutrition consult received for calorie count    SOURCE: [x] Patient  [x] Current Medical Record  [] RN  [x] Family/support person at bedside  [] Patient unavailable/inappropriate  [] Other:    CHART REVIEWED/EVENTS NOTED.  [] No changes to nutrition care plan to note  [x] Nutrition Status:  -s/p ALLO, day +10  -oral mucositis noted     DIET ORDER:   Diet, Regular:   Supplement Feeding Modality:  Oral  Ensure Max Cans or Servings Per Day:  1       Frequency:  Three Times a day (25)    CURRENT DIET ORDER IS:  [x] Appropriate:  [] Inadequate:  [] Other:    NUTRITION INTAKE/PROVISION:  [x] PO: Pt reports appetite is poor, feels thirsty but unable to drink a lot  -able to tolerate some of the Ensure max, likes both vanilla and chocolate  -likes the smoothie with the peanut butter, dislikes other flavors  -food preferences reviewed - amenable to HP apple juice, HP jello, and ice cream/pudding to maximize intake  -Pt feeling weak/not well; not amenable to nutrition-focused physical examination at time of RD visit  -calorie count sheet noted in bedside chart - Pt with no PO intake on  per documentation  [] Enteral Nutrition:  [] Parenteral Nutrition:    ANTHROPOMETRICS:  Drug Dosing Weight  Height (cm): 159 (2025 07:13)  Weight (kg): 64.5 (2025 07:13)  BMI (kg/m2): 25.5 (2025 07:13)  BSA (m2): 1.67 (2025 07:13)  Weights:   Daily Weight in k.6 (-), Weight in k.4 (), Weight in k.7 (), Weight in k.7 (), Weight in k.6 (), Weight in k.7 (), Weight in k.4 () *small wt fluctuations noted likely 2/2 edema    MEDICATIONS:  MEDICATIONS  (STANDING):  acyclovir    Suspension 800 milliGRAM(s) Oral every 12 hours  amLODIPine   Tablet 5 milliGRAM(s) Oral daily  artificial tears (preservative free) Ophthalmic Solution 1 Drop(s) Both EYES four times a day  Biotene Dry Mouth Oral Rinse 5 milliLiter(s) Swish and Spit five times a day  buPROPion XL (24-Hour) . 300 milliGRAM(s) Oral daily  chlorhexidine 0.12% Liquid 15 milliLiter(s) Swish and Spit two times a day  chlorhexidine 4% Liquid 1 Application(s) Topical <User Schedule>  fentaNYL   Patch  12 MICROgram(s)/Hr. 1 Patch Transdermal every 48 hours  filgrastim-sndz (ZARXIO) Injectable 300 MICROGram(s) SubCutaneous every 24 hours  letermovir 480 milliGRAM(s) Oral daily  levoFLOXacin IVPB 500 milliGRAM(s) IV Intermittent every 24 hours  levothyroxine 88 MICROGram(s) Oral daily  lisinopril 20 milliGRAM(s) Oral <User Schedule>  lisinopril 20 milliGRAM(s) Oral <User Schedule>  magnesium sulfate  IVPB 2 Gram(s) IV Intermittent every 1 hour  metoprolol succinate ER 25 milliGRAM(s) Oral daily  pantoprazole  Injectable 40 milliGRAM(s) IV Push daily  phytonadione   Solution 5 milliGRAM(s) Oral <User Schedule>  posaconazole DR Tablet 300 milliGRAM(s) Oral daily  potassium chloride  20 mEq/100 mL IVPB 20 milliEquivalent(s) IV Intermittent every 2 hours  prochlorperazine   Injectable 10 milliGRAM(s) IV Push every 6 hours  scopolamine 1 mG/72 Hr(s) Patch 1 Patch Transdermal every 72 hours  sodium bicarbonate Mouth Rinse 10 milliLiter(s) Swish and Spit five times a day  sodium chloride 0.9%. 1000 milliLiter(s) (250 mL/Hr) IV Continuous <Continuous>  ursodiol Suspension 300 milliGRAM(s) Oral two times a day  vancomycin    Solution 125 milliGRAM(s) Oral every 12 hours    MEDICATIONS  (PRN):  acetaminophen     Tablet .. 650 milliGRAM(s) Oral every 6 hours PRN Temp greater or equal to 38C (100.4F), Mild Pain (1 - 3)  FIRST- Mouthwash  BLM 15 milliLiter(s) Swish and Swallow five times a day PRN mouth/ throat pain  lidocaine 2% Viscous 15 milliLiter(s) Oral five times a day PRN Mouth Care  loperamide 2 milliGRAM(s) Oral every 6 hours PRN Diarrhea  ondansetron Injectable 8 milliGRAM(s) IV Push every 8 hours PRN nuaea (2nd line)  polyethylene glycol 3350 17 Gram(s) Oral daily PRN Constipation  sodium chloride 0.9% lock flush 10 milliLiter(s) IV Push every 1 hour PRN Pre/post blood products, medications, blood draw, and to maintain line patency    NUTRITIONALLY PERTINENT LABS:   Na147 mmol/L[H] Glu 100 mg/dL[H] K+ 3.2 mmol/L[L] Cr  0.49 mg/dL[L] BUN 16 mg/dL  Phos 2.9 mg/dL  Alb 3.0 g/dL[L] ALT 15 U/L AST 15 U/L Alkaline Phosphatase 88 U/L    NUTRITIONALLY PERTINENT MEDICATIONS/LABS:  [x] Reviewed  [x] Relevant notes on medications/labs:  -biotene/magic mouthwash  -compazine for nausea  -synthroid  -hypokalemia noted, ordered for repletion  -hypernatremia noted, likely due to poor PO intake    EDEMA:  [x] Reviewed  [x] Relevant notes: BLE, b/l feet, and b/l hands 1+ edema (RN flowsheets )    GI/ I&O:  [x] Reviewed  [x] Relevant notes: Pt reports N/V (last episode this morning before breakfast) and diarrhea, LBM documented  on RN flowsheets - loperamide PRN  [] Other:    SKIN:   [x] No pressure injuries documented, per nursing flowsheet  [] Pressure injury previously noted  [] Change in pressure injury documentation:  [] Other:    ESTIMATED NEEDS:  [x] No change:  [] Updated:  Energy:  0873-3391 kcal/day (30-35 kcal/kg)  Protein:  77.4-90.3 g/day (1.2-1.4 g/kg)  Fluid:   ml/day or [x] defer to team  Based on: dosing weight 64.5 kg    NUTRITION DIAGNOSIS:  [x] Prior Dx: 1) increased nutrient need and 2) inadequate protein energy intake  [] New Dx:    EDUCATION:  [x] Yes: small/frequent meals and sips to ease tolerance and maximize intake; importance of protein/calorie intake of diet/supplements  [] Not appropriate/warranted    NUTRITION CARE PLAN:  1. Diet: continue regular diet; Calorie Count Initiated - - RD to f/u upon completion  2. Supplements: decrease Ensure Max to 2x/day (160 kcal, 30 g Pro/11oz) - 1 chocolate, 1 vanilla; continue smoothie of the day 2x/week (for preferred flavor, ~300 kcal, 28 g Pro/smoothie)  3. Multivitamin/mineral supplementation: defer to MD team  4. RD to add high protein/calorie nourishments to trays as discussed above  5. RD to perform nutrition-focused physical examination as able to assess for Malnutrition; nutrition GOC discussion regarding nutrition support if Pt unable to meet nutrient needs via PO diet    [] Achieved - Continue current nutrition intervention(s)  [] Current medical condition precludes nutrition intervention at this time.    MONITORING AND EVALUATION:   RD remains available upon request and will follow up per protocol.    Gladys Nogueira MPH, RD, CDN  Available on [facility] TE[facility]

## 2025-07-07 NOTE — PROGRESS NOTE ADULT - NS ATTEND AMEND GEN_ALL_CORE FT
Day + 9 s/p allo-HSCT     Overall, patient is fatigued, complains of mouth pain, leg "heaviness" due to fluid overload.   She is not ambulating, staying in bed majority of the day. Limited PO intake.   Course complicated by CRS; now resolved  Hypertensive - on three antihypertensive agents; likely component of opioid withdrawal (fentanyl patch 50mg at admission, now at 12.5mg) - monitor closely and use of PRN as needed. Increased metoprolol and lisinopril pm 7/5/25  Physical exam is remarkable for mucositis; +1 b/l LE edema;   Labs reviewed; appropriate  Continue PRN diuresis - discussed that ambulation and OOB to chair is necessary.   Discussed close monitoring of In/Outs  Discussed limiting use of PRN opioids especially given that we are titrating down fentanyl patch. Extensively discussed with patient who is in agreement to rate her pain and request appropriate analgesics.   She has a private nursing aide at bedside.   PT consulted; encouraged out of bed to chair as much as possible during the day  continue supportive care and antimicrobial ppx. Day + 10 s/p allo-HSCT ..flu/bu/TT/ratg...ABC    Overall, patient is fatigued, complains of mouth pain, leg "heaviness" due to fluid overload. More talkative today  She is not ambulating, staying in bed majority of the day. Limited PO intake.   Course complicated by CRS; now resolved  Hypertensive - on three antihypertensive agents; possibly component of opioid withdrawal (fentanyl patch 50mg at admission, now at 12.5mg) - monitor closely and use of PRN as needed. Increased metoprolol and lisinopril pm 7/5/25..pt npt taking po bp meds..will give hydralazine today with lasix and albumin  Physical exam is remarkable for mucositis; +1 b/l LE edema;   Labs reviewed; appropriate  Continue PRN diuresis - discussed that ambulation and OOB to chair is necessary.   Discussed close monitoring of In/Outs  Discussed limiting use of PRN opioids especially given that we are titrating down fentanyl patch. Extensively discussed with patient who is in agreement to rate her pain and request appropriate analgesics.   She has a private nursing aide at bedside.   PT consulted; encouraged out of bed to chair as much as possible during the day  continue supportive care and antimicrobial ppx.  on zarxio

## 2025-07-08 LAB
ALBUMIN SERPL ELPH-MCNC: 3.3 G/DL — SIGNIFICANT CHANGE UP (ref 3.3–5)
ALP SERPL-CCNC: 90 U/L — SIGNIFICANT CHANGE UP (ref 40–120)
ALT FLD-CCNC: 14 U/L — SIGNIFICANT CHANGE UP (ref 10–45)
ANION GAP SERPL CALC-SCNC: 11 MMOL/L — SIGNIFICANT CHANGE UP (ref 5–17)
AST SERPL-CCNC: 13 U/L — SIGNIFICANT CHANGE UP (ref 10–40)
BASOPHILS # BLD AUTO: SIGNIFICANT CHANGE UP (ref 0–0.2)
BASOPHILS NFR BLD AUTO: SIGNIFICANT CHANGE UP (ref 0–2)
BILIRUB SERPL-MCNC: 0.9 MG/DL — SIGNIFICANT CHANGE UP (ref 0.2–1.2)
BUN SERPL-MCNC: 15 MG/DL — SIGNIFICANT CHANGE UP (ref 7–23)
CALCIUM SERPL-MCNC: 8.3 MG/DL — LOW (ref 8.4–10.5)
CHLORIDE SERPL-SCNC: 110 MMOL/L — HIGH (ref 96–108)
CO2 SERPL-SCNC: 25 MMOL/L — SIGNIFICANT CHANGE UP (ref 22–31)
CREAT SERPL-MCNC: 0.5 MG/DL — SIGNIFICANT CHANGE UP (ref 0.5–1.3)
EGFR: 105 ML/MIN/1.73M2 — SIGNIFICANT CHANGE UP
EGFR: 105 ML/MIN/1.73M2 — SIGNIFICANT CHANGE UP
EOSINOPHIL # BLD AUTO: SIGNIFICANT CHANGE UP (ref 0–0.5)
EOSINOPHIL NFR BLD AUTO: SIGNIFICANT CHANGE UP (ref 0–6)
GLUCOSE SERPL-MCNC: 105 MG/DL — HIGH (ref 70–99)
HCT VFR BLD CALC: 22.6 % — LOW (ref 34.5–45)
HGB BLD-MCNC: 7.3 G/DL — LOW (ref 11.5–15.5)
IMM GRANULOCYTES # BLD AUTO: SIGNIFICANT CHANGE UP (ref 0–0.07)
IMM GRANULOCYTES NFR BLD AUTO: SIGNIFICANT CHANGE UP (ref 0–0.9)
IMMATURE PLATELET FRACTION #: 0.1 K/UL — LOW (ref 4.7–11.1)
IMMATURE PLATELET FRACTION %: 2.4 % — SIGNIFICANT CHANGE UP (ref 1.6–4.9)
LDH SERPL L TO P-CCNC: 249 U/L — HIGH (ref 50–242)
LYMPHOCYTES # BLD AUTO: SIGNIFICANT CHANGE UP (ref 1–3.3)
LYMPHOCYTES NFR BLD AUTO: SIGNIFICANT CHANGE UP (ref 13–44)
MAGNESIUM SERPL-MCNC: 2.2 MG/DL — SIGNIFICANT CHANGE UP (ref 1.6–2.6)
MCHC RBC-ENTMCNC: 28.1 PG — SIGNIFICANT CHANGE UP (ref 27–34)
MCHC RBC-ENTMCNC: 32.3 G/DL — SIGNIFICANT CHANGE UP (ref 32–36)
MCV RBC AUTO: 86.9 FL — SIGNIFICANT CHANGE UP (ref 80–100)
MONOCYTES # BLD AUTO: SIGNIFICANT CHANGE UP (ref 0–0.9)
MONOCYTES NFR BLD AUTO: SIGNIFICANT CHANGE UP (ref 2–14)
NEUTROPHILS # BLD AUTO: SIGNIFICANT CHANGE UP (ref 1.8–7.4)
NEUTROPHILS NFR BLD AUTO: SIGNIFICANT CHANGE UP (ref 43–77)
NRBC # BLD AUTO: 0 K/UL — SIGNIFICANT CHANGE UP (ref 0–0)
NRBC # FLD: 0 K/UL — SIGNIFICANT CHANGE UP (ref 0–0)
NRBC BLD AUTO-RTO: SIGNIFICANT CHANGE UP (ref 0–0)
PHOSPHATE SERPL-MCNC: 3 MG/DL — SIGNIFICANT CHANGE UP (ref 2.5–4.5)
PLATELET # BLD AUTO: 4 K/UL — CRITICAL LOW (ref 150–400)
PMV BLD: SIGNIFICANT CHANGE UP FL (ref 7–13)
POTASSIUM SERPL-MCNC: 3.2 MMOL/L — LOW (ref 3.5–5.3)
POTASSIUM SERPL-SCNC: 3.2 MMOL/L — LOW (ref 3.5–5.3)
PROT SERPL-MCNC: 4.9 G/DL — LOW (ref 6–8.3)
RBC # BLD: 2.6 M/UL — LOW (ref 3.8–5.2)
RBC # FLD: 16 % — HIGH (ref 10.3–14.5)
SODIUM SERPL-SCNC: 146 MMOL/L — HIGH (ref 135–145)
WBC # BLD: 0.01 K/UL — CRITICAL LOW (ref 3.8–10.5)
WBC # FLD AUTO: 0.01 K/UL — CRITICAL LOW (ref 3.8–10.5)

## 2025-07-08 PROCEDURE — 99233 SBSQ HOSP IP/OBS HIGH 50: CPT

## 2025-07-08 RX ORDER — HYDROMORPHONE/SOD CHLOR,ISO/PF 2 MG/10 ML
0.5 SYRINGE (ML) INJECTION EVERY 6 HOURS
Refills: 0 | Status: DISCONTINUED | OUTPATIENT
Start: 2025-07-08 | End: 2025-07-13

## 2025-07-08 RX ORDER — OLANZAPINE 10 MG/1
5 TABLET ORAL AT BEDTIME
Refills: 0 | Status: DISCONTINUED | OUTPATIENT
Start: 2025-07-08 | End: 2025-07-11

## 2025-07-08 RX ORDER — OLANZAPINE 10 MG/1
2.5 TABLET ORAL ONCE
Refills: 0 | Status: COMPLETED | OUTPATIENT
Start: 2025-07-08 | End: 2025-07-08

## 2025-07-08 RX ADMIN — Medication 50 MILLIEQUIVALENT(S): at 11:16

## 2025-07-08 RX ADMIN — Medication 1 DROP(S): at 23:56

## 2025-07-08 RX ADMIN — Medication 1 DROP(S): at 11:20

## 2025-07-08 RX ADMIN — Medication 10 MILLILITER(S): at 11:28

## 2025-07-08 RX ADMIN — Medication 10 MILLIGRAM(S): at 23:46

## 2025-07-08 RX ADMIN — Medication 1 APPLICATION(S): at 07:47

## 2025-07-08 RX ADMIN — Medication 10 MILLIGRAM(S): at 17:21

## 2025-07-08 RX ADMIN — Medication 1 DROP(S): at 17:21

## 2025-07-08 RX ADMIN — Medication 5 MILLILITER(S): at 16:32

## 2025-07-08 RX ADMIN — Medication 15 MILLILITER(S): at 17:22

## 2025-07-08 RX ADMIN — Medication 0.5 MILLIGRAM(S): at 23:46

## 2025-07-08 RX ADMIN — Medication 8 MILLIGRAM(S): at 08:28

## 2025-07-08 RX ADMIN — Medication 5 MILLILITER(S): at 11:28

## 2025-07-08 RX ADMIN — Medication 0.5 MILLIGRAM(S): at 18:21

## 2025-07-08 RX ADMIN — Medication 8 MILLIGRAM(S): at 21:53

## 2025-07-08 RX ADMIN — AMLODIPINE BESYLATE 5 MILLIGRAM(S): 10 TABLET ORAL at 17:22

## 2025-07-08 RX ADMIN — Medication 1 PATCH: at 19:44

## 2025-07-08 RX ADMIN — Medication 50 MILLIEQUIVALENT(S): at 13:46

## 2025-07-08 RX ADMIN — SCOPOLAMINE 1 PATCH: 1 PATCH, EXTENDED RELEASE TRANSDERMAL at 07:26

## 2025-07-08 RX ADMIN — Medication 0.5 MILLIGRAM(S): at 12:44

## 2025-07-08 RX ADMIN — Medication 10 MILLILITER(S): at 07:58

## 2025-07-08 RX ADMIN — OLANZAPINE 2.5 MILLIGRAM(S): 10 TABLET ORAL at 12:09

## 2025-07-08 RX ADMIN — Medication 10 MILLILITER(S): at 20:13

## 2025-07-08 RX ADMIN — Medication 0.5 MILLIGRAM(S): at 11:44

## 2025-07-08 RX ADMIN — LIDOCAINE HYDROCHLORIDE 15 MILLILITER(S): 20 JELLY TOPICAL at 11:44

## 2025-07-08 RX ADMIN — Medication 1 PATCH: at 07:26

## 2025-07-08 RX ADMIN — Medication 5 MILLILITER(S): at 20:13

## 2025-07-08 RX ADMIN — LISINOPRIL 20 MILLIGRAM(S): 5 TABLET ORAL at 17:23

## 2025-07-08 RX ADMIN — Medication 40 MILLIGRAM(S): at 05:45

## 2025-07-08 RX ADMIN — Medication 40 MILLIGRAM(S): at 17:23

## 2025-07-08 RX ADMIN — Medication 10 MILLIGRAM(S): at 11:42

## 2025-07-08 RX ADMIN — Medication 10 MILLILITER(S): at 16:32

## 2025-07-08 RX ADMIN — LETERMOVIR 480 MILLIGRAM(S): 480 TABLET, FILM COATED ORAL at 13:22

## 2025-07-08 RX ADMIN — Medication 50 MILLIEQUIVALENT(S): at 08:26

## 2025-07-08 RX ADMIN — Medication 10 MILLIGRAM(S): at 05:46

## 2025-07-08 RX ADMIN — POSACONAZOLE 300 MILLIGRAM(S): 100 TABLET, DELAYED RELEASE ORAL at 13:26

## 2025-07-08 RX ADMIN — Medication 5 MILLIGRAM(S): at 13:20

## 2025-07-08 RX ADMIN — Medication 5 MILLILITER(S): at 07:59

## 2025-07-08 RX ADMIN — Medication 0.5 MILLIGRAM(S): at 17:21

## 2025-07-08 RX ADMIN — URSODIOL 300 MILLIGRAM(S): 300 CAPSULE ORAL at 17:22

## 2025-07-08 RX ADMIN — FILGRASTIM 300 MICROGRAM(S): 300 INJECTION, SOLUTION INTRAVENOUS; SUBCUTANEOUS at 11:44

## 2025-07-08 RX ADMIN — Medication 800 MILLIGRAM(S): at 17:23

## 2025-07-08 RX ADMIN — Medication 125 MILLIGRAM(S): at 17:22

## 2025-07-08 NOTE — PROGRESS NOTE ADULT - SUBJECTIVE AND OBJECTIVE BOX
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 / Bu / Thiotepa / rATG prep regimen for treatment of myelofibrosis    Disease: myelofibrosis   Prep regimen: Flu / Bu / Thiotepa / rATG   GVHD ppx: ABC: PTCy days +3, +4, Bortezomib days 0, +3, PTCy days +5, +14, +28   ABO/CMV:   Recipient:   A+/CMV+   Donor:   B+/CMV+      S: Patient seen and examined with HPC Transplant Team:       O:   Vital Signs Last 24 Hrs  T(C): 36.5 (08 Jul 2025 05:23), Max: 37.1 (07 Jul 2025 11:13)  T(F): 97.7 (08 Jul 2025 05:23), Max: 98.8 (07 Jul 2025 11:13)  HR: 86 (08 Jul 2025 05:23) (77 - 90)  BP: 181/68 (08 Jul 2025 05:23) (178/68 - 196/69)  BP(mean): --  RR: 20 (08 Jul 2025 05:23) (17 - 20)  SpO2: 96% (08 Jul 2025 05:23) (95% - 97%)    Parameters below as of 08 Jul 2025 05:23  Patient On (Oxygen Delivery Method): room air    Admit weight: 65.5kg  Daily Weight in kG:     Intake / Output:   07-07 @ 07:01  -  07-08 @ 07:00  --------------------------------------------------------  IN: 993 mL / OUT: 4000 mL / NET: -3007 mL      PE:   Oropharynx: + erythema + ulcerations posterior pharynx   Oral Mucositis: +                                                  rdGrdrrdarddrderd:rd3rd CVS: S1, S2 RRR   Lungs: CTA throughout bilaterally   Abdomen: + BS x 4, soft, NT, ND   Extremities: no edema   Gastric Mucositis:                 -                              Grade: n/a   Intestinal Mucositis:              -                              Grade: n/a   Skin: no rash   TLC: CDI   Neuro: A&O x 3        Labs:       CBC -     07-08    146[H]  |  110[H]  |  15  ----------------------------<  105[H]  3.2[L]   |  25  |  0.50    Ca    8.3[L]      08 Jul 2025 06:34  Phos  3.0     07-08  Mg     2.2     07-08    TPro  4.9[L]  /  Alb  3.3  /  TBili  0.9  /  DBili  x   /  AST  13  /  ALT  14  /  AlkPhos  90  07-08    PT/INR - ( 07 Jul 2025 07:01 )   PT: 15.0 sec;   INR: 1.31 ratio    PTT - ( 07 Jul 2025 07:01 )  PTT:26.6 sec  LIVER FUNCTIONS - ( 08 Jul 2025 06:34 )  Alb: 3.3 g/dL / Pro: 4.9 g/dL / ALK PHOS: 90 U/L / ALT: 14 U/L / AST: 13 U/L / GGT: x           Lactate Dehydrogenase, Serum: 249 U/L (07-08 @ 06:34)      Cultures:  Culture Results: <10,000 CFU/mL Normal Urogenital Cha (06.28.25 @ 06:31)    Culture Results: No growth at 5 days(06.27.25 @ 16:36)    Culture Results: No growth at  5 days (06.27.25 @ 16:30)    Respiratory Viral Panel with COVID-19 by SCARLET (06.20.25 @ 21:02)   Rapid RVP Result: [id number]-1: NotDetec:    Culture - Blood (06.20.25 @ 18:45)   Specimen Source: Blood Blood-Catheter    Culture Results: No growth at 24 hours Culture - Blood (06.20.25 @ 18:40)   Specimen Source: Blood Blood-Peripheral  Culture Results: No growth     Culture - Urine (06.20.25 @ 21:01)   Specimen Source: Clean Catch Clean Catch (Midstream)  Culture Results: 10,000 - 49,000 CFU/mL Gram Positive Cocci in Pairs and Chains   <10,000 CFU/ml Normal Urogenital cha present    Radiology:   ACC: [id number] EXAM:  XR CHEST PORTABLE URGENT 1V   ORDERED BY: SARAH REEVES   PROCEDURE DATE:  06/27/2025    IMPRESSION: No focal consolidation.        Meds:   Antimicrobials:   acyclovir    Suspension 800 milliGRAM(s) Oral every 12 hours  letermovir 480 milliGRAM(s) Oral daily  levoFLOXacin IVPB 500 milliGRAM(s) IV Intermittent every 24 hours  posaconazole DR Tablet 300 milliGRAM(s) Oral daily  vancomycin    Solution 125 milliGRAM(s) Oral every 12 hours      Heme / Onc:       GI:  loperamide 2 milliGRAM(s) Oral every 6 hours PRN  pantoprazole  Injectable 40 milliGRAM(s) IV Push two times a day  polyethylene glycol 3350 17 Gram(s) Oral daily PRN  sodium bicarbonate Mouth Rinse 10 milliLiter(s) Swish and Spit five times a day  ursodiol Suspension 300 milliGRAM(s) Oral two times a day      Cardiovascular:   amLODIPine   Tablet 5 milliGRAM(s) Oral daily  lisinopril 20 milliGRAM(s) Oral <User Schedule>  lisinopril 20 milliGRAM(s) Oral <User Schedule>  metoprolol succinate ER 25 milliGRAM(s) Oral daily      Immunologic:   filgrastim-sndz (ZARXIO) Injectable 300 MICROGram(s) SubCutaneous every 24 hours      Other medications:   artificial tears (preservative free) Ophthalmic Solution 1 Drop(s) Both EYES four times a day  Biotene Dry Mouth Oral Rinse 5 milliLiter(s) Swish and Spit five times a day  buPROPion XL (24-Hour) . 300 milliGRAM(s) Oral daily  chlorhexidine 0.12% Liquid 15 milliLiter(s) Swish and Spit two times a day  chlorhexidine 4% Liquid 1 Application(s) Topical <User Schedule>  fentaNYL   Patch  12 MICROgram(s)/Hr. 1 Patch Transdermal every 48 hours  levothyroxine 88 MICROGram(s) Oral daily  phytonadione   Solution 5 milliGRAM(s) Oral <User Schedule>  prochlorperazine   Injectable 10 milliGRAM(s) IV Push every 6 hours  scopolamine 1 mG/72 Hr(s) Patch 1 Patch Transdermal every 72 hours  sodium chloride 0.9%. 1000 milliLiter(s) IV Continuous <Continuous>      PRN:   acetaminophen     Tablet .. 650 milliGRAM(s) Oral every 6 hours PRN  FIRST- Mouthwash  BLM 15 milliLiter(s) Swish and Swallow five times a day PRN  lidocaine 2% Viscous 15 milliLiter(s) Oral five times a day PRN  loperamide 2 milliGRAM(s) Oral every 6 hours PRN  ondansetron Injectable 8 milliGRAM(s) IV Push every 8 hours PRN  polyethylene glycol 3350 17 Gram(s) Oral daily PRN  sodium chloride 0.9% lock flush 10 milliLiter(s) IV Push every 1 hour PRN      A/P: 64 year old female with hx of myelofibrosis presents for a MUD allogeneic SCT (DP Permissive) conditioning with Flu/Bu/Thiotepa/rATG.  Today is day +11  6/21- Thiotepa 1/2. Maintain skin precautions during thiotepa administration and for 48 hours after completion (days .6 through day -3). Chronic pain issues - continue home fentanyl patch. Febrile overnight, tmax 100.9. Cultures  pending. RVP negative. CXR pending official read, no obvious consolidations. Vital signs are stable.   6/23 D/C HCTZ. Fludarabine 1/3, Busulfan 1/3, rATG 1/3. No Tylenol on busulfan days. Continue keppra ppx for 24 hours post infusion of last dose of busulfan. No acute events overnight, vital signs are stable.   6/24- overnight patient was febrile during ATG, no cultures/abx as fever likely secondary to ATG. Patient was otherwise stable overnight. Fludarabine 2/3, Busulfan 2/3 and rATG 2/3. NO Tylenol or Azoles until Day 0. Continue with supportive care.  6/25- Fludarabine 3/3, busulfan 3/3, rATG 3/3. No acute events overnight. Vital signs are stable.   6/26 Switched Compazine to atc  6/27 VSS, afebrile. 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  6/28 VSS, persistent fevers likely due to CRS post-SCT, continues on zosyn. Cultures pending. Hypervolemia - lasix 40mg x 1, will continue strict I/Os. Started caspofungin due to persistent thrush.  6/29 overnight no acute events noted. VSS, febrile likely due to CRS - continue with supportive care. BCx/UCx NGTD x 24h.  6/30 VSS, intermittently febrile likely due to CRS, infectious workup negative so far. PTCy 1/2 today. I&O, daily weights, prn diuresis. Continue to monitor thrush (improving). Changed some meds to PO suspension due to mucositis. Continue with supportive care. Refractory nausea, added trial of zyprexa 5mg po QHS x 3 days. d/c primafit. Change voriconazole to posaconazole tomorrow.   7/1- No acute events overnight, vital signs are stable. Continue supportive care for chemotherapy induced oral mucositis.   7/2- No acute events overnight, vital signs are stable. Completed PTCy, hydration to be discontinued today. I&O, daily weights, prn diuresis. Continue supportive care. Fentanyl patch to be titrated down to 12 mcg / hr tomorrow  7/3 Starting Scopolamine patch to decrease oral secretions. Increased Lisinopril 20mg in am, 10mg PM.    7/4-No acute events; awaiting count recovery   7/5- no acute events overnight. Vital signs are stable. SBP remains elevated, lisinopril / metoprolol doses increased.   7/6: BMT team continues to educate the patient about the importance of medication taking. awaiting count recovery  7/7 Increased ppi q12h (+stool guaiac)    1. Infectious Disease:   acyclovir    Suspension 800 milliGRAM(s) Oral every 12 hours  letermovir 480 milliGRAM(s) Oral daily  levoFLOXacin IVPB 500 milliGRAM(s) IV Intermittent every 24 hours  posaconazole DR Tablet 300 milliGRAM(s) Oral daily  vancomycin    Solution 125 milliGRAM(s) Oral every 12 hours    2. VOD Prophylaxis:   ursodiol Suspension 300 milliGRAM(s) Oral two times a day    3. GI Prophylaxis:    pantoprazole  Injectable 40 milliGRAM(s) IV q12h    4. Mouthcare - NS / NaHCO3 rinses, Peridex, Biotene; Skin care     5. GVHD prophylaxis   PTCy days +3, +4  Bortezomib days 0, + 3  Abatacept days +5, +14, +28     6. Transfuse & replete electrolytes prn  Replete Mg/K+ (IV)     7. IV hydration, daily weights, strict I&O, prn diuresis     8. PO intake as tolerated, nutrition follow up as needed    9. Antiemetics, anti-diarrhea medications:   loperamide 2 milliGRAM(s) Oral every 6 hours PRN  ondansetron Injectable 8 milliGRAM(s) IV Push every 8 hours PRN  prochlorperazine   Injectable 10 milliGRAM(s) IV Push every 6 hours  scopolamine 1 mG/72 Hr(s) Patch 1 Patch Transdermal every 72 hours    10. OOB as tolerated, physical therapy consult if needed     11. Monitor coags  2x week, vitamin K BIW   phytonadione   Solution 5 milliGRAM(s) Oral <User Schedule>    12. Monitor closely for clinical changes, monitor for fevers     13. Emotional support provided, plan of care discussed and questions addressed     14. Patient education done regarding plan of care, restrictions and discharge planning     15. Continue regular social work input     I have written the above note for Dr. Gandhi  who performed service with me in the room.   Jalen Kay PA-C ([phone])    I have seen and examined patient with PA, I agree with above note as scribed.                  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 / Bu / Thiotepa / rATG prep regimen for treatment of myelofibrosis    Disease: myelofibrosis   Prep regimen: Flu / Bu / Thiotepa / rATG   GVHD ppx: ABC: PTCy days +3, +4, Bortezomib days 0, +3, PTCy days +5, +14, +28   ABO/CMV:   Recipient:   A+/CMV+   Donor:   B+/CMV+      S: Patient seen and examined with HPC Transplant Team:   Dry mouth  loose stools  nausea/emesis  fatigue  RUE weakness  refusing multiple medications    Vital Signs Last 24 Hrs  T(C): 36.5 (08 Jul 2025 05:23), Max: 37.1 (07 Jul 2025 11:13)  T(F): 97.7 (08 Jul 2025 05:23), Max: 98.8 (07 Jul 2025 11:13)  HR: 86 (08 Jul 2025 05:23) (77 - 90)  BP: 181/68 (08 Jul 2025 05:23) (178/68 - 196/69)  BP(mean): --  RR: 20 (08 Jul 2025 05:23) (17 - 20)  SpO2: 96% (08 Jul 2025 05:23) (95% - 97%)    Parameters below as of 08 Jul 2025 05:23  Patient On (Oxygen Delivery Method): room air    Admit weight: 65.5kg  Daily Weight in kG:     Intake / Output:   07-07 @ 07:01  -  07-08 @ 07:00  --------------------------------------------------------  IN: 993 mL / OUT: 4000 mL / NET: -3007 mL      PE:   Oropharynx: + erythema + ulcerations posterior pharynx   Oral Mucositis: +                                                  rdGrdrrdarddrderd:rd3rd CVS: S1, S2 RRR   Lungs: CTA throughout bilaterally   Abdomen: + BS x 4, soft, NT, ND   Extremities: trace edema RUE, 5/5 strenght BUE's  Gastric Mucositis:                 -                              Grade: n/a   Intestinal Mucositis:              -                              Grade: n/a   Skin: no rash, ecchymotic patches BLE's   TLC: CDI   Neuro: A&O x 3        Labs:                           7.3    0.01  )-----------( 4        ( 08 Jul 2025 06:37 )             22.6     08 Jul 2025 06:34    146    |  110    |  15     ----------------------------<  105    3.2     |  25     |  0.50     Ca    8.3        08 Jul 2025 06:34  Phos  3.0       08 Jul 2025 06:34  Mg     2.2       08 Jul 2025 06:34    TPro  4.9    /  Alb  3.3    /  TBili  0.9    /  DBili  x      /  AST  13     /  ALT  14     /  AlkPhos  90     08 Jul 2025 06:34    PT/INR - ( 07 Jul 2025 07:01 )   PT: 15.0 sec;   INR: 1.31 ratio    PTT - ( 07 Jul 2025 07:01 )  PTT:26.6 sec    LIVER FUNCTIONS - ( 08 Jul 2025 06:34 )  Alb: 3.3 g/dL / Pro: 4.9 g/dL / ALK PHOS: 90 U/L / ALT: 14 U/L / AST: 13 U/L / GGT: x           Urinalysis Basic - ( 08 Jul 2025 06:34 )    Color: x / Appearance: x / SG: x / pH: x  Gluc: 105 mg/dL / Ketone: x  / Bili: x / Urobili: x   Blood: x / Protein: x / Nitrite: x   Leuk Esterase: x / RBC: x / WBC x   Sq Epi: x / Non Sq Epi: x / Bacteria: x      Cultures:  Culture Results: <10,000 CFU/mL Normal Urogenital Cha (06.28.25 @ 06:31)    Culture Results: No growth at 5 days(06.27.25 @ 16:36)    Culture Results: No growth at  5 days (06.27.25 @ 16:30)    Respiratory Viral Panel with COVID-19 by SCARLET (06.20.25 @ 21:02)   Rapid RVP Result: [id number]: NotDetec:    Culture - Blood (06.20.25 @ 18:45)   Specimen Source: Blood Blood-Catheter    Culture Results: No growth at Final  Culture - Blood (06.20.25 @ 18:40)   Specimen Source: Blood Blood-Peripheral  Culture Results: No growth     Culture - Urine (06.20.25 @ 21:01)   Specimen Source: Clean Catch Clean Catch (Midstream)  Culture Results: 10,000 - 49,000 CFU/mL Gram Positive Cocci in Pairs and Chains   <10,000 CFU/ml Normal Urogenital cha present    Radiology:   ACC: [id number] EXAM:  XR CHEST PORTABLE URGENT 1V   ORDERED BY: SARAH REEVES   PROCEDURE DATE:  06/27/2025    IMPRESSION: No focal consolidation.        Meds:   Antimicrobials:   acyclovir    Suspension 800 milliGRAM(s) Oral every 12 hours  letermovir 480 milliGRAM(s) Oral daily  levoFLOXacin IVPB 500 milliGRAM(s) IV Intermittent every 24 hours  posaconazole DR Tablet 300 milliGRAM(s) Oral daily  vancomycin    Solution 125 milliGRAM(s) Oral every 12 hours      Heme / Onc:       GI:  loperamide 2 milliGRAM(s) Oral every 6 hours PRN  pantoprazole  Injectable 40 milliGRAM(s) IV Push two times a day  polyethylene glycol 3350 17 Gram(s) Oral daily PRN  sodium bicarbonate Mouth Rinse 10 milliLiter(s) Swish and Spit five times a day  ursodiol Suspension 300 milliGRAM(s) Oral two times a day      Cardiovascular:   amLODIPine   Tablet 5 milliGRAM(s) Oral daily  lisinopril 20 milliGRAM(s) Oral <User Schedule>  lisinopril 20 milliGRAM(s) Oral <User Schedule>  metoprolol succinate ER 25 milliGRAM(s) Oral daily      Immunologic:   filgrastim-sndz (ZARXIO) Injectable 300 MICROGram(s) SubCutaneous every 24 hours      Other medications:   artificial tears (preservative free) Ophthalmic Solution 1 Drop(s) Both EYES four times a day  Biotene Dry Mouth Oral Rinse 5 milliLiter(s) Swish and Spit five times a day  buPROPion XL (24-Hour) . 300 milliGRAM(s) Oral daily  chlorhexidine 0.12% Liquid 15 milliLiter(s) Swish and Spit two times a day  chlorhexidine 4% Liquid 1 Application(s) Topical <User Schedule>  fentaNYL   Patch  12 MICROgram(s)/Hr. 1 Patch Transdermal every 48 hours  levothyroxine 88 MICROGram(s) Oral daily  phytonadione   Solution 5 milliGRAM(s) Oral <User Schedule>  prochlorperazine   Injectable 10 milliGRAM(s) IV Push every 6 hours  scopolamine 1 mG/72 Hr(s) Patch 1 Patch Transdermal every 72 hours  sodium chloride 0.9%. 1000 milliLiter(s) IV Continuous <Continuous>      PRN:   acetaminophen     Tablet .. 650 milliGRAM(s) Oral every 6 hours PRN  FIRST- Mouthwash  BLM 15 milliLiter(s) Swish and Swallow five times a day PRN  lidocaine 2% Viscous 15 milliLiter(s) Oral five times a day PRN  loperamide 2 milliGRAM(s) Oral every 6 hours PRN  ondansetron Injectable 8 milliGRAM(s) IV Push every 8 hours PRN  polyethylene glycol 3350 17 Gram(s) Oral daily PRN  sodium chloride 0.9% lock flush 10 milliLiter(s) IV Push every 1 hour PRN      A/P: 64 year old female with hx of myelofibrosis presents for a MUD allogeneic SCT (DP Permissive) conditioning with Flu/Bu/Thiotepa/rATG.  Today is day +11  6/21- Thiotepa 1/2. Maintain skin precautions during thiotepa administration and for 48 hours after completion (days .6 through day -3). Chronic pain issues - continue home fentanyl patch. Febrile overnight, tmax 100.9. Cultures  pending. RVP negative. CXR pending official read, no obvious consolidations. Vital signs are stable.   6/23 D/C HCTZ. Fludarabine 1/3, Busulfan 1/3, rATG 1/3. No Tylenol on busulfan days. Continue keppra ppx for 24 hours post infusion of last dose of busulfan. No acute events overnight, vital signs are stable.   6/24- overnight patient was febrile during ATG, no cultures/abx as fever likely secondary to ATG. Patient was otherwise stable overnight. Fludarabine 2/3, Busulfan 2/3 and rATG 2/3. NO Tylenol or Azoles until Day 0. Continue with supportive care.  6/25- Fludarabine 3/3, busulfan 3/3, rATG 3/3. No acute events overnight. Vital signs are stable.   6/26 Switched Compazine to atc  6/27 VSS, afebrile. 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  6/28 VSS, persistent fevers likely due to CRS post-SCT, continues on zosyn. Cultures pending. Hypervolemia - lasix 40mg x 1, will continue strict I/Os. Started caspofungin due to persistent thrush.  6/29 overnight no acute events noted. VSS, febrile likely due to CRS - continue with supportive care. BCx/UCx NGTD x 24h.  6/30 VSS, intermittently febrile likely due to CRS, infectious workup negative so far. PTCy 1/2 today. I&O, daily weights, prn diuresis. Continue to monitor thrush (improving). Changed some meds to PO suspension due to mucositis. Continue with supportive care. Refractory nausea, added trial of zyprexa 5mg po QHS x 3 days. d/c primafit. Change voriconazole to posaconazole tomorrow.   7/1- No acute events overnight, vital signs are stable. Continue supportive care for chemotherapy induced oral mucositis.   7/2- No acute events overnight, vital signs are stable. Completed PTCy, hydration to be discontinued today. I&O, daily weights, prn diuresis. Continue supportive care. Fentanyl patch to be titrated down to 12 mcg / hr tomorrow  7/3 Starting Scopolamine patch to decrease oral secretions. Increased Lisinopril 20mg in am, 10mg PM.    7/4-No acute events; awaiting count recovery   7/5- no acute events overnight. Vital signs are stable. SBP remains elevated, lisinopril / metoprolol doses increased.   7/6: BMT team continues to educate the patient about the importance of medication taking. awaiting count recovery  7/7 Increased ppi q12h (+stool guaiac)  7/8 D/C'd Scopolamine patch    1. Infectious Disease:   acyclovir    Suspension 800 milliGRAM(s) Oral every 12 hours  letermovir 480 milliGRAM(s) Oral daily  levoFLOXacin IVPB 500 milliGRAM(s) IV Intermittent every 24 hours  posaconazole DR Tablet 300 milliGRAM(s) Oral daily  vancomycin    Solution 125 milliGRAM(s) Oral every 12 hours    2. VOD Prophylaxis:   ursodiol Suspension 300 milliGRAM(s) Oral two times a day    3. GI Prophylaxis:    pantoprazole  Injectable 40 milliGRAM(s) IV q12h    4. Mouthcare - NS / NaHCO3 rinses, Peridex, Biotene; Skin care     5. GVHD prophylaxis   PTCy days +3, +4  Bortezomib days 0, + 3  Abatacept days +5, +14, +28     6. Transfuse & replete electrolytes prn  Transfuse platelets x 1bag  Replete Mg/K+ (IV)     7. IV hydration, daily weights, strict I&O, prn diuresis     8. PO intake as tolerated, nutrition follow up as needed    9. Antiemetics, anti-diarrhea medications:   loperamide 2 milliGRAM(s) Oral every 6 hours PRN  ondansetron Injectable 8 milliGRAM(s) IV Push every 8 hours PRN  prochlorperazine   Injectable 10 milliGRAM(s) IV Push every 6 hours    10. OOB as tolerated, physical therapy consult if needed     11. Monitor coags  2x week, vitamin K BIW   phytonadione   Solution 5 milliGRAM(s) Oral <User Schedule>    12. Monitor closely for clinical changes, monitor for fevers     13. Emotional support provided, plan of care discussed and questions addressed     14. Patient education done regarding plan of care, restrictions and discharge planning     15. Continue regular social work input     I have written the above note for Dr. Gandhi  who performed service with me in the room.   Jalen Kay PA-C ([phone])    I have seen and examined patient with PA, I agree with above note as scribed.

## 2025-07-08 NOTE — PROGRESS NOTE ADULT - NS ATTEND AMEND GEN_ALL_CORE FT
Day + 10 s/p allo-HSCT ..flu/bu/TT/ratg...ABC    Overall, patient is fatigued, complains of mouth pain, leg "heaviness" due to fluid overload. More talkative today  She is not ambulating, staying in bed majority of the day. Limited PO intake.   Course complicated by CRS; now resolved  Hypertensive - on three antihypertensive agents; possibly component of opioid withdrawal (fentanyl patch 50mg at admission, now at 12.5mg) - monitor closely and use of PRN as needed. Increased metoprolol and lisinopril pm 7/5/25..pt npt taking po bp meds..will give hydralazine today with lasix and albumin  Physical exam is remarkable for mucositis; +1 b/l LE edema;   Labs reviewed; appropriate  Continue PRN diuresis - discussed that ambulation and OOB to chair is necessary.   Discussed close monitoring of In/Outs  Discussed limiting use of PRN opioids especially given that we are titrating down fentanyl patch. Extensively discussed with patient who is in agreement to rate her pain and request appropriate analgesics.   She has a private nursing aide at bedside.   PT consulted; encouraged out of bed to chair as much as possible during the day  continue supportive care and antimicrobial ppx.  on zarxio Day + 11 s/p allo-HSCT ..flu/bu/TT/ratg...ABC    Overall, patient is fatigued, complains of mouth pain, leg "heaviness" due to fluid overload. More talkative today  She is not ambulating, staying in bed majority of the day. Limited PO intake. some difficulty swallowing, nausea  Course complicated by CRS; now resolved  Hypertensive - on three antihypertensive agents; possibly component of opioid withdrawal (fentanyl patch 50mg at admission, now at 12.5mg) - monitor closely and use of PRN as needed. Increased metoprolol and lisinopril pm 7/5/25..pt npt taking po bp meds..will give hydralazine today with lasix and albumin prn..  Physical exam is remarkable for mucositis; +1 b/l LE edema;   Labs reviewed; appropriate  Continue PRN diuresis - discussed that ambulation and OOB to chair is necessary.   Discussed close monitoring of In/Outs  Discussed limiting use of PRN opioids especially given that we are titrating down fentanyl patch. Extensively discussed with patient who is in agreement to rate her pain and request appropriate analgesics.   She has a private nursing aide at bedside.   PT consulted; encouraged out of bed to chair as much as possible during the day  continue supportive care and antimicrobial ppx.  on zarxio  add zyprexa for nausea

## 2025-07-09 LAB
ALBUMIN SERPL ELPH-MCNC: 2.9 G/DL — LOW (ref 3.3–5)
ALP SERPL-CCNC: 91 U/L — SIGNIFICANT CHANGE UP (ref 40–120)
ALT FLD-CCNC: 11 U/L — SIGNIFICANT CHANGE UP (ref 10–45)
ANION GAP SERPL CALC-SCNC: 12 MMOL/L — SIGNIFICANT CHANGE UP (ref 5–17)
AST SERPL-CCNC: 9 U/L — LOW (ref 10–40)
BASOPHILS # BLD AUTO: SIGNIFICANT CHANGE UP (ref 0–0.2)
BASOPHILS NFR BLD AUTO: SIGNIFICANT CHANGE UP (ref 0–2)
BILIRUB DIRECT SERPL-MCNC: 0.6 MG/DL — HIGH (ref 0–0.3)
BILIRUB INDIRECT FLD-MCNC: 0.4 MG/DL — SIGNIFICANT CHANGE UP (ref 0.2–1)
BILIRUB SERPL-MCNC: 1 MG/DL — SIGNIFICANT CHANGE UP (ref 0.2–1.2)
BLD GP AB SCN SERPL QL: NEGATIVE — SIGNIFICANT CHANGE UP
BUN SERPL-MCNC: 14 MG/DL — SIGNIFICANT CHANGE UP (ref 7–23)
CALCIUM SERPL-MCNC: 8 MG/DL — LOW (ref 8.4–10.5)
CHLORIDE SERPL-SCNC: 114 MMOL/L — HIGH (ref 96–108)
CO2 SERPL-SCNC: 22 MMOL/L — SIGNIFICANT CHANGE UP (ref 22–31)
CREAT SERPL-MCNC: 0.52 MG/DL — SIGNIFICANT CHANGE UP (ref 0.5–1.3)
EGFR: 104 ML/MIN/1.73M2 — SIGNIFICANT CHANGE UP
EGFR: 104 ML/MIN/1.73M2 — SIGNIFICANT CHANGE UP
EOSINOPHIL # BLD AUTO: SIGNIFICANT CHANGE UP (ref 0–0.5)
EOSINOPHIL NFR BLD AUTO: SIGNIFICANT CHANGE UP (ref 0–6)
GLUCOSE SERPL-MCNC: 115 MG/DL — HIGH (ref 70–99)
HCT VFR BLD CALC: 20.1 % — CRITICAL LOW (ref 34.5–45)
HGB BLD-MCNC: 6.4 G/DL — CRITICAL LOW (ref 11.5–15.5)
IMM GRANULOCYTES # BLD AUTO: SIGNIFICANT CHANGE UP (ref 0–0.07)
IMM GRANULOCYTES NFR BLD AUTO: SIGNIFICANT CHANGE UP (ref 0–0.9)
IMMATURE PLATELET FRACTION #: 0.3 K/UL — LOW (ref 4.7–11.1)
IMMATURE PLATELET FRACTION %: 2.1 % — SIGNIFICANT CHANGE UP (ref 1.6–4.9)
LDH SERPL L TO P-CCNC: 223 U/L — SIGNIFICANT CHANGE UP (ref 50–242)
LYMPHOCYTES # BLD AUTO: SIGNIFICANT CHANGE UP (ref 1–3.3)
LYMPHOCYTES NFR BLD AUTO: SIGNIFICANT CHANGE UP (ref 13–44)
MAGNESIUM SERPL-MCNC: 2 MG/DL — SIGNIFICANT CHANGE UP (ref 1.6–2.6)
MCHC RBC-ENTMCNC: 28.6 PG — SIGNIFICANT CHANGE UP (ref 27–34)
MCHC RBC-ENTMCNC: 31.8 G/DL — LOW (ref 32–36)
MCV RBC AUTO: 89.7 FL — SIGNIFICANT CHANGE UP (ref 80–100)
MONOCYTES # BLD AUTO: SIGNIFICANT CHANGE UP (ref 0–0.9)
MONOCYTES NFR BLD AUTO: SIGNIFICANT CHANGE UP (ref 2–14)
NEUTROPHILS # BLD AUTO: SIGNIFICANT CHANGE UP (ref 1.8–7.4)
NEUTROPHILS NFR BLD AUTO: SIGNIFICANT CHANGE UP (ref 43–77)
NRBC # BLD AUTO: 0 K/UL — SIGNIFICANT CHANGE UP (ref 0–0)
NRBC # FLD: 0 K/UL — SIGNIFICANT CHANGE UP (ref 0–0)
NRBC BLD AUTO-RTO: 0 /100 WBCS — SIGNIFICANT CHANGE UP (ref 0–0)
PHOSPHATE SERPL-MCNC: 2.6 MG/DL — SIGNIFICANT CHANGE UP (ref 2.5–4.5)
PLATELET # BLD AUTO: 12 K/UL — CRITICAL LOW (ref 150–400)
PMV BLD: SIGNIFICANT CHANGE UP FL (ref 7–13)
POTASSIUM SERPL-MCNC: 3.8 MMOL/L — SIGNIFICANT CHANGE UP (ref 3.5–5.3)
POTASSIUM SERPL-SCNC: 3.8 MMOL/L — SIGNIFICANT CHANGE UP (ref 3.5–5.3)
PROT SERPL-MCNC: 4.9 G/DL — LOW (ref 6–8.3)
RBC # BLD: 2.24 M/UL — LOW (ref 3.8–5.2)
RBC # FLD: 15.6 % — HIGH (ref 10.3–14.5)
RH IG SCN BLD-IMP: POSITIVE — SIGNIFICANT CHANGE UP
SODIUM SERPL-SCNC: 148 MMOL/L — HIGH (ref 135–145)
WBC # BLD: 0.02 K/UL — CRITICAL LOW (ref 3.8–10.5)
WBC # FLD AUTO: 0.02 K/UL — CRITICAL LOW (ref 3.8–10.5)

## 2025-07-09 PROCEDURE — 99233 SBSQ HOSP IP/OBS HIGH 50: CPT

## 2025-07-09 RX ADMIN — Medication 0.5 MILLIGRAM(S): at 19:52

## 2025-07-09 RX ADMIN — Medication 10 MILLIGRAM(S): at 11:11

## 2025-07-09 RX ADMIN — Medication 5 MILLILITER(S): at 08:01

## 2025-07-09 RX ADMIN — Medication 10 MILLILITER(S): at 11:10

## 2025-07-09 RX ADMIN — Medication 10 MILLILITER(S): at 23:21

## 2025-07-09 RX ADMIN — Medication 10 MILLIGRAM(S): at 23:13

## 2025-07-09 RX ADMIN — Medication 5 MILLILITER(S): at 16:05

## 2025-07-09 RX ADMIN — Medication 40 MILLIGRAM(S): at 17:02

## 2025-07-09 RX ADMIN — Medication 10 MILLILITER(S): at 08:02

## 2025-07-09 RX ADMIN — Medication 1 DROP(S): at 23:22

## 2025-07-09 RX ADMIN — LOPERAMIDE HCL 2 MILLIGRAM(S): 1 SOLUTION ORAL at 17:01

## 2025-07-09 RX ADMIN — URSODIOL 300 MILLIGRAM(S): 300 CAPSULE ORAL at 06:14

## 2025-07-09 RX ADMIN — Medication 5 MILLILITER(S): at 23:21

## 2025-07-09 RX ADMIN — Medication 1 DROP(S): at 17:22

## 2025-07-09 RX ADMIN — LISINOPRIL 20 MILLIGRAM(S): 5 TABLET ORAL at 06:15

## 2025-07-09 RX ADMIN — Medication 0.5 MILLIGRAM(S): at 06:13

## 2025-07-09 RX ADMIN — Medication 800 MILLIGRAM(S): at 06:16

## 2025-07-09 RX ADMIN — Medication 0.5 MILLIGRAM(S): at 13:13

## 2025-07-09 RX ADMIN — URSODIOL 300 MILLIGRAM(S): 300 CAPSULE ORAL at 17:02

## 2025-07-09 RX ADMIN — LETERMOVIR 480 MILLIGRAM(S): 480 TABLET, FILM COATED ORAL at 11:15

## 2025-07-09 RX ADMIN — Medication 5 MILLILITER(S): at 19:52

## 2025-07-09 RX ADMIN — Medication 10 MILLILITER(S): at 16:05

## 2025-07-09 RX ADMIN — Medication 10 MILLIGRAM(S): at 06:15

## 2025-07-09 RX ADMIN — FILGRASTIM 300 MICROGRAM(S): 300 INJECTION, SOLUTION INTRAVENOUS; SUBCUTANEOUS at 11:15

## 2025-07-09 RX ADMIN — Medication 1 PATCH: at 19:32

## 2025-07-09 RX ADMIN — Medication 1 PATCH: at 08:01

## 2025-07-09 RX ADMIN — Medication 125 MILLIGRAM(S): at 06:14

## 2025-07-09 RX ADMIN — Medication 1 APPLICATION(S): at 08:02

## 2025-07-09 RX ADMIN — Medication 1 DROP(S): at 06:14

## 2025-07-09 RX ADMIN — METOPROLOL SUCCINATE 25 MILLIGRAM(S): 50 TABLET, EXTENDED RELEASE ORAL at 06:14

## 2025-07-09 RX ADMIN — Medication 15 MILLILITER(S): at 06:14

## 2025-07-09 RX ADMIN — Medication 0.5 MILLIGRAM(S): at 00:15

## 2025-07-09 RX ADMIN — OLANZAPINE 5 MILLIGRAM(S): 10 TABLET ORAL at 22:58

## 2025-07-09 RX ADMIN — Medication 10 MILLILITER(S): at 19:52

## 2025-07-09 RX ADMIN — Medication 0.5 MILLIGRAM(S): at 06:45

## 2025-07-09 RX ADMIN — LIDOCAINE HYDROCHLORIDE 15 MILLILITER(S): 20 JELLY TOPICAL at 11:10

## 2025-07-09 RX ADMIN — Medication 5 MILLILITER(S): at 11:09

## 2025-07-09 RX ADMIN — Medication 0.5 MILLIGRAM(S): at 14:13

## 2025-07-09 RX ADMIN — LISINOPRIL 20 MILLIGRAM(S): 5 TABLET ORAL at 17:21

## 2025-07-09 RX ADMIN — Medication 15 MILLILITER(S): at 17:03

## 2025-07-09 RX ADMIN — Medication 10 MILLIGRAM(S): at 17:03

## 2025-07-09 RX ADMIN — Medication 1 DROP(S): at 11:11

## 2025-07-09 RX ADMIN — POSACONAZOLE 300 MILLIGRAM(S): 100 TABLET, DELAYED RELEASE ORAL at 11:20

## 2025-07-09 RX ADMIN — AMLODIPINE BESYLATE 5 MILLIGRAM(S): 10 TABLET ORAL at 06:14

## 2025-07-09 RX ADMIN — Medication 125 MILLIGRAM(S): at 17:02

## 2025-07-09 RX ADMIN — Medication 40 MILLIGRAM(S): at 06:14

## 2025-07-09 RX ADMIN — Medication 800 MILLIGRAM(S): at 17:20

## 2025-07-09 RX ADMIN — Medication 88 MICROGRAM(S): at 06:15

## 2025-07-09 RX ADMIN — Medication 1 PATCH: at 07:45

## 2025-07-09 RX ADMIN — Medication 0.5 MILLIGRAM(S): at 20:10

## 2025-07-09 NOTE — PROGRESS NOTE ADULT - SUBJECTIVE AND OBJECTIVE BOX
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    O: Vitals:   Vital Signs Last 24 Hrs  T(C): 37.1 (09 Jul 2025 05:09), Max: 37.8 (09 Jul 2025 00:15)  T(F): 98.7 (09 Jul 2025 05:09), Max: 100 (09 Jul 2025 00:15)  HR: 108 (09 Jul 2025 05:09) (88 - 108)  BP: 164/70 (09 Jul 2025 05:09) (157/69 - 195/72)  BP(mean): --  RR: 19 (09 Jul 2025 05:09) (18 - 19)  SpO2: 93% (09 Jul 2025 05:09) (93% - 97%)    Parameters below as of 09 Jul 2025 05:09  Patient On (Oxygen Delivery Method): room air    Admit weight: 65.5kg  Daily Weight in kG:  (09 Jul 2025)    Intake / Output:   07-08 @ 07:01  -  07-09 @ 07:00  --------------------------------------------------------  IN: 660 mL / OUT: 1125 mL / NET: -465 mL      PE:   Oropharynx: + erythema + ulcerations posterior pharynx   Oral Mucositis: +                                                  rdGrdrrdarddrderd:rd3rd CVS: S1, S2 RRR   Lungs: CTA throughout bilaterally   Abdomen: + BS x 4, soft, NT, ND   Extremities: trace edema RUE, 5/5 strenght BUE's  Gastric Mucositis:                 -                              Grade: n/a   Intestinal Mucositis:              -                              Grade: n/a   Skin: no rash, ecchymotic patches BLE's   TLC: CDI   Neuro: A&O x 3      Labs:       07-09    148[H]  |  114[H]  |  14  ----------------------------<  115[H]  3.8   |  22  |  0.52    Ca    8.0[L]      09 Jul 2025 06:35  Phos  2.6     07-09  Mg     2.0     07-09    TPro  4.9[L]  /  Alb  2.9[L]  /  TBili  1.0  /  DBili  0.6[H]  /  AST  9[L]  /  ALT  11  /  AlkPhos  91  07-09      LIVER FUNCTIONS - ( 09 Jul 2025 06:35 )  Alb: 2.9 g/dL / Pro: 4.9 g/dL / ALK PHOS: 91 U/L / ALT: 11 U/L / AST: 9 U/L / GGT: x           Lactate Dehydrogenase, Serum: 223 U/L (07-09 @ 06:35)      Cultures:  Culture Results: <10,000 CFU/mL Normal Urogenital Cha (06.28.25 @ 06:31)    Culture Results: No growth at 5 days(06.27.25 @ 16:36)    Culture Results: No growth at  5 days (06.27.25 @ 16:30)    Respiratory Viral Panel with COVID-19 by [facility] (06.20.25 @ 21:02)   Rapid RVP Result: [id number]: NotDetec:    Culture - Blood (06.20.25 @ 18:45)   Specimen Source: Blood Blood-Catheter    Culture Results: No growth at Final  Culture - Blood (06.20.25 @ 18:40)   Specimen Source: Blood Blood-Peripheral  Culture Results: No growth     Culture - Urine (06.20.25 @ 21:01)   Specimen Source: Clean Catch Clean Catch (Midstream)  Culture Results: 10,000 - 49,000 CFU/mL Gram Positive Cocci in Pairs and Chains   <10,000 CFU/ml Normal Urogenital cha present    Radiology:   ACC: [id number] EXAM:  XR CHEST PORTABLE URGENT 1V   PROCEDURE DATE:  06/27/2025    IMPRESSION: No focal consolidation.      Meds:   Antimicrobials:   acyclovir    Suspension 800 milliGRAM(s) Oral every 12 hours  letermovir 480 milliGRAM(s) Oral daily  levoFLOXacin IVPB 500 milliGRAM(s) IV Intermittent every 24 hours  posaconazole DR Tablet 300 milliGRAM(s) Oral daily  vancomycin    Solution 125 milliGRAM(s) Oral every 12 hours      Heme / Onc:       GI:  loperamide 2 milliGRAM(s) Oral every 6 hours PRN  pantoprazole  Injectable 40 milliGRAM(s) IV Push two times a day  polyethylene glycol 3350 17 Gram(s) Oral daily PRN  sodium bicarbonate Mouth Rinse 10 milliLiter(s) Swish and Spit five times a day  ursodiol Suspension 300 milliGRAM(s) Oral two times a day      Cardiovascular:   amLODIPine   Tablet 5 milliGRAM(s) Oral daily  lisinopril 20 milliGRAM(s) Oral <User Schedule>  lisinopril 20 milliGRAM(s) Oral <User Schedule>  metoprolol succinate ER 25 milliGRAM(s) Oral daily      Immunologic:   filgrastim-sndz (ZARXIO) Injectable 300 MICROGram(s) SubCutaneous every 24 hours      Other medications:   artificial tears (preservative free) Ophthalmic Solution 1 Drop(s) Both EYES four times a day  Biotene Dry Mouth Oral Rinse 5 milliLiter(s) Swish and Spit five times a day  buPROPion XL (24-Hour) . 300 milliGRAM(s) Oral daily  chlorhexidine 0.12% Liquid 15 milliLiter(s) Swish and Spit two times a day  chlorhexidine 4% Liquid 1 Application(s) Topical <User Schedule>  fentaNYL   Patch  12 MICROgram(s)/Hr. 1 Patch Transdermal every 48 hours  levothyroxine 88 MICROGram(s) Oral daily  OLANZapine 5 milliGRAM(s) Oral at bedtime  phytonadione   Solution 5 milliGRAM(s) Oral <User Schedule>  prochlorperazine   Injectable 10 milliGRAM(s) IV Push every 6 hours      PRN:   acetaminophen     Tablet .. 650 milliGRAM(s) Oral every 6 hours PRN  FIRST- Mouthwash  BLM 15 milliLiter(s) Swish and Swallow five times a day PRN  HYDROmorphone  Injectable 0.5 milliGRAM(s) IV Push every 6 hours PRN  lidocaine 2% Viscous 15 milliLiter(s) Oral five times a day PRN  loperamide 2 milliGRAM(s) Oral every 6 hours PRN  ondansetron Injectable 8 milliGRAM(s) IV Push every 8 hours PRN  polyethylene glycol 3350 17 Gram(s) Oral daily PRN  sodium chloride 0.9% lock flush 10 milliLiter(s) IV Push every 1 hour PRN      A/P: 64 year old female with hx of myelofibrosis presents for a MUD allogeneic SCT (DP Permissive) conditioning with Flu/Bu/Thiotepa/rATG.  Today is day +12  6/21- Thiotepa 1/2. Maintain skin precautions during thiotepa administration and for 48 hours after completion (days .6 through day -3). Chronic pain issues - continue home fentanyl patch. Febrile overnight, tmax 100.9. Cultures  pending. RVP negative. CXR pending official read, no obvious consolidations. Vital signs are stable.   6/23 D/C HCTZ. Fludarabine 1/3, Busulfan 1/3, rATG 1/3. No Tylenol on busulfan days. Continue keppra ppx for 24 hours post infusion of last dose of busulfan. No acute events overnight, vital signs are stable.   6/24- overnight patient was febrile during ATG, no cultures/abx as fever likely secondary to ATG. Patient was otherwise stable overnight. Fludarabine 2/3, Busulfan 2/3 and rATG 2/3. NO Tylenol or Azoles until Day 0. Continue with supportive care.  6/25- Fludarabine 3/3, busulfan 3/3, rATG 3/3. No acute events overnight. Vital signs are stable.   6/26 Switched Compazine to atc  6/27 VSS, afebrile. 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  6/28 VSS, persistent fevers likely due to CRS post-SCT, continues on zosyn. Cultures pending. Hypervolemia - lasix 40mg x 1, will continue strict I/Os. Started caspofungin due to persistent thrush.  6/29 overnight no acute events noted. VSS, febrile likely due to CRS - continue with supportive care. BCx/UCx NGTD x 24h.  6/30 VSS, intermittently febrile likely due to CRS, infectious workup negative so far. PTCy 1/2 today. I&O, daily weights, prn diuresis. Continue to monitor thrush (improving). Changed some meds to PO suspension due to mucositis. Continue with supportive care. Refractory nausea, added trial of zyprexa 5mg po QHS x 3 days. d/c primafit. Change voriconazole to posaconazole tomorrow.   7/1- No acute events overnight, vital signs are stable. Continue supportive care for chemotherapy induced oral mucositis.   7/2- No acute events overnight, vital signs are stable. Completed PTCy, hydration to be discontinued today. I&O, daily weights, prn diuresis. Continue supportive care. Fentanyl patch to be titrated down to 12 mcg / hr tomorrow  7/3 Starting Scopolamine patch to decrease oral secretions. Increased Lisinopril 20mg in am, 10mg PM.    7/4-No acute events; awaiting count recovery   7/5- no acute events overnight. Vital signs are stable. SBP remains elevated, lisinopril / metoprolol doses increased.   7/6: BMT team continues to educate the patient about the importance of medication taking. awaiting count recovery  7/7 Increased ppi q12h (+stool guaiac)  7/8 D/C'd Scopolamine patch    1. Infectious Disease:   acyclovir Suspension 800 milliGRAM(s) Oral every 12 hours  letermovir 480 milliGRAM(s) Oral daily  levoFLOXacin IVPB 500 milliGRAM(s) IV Intermittent every 24 hours  posaconazole DR Tablet 300 milliGRAM(s) Oral daily  vancomycin  Solution 125 milliGRAM(s) Oral every 12 hours    2. VOD Prophylaxis:   ursodiol Suspension 300 milliGRAM(s) Oral two times a day    3. GI Prophylaxis:    pantoprazole  Injectable 40 milliGRAM(s) IV q12h    4. Mouthcare - NS / NaHCO3 rinses, Peridex, Biotene; Skin care     5. GVHD prophylaxis   PTCy days +3, +4  Bortezomib days 0, + 3  Abatacept days +5, +14, +28     6. Transfuse & replete electrolytes prn     7. IV hydration, daily weights, strict I&O, prn diuresis     8. PO intake as tolerated, nutrition follow up as needed    9. Antiemetics, anti-diarrhea medications:   loperamide 2 milliGRAM(s) Oral every 6 hours PRN  ondansetron Injectable 8 milliGRAM(s) IV Push every 8 hours PRN  prochlorperazine Injectable 10 milliGRAM(s) IV Push every 6 hours    10. OOB as tolerated, physical therapy consult if needed     11. Monitor coags  2x week, vitamin K BIW   phytonadione Solution 5 milliGRAM(s) Oral <User Schedule>    12. Monitor closely for clinical changes, monitor for fevers     13. Emotional support provided, plan of care discussed and questions addressed     14. Patient education done regarding plan of care, restrictions and discharge planning     15. Continue regular social work input     I have written the above note for Dr. Gandhi  who performed service with me in the room.   Bobo Barriga PA-C ([phone])    I have seen and examined patient with PA, I agree with above note as scribed.                          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    O: Vitals:   Vital Signs Last 24 Hrs  T(C): 37.1 (09 Jul 2025 05:09), Max: 37.8 (09 Jul 2025 00:15)  T(F): 98.7 (09 Jul 2025 05:09), Max: 100 (09 Jul 2025 00:15)  HR: 108 (09 Jul 2025 05:09) (88 - 108)  BP: 164/70 (09 Jul 2025 05:09) (157/69 - 195/72)  BP(mean): --  RR: 19 (09 Jul 2025 05:09) (18 - 19)  SpO2: 93% (09 Jul 2025 05:09) (93% - 97%)    Parameters below as of 09 Jul 2025 05:09  Patient On (Oxygen Delivery Method): room air    Admit weight: 65.5kg  Daily Weight in kG:  (09 Jul 2025)    Intake / Output:   07-08 @ 07:01  -  07-09 @ 07:00  --------------------------------------------------------  IN: 660 mL / OUT: 1125 mL / NET: -465 mL      PE:   Oropharynx: + erythema + ulcerations posterior pharynx   Oral Mucositis: +                                                  stGstrstastdstest:st1st CVS: S1, S2 RRR   Lungs: CTA throughout bilaterally   Abdomen: + BS x 4, soft, NT, ND   Extremities: trace edema RUE, 5/5 strenght BUE's  Gastric Mucositis:                 -                              Grade: n/a   Intestinal Mucositis:              -                              Grade: n/a   Skin: no rash, ecchymotic patches BLE's   TLC: CDI   Neuro: A&O x 3      Labs:                           6.4    0.02  )-----------( 12       ( 09 Jul 2025 06:36 )             20.1     07-09    148[H]  |  114[H]  |  14  ----------------------------<  115[H]  3.8   |  22  |  0.52    Ca    8.0[L]      09 Jul 2025 06:35  Phos  2.6     07-09  Mg     2.0     07-09    TPro  4.9[L]  /  Alb  2.9[L]  /  TBili  1.0  /  DBili  0.6[H]  /  AST  9[L]  /  ALT  11  /  AlkPhos  91  07-09      LIVER FUNCTIONS - ( 09 Jul 2025 06:35 )  Alb: 2.9 g/dL / Pro: 4.9 g/dL / ALK PHOS: 91 U/L / ALT: 11 U/L / AST: 9 U/L / GGT: x           Lactate Dehydrogenase, Serum: 223 U/L (07-09 @ 06:35)      Cultures:  Culture Results: <10,000 CFU/mL Normal Urogenital Cha (06.28.25 @ 06:31)    Culture Results: No growth at 5 days(06.27.25 @ 16:36)    Culture Results: No growth at  5 days (06.27.25 @ 16:30)    Respiratory Viral Panel with COVID-19 by [facility] (06.20.25 @ 21:02)   Rapid RVP Result: [id number]: NotDetec:    Culture - Blood (06.20.25 @ 18:45)   Specimen Source: Blood Blood-Catheter    Culture Results: No growth at Final  Culture - Blood (06.20.25 @ 18:40)   Specimen Source: Blood Blood-Peripheral  Culture Results: No growth     Culture - Urine (06.20.25 @ 21:01)   Specimen Source: Clean Catch Clean Catch (Midstream)  Culture Results: 10,000 - 49,000 CFU/mL Gram Positive Cocci in Pairs and Chains   <10,000 CFU/ml Normal Urogenital cha present    Radiology:   ACC: [id number] EXAM:  XR CHEST PORTABLE URGENT 1V   PROCEDURE DATE:  06/27/2025    IMPRESSION: No focal consolidation.      Meds:   Antimicrobials:   acyclovir    Suspension 800 milliGRAM(s) Oral every 12 hours  letermovir 480 milliGRAM(s) Oral daily  levoFLOXacin IVPB 500 milliGRAM(s) IV Intermittent every 24 hours  posaconazole DR Tablet 300 milliGRAM(s) Oral daily  vancomycin    Solution 125 milliGRAM(s) Oral every 12 hours      Heme / Onc:       GI:  loperamide 2 milliGRAM(s) Oral every 6 hours PRN  pantoprazole  Injectable 40 milliGRAM(s) IV Push two times a day  polyethylene glycol 3350 17 Gram(s) Oral daily PRN  sodium bicarbonate Mouth Rinse 10 milliLiter(s) Swish and Spit five times a day  ursodiol Suspension 300 milliGRAM(s) Oral two times a day      Cardiovascular:   amLODIPine   Tablet 5 milliGRAM(s) Oral daily  lisinopril 20 milliGRAM(s) Oral <User Schedule>  lisinopril 20 milliGRAM(s) Oral <User Schedule>  metoprolol succinate ER 25 milliGRAM(s) Oral daily      Immunologic:   filgrastim-sndz (ZARXIO) Injectable 300 MICROGram(s) SubCutaneous every 24 hours      Other medications:   artificial tears (preservative free) Ophthalmic Solution 1 Drop(s) Both EYES four times a day  Biotene Dry Mouth Oral Rinse 5 milliLiter(s) Swish and Spit five times a day  buPROPion XL (24-Hour) . 300 milliGRAM(s) Oral daily  chlorhexidine 0.12% Liquid 15 milliLiter(s) Swish and Spit two times a day  chlorhexidine 4% Liquid 1 Application(s) Topical <User Schedule>  fentaNYL   Patch  12 MICROgram(s)/Hr. 1 Patch Transdermal every 48 hours  levothyroxine 88 MICROGram(s) Oral daily  OLANZapine 5 milliGRAM(s) Oral at bedtime  phytonadione   Solution 5 milliGRAM(s) Oral <User Schedule>  prochlorperazine   Injectable 10 milliGRAM(s) IV Push every 6 hours      PRN:   acetaminophen     Tablet .. 650 milliGRAM(s) Oral every 6 hours PRN  FIRST- Mouthwash  BLM 15 milliLiter(s) Swish and Swallow five times a day PRN  HYDROmorphone  Injectable 0.5 milliGRAM(s) IV Push every 6 hours PRN  lidocaine 2% Viscous 15 milliLiter(s) Oral five times a day PRN  loperamide 2 milliGRAM(s) Oral every 6 hours PRN  ondansetron Injectable 8 milliGRAM(s) IV Push every 8 hours PRN  polyethylene glycol 3350 17 Gram(s) Oral daily PRN  sodium chloride 0.9% lock flush 10 milliLiter(s) IV Push every 1 hour PRN      A/P: 64 year old female with hx of myelofibrosis presents for a MUD allogeneic SCT (DP Permissive) conditioning with Flu/Bu/Thiotepa/rATG.  Today is day +12  6/21- Thiotepa 1/2. Maintain skin precautions during thiotepa administration and for 48 hours after completion (days .6 through day -3). Chronic pain issues - continue home fentanyl patch. Febrile overnight, tmax 100.9. Cultures  pending. RVP negative. CXR pending official read, no obvious consolidations. Vital signs are stable.   6/23 D/C HCTZ. Fludarabine 1/3, Busulfan 1/3, rATG 1/3. No Tylenol on busulfan days. Continue keppra ppx for 24 hours post infusion of last dose of busulfan. No acute events overnight, vital signs are stable.   6/24- overnight patient was febrile during ATG, no cultures/abx as fever likely secondary to ATG. Patient was otherwise stable overnight. Fludarabine 2/3, Busulfan 2/3 and rATG 2/3. NO Tylenol or Azoles until Day 0. Continue with supportive care.  6/25- Fludarabine 3/3, busulfan 3/3, rATG 3/3. No acute events overnight. Vital signs are stable.   6/26 Switched Compazine to atc  6/27 VSS, afebrile. 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  6/28 VSS, persistent fevers likely due to CRS post-SCT, continues on zosyn. Cultures pending. Hypervolemia - lasix 40mg x 1, will continue strict I/Os. Started caspofungin due to persistent thrush.  6/29 overnight no acute events noted. VSS, febrile likely due to CRS - continue with supportive care. BCx/UCx NGTD x 24h.  6/30 VSS, intermittently febrile likely due to CRS, infectious workup negative so far. PTCy 1/2 today. I&O, daily weights, prn diuresis. Continue to monitor thrush (improving). Changed some meds to PO suspension due to mucositis. Continue with supportive care. Refractory nausea, added trial of zyprexa 5mg po QHS x 3 days. d/c primafit. Change voriconazole to posaconazole tomorrow.   7/1- No acute events overnight, vital signs are stable. Continue supportive care for chemotherapy induced oral mucositis.   7/2- No acute events overnight, vital signs are stable. Completed PTCy, hydration to be discontinued today. I&O, daily weights, prn diuresis. Continue supportive care. Fentanyl patch to be titrated down to 12 mcg / hr tomorrow  7/3 Starting Scopolamine patch to decrease oral secretions. Increased Lisinopril 20mg in am, 10mg PM.    7/4-No acute events; awaiting count recovery   7/5- no acute events overnight. Vital signs are stable. SBP remains elevated, lisinopril / metoprolol doses increased.   7/6: BMT team continues to educate the patient about the importance of medication taking. awaiting count recovery  7/7 Increased ppi q12h (+stool guaiac)  7/8 D/C'd Scopolamine patch  7/9 VSS awaiting count recovery      1. Infectious Disease:   acyclovir Suspension 800 milliGRAM(s) Oral every 12 hours  letermovir 480 milliGRAM(s) Oral daily  levoFLOXacin IVPB 500 milliGRAM(s) IV Intermittent every 24 hours  posaconazole DR Tablet 300 milliGRAM(s) Oral daily  vancomycin  Solution 125 milliGRAM(s) Oral every 12 hours    2. VOD Prophylaxis:   ursodiol Suspension 300 milliGRAM(s) Oral two times a day    3. GI Prophylaxis:    pantoprazole  Injectable 40 milliGRAM(s) IV q12h    4. Mouthcare - NS / NaHCO3 rinses, Peridex, Biotene; Skin care     5. GVHD prophylaxis   PTCy days +3, +4  Bortezomib days 0, + 3  Abatacept days +5, +14, +28     6. Transfuse & replete electrolytes prn     7. IV hydration, daily weights, strict I&O, prn diuresis     8. PO intake as tolerated, nutrition follow up as needed    9. Antiemetics, anti-diarrhea medications:   loperamide 2 milliGRAM(s) Oral every 6 hours PRN  ondansetron Injectable 8 milliGRAM(s) IV Push every 8 hours PRN  prochlorperazine Injectable 10 milliGRAM(s) IV Push every 6 hours    10. OOB as tolerated, physical therapy consult if needed     11. Monitor coags  2x week, vitamin K BIW   phytonadione Solution 5 milliGRAM(s) Oral <User Schedule>    12. Monitor closely for clinical changes, monitor for fevers     13. Emotional support provided, plan of care discussed and questions addressed     14. Patient education done regarding plan of care, restrictions and discharge planning     15. Continue regular social work input     I have written the above note for Dr. Gandhi  who performed service with me in the room.   Bobo Barriga PA-C ([phone])    I have seen and examined patient with PA, I agree with above note as scribed.

## 2025-07-09 NOTE — DIETITIAN NUTRITION RISK NOTIFICATION - TREATMENT: THE FOLLOWING DIET HAS BEEN RECOMMENDED
Diet, Regular:   Supplement Feeding Modality:  Oral  Ensure Max Cans or Servings Per Day:  1       Frequency:  Three Times a day (06-27-25 @ 18:00) [Active]

## 2025-07-09 NOTE — DIETITIAN NUTRITION RISK NOTIFICATION - BUCCAL DEPLETION IS
instructed to return to the office or call for an appointment sooner if her symptoms are changing or worsening prior to that time.
severe

## 2025-07-09 NOTE — CHART NOTE - NSCHARTNOTEFT_GEN_A_CORE
NUTRITION FOLLOW UP NOTE    PATIENT SEEN FOR: follow up / calorie count assessment     SOURCE: [x] Patient  [x] Current Medical Record  [] RN  [x] Family/support person at bedside  [] Patient unavailable/inappropriate  [] Other:    CHART REVIEWED/EVENTS NOTED.  [] No changes to nutrition care plan to note  [x] Nutrition Status:  -s/p ALLO, day +12  -oral mucositis noted     DIET ORDER:   Diet, Regular:   Supplement Feeding Modality:  Oral  Ensure Max Cans or Servings Per Day:  1       Frequency:  Three Times a day (25 @ 18:00) [Active]      CURRENT DIET ORDER IS:  [x] Appropriate:  [] Inadequate:  [] Other:    NUTRITION INTAKE/PROVISION:  [x] PO: Pt reports appetite is poor.   -able to tolerate some of the Ensure max, likes both vanilla and chocolate  -likes the smoothie with the peanut butter, dislikes other flavors  -Continue HP apple juice, HP jello, and ice cream/pudding to maximize intake  [] Enteral Nutrition:  [] Parenteral Nutrition:    Calorie Count -  Day 1 Energy needs: ( kcal, g protein )  Day 2 Energy needs: ( kcal, g protein )  Day 3 Energy needs: ( kcal, g protein )  Results: Pt with good average energy intake ( % of needs), good average protein intake (% of needs)     ANTHROPOMETRICS:  Drug Dosing Weight  Height (cm): 159 (2025 07:13)  Weight (kg): 64.5 (2025 07:13)  BMI (kg/m2): 25.5 (2025 07:13)  BSA (m2): 1.67 (2025 07:13)  Weights:   Daily Weight in kG: Daily     Daily Weight in k.3 (2025 08:26), 65.6 (-), Weight in k.4 (), Weight in k.7 (), Weight in k.7 (), Weight in k.6 (), Weight in k.7 (), Weight in k.4 () *small wt fluctuations noted likely 2/2 edema    MEDICATIONS:  MEDICATIONS  (STANDING):  acyclovir    Suspension 800 milliGRAM(s) Oral every 12 hours  amLODIPine   Tablet 5 milliGRAM(s) Oral daily  artificial tears (preservative free) Ophthalmic Solution 1 Drop(s) Both EYES four times a day  Biotene Dry Mouth Oral Rinse 5 milliLiter(s) Swish and Spit five times a day  buPROPion XL (24-Hour) . 300 milliGRAM(s) Oral daily  chlorhexidine 0.12% Liquid 15 milliLiter(s) Swish and Spit two times a day  chlorhexidine 4% Liquid 1 Application(s) Topical <User Schedule>  filgrastim-sndz (ZARXIO) Injectable 300 MICROGram(s) SubCutaneous every 24 hours  letermovir 480 milliGRAM(s) Oral daily  levoFLOXacin IVPB 500 milliGRAM(s) IV Intermittent every 24 hours  levothyroxine 88 MICROGram(s) Oral daily  lisinopril 20 milliGRAM(s) Oral <User Schedule>  lisinopril 20 milliGRAM(s) Oral <User Schedule>  metoprolol succinate ER 25 milliGRAM(s) Oral daily  OLANZapine 5 milliGRAM(s) Oral at bedtime  pantoprazole  Injectable 40 milliGRAM(s) IV Push two times a day  phytonadione   Solution 5 milliGRAM(s) Oral <User Schedule>  posaconazole DR Tablet 300 milliGRAM(s) Oral daily  prochlorperazine   Injectable 10 milliGRAM(s) IV Push every 6 hours  sodium bicarbonate Mouth Rinse 10 milliLiter(s) Swish and Spit five times a day  ursodiol Suspension 300 milliGRAM(s) Oral two times a day  vancomycin    Solution 125 milliGRAM(s) Oral every 12 hours    MEDICATIONS  (PRN):  acetaminophen     Tablet .. 650 milliGRAM(s) Oral every 6 hours PRN Temp greater or equal to 38C (100.4F), Mild Pain (1 - 3)  FIRST- Mouthwash  BLM 15 milliLiter(s) Swish and Swallow five times a day PRN mouth/ throat pain  HYDROmorphone  Injectable 0.5 milliGRAM(s) IV Push every 6 hours PRN Severe Pain (7 - 10)  lidocaine 2% Viscous 15 milliLiter(s) Oral five times a day PRN Mouth Care  loperamide 2 milliGRAM(s) Oral every 6 hours PRN Diarrhea  ondansetron Injectable 8 milliGRAM(s) IV Push every 8 hours PRN nuaea (2nd line)  polyethylene glycol 3350 17 Gram(s) Oral daily PRN Constipation  sodium chloride 0.9% lock flush 10 milliLiter(s) IV Push every 1 hour PRN Pre/post blood products, medications, blood draw, and to maintain line patency    NUTRITIONALLY PERTINENT LABS:   @ 06:35: Na 148[H], BUN 14, Cr 0.52, [H], K+ 3.8, Phos 2.6, Mg 2.0, Alk Phos 91, ALT/SGPT 11, AST/SGOT 9[L], HbA1c --      NUTRITIONALLY PERTINENT MEDICATIONS/LABS:  [x] Reviewed  [x] Relevant notes on medications/labs:  -biotene/magic mouthwash  -compazine for nausea  -synthroid  -hypernatremia noted.     EDEMA:  [x] Reviewed  [x] Relevant notes: 1+ bilateral ankle, hand    GI/ I&O:  [x] Reviewed  [x] Relevant notes: Last BM:   [] Other:    SKIN:   [x] No pressure injuries documented, per nursing flowsheet  [] Pressure injury previously noted  [] Change in pressure injury documentation:  [] Other:    ESTIMATED NEEDS:  [x] No change:  [] Updated:  Energy:  9789-0086 kcal/day (30-35 kcal/kg)  Protein:  77.4-90.3 g/day (1.2-1.4 g/kg)  Fluid:   ml/day or [x] defer to team  Based on: dosing weight 64.5 kg    NUTRITION DIAGNOSIS:  [x] Prior Dx: 1) increased nutrient need and 2) inadequate protein energy intake  [] New Dx:    EDUCATION:  [x] Yes: small/frequent meals and sips to ease tolerance and maximize intake; importance of protein/calorie intake of diet/supplements  [] Not appropriate/warranted    NUTRITION CARE PLAN:  1. Diet: continue regular diet  2. Supplements: decrease Ensure Max to 2x/day (160 kcal, 30 g Pro/11oz) - 1 chocolate, 1 vanilla; continue smoothie of the day 2x/week (for preferred flavor, ~300 kcal, 28 g Pro/smoothie)  3. Multivitamin/mineral supplementation: defer to MD team  4. RD to add high protein/calorie nourishments to trays as discussed above  5.     [] Achieved - Continue current nutrition intervention(s)  [] Current medical condition precludes nutrition intervention at this time.    MONITORING AND EVALUATION:   RD remains available upon request and will follow up per protocol.    Ann-Marie Gallagher RDN CDN (available on TEAMS) NUTRITION FOLLOW UP NOTE    PATIENT SEEN FOR: follow up / calorie count assessment     SOURCE: [x] Patient  [x] Current Medical Record  [] RN  [x] Family/support person at bedside  [] Patient unavailable/inappropriate  [] Other:    CHART REVIEWED/EVENTS NOTED.  [] No changes to nutrition care plan to note  [x] Nutrition Status:  -s/p ALLO, day +12  -oral mucositis noted     DIET ORDER:   Diet, Regular:   Supplement Feeding Modality:  Oral  Ensure Max Cans or Servings Per Day:  1       Frequency:  Three Times a day (25 @ 18:00) [Active]      CURRENT DIET ORDER IS:  [x] Appropriate:  [] Inadequate:  [] Other:    NUTRITION INTAKE/PROVISION:  [x] PO: Pt reports appetite is poor.   -able to tolerate some of the Ensure max, likes both vanilla and chocolate  -likes the smoothie with the peanut butter, dislikes other flavors  -Continue HP apple juice, HP jello, and ice cream/pudding to maximize intake  [] Enteral Nutrition:  [] Parenteral Nutrition:    Calorie Count -  Day 1 Energy needs: (0 kcal, 0 g protein )  Day 2 Energy needs: (0 kcal, 0 g protein )  Day 3 Energy needs: (150 kcal, 6 g protein )  Results: Pt with poor average energy intake (<25 % of needs), poor average protein intake (<25% of needs)     ANTHROPOMETRICS:  Drug Dosing Weight  Height (cm): 159 (2025 07:13)  Weight (kg): 64.5 (2025 07:13)  BMI (kg/m2): 25.5 (2025 07:13)  BSA (m2): 1.67 (2025 07:13)  Weights:   Daily Weight in kG: Daily     Daily Weight in k.3 (2025 08:26), 65.6 (-), Weight in k.4 (), Weight in k.7 (), Weight in k.7 (), Weight in k.6 (), Weight in k.7 (), Weight in k.4 () *small wt fluctuations noted likely 2/2 edema    MEDICATIONS:  MEDICATIONS  (STANDING):  acyclovir    Suspension 800 milliGRAM(s) Oral every 12 hours  amLODIPine   Tablet 5 milliGRAM(s) Oral daily  artificial tears (preservative free) Ophthalmic Solution 1 Drop(s) Both EYES four times a day  Biotene Dry Mouth Oral Rinse 5 milliLiter(s) Swish and Spit five times a day  buPROPion XL (24-Hour) . 300 milliGRAM(s) Oral daily  chlorhexidine 0.12% Liquid 15 milliLiter(s) Swish and Spit two times a day  chlorhexidine 4% Liquid 1 Application(s) Topical <User Schedule>  filgrastim-sndz (ZARXIO) Injectable 300 MICROGram(s) SubCutaneous every 24 hours  letermovir 480 milliGRAM(s) Oral daily  levoFLOXacin IVPB 500 milliGRAM(s) IV Intermittent every 24 hours  levothyroxine 88 MICROGram(s) Oral daily  lisinopril 20 milliGRAM(s) Oral <User Schedule>  lisinopril 20 milliGRAM(s) Oral <User Schedule>  metoprolol succinate ER 25 milliGRAM(s) Oral daily  OLANZapine 5 milliGRAM(s) Oral at bedtime  pantoprazole  Injectable 40 milliGRAM(s) IV Push two times a day  phytonadione   Solution 5 milliGRAM(s) Oral <User Schedule>  posaconazole DR Tablet 300 milliGRAM(s) Oral daily  prochlorperazine   Injectable 10 milliGRAM(s) IV Push every 6 hours  sodium bicarbonate Mouth Rinse 10 milliLiter(s) Swish and Spit five times a day  ursodiol Suspension 300 milliGRAM(s) Oral two times a day  vancomycin    Solution 125 milliGRAM(s) Oral every 12 hours    MEDICATIONS  (PRN):  acetaminophen     Tablet .. 650 milliGRAM(s) Oral every 6 hours PRN Temp greater or equal to 38C (100.4F), Mild Pain (1 - 3)  FIRST- Mouthwash  BLM 15 milliLiter(s) Swish and Swallow five times a day PRN mouth/ throat pain  HYDROmorphone  Injectable 0.5 milliGRAM(s) IV Push every 6 hours PRN Severe Pain (7 - 10)  lidocaine 2% Viscous 15 milliLiter(s) Oral five times a day PRN Mouth Care  loperamide 2 milliGRAM(s) Oral every 6 hours PRN Diarrhea  ondansetron Injectable 8 milliGRAM(s) IV Push every 8 hours PRN nuaea (2nd line)  polyethylene glycol 3350 17 Gram(s) Oral daily PRN Constipation  sodium chloride 0.9% lock flush 10 milliLiter(s) IV Push every 1 hour PRN Pre/post blood products, medications, blood draw, and to maintain line patency    NUTRITIONALLY PERTINENT LABS:   @ 06:35: Na 148[H], BUN 14, Cr 0.52, [H], K+ 3.8, Phos 2.6, Mg 2.0, Alk Phos 91, ALT/SGPT 11, AST/SGOT 9[L], HbA1c --      NUTRITIONALLY PERTINENT MEDICATIONS/LABS:  [x] Reviewed  [x] Relevant notes on medications/labs:  -biotene/magic mouthwash  -compazine for nausea  -synthroid  -hypernatremia noted.     EDEMA:  [x] Reviewed  [x] Relevant notes: 1+ bilateral ankle, hand    GI/ I&O:  [x] Reviewed  [x] Relevant notes: Last BM:   [] Other:    SKIN:   [x] No pressure injuries documented, per nursing flowsheet  [] Pressure injury previously noted  [] Change in pressure injury documentation:  [] Other:    ESTIMATED NEEDS:  [x] No change:  [] Updated:  Energy:  0644-6660 kcal/day (30-35 kcal/kg)  Protein:  77.4-90.3 g/day (1.2-1.4 g/kg)  Fluid:   ml/day or [x] defer to team  Based on: dosing weight 64.5 kg    NUTRITION DIAGNOSIS:  [x] Prior Dx: 1) increased nutrient need and 2) inadequate protein energy intake  [] New Dx:    EDUCATION:  [x] Yes: small/frequent meals and sips to ease tolerance and maximize intake; importance of protein/calorie intake of diet/supplements  [] Not appropriate/warranted    NUTRITION CARE PLAN:  1. Diet: continue regular diet  2. Supplements: decrease Ensure Max to 2x/day (160 kcal, 30 g Pro/11oz) - 1 chocolate, 1 vanilla; continue smoothie of the day 2x/week (for preferred flavor, ~300 kcal, 28 g Pro/smoothie)  3. Multivitamin/mineral supplementation: defer to MD team  4. RD to add high protein/calorie nourishments to trays as discussed above  5. Calorie count results noted above. Pt meeting <25% of estimated protein-energy needs. If not medically contraindicated, recommend alternate route of nutrition if within GOC.     [] Achieved - Continue current nutrition intervention(s)  [] Current medical condition precludes nutrition intervention at this time.    MONITORING AND EVALUATION:   RD remains available upon request and will follow up per protocol.    Ann-Marie Gallagher RDN CDN (available on TEAMS) NUTRITION FOLLOW UP NOTE    PATIENT SEEN FOR: follow up / calorie count assessment     SOURCE: [x] Patient  [x] Current Medical Record  [] RN  [x] Family/support person at bedside  [] Patient unavailable/inappropriate  [] Other:    CHART REVIEWED/EVENTS NOTED.  [] No changes to nutrition care plan to note  [x] Nutrition Status:  -s/p ALLO, day +12  -oral mucositis noted     DIET ORDER:   Diet, Regular:   Supplement Feeding Modality:  Oral  Ensure Max Cans or Servings Per Day:  1       Frequency:  Three Times a day (25 @ 18:00) [Active]      CURRENT DIET ORDER IS:  [x] Appropriate:  [] Inadequate:  [] Other:    NUTRITION INTAKE/PROVISION:  [x] PO: Pt reports poor appetite x 5 day; consuming <25% of needs.   -able to tolerate some of the Ensure max, likes both vanilla and chocolate  -likes the smoothie with the peanut butter, dislikes other flavors  -Continue HP apple juice, HP jello, and ice cream/pudding to maximize intake  [] Enteral Nutrition:  [] Parenteral Nutrition:    Calorie Count -  Day 1 Energy needs: (0 kcal, 0 g protein )  Day 2 Energy needs: (0 kcal, 0 g protein )  Day 3 Energy needs: (150 kcal, 6 g protein )  Results: Pt with poor average energy intake (<25 % of needs), poor average protein intake (<25% of needs)     ANTHROPOMETRICS:  Drug Dosing Weight  Height (cm): 159 (2025 07:13)  Weight (kg): 64.5 (2025 07:13)  BMI (kg/m2): 25.5 (2025 07:13)  BSA (m2): 1.67 (2025 07:13)  Weights:   Daily Weight in kG: Daily     Daily Weight in k.3 (2025 08:26), 65.6 (-), Weight in k.4 (), Weight in k.7 (), Weight in k.7 (), Weight in k.6 (), Weight in k.7 (), Weight in k.4 (07-01) *small wt fluctuations noted likely 2/2 edema    MEDICATIONS:  MEDICATIONS  (STANDING):  acyclovir    Suspension 800 milliGRAM(s) Oral every 12 hours  amLODIPine   Tablet 5 milliGRAM(s) Oral daily  artificial tears (preservative free) Ophthalmic Solution 1 Drop(s) Both EYES four times a day  Biotene Dry Mouth Oral Rinse 5 milliLiter(s) Swish and Spit five times a day  buPROPion XL (24-Hour) . 300 milliGRAM(s) Oral daily  chlorhexidine 0.12% Liquid 15 milliLiter(s) Swish and Spit two times a day  chlorhexidine 4% Liquid 1 Application(s) Topical <User Schedule>  filgrastim-sndz (ZARXIO) Injectable 300 MICROGram(s) SubCutaneous every 24 hours  letermovir 480 milliGRAM(s) Oral daily  levoFLOXacin IVPB 500 milliGRAM(s) IV Intermittent every 24 hours  levothyroxine 88 MICROGram(s) Oral daily  lisinopril 20 milliGRAM(s) Oral <User Schedule>  lisinopril 20 milliGRAM(s) Oral <User Schedule>  metoprolol succinate ER 25 milliGRAM(s) Oral daily  OLANZapine 5 milliGRAM(s) Oral at bedtime  pantoprazole  Injectable 40 milliGRAM(s) IV Push two times a day  phytonadione   Solution 5 milliGRAM(s) Oral <User Schedule>  posaconazole DR Tablet 300 milliGRAM(s) Oral daily  prochlorperazine   Injectable 10 milliGRAM(s) IV Push every 6 hours  sodium bicarbonate Mouth Rinse 10 milliLiter(s) Swish and Spit five times a day  ursodiol Suspension 300 milliGRAM(s) Oral two times a day  vancomycin    Solution 125 milliGRAM(s) Oral every 12 hours    MEDICATIONS  (PRN):  acetaminophen     Tablet .. 650 milliGRAM(s) Oral every 6 hours PRN Temp greater or equal to 38C (100.4F), Mild Pain (1 - 3)  FIRST- Mouthwash  BLM 15 milliLiter(s) Swish and Swallow five times a day PRN mouth/ throat pain  HYDROmorphone  Injectable 0.5 milliGRAM(s) IV Push every 6 hours PRN Severe Pain (7 - 10)  lidocaine 2% Viscous 15 milliLiter(s) Oral five times a day PRN Mouth Care  loperamide 2 milliGRAM(s) Oral every 6 hours PRN Diarrhea  ondansetron Injectable 8 milliGRAM(s) IV Push every 8 hours PRN nuaea (2nd line)  polyethylene glycol 3350 17 Gram(s) Oral daily PRN Constipation  sodium chloride 0.9% lock flush 10 milliLiter(s) IV Push every 1 hour PRN Pre/post blood products, medications, blood draw, and to maintain line patency    NUTRITIONALLY PERTINENT LABS:   @ 06:35: Na 148[H], BUN 14, Cr 0.52, [H], K+ 3.8, Phos 2.6, Mg 2.0, Alk Phos 91, ALT/SGPT 11, AST/SGOT 9[L], HbA1c --      NUTRITIONALLY PERTINENT MEDICATIONS/LABS:  [x] Reviewed  [x] Relevant notes on medications/labs:  -biotene/magic mouthwash  -compazine for nausea  -synthroid  -hypernatremia noted.     EDEMA:  [x] Reviewed  [x] Relevant notes: 1+ bilateral ankle, hand    GI/ I&O:  [x] Reviewed  [x] Relevant notes: Last BM:   [] Other:    SKIN:   [x] No pressure injuries documented, per nursing flowsheet  [] Pressure injury previously noted  [] Change in pressure injury documentation:  [] Other:    Nutrition focused physical exam conducted:  Subcutaneous fat loss: [ severe] Orbital fat pads region, [ severe]Buccal fat region, [ ]Triceps region,  [ ]Ribs region.  Muscle wasting: [ severe]Temples region, [severe ]Clavicle region, [ ]Shoulder region, [ ]Scapula region, [ ]Interosseous region,  [ ]thigh region, [ ]Calf region    ESTIMATED NEEDS:  [x] No change:  [] Updated:  Energy:  9338-0587 kcal/day (30-35 kcal/kg)  Protein:  77.4-90.3 g/day (1.2-1.4 g/kg)  Fluid:   ml/day or [x] defer to team  Based on: dosing weight 64.5 kg    NUTRITION DIAGNOSIS:  [x] Prior Dx: 1) increased nutrient need and 2) inadequate protein energy intake  [x] New Dx: Severe acute malnutrition related to decreased ability to consume adequate protein-energy in setting of increased physiological demand for nutrients as evidenced by pt metting <50% of estimated needs and severe muscle/fat loss.   Goal: Pt to meet >75% of estimated protein- energy needs during hospital stay.     EDUCATION:  [x] Yes: small/frequent meals and sips to ease tolerance and maximize intake; importance of protein/calorie intake of diet/supplements  [] Not appropriate/warranted    NUTRITION CARE PLAN:  1. Diet: continue regular diet  2. Supplements: decrease Ensure Max to 2x/day (160 kcal, 30 g Pro/11oz) - 1 chocolate, 1 vanilla; continue smoothie of the day 2x/week (for preferred flavor, ~300 kcal, 28 g Pro/smoothie)  3. Multivitamin/mineral supplementation: defer to MD team  4. RD to add high protein/calorie nourishments to trays as discussed above  5. Calorie count results noted above. Pt meeting <25% of estimated protein-energy needs. If not medically contraindicated, recommend alternate route of nutrition if within GOC.   6. New malnutrition notification sent.     [] Achieved - Continue current nutrition intervention(s)  [] Current medical condition precludes nutrition intervention at this time.    MONITORING AND EVALUATION:   RD remains available upon request and will follow up per protocol.    Ann-Marie Gallagher RDN CDN (available on TEAMS) NUTRITION FOLLOW UP NOTE    PATIENT SEEN FOR: follow up / calorie count assessment     SOURCE: [x] Patient  [x] Current Medical Record  [] RN  [x] Family/support person at bedside  [] Patient unavailable/inappropriate  [] Other:    CHART REVIEWED/EVENTS NOTED.  [] No changes to nutrition care plan to note  [x] Nutrition Status:  -s/p ALLO, day +12  -oral mucositis noted     DIET ORDER:   Diet, Regular:   Supplement Feeding Modality:  Oral  Ensure Max Cans or Servings Per Day:  1       Frequency:  Three Times a day (25 @ 18:00) [Active]      CURRENT DIET ORDER IS:  [x] Appropriate:  [] Inadequate:  [] Other:    NUTRITION INTAKE/PROVISION:  [x] PO: Pt reports poor appetite x 5 day; consuming <25% of needs.   -able to tolerate some of the Ensure max, likes both vanilla and chocolate  -likes the smoothie with the peanut butter, dislikes other flavors  -Continue HP apple juice, HP jello, and ice cream/pudding to maximize intake  [] Enteral Nutrition:  [] Parenteral Nutrition:    Calorie Count -  Day 1 Energy needs: (0 kcal, 0 g protein )  Day 2 Energy needs: (0 kcal, 0 g protein )  Day 3 Energy needs: (150 kcal, 6 g protein )  Results: Pt with poor average energy intake (<25 % of needs), poor average protein intake (<25% of needs)     ANTHROPOMETRICS:  Drug Dosing Weight  Height (cm): 159 (2025 07:13)  Weight (kg): 64.5 (2025 07:13)  BMI (kg/m2): 25.5 (2025 07:13)  BSA (m2): 1.67 (2025 07:13)  Weights:   Daily Weight in kG: Daily     Daily Weight in k.3 (2025 08:26), 65.6 (-), Weight in k.4 (), Weight in k.7 (), Weight in k.7 (), Weight in k.6 (), Weight in k.7 (), Weight in k.4 (07-01) *small wt fluctuations noted likely 2/2 edema    MEDICATIONS:  MEDICATIONS  (STANDING):  acyclovir    Suspension 800 milliGRAM(s) Oral every 12 hours  amLODIPine   Tablet 5 milliGRAM(s) Oral daily  artificial tears (preservative free) Ophthalmic Solution 1 Drop(s) Both EYES four times a day  Biotene Dry Mouth Oral Rinse 5 milliLiter(s) Swish and Spit five times a day  buPROPion XL (24-Hour) . 300 milliGRAM(s) Oral daily  chlorhexidine 0.12% Liquid 15 milliLiter(s) Swish and Spit two times a day  chlorhexidine 4% Liquid 1 Application(s) Topical <User Schedule>  filgrastim-sndz (ZARXIO) Injectable 300 MICROGram(s) SubCutaneous every 24 hours  letermovir 480 milliGRAM(s) Oral daily  levoFLOXacin IVPB 500 milliGRAM(s) IV Intermittent every 24 hours  levothyroxine 88 MICROGram(s) Oral daily  lisinopril 20 milliGRAM(s) Oral <User Schedule>  lisinopril 20 milliGRAM(s) Oral <User Schedule>  metoprolol succinate ER 25 milliGRAM(s) Oral daily  OLANZapine 5 milliGRAM(s) Oral at bedtime  pantoprazole  Injectable 40 milliGRAM(s) IV Push two times a day  phytonadione   Solution 5 milliGRAM(s) Oral <User Schedule>  posaconazole DR Tablet 300 milliGRAM(s) Oral daily  prochlorperazine   Injectable 10 milliGRAM(s) IV Push every 6 hours  sodium bicarbonate Mouth Rinse 10 milliLiter(s) Swish and Spit five times a day  ursodiol Suspension 300 milliGRAM(s) Oral two times a day  vancomycin    Solution 125 milliGRAM(s) Oral every 12 hours    MEDICATIONS  (PRN):  acetaminophen     Tablet .. 650 milliGRAM(s) Oral every 6 hours PRN Temp greater or equal to 38C (100.4F), Mild Pain (1 - 3)  FIRST- Mouthwash  BLM 15 milliLiter(s) Swish and Swallow five times a day PRN mouth/ throat pain  HYDROmorphone  Injectable 0.5 milliGRAM(s) IV Push every 6 hours PRN Severe Pain (7 - 10)  lidocaine 2% Viscous 15 milliLiter(s) Oral five times a day PRN Mouth Care  loperamide 2 milliGRAM(s) Oral every 6 hours PRN Diarrhea  ondansetron Injectable 8 milliGRAM(s) IV Push every 8 hours PRN nuaea (2nd line)  polyethylene glycol 3350 17 Gram(s) Oral daily PRN Constipation  sodium chloride 0.9% lock flush 10 milliLiter(s) IV Push every 1 hour PRN Pre/post blood products, medications, blood draw, and to maintain line patency    NUTRITIONALLY PERTINENT LABS:   @ 06:35: Na 148[H], BUN 14, Cr 0.52, [H], K+ 3.8, Phos 2.6, Mg 2.0, Alk Phos 91, ALT/SGPT 11, AST/SGOT 9[L], HbA1c --      NUTRITIONALLY PERTINENT MEDICATIONS/LABS:  [x] Reviewed  [x] Relevant notes on medications/labs:  -biotene/magic mouthwash  -compazine for nausea  -synthroid  -hypernatremia noted.     EDEMA:  [x] Reviewed  [x] Relevant notes: 1+ bilateral ankle, hand    GI/ I&O:  [x] Reviewed  [x] Relevant notes: Last BM:   [] Other:    SKIN:   [x] No pressure injuries documented, per nursing flowsheet  [] Pressure injury previously noted  [] Change in pressure injury documentation:  [] Other:    Nutrition focused physical exam conducted:  Subcutaneous fat loss: [ severe] Orbital fat pads region, [ severe]Buccal fat region, [ ]Triceps region,  [ ]Ribs region.  Muscle wasting: [ severe]Temples region, [severe ]Clavicle region, [ ]Shoulder region, [ ]Scapula region, [ ]Interosseous region,  [ ]thigh region, [ ]Calf region    ESTIMATED NEEDS:  [x] No change:  [] Updated:  Energy:  6186-4717 kcal/day (30-35 kcal/kg)  Protein:  77.4-90.3 g/day (1.2-1.4 g/kg)  Fluid:   ml/day or [x] defer to team  Based on: dosing weight 64.5 kg    NUTRITION DIAGNOSIS:  [x] Prior Dx: 1) increased nutrient need and 2) inadequate protein energy intake  [x] New Dx: Severe acute on chronic malnutrition related to decreased ability to consume adequate protein-energy in setting of increased physiological demand for nutrients as evidenced by pt metting <50% of estimated needs and severe muscle/fat loss.   Goal: Pt to meet >75% of estimated protein- energy needs during hospital stay.     EDUCATION:  [x] Yes: small/frequent meals and sips to ease tolerance and maximize intake; importance of protein/calorie intake of diet/supplements  [] Not appropriate/warranted    NUTRITION CARE PLAN:  1. Diet: continue regular diet  2. Supplements: decrease Ensure Max to 2x/day (160 kcal, 30 g Pro/11oz) - 1 chocolate, 1 vanilla; continue smoothie of the day 2x/week (for preferred flavor, ~300 kcal, 28 g Pro/smoothie)  3. Multivitamin/mineral supplementation: defer to MD team  4. RD to add high protein/calorie nourishments to trays as discussed above  5. Calorie count results noted above. Pt meeting <25% of estimated protein-energy needs. If not medically contraindicated, recommend alternate route of nutrition if within GOC.   6. New malnutrition notification sent.     [] Achieved - Continue current nutrition intervention(s)  [] Current medical condition precludes nutrition intervention at this time.    MONITORING AND EVALUATION:   RD remains available upon request and will follow up per protocol.    Ann-Marie Gallagher RDN CDN (available on TEAMS)

## 2025-07-09 NOTE — PROGRESS NOTE ADULT - NS ATTEND AMEND GEN_ALL_CORE FT
Day + 11 s/p allo-HSCT ..flu/bu/TT/ratg...ABC    Overall, patient is fatigued, complains of mouth pain, leg "heaviness" due to fluid overload. More talkative today  She is not ambulating, staying in bed majority of the day. Limited PO intake. some difficulty swallowing, nausea  Course complicated by CRS; now resolved  Hypertensive - on three antihypertensive agents; possibly component of opioid withdrawal (fentanyl patch 50mg at admission, now at 12.5mg) - monitor closely and use of PRN as needed. Increased metoprolol and lisinopril pm 7/5/25..pt npt taking po bp meds..will give hydralazine today with lasix and albumin prn..  Physical exam is remarkable for mucositis; +1 b/l LE edema;   Labs reviewed; appropriate  Continue PRN diuresis - discussed that ambulation and OOB to chair is necessary.   Discussed close monitoring of In/Outs  Discussed limiting use of PRN opioids especially given that we are titrating down fentanyl patch. Extensively discussed with patient who is in agreement to rate her pain and request appropriate analgesics.   She has a private nursing aide at bedside.   PT consulted; encouraged out of bed to chair as much as possible during the day  continue supportive care and antimicrobial ppx.  on zarxio  add zyprexa for nausea Day + 12 s/p allo-HSCT ..flu/bu/TT/ratg...ABC    Overall, patient is fatigued, complains of mouth pain, leg "heaviness" due to fluid overload. More talkative today  She is not ambulating, staying in bed majority of the day. Limited PO intake. some difficulty swallowing, nausea  Course complicated by CRS; now resolved  Hypertensive - on three antihypertensive agents; possibly component of opioid withdrawal (fentanyl patch 50mg at admission, now at 12.5mg) - monitor closely and use of PRN as needed. Increased metoprolol and lisinopril pm 7/5/25..pt npt taking po bp meds..will give hydralazine prn with lasix and albumin prn..pt tokk bp meds today  Physical exam is remarkable for mucositis; +1 b/l LE edema;   Labs reviewed; appropriate  Continue PRN diuresis - discussed that ambulation and OOB to chair is necessary.   Discussed close monitoring of In/Outs  Discussed limiting use of PRN opioids especially given that we are titrating down fentanyl patch. Extensively discussed with patient who is in agreement to rate her pain and request appropriate analgesics.   She has a private nursing aide at bedside.   PT consulted; encouraged out of bed to chair as much as possible during the day  continue supportive care and antimicrobial ppx.  on zarxio  add zyprexa for nausea

## 2025-07-10 LAB
ALBUMIN SERPL ELPH-MCNC: 2.9 G/DL — LOW (ref 3.3–5)
ALP SERPL-CCNC: 86 U/L — SIGNIFICANT CHANGE UP (ref 40–120)
ALT FLD-CCNC: 9 U/L — LOW (ref 10–45)
ANION GAP SERPL CALC-SCNC: 11 MMOL/L — SIGNIFICANT CHANGE UP (ref 5–17)
ANION GAP SERPL CALC-SCNC: 11 MMOL/L — SIGNIFICANT CHANGE UP (ref 5–17)
APTT BLD: 26.6 SEC — SIGNIFICANT CHANGE UP (ref 26.1–36.8)
AST SERPL-CCNC: <5 U/L — LOW (ref 10–40)
BASOPHILS # BLD AUTO: SIGNIFICANT CHANGE UP (ref 0–0.2)
BASOPHILS NFR BLD AUTO: SIGNIFICANT CHANGE UP (ref 0–2)
BILIRUB SERPL-MCNC: 0.7 MG/DL — SIGNIFICANT CHANGE UP (ref 0.2–1.2)
BUN SERPL-MCNC: 13 MG/DL — SIGNIFICANT CHANGE UP (ref 7–23)
BUN SERPL-MCNC: 14 MG/DL — SIGNIFICANT CHANGE UP (ref 7–23)
CALCIUM SERPL-MCNC: 8.3 MG/DL — LOW (ref 8.4–10.5)
CALCIUM SERPL-MCNC: 8.3 MG/DL — LOW (ref 8.4–10.5)
CHLORIDE SERPL-SCNC: 112 MMOL/L — HIGH (ref 96–108)
CHLORIDE SERPL-SCNC: 116 MMOL/L — HIGH (ref 96–108)
CO2 SERPL-SCNC: 23 MMOL/L — SIGNIFICANT CHANGE UP (ref 22–31)
CO2 SERPL-SCNC: 23 MMOL/L — SIGNIFICANT CHANGE UP (ref 22–31)
CREAT SERPL-MCNC: 0.51 MG/DL — SIGNIFICANT CHANGE UP (ref 0.5–1.3)
CREAT SERPL-MCNC: 0.57 MG/DL — SIGNIFICANT CHANGE UP (ref 0.5–1.3)
EGFR: 102 ML/MIN/1.73M2 — SIGNIFICANT CHANGE UP
EGFR: 102 ML/MIN/1.73M2 — SIGNIFICANT CHANGE UP
EGFR: 105 ML/MIN/1.73M2 — SIGNIFICANT CHANGE UP
EGFR: 105 ML/MIN/1.73M2 — SIGNIFICANT CHANGE UP
EOSINOPHIL # BLD AUTO: SIGNIFICANT CHANGE UP (ref 0–0.5)
EOSINOPHIL NFR BLD AUTO: SIGNIFICANT CHANGE UP (ref 0–6)
GLUCOSE SERPL-MCNC: 116 MG/DL — HIGH (ref 70–99)
GLUCOSE SERPL-MCNC: 122 MG/DL — HIGH (ref 70–99)
HCT VFR BLD CALC: 23.8 % — LOW (ref 34.5–45)
HGB BLD-MCNC: 7.5 G/DL — LOW (ref 11.5–15.5)
IMM GRANULOCYTES # BLD AUTO: SIGNIFICANT CHANGE UP (ref 0–0.07)
IMM GRANULOCYTES NFR BLD AUTO: SIGNIFICANT CHANGE UP (ref 0–0.9)
IMMATURE PLATELET FRACTION #: 0.9 K/UL — LOW (ref 4.7–11.1)
IMMATURE PLATELET FRACTION %: 7.2 % — HIGH (ref 1.6–4.9)
INR BLD: 1.39 RATIO — HIGH (ref 0.85–1.16)
LDH SERPL L TO P-CCNC: 217 U/L — SIGNIFICANT CHANGE UP (ref 50–242)
LYMPHOCYTES # BLD AUTO: SIGNIFICANT CHANGE UP (ref 1–3.3)
LYMPHOCYTES NFR BLD AUTO: SIGNIFICANT CHANGE UP (ref 13–44)
MAGNESIUM SERPL-MCNC: 1.9 MG/DL — SIGNIFICANT CHANGE UP (ref 1.6–2.6)
MCHC RBC-ENTMCNC: 28.1 PG — SIGNIFICANT CHANGE UP (ref 27–34)
MCHC RBC-ENTMCNC: 31.5 G/DL — LOW (ref 32–36)
MCV RBC AUTO: 89.1 FL — SIGNIFICANT CHANGE UP (ref 80–100)
MONOCYTES # BLD AUTO: SIGNIFICANT CHANGE UP (ref 0–0.9)
MONOCYTES NFR BLD AUTO: SIGNIFICANT CHANGE UP (ref 2–14)
NEUTROPHILS # BLD AUTO: SIGNIFICANT CHANGE UP (ref 1.8–7.4)
NEUTROPHILS NFR BLD AUTO: SIGNIFICANT CHANGE UP (ref 43–77)
NRBC # BLD AUTO: 0 K/UL — SIGNIFICANT CHANGE UP (ref 0–0)
NRBC # FLD: 0 K/UL — SIGNIFICANT CHANGE UP (ref 0–0)
NRBC BLD AUTO-RTO: 0 /100 WBCS — SIGNIFICANT CHANGE UP (ref 0–0)
PHOSPHATE SERPL-MCNC: 2.5 MG/DL — SIGNIFICANT CHANGE UP (ref 2.5–4.5)
PLATELET # BLD AUTO: 12 K/UL — CRITICAL LOW (ref 150–400)
PMV BLD: SIGNIFICANT CHANGE UP FL (ref 7–13)
POTASSIUM SERPL-MCNC: 3.1 MMOL/L — LOW (ref 3.5–5.3)
POTASSIUM SERPL-MCNC: 3.7 MMOL/L — SIGNIFICANT CHANGE UP (ref 3.5–5.3)
POTASSIUM SERPL-SCNC: 3.1 MMOL/L — LOW (ref 3.5–5.3)
POTASSIUM SERPL-SCNC: 3.7 MMOL/L — SIGNIFICANT CHANGE UP (ref 3.5–5.3)
PROT SERPL-MCNC: 4.9 G/DL — LOW (ref 6–8.3)
PROTHROM AB SERPL-ACNC: 15.9 SEC — HIGH (ref 9.9–13.4)
RBC # BLD: 2.67 M/UL — LOW (ref 3.8–5.2)
RBC # FLD: 15.7 % — HIGH (ref 10.3–14.5)
SODIUM SERPL-SCNC: 146 MMOL/L — HIGH (ref 135–145)
SODIUM SERPL-SCNC: 150 MMOL/L — HIGH (ref 135–145)
WBC # BLD: 0.15 K/UL — CRITICAL LOW (ref 3.8–10.5)
WBC # FLD AUTO: 0.15 K/UL — CRITICAL LOW (ref 3.8–10.5)

## 2025-07-10 PROCEDURE — 99233 SBSQ HOSP IP/OBS HIGH 50: CPT

## 2025-07-10 PROCEDURE — 99221 1ST HOSP IP/OBS SF/LOW 40: CPT

## 2025-07-10 PROCEDURE — 71045 X-RAY EXAM CHEST 1 VIEW: CPT | Mod: 26

## 2025-07-10 RX ORDER — ENALAPRIL MALEATE 20 MG
1 TABLET ORAL
Qty: 30 | Refills: 0
Start: 2025-07-10 | End: 2025-08-08

## 2025-07-10 RX ORDER — SCOPOLAMINE 1 MG/3D
1 PATCH, EXTENDED RELEASE TRANSDERMAL
Refills: 0 | Status: DISCONTINUED | OUTPATIENT
Start: 2025-07-10 | End: 2025-07-11

## 2025-07-10 RX ORDER — SODIUM CHLORIDE 9 G/1000ML
1000 INJECTION, SOLUTION INTRAVENOUS
Refills: 0 | Status: DISCONTINUED | OUTPATIENT
Start: 2025-07-10 | End: 2025-07-10

## 2025-07-10 RX ORDER — SODIUM CHLORIDE 9 G/1000ML
1000 INJECTION, SOLUTION INTRAVENOUS
Refills: 0 | Status: DISCONTINUED | OUTPATIENT
Start: 2025-07-10 | End: 2025-07-11

## 2025-07-10 RX ORDER — FENTANYL CITRATE-0.9 % NACL/PF 100MCG/2ML
1 SYRINGE (ML) INTRAVENOUS
Refills: 0 | Status: DISCONTINUED | OUTPATIENT
Start: 2025-07-10 | End: 2025-07-15

## 2025-07-10 RX ORDER — AMLODIPINE BESYLATE 10 MG/1
1 TABLET ORAL
Qty: 30 | Refills: 0
Start: 2025-07-10 | End: 2025-08-08

## 2025-07-10 RX ADMIN — FILGRASTIM 300 MICROGRAM(S): 300 INJECTION, SOLUTION INTRAVENOUS; SUBCUTANEOUS at 12:27

## 2025-07-10 RX ADMIN — Medication 50 MILLIEQUIVALENT(S): at 21:32

## 2025-07-10 RX ADMIN — Medication 10 MILLILITER(S): at 08:09

## 2025-07-10 RX ADMIN — Medication 0.5 MILLIGRAM(S): at 02:03

## 2025-07-10 RX ADMIN — LISINOPRIL 20 MILLIGRAM(S): 5 TABLET ORAL at 06:20

## 2025-07-10 RX ADMIN — Medication 50 MILLIEQUIVALENT(S): at 19:04

## 2025-07-10 RX ADMIN — Medication 0.5 MILLIGRAM(S): at 02:27

## 2025-07-10 RX ADMIN — Medication 5 MILLIGRAM(S): at 08:09

## 2025-07-10 RX ADMIN — Medication 800 MILLIGRAM(S): at 06:19

## 2025-07-10 RX ADMIN — Medication 10 MILLIGRAM(S): at 17:04

## 2025-07-10 RX ADMIN — Medication 1 DROP(S): at 23:16

## 2025-07-10 RX ADMIN — SODIUM CHLORIDE 50 MILLILITER(S): 9 INJECTION, SOLUTION INTRAVENOUS at 17:03

## 2025-07-10 RX ADMIN — Medication 5 MILLILITER(S): at 12:26

## 2025-07-10 RX ADMIN — URSODIOL 300 MILLIGRAM(S): 300 CAPSULE ORAL at 06:20

## 2025-07-10 RX ADMIN — LOPERAMIDE HCL 2 MILLIGRAM(S): 1 SOLUTION ORAL at 10:10

## 2025-07-10 RX ADMIN — Medication 1 DROP(S): at 06:20

## 2025-07-10 RX ADMIN — Medication 40 MILLIGRAM(S): at 06:21

## 2025-07-10 RX ADMIN — BUPROPION HYDROBROMIDE 300 MILLIGRAM(S): 522 TABLET, EXTENDED RELEASE ORAL at 12:26

## 2025-07-10 RX ADMIN — Medication 40 MILLIGRAM(S): at 17:04

## 2025-07-10 RX ADMIN — SCOPOLAMINE 1 PATCH: 1 PATCH, EXTENDED RELEASE TRANSDERMAL at 19:00

## 2025-07-10 RX ADMIN — Medication 15 MILLILITER(S): at 17:04

## 2025-07-10 RX ADMIN — METOPROLOL SUCCINATE 25 MILLIGRAM(S): 50 TABLET, EXTENDED RELEASE ORAL at 06:20

## 2025-07-10 RX ADMIN — Medication 1 DROP(S): at 17:04

## 2025-07-10 RX ADMIN — LISINOPRIL 20 MILLIGRAM(S): 5 TABLET ORAL at 17:03

## 2025-07-10 RX ADMIN — SODIUM CHLORIDE 75 MILLILITER(S): 9 INJECTION, SOLUTION INTRAVENOUS at 08:07

## 2025-07-10 RX ADMIN — Medication 5 MILLILITER(S): at 08:07

## 2025-07-10 RX ADMIN — Medication 10 MILLILITER(S): at 23:17

## 2025-07-10 RX ADMIN — Medication 0.5 MILLIGRAM(S): at 17:03

## 2025-07-10 RX ADMIN — Medication 10 MILLIGRAM(S): at 23:16

## 2025-07-10 RX ADMIN — OLANZAPINE 5 MILLIGRAM(S): 10 TABLET ORAL at 21:24

## 2025-07-10 RX ADMIN — LETERMOVIR 480 MILLIGRAM(S): 480 TABLET, FILM COATED ORAL at 12:27

## 2025-07-10 RX ADMIN — Medication 10 MILLIGRAM(S): at 12:26

## 2025-07-10 RX ADMIN — SCOPOLAMINE 1 PATCH: 1 PATCH, EXTENDED RELEASE TRANSDERMAL at 12:27

## 2025-07-10 RX ADMIN — AMLODIPINE BESYLATE 5 MILLIGRAM(S): 10 TABLET ORAL at 06:20

## 2025-07-10 RX ADMIN — Medication 10 MILLIGRAM(S): at 06:21

## 2025-07-10 RX ADMIN — Medication 0.5 MILLIGRAM(S): at 11:15

## 2025-07-10 RX ADMIN — Medication 1 PATCH: at 19:00

## 2025-07-10 RX ADMIN — Medication 10 MILLILITER(S): at 12:28

## 2025-07-10 RX ADMIN — Medication 5 MILLILITER(S): at 23:15

## 2025-07-10 RX ADMIN — Medication 800 MILLIGRAM(S): at 17:03

## 2025-07-10 RX ADMIN — Medication 125 MILLIGRAM(S): at 06:19

## 2025-07-10 RX ADMIN — POSACONAZOLE 300 MILLIGRAM(S): 100 TABLET, DELAYED RELEASE ORAL at 12:27

## 2025-07-10 RX ADMIN — Medication 125 MILLIGRAM(S): at 17:04

## 2025-07-10 RX ADMIN — Medication 10 MILLILITER(S): at 20:29

## 2025-07-10 RX ADMIN — Medication 5 MILLILITER(S): at 20:29

## 2025-07-10 RX ADMIN — Medication 15 MILLILITER(S): at 06:48

## 2025-07-10 RX ADMIN — Medication 5 MILLILITER(S): at 17:04

## 2025-07-10 RX ADMIN — LOPERAMIDE HCL 2 MILLIGRAM(S): 1 SOLUTION ORAL at 17:04

## 2025-07-10 RX ADMIN — Medication 1 PATCH: at 07:45

## 2025-07-10 RX ADMIN — Medication 0.5 MILLIGRAM(S): at 17:45

## 2025-07-10 RX ADMIN — Medication 10 MILLILITER(S): at 17:05

## 2025-07-10 RX ADMIN — Medication 0.5 MILLIGRAM(S): at 10:50

## 2025-07-10 RX ADMIN — URSODIOL 300 MILLIGRAM(S): 300 CAPSULE ORAL at 18:14

## 2025-07-10 RX ADMIN — Medication 1 DROP(S): at 12:26

## 2025-07-10 RX ADMIN — Medication 1 APPLICATION(S): at 08:08

## 2025-07-10 RX ADMIN — Medication 88 MICROGRAM(S): at 06:20

## 2025-07-10 RX ADMIN — Medication 0.5 MILLIGRAM(S): at 23:36

## 2025-07-10 NOTE — PROGRESS NOTE ADULT - SUBJECTIVE AND OBJECTIVE BOX
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 prep regimen for treatment of myelofibrosis    Disease: myelofibrosis   Prep regimen: Flu / Bu / Thiotepa / rATG   GVHD ppx: ABC: PTCy days +3, +4, Bortezomib days 0, +3, PTCy days +5, +14, +28   ABO/CMV:   Recipient:   A+/CMV+   Donor:   B+/CMV+      S: Patient seen and examined with HPC Transplant Team:     O: Vitals:   Vital Signs Last 24 Hrs  T(C): 37.8 (10 Jul 2025 05:33), Max: 37.8 (10 Jul 2025 05:33)  T(F): 100 (10 Jul 2025 05:33), Max: 100 (10 Jul 2025 05:33)  HR: 100 (10 Jul 2025 05:33) (88 - 100)  BP: 158/70 (10 Jul 2025 05:33) (145/65 - 161/62)  BP(mean): --  RR: 19 (10 Jul 2025 05:33) (18 - 20)  SpO2: 94% (10 Jul 2025 05:33) (94% - 97%)    Parameters below as of 10 Jul 2025 05:33  Patient On (Oxygen Delivery Method): room air        Admit weight: 65.5kg  Daily Weight in k.3 (2025 08:26)    Intake / Output:   - @ 07:01  -  07-10 @ 07:00  --------------------------------------------------------  IN: 710 mL / OUT: 1569 mL / NET: -859 mL          PE:   Oropharynx: + erythema + ulcerations posterior pharynx   Oral Mucositis: +                                                  stGstrstastdstest:st1st CVS: S1, S2 RRR   Lungs: CTA throughout bilaterally   Abdomen: + BS x 4, soft, NT, ND   Extremities: trace edema RUE, 5/5 strenght BUE's  Gastric Mucositis:                 -                              Grade: n/a   Intestinal Mucositis:              -                              Grade: n/a   Skin: no rash, ecchymotic patches BLE's   TLC: CDI   Neuro: A&O x 3          Labs:     07-10    150[H]  |  116[H]  |  14  ----------------------------<  116[H]  3.7   |  23  |  0.57    Ca    8.3[L]      10 Jul 2025 06:43  Phos  2.5     07-10  Mg     1.9     07-10    TPro  4.9[L]  /  Alb  2.9[L]  /  TBili  0.7  /  DBili  x   /  AST  <5[L]  /  ALT  9[L]  /  AlkPhos  86  07-10    PT/INR - ( 10 Jul 2025 06:43 )   PT: 15.9 sec;   INR: 1.39 ratio         PTT - ( 10 Jul 2025 06:43 )  PTT:26.6 sec  LIVER FUNCTIONS - ( 10 Jul 2025 06:43 )  Alb: 2.9 g/dL / Pro: 4.9 g/dL / ALK PHOS: 86 U/L / ALT: 9 U/L / AST: <5 U/L / GGT: x           Lactate Dehydrogenase, Serum: 217 U/L (07-10 @ 06:43)        Cultures:  Culture Results: <10,000 CFU/mL Normal Urogenital Cha (25 @ 06:31)    Culture Results: No growth at 5 days(25 @ 16:36)    Culture Results: No growth at  5 days (25 @ 16:30)    Respiratory Viral Panel with COVID-19 by SCARLET (25 @ 21:02)   Rapid RVP Result: [id number]: NotDetec:    Culture - Blood (25 @ 18:45)   Specimen Source: Blood Blood-Catheter    Culture Results: No growth at Final  Culture - Blood (25 @ 18:40)   Specimen Source: Blood Blood-Peripheral  Culture Results: No growth     Culture - Urine (25 @ 21:01)   Specimen Source: Clean Catch Clean Catch (Midstream)  Culture Results: 10,000 - 49,000 CFU/mL Gram Positive Cocci in Pairs and Chains   <10,000 CFU/ml Normal Urogenital cha present    Radiology:   ACC: [id number] EXAM:  XR CHEST PORTABLE URGENT 1V   PROCEDURE DATE:  2025    IMPRESSION: No focal consolidation.          Meds:   Antimicrobials:   acyclovir    Suspension 800 milliGRAM(s) Oral every 12 hours  letermovir 480 milliGRAM(s) Oral daily  levoFLOXacin IVPB 500 milliGRAM(s) IV Intermittent every 24 hours  posaconazole DR Tablet 300 milliGRAM(s) Oral daily  vancomycin    Solution 125 milliGRAM(s) Oral every 12 hours      Heme / Onc:       GI:  loperamide 2 milliGRAM(s) Oral every 6 hours PRN  pantoprazole  Injectable 40 milliGRAM(s) IV Push two times a day  polyethylene glycol 3350 17 Gram(s) Oral daily PRN  sodium bicarbonate Mouth Rinse 10 milliLiter(s) Swish and Spit five times a day  ursodiol Suspension 300 milliGRAM(s) Oral two times a day      Cardiovascular:   amLODIPine   Tablet 5 milliGRAM(s) Oral daily  lisinopril 20 milliGRAM(s) Oral <User Schedule>  lisinopril 20 milliGRAM(s) Oral <User Schedule>  metoprolol succinate ER 25 milliGRAM(s) Oral daily      Immunologic:   filgrastim-sndz (ZARXIO) Injectable 300 MICROGram(s) SubCutaneous every 24 hours      Other medications:   artificial tears (preservative free) Ophthalmic Solution 1 Drop(s) Both EYES four times a day  Biotene Dry Mouth Oral Rinse 5 milliLiter(s) Swish and Spit five times a day  buPROPion XL (24-Hour) . 300 milliGRAM(s) Oral daily  chlorhexidine 0.12% Liquid 15 milliLiter(s) Swish and Spit two times a day  chlorhexidine 4% Liquid 1 Application(s) Topical <User Schedule>  dextrose 5%. 1000 milliLiter(s) IV Continuous <Continuous>  levothyroxine 88 MICROGram(s) Oral daily  OLANZapine 5 milliGRAM(s) Oral at bedtime  phytonadione   Solution 5 milliGRAM(s) Oral <User Schedule>  prochlorperazine   Injectable 10 milliGRAM(s) IV Push every 6 hours      PRN:   acetaminophen     Tablet .. 650 milliGRAM(s) Oral every 6 hours PRN  FIRST- Mouthwash  BLM 15 milliLiter(s) Swish and Swallow five times a day PRN  HYDROmorphone  Injectable 0.5 milliGRAM(s) IV Push every 6 hours PRN  lidocaine 2% Viscous 15 milliLiter(s) Oral five times a day PRN  loperamide 2 milliGRAM(s) Oral every 6 hours PRN  ondansetron Injectable 8 milliGRAM(s) IV Push every 8 hours PRN  polyethylene glycol 3350 17 Gram(s) Oral daily PRN  sodium chloride 0.9% lock flush 10 milliLiter(s) IV Push every 1 hour PRN        A/P: 64 year old female with hx of myelofibrosis presents for a MUD allogeneic SCT (DP Permissive) conditioning with Flu/Bu/Thiotepa/rATG.  Today is day +13  - Thiotepa 1/2. Maintain skin precautions during thiotepa administration and for 48 hours after completion (days .6 through day -3). Chronic pain issues - continue home fentanyl patch. Febrile overnight, tmax 100.9. Cultures  pending. RVP negative. CXR pending official read, no obvious consolidations. Vital signs are stable.    D/C HCTZ. Fludarabine 1/3, Busulfan 1/3, rATG 1/3. No Tylenol on busulfan days. Continue keppra ppx for 24 hours post infusion of last dose of busulfan. No acute events overnight, vital signs are stable.   - overnight patient was febrile during ATG, no cultures/abx as fever likely secondary to ATG. Patient was otherwise stable overnight. Fludarabine 2/3, Busulfan 2/3 and rATG 2/3. NO Tylenol or Azoles until Day 0. Continue with supportive care.  - Fludarabine 3/3, busulfan 3/3, rATG 3/3. No acute events overnight. Vital signs are stable.    Switched Compazine to atc   VSS, afebrile. 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   VSS, persistent fevers likely due to CRS post-SCT, continues on zosyn. Cultures pending. Hypervolemia - lasix 40mg x 1, will continue strict I/Os. Started caspofungin due to persistent thrush.   overnight no acute events noted. VSS, febrile likely due to CRS - continue with supportive care. BCx/UCx NGTD x 24h.   VSS, intermittently febrile likely due to CRS, infectious workup negative so far. PTCy  today. I&O, daily weights, prn diuresis. Continue to monitor thrush (improving). Changed some meds to PO suspension due to mucositis. Continue with supportive care. Refractory nausea, added trial of zyprexa 5mg po QHS x 3 days. d/c primafit. Change voriconazole to posaconazole tomorrow.   - No acute events overnight, vital signs are stable. Continue supportive care for chemotherapy induced oral mucositis.   - No acute events overnight, vital signs are stable. Completed PTCy, hydration to be discontinued today. I&O, daily weights, prn diuresis. Continue supportive care. Fentanyl patch to be titrated down to 12 mcg / hr tomorrow  7/3 Starting Scopolamine patch to decrease oral secretions. Increased Lisinopril 20mg in am, 10mg PM.    -No acute events; awaiting count recovery   - no acute events overnight. Vital signs are stable. SBP remains elevated, lisinopril / metoprolol doses increased.   : BMT team continues to educate the patient about the importance of medication taking. awaiting count recovery   Increased ppi q12h (+stool guaiac)   D/C'd Scopolamine patch   VSS awaiting count recovery      1. Infectious Disease:   acyclovir Suspension 800 milliGRAM(s) Oral every 12 hours  letermovir 480 milliGRAM(s) Oral daily  levoFLOXacin IVPB 500 milliGRAM(s) IV Intermittent every 24 hours  posaconazole DR Tablet 300 milliGRAM(s) Oral daily  vancomycin  Solution 125 milliGRAM(s) Oral every 12 hours    2. VOD Prophylaxis:   ursodiol Suspension 300 milliGRAM(s) Oral two times a day    3. GI Prophylaxis:    pantoprazole  Injectable 40 milliGRAM(s) IV q12h    4. Mouthcare - NS / NaHCO3 rinses, Peridex, Biotene; Skin care     5. GVHD prophylaxis   PTCy days +3, +4  Bortezomib days 0, + 3  Abatacept days +5, +14, +28     6. Transfuse & replete electrolytes prn     7. IV hydration, daily weights, strict I&O, prn diuresis     8. PO intake as tolerated, nutrition follow up as needed    9. Antiemetics, anti-diarrhea medications:   loperamide 2 milliGRAM(s) Oral every 6 hours PRN  ondansetron Injectable 8 milliGRAM(s) IV Push every 8 hours PRN  prochlorperazine Injectable 10 milliGRAM(s) IV Push every 6 hours    10. OOB as tolerated, physical therapy consult if needed     11. Monitor coags  2x week, vitamin K BIW   phytonadione Solution 5 milliGRAM(s) Oral <User Schedule>    12. Monitor closely for clinical changes, monitor for fevers     13. Emotional support provided, plan of care discussed and questions addressed     14. Patient education done regarding plan of care, restrictions and discharge planning     15. Continue regular social work input     I have written the above note for Dr. Gandhi  who performed service with me in the room.   Yesenia Dejesus PA-C ([phone])    I have seen and examined patient with PA, I agree with above note as scribed.                        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 prep regimen for treatment of myelofibrosis    Disease: myelofibrosis   Prep regimen: Flu / Bu / Thiotepa / rATG   GVHD ppx: ABC: PTCy days +3, +4, Bortezomib days 0, +3, PTCy days +5, +14, +28   ABO/CMV:   Recipient:   A+/CMV+   Donor:   B+/CMV+      S: Patient seen and examined with HPC Transplant Team:   overall feels better today  +fatigue  +oral/throat pain      O: Vitals:   Vital Signs Last 24 Hrs  T(C): 37.8 (10 Jul 2025 05:33), Max: 37.8 (10 Jul 2025 05:33)  T(F): 100 (10 Jul 2025 05:33), Max: 100 (10 Jul 2025 05:33)  HR: 100 (10 Jul 2025 05:33) (88 - 100)  BP: 158/70 (10 Jul 2025 05:33) (145/65 - 161/62)  BP(mean): --  RR: 19 (10 Jul 2025 05:33) (18 - 20)  SpO2: 94% (10 Jul 2025 05:33) (94% - 97%)    Parameters below as of 10 Jul 2025 05:33  Patient On (Oxygen Delivery Method): room air        Admit weight: 65.5kg  Daily Weight in k.3 (2025 08:26)    Intake / Output:    @ 07:01  -  07-10 @ 07:00  --------------------------------------------------------  IN: 710 mL / OUT: 1569 mL / NET: -859 mL          PE:   Oropharynx: + erythema + ulcerations posterior pharynx   Oral Mucositis: +                                                  rdGrdrrdarddrderd:rd3rd CVS: S1, S2 RRR   Lungs: CTA throughout bilaterally   Abdomen: + BS x 4, soft, NT, ND   Extremities: trace edema RUE, 5/5 strenght BUE's  Gastric Mucositis:                 -                              Grade: n/a   Intestinal Mucositis:              -                              Grade: n/a   Skin: no rash, ecchymotic patches BLE's   TLC: CDI   Neuro: A&O x 3          Labs:                         7.5    0.15  )-----------( 12       ( 10 Jul 2025 06:43 )             23.8     07-10    150[H]  |  116[H]  |  14  ----------------------------<  116[H]  3.7   |  23  |  0.57    Ca    8.3[L]      10 Jul 2025 06:43  Phos  2.5     07-10  Mg     1.9     07-10    TPro  4.9[L]  /  Alb  2.9[L]  /  TBili  0.7  /  DBili  x   /  AST  <5[L]  /  ALT  9[L]  /  AlkPhos  86  07-10    PT/INR - ( 10 Jul 2025 06:43 )   PT: 15.9 sec;   INR: 1.39 ratio         PTT - ( 10 Jul 2025 06:43 )  PTT:26.6 sec  LIVER FUNCTIONS - ( 10 Jul 2025 06:43 )  Alb: 2.9 g/dL / Pro: 4.9 g/dL / ALK PHOS: 86 U/L / ALT: 9 U/L / AST: <5 U/L / GGT: x           Lactate Dehydrogenase, Serum: 217 U/L (07-10 @ 06:43)        Cultures:  Culture Results: <10,000 CFU/mL Normal Urogenital Cha (25 @ 06:31)    Culture Results: No growth at 5 days(25 @ 16:36)    Culture Results: No growth at  5 days (25 @ 16:30)    Respiratory Viral Panel with COVID-19 by [facility] (25 @ 21:02)   Rapid RVP Result: [id number]: NotDetec:    Culture - Blood (25 @ 18:45)   Specimen Source: Blood Blood-Catheter    Culture Results: No growth at Final  Culture - Blood (25 @ 18:40)   Specimen Source: Blood Blood-Peripheral  Culture Results: No growth     Culture - Urine (25 @ 21:01)   Specimen Source: Clean Catch Clean Catch (Midstream)  Culture Results: 10,000 - 49,000 CFU/mL Gram Positive Cocci in Pairs and Chains   <10,000 CFU/ml Normal Urogenital cha present    Radiology:   ACC: [id number] EXAM:  XR CHEST PORTABLE URGENT 1V   PROCEDURE DATE:  2025    IMPRESSION: No focal consolidation.          Meds:   Antimicrobials:   acyclovir    Suspension 800 milliGRAM(s) Oral every 12 hours  letermovir 480 milliGRAM(s) Oral daily  levoFLOXacin IVPB 500 milliGRAM(s) IV Intermittent every 24 hours  posaconazole DR Tablet 300 milliGRAM(s) Oral daily  vancomycin    Solution 125 milliGRAM(s) Oral every 12 hours      Heme / Onc:       GI:  loperamide 2 milliGRAM(s) Oral every 6 hours PRN  pantoprazole  Injectable 40 milliGRAM(s) IV Push two times a day  polyethylene glycol 3350 17 Gram(s) Oral daily PRN  sodium bicarbonate Mouth Rinse 10 milliLiter(s) Swish and Spit five times a day  ursodiol Suspension 300 milliGRAM(s) Oral two times a day      Cardiovascular:   amLODIPine   Tablet 5 milliGRAM(s) Oral daily  lisinopril 20 milliGRAM(s) Oral <User Schedule>  lisinopril 20 milliGRAM(s) Oral <User Schedule>  metoprolol succinate ER 25 milliGRAM(s) Oral daily      Immunologic:   filgrastim-sndz (ZARXIO) Injectable 300 MICROGram(s) SubCutaneous every 24 hours      Other medications:   artificial tears (preservative free) Ophthalmic Solution 1 Drop(s) Both EYES four times a day  Biotene Dry Mouth Oral Rinse 5 milliLiter(s) Swish and Spit five times a day  buPROPion XL (24-Hour) . 300 milliGRAM(s) Oral daily  chlorhexidine 0.12% Liquid 15 milliLiter(s) Swish and Spit two times a day  chlorhexidine 4% Liquid 1 Application(s) Topical <User Schedule>  dextrose 5%. 1000 milliLiter(s) IV Continuous <Continuous>  levothyroxine 88 MICROGram(s) Oral daily  OLANZapine 5 milliGRAM(s) Oral at bedtime  phytonadione   Solution 5 milliGRAM(s) Oral <User Schedule>  prochlorperazine   Injectable 10 milliGRAM(s) IV Push every 6 hours      PRN:   acetaminophen     Tablet .. 650 milliGRAM(s) Oral every 6 hours PRN  FIRST- Mouthwash  BLM 15 milliLiter(s) Swish and Swallow five times a day PRN  HYDROmorphone  Injectable 0.5 milliGRAM(s) IV Push every 6 hours PRN  lidocaine 2% Viscous 15 milliLiter(s) Oral five times a day PRN  loperamide 2 milliGRAM(s) Oral every 6 hours PRN  ondansetron Injectable 8 milliGRAM(s) IV Push every 8 hours PRN  polyethylene glycol 3350 17 Gram(s) Oral daily PRN  sodium chloride 0.9% lock flush 10 milliLiter(s) IV Push every 1 hour PRN        A/P: 64 year old female with hx of myelofibrosis presents for a MUD allogeneic SCT (DP Permissive) conditioning with Flu/Bu/Thiotepa/rATG.  Today is day +13  - Thiotepa 1/2. Maintain skin precautions during thiotepa administration and for 48 hours after completion (days .6 through day -3). Chronic pain issues - continue home fentanyl patch. Febrile overnight, tmax 100.9. Cultures  pending. RVP negative. CXR pending official read, no obvious consolidations. Vital signs are stable.    D/C HCTZ. Fludarabine 1/3, Busulfan 1/3, rATG 1/3. No Tylenol on busulfan days. Continue keppra ppx for 24 hours post infusion of last dose of busulfan. No acute events overnight, vital signs are stable.   - overnight patient was febrile during ATG, no cultures/abx as fever likely secondary to ATG. Patient was otherwise stable overnight. Fludarabine 2/3, Busulfan 2/3 and rATG 2/3. NO Tylenol or Azoles until Day 0. Continue with supportive care.  - Fludarabine 3/3, busulfan 3/3, rATG 3/3. No acute events overnight. Vital signs are stable.    Switched Compazine to atc   VSS, afebrile. 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   VSS, persistent fevers likely due to CRS post-SCT, continues on zosyn. Cultures pending. Hypervolemia - lasix 40mg x 1, will continue strict I/Os. Started caspofungin due to persistent thrush.   overnight no acute events noted. VSS, febrile likely due to CRS - continue with supportive care. BCx/UCx NGTD x 24h.   VSS, intermittently febrile likely due to CRS, infectious workup negative so far. PTCy  today. I&O, daily weights, prn diuresis. Continue to monitor thrush (improving). Changed some meds to PO suspension due to mucositis. Continue with supportive care. Refractory nausea, added trial of zyprexa 5mg po QHS x 3 days. d/c primafit. Change voriconazole to posaconazole tomorrow.   - No acute events overnight, vital signs are stable. Continue supportive care for chemotherapy induced oral mucositis.   - No acute events overnight, vital signs are stable. Completed PTCy, hydration to be discontinued today. I&O, daily weights, prn diuresis. Continue supportive care. Fentanyl patch to be titrated down to 12 mcg / hr tomorrow  7/3 Starting Scopolamine patch to decrease oral secretions. Increased Lisinopril 20mg in am, 10mg PM.    -No acute events; awaiting count recovery   - no acute events overnight. Vital signs are stable. SBP remains elevated, lisinopril / metoprolol doses increased.   : BMT team continues to educate the patient about the importance of medication taking. awaiting count recovery   Increased ppi q12h (+stool guaiac)   D/C'd Scopolamine patch   VSS awaiting count recovery  7/10 VSS WNC at 0.15 today; continue Zarxio. BMT team continues to encourage PO intake getting OOB and medication compliance. NA at 150: restarted DW5(2L free water deficit)      1. Infectious Disease:   acyclovir Suspension 800 milliGRAM(s) Oral every 12 hours  letermovir 480 milliGRAM(s) Oral daily  levoFLOXacin IVPB 500 milliGRAM(s) IV Intermittent every 24 hours  posaconazole DR Tablet 300 milliGRAM(s) Oral daily  vancomycin  Solution 125 milliGRAM(s) Oral every 12 hours    2. VOD Prophylaxis:   ursodiol Suspension 300 milliGRAM(s) Oral two times a day    3. GI Prophylaxis:    pantoprazole  Injectable 40 milliGRAM(s) IV q12h    4. Mouthcare - NS / NaHCO3 rinses, Peridex, Biotene; Skin care     5. GVHD prophylaxis   PTCy days +3, +4  Bortezomib days 0, + 3  Abatacept days +5, +14, +28     6. Transfuse & replete electrolytes prn     7. IV hydration, daily weights, strict I&O, prn diuresis     8. PO intake as tolerated, nutrition follow up as needed    9. Antiemetics, anti-diarrhea medications:   loperamide 2 milliGRAM(s) Oral every 6 hours PRN  ondansetron Injectable 8 milliGRAM(s) IV Push every 8 hours PRN  prochlorperazine Injectable 10 milliGRAM(s) IV Push every 6 hours    10. OOB as tolerated, physical therapy consult if needed     11. Monitor coags  2x week, vitamin K BIW   phytonadione Solution 5 milliGRAM(s) Oral <User Schedule>    12. Monitor closely for clinical changes, monitor for fevers     13. Emotional support provided, plan of care discussed and questions addressed     14. Patient education done regarding plan of care, restrictions and discharge planning     15. Continue regular social work input     I have written the above note for Dr. Gandhi  who performed service with me in the room.   Yesenia Dejesus PA-C ([phone])    I have seen and examined patient with PA, I agree with above note as scribed.

## 2025-07-10 NOTE — CONSULT NOTE ADULT - REASON FOR ADMISSION
64 year old female with hx of myelofibrosis presents for a MUD allogeneic SCT (DP Permissive) conditioning with Flu/Bu/Thiotepa/rATG.
64 year old female with hx of myelofibrosis presents for a MUD allogeneic SCT (DP Permissive) conditioning with Flu/Bu/Thiotepa/rATG.

## 2025-07-10 NOTE — CONSULT NOTE ADULT - ASSESSMENT
A/P:64 year old female with a history of essential thrombocytopenia (23 years) who was being followed by Dr. Croft at [facility]. Patient has been on hydrea for many years with no repsponse then treated with pegylated IFN alpha (Besrema) x 6 months, but became intolerant due to significant complications which required multiple hospitalizations in the fall-winter of 2024. On 11/30/24 patient presented to [facility] ED with worsening back pain, chest pain, SOB and productive cough requiring oxygen, as well as HF exacerbation. a CT was performed showing moderately large pericardial effusion, bilateral pleural effusions and suspected superimposed PNA. At that time her hospital course was complicated  with pericarditis likely secondary to pegylated interferon and pericardial effusions. 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 Patient was then sent to [facility] (12/12/24-12/20/2024). During that time a BMBx (12/11/24) was performed showing ET with 4-6% blasts, complex karyotype and NGS with JAK2. Patient then followed up with Dr. Dailey who performed a repeat BMBx (3/14/25) that was consistent with myelofibrosis. Now, patient presents for a MUD allogeneic SCT (DP Permissive) conditioning with Flu/Bu/Thiothepa/rATG and GVHD ppx with ABC. Patient feels well today and has no complaints at this time    Wound Consult requested to assist w/ management of  Incontinence associated dermatitis    Recommendations:   Buttocks/ Sacrum  cavilon daily Triad BID and prn soiling     Abx per Medicine/ ID  Moisturize intact skin w/ SWEEN cream BID  Nutrition Consult for optimization in pt w/  Increased nutritional needs     Continue turning and positioning w/ offloading assistive devices as per protocol         Continue w/ attends under pads and Pericare as per protocol  Waffle Cushion to chair when oob to chair  Heels appropriately offloaded with positioners  Continue w/ low air loss pressure redistribution bed surface   Care as per medicine, will follow w/ you  If needed upon discharge f/u as outpatient at [facility] [address] [facility] [phone]  Seen and D/w team & RN  Thank you for this consult,  ENRIQUE Funk-BC, [facility]  [phone]  Nights/ Weekends/ Holidays please call:  General Surgery Consult pager [phone]) for emergencies  Wound PT for multilayer leg wrapping or VAC issues ([phone])

## 2025-07-10 NOTE — PROGRESS NOTE ADULT - NS ATTEND AMEND GEN_ALL_CORE FT
Day + 12 s/p allo-HSCT ..flu/bu/TT/ratg...ABC    Overall, patient is fatigued, complains of mouth pain, leg "heaviness" due to fluid overload. More talkative today  She is not ambulating, staying in bed majority of the day. Limited PO intake. some difficulty swallowing, nausea  Course complicated by CRS; now resolved  Hypertensive - on three antihypertensive agents; possibly component of opioid withdrawal (fentanyl patch 50mg at admission, now at 12.5mg) - monitor closely and use of PRN as needed. Increased metoprolol and lisinopril pm 7/5/25..pt npt taking po bp meds..will give hydralazine prn with lasix and albumin prn..pt tokk bp meds today  Physical exam is remarkable for mucositis; +1 b/l LE edema;   Labs reviewed; appropriate  Continue PRN diuresis - discussed that ambulation and OOB to chair is necessary.   Discussed close monitoring of In/Outs  Discussed limiting use of PRN opioids especially given that we are titrating down fentanyl patch. Extensively discussed with patient who is in agreement to rate her pain and request appropriate analgesics.   She has a private nursing aide at bedside.   PT consulted; encouraged out of bed to chair as much as possible during the day  continue supportive care and antimicrobial ppx.  on zarxio  add zyprexa for nausea Day + 13  s/p allo-HSCT ..flu/bu/TT/ratg...ABC    Overall, patient is fatigued, complains of mouth pain, leg "heaviness" due to fluid overload. More talkative today  She is not ambulating, staying in bed majority of the day. Limited PO intake. some difficulty swallowing, nausea  Course complicated by CRS; now resolved  Hypertensive - on three antihypertensive agents; possibly component of opioid withdrawal (fentanyl patch 50mg at admission, now at 12.5mg) - monitor closely and use of PRN as needed. Increased metoprolol and lisinopril pm 7/5/25..pt npt taking po bp meds..will give hydralazine prn with lasix and albumin prn..pt took bp meds today  Physical exam is remarkable for mucositis; +1 b/l LE edema;   Labs reviewed; appropriate  Continue PRN diuresis - discussed that ambulation and OOB to chair is necessary.   Discussed close monitoring of In/Outs  Discussed limiting use of PRN opioids especially given that we are titrating down fentanyl patch. Extensively discussed with patient who is in agreement to rate her pain and request appropriate analgesics.   She has a private nursing aide at bedside.   PT consulted; encouraged out of bed to chair as much as possible during the day  continue supportive care and antimicrobial ppx.  on zarxio..wbc recovering  add zyprexa for nausea Day + 13  s/p allo-HSCT ..flu/bu/TT/ratg...ABC    Overall, patient is fatigued, complains of mouth pain, leg "heaviness" due to fluid overload. More talkative today  She is not ambulating, staying in bed majority of the day. Limited PO intake. some difficulty swallowing, nausea  Course complicated by CRS; now resolved  Hypertensive - on three antihypertensive agents; possibly component of opioid withdrawal (fentanyl patch 50mg at admission, now at 12.5mg) - monitor closely and use of PRN as needed. Increased metoprolol and lisinopril pm 7/5/25..pt npt taking po bp meds..will give hydralazine prn with lasix and albumin prn..pt took bp meds today  Physical exam is remarkable for mucositis; +1 b/l LE edema;   Labs reviewed; appropriate  Continue PRN diuresis - discussed that ambulation and OOB to chair is necessary.   Discussed close monitoring of In/Outs  Discussed limiting use of PRN opioids especially given that we are titrating down fentanyl patch. Extensively discussed with patient who is in agreement to rate her pain and request appropriate analgesics.   She has a private nursing aide at bedside.   PT consulted; encouraged out of bed to chair as much as possible during the day  continue supportive care and antimicrobial ppx.  on zarxio..wbc recovering  add zyprexa for nausea.

## 2025-07-10 NOTE — PROVIDER CONTACT NOTE (MEDICATION) - ASSESSMENT
pt unable to tolerate more than 3 sips of powder mixed with ensure and pink drink at bedside. pt stated  " its burning"
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
## 2025-07-11 LAB
ALBUMIN SERPL ELPH-MCNC: 2.6 G/DL — LOW (ref 3.3–5)
ALP SERPL-CCNC: 85 U/L — SIGNIFICANT CHANGE UP (ref 40–120)
ALT FLD-CCNC: 8 U/L — LOW (ref 10–45)
ANION GAP SERPL CALC-SCNC: 10 MMOL/L — SIGNIFICANT CHANGE UP (ref 5–17)
AST SERPL-CCNC: 5 U/L — LOW (ref 10–40)
BASOPHILS # BLD AUTO: SIGNIFICANT CHANGE UP (ref 0–0.2)
BASOPHILS NFR BLD AUTO: SIGNIFICANT CHANGE UP (ref 0–2)
BILIRUB DIRECT SERPL-MCNC: 0.3 MG/DL — SIGNIFICANT CHANGE UP (ref 0–0.3)
BILIRUB INDIRECT FLD-MCNC: 0.3 MG/DL — SIGNIFICANT CHANGE UP (ref 0.2–1)
BILIRUB SERPL-MCNC: 0.6 MG/DL — SIGNIFICANT CHANGE UP (ref 0.2–1.2)
BLD GP AB SCN SERPL QL: NEGATIVE — SIGNIFICANT CHANGE UP
BUN SERPL-MCNC: 13 MG/DL — SIGNIFICANT CHANGE UP (ref 7–23)
CALCIUM SERPL-MCNC: 8.1 MG/DL — LOW (ref 8.4–10.5)
CHLORIDE SERPL-SCNC: 111 MMOL/L — HIGH (ref 96–108)
CO2 SERPL-SCNC: 22 MMOL/L — SIGNIFICANT CHANGE UP (ref 22–31)
CREAT SERPL-MCNC: 0.55 MG/DL — SIGNIFICANT CHANGE UP (ref 0.5–1.3)
EBV DNA SERPL NAA+PROBE-ACNC: SIGNIFICANT CHANGE UP IU/ML
EBVPCR LOG: SIGNIFICANT CHANGE UP LOG10IU/ML
EGFR: 103 ML/MIN/1.73M2 — SIGNIFICANT CHANGE UP
EGFR: 103 ML/MIN/1.73M2 — SIGNIFICANT CHANGE UP
EOSINOPHIL # BLD AUTO: SIGNIFICANT CHANGE UP (ref 0–0.5)
EOSINOPHIL NFR BLD AUTO: SIGNIFICANT CHANGE UP (ref 0–6)
GLUCOSE SERPL-MCNC: 105 MG/DL — HIGH (ref 70–99)
HCT VFR BLD CALC: 20.3 % — CRITICAL LOW (ref 34.5–45)
HGB BLD-MCNC: 6.5 G/DL — CRITICAL LOW (ref 11.5–15.5)
IMM GRANULOCYTES # BLD AUTO: SIGNIFICANT CHANGE UP (ref 0–0.07)
IMM GRANULOCYTES NFR BLD AUTO: SIGNIFICANT CHANGE UP (ref 0–0.9)
IMMATURE PLATELET FRACTION #: 1.2 K/UL — LOW (ref 4.7–11.1)
IMMATURE PLATELET FRACTION %: 8.1 % — HIGH (ref 1.6–4.9)
LDH SERPL L TO P-CCNC: 166 U/L — SIGNIFICANT CHANGE UP (ref 50–242)
LYMPHOCYTES # BLD AUTO: SIGNIFICANT CHANGE UP (ref 1–3.3)
LYMPHOCYTES NFR BLD AUTO: SIGNIFICANT CHANGE UP (ref 13–44)
MAGNESIUM SERPL-MCNC: 1.8 MG/DL — SIGNIFICANT CHANGE UP (ref 1.6–2.6)
MCHC RBC-ENTMCNC: 28.5 PG — SIGNIFICANT CHANGE UP (ref 27–34)
MCHC RBC-ENTMCNC: 32 G/DL — SIGNIFICANT CHANGE UP (ref 32–36)
MCV RBC AUTO: 89 FL — SIGNIFICANT CHANGE UP (ref 80–100)
MONOCYTES # BLD AUTO: SIGNIFICANT CHANGE UP (ref 0–0.9)
MONOCYTES NFR BLD AUTO: SIGNIFICANT CHANGE UP (ref 2–14)
NEUTROPHILS # BLD AUTO: SIGNIFICANT CHANGE UP (ref 1.8–7.4)
NEUTROPHILS NFR BLD AUTO: SIGNIFICANT CHANGE UP (ref 43–77)
NRBC # BLD AUTO: 0.02 K/UL — HIGH (ref 0–0)
NRBC # FLD: 0.02 K/UL — HIGH (ref 0–0)
NRBC BLD AUTO-RTO: 6 /100 WBCS — HIGH (ref 0–0)
PHOSPHATE SERPL-MCNC: 2.5 MG/DL — SIGNIFICANT CHANGE UP (ref 2.5–4.5)
PLATELET # BLD AUTO: 15 K/UL — CRITICAL LOW (ref 150–400)
PMV BLD: SIGNIFICANT CHANGE UP FL (ref 7–13)
POTASSIUM SERPL-MCNC: 3.9 MMOL/L — SIGNIFICANT CHANGE UP (ref 3.5–5.3)
POTASSIUM SERPL-SCNC: 3.9 MMOL/L — SIGNIFICANT CHANGE UP (ref 3.5–5.3)
PROT SERPL-MCNC: 4.6 G/DL — LOW (ref 6–8.3)
RBC # BLD: 2.28 M/UL — LOW (ref 3.8–5.2)
RBC # FLD: 15.8 % — HIGH (ref 10.3–14.5)
RH IG SCN BLD-IMP: POSITIVE — SIGNIFICANT CHANGE UP
SODIUM SERPL-SCNC: 143 MMOL/L — SIGNIFICANT CHANGE UP (ref 135–145)
WBC # BLD: 0.33 K/UL — CRITICAL LOW (ref 3.8–10.5)
WBC # FLD AUTO: 0.33 K/UL — CRITICAL LOW (ref 3.8–10.5)

## 2025-07-11 PROCEDURE — 99233 SBSQ HOSP IP/OBS HIGH 50: CPT

## 2025-07-11 RX ORDER — OLANZAPINE 10 MG/1
2.5 TABLET ORAL AT BEDTIME
Refills: 0 | Status: DISCONTINUED | OUTPATIENT
Start: 2025-07-11 | End: 2025-07-13

## 2025-07-11 RX ORDER — ACETAMINOPHEN 500 MG/5ML
1000 LIQUID (ML) ORAL ONCE
Refills: 0 | Status: COMPLETED | OUTPATIENT
Start: 2025-07-11 | End: 2025-07-11

## 2025-07-11 RX ORDER — FENTANYL CITRATE-0.9 % NACL/PF 100MCG/2ML
1 SYRINGE (ML) INTRAVENOUS
Qty: 1 | Refills: 0
Start: 2025-07-11 | End: 2025-07-20

## 2025-07-11 RX ORDER — ABATACEPT 125 MG/ML
570 INJECTION, SOLUTION SUBCUTANEOUS ONCE
Refills: 0 | Status: COMPLETED | OUTPATIENT
Start: 2025-07-11 | End: 2025-07-11

## 2025-07-11 RX ORDER — ENALAPRIL MALEATE 20 MG
1 TABLET ORAL
Qty: 60 | Refills: 0
Start: 2025-07-11 | End: 2025-08-09

## 2025-07-11 RX ADMIN — Medication 10 MILLILITER(S): at 15:10

## 2025-07-11 RX ADMIN — LETERMOVIR 480 MILLIGRAM(S): 480 TABLET, FILM COATED ORAL at 12:04

## 2025-07-11 RX ADMIN — ABATACEPT 100 MILLIGRAM(S): 125 INJECTION, SOLUTION SUBCUTANEOUS at 15:10

## 2025-07-11 RX ADMIN — Medication 10 MILLIGRAM(S): at 17:48

## 2025-07-11 RX ADMIN — POSACONAZOLE 300 MILLIGRAM(S): 100 TABLET, DELAYED RELEASE ORAL at 12:04

## 2025-07-11 RX ADMIN — Medication 1000 MILLIGRAM(S): at 03:25

## 2025-07-11 RX ADMIN — METOPROLOL SUCCINATE 25 MILLIGRAM(S): 50 TABLET, EXTENDED RELEASE ORAL at 05:39

## 2025-07-11 RX ADMIN — Medication 125 MILLIGRAM(S): at 05:35

## 2025-07-11 RX ADMIN — SCOPOLAMINE 1 PATCH: 1 PATCH, EXTENDED RELEASE TRANSDERMAL at 18:20

## 2025-07-11 RX ADMIN — Medication 1 PATCH: at 08:37

## 2025-07-11 RX ADMIN — URSODIOL 300 MILLIGRAM(S): 300 CAPSULE ORAL at 17:48

## 2025-07-11 RX ADMIN — Medication 15 MILLILITER(S): at 05:35

## 2025-07-11 RX ADMIN — Medication 1 DROP(S): at 12:03

## 2025-07-11 RX ADMIN — Medication 1 DROP(S): at 23:48

## 2025-07-11 RX ADMIN — Medication 1 PATCH: at 19:46

## 2025-07-11 RX ADMIN — Medication 10 MILLILITER(S): at 19:31

## 2025-07-11 RX ADMIN — Medication 5 MILLILITER(S): at 23:43

## 2025-07-11 RX ADMIN — Medication 5 MILLILITER(S): at 08:37

## 2025-07-11 RX ADMIN — Medication 10 MILLIGRAM(S): at 05:37

## 2025-07-11 RX ADMIN — Medication 0.5 MILLIGRAM(S): at 00:36

## 2025-07-11 RX ADMIN — Medication 0.5 MILLIGRAM(S): at 22:42

## 2025-07-11 RX ADMIN — Medication 800 MILLIGRAM(S): at 17:47

## 2025-07-11 RX ADMIN — Medication 1 PATCH: at 08:35

## 2025-07-11 RX ADMIN — Medication 800 MILLIGRAM(S): at 05:36

## 2025-07-11 RX ADMIN — Medication 40 MILLIGRAM(S): at 05:34

## 2025-07-11 RX ADMIN — Medication 10 MILLILITER(S): at 23:44

## 2025-07-11 RX ADMIN — Medication 1 APPLICATION(S): at 08:48

## 2025-07-11 RX ADMIN — Medication 125 MILLIGRAM(S): at 17:48

## 2025-07-11 RX ADMIN — Medication 10 MILLILITER(S): at 12:04

## 2025-07-11 RX ADMIN — LISINOPRIL 20 MILLIGRAM(S): 5 TABLET ORAL at 17:49

## 2025-07-11 RX ADMIN — LISINOPRIL 20 MILLIGRAM(S): 5 TABLET ORAL at 05:37

## 2025-07-11 RX ADMIN — Medication 50 MILLIEQUIVALENT(S): at 00:52

## 2025-07-11 RX ADMIN — Medication 88 MICROGRAM(S): at 05:38

## 2025-07-11 RX ADMIN — Medication 0.5 MILLIGRAM(S): at 21:42

## 2025-07-11 RX ADMIN — Medication 0.5 MILLIGRAM(S): at 06:53

## 2025-07-11 RX ADMIN — Medication 400 MILLIGRAM(S): at 02:25

## 2025-07-11 RX ADMIN — Medication 0.5 MILLIGRAM(S): at 15:41

## 2025-07-11 RX ADMIN — Medication 1 DROP(S): at 05:37

## 2025-07-11 RX ADMIN — Medication 5 MILLILITER(S): at 12:04

## 2025-07-11 RX ADMIN — Medication 40 MILLIGRAM(S): at 17:47

## 2025-07-11 RX ADMIN — Medication 10 MILLILITER(S): at 08:41

## 2025-07-11 RX ADMIN — URSODIOL 300 MILLIGRAM(S): 300 CAPSULE ORAL at 05:38

## 2025-07-11 RX ADMIN — FILGRASTIM 300 MICROGRAM(S): 300 INJECTION, SOLUTION INTRAVENOUS; SUBCUTANEOUS at 12:03

## 2025-07-11 RX ADMIN — Medication 15 MILLILITER(S): at 17:48

## 2025-07-11 RX ADMIN — Medication 1 DROP(S): at 17:48

## 2025-07-11 RX ADMIN — Medication 0.5 MILLIGRAM(S): at 07:15

## 2025-07-11 RX ADMIN — Medication 10 MILLIGRAM(S): at 12:03

## 2025-07-11 RX ADMIN — Medication 5 MILLILITER(S): at 19:30

## 2025-07-11 RX ADMIN — Medication 10 MILLIGRAM(S): at 23:42

## 2025-07-11 RX ADMIN — BUPROPION HYDROBROMIDE 300 MILLIGRAM(S): 522 TABLET, EXTENDED RELEASE ORAL at 12:03

## 2025-07-11 RX ADMIN — SCOPOLAMINE 1 PATCH: 1 PATCH, EXTENDED RELEASE TRANSDERMAL at 07:50

## 2025-07-11 RX ADMIN — AMLODIPINE BESYLATE 5 MILLIGRAM(S): 10 TABLET ORAL at 05:35

## 2025-07-11 RX ADMIN — Medication 0.5 MILLIGRAM(S): at 16:09

## 2025-07-11 RX ADMIN — Medication 5 MILLILITER(S): at 15:10

## 2025-07-11 NOTE — PROGRESS NOTE ADULT - SUBJECTIVE AND OBJECTIVE BOX
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 / Bu / Thiotepa / rATG prep regimen for treatment of myelofibrosis    Disease: myelofibrosis   Prep regimen: Flu / Bu / Thiotepa / rATG   GVHD ppx: ABC: PTCy days +3, +4, Bortezomib days 0, +3, PTCy days +5, +14, +28   ABO/CMV:   Recipient:   A+/CMV+   Donor:   B+/CMV+    S: Patient seen and examined with HPC Transplant Team:       O:   Vital Signs Last 24 Hrs  T(C): 36.2 (11 Jul 2025 05:30), Max: 36.8 (10 Jul 2025 18:26)  T(F): 97.2 (11 Jul 2025 05:30), Max: 98.2 (10 Jul 2025 18:26)  HR: 89 (11 Jul 2025 05:30) (88 - 91)  BP: 132/60 (11 Jul 2025 05:30) (127/62 - 161/69)  BP(mean): --  RR: 18 (11 Jul 2025 05:30) (18 - 19)  SpO2: 94% (11 Jul 2025 05:30) (93% - 94%)    Parameters below as of 11 Jul 2025 05:30  Patient On (Oxygen Delivery Method): room air        Admit weight: 65.5kg  Daily     Intake / Output:   07-10 @ 07:01  -  07-11 @ 07:00  --------------------------------------------------------  IN: 2302 mL / OUT: 700 mL / NET: 1602 mL    PE:   Oropharynx: + erythema + ulcerations posterior pharynx   Oral Mucositis: +                                                  stGstrstastdstest:st1st CVS: S1, S2 RRR   Lungs: CTA throughout bilaterally   Abdomen: + BS x 4, soft, NT, ND   Extremities: trace edema RUE, 5/5 strenght BUE's  Gastric Mucositis:                 -                              Grade: n/a   Intestinal Mucositis:              -                              Grade: n/a   Skin: no rash, ecchymotic patches BLE's   TLC: CDI   Neuro: A&O x 3      Labs:       07-11    143  |  111[H]  |  13  ----------------------------<  105[H]  3.9   |  22  |  0.55    Ca    8.1[L]      11 Jul 2025 06:28  Phos  2.5     07-11  Mg     1.8     07-11    TPro  4.6[L]  /  Alb  2.6[L]  /  TBili  0.6  /  DBili  0.3  /  AST  5[L]  /  ALT  8[L]  /  AlkPhos  85  07-11    PT/INR - ( 10 Jul 2025 06:43 )   PT: 15.9 sec;   INR: 1.39 ratio    PTT - ( 10 Jul 2025 06:43 )  PTT:26.6 sec    LIVER FUNCTIONS - ( 11 Jul 2025 06:28 )  Alb: 2.6 g/dL / Pro: 4.6 g/dL / ALK PHOS: 85 U/L / ALT: 8 U/L / AST: 5 U/L / GGT: x           Lactate Dehydrogenase, Serum: 166 U/L (07-11 @ 06:28)        Cultures:  Culture Results: <10,000 CFU/mL Normal Urogenital Cha (06.28.25 @ 06:31)    Culture Results: No growth at 5 days(06.27.25 @ 16:36)    Culture Results: No growth at  5 days (06.27.25 @ 16:30)    Respiratory Viral Panel with COVID-19 by SCARLET (06.20.25 @ 21:02)   Rapid RVP Result: [id number]: NotDetec:    Culture - Blood (06.20.25 @ 18:45)   Specimen Source: Blood Blood-Catheter    Culture Results: No growth at Final  Culture - Blood (06.20.25 @ 18:40)   Specimen Source: Blood Blood-Peripheral  Culture Results: No growth     Culture - Urine (06.20.25 @ 21:01)   Specimen Source: Clean Catch Clean Catch (Midstream)  Culture Results: 10,000 - 49,000 CFU/mL Gram Positive Cocci in Pairs and Chains   <10,000 CFU/ml Normal Urogenital cha present    Radiology:   ACC: [id number] EXAM:  XR CHEST PORTABLE URGENT 1V   PROCEDURE DATE:  06/27/2025    IMPRESSION: No focal consolidation.      Meds:   Antimicrobials:   acyclovir    Suspension 800 milliGRAM(s) Oral every 12 hours  letermovir 480 milliGRAM(s) Oral daily  levoFLOXacin IVPB 500 milliGRAM(s) IV Intermittent every 24 hours  posaconazole DR Tablet 300 milliGRAM(s) Oral daily  vancomycin    Solution 125 milliGRAM(s) Oral every 12 hours      Heme / Onc:       GI:  loperamide 2 milliGRAM(s) Oral every 6 hours PRN  pantoprazole  Injectable 40 milliGRAM(s) IV Push two times a day  polyethylene glycol 3350 17 Gram(s) Oral daily PRN  sodium bicarbonate Mouth Rinse 10 milliLiter(s) Swish and Spit five times a day  ursodiol Suspension 300 milliGRAM(s) Oral two times a day      Cardiovascular:   amLODIPine   Tablet 5 milliGRAM(s) Oral daily  lisinopril 20 milliGRAM(s) Oral <User Schedule>  lisinopril 20 milliGRAM(s) Oral <User Schedule>  metoprolol succinate ER 25 milliGRAM(s) Oral daily      Immunologic:   filgrastim-sndz (ZARXIO) Injectable 300 MICROGram(s) SubCutaneous every 24 hours      Other medications:   artificial tears (preservative free) Ophthalmic Solution 1 Drop(s) Both EYES four times a day  Biotene Dry Mouth Oral Rinse 5 milliLiter(s) Swish and Spit five times a day  buPROPion XL (24-Hour) . 300 milliGRAM(s) Oral daily  chlorhexidine 0.12% Liquid 15 milliLiter(s) Swish and Spit two times a day  chlorhexidine 4% Liquid 1 Application(s) Topical <User Schedule>  dextrose 5%. 1000 milliLiter(s) IV Continuous <Continuous>  fentaNYL   Patch  12 MICROgram(s)/Hr. 1 Patch Transdermal every 48 hours  levothyroxine 88 MICROGram(s) Oral daily  OLANZapine 5 milliGRAM(s) Oral at bedtime  phytonadione   Solution 5 milliGRAM(s) Oral <User Schedule>  prochlorperazine   Injectable 10 milliGRAM(s) IV Push every 6 hours  scopolamine 1 mG/72 Hr(s) Patch 1 Patch Transdermal every 72 hours      PRN:   acetaminophen     Tablet .. 650 milliGRAM(s) Oral every 6 hours PRN  FIRST- Mouthwash  BLM 15 milliLiter(s) Swish and Swallow five times a day PRN  HYDROmorphone  Injectable 0.5 milliGRAM(s) IV Push every 6 hours PRN  lidocaine 2% Viscous 15 milliLiter(s) Oral five times a day PRN  loperamide 2 milliGRAM(s) Oral every 6 hours PRN  ondansetron Injectable 8 milliGRAM(s) IV Push every 8 hours PRN  polyethylene glycol 3350 17 Gram(s) Oral daily PRN  sodium chloride 0.9% lock flush 10 milliLiter(s) IV Push every 1 hour PRN      A/P: 64 year old female with hx of myelofibrosis presents for a MUD allogeneic SCT (DP Permissive) conditioning with Flu/Bu/Thiotepa/rATG.  Today is day +14  6/21- Thiotepa 1/2. Maintain skin precautions during thiotepa administration and for 48 hours after completion (days .6 through day -3). Chronic pain issues - continue home fentanyl patch. Febrile overnight, tmax 100.9. Cultures  pending. RVP negative. CXR pending official read, no obvious consolidations. Vital signs are stable.   6/23 D/C HCTZ. Fludarabine 1/3, Busulfan 1/3, rATG 1/3. No Tylenol on busulfan days. Continue keppra ppx for 24 hours post infusion of last dose of busulfan. No acute events overnight, vital signs are stable.   6/24- overnight patient was febrile during ATG, no cultures/abx as fever likely secondary to ATG. Patient was otherwise stable overnight. Fludarabine 2/3, Busulfan 2/3 and rATG 2/3. NO Tylenol or Azoles until Day 0. Continue with supportive care.  6/25- Fludarabine 3/3, busulfan 3/3, rATG 3/3. No acute events overnight. Vital signs are stable.   6/26 Switched Compazine to atc  6/27 VSS, afebrile. 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  6/28 VSS, persistent fevers likely due to CRS post-SCT, continues on zosyn. Cultures pending. Hypervolemia - lasix 40mg x 1, will continue strict I/Os. Started caspofungin due to persistent thrush.  6/29 overnight no acute events noted. VSS, febrile likely due to CRS - continue with supportive care. BCx/UCx NGTD x 24h.  6/30 VSS, intermittently febrile likely due to CRS, infectious workup negative so far. PTCy 1/2 today. I&O, daily weights, prn diuresis. Continue to monitor thrush (improving). Changed some meds to PO suspension due to mucositis. Continue with supportive care. Refractory nausea, added trial of zyprexa 5mg po QHS x 3 days. d/c primafit. Change voriconazole to posaconazole tomorrow.   7/1- No acute events overnight, vital signs are stable. Continue supportive care for chemotherapy induced oral mucositis.   7/2- No acute events overnight, vital signs are stable. Completed PTCy, hydration to be discontinued today. I&O, daily weights, prn diuresis. Continue supportive care. Fentanyl patch to be titrated down to 12 mcg / hr tomorrow  7/3 Starting Scopolamine patch to decrease oral secretions. Increased Lisinopril 20mg in am, 10mg PM.    7/4-No acute events; awaiting count recovery   7/5- no acute events overnight. Vital signs are stable. SBP remains elevated, lisinopril / metoprolol doses increased.   7/6: BMT team continues to educate the patient about the importance of medication taking. awaiting count recovery  7/7 Increased ppi q12h (+stool guaiac)  7/8 D/C'd Scopolamine patch  7/9 VSS awaiting count recovery  7/10 VSS WNC at 0.15 today; continue Zarxio. BMT team continues to encourage PO intake getting OOB and medication compliance. NA at 150: restarted DW5(2L free water deficit)      1. Infectious Disease:   acyclovir Suspension 800 milliGRAM(s) Oral every 12 hours  letermovir 480 milliGRAM(s) Oral daily  levoFLOXacin IVPB 500 milliGRAM(s) IV Intermittent every 24 hours  posaconazole DR Tablet 300 milliGRAM(s) Oral daily  vancomycin  Solution 125 milliGRAM(s) Oral every 12 hours    2. VOD Prophylaxis:   ursodiol Suspension 300 milliGRAM(s) Oral two times a day    3. GI Prophylaxis:    pantoprazole  Injectable 40 milliGRAM(s) IV q12h    4. Mouthcare - NS / NaHCO3 rinses, Peridex, Biotene; Skin care     5. GVHD prophylaxis   PTCy days +3, +4  Bortezomib days 0, + 3  Abatacept days +5, +14, +28     6. Transfuse & replete electrolytes prn     7. IV hydration, daily weights, strict I&O, prn diuresis     8. PO intake as tolerated, nutrition follow up as needed    9. Antiemetics, anti-diarrhea medications:   loperamide 2 milliGRAM(s) Oral every 6 hours PRN  ondansetron Injectable 8 milliGRAM(s) IV Push every 8 hours PRN  prochlorperazine Injectable 10 milliGRAM(s) IV Push every 6 hours    10. OOB as tolerated, physical therapy consult if needed     11. Monitor coags  2x week, vitamin K BIW   phytonadione Solution 5 milliGRAM(s) Oral <User Schedule>    12. Monitor closely for clinical changes, monitor for fevers     13. Emotional support provided, plan of care discussed and questions addressed     14. Patient education done regarding plan of care, restrictions and discharge planning     15. Continue regular social work input     I have written the above note for Dr. Gandhi  who performed service with me in the room.   Jalen Kay PA-C ([phone])    I have seen and examined patient with PA, I agree with above note as scribed.                  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 / Bu / Thiotepa / rATG prep regimen for treatment of myelofibrosis    Disease: myelofibrosis   Prep regimen: Flu / Bu / Thiotepa / rATG   GVHD ppx: ABC: PTCy days +3, +4, Bortezomib days 0, +3, PTCy days +5, +14, +28   ABO/CMV:   Recipient:   A+/CMV+   Donor:   B+/CMV+    S: Patient seen and examined with HPC Transplant Team:   + dry intermittent cough   + fatigue     O:   Vital Signs Last 24 Hrs  T(C): 36.2 (11 Jul 2025 05:30), Max: 36.8 (10 Jul 2025 18:26)  T(F): 97.2 (11 Jul 2025 05:30), Max: 98.2 (10 Jul 2025 18:26)  HR: 89 (11 Jul 2025 05:30) (88 - 91)  BP: 132/60 (11 Jul 2025 05:30) (127/62 - 161/69)  BP(mean): --  RR: 18 (11 Jul 2025 05:30) (18 - 19)  SpO2: 94% (11 Jul 2025 05:30) (93% - 94%)    Parameters below as of 11 Jul 2025 05:30  Patient On (Oxygen Delivery Method): room air      Admit weight: 65.5kg      Intake / Output:   07-10 @ 07:01  -  07-11 @ 07:00  --------------------------------------------------------  IN: 2302 mL / OUT: 700 mL / NET: 1602 mL    PE:   Oropharynx: + erythema + ulcerations posterior pharynx   Oral Mucositis: +                                                  rdGrdrrdarddrderd:rd3rd CVS: S1, S2 RRR   Lungs: CTA throughout bilaterally   Abdomen: + BS x 4, soft, NT, ND   Extremities: trace edema RUE, 5/5 strenght BUE's  Gastric Mucositis:                 -                              Grade: n/a   Intestinal Mucositis:              -                              Grade: n/a   Skin: no rash, ecchymotic patches BLE's   TLC: CDI   Neuro: A&O x 3      Labs:                         6.5    0.33  )-----------( 15       ( 11 Jul 2025 06:28 )             20.3     07-11    143  |  111[H]  |  13  ----------------------------<  105[H]  3.9   |  22  |  0.55    Ca    8.1[L]      11 Jul 2025 06:28  Phos  2.5     07-11  Mg     1.8     07-11    TPro  4.6[L]  /  Alb  2.6[L]  /  TBili  0.6  /  DBili  0.3  /  AST  5[L]  /  ALT  8[L]  /  AlkPhos  85  07-11    PT/INR - ( 10 Jul 2025 06:43 )   PT: 15.9 sec;   INR: 1.39 ratio    PTT - ( 10 Jul 2025 06:43 )  PTT:26.6 sec    LIVER FUNCTIONS - ( 11 Jul 2025 06:28 )  Alb: 2.6 g/dL / Pro: 4.6 g/dL / ALK PHOS: 85 U/L / ALT: 8 U/L / AST: 5 U/L / GGT: x           Lactate Dehydrogenase, Serum: 166 U/L (07-11 @ 06:28)        Cultures:   Culture Results: <10,000 CFU/mL Normal Urogenital Cha (06.28.25 @ 06:31)    Culture Results: No growth at 5 days(06.27.25 @ 16:36)    Culture Results: No growth at  5 days (06.27.25 @ 16:30)    Respiratory Viral Panel with COVID-19 by SCARLET (06.20.25 @ 21:02)   Rapid RVP Result: [id number]: NotDetec:    Culture - Blood (06.20.25 @ 18:45)   Specimen Source: Blood Blood-Catheter    Culture Results: No growth at Final  Culture - Blood (06.20.25 @ 18:40)   Specimen Source: Blood Blood-Peripheral  Culture Results: No growth     Culture - Urine (06.20.25 @ 21:01)   Specimen Source: Clean Catch Clean Catch (Midstream)  Culture Results: 10,000 - 49,000 CFU/mL Gram Positive Cocci in Pairs and Chains   <10,000 CFU/ml Normal Urogenital cha present    Radiology:   ACC: [id number] EXAM:  XR CHEST PORTABLE URGENT 1V   PROCEDURE DATE:  06/27/2025    IMPRESSION: No focal consolidation.      Meds:   Antimicrobials:   acyclovir    Suspension 800 milliGRAM(s) Oral every 12 hours  letermovir 480 milliGRAM(s) Oral daily  levoFLOXacin IVPB 500 milliGRAM(s) IV Intermittent every 24 hours  posaconazole DR Tablet 300 milliGRAM(s) Oral daily  vancomycin    Solution 125 milliGRAM(s) Oral every 12 hours      Heme / Onc:       GI:  loperamide 2 milliGRAM(s) Oral every 6 hours PRN  pantoprazole  Injectable 40 milliGRAM(s) IV Push two times a day  polyethylene glycol 3350 17 Gram(s) Oral daily PRN  sodium bicarbonate Mouth Rinse 10 milliLiter(s) Swish and Spit five times a day  ursodiol Suspension 300 milliGRAM(s) Oral two times a day      Cardiovascular:   amLODIPine   Tablet 5 milliGRAM(s) Oral daily  lisinopril 20 milliGRAM(s) Oral <User Schedule>  lisinopril 20 milliGRAM(s) Oral <User Schedule>  metoprolol succinate ER 25 milliGRAM(s) Oral daily      Immunologic:   filgrastim-sndz (ZARXIO) Injectable 300 MICROGram(s) SubCutaneous every 24 hours      Other medications:   artificial tears (preservative free) Ophthalmic Solution 1 Drop(s) Both EYES four times a day  Biotene Dry Mouth Oral Rinse 5 milliLiter(s) Swish and Spit five times a day  buPROPion XL (24-Hour) . 300 milliGRAM(s) Oral daily  chlorhexidine 0.12% Liquid 15 milliLiter(s) Swish and Spit two times a day  chlorhexidine 4% Liquid 1 Application(s) Topical <User Schedule>  dextrose 5%. 1000 milliLiter(s) IV Continuous <Continuous>  fentaNYL   Patch  12 MICROgram(s)/Hr. 1 Patch Transdermal every 48 hours  levothyroxine 88 MICROGram(s) Oral daily  OLANZapine 5 milliGRAM(s) Oral at bedtime  phytonadione   Solution 5 milliGRAM(s) Oral <User Schedule>  prochlorperazine   Injectable 10 milliGRAM(s) IV Push every 6 hours  scopolamine 1 mG/72 Hr(s) Patch 1 Patch Transdermal every 72 hours      PRN:   acetaminophen     Tablet .. 650 milliGRAM(s) Oral every 6 hours PRN  FIRST- Mouthwash  BLM 15 milliLiter(s) Swish and Swallow five times a day PRN  HYDROmorphone  Injectable 0.5 milliGRAM(s) IV Push every 6 hours PRN  lidocaine 2% Viscous 15 milliLiter(s) Oral five times a day PRN  loperamide 2 milliGRAM(s) Oral every 6 hours PRN  ondansetron Injectable 8 milliGRAM(s) IV Push every 8 hours PRN  polyethylene glycol 3350 17 Gram(s) Oral daily PRN  sodium chloride 0.9% lock flush 10 milliLiter(s) IV Push every 1 hour PRN      A/P: 64 year old female with hx of myelofibrosis presents for a MUD allogeneic SCT (DP Permissive) conditioning with Flu/Bu/Thiotepa/rATG.  Today is day +14  6/21- Thiotepa 1/2. Maintain skin precautions during thiotepa administration and for 48 hours after completion (days .6 through day -3). Chronic pain issues - continue home fentanyl patch. Febrile overnight, tmax 100.9. Cultures  pending. RVP negative. CXR pending official read, no obvious consolidations. Vital signs are stable.   6/23 D/C HCTZ. Fludarabine 1/3, Busulfan 1/3, rATG 1/3. No Tylenol on busulfan days. Continue keppra ppx for 24 hours post infusion of last dose of busulfan. No acute events overnight, vital signs are stable.   6/24- overnight patient was febrile during ATG, no cultures/abx as fever likely secondary to ATG. Patient was otherwise stable overnight. Fludarabine 2/3, Busulfan 2/3 and rATG 2/3. NO Tylenol or Azoles until Day 0. Continue with supportive care.  6/25- Fludarabine 3/3, busulfan 3/3, rATG 3/3. No acute events overnight. Vital signs are stable.   6/26 Switched Compazine to atc  6/27 VSS, afebrile. 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  6/28 VSS, persistent fevers likely due to CRS post-SCT, continues on zosyn. Cultures pending. Hypervolemia - lasix 40mg x 1, will continue strict I/Os. Started caspofungin due to persistent thrush.  6/29 overnight no acute events noted. VSS, febrile likely due to CRS - continue with supportive care. BCx/UCx NGTD x 24h.  6/30 VSS, intermittently febrile likely due to CRS, infectious workup negative so far. PTCy 1/2 today. I&O, daily weights, prn diuresis. Continue to monitor thrush (improving). Changed some meds to PO suspension due to mucositis. Continue with supportive care. Refractory nausea, added trial of zyprexa 5mg po QHS x 3 days. d/c primafit. Change voriconazole to posaconazole tomorrow.   7/1- No acute events overnight, vital signs are stable. Continue supportive care for chemotherapy induced oral mucositis.   7/2- No acute events overnight, vital signs are stable. Completed PTCy, hydration to be discontinued today. I&O, daily weights, prn diuresis. Continue supportive care. Fentanyl patch to be titrated down to 12 mcg / hr tomorrow  7/3 Starting Scopolamine patch to decrease oral secretions. Increased Lisinopril 20mg in am, 10mg PM.    7/4-No acute events; awaiting count recovery   7/5- no acute events overnight. Vital signs are stable. SBP remains elevated, lisinopril / metoprolol doses increased.   7/6: BMT team continues to educate the patient about the importance of medication taking. awaiting count recovery  7/7 Increased ppi q12h (+stool guaiac)  7/8 D/C'd Scopolamine patch  7/9 VSS awaiting count recovery  7/10 VSS WNC at 0.15 today; continue Zarxio. BMT team continues to encourage PO intake getting OOB and medication compliance. NA at 150: restarted DW5(2L free water deficit)    7/11- no acute events overnight. Vital signs are stable. d/c D5W, continue to encourage po intake. Early engraftment, check CMV PCR, VNTR. Transition meds back to po.     1. Infectious Disease:   acyclovir Suspension 800 milliGRAM(s) Oral every 12 hours  letermovir 480 milliGRAM(s) Oral daily  levoFLOXacin IVPB 500 milliGRAM(s) IV Intermittent every 24 hours  posaconazole DR Tablet 300 milliGRAM(s) Oral daily  vancomycin  Solution 125 milliGRAM(s) Oral every 12 hours    2. VOD Prophylaxis:   ursodiol Suspension 300 milliGRAM(s) Oral two times a day    3. GI Prophylaxis:    pantoprazole  Injectable 40 milliGRAM(s) IV q12h    4. Mouthcare - NS / NaHCO3 rinses, Peridex, Biotene; Skin care     5. GVHD prophylaxis   PTCy days +3, +4  Bortezomib days 0, + 3  Abatacept days +5, +14, +28     6. Transfuse & replete electrolytes prn  1 unit PRBC     7. IV hydration, daily weights, strict I&O, prn diuresis     8. PO intake as tolerated, nutrition follow up as needed    9. Antiemetics, anti-diarrhea medications:   loperamide 2 milliGRAM(s) Oral every 6 hours PRN  ondansetron Injectable 8 milliGRAM(s) IV Push every 8 hours PRN  prochlorperazine Injectable 10 milliGRAM(s) IV Push every 6 hours    10. OOB as tolerated, physical therapy consult if needed     11. Monitor coags  2x week, vitamin K BIW   phytonadione Solution 5 milliGRAM(s) Oral <User Schedule>    12. Monitor closely for clinical changes, monitor for fevers     13. Emotional support provided, plan of care discussed and questions addressed     14. Patient education done regarding plan of care, restrictions and discharge planning     15. Continue regular social work input     I have written the above note for Dr. Gandhi  who performed service with me in the room.   Jalen Kay PA-C ([phone])    I have seen and examined patient with PA, I agree with above note as scribed.

## 2025-07-11 NOTE — PROGRESS NOTE ADULT - NS ATTEND AMEND GEN_ALL_CORE FT
Day + 13  s/p allo-HSCT ..flu/bu/TT/ratg...ABC    Overall, patient is fatigued, complains of mouth pain, leg "heaviness" due to fluid overload. More talkative today  She is not ambulating, staying in bed majority of the day. Limited PO intake. some difficulty swallowing, nausea  Course complicated by CRS; now resolved  Hypertensive - on three antihypertensive agents; possibly component of opioid withdrawal (fentanyl patch 50mg at admission, now at 12.5mg) - monitor closely and use of PRN as needed. Increased metoprolol and lisinopril pm 7/5/25..pt npt taking po bp meds..will give hydralazine prn with lasix and albumin prn..pt took bp meds today  Physical exam is remarkable for mucositis; +1 b/l LE edema;   Labs reviewed; appropriate  Continue PRN diuresis - discussed that ambulation and OOB to chair is necessary.   Discussed close monitoring of In/Outs  Discussed limiting use of PRN opioids especially given that we are titrating down fentanyl patch. Extensively discussed with patient who is in agreement to rate her pain and request appropriate analgesics.   She has a private nursing aide at bedside.   PT consulted; encouraged out of bed to chair as much as possible during the day  continue supportive care and antimicrobial ppx.  on zarxio..wbc recovering  add zyprexa for nausea. Day + 14  s/p allo-HSCT ..flu/bu/TT/ratg...ABC on protocol    Overall, patient is fatigued, complains of mouth pain, leg "heaviness" due to fluid overload. More talkative today  She is not ambulating, staying in bed majority of the day. Limited PO intake. some difficulty swallowing, nausea..taking po meds  Course complicated by CRS; now resolved  Hypertensive - on three antihypertensive agents; possibly component of opioid withdrawal (fentanyl patch 50mg at admission, now at 12.5mg) - monitor closely and use of PRN as needed. Increased metoprolol and lisinopril pm 7/5/25..pt npt taking po bp meds..will give hydralazine prn with lasix and albumin prn..pt took bp meds today..stop norvasc..restart HCTZ  Physical exam is remarkable for mucositis; +1 b/l LE edema;   Labs reviewed; appropriate  Continue PRN diuresis - discussed that ambulation and OOB to chair is necessary.   Discussed close monitoring of In/Outs  Discussed limiting use of PRN opioids especially given that we are titrating down fentanyl patch. Extensively discussed with patient who is in agreement to rate her pain and request appropriate analgesics.   PT consulted; encouraged out of bed to chair as much as possible during the day  continue supportive care and antimicrobial ppx.  on zarxio..wbc recovering  added zyprexa for nausea. will decrease dose..cont prn antiemetics

## 2025-07-12 ENCOUNTER — TRANSCRIPTION ENCOUNTER (OUTPATIENT)
Age: 65
End: 2025-07-12

## 2025-07-12 LAB
ALBUMIN SERPL ELPH-MCNC: 2.6 G/DL — LOW (ref 3.3–5)
ALP SERPL-CCNC: 84 U/L — SIGNIFICANT CHANGE UP (ref 40–120)
ALT FLD-CCNC: 7 U/L — LOW (ref 10–45)
ANION GAP SERPL CALC-SCNC: 12 MMOL/L — SIGNIFICANT CHANGE UP (ref 5–17)
AST SERPL-CCNC: 7 U/L — LOW (ref 10–40)
BASOPHILS # BLD AUTO: 0.01 K/UL — SIGNIFICANT CHANGE UP (ref 0–0.2)
BASOPHILS # BLD MANUAL: 0 K/UL — SIGNIFICANT CHANGE UP (ref 0–0.2)
BASOPHILS NFR BLD AUTO: 0.8 % — SIGNIFICANT CHANGE UP (ref 0–2)
BASOPHILS NFR BLD MANUAL: 0 % — SIGNIFICANT CHANGE UP (ref 0–2)
BILIRUB SERPL-MCNC: 0.6 MG/DL — SIGNIFICANT CHANGE UP (ref 0.2–1.2)
BUN SERPL-MCNC: 10 MG/DL — SIGNIFICANT CHANGE UP (ref 7–23)
CALCIUM SERPL-MCNC: 8.2 MG/DL — LOW (ref 8.4–10.5)
CHLORIDE SERPL-SCNC: 109 MMOL/L — HIGH (ref 96–108)
CO2 SERPL-SCNC: 22 MMOL/L — SIGNIFICANT CHANGE UP (ref 22–31)
CREAT SERPL-MCNC: 0.52 MG/DL — SIGNIFICANT CHANGE UP (ref 0.5–1.3)
DACRYOCYTES BLD QL SMEAR: SLIGHT — SIGNIFICANT CHANGE UP
EGFR: 104 ML/MIN/1.73M2 — SIGNIFICANT CHANGE UP
EGFR: 104 ML/MIN/1.73M2 — SIGNIFICANT CHANGE UP
ELLIPTOCYTES BLD QL SMEAR: SLIGHT — SIGNIFICANT CHANGE UP
EOSINOPHIL # BLD AUTO: 0 K/UL — SIGNIFICANT CHANGE UP (ref 0–0.5)
EOSINOPHIL # BLD MANUAL: 0 K/UL — SIGNIFICANT CHANGE UP (ref 0–0.5)
EOSINOPHIL NFR BLD AUTO: 0 % — SIGNIFICANT CHANGE UP (ref 0–6)
EOSINOPHIL NFR BLD MANUAL: 0 % — SIGNIFICANT CHANGE UP (ref 0–6)
GLUCOSE SERPL-MCNC: 87 MG/DL — SIGNIFICANT CHANGE UP (ref 70–99)
HADV DNA FLD NAA+PROBE-LOG#: SIGNIFICANT CHANGE UP COPIES/ML
HCT VFR BLD CALC: 22.6 % — LOW (ref 34.5–45)
HGB BLD-MCNC: 7.1 G/DL — LOW (ref 11.5–15.5)
IMM GRANULOCYTES # BLD AUTO: 0.08 K/UL — HIGH (ref 0–0.07)
IMM GRANULOCYTES NFR BLD AUTO: 6.3 % — HIGH (ref 0–0.9)
IMMATURE PLATELET FRACTION #: 2.3 K/UL — LOW (ref 4.7–11.1)
IMMATURE PLATELET FRACTION %: 12.8 % — HIGH (ref 1.6–4.9)
LDH SERPL L TO P-CCNC: 181 U/L — SIGNIFICANT CHANGE UP (ref 50–242)
LYMPHOCYTES # BLD AUTO: 0.02 K/UL — LOW (ref 1–3.3)
LYMPHOCYTES # BLD MANUAL: 0.04 K/UL — LOW (ref 1–3.3)
LYMPHOCYTES NFR BLD AUTO: 1.6 % — LOW (ref 13–44)
LYMPHOCYTES NFR BLD MANUAL: 2.9 % — LOW (ref 13–44)
MAGNESIUM SERPL-MCNC: 1.7 MG/DL — SIGNIFICANT CHANGE UP (ref 1.6–2.6)
MANUAL METAMYELOCYTE #: 0.05 K/UL — HIGH (ref 0–0)
MANUAL NEUTROPHIL BANDS #: 0.07 K/UL — SIGNIFICANT CHANGE UP (ref 0–0.84)
MANUAL NRBC #: 0.04 K/UL — HIGH (ref 0–0)
MANUAL PROMYELOCYTE #: 0.02 K/UL — HIGH (ref 0–0)
MCHC RBC-ENTMCNC: 27.6 PG — SIGNIFICANT CHANGE UP (ref 27–34)
MCHC RBC-ENTMCNC: 31.4 G/DL — LOW (ref 32–36)
MCV RBC AUTO: 87.9 FL — SIGNIFICANT CHANGE UP (ref 80–100)
METAMYELOCYTES # FLD: 4.3 % — HIGH (ref 0–0)
METAMYELOCYTES NFR BLD: 4.3 % — HIGH (ref 0–0)
MONOCYTES # BLD AUTO: 0.13 K/UL — SIGNIFICANT CHANGE UP (ref 0–0.9)
MONOCYTES # BLD MANUAL: 0.07 K/UL — SIGNIFICANT CHANGE UP (ref 0–0.9)
MONOCYTES NFR BLD AUTO: 10.3 % — SIGNIFICANT CHANGE UP (ref 2–14)
MONOCYTES NFR BLD MANUAL: 5.8 % — SIGNIFICANT CHANGE UP (ref 2–14)
NEUTROPHILS # BLD AUTO: 1.02 K/UL — LOW (ref 1.8–7.4)
NEUTROPHILS # BLD MANUAL: 1.01 K/UL — LOW (ref 1.8–7.4)
NEUTROPHILS NFR BLD AUTO: 81 % — HIGH (ref 43–77)
NEUTROPHILS NFR BLD MANUAL: 79.8 % — HIGH (ref 43–77)
NEUTS BAND # BLD: 5.8 % — SIGNIFICANT CHANGE UP (ref 0–8)
NEUTS BAND NFR BLD: 5.8 % — SIGNIFICANT CHANGE UP (ref 0–8)
NRBC # BLD AUTO: 0 K/UL — SIGNIFICANT CHANGE UP (ref 0–0)
NRBC # BLD: 3 /100 WBCS — HIGH (ref 0–0)
NRBC # FLD: 0 K/UL — SIGNIFICANT CHANGE UP (ref 0–0)
NRBC BLD AUTO-RTO: 0 /100 WBCS — SIGNIFICANT CHANGE UP (ref 0–0)
NRBC BLD-RTO: 3 /100 WBCS — HIGH (ref 0–0)
PHOSPHATE SERPL-MCNC: 3 MG/DL — SIGNIFICANT CHANGE UP (ref 2.5–4.5)
PLAT MORPH BLD: NORMAL — SIGNIFICANT CHANGE UP
PLATELET # BLD AUTO: 18 K/UL — CRITICAL LOW (ref 150–400)
PMV BLD: SIGNIFICANT CHANGE UP FL (ref 7–13)
POIKILOCYTOSIS BLD QL AUTO: SLIGHT — SIGNIFICANT CHANGE UP
POTASSIUM SERPL-MCNC: 3.5 MMOL/L — SIGNIFICANT CHANGE UP (ref 3.5–5.3)
POTASSIUM SERPL-SCNC: 3.5 MMOL/L — SIGNIFICANT CHANGE UP (ref 3.5–5.3)
PROMYELOCYTES # FLD: 1.4 % — HIGH (ref 0–0)
PROMYELOCYTES NFR BLD: 1.4 % — HIGH (ref 0–0)
PROT SERPL-MCNC: 4.5 G/DL — LOW (ref 6–8.3)
RBC # BLD: 2.57 M/UL — LOW (ref 3.8–5.2)
RBC # FLD: 15.9 % — HIGH (ref 10.3–14.5)
RBC BLD AUTO: ABNORMAL
SCHISTOCYTES BLD QL AUTO: SLIGHT — SIGNIFICANT CHANGE UP
SODIUM SERPL-SCNC: 143 MMOL/L — SIGNIFICANT CHANGE UP (ref 135–145)
TOXIC GRANULES BLD QL SMEAR: PRESENT
WBC # BLD: 1.26 K/UL — LOW (ref 3.8–10.5)
WBC # FLD AUTO: 1.26 K/UL — LOW (ref 3.8–10.5)

## 2025-07-12 PROCEDURE — 99233 SBSQ HOSP IP/OBS HIGH 50: CPT

## 2025-07-12 RX ORDER — LETERMOVIR 480 MG/1
1 TABLET, FILM COATED ORAL
Qty: 0 | Refills: 0 | DISCHARGE
Start: 2025-07-12

## 2025-07-12 RX ORDER — POSACONAZOLE 100 MG/1
3 TABLET, DELAYED RELEASE ORAL
Qty: 0 | Refills: 0 | DISCHARGE
Start: 2025-07-12

## 2025-07-12 RX ORDER — HYDROMORPHONE/SOD CHLOR,ISO/PF 2 MG/10 ML
2 SYRINGE (ML) INJECTION ONCE
Refills: 0 | Status: DISCONTINUED | OUTPATIENT
Start: 2025-07-12 | End: 2025-07-12

## 2025-07-12 RX ORDER — HYDROCHLOROTHIAZIDE 50 MG/1
1 TABLET ORAL
Qty: 0 | Refills: 0 | DISCHARGE
Start: 2025-07-12

## 2025-07-12 RX ORDER — ACETAMINOPHEN 500 MG/5ML
1000 LIQUID (ML) ORAL ONCE
Refills: 0 | Status: COMPLETED | OUTPATIENT
Start: 2025-07-12 | End: 2025-07-12

## 2025-07-12 RX ORDER — ONDANSETRON HCL/PF 4 MG/2 ML
1 VIAL (ML) INJECTION
Qty: 0 | Refills: 0 | DISCHARGE

## 2025-07-12 RX ORDER — URSODIOL 300 MG/1
1 CAPSULE ORAL
Qty: 0 | Refills: 0 | DISCHARGE

## 2025-07-12 RX ORDER — LEVOTHYROXINE SODIUM 300 MCG
1 TABLET ORAL
Qty: 0 | Refills: 0 | DISCHARGE
Start: 2025-07-12

## 2025-07-12 RX ORDER — METOPROLOL SUCCINATE 50 MG/1
1 TABLET, EXTENDED RELEASE ORAL
Qty: 0 | Refills: 0 | DISCHARGE
Start: 2025-07-12

## 2025-07-12 RX ORDER — TOUCHLESS CARE ZINC OXIDE PROTECTANT 20; 25 G/100G; G/100G
1 SPRAY TOPICAL THREE TIMES A DAY
Refills: 0 | Status: DISCONTINUED | OUTPATIENT
Start: 2025-07-12 | End: 2025-07-15

## 2025-07-12 RX ADMIN — Medication 125 MILLIGRAM(S): at 05:15

## 2025-07-12 RX ADMIN — Medication 10 MILLILITER(S): at 15:47

## 2025-07-12 RX ADMIN — Medication 40 MILLIGRAM(S): at 05:05

## 2025-07-12 RX ADMIN — Medication 15 MILLILITER(S): at 17:02

## 2025-07-12 RX ADMIN — Medication 0.5 MILLIGRAM(S): at 20:27

## 2025-07-12 RX ADMIN — Medication 0.5 MILLIGRAM(S): at 21:27

## 2025-07-12 RX ADMIN — LISINOPRIL 20 MILLIGRAM(S): 5 TABLET ORAL at 05:19

## 2025-07-12 RX ADMIN — Medication 15 MILLILITER(S): at 05:20

## 2025-07-12 RX ADMIN — Medication 50 MILLIEQUIVALENT(S): at 09:06

## 2025-07-12 RX ADMIN — Medication 1 PATCH: at 19:37

## 2025-07-12 RX ADMIN — Medication 10 MILLIGRAM(S): at 05:07

## 2025-07-12 RX ADMIN — Medication 40 MILLIGRAM(S): at 17:02

## 2025-07-12 RX ADMIN — Medication 5 MILLILITER(S): at 08:02

## 2025-07-12 RX ADMIN — Medication 125 MILLIGRAM(S): at 17:02

## 2025-07-12 RX ADMIN — Medication 800 MILLIGRAM(S): at 17:02

## 2025-07-12 RX ADMIN — Medication 2 MILLIGRAM(S): at 16:41

## 2025-07-12 RX ADMIN — METOPROLOL SUCCINATE 25 MILLIGRAM(S): 50 TABLET, EXTENDED RELEASE ORAL at 05:14

## 2025-07-12 RX ADMIN — Medication 0.5 MILLIGRAM(S): at 12:05

## 2025-07-12 RX ADMIN — Medication 0.5 MILLIGRAM(S): at 05:25

## 2025-07-12 RX ADMIN — Medication 10 MILLILITER(S): at 19:37

## 2025-07-12 RX ADMIN — Medication 0.5 MILLIGRAM(S): at 04:25

## 2025-07-12 RX ADMIN — Medication 5 MILLILITER(S): at 15:47

## 2025-07-12 RX ADMIN — Medication 5 MILLILITER(S): at 23:30

## 2025-07-12 RX ADMIN — Medication 10 MILLIGRAM(S): at 11:45

## 2025-07-12 RX ADMIN — Medication 1 PATCH: at 07:55

## 2025-07-12 RX ADMIN — TOUCHLESS CARE ZINC OXIDE PROTECTANT 1 APPLICATION(S): 20; 25 SPRAY TOPICAL at 17:01

## 2025-07-12 RX ADMIN — Medication 10 MILLILITER(S): at 12:26

## 2025-07-12 RX ADMIN — Medication 1 DROP(S): at 23:30

## 2025-07-12 RX ADMIN — Medication 1000 MILLIGRAM(S): at 03:31

## 2025-07-12 RX ADMIN — Medication 0.5 MILLIGRAM(S): at 11:05

## 2025-07-12 RX ADMIN — Medication 10 MILLILITER(S): at 08:03

## 2025-07-12 RX ADMIN — OLANZAPINE 2.5 MILLIGRAM(S): 10 TABLET ORAL at 22:34

## 2025-07-12 RX ADMIN — Medication 800 MILLIGRAM(S): at 05:02

## 2025-07-12 RX ADMIN — POSACONAZOLE 300 MILLIGRAM(S): 100 TABLET, DELAYED RELEASE ORAL at 11:45

## 2025-07-12 RX ADMIN — Medication 1 APPLICATION(S): at 08:02

## 2025-07-12 RX ADMIN — LISINOPRIL 20 MILLIGRAM(S): 5 TABLET ORAL at 17:02

## 2025-07-12 RX ADMIN — Medication 1 DROP(S): at 05:14

## 2025-07-12 RX ADMIN — Medication 1 DROP(S): at 11:45

## 2025-07-12 RX ADMIN — Medication 10 MILLIGRAM(S): at 17:03

## 2025-07-12 RX ADMIN — Medication 88 MICROGRAM(S): at 05:14

## 2025-07-12 RX ADMIN — Medication 5 MILLILITER(S): at 19:37

## 2025-07-12 RX ADMIN — TOUCHLESS CARE ZINC OXIDE PROTECTANT 1 APPLICATION(S): 20; 25 SPRAY TOPICAL at 22:35

## 2025-07-12 RX ADMIN — BUPROPION HYDROBROMIDE 300 MILLIGRAM(S): 522 TABLET, EXTENDED RELEASE ORAL at 11:45

## 2025-07-12 RX ADMIN — URSODIOL 300 MILLIGRAM(S): 300 CAPSULE ORAL at 17:38

## 2025-07-12 RX ADMIN — FILGRASTIM 300 MICROGRAM(S): 300 INJECTION, SOLUTION INTRAVENOUS; SUBCUTANEOUS at 11:48

## 2025-07-12 RX ADMIN — Medication 50 MILLIEQUIVALENT(S): at 11:05

## 2025-07-12 RX ADMIN — Medication 400 MILLIGRAM(S): at 02:34

## 2025-07-12 RX ADMIN — LETERMOVIR 480 MILLIGRAM(S): 480 TABLET, FILM COATED ORAL at 11:45

## 2025-07-12 RX ADMIN — URSODIOL 300 MILLIGRAM(S): 300 CAPSULE ORAL at 05:21

## 2025-07-12 RX ADMIN — Medication 5 MILLILITER(S): at 12:26

## 2025-07-12 RX ADMIN — Medication 10 MILLILITER(S): at 23:32

## 2025-07-12 RX ADMIN — Medication 2 MILLIGRAM(S): at 15:46

## 2025-07-12 NOTE — DISCHARGE NOTE PROVIDER - NSDCACTIVITY_GEN_ALL_CORE
Bathing allowed/Showering allowed/Stairs allowed/Walking - Indoors allowed/Walking - Outdoors allowed/Activity as tolerated

## 2025-07-12 NOTE — DISCHARGE NOTE PROVIDER - NSDCFUADDAPPT_GEN_ALL_CORE_FT
You have a follow up at the [facility] on:  -  The [facility] will call you with appointment times

## 2025-07-12 NOTE — PROGRESS NOTE ADULT - SUBJECTIVE AND OBJECTIVE BOX
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 / Bu / Thiotepa / rATG prep regimen for treatment of myelofibrosis    Disease: myelofibrosis   Prep regimen: Flu / Bu / Thiotepa / rATG   GVHD ppx: ABC: PTCy days +3, +4, Bortezomib days 0, +3, PTCy days +5, +14, +28   ABO/CMV:   Recipient:   A+/CMV+   Donor:   B+/CMV+    S: Patient seen and examined with HPC Transplant Team:       O:   Vital Signs Last 24 Hrs  T(C): 36.2 (12 Jul 2025 04:45), Max: 37.2 (11 Jul 2025 11:58)  T(F): 97.1 (12 Jul 2025 04:45), Max: 99 (11 Jul 2025 11:58)  HR: 72 (12 Jul 2025 04:45) (72 - 85)  BP: 116/57 (12 Jul 2025 04:45) (116/57 - 143/63)  BP(mean): --  RR: 18 (12 Jul 2025 04:45) (18 - 18)  SpO2: 96% (12 Jul 2025 04:45) (94% - 97%)    Parameters below as of 12 Jul 2025 04:45  Patient On (Oxygen Delivery Method): room air    Admit weight: 65.5kg  Daily     Intake / Output:   07-11 @ 07:01  -  07-12 @ 07:00  --------------------------------------------------------  IN: 1405.2 mL / OUT: 650 mL / NET: 755.2 mL    PE:   Oropharynx: + erythema + ulcerations posterior pharynx   Oral Mucositis: +                                                  rdGrdrrdarddrderd:rd3rd CVS: S1, S2 RRR   Lungs: CTA throughout bilaterally   Abdomen: + BS x 4, soft, NT, ND   Extremities: trace edema RUE, 5/5 strenght BUE's  Gastric Mucositis:                 -                              Grade: n/a   Intestinal Mucositis:              -                              Grade: n/a   Skin: no rash, ecchymotic patches BLE's   TLC: CDI   Neuro: A&O x 3        Labs:       CBC - p    07-12    143  |  109[H]  |  10  ----------------------------<  87  3.5   |  22  |  0.52    Ca    8.2[L]      12 Jul 2025 06:41  Phos  3.0     07-12  Mg     1.7     07-12    TPro  4.5[L]  /  Alb  2.6[L]  /  TBili  0.6  /  DBili  x   /  AST  7[L]  /  ALT  7[L]  /  AlkPhos  84  07-12      LIVER FUNCTIONS - ( 12 Jul 2025 06:41 )  Alb: 2.6 g/dL / Pro: 4.5 g/dL / ALK PHOS: 84 U/L / ALT: 7 U/L / AST: 7 U/L / GGT: x           Lactate Dehydrogenase, Serum: 181 U/L (07-12 @ 06:41)        Cultures:   Culture Results: <10,000 CFU/mL Normal Urogenital Cha (06.28.25 @ 06:31)    Culture Results: No growth at 5 days(06.27.25 @ 16:36)    Culture Results: No growth at  5 days (06.27.25 @ 16:30)    Respiratory Viral Panel with COVID-19 by SCARLET (06.20.25 @ 21:02)   Rapid RVP Result: [id number]: NotDetec:    Culture - Blood (06.20.25 @ 18:45)   Specimen Source: Blood Blood-Catheter    Culture Results: No growth at Final  Culture - Blood (06.20.25 @ 18:40)   Specimen Source: Blood Blood-Peripheral  Culture Results: No growth     Culture - Urine (06.20.25 @ 21:01)   Specimen Source: Clean Catch Clean Catch (Midstream)  Culture Results: 10,000 - 49,000 CFU/mL Gram Positive Cocci in Pairs and Chains   <10,000 CFU/ml Normal Urogenital cha present    Radiology:   ACC: [id number] EXAM:  XR CHEST PORTABLE URGENT 1V   PROCEDURE DATE:  06/27/2025    IMPRESSION: No focal consolidation.      Meds:   Antimicrobials:   acyclovir    Suspension 800 milliGRAM(s) Oral every 12 hours  letermovir 480 milliGRAM(s) Oral daily  levoFLOXacin IVPB 500 milliGRAM(s) IV Intermittent every 24 hours  posaconazole DR Tablet 300 milliGRAM(s) Oral daily  vancomycin    Solution 125 milliGRAM(s) Oral every 12 hours      Heme / Onc:       GI:  loperamide 2 milliGRAM(s) Oral every 6 hours PRN  pantoprazole  Injectable 40 milliGRAM(s) IV Push two times a day  polyethylene glycol 3350 17 Gram(s) Oral daily PRN  sodium bicarbonate Mouth Rinse 10 milliLiter(s) Swish and Spit five times a day  ursodiol Suspension 300 milliGRAM(s) Oral two times a day      Cardiovascular:   hydrochlorothiazide 25 milliGRAM(s) Oral two times a day  lisinopril 20 milliGRAM(s) Oral <User Schedule>  lisinopril 20 milliGRAM(s) Oral <User Schedule>  metoprolol succinate ER 25 milliGRAM(s) Oral daily      Immunologic:   filgrastim-sndz (ZARXIO) Injectable 300 MICROGram(s) SubCutaneous every 24 hours      Other medications:   artificial tears (preservative free) Ophthalmic Solution 1 Drop(s) Both EYES four times a day  Biotene Dry Mouth Oral Rinse 5 milliLiter(s) Swish and Spit five times a day  buPROPion XL (24-Hour) . 300 milliGRAM(s) Oral daily  chlorhexidine 0.12% Liquid 15 milliLiter(s) Swish and Spit two times a day  chlorhexidine 4% Liquid 1 Application(s) Topical <User Schedule>  fentaNYL   Patch  12 MICROgram(s)/Hr. 1 Patch Transdermal every 48 hours  levothyroxine 88 MICROGram(s) Oral daily  OLANZapine 2.5 milliGRAM(s) Oral at bedtime  phytonadione   Solution 5 milliGRAM(s) Oral <User Schedule>  prochlorperazine   Injectable 10 milliGRAM(s) IV Push every 6 hours      PRN:   acetaminophen     Tablet .. 650 milliGRAM(s) Oral every 6 hours PRN  FIRST- Mouthwash  BLM 15 milliLiter(s) Swish and Swallow five times a day PRN  HYDROmorphone  Injectable 0.5 milliGRAM(s) IV Push every 6 hours PRN  lidocaine 2% Viscous 15 milliLiter(s) Oral five times a day PRN  loperamide 2 milliGRAM(s) Oral every 6 hours PRN  ondansetron Injectable 8 milliGRAM(s) IV Push every 8 hours PRN  polyethylene glycol 3350 17 Gram(s) Oral daily PRN  sodium chloride 0.9% lock flush 10 milliLiter(s) IV Push every 1 hour PRN        A/P: 64 year old female with hx of myelofibrosis presents for a MUD allogeneic SCT (DP Permissive) conditioning with Flu/Bu/Thiotepa/rATG.  Today is day +15  6/21- Thiotepa 1/2. Maintain skin precautions during thiotepa administration and for 48 hours after completion (days .6 through day -3). Chronic pain issues - continue home fentanyl patch. Febrile overnight, tmax 100.9. Cultures  pending. RVP negative. CXR pending official read, no obvious consolidations. Vital signs are stable.   6/23 D/C HCTZ. Fludarabine 1/3, Busulfan 1/3, rATG 1/3. No Tylenol on busulfan days. Continue keppra ppx for 24 hours post infusion of last dose of busulfan. No acute events overnight, vital signs are stable.   6/24- overnight patient was febrile during ATG, no cultures/abx as fever likely secondary to ATG. Patient was otherwise stable overnight. Fludarabine 2/3, Busulfan 2/3 and rATG 2/3. NO Tylenol or Azoles until Day 0. Continue with supportive care.  6/25- Fludarabine 3/3, busulfan 3/3, rATG 3/3. No acute events overnight. Vital signs are stable.   6/26 Switched Compazine to atc  6/27 VSS, afebrile. 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  6/28 VSS, persistent fevers likely due to CRS post-SCT, continues on zosyn. Cultures pending. Hypervolemia - lasix 40mg x 1, will continue strict I/Os. Started caspofungin due to persistent thrush.  6/29 overnight no acute events noted. VSS, febrile likely due to CRS - continue with supportive care. BCx/UCx NGTD x 24h.  6/30 VSS, intermittently febrile likely due to CRS, infectious workup negative so far. PTCy 1/2 today. I&O, daily weights, prn diuresis. Continue to monitor thrush (improving). Changed some meds to PO suspension due to mucositis. Continue with supportive care. Refractory nausea, added trial of zyprexa 5mg po QHS x 3 days. d/c primafit. Change voriconazole to posaconazole tomorrow.   7/1- No acute events overnight, vital signs are stable. Continue supportive care for chemotherapy induced oral mucositis.   7/2- No acute events overnight, vital signs are stable. Completed PTCy, hydration to be discontinued today. I&O, daily weights, prn diuresis. Continue supportive care. Fentanyl patch to be titrated down to 12 mcg / hr tomorrow  7/3 Starting Scopolamine patch to decrease oral secretions. Increased Lisinopril 20mg in am, 10mg PM.    7/4-No acute events; awaiting count recovery   7/5- no acute events overnight. Vital signs are stable. SBP remains elevated, lisinopril / metoprolol doses increased.   7/6: BMT team continues to educate the patient about the importance of medication taking. awaiting count recovery  7/7 Increased ppi q12h (+stool guaiac)  7/8 D/C'd Scopolamine patch  7/9 VSS awaiting count recovery  7/10 VSS WNC at 0.15 today; continue Zarxio. BMT team continues to encourage PO intake getting OOB and medication compliance. NA at 150: restarted DW5(2L free water deficit)    7/11- no acute events overnight. Vital signs are stable. d/c D5W, continue to encourage po intake. Early engraftment, check CMV PCR, VNTR. Transition meds back to po.     1. Infectious Disease:   acyclovir Suspension 800 milliGRAM(s) Oral every 12 hours  letermovir 480 milliGRAM(s) Oral daily  levoFLOXacin IVPB 500 milliGRAM(s) IV Intermittent every 24 hours  posaconazole DR Tablet 300 milliGRAM(s) Oral daily  vancomycin  Solution 125 milliGRAM(s) Oral every 12 hours    2. VOD Prophylaxis:   ursodiol Suspension 300 milliGRAM(s) Oral two times a day    3. GI Prophylaxis:    pantoprazole  Injectable 40 milliGRAM(s) IV q12h    4. Mouthcare - NS / NaHCO3 rinses, Peridex, Biotene; Skin care     5. GVHD prophylaxis   PTCy days +3, +4  Bortezomib days 0, + 3  Abatacept days +5, +14, +28     6. Transfuse & replete electrolytes prn    7. IV hydration, daily weights, strict I&O, prn diuresis     8. PO intake as tolerated, nutrition follow up as needed    9. Antiemetics, anti-diarrhea medications:   loperamide 2 milliGRAM(s) Oral every 6 hours PRN  ondansetron Injectable 8 milliGRAM(s) IV Push every 8 hours PRN  prochlorperazine Injectable 10 milliGRAM(s) IV Push every 6 hours    10. OOB as tolerated, physical therapy consult if needed     11. Monitor coags  2x week, vitamin K BIW   phytonadione Solution 5 milliGRAM(s) Oral <User Schedule>    12. Monitor closely for clinical changes, monitor for fevers     13. Emotional support provided, plan of care discussed and questions addressed     14. Patient education done regarding plan of care, restrictions and discharge planning     15. Continue regular social work input     I have written the above note for Dr. Gandhi  who performed service with me in the room.   Jalen Kay PA-C ([phone])    I have seen and examined patient with PA, I agree with above note as scribed.                    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 / Bu / Thiotepa / rATG prep regimen for treatment of myelofibrosis    Disease: myelofibrosis   Prep regimen: Flu / Bu / Thiotepa / rATG   GVHD ppx: ABC: PTCy days +3, +4, Bortezomib days 0, +3, PTCy days +5, +14, +28   ABO/CMV:   Recipient:   A+/CMV+   Donor:   B+/CMV+    S: Patient seen and examined with HPC Transplant Team:   +throat dryness pain  +rear end irritation    Vital Signs Last 24 Hrs  T(C): 36.2 (12 Jul 2025 04:45), Max: 37.2 (11 Jul 2025 11:58)  T(F): 97.1 (12 Jul 2025 04:45), Max: 99 (11 Jul 2025 11:58)  HR: 72 (12 Jul 2025 04:45) (72 - 85)  BP: 116/57 (12 Jul 2025 04:45) (116/57 - 143/63)  BP(mean): --  RR: 18 (12 Jul 2025 04:45) (18 - 18)  SpO2: 96% (12 Jul 2025 04:45) (94% - 97%)    Parameters below as of 12 Jul 2025 04:45  Patient On (Oxygen Delivery Method): room air    Admit weight: 65.5kg  Daily 65kg    Intake / Output:   07-11 @ 07:01  -  07-12 @ 07:00  --------------------------------------------------------  IN: 1405.2 mL / OUT: 650 mL / NET: 755.2 mL    PE:   Oropharynx: + erythema + ulcerations posterior pharynx   Oral Mucositis: +                                                  stGstrstastdstest:st1st CVS: S1, S2 RRR   Lungs: CTA throughout bilaterally   Abdomen: + BS x 4, soft, NT, ND   Extremities: trace edema RUE, 5/5 strenght BUE's  Gastric Mucositis:                 -                              Grade: n/a   Intestinal Mucositis:              -                              Grade: n/a   Skin: no rash, ecchymotic patches BLE's   TLC: CDI   Neuro: A&O x 3        Labs:                         7.1    1.26  )-----------( 18       ( 12 Jul 2025 06:41 )             22.6     12 Jul 2025 06:41    143    |  109    |  10     ----------------------------<  87     3.5     |  22     |  0.52     Ca    8.2        12 Jul 2025 06:41  Phos  3.0       12 Jul 2025 06:41  Mg     1.7       12 Jul 2025 06:41    TPro  4.5    /  Alb  2.6    /  TBili  0.6    /  DBili  x      /  AST  7      /  ALT  7      /  AlkPhos  84     12 Jul 2025 06:41      LIVER FUNCTIONS - ( 12 Jul 2025 06:41 )  Alb: 2.6 g/dL / Pro: 4.5 g/dL / ALK PHOS: 84 U/L / ALT: 7 U/L / AST: 7 U/L / GGT: x           Urinalysis Basic - ( 12 Jul 2025 06:41 )    Color: x / Appearance: x / SG: x / pH: x  Gluc: 87 mg/dL / Ketone: x  / Bili: x / Urobili: x   Blood: x / Protein: x / Nitrite: x   Leuk Esterase: x / RBC: x / WBC x   Sq Epi: x / Non Sq Epi: x / Bacteria: x      Cultures:   Culture Results: <10,000 CFU/mL Normal Urogenital Hca (06.28.25 @ 06:31)    Culture Results: No growth at 5 days(06.27.25 @ 16:36)    Culture Results: No growth at  5 days (06.27.25 @ 16:30)    Respiratory Viral Panel with COVID-19 by [facility] (06.20.25 @ 21:02)   Rapid RVP Result: [id number]: NotDetec:    Culture - Blood (06.20.25 @ 18:45)   Specimen Source: Blood Blood-Catheter    Culture Results: No growth at Final  Culture - Blood (06.20.25 @ 18:40)   Specimen Source: Blood Blood-Peripheral  Culture Results: No growth     Culture - Urine (06.20.25 @ 21:01)   Specimen Source: Clean Catch Clean Catch (Midstream)  Culture Results: 10,000 - 49,000 CFU/mL Gram Positive Cocci in Pairs and Chains   <10,000 CFU/ml Normal Urogenital cha present    Radiology:   ACC: [id number] EXAM:  XR CHEST PORTABLE URGENT 1V   PROCEDURE DATE:  06/27/2025    IMPRESSION: No focal consolidation.      Meds:   Antimicrobials:   acyclovir    Suspension 800 milliGRAM(s) Oral every 12 hours  letermovir 480 milliGRAM(s) Oral daily  levoFLOXacin IVPB 500 milliGRAM(s) IV Intermittent every 24 hours  posaconazole DR Tablet 300 milliGRAM(s) Oral daily  vancomycin    Solution 125 milliGRAM(s) Oral every 12 hours      Heme / Onc:       GI:  loperamide 2 milliGRAM(s) Oral every 6 hours PRN  pantoprazole  Injectable 40 milliGRAM(s) IV Push two times a day  polyethylene glycol 3350 17 Gram(s) Oral daily PRN  sodium bicarbonate Mouth Rinse 10 milliLiter(s) Swish and Spit five times a day  ursodiol Suspension 300 milliGRAM(s) Oral two times a day      Cardiovascular:   hydrochlorothiazide 25 milliGRAM(s) Oral two times a day  lisinopril 20 milliGRAM(s) Oral <User Schedule>  lisinopril 20 milliGRAM(s) Oral <User Schedule>  metoprolol succinate ER 25 milliGRAM(s) Oral daily      Immunologic:   filgrastim-sndz (ZARXIO) Injectable 300 MICROGram(s) SubCutaneous every 24 hours      Other medications:   artificial tears (preservative free) Ophthalmic Solution 1 Drop(s) Both EYES four times a day  Biotene Dry Mouth Oral Rinse 5 milliLiter(s) Swish and Spit five times a day  buPROPion XL (24-Hour) . 300 milliGRAM(s) Oral daily  chlorhexidine 0.12% Liquid 15 milliLiter(s) Swish and Spit two times a day  chlorhexidine 4% Liquid 1 Application(s) Topical <User Schedule>  fentaNYL   Patch  12 MICROgram(s)/Hr. 1 Patch Transdermal every 48 hours  levothyroxine 88 MICROGram(s) Oral daily  OLANZapine 2.5 milliGRAM(s) Oral at bedtime  phytonadione   Solution 5 milliGRAM(s) Oral <User Schedule>  prochlorperazine   Injectable 10 milliGRAM(s) IV Push every 6 hours      PRN:   acetaminophen     Tablet .. 650 milliGRAM(s) Oral every 6 hours PRN  FIRST- Mouthwash  BLM 15 milliLiter(s) Swish and Swallow five times a day PRN  HYDROmorphone  Injectable 0.5 milliGRAM(s) IV Push every 6 hours PRN  lidocaine 2% Viscous 15 milliLiter(s) Oral five times a day PRN  loperamide 2 milliGRAM(s) Oral every 6 hours PRN  ondansetron Injectable 8 milliGRAM(s) IV Push every 8 hours PRN  polyethylene glycol 3350 17 Gram(s) Oral daily PRN  sodium chloride 0.9% lock flush 10 milliLiter(s) IV Push every 1 hour PRN        A/P: 64 year old female with hx of myelofibrosis presents for a MUD allogeneic SCT (DP Permissive) conditioning with Flu/Bu/Thiotepa/rATG.  Today is day +15  6/21- Thiotepa 1/2. Maintain skin precautions during thiotepa administration and for 48 hours after completion (days .6 through day -3). Chronic pain issues - continue home fentanyl patch. Febrile overnight, tmax 100.9. Cultures  pending. RVP negative. CXR pending official read, no obvious consolidations. Vital signs are stable.   6/23 D/C HCTZ. Fludarabine 1/3, Busulfan 1/3, rATG 1/3. No Tylenol on busulfan days. Continue keppra ppx for 24 hours post infusion of last dose of busulfan. No acute events overnight, vital signs are stable.   6/24- overnight patient was febrile during ATG, no cultures/abx as fever likely secondary to ATG. Patient was otherwise stable overnight. Fludarabine 2/3, Busulfan 2/3 and rATG 2/3. NO Tylenol or Azoles until Day 0. Continue with supportive care.  6/25- Fludarabine 3/3, busulfan 3/3, rATG 3/3. No acute events overnight. Vital signs are stable.   6/26 Switched Compazine to atc  6/27 VSS, afebrile. 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  6/28 VSS, persistent fevers likely due to CRS post-SCT, continues on zosyn. Cultures pending. Hypervolemia - lasix 40mg x 1, will continue strict I/Os. Started caspofungin due to persistent thrush.  6/29 overnight no acute events noted. VSS, febrile likely due to CRS - continue with supportive care. BCx/UCx NGTD x 24h.  6/30 VSS, intermittently febrile likely due to CRS, infectious workup negative so far. PTCy 1/2 today. I&O, daily weights, prn diuresis. Continue to monitor thrush (improving). Changed some meds to PO suspension due to mucositis. Continue with supportive care. Refractory nausea, added trial of zyprexa 5mg po QHS x 3 days. d/c primafit. Change voriconazole to posaconazole tomorrow.   7/1- No acute events overnight, vital signs are stable. Continue supportive care for chemotherapy induced oral mucositis.   7/2- No acute events overnight, vital signs are stable. Completed PTCy, hydration to be discontinued today. I&O, daily weights, prn diuresis. Continue supportive care. Fentanyl patch to be titrated down to 12 mcg / hr tomorrow  7/3 Starting Scopolamine patch to decrease oral secretions. Increased Lisinopril 20mg in am, 10mg PM.    7/4-No acute events; awaiting count recovery   7/5- no acute events overnight. Vital signs are stable. SBP remains elevated, lisinopril / metoprolol doses increased.   7/6: BMT team continues to educate the patient about the importance of medication taking. awaiting count recovery  7/7 Increased ppi q12h (+stool guaiac)  7/8 D/C'd Scopolamine patch  7/9 VSS awaiting count recovery  7/10 VSS WBC at 0.15 today; continue Zarxio. BMT team continues to encourage PO intake getting OOB and medication compliance. NA at 150: restarted DW5(2L free water deficit)    7/11- no acute events overnight. Vital signs are stable. d/c D5W, continue to encourage po intake. Early engraftment, check CMV PCR, VNTR. Transition meds back to po.     1. Infectious Disease:   acyclovir Suspension 800 milliGRAM(s) Oral every 12 hours  letermovir 480 milliGRAM(s) Oral daily  levoFLOXacin IVPB 500 milliGRAM(s) IV Intermittent every 24 hours  posaconazole DR Tablet 300 milliGRAM(s) Oral daily  vancomycin  Solution 125 milliGRAM(s) Oral every 12 hours    2. VOD Prophylaxis:   ursodiol Suspension 300 milliGRAM(s) Oral two times a day    3. GI Prophylaxis:    pantoprazole  Injectable 40 milliGRAM(s) IV q12h    4. Mouthcare - NS / NaHCO3 rinses, Peridex, Biotene; Skin care     5. GVHD prophylaxis   PTCy days +3, +4  Bortezomib days 0, + 3  Abatacept days +5, +14, +28     6. Transfuse & replete electrolytes prn  Replete K+ (IV)    7. IV hydration, daily weights, strict I&O, prn diuresis     8. PO intake as tolerated, nutrition follow up as needed    9. Antiemetics, anti-diarrhea medications:   loperamide 2 milliGRAM(s) Oral every 6 hours PRN  ondansetron Injectable 8 milliGRAM(s) IV Push every 8 hours PRN  prochlorperazine Injectable 10 milliGRAM(s) IV Push every 6 hours    10. OOB as tolerated, physical therapy consult if needed     11. Monitor coags  2x week, vitamin K BIW   phytonadione Solution 5 milliGRAM(s) Oral <User Schedule>    12. Monitor closely for clinical changes, monitor for fevers     13. Emotional support provided, plan of care discussed and questions addressed     14. Patient education done regarding plan of care, restrictions and discharge planning     15. Continue regular social work input     I have written the above note for Dr. Gandhi  who performed service with me in the room.   Jalen Kay PA-C ([phone])    I have seen and examined patient with PA, I agree with above note as scribed.

## 2025-07-12 NOTE — DISCHARGE NOTE PROVIDER - REASON FOR ADMISSION
Allogeneic pbsct from MUD (12/12) with Flu / Bu / Thiotepa / rATG prep regimen for the treatment of myelofibrosis    64 year old female with hx of myelofibrosis presents for a MUD allogeneic SCT (DP Permissive) conditioning with Flu/Bu/Thiotepa/rATG.

## 2025-07-12 NOTE — PROGRESS NOTE ADULT - NS ATTEND AMEND GEN_ALL_CORE FT
Day + 14  s/p allo-HSCT ..flu/bu/TT/ratg...ABC on protocol    Overall, patient is fatigued, complains of mouth pain, leg "heaviness" due to fluid overload. More talkative today  She is not ambulating, staying in bed majority of the day. Limited PO intake. some difficulty swallowing, nausea..taking po meds  Course complicated by CRS; now resolved  Hypertensive - on three antihypertensive agents; possibly component of opioid withdrawal (fentanyl patch 50mg at admission, now at 12.5mg) - monitor closely and use of PRN as needed. Increased metoprolol and lisinopril pm 7/5/25..pt npt taking po bp meds..will give hydralazine prn with lasix and albumin prn..pt took bp meds today..stop norvasc..restart HCTZ  Physical exam is remarkable for mucositis; +1 b/l LE edema;   Labs reviewed; appropriate  Continue PRN diuresis - discussed that ambulation and OOB to chair is necessary.   Discussed close monitoring of In/Outs  Discussed limiting use of PRN opioids especially given that we are titrating down fentanyl patch. Extensively discussed with patient who is in agreement to rate her pain and request appropriate analgesics.   PT consulted; encouraged out of bed to chair as much as possible during the day  continue supportive care and antimicrobial ppx.  on zarxio..wbc recovering  added zyprexa for nausea. will decrease dose..cont prn antiemetics Day + 15  s/p allo-HSCT ..flu/bu/TT/ratg...ABC on protocol    Overall, patient is fatigued, complains of mouth pain, leg "heaviness" due to fluid overload. More cooperative  She is not ambulating, staying in bed majority of the day. Limited PO intake. some difficulty swallowing, nausea..taking po meds  Course complicated by CRS; now resolved  Hypertensive - on three antihypertensive agents; possibly component of opioid withdrawal (fentanyl patch 50mg at admission, now at 12.5mg) - monitor closely and use of PRN as needed. Increased metoprolol and lisinopril pm 7/5/25..pt npt taking po bp meds..will give hydralazine prn with lasix and albumin prn..pt took bp meds today..stop norvasc..restart HCTZ  Physical exam is remarkable for mucositis; +1 b/l LE edema;   Labs reviewed; appropriate  Continue PRN diuresis - discussed that ambulation and OOB to chair is necessary.   Discussed close monitoring of In/Outs  Discussed limiting use of PRN opioids especially given that we are titrating down fentanyl patch. Cont 12.5 mcg q 48hrs  PT consulted; encouraged out of bed to chair as much as possible during the day  continue supportive care and antimicrobial ppx.  on zarxio..wbc recovering  added zyprexa for nausea. will decrease dose..cont prn antiemetics

## 2025-07-12 NOTE — PROVIDER CONTACT NOTE (OTHER) - BACKGROUND
Myelofibrosis
Day +5 allo 12/12 MUD SCT, myelofibrosis. Prep Flu/Bu/Thiotepa/rATG.
Myelofibrosis
SP BMT
BMT day + 1. Myelofibrosis
s/p allo transplant
s/p allo transplant
BMT day + 1, Myelofibrosis
Day +10 allo transplant MUD, Flu/Bu/Thiotepa/ATG, ABC graft
Myelofibrosis, Day +8 s/p allo SCT
64yoF, MF, day -4 allo SCT
64yoF, MF, day -4 allo SCT  flu/bu/thiotepa/rATG prep
64yoF, MF, day -4 allo SCT  flu/bu/thiotepa/rATG prep
Day +6 alloSCT 12/12 MUD, myelofibrosis.
Myelofibrosis
Myelofibrosis.
Myelofibrosis
SP BMT
64yoF, MF, day -4 allo SCT  flu/bu/thiotepa/rATG prep
64yoF, MF, day -4 allo SCT  prep regimen: flu/bu/thiotepa/rATG
Myelofibrosis

## 2025-07-12 NOTE — PROVIDER CONTACT NOTE (OTHER) - NAME OF MD/NP/PA/DO NOTIFIED:
Bobo Barriga PA
Bobo Barriga PA
Edgard MAYER
Junior MAYER
MORENO Penaloza
BMT PA Jalen Kay
Bobo MAYER
Bobo MAYER
Cj Ezring
MORENO Corona
Yesenia MAYER
crissy rosa np
ron manrique
Edgard MAYER
Erin Gaona, PA
Hunter Dejesus
ron manrique
MORENO Dejesus
MORENO Gan
Bobo MAYER
MORENO Kay

## 2025-07-12 NOTE — DISCHARGE NOTE PROVIDER - NSDCFUSCHEDAPPT_GEN_ALL_CORE_FT
Valentino Vann  [facility]  [facility] [address]  Scheduled Appointment: 07/15/2025    [facility]  [facility] MOREL [facility]  Scheduled Appointment: 07/25/2025

## 2025-07-12 NOTE — DISCHARGE NOTE PROVIDER - NSDCCPCAREPLAN_GEN_ALL_CORE_FT
PRINCIPAL DISCHARGE DIAGNOSIS  Diagnosis: Stem cells transplant status  Assessment and Plan of Treatment: 1. Diet and activities as per [facility] discharge guidelines and safe food handling guidelines. NO RESTAURANT OR TAKE OUT FOOD AT THIS TIME, ONLY HOME COOKED PREPARED/FROZEN FOODS. You are allowed to have fresh baked pizza right out of the oven. This is the ONLY takeout food at this time.  2. Notify your physician if bleeding; swelling; persistent nausea and vomiting; unable to urinate; pain not relieved by medications; fever; numbness, tingling; excessive diarrhea, inability to tolerate liquids or foods; increased irritability or sluggishness, rash        SECONDARY DISCHARGE DIAGNOSES  Diagnosis: Need for prophylactic measure  Assessment and Plan of Treatment: 1. Continue your prophylactic medications as directed by your doctor and / or pharmacist   Acyclovir - antiviral prophylaxis   Posaconazole - antifungal porphylaxis   Bactrim - PCP prophylaxis     PRINCIPAL DISCHARGE DIAGNOSIS  Diagnosis: Stem cells transplant status  Assessment and Plan of Treatment: 1. Diet and activities as per [facility] discharge guidelines and safe food handling guidelines. NO RESTAURANT OR TAKE OUT FOOD AT THIS TIME, ONLY HOME COOKED PREPARED/FROZEN FOODS. You are allowed to have fresh baked pizza right out of the oven. This is the ONLY takeout food at this time.  2. Notify your physician if bleeding; swelling; persistent nausea and vomiting; unable to urinate; pain not relieved by medications; fever; numbness, tingling; excessive diarrhea, inability to tolerate liquids or foods; increased irritability or sluggishness, rash        SECONDARY DISCHARGE DIAGNOSES  Diagnosis: Need for prophylactic measure  Assessment and Plan of Treatment: 1. Continue your prophylactic medications as directed by your doctor and / or pharmacist   Acyclovir - antiviral prophylaxis   Posaconazole - antifungal porphylaxis   Mepron- PCP prophylaxis

## 2025-07-12 NOTE — DISCHARGE NOTE PROVIDER - DETAILS OF MALNUTRITION DIAGNOSIS/DIAGNOSES
This patient has been assessed with a concern for Malnutrition and was treated during this hospitalization for the following Nutrition diagnosis/diagnoses:     -  07/09/2025: Severe protein-calorie malnutrition

## 2025-07-12 NOTE — PROVIDER CONTACT NOTE (OTHER) - SITUATION
Pt febrile, 102.0 tympanic
BP: 191/76
Temp 103.8
Temp=101.5, pt rigoring, due for Tylenol 650mg PO in 1 hr
pt experiencing nausea despite compazine PRN and zofran premed
Pt febrile, 100.9 tympanic
Requested for sleeping pill.
rATG infusion initiated at 1440, at 1620, patient begins experiencing rigors and chills, reports discomfort
Pt febrile, 100.9 tympanic
Temp=100.9, pt rigoring, pt not due for Tylenol
pt c/o pain, 10/10 b/l leg
BP = 207/68, RN not notified by CNA in pt's room. Reports being asymptomatic, continuing baseline pain from mucositis. No new chest pain, changes in vision, symptoms. AM BP medications not yet given.
/52 as baseline before initiation of rATG infusion
Pt c/o 8/10 buttock pain
Pt had PAT on tele for 3.3 secs, [phone]=[phone]
Pt requesting to speak to provider regarding pain medication for mucositis as dilaudid was requested.
fever 101.4
50 ml clear emesis despite antinausea medications  vitals 176/67, 96%, 63hr, 100.2 F  at 1620, patient had rigors and chills, received 25mg demerol and rate decreased to 22 mL/hr
Pt hypertensive- /78, repeat 208/71
BP = 187/68
Patient complained of chest pain rated 5/10 after vomiting.
pt c/o 10/10 pain, received IV Tylenol at 1352
vitals 100 F, HR 80, /57, SpO2 97% with rATG at rate of 22 mL/hr  patient had rigors at 1620, elevated blood pressure at 1640, received demerol 25mg IV, infusion rate reduced to 22 mL/hr

## 2025-07-12 NOTE — PROVIDER CONTACT NOTE (OTHER) - REASON
50 ml clear emesis, vitals 176/67, 96%, 63hr, 100.2 F during rATG
Chest pain 5/10
pt c/o pain, 10/10 b/l leg
Pt Hypertensive
Pt had PAT on tele for 3.3 secs, PA=[phone]
Temp=100.9, pt rigoring, pt not due for Tylenol
sleeping pill
vitals 100 F, HR 80, /57, SpO2 97% with rATG at rate of 22 mL/hr
fever 101.4
pt experiencing nausea despite compazine PRN and zofran premed
Pt febrile, 102.0 tympanic
/52 as baseline before initiation of rATG infusion
BP: 191/76
Pt c/o 8/10 buttock pain
Temp=101.5, pt rigoring, due for Tylenol 650mg PO in 1 hr
BP = 187/68
BP = 207/68
Pain
Pt febrile, 100.9 tympanic
Pt febrile, 100.9 tympanic
pt c/o 10/10 pain, received IV Tylenol at 1352
rigors and chills during ATG infusion
Temp 103.8

## 2025-07-12 NOTE — DISCHARGE NOTE PROVIDER - HOSPITAL COURSE
This is a 64 year old female with a medical history of essential thrombocytopenia (followed by Dr. Mann Urbina at [facility]) and myelofibrosis admitted for an allogeneic pbsct from a MUD (12/12) with a Flu / Bu / Thiotepa / rATG prep regimen. Hematologic history as follows: initially treated with hydrea, later treatment changed to pegylated IFN alpha (Besrema). She became intolerant to Besrema with multiple hospitalizations in late 2024. On 11/30/24, she presented to [facility] ER with complaints of worsening back pain, chest pain, dyspnea and cough requiring O2, and heart failure. A CT chest showed a moderately large pericardial effusion, bilateral pleural effusions and a suspected superimposed PNA. The hospital course was complicated by pericarditis (likely secondary to pegylated interferon) and paroxysmal atrial fibrillation (treated with amiodarone). She was discharged to [facility] (12/12/24-12/20/24). A bone marrow biopsy on 12/11/24 showed 4-6% blasts, complex karyotype and NGS with JAK2. She followed up with Dr. Dailey post discharge. A bone marrow biopsy 3/14/25 was consisted with myelofibrosis. She had complaints of diffuse, chronic pain on admission.     Upon admission, a TLC was placed in IR. Ms. Corona received IV hydration, pain management, nutritional support and antibacterial / antiviral / antifungal / PCP / GI and VOD prophylaxis. Labs were monitored on a daily basis and she received electrolyte repletion and transfusional support as needed.     On 6/27/25, Ms. Corona received her transplant; cell counts as follows:   Aliquot 1 - Product identification: [id number] / [id number]  Infusion start time: 1229  Infusion completion time: 1316  Product volume (ml): 149 ml  Red blood cell volume (ml): 4.5 ml  CD34 / kg infused (10^6) = 7.49 x 10^6  TNC / kg infused (10^9) = 1.08 x 10^9  CD34 viability (%) = 99.0%  Total DMSO (ml) = N/A  Infusion reaction describe: Patient tolerated procedure well.    While admitted, Ms. Corona experienced pancytopenia related to the high dose chemotherapy prep regimen. When she became neutropenic, she was started on prophylactic levaquin and oral vancomycin. She also had neutropenic fevers. When she became febrile, blood and urine cultures were sent, a CXR completed and the levaquin was changed to zosyn plus vancomycin. The infectious work up was negative, and the antibiotics were de-escalated. Ms. Corona also experienced oral and GI mucositis, and exacerbations of her bone pain. She was treated with supportive care. Early engraftment was noted on 7/10/25. CMV PCR and VNTR were sent, results are pending.    This is a 64 year old female with a medical history of essential thrombocytopenia (followed by Dr. Mann Urbina at [facility]) and myelofibrosis admitted for an allogeneic pbsct from a MUD (12/12) with a Flu / Bu / Thiotepa / rATG prep regimen. Hematologic history as follows: initially treated with hydrea, later treatment changed to pegylated IFN alpha (Besrema). She became intolerant to Besrema with multiple hospitalizations in late 2024. On 11/30/24, she presented to [facility] ER with complaints of worsening back pain, chest pain, dyspnea and cough requiring O2, and heart failure. A CT chest showed a moderately large pericardial effusion, bilateral pleural effusions and a suspected superimposed PNA. The hospital course was complicated by pericarditis (likely secondary to pegylated interferon) and paroxysmal atrial fibrillation (treated with amiodarone). She was discharged to [facility] (12/12/24-12/20/24). A bone marrow biopsy on 12/11/24 showed 4-6% blasts, complex karyotype and NGS with JAK2. She followed up with Dr. Dailey post discharge. A bone marrow biopsy 3/14/25 was consisted with myelofibrosis. She had complaints of diffuse, chronic pain on admission.     Upon admission, a TLC was placed in IR. Ms. Corona received IV hydration, pain management, nutritional support and antibacterial / antiviral / antifungal / PCP / GI and VOD prophylaxis. Labs were monitored on a daily basis and she received electrolyte repletion and transfusional support as needed.     On 6/27/25, Ms. Corona received her transplant; cell counts as follows:   Aliquot 1 - Product identification: [id number] / [id number]  Infusion start time: 1229  Infusion completion time: 1316  Product volume (ml): 149 ml  Red blood cell volume (ml): 4.5 ml  CD34 / kg infused (10^6) = 7.49 x 10^6  TNC / kg infused (10^9) = 1.08 x 10^9  CD34 viability (%) = 99.0%  Total DMSO (ml) = N/A  Infusion reaction describe: Patient tolerated procedure well.    While admitted, Ms. Corona experienced pancytopenia related to the high dose chemotherapy prep regimen. When she became neutropenic, she was started on prophylactic levaquin and oral vancomycin. She also had neutropenic fevers. When she became febrile, blood and urine cultures were sent, a CXR completed and the levaquin was changed to zosyn plus vancomycin. The infectious work up was negative, and the antibiotics were de-escalated. Ms. Cornoa also experienced oral and GI mucositis, and exacerbations of her bone pain. She was treated with supportive care. Early engraftment was noted on 7/10/25, she engrafted on 7/14. CMV PCR and VNTR were sent, results are pending.     Currently, Ms. Corona is ready for discharge with a follow up appointments at [facility].   This is a 64 year old female with a medical history of essential thrombocytopenia (followed by Dr. Mann Urbina at [facility]) and myelofibrosis admitted for an allogeneic pbsct from a MUD (12/12) with a Flu / Bu / Thiotepa / rATG prep regimen. Hematologic history as follows: initially treated with hydrea, later treatment changed to pegylated IFN alpha (Besrema). She became intolerant to Besrema with multiple hospitalizations in late 2024. On 11/30/24, she presented to [facility] ER with complaints of worsening back pain, chest pain, dyspnea and cough requiring O2, and heart failure. A CT chest showed a moderately large pericardial effusion, bilateral pleural effusions and a suspected superimposed PNA. The hospital course was complicated by pericarditis (likely secondary to pegylated interferon) and paroxysmal atrial fibrillation (treated with amiodarone). She was discharged to [facility] (12/12/24-12/20/24). A bone marrow biopsy on 12/11/24 showed 4-6% blasts, complex karyotype and NGS with JAK2. She followed up with Dr. Dailey post discharge. A bone marrow biopsy 3/14/25 was consisted with myelofibrosis. She had complaints of diffuse, chronic pain on admission.     Upon admission, a TLC was placed in IR. Ms. Corona received IV hydration, pain management, nutritional support and antibacterial / antiviral / antifungal / PCP / GI and VOD prophylaxis. Labs were monitored on a daily basis and she received electrolyte repletion and transfusional support as needed.     On 6/27/25, Ms. Corona received her transplant; cell counts as follows:   Aliquot 1 - Product identification: [id number] / [id number]  Infusion start time: 1229  Infusion completion time: 1316  Product volume (ml): 149 ml  Red blood cell volume (ml): 4.5 ml  CD34 / kg infused (10^6) = 7.49 x 10^6  TNC / kg infused (10^9) = 1.08 x 10^9  CD34 viability (%) = 99.0%  Total DMSO (ml) = N/A  Infusion reaction describe: Patient tolerated procedure well.    While admitted, Ms. Corona experienced pancytopenia related to the high dose chemotherapy prep regimen. When she became neutropenic, she was started on prophylactic levaquin and oral vancomycin. She also had neutropenic fevers. When she became febrile, blood and urine cultures were sent, a CXR completed and the levaquin was changed to zosyn plus vancomycin. The infectious work up was negative, and the antibiotics were de-escalated. Ms. Corona also experienced oral and GI mucositis, and exacerbations of her bone pain. She was treated with supportive care. Early engraftment was noted on 7/10/25, she engrafted on 7/14. CMV PCR and VNTR were sent, results are pending.     Currently, Ms. Corona is ready for discharge with a follow up appointments at [facility]. Prior to discharge, a PICC line was placed at the bedside.

## 2025-07-12 NOTE — PROVIDER CONTACT NOTE (OTHER) - ASSESSMENT
A&Ox4, vital signs 105/52, 98F, 67 HR, 97% SpO2 on room air  no acute distress patient reports "feeling normal"
All other VSS, NAD, Febrile, Rigors
Pt A&Ox4, hypertensive, all other VS stable. Pt asymptomatic- denies pain/headache/chest pain/SOB.
Pt is AOx4, vital signs stable, afebrile. Pt expresses 8/10 pain in the buttock area. No other distress noted at this time.
Requested for melatonin.
vitals 100 F, HR 80, /57, SpO2 97% with rATG at rate of 22 mL/hr  patient had rigors at 1620, elevated blood pressure at 1640, received demerol 25mg IV, now afebrile and asymptomatic
NAD, afebrile  No SOB, chest pain, N/V/D  No signs of bleeding
Pt afebrile, no SOB/chest pain, N/V/D  Mucositis
VSS except febrile, NAD
pt c/o 10/10 pain, received IV Tylenol at 1352
98.4F, 62 hr, 117/56, 96% SpO2  rigors and chills, asking for heat packs and blankets
NAD VSS
Pt febrile, 100.9 tympanic
Pt is AOx4. Pt had rigors earlier. Pt is asymptomatic now
All other VSS, NAD, Afebrile, Asymptomatic
Patient is Aox4, afebrile, complains of nausea, denies SOB. Vital signs in range of baseline, no acute distress noted.
A&Ox4, afebrile with vital signs stable  patient received 16mg Zofran as premed for chemotherapy, reported nausea at 1200 and received compazine 10mg PRN IVP, reports increased nausea and is dry heaving at 1415
All other VSS, NAD, Febrile, Rigors
Pt asymptomatic. Pt A/Ox4, vital signs stable, afebrile. Pt denies pain/discomfort, N/V/D, SOB, chest pain.
Pt febrile, 102.0 tympanic  tachy
pt c/o pain, 10/10 b/l leg
50 ml clear emesis, vitals 176/67, 96%, 63hr, 100.2 F during rATG infusion
Pt is AOx4, vital signs stable, afebrile except BP elevated. Pt denies pain/discomfort, chest pain, SOB. No distress noted at this time.

## 2025-07-12 NOTE — PROVIDER CONTACT NOTE (OTHER) - RECOMMENDATIONS
Myelofibrosis
N/A
n/a
RN asks provider if it is ok to initiate rATG with vital signs as documented
n/a
Recheck temp. Place ice packs and monitor.
N/A
Provider made aware. Give AM BP meds and recheck 1-2 hrs.
Provider to see patient.
n/a
n/a
IV Tylenol
N/A

## 2025-07-12 NOTE — DISCHARGE NOTE PROVIDER - NSDCMRMEDTOKEN_GEN_ALL_CORE_FT
acyclovir 800 mg oral tablet: 1 tab(s) orally 2 times a day  Bactrim 400 mg-80 mg oral tablet: 1 tab(s) orally once a day  enalapril 20 mg oral tablet: 1 tab(s) orally 2 times a day  fentaNYL 12 mcg/hr transdermal film, extended release: 1 patch transdermal every 48 hours MDD: 12mcg  hydroCHLOROthiazide 25 mg oral tablet: 1 tab(s) orally 2 times a day  letermovir 480 mg oral tablet: 1 tab(s) orally once a day  Metoprolol Succinate ER 25 mg oral tablet, extended release: 1 tab(s) orally once a day  ondansetron 8 mg oral tablet: 1 tab(s) orally every 8 hours as needed for  nausea  pantoprazole 40 mg oral delayed release tablet: 1 tab(s) orally once a day (before a meal)  posaconazole 100 mg oral delayed release tablet: 3 tab(s) orally once a day  Synthroid 88 mcg (0.088 mg) oral tablet: 1 tab(s) orally once a day  ursodiol 300 mg oral capsule: 1 cap(s) orally 2 times a day   acyclovir 800 mg oral tablet: 1 tab(s) orally 2 times a day  atovaquone 750 mg/5 mL oral suspension: 10 milliliter(s) orally once a day  enalapril 20 mg oral tablet: 1 tab(s) orally 2 times a day  fentaNYL 12 mcg/hr transdermal film, extended release: 1 patch transdermal every 48 hours MDD: 12mcg  hydroCHLOROthiazide 25 mg oral tablet: 1 tab(s) orally 2 times a day  letermovir 480 mg oral tablet: 1 tab(s) orally once a day  Metoprolol Succinate ER 25 mg oral tablet, extended release: 1 tab(s) orally once a day  ondansetron 8 mg oral tablet: 1 tab(s) orally every 8 hours as needed for  nausea  oxyCODONE 5 mg oral tablet: 1 tab(s) orally every 6 hours As needed Severe Pain (7 - 10)  pantoprazole 40 mg oral delayed release tablet: 1 tab(s) orally once a day (before a meal)  posaconazole 100 mg oral delayed release tablet: 3 tab(s) orally once a day  Synthroid 88 mcg (0.088 mg) oral tablet: 1 tab(s) orally once a day  ursodiol 300 mg oral capsule: 1 cap(s) orally 2 times a day   acyclovir 800 mg oral tablet: 1 tab(s) orally 2 times a day  atovaquone 750 mg/5 mL oral suspension: 10 milliliter(s) orally once a day  enalapril 20 mg oral tablet: 1 tab(s) orally 2 times a day  fentaNYL 12 mcg/hr transdermal film, extended release: 1 patch transdermal every 48 hours MDD: 12mcg  hydroCHLOROthiazide 25 mg oral tablet: 1 tab(s) orally 2 times a day  letermovir 480 mg oral tablet: 1 tab(s) orally once a day  Metoprolol Succinate ER 25 mg oral tablet, extended release: 1 tab(s) orally once a day  ondansetron 8 mg oral tablet: 1 tab(s) orally every 8 hours as needed for  nausea  Out-patient PT 2-3 times/week. Evaluate and treat: OUT-patient PT  oxyCODONE 5 mg oral tablet: 1 tab(s) orally every 6 hours As needed Severe Pain (7 - 10)  pantoprazole 40 mg oral delayed release tablet: 1 tab(s) orally once a day (before a meal)  posaconazole 100 mg oral delayed release tablet: 3 tab(s) orally once a day  Synthroid 88 mcg (0.088 mg) oral tablet: 1 tab(s) orally once a day  ursodiol 300 mg oral capsule: 1 cap(s) orally 2 times a day

## 2025-07-13 LAB
ALBUMIN SERPL ELPH-MCNC: 2.8 G/DL — LOW (ref 3.3–5)
ALP SERPL-CCNC: 92 U/L — SIGNIFICANT CHANGE UP (ref 40–120)
ALT FLD-CCNC: 6 U/L — LOW (ref 10–45)
ANION GAP SERPL CALC-SCNC: 11 MMOL/L — SIGNIFICANT CHANGE UP (ref 5–17)
AST SERPL-CCNC: 6 U/L — LOW (ref 10–40)
BASOPHILS # BLD AUTO: 0.03 K/UL — SIGNIFICANT CHANGE UP (ref 0–0.2)
BASOPHILS # BLD MANUAL: 0 K/UL — SIGNIFICANT CHANGE UP (ref 0–0.2)
BASOPHILS NFR BLD AUTO: 0.8 % — SIGNIFICANT CHANGE UP (ref 0–2)
BASOPHILS NFR BLD MANUAL: 0 % — SIGNIFICANT CHANGE UP (ref 0–2)
BILIRUB SERPL-MCNC: 0.6 MG/DL — SIGNIFICANT CHANGE UP (ref 0.2–1.2)
BUN SERPL-MCNC: 6 MG/DL — LOW (ref 7–23)
CALCIUM SERPL-MCNC: 8.2 MG/DL — LOW (ref 8.4–10.5)
CHLORIDE SERPL-SCNC: 105 MMOL/L — SIGNIFICANT CHANGE UP (ref 96–108)
CO2 SERPL-SCNC: 23 MMOL/L — SIGNIFICANT CHANGE UP (ref 22–31)
CREAT SERPL-MCNC: 0.49 MG/DL — LOW (ref 0.5–1.3)
EGFR: 106 ML/MIN/1.73M2 — SIGNIFICANT CHANGE UP
EGFR: 106 ML/MIN/1.73M2 — SIGNIFICANT CHANGE UP
EOSINOPHIL # BLD AUTO: 0 K/UL — SIGNIFICANT CHANGE UP (ref 0–0.5)
EOSINOPHIL # BLD MANUAL: 0 K/UL — SIGNIFICANT CHANGE UP (ref 0–0.5)
EOSINOPHIL NFR BLD AUTO: 0 % — SIGNIFICANT CHANGE UP (ref 0–6)
EOSINOPHIL NFR BLD MANUAL: 0 % — SIGNIFICANT CHANGE UP (ref 0–6)
GLUCOSE SERPL-MCNC: 79 MG/DL — SIGNIFICANT CHANGE UP (ref 70–99)
HCT VFR BLD CALC: 23.2 % — LOW (ref 34.5–45)
HGB BLD-MCNC: 7.4 G/DL — LOW (ref 11.5–15.5)
IMM GRANULOCYTES # BLD AUTO: 0.27 K/UL — HIGH (ref 0–0.07)
IMM GRANULOCYTES NFR BLD AUTO: 6.9 % — HIGH (ref 0–0.9)
IMMATURE PLATELET FRACTION #: 3.1 K/UL — LOW (ref 4.7–11.1)
IMMATURE PLATELET FRACTION %: 10.8 % — HIGH (ref 1.6–4.9)
LDH SERPL L TO P-CCNC: 186 U/L — SIGNIFICANT CHANGE UP (ref 50–242)
LYMPHOCYTES # BLD AUTO: 0.02 K/UL — LOW (ref 1–3.3)
LYMPHOCYTES # BLD MANUAL: 0 K/UL — LOW (ref 1–3.3)
LYMPHOCYTES NFR BLD AUTO: 0.5 % — LOW (ref 13–44)
LYMPHOCYTES NFR BLD MANUAL: 0 % — LOW (ref 13–44)
MAGNESIUM SERPL-MCNC: 1.6 MG/DL — SIGNIFICANT CHANGE UP (ref 1.6–2.6)
MANUAL METAMYELOCYTE #: 0.03 K/UL — HIGH (ref 0–0)
MANUAL MYELOCYTE #: 0.03 K/UL — HIGH (ref 0–0)
MANUAL NEUTROPHIL BANDS #: 0.19 K/UL — SIGNIFICANT CHANGE UP (ref 0–0.84)
MANUAL NRBC #: 0.04 K/UL — HIGH (ref 0–0)
MANUAL PROMYELOCYTE #: 0.03 K/UL — HIGH (ref 0–0)
MCHC RBC-ENTMCNC: 27.7 PG — SIGNIFICANT CHANGE UP (ref 27–34)
MCHC RBC-ENTMCNC: 31.9 G/DL — LOW (ref 32–36)
MCV RBC AUTO: 86.9 FL — SIGNIFICANT CHANGE UP (ref 80–100)
METAMYELOCYTES # FLD: 0.8 % — HIGH (ref 0–0)
METAMYELOCYTES NFR BLD: 0.8 % — HIGH (ref 0–0)
MONOCYTES # BLD AUTO: 0.33 K/UL — SIGNIFICANT CHANGE UP (ref 0–0.9)
MONOCYTES # BLD MANUAL: 0.1 K/UL — SIGNIFICANT CHANGE UP (ref 0–0.9)
MONOCYTES NFR BLD AUTO: 8.5 % — SIGNIFICANT CHANGE UP (ref 2–14)
MONOCYTES NFR BLD MANUAL: 2.5 % — SIGNIFICANT CHANGE UP (ref 2–14)
MYELOCYTES NFR BLD: 0.8 % — HIGH (ref 0–0)
NEUTROPHILS # BLD AUTO: 3.24 K/UL — SIGNIFICANT CHANGE UP (ref 1.8–7.4)
NEUTROPHILS # BLD MANUAL: 3.5 K/UL — SIGNIFICANT CHANGE UP (ref 1.8–7.4)
NEUTROPHILS NFR BLD AUTO: 83.3 % — HIGH (ref 43–77)
NEUTROPHILS NFR BLD MANUAL: 90.1 % — HIGH (ref 43–77)
NEUTS BAND # BLD: 5 % — SIGNIFICANT CHANGE UP (ref 0–8)
NEUTS BAND NFR BLD: 5 % — SIGNIFICANT CHANGE UP (ref 0–8)
NRBC # BLD AUTO: 0 K/UL — SIGNIFICANT CHANGE UP (ref 0–0)
NRBC # BLD: 1 /100 WBCS — HIGH (ref 0–0)
NRBC # FLD: 0 K/UL — SIGNIFICANT CHANGE UP (ref 0–0)
NRBC BLD-RTO: 1 /100 WBCS — HIGH (ref 0–0)
PHOSPHATE SERPL-MCNC: 3 MG/DL — SIGNIFICANT CHANGE UP (ref 2.5–4.5)
PLAT MORPH BLD: NORMAL — SIGNIFICANT CHANGE UP
PLATELET # BLD AUTO: 29 K/UL — LOW (ref 150–400)
PMV BLD: SIGNIFICANT CHANGE UP FL (ref 7–13)
POTASSIUM SERPL-MCNC: 3.3 MMOL/L — LOW (ref 3.5–5.3)
POTASSIUM SERPL-SCNC: 3.3 MMOL/L — LOW (ref 3.5–5.3)
PROMYELOCYTES # FLD: 0.8 % — HIGH (ref 0–0)
PROMYELOCYTES NFR BLD: 0.8 % — HIGH (ref 0–0)
PROT SERPL-MCNC: 4.8 G/DL — LOW (ref 6–8.3)
RBC # BLD: 2.67 M/UL — LOW (ref 3.8–5.2)
RBC # FLD: 15.8 % — HIGH (ref 10.3–14.5)
RBC BLD AUTO: SIGNIFICANT CHANGE UP
SODIUM SERPL-SCNC: 139 MMOL/L — SIGNIFICANT CHANGE UP (ref 135–145)
TOXIC GRANULES BLD QL SMEAR: PRESENT
WBC # BLD: 3.89 K/UL — SIGNIFICANT CHANGE UP (ref 3.8–10.5)
WBC # FLD AUTO: 3.89 K/UL — SIGNIFICANT CHANGE UP (ref 3.8–10.5)

## 2025-07-13 PROCEDURE — 99233 SBSQ HOSP IP/OBS HIGH 50: CPT

## 2025-07-13 RX ORDER — OXYCODONE HYDROCHLORIDE 30 MG/1
5 TABLET ORAL EVERY 6 HOURS
Refills: 0 | Status: DISCONTINUED | OUTPATIENT
Start: 2025-07-13 | End: 2025-07-15

## 2025-07-13 RX ORDER — OLANZAPINE 10 MG/1
2.5 TABLET ORAL AT BEDTIME
Refills: 0 | Status: COMPLETED | OUTPATIENT
Start: 2025-07-13 | End: 2025-07-13

## 2025-07-13 RX ORDER — FUROSEMIDE 10 MG/ML
20 INJECTION INTRAMUSCULAR; INTRAVENOUS ONCE
Refills: 0 | Status: COMPLETED | OUTPATIENT
Start: 2025-07-13 | End: 2025-07-13

## 2025-07-13 RX ADMIN — OXYCODONE HYDROCHLORIDE 5 MILLIGRAM(S): 30 TABLET ORAL at 12:38

## 2025-07-13 RX ADMIN — TOUCHLESS CARE ZINC OXIDE PROTECTANT 1 APPLICATION(S): 20; 25 SPRAY TOPICAL at 22:11

## 2025-07-13 RX ADMIN — Medication 1 PATCH: at 08:26

## 2025-07-13 RX ADMIN — OLANZAPINE 2.5 MILLIGRAM(S): 10 TABLET ORAL at 22:11

## 2025-07-13 RX ADMIN — Medication 15 MILLILITER(S): at 05:13

## 2025-07-13 RX ADMIN — Medication 40 MILLIGRAM(S): at 17:54

## 2025-07-13 RX ADMIN — Medication 10 MILLILITER(S): at 15:25

## 2025-07-13 RX ADMIN — OXYCODONE HYDROCHLORIDE 5 MILLIGRAM(S): 30 TABLET ORAL at 20:48

## 2025-07-13 RX ADMIN — Medication 10 MILLILITER(S): at 23:02

## 2025-07-13 RX ADMIN — URSODIOL 300 MILLIGRAM(S): 300 CAPSULE ORAL at 17:55

## 2025-07-13 RX ADMIN — Medication 1 PATCH: at 08:32

## 2025-07-13 RX ADMIN — TOUCHLESS CARE ZINC OXIDE PROTECTANT 1 APPLICATION(S): 20; 25 SPRAY TOPICAL at 05:13

## 2025-07-13 RX ADMIN — Medication 40 MILLIGRAM(S): at 05:13

## 2025-07-13 RX ADMIN — LISINOPRIL 20 MILLIGRAM(S): 5 TABLET ORAL at 05:17

## 2025-07-13 RX ADMIN — Medication 15 MILLILITER(S): at 17:54

## 2025-07-13 RX ADMIN — FUROSEMIDE 20 MILLIGRAM(S): 10 INJECTION INTRAMUSCULAR; INTRAVENOUS at 13:04

## 2025-07-13 RX ADMIN — Medication 5 MILLILITER(S): at 15:24

## 2025-07-13 RX ADMIN — Medication 0.5 MILLIGRAM(S): at 02:33

## 2025-07-13 RX ADMIN — Medication 10 MILLILITER(S): at 11:08

## 2025-07-13 RX ADMIN — LETERMOVIR 480 MILLIGRAM(S): 480 TABLET, FILM COATED ORAL at 11:07

## 2025-07-13 RX ADMIN — FILGRASTIM 300 MICROGRAM(S): 300 INJECTION, SOLUTION INTRAVENOUS; SUBCUTANEOUS at 11:39

## 2025-07-13 RX ADMIN — Medication 800 MILLIGRAM(S): at 17:54

## 2025-07-13 RX ADMIN — Medication 40 MILLIEQUIVALENT(S): at 14:57

## 2025-07-13 RX ADMIN — TOUCHLESS CARE ZINC OXIDE PROTECTANT 1 APPLICATION(S): 20; 25 SPRAY TOPICAL at 13:04

## 2025-07-13 RX ADMIN — POSACONAZOLE 300 MILLIGRAM(S): 100 TABLET, DELAYED RELEASE ORAL at 11:07

## 2025-07-13 RX ADMIN — Medication 40 MILLIEQUIVALENT(S): at 09:15

## 2025-07-13 RX ADMIN — Medication 5 MILLILITER(S): at 19:23

## 2025-07-13 RX ADMIN — Medication 1 DROP(S): at 23:02

## 2025-07-13 RX ADMIN — Medication 5 MILLILITER(S): at 11:08

## 2025-07-13 RX ADMIN — URSODIOL 300 MILLIGRAM(S): 300 CAPSULE ORAL at 05:11

## 2025-07-13 RX ADMIN — Medication 0.5 MILLIGRAM(S): at 03:33

## 2025-07-13 RX ADMIN — OXYCODONE HYDROCHLORIDE 5 MILLIGRAM(S): 30 TABLET ORAL at 13:38

## 2025-07-13 RX ADMIN — Medication 1 DROP(S): at 05:13

## 2025-07-13 RX ADMIN — Medication 5 MILLILITER(S): at 23:02

## 2025-07-13 RX ADMIN — METOPROLOL SUCCINATE 25 MILLIGRAM(S): 50 TABLET, EXTENDED RELEASE ORAL at 05:13

## 2025-07-13 RX ADMIN — Medication 10 MILLILITER(S): at 08:31

## 2025-07-13 RX ADMIN — Medication 1 APPLICATION(S): at 08:27

## 2025-07-13 RX ADMIN — Medication 10 MILLILITER(S): at 19:24

## 2025-07-13 RX ADMIN — Medication 1 PATCH: at 19:26

## 2025-07-13 RX ADMIN — OXYCODONE HYDROCHLORIDE 5 MILLIGRAM(S): 30 TABLET ORAL at 19:46

## 2025-07-13 RX ADMIN — Medication 800 MILLIGRAM(S): at 05:12

## 2025-07-13 RX ADMIN — Medication 88 MICROGRAM(S): at 05:13

## 2025-07-13 RX ADMIN — Medication 125 MILLIGRAM(S): at 05:14

## 2025-07-13 RX ADMIN — LISINOPRIL 20 MILLIGRAM(S): 5 TABLET ORAL at 17:53

## 2025-07-13 RX ADMIN — Medication 5 MILLILITER(S): at 08:26

## 2025-07-13 NOTE — PROGRESS NOTE ADULT - SUBJECTIVE AND OBJECTIVE BOX
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      O: Vitals:   Vital Signs Last 24 Hrs  T(C): 36.4 (13 Jul 2025 05:05), Max: 36.4 (13 Jul 2025 00:59)  T(F): 97.6 (13 Jul 2025 05:05), Max: 97.6 (13 Jul 2025 00:59)  HR: 82 (13 Jul 2025 05:05) (79 - 83)  BP: 150/64 (13 Jul 2025 05:05) (145/54 - 160/69)  BP(mean): --  RR: 18 (13 Jul 2025 05:05) (18 - 18)  SpO2: 95% (13 Jul 2025 05:05) (95% - 98%)    Parameters below as of 13 Jul 2025 05:05  Patient On (Oxygen Delivery Method): room air    Admit weight: 65.5kg  Daily Weight in kG:  (13 Jul 2025)    Intake / Output:   07-12 @ 07:01  -  07-13 @ 07:00  --------------------------------------------------------  IN: 697 mL / OUT: 2100 mL / NET: -1403 mL    PE:   Oropharynx: + erythema + ulcerations posterior pharynx   Oral Mucositis: +                                                  rdGrdrrdarddrderd:rd3rd CVS: S1, S2 RRR   Lungs: CTA throughout bilaterally   Abdomen: + BS x 4, soft, NT, ND   Extremities: trace edema RUE, 5/5 strenght BUE's  Gastric Mucositis:                 -                              Grade: n/a   Intestinal Mucositis:              -                              Grade: n/a   Skin: no rash, ecchymotic patches BLE's   TLC: CDI   Neuro: A&O x 3      Labs:               Cultures:   Culture Results: <10,000 CFU/mL Normal Urogenital Cha (06.28.25 @ 06:31)    Culture Results: No growth at 5 days(06.27.25 @ 16:36)    Culture Results: No growth at  5 days (06.27.25 @ 16:30)    Respiratory Viral Panel with COVID-19 by SCARLET (06.20.25 @ 21:02)   Rapid RVP Result: [id number]: NotDetec:    Culture - Blood (06.20.25 @ 18:45)   Specimen Source: Blood Blood-Catheter    Culture Results: No growth at Final  Culture - Blood (06.20.25 @ 18:40)   Specimen Source: Blood Blood-Peripheral  Culture Results: No growth     Culture - Urine (06.20.25 @ 21:01)   Specimen Source: Clean Catch Clean Catch (Midstream)  Culture Results: 10,000 - 49,000 CFU/mL Gram Positive Cocci in Pairs and Chains   <10,000 CFU/ml Normal Urogenital cha present    Radiology:   ACC: [id number] EXAM:  XR CHEST PORTABLE URGENT 1V   PROCEDURE DATE:  06/27/2025    IMPRESSION: No focal consolidation.      Meds:   Antimicrobials:   acyclovir    Suspension 800 milliGRAM(s) Oral every 12 hours  letermovir 480 milliGRAM(s) Oral daily  levoFLOXacin  Tablet 500 milliGRAM(s) Oral every 24 hours  posaconazole DR Tablet 300 milliGRAM(s) Oral daily  vancomycin    Solution 125 milliGRAM(s) Oral every 12 hours      Heme / Onc:       GI:  loperamide 2 milliGRAM(s) Oral every 6 hours PRN  pantoprazole  Injectable 40 milliGRAM(s) IV Push two times a day  polyethylene glycol 3350 17 Gram(s) Oral daily PRN  sodium bicarbonate Mouth Rinse 10 milliLiter(s) Swish and Spit five times a day  ursodiol Suspension 300 milliGRAM(s) Oral two times a day      Cardiovascular:   hydrochlorothiazide 25 milliGRAM(s) Oral two times a day  lisinopril 20 milliGRAM(s) Oral <User Schedule>  lisinopril 20 milliGRAM(s) Oral <User Schedule>  metoprolol succinate ER 25 milliGRAM(s) Oral daily      Immunologic:   filgrastim-sndz (ZARXIO) Injectable 300 MICROGram(s) SubCutaneous every 24 hours      Other medications:   artificial tears (preservative free) Ophthalmic Solution 1 Drop(s) Both EYES four times a day  Biotene Dry Mouth Oral Rinse 5 milliLiter(s) Swish and Spit five times a day  buPROPion XL (24-Hour) . 300 milliGRAM(s) Oral daily  chlorhexidine 0.12% Liquid 15 milliLiter(s) Swish and Spit two times a day  chlorhexidine 4% Liquid 1 Application(s) Topical <User Schedule>  fentaNYL   Patch  12 MICROgram(s)/Hr. 1 Patch Transdermal every 48 hours  levothyroxine 88 MICROGram(s) Oral daily  OLANZapine 2.5 milliGRAM(s) Oral at bedtime  phytonadione   Solution 5 milliGRAM(s) Oral <User Schedule>  prochlorperazine   Injectable 10 milliGRAM(s) IV Push every 6 hours  zinc oxide 40% Paste 1 Application(s) Topical three times a day      PRN:   acetaminophen     Tablet .. 650 milliGRAM(s) Oral every 6 hours PRN  FIRST- Mouthwash  BLM 15 milliLiter(s) Swish and Swallow five times a day PRN  HYDROmorphone  Injectable 0.5 milliGRAM(s) IV Push every 6 hours PRN  lidocaine 2% Viscous 15 milliLiter(s) Oral five times a day PRN  loperamide 2 milliGRAM(s) Oral every 6 hours PRN  ondansetron Injectable 8 milliGRAM(s) IV Push every 8 hours PRN  polyethylene glycol 3350 17 Gram(s) Oral daily PRN  sodium chloride 0.9% lock flush 10 milliLiter(s) IV Push every 1 hour PRN      A/P: 64 year old female with hx of myelofibrosis presents for a MUD allogeneic SCT (DP Permissive) conditioning with Flu/Bu/Thiotepa/rATG.  Today is day +16  6/21- Thiotepa 1/2. Maintain skin precautions during thiotepa administration and for 48 hours after completion (days .6 through day -3). Chronic pain issues - continue home fentanyl patch. Febrile overnight, tmax 100.9. Cultures  pending. RVP negative. CXR pending official read, no obvious consolidations. Vital signs are stable.   6/23 D/C HCTZ. Fludarabine 1/3, Busulfan 1/3, rATG 1/3. No Tylenol on busulfan days. Continue keppra ppx for 24 hours post infusion of last dose of busulfan. No acute events overnight, vital signs are stable.   6/24- overnight patient was febrile during ATG, no cultures/abx as fever likely secondary to ATG. Patient was otherwise stable overnight. Fludarabine 2/3, Busulfan 2/3 and rATG 2/3. NO Tylenol or Azoles until Day 0. Continue with supportive care.  6/25- Fludarabine 3/3, busulfan 3/3, rATG 3/3. No acute events overnight. Vital signs are stable.   6/26 Switched Compazine to atc  6/27 VSS, afebrile. 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  6/28 VSS, persistent fevers likely due to CRS post-SCT, continues on zosyn. Cultures pending. Hypervolemia - lasix 40mg x 1, will continue strict I/Os. Started caspofungin due to persistent thrush.  6/29 overnight no acute events noted. VSS, febrile likely due to CRS - continue with supportive care. BCx/UCx NGTD x 24h.  6/30 VSS, intermittently febrile likely due to CRS, infectious workup negative so far. PTCy 1/2 today. I&O, daily weights, prn diuresis. Continue to monitor thrush (improving). Changed some meds to PO suspension due to mucositis. Continue with supportive care. Refractory nausea, added trial of zyprexa 5mg po QHS x 3 days. d/c primafit. Change voriconazole to posaconazole tomorrow.   7/1- No acute events overnight, vital signs are stable. Continue supportive care for chemotherapy induced oral mucositis.   7/2- No acute events overnight, vital signs are stable. Completed PTCy, hydration to be discontinued today. I&O, daily weights, prn diuresis. Continue supportive care. Fentanyl patch to be titrated down to 12 mcg / hr tomorrow  7/3 Starting Scopolamine patch to decrease oral secretions. Increased Lisinopril 20mg in am, 10mg PM.    7/4-No acute events; awaiting count recovery   7/5- no acute events overnight. Vital signs are stable. SBP remains elevated, lisinopril / metoprolol doses increased.   7/6: BMT team continues to educate the patient about the importance of medication taking. awaiting count recovery  7/7 Increased ppi q12h (+stool guaiac)  7/8 D/C'd Scopolamine patch  7/9 VSS awaiting count recovery  7/10 VSS WBC at 0.15 today; continue Zarxio. BMT team continues to encourage PO intake getting OOB and medication compliance. NA at 150: restarted DW5(2L free water deficit)    7/11- no acute events overnight. Vital signs are stable. d/c D5W, continue to encourage po intake. Early engraftment, check CMV PCR, VNTR. Transition meds back to po.     1. Infectious Disease:   acyclovir Suspension 800 milliGRAM(s) Oral every 12 hours  letermovir 480 milliGRAM(s) Oral daily  levoFLOXacin IVPB 500 milliGRAM(s) IV Intermittent every 24 hours  posaconazole DR Tablet 300 milliGRAM(s) Oral daily  vancomycin Solution 125 milliGRAM(s) Oral every 12 hours    2. VOD Prophylaxis:   ursodiol Suspension 300 milliGRAM(s) Oral two times a day    3. GI Prophylaxis:    pantoprazole  Injectable 40 milliGRAM(s) IV q12h    4. Mouthcare - NS / NaHCO3 rinses, Peridex, Biotene; Skin care     5. GVHD prophylaxis   PTCy days +3, +4  Bortezomib days 0, + 3  Abatacept days +5, +14, +28     6. Transfuse & replete electrolytes prn    7. IV hydration, daily weights, strict I&O, prn diuresis     8. PO intake as tolerated, nutrition follow up as needed    9. Antiemetics, anti-diarrhea medications:   loperamide 2 milliGRAM(s) Oral every 6 hours PRN  ondansetron Injectable 8 milliGRAM(s) IV Push every 8 hours PRN  prochlorperazine Injectable 10 milliGRAM(s) IV Push every 6 hours    10. OOB as tolerated, physical therapy consult if needed     11. Monitor coags  2x week, vitamin K BIW   phytonadione Solution 5 milliGRAM(s) Oral <User Schedule>    12. Monitor closely for clinical changes, monitor for fevers     13. Emotional support provided, plan of care discussed and questions addressed     14. Patient education done regarding plan of care, restrictions and discharge planning     15. Continue regular social work input     I have written the above note for Dr. Gandhi who performed service with me in the room.   Bobo Barriga PA-C ([phone])    I have seen and examined patient with PA, I agree with above note as scribed.                  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 prep regimen for treatment of myelofibrosis    Disease: myelofibrosis   Prep regimen: Flu / Bu / Thiotepa / rATG   GVHD ppx: ABC: PTCy days +3, +4, Bortezomib days 0, +3, PTCy days +5, +14, +28   ABO/CMV:   Recipient:   A+/CMV+   Donor:   B+/CMV+    S: Patient seen and examined with HPC Transplant Team:   Overnight no events noted.  Patient states throat/mouth pain improving and able to tolerate more PO.  Admits to improvement in loose BM.  Admits to fatigue today.    O: Vitals:   Vital Signs Last 24 Hrs  T(C): 36.4 (2025 05:05), Max: 36.4 (2025 00:59)  T(F): 97.6 (2025 05:05), Max: 97.6 (2025 00:59)  HR: 82 (2025 05:05) (79 - 83)  BP: 150/64 (2025 05:05) (145/54 - 160/69)  BP(mean): --  RR: 18 (2025 05:05) (18 - 18)  SpO2: 95% (2025 05:05) (95% - 98%)    Parameters below as of 2025 05:05  Patient On (Oxygen Delivery Method): room air    Admit weight: 65.5kg  Daily Weight in k (2025)    Intake / Output:    @ 07:  -   @ 07:00  --------------------------------------------------------  IN: 697 mL / OUT: 2100 mL / NET: -1403 mL    PE:   Oropharynx: + erythema + ulcerations posterior pharynx   Oral Mucositis: +                                                  stGstrstastdstest:st1st CVS: S1, S2 RRR   Lungs: CTA throughout bilaterally   Abdomen: + BS x 4, soft, NT, ND   Extremities: trace edema RUE, 5/5 strenght BUE's  Gastric Mucositis:                 -                              Grade: n/a   Intestinal Mucositis:              -                              Grade: n/a   Skin: no rash, ecchymotic patches BLE's   TLC: CDI   Neuro: A&O x 3      Labs:   CBC Full  -  ( 2025 06:55 )  WBC Count : 3.89 K/uL  RBC Count : 2.67 M/uL  Hemoglobin : 7.4 g/dL  Hematocrit : 23.2 %  Platelet Count - Automated : 29 K/uL  Mean Cell Volume : 86.9 fl  Mean Cell Hemoglobin : 27.7 pg  Mean Cell Hemoglobin Concentration : 31.9 g/dL  Auto Neutrophil # : 3.24 K/uL  Auto Lymphocyte # : 0.02 K/uL  Auto Monocyte # : 0.33 K/uL  Auto Eosinophil # : 0.00 K/uL  Auto Basophil # : 0.03 K/uL  Auto Neutrophil % : 83.3 %  Auto Lymphocyte % : 0.5 %  Auto Monocyte % : 8.5 %  Auto Eosinophil % : 0.0 %  Auto Basophil % : 0.8 %                          7.4    3.89  )-----------( 29       ( 2025 06:55 )             23.2     07-    139  |  105  |  6[L]  ----------------------------<  79  3.3[L]   |  23  |  0.49[L]    Ca    8.2[L]      2025 06:55  Phos  3.0     -  Mg     1.6     -    TPro  4.8[L]  /  Alb  2.8[L]  /  TBili  0.6  /  DBili  x   /  AST  6[L]  /  ALT  6[L]  /  AlkPhos  92  -      Cultures:   Culture Results: <10,000 CFU/mL Normal Urogenital Cha (25 @ 06:31)    Culture Results: No growth at 5 days(25 @ 16:36)    Culture Results: No growth at  5 days (25 @ 16:30)    Respiratory Viral Panel with COVID-19 by [facility] (25 @ 21:02)   Rapid RVP Result: [id number]: NotDetec:    Culture - Blood (06.20.25 @ 18:45)   Specimen Source: Blood Blood-Catheter    Culture Results: No growth at Final  Culture - Blood (25 @ 18:40)   Specimen Source: Blood Blood-Peripheral  Culture Results: No growth     Culture - Urine (25 @ 21:01)   Specimen Source: Clean Catch Clean Catch (Midstream)  Culture Results: 10,000 - 49,000 CFU/mL Gram Positive Cocci in Pairs and Chains   <10,000 CFU/ml Normal Urogenital cha present    Radiology:   ACC: [id number] EXAM:  XR CHEST PORTABLE URGENT 1V   PROCEDURE DATE:  2025    IMPRESSION: No focal consolidation.      Meds:   Antimicrobials:   acyclovir    Suspension 800 milliGRAM(s) Oral every 12 hours  letermovir 480 milliGRAM(s) Oral daily  levoFLOXacin  Tablet 500 milliGRAM(s) Oral every 24 hours  posaconazole DR Tablet 300 milliGRAM(s) Oral daily  vancomycin    Solution 125 milliGRAM(s) Oral every 12 hours      Heme / Onc:       GI:  loperamide 2 milliGRAM(s) Oral every 6 hours PRN  pantoprazole  Injectable 40 milliGRAM(s) IV Push two times a day  polyethylene glycol 3350 17 Gram(s) Oral daily PRN  sodium bicarbonate Mouth Rinse 10 milliLiter(s) Swish and Spit five times a day  ursodiol Suspension 300 milliGRAM(s) Oral two times a day      Cardiovascular:   hydrochlorothiazide 25 milliGRAM(s) Oral two times a day  lisinopril 20 milliGRAM(s) Oral <User Schedule>  lisinopril 20 milliGRAM(s) Oral <User Schedule>  metoprolol succinate ER 25 milliGRAM(s) Oral daily      Immunologic:   filgrastim-sndz (ZARXIO) Injectable 300 MICROGram(s) SubCutaneous every 24 hours      Other medications:   artificial tears (preservative free) Ophthalmic Solution 1 Drop(s) Both EYES four times a day  Biotene Dry Mouth Oral Rinse 5 milliLiter(s) Swish and Spit five times a day  buPROPion XL (24-Hour) . 300 milliGRAM(s) Oral daily  chlorhexidine 0.12% Liquid 15 milliLiter(s) Swish and Spit two times a day  chlorhexidine 4% Liquid 1 Application(s) Topical <User Schedule>  fentaNYL   Patch  12 MICROgram(s)/Hr. 1 Patch Transdermal every 48 hours  levothyroxine 88 MICROGram(s) Oral daily  OLANZapine 2.5 milliGRAM(s) Oral at bedtime  phytonadione   Solution 5 milliGRAM(s) Oral <User Schedule>  prochlorperazine   Injectable 10 milliGRAM(s) IV Push every 6 hours  zinc oxide 40% Paste 1 Application(s) Topical three times a day      PRN:   acetaminophen     Tablet .. 650 milliGRAM(s) Oral every 6 hours PRN  FIRST- Mouthwash  BLM 15 milliLiter(s) Swish and Swallow five times a day PRN  HYDROmorphone  Injectable 0.5 milliGRAM(s) IV Push every 6 hours PRN  lidocaine 2% Viscous 15 milliLiter(s) Oral five times a day PRN  loperamide 2 milliGRAM(s) Oral every 6 hours PRN  ondansetron Injectable 8 milliGRAM(s) IV Push every 8 hours PRN  polyethylene glycol 3350 17 Gram(s) Oral daily PRN  sodium chloride 0.9% lock flush 10 milliLiter(s) IV Push every 1 hour PRN    A/P: 64 year old female with hx of myelofibrosis presents for a MUD allogeneic SCT (DP Permissive) conditioning with Flu/Bu/Thiotepa/rATG.  Today is day +16  - Thiotepa /. Maintain skin precautions during thiotepa administration and for 48 hours after completion (days .6 through day -3). Chronic pain issues - continue home fentanyl patch. Febrile overnight, tmax 100.9. Cultures  pending. RVP negative. CXR pending official read, no obvious consolidations. Vital signs are stable.    D/C HCTZ. Fludarabine 1/3, Busulfan 1/3, rATG 1/3. No Tylenol on busulfan days. Continue keppra ppx for 24 hours post infusion of last dose of busulfan. No acute events overnight, vital signs are stable.   - overnight patient was febrile during ATG, no cultures/abx as fever likely secondary to ATG. Patient was otherwise stable overnight. Fludarabine 2/3, Busulfan 2/3 and rATG 2/3. NO Tylenol or Azoles until Day 0. Continue with supportive care.  - Fludarabine 3/3, busulfan 3/3, rATG 3/3. No acute events overnight. Vital signs are stable.    Switched Compazine to atc   VSS, afebrile. 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   VSS, persistent fevers likely due to CRS post-SCT, continues on zosyn. Cultures pending. Hypervolemia - lasix 40mg x 1, will continue strict I/Os. Started caspofungin due to persistent thrush.   overnight no acute events noted. VSS, febrile likely due to CRS - continue with supportive care. BCx/UCx NGTD x 24h.   VSS, intermittently febrile likely due to CRS, infectious workup negative so far. PTCy  today. I&O, daily weights, prn diuresis. Continue to monitor thrush (improving). Changed some meds to PO suspension due to mucositis. Continue with supportive care. Refractory nausea, added trial of zyprexa 5mg po QHS x 3 days. d/c primafit. Change voriconazole to posaconazole tomorrow.   - No acute events overnight, vital signs are stable. Continue supportive care for chemotherapy induced oral mucositis.   - No acute events overnight, vital signs are stable. Completed PTCy, hydration to be discontinued today. I&O, daily weights, prn diuresis. Continue supportive care. Fentanyl patch to be titrated down to 12 mcg / hr tomorrow  7/3 Starting Scopolamine patch to decrease oral secretions. Increased Lisinopril 20mg in am, 10mg PM.    -No acute events; awaiting count recovery   - no acute events overnight. Vital signs are stable. SBP remains elevated, lisinopril / metoprolol doses increased.   : BMT team continues to educate the patient about the importance of medication taking. awaiting count recovery   Increased ppi q12h (+stool guaiac)   D/C'd Scopolamine patch   VSS awaiting count recovery  7/10 VSS WBC at 0.15 today; continue Zarxio. BMT team continues to encourage PO intake getting OOB and medication compliance. NA at 150: restarted DW5(2L free water deficit)    - no acute events overnight. Vital signs are stable. d/c D5W, continue to encourage po intake. Early engraftment, check CMV PCR, VNTR. Transition meds back to po.   - VSS and remains afebrile. Engrafting (ANC 3.24). Mucositis improving, continue to encourage increasing PO intake. Encourage OOB to chair/ambulation with physical therapy. Will D/C Dilaudid and switch to PO oxycodone prn. Discharge planning.     1. Infectious Disease:   acyclovir Suspension 800 milliGRAM(s) Oral every 12 hours  letermovir 480 milliGRAM(s) Oral daily  levoFLOXacin IVPB 500 milliGRAM(s) IV Intermittent every 24 hours  posaconazole DR Tablet 300 milliGRAM(s) Oral daily  vancomycin Solution 125 milliGRAM(s) Oral every 12 hours    2. VOD Prophylaxis:   ursodiol Suspension 300 milliGRAM(s) Oral two times a day    3. GI Prophylaxis:    pantoprazole  Injectable 40 milliGRAM(s) IV q12h    4. Mouthcare - NS / NaHCO3 rinses, Peridex, Biotene; Skin care     5. GVHD prophylaxis   PTCy days +3, +4  Bortezomib days 0, + 3  Abatacept days +5, +14, +28     6. Transfuse & replete electrolytes prn   Hypokalemia - repleted.    7. IV hydration, daily weights, strict I&O, prn diuresis     8. PO intake as tolerated, nutrition follow up as needed    9. Antiemetics, anti-diarrhea medications:   loperamide 2 milliGRAM(s) Oral every 6 hours PRN  ondansetron Injectable 8 milliGRAM(s) IV Push every 8 hours PRN  prochlorperazine Injectable 10 milliGRAM(s) IV Push every 6 hours    10. OOB as tolerated, physical therapy consult if needed     11. Monitor coags  2x week, vitamin K BIW   phytonadione Solution 5 milliGRAM(s) Oral <User Schedule>    12. Monitor closely for clinical changes, monitor for fevers     13. Emotional support provided, plan of care discussed and questions addressed     14. Patient education done regarding plan of care, restrictions and discharge planning     15. Continue regular social work input     I have written the above note for Dr. Gandhi who performed service with me in the room.   Bobo Barriga PA-C ([phone])    I have seen and examined patient with PA, I agree with above note as scribed.

## 2025-07-13 NOTE — PROGRESS NOTE ADULT - NS ATTEND AMEND GEN_ALL_CORE FT
Day + 15  s/p allo-HSCT ..flu/bu/TT/ratg...ABC on protocol    Overall, patient is fatigued, complains of mouth pain, leg "heaviness" due to fluid overload. More cooperative  She is not ambulating, staying in bed majority of the day. Limited PO intake. some difficulty swallowing, nausea..taking po meds  Course complicated by CRS; now resolved  Hypertensive - on three antihypertensive agents; possibly component of opioid withdrawal (fentanyl patch 50mg at admission, now at 12.5mg) - monitor closely and use of PRN as needed. Increased metoprolol and lisinopril pm 7/5/25..pt npt taking po bp meds..will give hydralazine prn with lasix and albumin prn..pt took bp meds today..stop norvasc..restart HCTZ  Physical exam is remarkable for mucositis; +1 b/l LE edema;   Labs reviewed; appropriate  Continue PRN diuresis - discussed that ambulation and OOB to chair is necessary.   Discussed close monitoring of In/Outs  Discussed limiting use of PRN opioids especially given that we are titrating down fentanyl patch. Cont 12.5 mcg q 48hrs  PT consulted; encouraged out of bed to chair as much as possible during the day  continue supportive care and antimicrobial ppx.  on zarxio..wbc recovering  added zyprexa for nausea. will decrease dose..cont prn antiemetics Day + 16  s/p allo-HSCT ..flu/bu/TT/ratg...ABC on protocol    Overall, patient is fatigued, complains of improved mouth pain, leg "heaviness" due to fluid overload. More cooperative  She is  ambulating more.. Limited PO intake.but improving.. some difficulty swallowing, nausea..taking po meds  Course complicated by CRS; now resolved  Hypertensive - on three antihypertensive agents; possibly component of opioid withdrawal (fentanyl patch 50mg at admission, now at 12.5mg) - monitor closely and use of PRN as needed. Increased metoprolol and lisinopril pm 7/5/25..pt was not taking po bp meds.gave hydralazine prn with lasix and albumin prn..pt took bp meds today..stop norvasc..restart HCTZ  Physical exam is remarkable for mucositis; +1 b/l LE edema;   Labs reviewed; appropriate  Continue PRN diuresis - discussed that ambulation and OOB to chair is necessary.   Discussed close monitoring of In/Outs..lasix today  Discussed limiting use of PRN opioids . Cont 12.5 mcg q 48hrs..switch to oxy prn breakthrough  PT consulted; encouraged out of bed to chair as much as possible during the day  continue supportive care and antimicrobial ppx.  on zarxio..wbc recovering  added zyprexa for nausea. will decrease dose....stop monday.cont prn antiemetics  brother on phone today..started d/c instruction..possibly for wed  pt has temp tlc..will need to be removed prior to d/c  plts are recovering..may be able to avoid another line placement  pt seen in IDR, labs and data reviewed  quests addressed

## 2025-07-14 LAB
ALBUMIN SERPL ELPH-MCNC: 2.8 G/DL — LOW (ref 3.3–5)
ALP SERPL-CCNC: 97 U/L — SIGNIFICANT CHANGE UP (ref 40–120)
ALT FLD-CCNC: <5 U/L — LOW (ref 10–45)
ANION GAP SERPL CALC-SCNC: 12 MMOL/L — SIGNIFICANT CHANGE UP (ref 5–17)
ANISOCYTOSIS BLD QL: SLIGHT — SIGNIFICANT CHANGE UP
APTT BLD: 28.7 SEC — SIGNIFICANT CHANGE UP (ref 26.1–36.8)
AST SERPL-CCNC: 5 U/L — LOW (ref 10–40)
BASOPHILS # BLD AUTO: 0.06 K/UL — SIGNIFICANT CHANGE UP (ref 0–0.2)
BASOPHILS # BLD MANUAL: 0 K/UL — SIGNIFICANT CHANGE UP (ref 0–0.2)
BASOPHILS NFR BLD AUTO: 1 % — SIGNIFICANT CHANGE UP (ref 0–2)
BASOPHILS NFR BLD MANUAL: 0 % — SIGNIFICANT CHANGE UP (ref 0–2)
BILIRUB DIRECT SERPL-MCNC: 0.2 MG/DL — SIGNIFICANT CHANGE UP (ref 0–0.3)
BILIRUB INDIRECT FLD-MCNC: 0.3 MG/DL — SIGNIFICANT CHANGE UP (ref 0.2–1)
BILIRUB SERPL-MCNC: 0.5 MG/DL — SIGNIFICANT CHANGE UP (ref 0.2–1.2)
BLD GP AB SCN SERPL QL: NEGATIVE — SIGNIFICANT CHANGE UP
BUN SERPL-MCNC: 5 MG/DL — LOW (ref 7–23)
CALCIUM SERPL-MCNC: 8.2 MG/DL — LOW (ref 8.4–10.5)
CHLORIDE SERPL-SCNC: 102 MMOL/L — SIGNIFICANT CHANGE UP (ref 96–108)
CMV DNA CSF QL NAA+PROBE: SIGNIFICANT CHANGE UP IU/ML
CMV DNA SPEC NAA+PROBE-LOG#: SIGNIFICANT CHANGE UP LOG10IU/ML
CO2 SERPL-SCNC: 25 MMOL/L — SIGNIFICANT CHANGE UP (ref 22–31)
CREAT SERPL-MCNC: 0.53 MG/DL — SIGNIFICANT CHANGE UP (ref 0.5–1.3)
DACRYOCYTES BLD QL SMEAR: SLIGHT — SIGNIFICANT CHANGE UP
EGFR: 104 ML/MIN/1.73M2 — SIGNIFICANT CHANGE UP
EGFR: 104 ML/MIN/1.73M2 — SIGNIFICANT CHANGE UP
ELLIPTOCYTES BLD QL SMEAR: SLIGHT — SIGNIFICANT CHANGE UP
EOSINOPHIL # BLD AUTO: 0 K/UL — SIGNIFICANT CHANGE UP (ref 0–0.5)
EOSINOPHIL # BLD MANUAL: 0 K/UL — SIGNIFICANT CHANGE UP (ref 0–0.5)
EOSINOPHIL NFR BLD AUTO: 0 % — SIGNIFICANT CHANGE UP (ref 0–6)
EOSINOPHIL NFR BLD MANUAL: 0 % — SIGNIFICANT CHANGE UP (ref 0–6)
GLUCOSE SERPL-MCNC: 80 MG/DL — SIGNIFICANT CHANGE UP (ref 70–99)
HCT VFR BLD CALC: 21.6 % — LOW (ref 34.5–45)
HGB BLD-MCNC: 7 G/DL — CRITICAL LOW (ref 11.5–15.5)
IMM GRANULOCYTES # BLD AUTO: 0.41 K/UL — HIGH (ref 0–0.07)
IMM GRANULOCYTES NFR BLD AUTO: 6.8 % — HIGH (ref 0–0.9)
IMMATURE PLATELET FRACTION #: 2.7 K/UL — LOW (ref 4.7–11.1)
IMMATURE PLATELET FRACTION %: 7.6 % — HIGH (ref 1.6–4.9)
INR BLD: 1.25 RATIO — HIGH (ref 0.85–1.16)
LDH SERPL L TO P-CCNC: 181 U/L — SIGNIFICANT CHANGE UP (ref 50–242)
LYMPHOCYTES # BLD AUTO: 0.02 K/UL — LOW (ref 1–3.3)
LYMPHOCYTES # BLD MANUAL: 0 K/UL — LOW (ref 1–3.3)
LYMPHOCYTES NFR BLD AUTO: 0.3 % — LOW (ref 13–44)
LYMPHOCYTES NFR BLD MANUAL: 0 % — LOW (ref 13–44)
MAGNESIUM SERPL-MCNC: 1.6 MG/DL — SIGNIFICANT CHANGE UP (ref 1.6–2.6)
MANUAL METAMYELOCYTE #: 0.05 K/UL — HIGH (ref 0–0)
MANUAL NEUTROPHIL BANDS #: 0.31 K/UL — SIGNIFICANT CHANGE UP (ref 0–0.84)
MANUAL NRBC #: 0.12 K/UL — HIGH (ref 0–0)
MCHC RBC-ENTMCNC: 28.2 PG — SIGNIFICANT CHANGE UP (ref 27–34)
MCHC RBC-ENTMCNC: 32.4 G/DL — SIGNIFICANT CHANGE UP (ref 32–36)
MCV RBC AUTO: 87.1 FL — SIGNIFICANT CHANGE UP (ref 80–100)
METAMYELOCYTES # FLD: 0.8 % — HIGH (ref 0–0)
METAMYELOCYTES NFR BLD: 0.8 % — HIGH (ref 0–0)
MONOCYTES # BLD AUTO: 0.65 K/UL — SIGNIFICANT CHANGE UP (ref 0–0.9)
MONOCYTES # BLD MANUAL: 0.1 K/UL — SIGNIFICANT CHANGE UP (ref 0–0.9)
MONOCYTES NFR BLD AUTO: 10.7 % — SIGNIFICANT CHANGE UP (ref 2–14)
MONOCYTES NFR BLD MANUAL: 1.7 % — LOW (ref 2–14)
NEUTROPHILS # BLD AUTO: 4.93 K/UL — SIGNIFICANT CHANGE UP (ref 1.8–7.4)
NEUTROPHILS # BLD MANUAL: 5.61 K/UL — SIGNIFICANT CHANGE UP (ref 1.8–7.4)
NEUTROPHILS NFR BLD AUTO: 81.2 % — HIGH (ref 43–77)
NEUTROPHILS NFR BLD MANUAL: 92.4 % — HIGH (ref 43–77)
NEUTS BAND # BLD: 5.1 % — SIGNIFICANT CHANGE UP (ref 0–8)
NEUTS BAND NFR BLD: 5.1 % — SIGNIFICANT CHANGE UP (ref 0–8)
NRBC # BLD AUTO: 0 K/UL — SIGNIFICANT CHANGE UP (ref 0–0)
NRBC # BLD: 2 /100 WBCS — HIGH (ref 0–0)
NRBC # FLD: 0 K/UL — SIGNIFICANT CHANGE UP (ref 0–0)
NRBC BLD AUTO-RTO: 0 /100 WBCS — SIGNIFICANT CHANGE UP (ref 0–0)
NRBC BLD-RTO: 2 /100 WBCS — HIGH (ref 0–0)
OVALOCYTES BLD QL SMEAR: SLIGHT — SIGNIFICANT CHANGE UP
PHOSPHATE SERPL-MCNC: 3.3 MG/DL — SIGNIFICANT CHANGE UP (ref 2.5–4.5)
PLAT MORPH BLD: NORMAL — SIGNIFICANT CHANGE UP
PLATELET # BLD AUTO: 36 K/UL — LOW (ref 150–400)
PMV BLD: SIGNIFICANT CHANGE UP FL (ref 7–13)
POIKILOCYTOSIS BLD QL AUTO: SLIGHT — SIGNIFICANT CHANGE UP
POTASSIUM SERPL-MCNC: 3.1 MMOL/L — LOW (ref 3.5–5.3)
POTASSIUM SERPL-SCNC: 3.1 MMOL/L — LOW (ref 3.5–5.3)
PROT SERPL-MCNC: 4.8 G/DL — LOW (ref 6–8.3)
PROTHROM AB SERPL-ACNC: 14.3 SEC — HIGH (ref 9.9–13.4)
RBC # BLD: 2.48 M/UL — LOW (ref 3.8–5.2)
RBC # FLD: 15.3 % — HIGH (ref 10.3–14.5)
RBC BLD AUTO: ABNORMAL
RH IG SCN BLD-IMP: POSITIVE — SIGNIFICANT CHANGE UP
SCHISTOCYTES BLD QL AUTO: SLIGHT — SIGNIFICANT CHANGE UP
SODIUM SERPL-SCNC: 139 MMOL/L — SIGNIFICANT CHANGE UP (ref 135–145)
WBC # BLD: 6.07 K/UL — SIGNIFICANT CHANGE UP (ref 3.8–10.5)
WBC # FLD AUTO: 6.07 K/UL — SIGNIFICANT CHANGE UP (ref 3.8–10.5)

## 2025-07-14 PROCEDURE — 85025 COMPLETE CBC W/AUTO DIFF WBC: CPT

## 2025-07-14 PROCEDURE — 86850 RBC ANTIBODY SCREEN: CPT

## 2025-07-14 PROCEDURE — 87640 STAPH A DNA AMP PROBE: CPT

## 2025-07-14 PROCEDURE — P9011: CPT

## 2025-07-14 PROCEDURE — 93005 ELECTROCARDIOGRAM TRACING: CPT

## 2025-07-14 PROCEDURE — 97161 PT EVAL LOW COMPLEX 20 MIN: CPT

## 2025-07-14 PROCEDURE — 99231 SBSQ HOSP IP/OBS SF/LOW 25: CPT

## 2025-07-14 PROCEDURE — 85049 AUTOMATED PLATELET COUNT: CPT

## 2025-07-14 PROCEDURE — P9040: CPT

## 2025-07-14 PROCEDURE — 97530 THERAPEUTIC ACTIVITIES: CPT

## 2025-07-14 PROCEDURE — 80048 BASIC METABOLIC PNL TOTAL CA: CPT

## 2025-07-14 PROCEDURE — 86985 SPLIT BLOOD OR PRODUCTS: CPT

## 2025-07-14 PROCEDURE — 86900 BLOOD TYPING SEROLOGIC ABO: CPT

## 2025-07-14 PROCEDURE — 93970 EXTREMITY STUDY: CPT

## 2025-07-14 PROCEDURE — 0225U NFCT DS DNA&RNA 21 SARSCOV2: CPT

## 2025-07-14 PROCEDURE — 86901 BLOOD TYPING SEROLOGIC RH(D): CPT

## 2025-07-14 PROCEDURE — 76937 US GUIDE VASCULAR ACCESS: CPT

## 2025-07-14 PROCEDURE — C1769: CPT

## 2025-07-14 PROCEDURE — 97116 GAIT TRAINING THERAPY: CPT

## 2025-07-14 PROCEDURE — 87077 CULTURE AEROBIC IDENTIFY: CPT

## 2025-07-14 PROCEDURE — 87799 DETECT AGENT NOS DNA QUANT: CPT

## 2025-07-14 PROCEDURE — 83735 ASSAY OF MAGNESIUM: CPT

## 2025-07-14 PROCEDURE — 84100 ASSAY OF PHOSPHORUS: CPT

## 2025-07-14 PROCEDURE — 97110 THERAPEUTIC EXERCISES: CPT

## 2025-07-14 PROCEDURE — 87641 MR-STAPH DNA AMP PROBE: CPT

## 2025-07-14 PROCEDURE — 99233 SBSQ HOSP IP/OBS HIGH 50: CPT

## 2025-07-14 PROCEDURE — 87040 BLOOD CULTURE FOR BACTERIA: CPT

## 2025-07-14 PROCEDURE — 85610 PROTHROMBIN TIME: CPT

## 2025-07-14 PROCEDURE — C1751: CPT

## 2025-07-14 PROCEDURE — C1887: CPT

## 2025-07-14 PROCEDURE — P9047: CPT

## 2025-07-14 PROCEDURE — 83615 LACTATE (LD) (LDH) ENZYME: CPT

## 2025-07-14 PROCEDURE — 80053 COMPREHEN METABOLIC PANEL: CPT

## 2025-07-14 PROCEDURE — 82272 OCCULT BLD FECES 1-3 TESTS: CPT

## 2025-07-14 PROCEDURE — 77001 FLUOROGUIDE FOR VEIN DEVICE: CPT

## 2025-07-14 PROCEDURE — 86923 COMPATIBILITY TEST ELECTRIC: CPT

## 2025-07-14 PROCEDURE — 71045 X-RAY EXAM CHEST 1 VIEW: CPT

## 2025-07-14 PROCEDURE — 82955 ASSAY OF G6PD ENZYME: CPT

## 2025-07-14 PROCEDURE — 87637 SARSCOV2&INF A&B&RSV AMP PRB: CPT

## 2025-07-14 PROCEDURE — P9037: CPT

## 2025-07-14 PROCEDURE — 81001 URINALYSIS AUTO W/SCOPE: CPT

## 2025-07-14 PROCEDURE — 85730 THROMBOPLASTIN TIME PARTIAL: CPT

## 2025-07-14 PROCEDURE — 36415 COLL VENOUS BLD VENIPUNCTURE: CPT

## 2025-07-14 PROCEDURE — 36556 INSERT NON-TUNNEL CV CATH: CPT

## 2025-07-14 PROCEDURE — 82248 BILIRUBIN DIRECT: CPT

## 2025-07-14 PROCEDURE — C1894: CPT

## 2025-07-14 PROCEDURE — P9100: CPT

## 2025-07-14 PROCEDURE — 87086 URINE CULTURE/COLONY COUNT: CPT

## 2025-07-14 RX ORDER — ATOVAQUONE 750 MG/5ML
1500 SUSPENSION ORAL DAILY
Refills: 0 | Status: DISCONTINUED | OUTPATIENT
Start: 2025-07-14 | End: 2025-07-15

## 2025-07-14 RX ADMIN — OXYCODONE HYDROCHLORIDE 5 MILLIGRAM(S): 30 TABLET ORAL at 10:30

## 2025-07-14 RX ADMIN — Medication 10 MILLILITER(S): at 11:57

## 2025-07-14 RX ADMIN — Medication 800 MILLIGRAM(S): at 18:12

## 2025-07-14 RX ADMIN — Medication 10 MILLILITER(S): at 19:46

## 2025-07-14 RX ADMIN — Medication 5 MILLILITER(S): at 15:49

## 2025-07-14 RX ADMIN — Medication 1 DROP(S): at 05:03

## 2025-07-14 RX ADMIN — OXYCODONE HYDROCHLORIDE 5 MILLIGRAM(S): 30 TABLET ORAL at 09:39

## 2025-07-14 RX ADMIN — TOUCHLESS CARE ZINC OXIDE PROTECTANT 1 APPLICATION(S): 20; 25 SPRAY TOPICAL at 05:04

## 2025-07-14 RX ADMIN — Medication 10 MILLILITER(S): at 15:48

## 2025-07-14 RX ADMIN — LETERMOVIR 480 MILLIGRAM(S): 480 TABLET, FILM COATED ORAL at 11:38

## 2025-07-14 RX ADMIN — OXYCODONE HYDROCHLORIDE 5 MILLIGRAM(S): 30 TABLET ORAL at 23:43

## 2025-07-14 RX ADMIN — Medication 40 MILLIGRAM(S): at 18:05

## 2025-07-14 RX ADMIN — Medication 5 MILLILITER(S): at 23:02

## 2025-07-14 RX ADMIN — METOPROLOL SUCCINATE 25 MILLIGRAM(S): 50 TABLET, EXTENDED RELEASE ORAL at 05:03

## 2025-07-14 RX ADMIN — Medication 500 MILLILITER(S): at 12:30

## 2025-07-14 RX ADMIN — Medication 5 MILLILITER(S): at 19:46

## 2025-07-14 RX ADMIN — Medication 10 MILLILITER(S): at 07:50

## 2025-07-14 RX ADMIN — ATOVAQUONE 1500 MILLIGRAM(S): 750 SUSPENSION ORAL at 11:37

## 2025-07-14 RX ADMIN — LISINOPRIL 20 MILLIGRAM(S): 5 TABLET ORAL at 18:06

## 2025-07-14 RX ADMIN — Medication 1 PATCH: at 07:45

## 2025-07-14 RX ADMIN — Medication 5 MILLILITER(S): at 07:37

## 2025-07-14 RX ADMIN — OXYCODONE HYDROCHLORIDE 5 MILLIGRAM(S): 30 TABLET ORAL at 15:45

## 2025-07-14 RX ADMIN — Medication 10 MILLILITER(S): at 23:03

## 2025-07-14 RX ADMIN — Medication 1 PATCH: at 19:46

## 2025-07-14 RX ADMIN — Medication 40 MILLIEQUIVALENT(S): at 13:00

## 2025-07-14 RX ADMIN — URSODIOL 300 MILLIGRAM(S): 300 CAPSULE ORAL at 05:05

## 2025-07-14 RX ADMIN — LOPERAMIDE HCL 2 MILLIGRAM(S): 1 SOLUTION ORAL at 18:21

## 2025-07-14 RX ADMIN — TOUCHLESS CARE ZINC OXIDE PROTECTANT 1 APPLICATION(S): 20; 25 SPRAY TOPICAL at 22:43

## 2025-07-14 RX ADMIN — Medication 1 DROP(S): at 11:37

## 2025-07-14 RX ADMIN — Medication 15 MILLILITER(S): at 05:03

## 2025-07-14 RX ADMIN — OXYCODONE HYDROCHLORIDE 5 MILLIGRAM(S): 30 TABLET ORAL at 16:40

## 2025-07-14 RX ADMIN — Medication 1 DROP(S): at 18:05

## 2025-07-14 RX ADMIN — Medication 1 APPLICATION(S): at 07:50

## 2025-07-14 RX ADMIN — Medication 5 MILLIGRAM(S): at 07:37

## 2025-07-14 RX ADMIN — OXYCODONE HYDROCHLORIDE 5 MILLIGRAM(S): 30 TABLET ORAL at 22:43

## 2025-07-14 RX ADMIN — URSODIOL 300 MILLIGRAM(S): 300 CAPSULE ORAL at 18:18

## 2025-07-14 RX ADMIN — OXYCODONE HYDROCHLORIDE 5 MILLIGRAM(S): 30 TABLET ORAL at 01:50

## 2025-07-14 RX ADMIN — Medication 1 DROP(S): at 23:02

## 2025-07-14 RX ADMIN — BUPROPION HYDROBROMIDE 300 MILLIGRAM(S): 522 TABLET, EXTENDED RELEASE ORAL at 11:38

## 2025-07-14 RX ADMIN — LISINOPRIL 20 MILLIGRAM(S): 5 TABLET ORAL at 05:03

## 2025-07-14 RX ADMIN — TOUCHLESS CARE ZINC OXIDE PROTECTANT 1 APPLICATION(S): 20; 25 SPRAY TOPICAL at 13:45

## 2025-07-14 RX ADMIN — POSACONAZOLE 300 MILLIGRAM(S): 100 TABLET, DELAYED RELEASE ORAL at 11:38

## 2025-07-14 RX ADMIN — Medication 15 MILLILITER(S): at 18:06

## 2025-07-14 RX ADMIN — Medication 5 MILLILITER(S): at 11:37

## 2025-07-14 RX ADMIN — OXYCODONE HYDROCHLORIDE 5 MILLIGRAM(S): 30 TABLET ORAL at 02:50

## 2025-07-14 RX ADMIN — Medication 40 MILLIGRAM(S): at 05:04

## 2025-07-14 RX ADMIN — Medication 800 MILLIGRAM(S): at 05:04

## 2025-07-14 RX ADMIN — Medication 88 MICROGRAM(S): at 05:03

## 2025-07-14 NOTE — PROGRESS NOTE ADULT - SUBJECTIVE AND OBJECTIVE BOX
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    O: Vitals:   Vital Signs Last 24 Hrs  T(C): 36.9 (14 Jul 2025 05:00), Max: 37.3 (13 Jul 2025 18:02)  T(F): 98.5 (14 Jul 2025 05:00), Max: 99.1 (13 Jul 2025 18:02)  HR: 81 (14 Jul 2025 05:00) (81 - 87)  BP: 145/65 (14 Jul 2025 05:00) (138/60 - 152/70)  BP(mean): --  RR: 18 (14 Jul 2025 05:00) (18 - 18)  SpO2: 96% (14 Jul 2025 05:00) (96% - 98%)    Parameters below as of 14 Jul 2025 05:00  Patient On (Oxygen Delivery Method): room air    Admit weight: 65.5kg   Daily Weight in kG:  (14 Jul 2025)    Intake / Output:   07-13 @ 07:01  -  07-14 @ 07:00  --------------------------------------------------------  IN: 540 mL / OUT: 3600 mL / NET: -3060 mL      PE:   Oropharynx: + erythema + ulcerations posterior pharynx   Oral Mucositis: +                                                  rdGrdrrdarddrderd:rd3rd CVS: S1, S2 RRR   Lungs: CTA throughout bilaterally   Abdomen: + BS x 4, soft, NT, ND   Extremities: trace edema RUE, 5/5 strenght BUE's  Gastric Mucositis:                 -                              Grade: n/a   Intestinal Mucositis:              -                              Grade: n/a   Skin: no rash, ecchymotic patches BLE's   TLC: CDI   Neuro: A&O x 3      Labs:               Cultures:   Culture Results: <10,000 CFU/mL Normal Urogenital Cha (06.28.25 @ 06:31)    Culture Results: No growth at 5 days(06.27.25 @ 16:36)    Culture Results: No growth at  5 days (06.27.25 @ 16:30)    Respiratory Viral Panel with COVID-19 by [facility] (06.20.25 @ 21:02)   Rapid RVP Result: [id number]: NotDetec:    Culture - Blood (06.20.25 @ 18:45)   Specimen Source: Blood Blood-Catheter    Culture Results: No growth at Final  Culture - Blood (06.20.25 @ 18:40)   Specimen Source: Blood Blood-Peripheral  Culture Results: No growth     Culture - Urine (06.20.25 @ 21:01)   Specimen Source: Clean Catch Clean Catch (Midstream)  Culture Results: 10,000 - 49,000 CFU/mL Gram Positive Cocci in Pairs and Chains   <10,000 CFU/ml Normal Urogenital cha present    Radiology:   ACC: [id number] EXAM:  XR CHEST PORTABLE URGENT 1V   PROCEDURE DATE:  06/27/2025    IMPRESSION: No focal consolidation.      Meds:   Antimicrobials:   acyclovir    Suspension 800 milliGRAM(s) Oral every 12 hours  letermovir 480 milliGRAM(s) Oral daily  posaconazole DR Tablet 300 milliGRAM(s) Oral daily      Heme / Onc:       GI:  loperamide 2 milliGRAM(s) Oral every 6 hours PRN  pantoprazole  Injectable 40 milliGRAM(s) IV Push two times a day  polyethylene glycol 3350 17 Gram(s) Oral daily PRN  sodium bicarbonate Mouth Rinse 10 milliLiter(s) Swish and Spit five times a day  ursodiol Suspension 300 milliGRAM(s) Oral two times a day      Cardiovascular:   hydrochlorothiazide 25 milliGRAM(s) Oral two times a day  lisinopril 20 milliGRAM(s) Oral <User Schedule>  lisinopril 20 milliGRAM(s) Oral <User Schedule>  metoprolol succinate ER 25 milliGRAM(s) Oral daily      Immunologic:   filgrastim-sndz (ZARXIO) Injectable 300 MICROGram(s) SubCutaneous every 24 hours      Other medications:   artificial tears (preservative free) Ophthalmic Solution 1 Drop(s) Both EYES four times a day  Biotene Dry Mouth Oral Rinse 5 milliLiter(s) Swish and Spit five times a day  buPROPion XL (24-Hour) . 300 milliGRAM(s) Oral daily  chlorhexidine 0.12% Liquid 15 milliLiter(s) Swish and Spit two times a day  chlorhexidine 4% Liquid 1 Application(s) Topical <User Schedule>  fentaNYL   Patch  12 MICROgram(s)/Hr. 1 Patch Transdermal every 48 hours  levothyroxine 88 MICROGram(s) Oral daily  phytonadione   Solution 5 milliGRAM(s) Oral <User Schedule>  prochlorperazine   Injectable 10 milliGRAM(s) IV Push every 6 hours  zinc oxide 40% Paste 1 Application(s) Topical three times a day      PRN:   acetaminophen     Tablet .. 650 milliGRAM(s) Oral every 6 hours PRN  FIRST- Mouthwash  BLM 15 milliLiter(s) Swish and Swallow five times a day PRN  lidocaine 2% Viscous 15 milliLiter(s) Oral five times a day PRN  loperamide 2 milliGRAM(s) Oral every 6 hours PRN  ondansetron Injectable 8 milliGRAM(s) IV Push every 8 hours PRN  oxyCODONE    IR 5 milliGRAM(s) Oral every 6 hours PRN  polyethylene glycol 3350 17 Gram(s) Oral daily PRN  sodium chloride 0.9% lock flush 10 milliLiter(s) IV Push every 1 hour PRN      A/P: 64 year old female with hx of myelofibrosis presents for a MUD allogeneic SCT (DP Permissive) conditioning with Flu/Bu/Thiotepa/rATG.  Today is day +17  6/21- Thiotepa 1/2. Maintain skin precautions during thiotepa administration and for 48 hours after completion (days .6 through day -3). Chronic pain issues - continue home fentanyl patch. Febrile overnight, tmax 100.9. Cultures  pending. RVP negative. CXR pending official read, no obvious consolidations. Vital signs are stable.   6/23 D/C HCTZ. Fludarabine 1/3, Busulfan 1/3, rATG 1/3. No Tylenol on busulfan days. Continue keppra ppx for 24 hours post infusion of last dose of busulfan. No acute events overnight, vital signs are stable.   6/24- overnight patient was febrile during ATG, no cultures/abx as fever likely secondary to ATG. Patient was otherwise stable overnight. Fludarabine 2/3, Busulfan 2/3 and rATG 2/3. NO Tylenol or Azoles until Day 0. Continue with supportive care.  6/25- Fludarabine 3/3, busulfan 3/3, rATG 3/3. No acute events overnight. Vital signs are stable.   6/26 Switched Compazine to atc  6/27 VSS, afebrile. 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  6/28 VSS, persistent fevers likely due to CRS post-SCT, continues on zosyn. Cultures pending. Hypervolemia - lasix 40mg x 1, will continue strict I/Os. Started caspofungin due to persistent thrush.  6/29 overnight no acute events noted. VSS, febrile likely due to CRS - continue with supportive care. BCx/UCx NGTD x 24h.  6/30 VSS, intermittently febrile likely due to CRS, infectious workup negative so far. PTCy 1/2 today. I&O, daily weights, prn diuresis. Continue to monitor thrush (improving). Changed some meds to PO suspension due to mucositis. Continue with supportive care. Refractory nausea, added trial of zyprexa 5mg po QHS x 3 days. d/c primafit. Change voriconazole to posaconazole tomorrow.   7/1- No acute events overnight, vital signs are stable. Continue supportive care for chemotherapy induced oral mucositis.   7/2- No acute events overnight, vital signs are stable. Completed PTCy, hydration to be discontinued today. I&O, daily weights, prn diuresis. Continue supportive care. Fentanyl patch to be titrated down to 12 mcg / hr tomorrow  7/3 Starting Scopolamine patch to decrease oral secretions. Increased Lisinopril 20mg in am, 10mg PM.    7/4-No acute events; awaiting count recovery   7/5- no acute events overnight. Vital signs are stable. SBP remains elevated, lisinopril / metoprolol doses increased.   7/6: BMT team continues to educate the patient about the importance of medication taking. awaiting count recovery  7/7 Increased ppi q12h (+stool guaiac)  7/8 D/C'd Scopolamine patch  7/9 VSS awaiting count recovery  7/10 VSS WBC at 0.15 today; continue Zarxio. BMT team continues to encourage PO intake getting OOB and medication compliance. NA at 150: restarted DW5(2L free water deficit)    7/11- no acute events overnight. Vital signs are stable. d/c D5W, continue to encourage po intake. Early engraftment, check CMV PCR, VNTR. Transition meds back to po.   7/13- VSS and remains afebrile. Engrafting (ANC 3.24). Mucositis improving, continue to encourage increasing PO intake. Encourage OOB to chair/ambulation with physical therapy. Will D/C Dilaudid and switch to PO oxycodone prn. Discharge planning.   7/14- VSS and remains afebrile. Mucositis improving. Continue to encourage OOB with physical therapy and sitting in chair. Continue to encourage to increase PO intake daily.    1. Infectious Disease:   acyclovir Suspension 800 milliGRAM(s) Oral every 12 hours  letermovir 480 milliGRAM(s) Oral daily  levoFLOXacin IVPB 500 milliGRAM(s) IV Intermittent every 24 hours  posaconazole DR Tablet 300 milliGRAM(s) Oral daily  vancomycin Solution 125 milliGRAM(s) Oral every 12 hours    2. VOD Prophylaxis:   ursodiol Suspension 300 milliGRAM(s) Oral two times a day    3. GI Prophylaxis:    pantoprazole  Injectable 40 milliGRAM(s) IV q12h    4. Mouthcare - NS / NaHCO3 rinses, Peridex, Biotene; Skin care     5. GVHD prophylaxis   PTCy days +3, +4  Bortezomib days 0, + 3  Abatacept days +5, +14, +28     6. Transfuse & replete electrolytes prn    7. IV hydration, daily weights, strict I&O, prn diuresis     8. PO intake as tolerated, nutrition follow up as needed    9. Antiemetics, anti-diarrhea medications:   loperamide 2 milliGRAM(s) Oral every 6 hours PRN  ondansetron Injectable 8 milliGRAM(s) IV Push every 8 hours PRN  prochlorperazine Injectable 10 milliGRAM(s) IV Push every 6 hours    10. OOB as tolerated, physical therapy consult if needed     11. Monitor coags  2x week, vitamin K BIW   phytonadione Solution 5 milliGRAM(s) Oral <User Schedule>    12. Monitor closely for clinical changes, monitor for fevers     13. Emotional support provided, plan of care discussed and questions addressed     14. Patient education done regarding plan of care, restrictions and discharge planning     15. Continue regular social work input     I have written the above note for Dr. Norwood who performed service with me in the room.   Bobo Barriga PA-C ([phone])    I have seen and examined patient with PA, I agree with above note as scribed.                        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 prep regimen for treatment of myelofibrosis    Disease: myelofibrosis   Prep regimen: Flu / Bu / Thiotepa / rATG   GVHD ppx: ABC: PTCy days +3, +4, Bortezomib days 0, +3, PTCy days +5, +14, +28   ABO/CMV:   Recipient:   A+/CMV+   Donor:   B+/CMV+    S: Patient seen and examined with HPC Transplant Team:   Overnight no events noted.  Patient states that mucositis pain of the mouth/throat is improving, but still with intermittent pain.  Admits to buttock skin area pain.  Sitting OOB in chair this AM.    O: Vitals:   Vital Signs Last 24 Hrs  T(C): 36.9 (2025 05:00), Max: 37.3 (2025 18:02)  T(F): 98.5 (2025 05:00), Max: 99.1 (2025 18:02)  HR: 81 (2025 05:00) (81 - 87)  BP: 145/65 (2025 05:00) (138/60 - 152/70)  BP(mean): --  RR: 18 (2025 05:00) (18 - 18)  SpO2: 96% (2025 05:00) (96% - 98%)    Parameters below as of 2025 05:00  Patient On (Oxygen Delivery Method): room air    Admit weight: 65.5kg   Daily Weight in k.9  (2025)    Intake / Output:   -13 @ 07:  -  -14 @ 07:00  --------------------------------------------------------  IN: 540 mL / OUT: 3600 mL / NET: -3060 mL      PE:   Oropharynx: + erythema + ulcerations posterior pharynx   Oral Mucositis: +                                                  rdGrdrrdarddrderd:rd3rd CVS: S1, S2 RRR   Lungs: CTA throughout bilaterally   Abdomen: + BS x 4, soft, NT, ND   Extremities: trace edema RUE, 5/5 strenght BUE's  Gastric Mucositis:                 -                              Grade: n/a   Intestinal Mucositis:              -                              Grade: n/a   Skin: no rash, ecchymotic patches BLE's   TLC: CDI   Neuro: A&O x 3      Labs:   CBC Full  -  ( 2025 06:49 )  WBC Count : 6.07 K/uL  RBC Count : 2.48 M/uL  Hemoglobin : 7.0 g/dL  Hematocrit : 21.6 %  Platelet Count - Automated : 36 K/uL  Mean Cell Volume : 87.1 fl  Mean Cell Hemoglobin : 28.2 pg  Mean Cell Hemoglobin Concentration : 32.4 g/dL  Auto Neutrophil # : 4.93 K/uL  Auto Lymphocyte # : 0.02 K/uL  Auto Monocyte # : 0.65 K/uL  Auto Eosinophil # : 0.00 K/uL  Auto Basophil # : 0.06 K/uL  Auto Neutrophil % : 81.2 %  Auto Lymphocyte % : 0.3 %  Auto Monocyte % : 10.7 %  Auto Eosinophil % : 0.0 %  Auto Basophil % : 1.0 %                          7.0    6.07  )-----------( 36       ( 2025 06:49 )             21.6     07-14    139  |  102  |  5[L]  ----------------------------<  80  3.1[L]   |  25  |  0.53    Ca    8.2[L]      2025 06:50  Phos  3.3     07-14  Mg     1.6     -14    TPro  4.8[L]  /  Alb  2.8[L]  /  TBili  0.5  /  DBili  0.2  /  AST  5[L]  /  ALT  <5[L]  /  AlkPhos  97  07-14    Cultures:   Culture Results: <10,000 CFU/mL Normal Urogenital Cha (25 @ 06:31)    Culture Results: No growth at 5 days(25 @ 16:36)    Culture Results: No growth at  5 days (25 @ 16:30)    Respiratory Viral Panel with COVID-19 by [facility] (25 @ 21:02)   Rapid RVP Result: [id number]: NotDetec:    Culture - Blood (25 @ 18:45)   Specimen Source: Blood Blood-Catheter    Culture Results: No growth at Final  Culture - Blood (25 @ 18:40)   Specimen Source: Blood Blood-Peripheral  Culture Results: No growth     Culture - Urine (25 @ 21:01)   Specimen Source: Clean Catch Clean Catch (Midstream)  Culture Results: 10,000 - 49,000 CFU/mL Gram Positive Cocci in Pairs and Chains   <10,000 CFU/ml Normal Urogenital cha present    Radiology:   ACC: [id number] EXAM:  XR CHEST PORTABLE URGENT 1V   PROCEDURE DATE:  2025    IMPRESSION: No focal consolidation.      Meds:   Antimicrobials:   acyclovir    Suspension 800 milliGRAM(s) Oral every 12 hours  letermovir 480 milliGRAM(s) Oral daily  posaconazole DR Tablet 300 milliGRAM(s) Oral daily      Heme / Onc:       GI:  loperamide 2 milliGRAM(s) Oral every 6 hours PRN  pantoprazole  Injectable 40 milliGRAM(s) IV Push two times a day  polyethylene glycol 3350 17 Gram(s) Oral daily PRN  sodium bicarbonate Mouth Rinse 10 milliLiter(s) Swish and Spit five times a day  ursodiol Suspension 300 milliGRAM(s) Oral two times a day      Cardiovascular:   hydrochlorothiazide 25 milliGRAM(s) Oral two times a day  lisinopril 20 milliGRAM(s) Oral <User Schedule>  lisinopril 20 milliGRAM(s) Oral <User Schedule>  metoprolol succinate ER 25 milliGRAM(s) Oral daily      Immunologic:   filgrastim-sndz (ZARXIO) Injectable 300 MICROGram(s) SubCutaneous every 24 hours      Other medications:   artificial tears (preservative free) Ophthalmic Solution 1 Drop(s) Both EYES four times a day  Biotene Dry Mouth Oral Rinse 5 milliLiter(s) Swish and Spit five times a day  buPROPion XL (24-Hour) . 300 milliGRAM(s) Oral daily  chlorhexidine 0.12% Liquid 15 milliLiter(s) Swish and Spit two times a day  chlorhexidine 4% Liquid 1 Application(s) Topical <User Schedule>  fentaNYL   Patch  12 MICROgram(s)/Hr. 1 Patch Transdermal every 48 hours  levothyroxine 88 MICROGram(s) Oral daily  phytonadione   Solution 5 milliGRAM(s) Oral <User Schedule>  prochlorperazine   Injectable 10 milliGRAM(s) IV Push every 6 hours  zinc oxide 40% Paste 1 Application(s) Topical three times a day      PRN:   acetaminophen     Tablet .. 650 milliGRAM(s) Oral every 6 hours PRN  FIRST- Mouthwash  BLM 15 milliLiter(s) Swish and Swallow five times a day PRN  lidocaine 2% Viscous 15 milliLiter(s) Oral five times a day PRN  loperamide 2 milliGRAM(s) Oral every 6 hours PRN  ondansetron Injectable 8 milliGRAM(s) IV Push every 8 hours PRN  oxyCODONE    IR 5 milliGRAM(s) Oral every 6 hours PRN  polyethylene glycol 3350 17 Gram(s) Oral daily PRN  sodium chloride 0.9% lock flush 10 milliLiter(s) IV Push every 1 hour PRN      A/P: 64 year old female with hx of myelofibrosis presents for a MUD allogeneic SCT (DP Permissive) conditioning with Flu/Bu/Thiotepa/rATG.  Today is day +- Thiotepa 1/2. Maintain skin precautions during thiotepa administration and for 48 hours after completion (days .6 through day -3). Chronic pain issues - continue home fentanyl patch. Febrile overnight, tmax 100.9. Cultures  pending. RVP negative. CXR pending official read, no obvious consolidations. Vital signs are stable.    D/C HCTZ. Fludarabine 1/3, Busulfan 1/3, rATG 1/3. No Tylenol on busulfan days. Continue keppra ppx for 24 hours post infusion of last dose of busulfan. No acute events overnight, vital signs are stable.   - overnight patient was febrile during ATG, no cultures/abx as fever likely secondary to ATG. Patient was otherwise stable overnight. Fludarabine 2/3, Busulfan 2/3 and rATG 2/3. NO Tylenol or Azoles until Day 0. Continue with supportive care.  - Fludarabine 3/3, busulfan 3/3, rATG 3/3. No acute events overnight. Vital signs are stable.    Switched Compazine to atc   VSS, afebrile. 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   VSS, persistent fevers likely due to CRS post-SCT, continues on zosyn. Cultures pending. Hypervolemia - lasix 40mg x 1, will continue strict I/Os. Started caspofungin due to persistent thrush.   overnight no acute events noted. VSS, febrile likely due to CRS - continue with supportive care. BCx/UCx NGTD x 24h.   VSS, intermittently febrile likely due to CRS, infectious workup negative so far. PTCy / today. I&O, daily weights, prn diuresis. Continue to monitor thrush (improving). Changed some meds to PO suspension due to mucositis. Continue with supportive care. Refractory nausea, added trial of zyprexa 5mg po QHS x 3 days. d/c primafit. Change voriconazole to posaconazole tomorrow.   - No acute events overnight, vital signs are stable. Continue supportive care for chemotherapy induced oral mucositis.   - No acute events overnight, vital signs are stable. Completed PTCy, hydration to be discontinued today. I&O, daily weights, prn diuresis. Continue supportive care. Fentanyl patch to be titrated down to 12 mcg / hr tomorrow  7/3 Starting Scopolamine patch to decrease oral secretions. Increased Lisinopril 20mg in am, 10mg PM.    -No acute events; awaiting count recovery   - no acute events overnight. Vital signs are stable. SBP remains elevated, lisinopril / metoprolol doses increased.   : BMT team continues to educate the patient about the importance of medication taking. awaiting count recovery   Increased ppi q12h (+stool guaiac)   D/C'd Scopolamine patch   VSS awaiting count recovery  7/10 VSS WBC at 0.15 today; continue Zarxio. BMT team continues to encourage PO intake getting OOB and medication compliance. NA at 150: restarted DW5(2L free water deficit)    - no acute events overnight. Vital signs are stable. d/c D5W, continue to encourage po intake. Early engraftment, check CMV PCR, VNTR. Transition meds back to po.   - VSS and remains afebrile. Engrafting (ANC 3.24). Mucositis improving, continue to encourage increasing PO intake. Encourage OOB to chair/ambulation with physical therapy. Will D/C Dilaudid and switch to PO oxycodone prn. Discharge planning.   - VSS and remains afebrile. Mucositis improving. Continue to encourage OOB with physical therapy and sitting in chair. Continue to encourage to increase PO intake daily. Started on Atovaquone ppx (sulfa allergy). Discharge planning.    1. Infectious Disease:   acyclovir Suspension 800 milliGRAM(s) Oral every 12 hours  letermovir 480 milliGRAM(s) Oral daily  levoFLOXacin IVPB 500 milliGRAM(s) IV Intermittent every 24 hours  posaconazole DR Tablet 300 milliGRAM(s) Oral daily  vancomycin Solution 125 milliGRAM(s) Oral every 12 hours    2. VOD Prophylaxis:   ursodiol Suspension 300 milliGRAM(s) Oral two times a day    3. GI Prophylaxis:    pantoprazole  Injectable 40 milliGRAM(s) IV q12h    4. Mouthcare - NS / NaHCO3 rinses, Peridex, Biotene; Skin care     5. GVHD prophylaxis   PTCy days +3, +4  Bortezomib days 0, + 3  Abatacept days +5, +14, +28     6. Transfuse & replete electrolytes prn   Hypokalemia - repleted.    7. IV hydration, daily weights, strict I&O, prn diuresis     8. PO intake as tolerated, nutrition follow up as needed    9. Antiemetics, anti-diarrhea medications:   loperamide 2 milliGRAM(s) Oral every 6 hours PRN  ondansetron Injectable 8 milliGRAM(s) IV Push every 8 hours PRN  prochlorperazine Injectable 10 milliGRAM(s) IV Push every 6 hours    10. OOB as tolerated, physical therapy consult if needed     11. Monitor coags  2x week, vitamin K BIW   phytonadione Solution 5 milliGRAM(s) Oral <User Schedule>    12. Monitor closely for clinical changes, monitor for fevers     13. Emotional support provided, plan of care discussed and questions addressed     14. Patient education done regarding plan of care, restrictions and discharge planning     15. Continue regular social work input     I have written the above note for Dr. Norwood who performed service with me in the room.   Bobo Barriga PA-C ([phone])    I have seen and examined patient with PA, I agree with above note as scribed.

## 2025-07-14 NOTE — PROGRESS NOTE ADULT - NS ATTEND AMEND GEN_ALL_CORE FT
Day + 16  s/p allo-HSCT ..flu/bu/TT/ratg...ABC on protocol    Overall, patient is fatigued, complains of improved mouth pain, leg "heaviness" due to fluid overload. More cooperative  She is  ambulating more.. Limited PO intake.but improving.. some difficulty swallowing, nausea..taking po meds  Course complicated by CRS; now resolved  Hypertensive - on three antihypertensive agents; possibly component of opioid withdrawal (fentanyl patch 50mg at admission, now at 12.5mg) - monitor closely and use of PRN as needed. Increased metoprolol and lisinopril pm 7/5/25..pt was not taking po bp meds.gave hydralazine prn with lasix and albumin prn..pt took bp meds today..stop norvasc..restart HCTZ  Physical exam is remarkable for mucositis; +1 b/l LE edema;   Labs reviewed; appropriate  Continue PRN diuresis - discussed that ambulation and OOB to chair is necessary.   Discussed close monitoring of In/Outs..lasix today  Discussed limiting use of PRN opioids . Cont 12.5 mcg q 48hrs..switch to oxy prn breakthrough  PT consulted; encouraged out of bed to chair as much as possible during the day  continue supportive care and antimicrobial ppx.  on zarxio..wbc recovering  added zyprexa for nausea. will decrease dose....stop monday.cont prn antiemetics  brother on phone today..started d/c instruction..possibly for wed  pt has temp tlc..will need to be removed prior to d/c  plts are recovering..may be able to avoid another line placement  pt seen in IDR, labs and data reviewed  quests addressed I attest my time as PA/NP is greater than 50% of the total combined time spent on qualifying patient care activities by the PA/NP and attending.   Attending comments: I rounded with the team including nurses, ACPs and PharmD. I reviewed the data in depth. I saw and examined the patient myself. I reviewed and confirmed the above note.  The patient is a 64 yo patient, with history of MF, Here for MUD allogeneic HSCT with Flu/Bu/Thiotepa rATG Conditioning for the treatment of MF, day +17.  S: The patient complaints of odynophagia secondary to chemotherapy-induced mucositis, yet states that is improving. The patient is otherwise tolerating  therapy well.   O: The vitals are stable. The oral cavity has pale oral mucosa. The line site is clean. The lungs are clear. There is no LE edema. There is no skin rash but decreased skin turgor. Neuro exam AOX3, no focal deficit.   A/P:  Overall, there patient is improving. The patient is on appropriate therapy at this stage.   We will continue supportive measurements for mucositis.   Pancytopenia secondary to chemotherapy has improved, she has recovery of WBC and platelets, Hb 7.0.   I answered the patient’s questions.  I encouraged po intake and ambulation.  Plan to d/c tomorrow.   EBONIE Norwood MD.  Time-based billing (NON-critical care).   40 minutes spent on total encounter. The necessity of the time spent during the encounter on this date of service was due to: Complex transplant care.

## 2025-07-14 NOTE — CHART NOTE - NSCHARTNOTEFT_GEN_A_CORE
NUTRITION FOLLOW UP NOTE    PATIENT SEEN FOR: BMTU 1st Malnutrition follow up     SOURCE: [x] Patient  [x] Current Medical Record  [x] RN  [x] Family/support person at bedside  [] Patient unavailable/inappropriate  [x] Other: interdisciplinary rounds     CHART REVIEWED/EVENTS NOTED.  [] No changes to nutrition care plan to note  [x] Nutrition Status:  - s/p allogeneic SCT (DP Permissive) conditioning with Flu/Bu/Thiotepa/rATG, today is day +17.   -  early engraftment   -  Mucositis improving     DIET ORDER:   Diet, Regular:   Supplement Feeding Modality:  Oral  Ensure Max Cans or Servings Per Day:  1       Frequency:  Three Times a day (25 @ 18:00) [Active]        CURRENT DIET ORDER IS:  [x] Appropriate:  [] Inadequate:  [] Other:    NUTRITION INTAKE/PROVISION:  [x] PO: Pt reports continued poor appetite; reports she is unable to drink or eat anything in setting of persistent pain.   -able to tolerate some of the Ensure max, likes both vanilla and chocolate  -likes the smoothie with the peanut butter, dislikes other flavors  -Continue HP apple juice, HP jello, and ice cream/pudding to maximize intake  [] Enteral Nutrition:  [] Parenteral Nutrition:      ANTHROPOMETRICS:  Drug Dosing Weight  Height (cm): 159 (2025 07:13)  Weight (kg): 64.5 (2025 07:13)  BMI (kg/m2): 25.5 (2025 07:13)  BSA (m2): 1.67 (2025 07:13)  Weights:     Daily Weight in k.9 (2025 07:26), 63.3 (2025 08:26), 65.6 (-), Weight in k.4 (-), Weight in k.7 (), Weight in k.7 (), Weight in k.6 (), Weight in k.7 (), Weight in k.4 () *small wt fluctuations noted likely 2/2 edema    MEDICATIONS:  MEDICATIONS  (STANDING):  acyclovir   Oral Tab/Cap 800 milliGRAM(s) Oral every 12 hours  artificial tears (preservative free) Ophthalmic Solution 1 Drop(s) Both EYES four times a day  atovaquone  Suspension 1500 milliGRAM(s) Oral daily  Biotene Dry Mouth Oral Rinse 5 milliLiter(s) Swish and Spit five times a day  buPROPion XL (24-Hour) . 300 milliGRAM(s) Oral daily  chlorhexidine 0.12% Liquid 15 milliLiter(s) Swish and Spit two times a day  chlorhexidine 4% Liquid 1 Application(s) Topical <User Schedule>  fentaNYL   Patch  12 MICROgram(s)/Hr. 1 Patch Transdermal every 48 hours  hydrochlorothiazide 25 milliGRAM(s) Oral two times a day  letermovir 480 milliGRAM(s) Oral daily  levothyroxine 88 MICROGram(s) Oral daily  lisinopril 20 milliGRAM(s) Oral <User Schedule>  lisinopril 20 milliGRAM(s) Oral <User Schedule>  metoprolol succinate ER 25 milliGRAM(s) Oral daily  pantoprazole    Tablet 40 milliGRAM(s) Oral every 12 hours  phytonadione   Solution 5 milliGRAM(s) Oral <User Schedule>  posaconazole DR Tablet 300 milliGRAM(s) Oral daily  potassium chloride    Tablet ER 40 milliEquivalent(s) Oral every 4 hours  prochlorperazine   Injectable 10 milliGRAM(s) IV Push every 6 hours  sodium bicarbonate Mouth Rinse 10 milliLiter(s) Swish and Spit five times a day  ursodiol Suspension 300 milliGRAM(s) Oral two times a day  zinc oxide 40% Paste 1 Application(s) Topical three times a day    MEDICATIONS  (PRN):  acetaminophen     Tablet .. 650 milliGRAM(s) Oral every 6 hours PRN Temp greater or equal to 38C (100.4F), Mild Pain (1 - 3)  FIRST- Mouthwash  BLM 15 milliLiter(s) Swish and Swallow five times a day PRN mouth/ throat pain  lidocaine 2% Viscous 15 milliLiter(s) Oral five times a day PRN Mouth Care  loperamide 2 milliGRAM(s) Oral every 6 hours PRN Diarrhea  ondansetron Injectable 8 milliGRAM(s) IV Push every 8 hours PRN nuaea (2nd line)  oxyCODONE    IR 5 milliGRAM(s) Oral every 6 hours PRN Severe Pain (7 - 10)  polyethylene glycol 3350 17 Gram(s) Oral daily PRN Constipation  sodium chloride 0.9% lock flush 10 milliLiter(s) IV Push every 1 hour PRN Pre/post blood products, medications, blood draw, and to maintain line patency      NUTRITIONALLY PERTINENT LABS:   @ 06:50: Na 139, BUN 5[L], Cr 0.53, BG 80, K+ 3.1[L], Phos 3.3, Mg 1.6, Alk Phos 97, ALT/SGPT <5[L], AST/SGOT 5[L], HbA1c --        NUTRITIONALLY PERTINENT MEDICATIONS/LABS:  [x] Reviewed  [x] Relevant notes on medications/labs:  -biotene/magic mouthwash  -synthroid  -hypokalemic.     EDEMA:  [x] Reviewed  [x] Relevant notes: No peripheral edema noted per nursing flow sheets today.     GI/ I&O:  [x] Reviewed  [x] Relevant notes: Last BM:  per nursing flow sheets  [x] Other: Ordered for Miralax     SKIN:   [x] No pressure injuries documented, per nursing flowsheet  [] Pressure injury previously noted  [] Change in pressure injury documentation:  [] Other:      ESTIMATED NEEDS:  [x] No change:  [] Updated:  Energy:  5100-4405 kcal/day (30-35 kcal/kg)  Protein:  77.4-90.3 g/day (1.2-1.4 g/kg)  Fluid:   ml/day or [x] defer to team  Based on: dosing weight 64.5 kg    NUTRITION DIAGNOSIS:  [x] Prior Dx: 1) Severe acute on chronic malnutrition 2) increased nutrient needs  [] New Dx:     EDUCATION:  [x] Yes: Reviewed transplant food safety precautions and answered all questions as able. Discussed avoiding any take out/outside foods (including no bakery/deli items), emphasis on consuming home cooked or frozen meals. Discussed consuming bottled or filtered water. Discussed importance of adequate intake when home, including nutrient dense foods as part of diet, consuming small, frequent meals to optimize overall intake.   [] Not appropriate/warranted    NUTRITION CARE PLAN:  Recommendations:   1. Continue current diet order: regular diet   2. Continue protein-energy oral nutritional supplements to help optimize PO intake.   3. Recommend follow up visit with Transplant MD and outpatient RD for dietary modifications as warranted.       [] Achieved - Continue current nutrition intervention(s)  [] Current medical condition precludes nutrition intervention at this time.    MONITORING AND EVALUATION:   RD remains available upon request and will follow up per protocol.    Ann-Marie Gallagher RDN CDN (available on TEAMS)

## 2025-07-14 NOTE — CHART NOTE - NSCHARTNOTESELECT_GEN_ALL_CORE
Fever/Event Note
Nutrition Services
Stem Cell Infusion Note/Event Note

## 2025-07-14 NOTE — PROGRESS NOTE ADULT - ASSESSMENT
Assessment and Recommendation:   · Assessment    A/P:64 year old female with a history of essential thrombocytopenia (23 years) who was being followed by Dr. Croft at [facility]. Patient has been on hydrea for many years with no repsponse then treated with pegylated IFN alpha (Besrema) x 6 months, but became intolerant due to significant complications which required multiple hospitalizations in the fall-winter of 2024. On 11/30/24 patient presented to [facility] ED with worsening back pain, chest pain, SOB and productive cough requiring oxygen, as well as HF exacerbation. a CT was performed showing moderately large pericardial effusion, bilateral pleural effusions and suspected superimposed PNA. At that time her hospital course was complicated  with pericarditis likely secondary to pegylated interferon and pericardial effusions. 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 Patient was then sent to [facility] (12/12/24-12/20/2024). During that time a BMBx (12/11/24) was performed showing ET with 4-6% blasts, complex karyotype and NGS with JAK2. Patient then followed up with Dr. Dailey who performed a repeat BMBx (3/14/25) that was consistent with myelofibrosis. Now, patient presents for a MUD allogeneic SCT (DP Permissive) conditioning with Flu/Bu/Thiothepa/rATG and GVHD ppx with ABC. Patient feels well today and has no complaints at this time    Wound Consult requested to assist w/ management of  Incontinence associated dermatitis    Recommendations:   Buttocks/ Sacrum  continue cavilon daily Triad BID and prn soiling     Abx per Medicine/ ID  Moisturize intact skin w/ SWEEN cream BID  Appreciate nutrition     Continue turning and positioning w/ offloading assistive devices as per protocol         Continue w/ attends under pads and Pericare as per protocol  Waffle Cushion to chair when oob to chair  Heels appropriately offloaded with positioners  Continue w/ low air loss pressure redistribution bed surface   Care as per medicine, will follow w/ you  If needed upon discharge f/u as outpatient at [facility] [phone]  Seen and D/w team & RN  Thank you for this consult,  ENRIQUE Funk-BC, [facility]  [phone]  Nights/ Weekends/ Holidays please call:  General Surgery Consult pager ([phone]) for emergencies  Wound PT for multilayer leg wrapping or VAC issues ([phone])

## 2025-07-14 NOTE — PROGRESS NOTE ADULT - SUBJECTIVE AND OBJECTIVE BOX
[facility]-- WOUND TEAM -- FOLLOW UP NOTE  --------------------------------------------------------------------------------    24 hour events/subjective:    Afebrile  Last temp 36.9 C  Tolerating po w/o n/v  Chart reviewed  Hx of   64 year old female with a history of essential thrombocytopenia (23 years) who was being followed by Dr. Croft at [facility]. Patient has been on hydrea for many years with no repsponse then treated with pegylated IFN alpha (Besrema) x 6 months, but became intolerant due to significant complications which required multiple hospitalizations in the fall-winter of 2024. On 11/30/24 patient presented to [facility] ED with worsening back pain, chest pain, SOB and productive cough requiring oxygen, as well as HF exacerbation. a CT was performed showing moderately large pericardial effusion, bilateral pleural effusions and suspected superimposed PNA. At that time her hospital course was complicated  with pericarditis likely secondary to pegylated interferon and pericardial effusions. 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 Patient was then sent to [facility] (12/12/24-12/20/2024). During that time a BMBx (12/11/24) was performed showing ET with 4-6% blasts, complex karyotype and NGS with JAK2. Patient then followed up with Dr. Dailey who performed a repeat BMBx (3/14/25) that was consistent with myelofibrosis. Now, patient presents for a MUD allogeneic SCT (DP Permissive) conditioning with Flu/Bu/Thiothepa/rATG and GVHD ppx with ABC  Patient found in bed on low air loss specialty bed. I introduced myself and role. Safety maintained  throughout consult. All questions answered to the expressed satisfaction of patient  dc planning ongoing        Diet:  Diet, Regular:   Supplement Feeding Modality:  Oral  Ensure Max Cans or Servings Per Day:  1       Frequency:  Three Times a day (06-27-25 @ 18:00)      ROS: General/ Skin/ Msk/ Neuro/ GI see HPI  all other systems negative      ALLERGIES & MEDICATIONS  --------------------------------------------------------------------------------  Allergies    sulfa drugs (Vomiting; Nausea)  IV Contrast (Hives)    Intolerances          STANDING INPATIENT MEDICATIONS    acyclovir   Oral Tab/Cap 800 milliGRAM(s) Oral every 12 hours  artificial tears (preservative free) Ophthalmic Solution 1 Drop(s) Both EYES four times a day  atovaquone  Suspension 1500 milliGRAM(s) Oral daily  Biotene Dry Mouth Oral Rinse 5 milliLiter(s) Swish and Spit five times a day  buPROPion XL (24-Hour) . 300 milliGRAM(s) Oral daily  chlorhexidine 0.12% Liquid 15 milliLiter(s) Swish and Spit two times a day  chlorhexidine 4% Liquid 1 Application(s) Topical <User Schedule>  fentaNYL   Patch  12 MICROgram(s)/Hr. 1 Patch Transdermal every 48 hours  hydrochlorothiazide 25 milliGRAM(s) Oral two times a day  letermovir 480 milliGRAM(s) Oral daily  levothyroxine 88 MICROGram(s) Oral daily  lisinopril 20 milliGRAM(s) Oral <User Schedule>  lisinopril 20 milliGRAM(s) Oral <User Schedule>  metoprolol succinate ER 25 milliGRAM(s) Oral daily  pantoprazole    Tablet 40 milliGRAM(s) Oral every 12 hours  phytonadione   Solution 5 milliGRAM(s) Oral <User Schedule>  posaconazole DR Tablet 300 milliGRAM(s) Oral daily  potassium chloride    Tablet ER 40 milliEquivalent(s) Oral every 4 hours  prochlorperazine   Injectable 10 milliGRAM(s) IV Push every 6 hours  sodium bicarbonate Mouth Rinse 10 milliLiter(s) Swish and Spit five times a day  sodium chloride 0.9% Bolus 500 milliLiter(s) IV Bolus once  ursodiol Suspension 300 milliGRAM(s) Oral two times a day  zinc oxide 40% Paste 1 Application(s) Topical three times a day      PRN INPATIENT MEDICATION  acetaminophen     Tablet .. 650 milliGRAM(s) Oral every 6 hours PRN  FIRST- Mouthwash  BLM 15 milliLiter(s) Swish and Swallow five times a day PRN  lidocaine 2% Viscous 15 milliLiter(s) Oral five times a day PRN  loperamide 2 milliGRAM(s) Oral every 6 hours PRN  ondansetron Injectable 8 milliGRAM(s) IV Push every 8 hours PRN  oxyCODONE    IR 5 milliGRAM(s) Oral every 6 hours PRN  polyethylene glycol 3350 17 Gram(s) Oral daily PRN  sodium chloride 0.9% lock flush 10 milliLiter(s) IV Push every 1 hour PRN        VITALS/PHYSICAL EXAM  --------------------------------------------------------------------------------  T(C): 36.9 (07-14-25 @ 05:00), Max: 37.3 (07-13-25 @ 18:02)  HR: 81 (07-14-25 @ 05:00) (81 - 87)  BP: 145/65 (07-14-25 @ 05:00) (138/60 - 152/70)  RR: 18 (07-14-25 @ 05:00) (18 - 18)  SpO2: 96% (07-14-25 @ 05:00) (96% - 98%)  Wt(kg): --        07-13-25 @ 07:01  -  07-14-25 @ 07:00  --------------------------------------------------------  IN: 540 mL / OUT: 3600 mL / NET: -3060 mL    07-14-25 @ 07:01  -  07-14-25 @ 11:17  --------------------------------------------------------  IN: 475 mL / OUT: 250 mL / NET: 225 mL    NAD, A&Ox3/ Obese  WD/ WN  Versa Care P500 bed  HEENT: sclera clear, mucosa moist, throat clear, trachea midline, neck supple  Respiratory: nonlabored w/ equal chest rise  Gastrointestinal: soft NT/ND  : Deferred  Neurology:  verbal, can follow commands  Psych: calm/ appropriate  Musculoskeletal:  FROM, no deformities/ contractures  Vascular: BLE equally warm,  no cyanosis, clubbing,  nor acute ischemia             BLE edema equal  Skin:  moist w/ good turgor  BL buttocks sacral region moist, hyperpigmentation     BL buttocks friction injury, denuded, TTP       There is no blistering, drainage, no erythema, no increased warmth        no induration, fluctuance, nor crepitus          LABS/ CULTURES/ RADIOLOGY:                     7.0    6.07  >-----------<  36       [07-14-25 @ 06:49]              21.6     139  |  102  |  5   ----------------------------<  80      [07-14-25 @ 06:50]  3.1   |  25  |  0.53        Ca     8.2     [07-14-25 @ 06:50]      Mg     1.6     [07-14-25 @ 06:50]      Phos  3.3     [07-14-25 @ 06:50]    TPro  4.8  /  Alb  2.8  /  TBili  0.5  /  DBili  0.2  /  AST  5   /  ALT  <5  /  AlkPhos  97  [07-14-25 @ 06:50]    PT/INR: PT 14.3 , INR 1.25       [07-14-25 @ 06:52]  PTT: 28.7       [07-14-25 @ 06:52]          [07-14-25 @ 06:50]        CAPILLARY BLOOD GLUCOSE                              >>> <<<

## 2025-07-15 ENCOUNTER — APPOINTMENT (OUTPATIENT)
Dept: INTERNAL MEDICINE | Facility: CLINIC | Age: 65
End: 2025-07-15

## 2025-07-15 ENCOUNTER — TRANSCRIPTION ENCOUNTER (OUTPATIENT)
Age: 65
End: 2025-07-15

## 2025-07-15 VITALS
SYSTOLIC BLOOD PRESSURE: 168 MMHG | DIASTOLIC BLOOD PRESSURE: 68 MMHG | TEMPERATURE: 98 F | OXYGEN SATURATION: 99 % | HEART RATE: 71 BPM | RESPIRATION RATE: 18 BRPM

## 2025-07-15 LAB
ALBUMIN SERPL ELPH-MCNC: 2.9 G/DL — LOW (ref 3.3–5)
ALP SERPL-CCNC: 96 U/L — SIGNIFICANT CHANGE UP (ref 40–120)
ALT FLD-CCNC: <5 U/L — LOW (ref 10–45)
ANION GAP SERPL CALC-SCNC: 12 MMOL/L — SIGNIFICANT CHANGE UP (ref 5–17)
AST SERPL-CCNC: 7 U/L — LOW (ref 10–40)
BASOPHILS # BLD AUTO: 0.02 K/UL — SIGNIFICANT CHANGE UP (ref 0–0.2)
BASOPHILS # BLD MANUAL: 0 K/UL — SIGNIFICANT CHANGE UP (ref 0–0.2)
BASOPHILS NFR BLD AUTO: 0.7 % — SIGNIFICANT CHANGE UP (ref 0–2)
BASOPHILS NFR BLD MANUAL: 0 % — SIGNIFICANT CHANGE UP (ref 0–2)
BILIRUB SERPL-MCNC: 0.5 MG/DL — SIGNIFICANT CHANGE UP (ref 0.2–1.2)
BLD GP AB SCN SERPL QL: NEGATIVE — SIGNIFICANT CHANGE UP
BUN SERPL-MCNC: 4 MG/DL — LOW (ref 7–23)
CALCIUM SERPL-MCNC: 7.9 MG/DL — LOW (ref 8.4–10.5)
CHLORIDE SERPL-SCNC: 99 MMOL/L — SIGNIFICANT CHANGE UP (ref 96–108)
CO2 SERPL-SCNC: 25 MMOL/L — SIGNIFICANT CHANGE UP (ref 22–31)
CREAT SERPL-MCNC: 0.45 MG/DL — LOW (ref 0.5–1.3)
EGFR: 108 ML/MIN/1.73M2 — SIGNIFICANT CHANGE UP
EGFR: 108 ML/MIN/1.73M2 — SIGNIFICANT CHANGE UP
EOSINOPHIL # BLD AUTO: 0 K/UL — SIGNIFICANT CHANGE UP (ref 0–0.5)
EOSINOPHIL # BLD MANUAL: 0 K/UL — SIGNIFICANT CHANGE UP (ref 0–0.5)
EOSINOPHIL NFR BLD AUTO: 0 % — SIGNIFICANT CHANGE UP (ref 0–6)
EOSINOPHIL NFR BLD MANUAL: 0 % — SIGNIFICANT CHANGE UP (ref 0–6)
GIANT PLATELETS BLD QL SMEAR: PRESENT
GLUCOSE SERPL-MCNC: 87 MG/DL — SIGNIFICANT CHANGE UP (ref 70–99)
HCT VFR BLD CALC: 22.4 % — LOW (ref 34.5–45)
HGB BLD-MCNC: 7.2 G/DL — LOW (ref 11.5–15.5)
IMM GRANULOCYTES # BLD AUTO: 0.24 K/UL — HIGH (ref 0–0.07)
IMM GRANULOCYTES NFR BLD AUTO: 8.6 % — HIGH (ref 0–0.9)
IMMATURE PLATELET FRACTION #: 3.3 K/UL — LOW (ref 4.7–11.1)
IMMATURE PLATELET FRACTION %: 7.4 % — HIGH (ref 1.6–4.9)
LDH SERPL L TO P-CCNC: 196 U/L — SIGNIFICANT CHANGE UP (ref 50–242)
LYMPHOCYTES # BLD AUTO: 0.02 K/UL — LOW (ref 1–3.3)
LYMPHOCYTES # BLD MANUAL: 0.03 K/UL — LOW (ref 1–3.3)
LYMPHOCYTES NFR BLD AUTO: 0.7 % — LOW (ref 13–44)
LYMPHOCYTES NFR BLD MANUAL: 0.9 % — LOW (ref 13–44)
MAGNESIUM SERPL-MCNC: 1.7 MG/DL — SIGNIFICANT CHANGE UP (ref 1.6–2.6)
MANUAL METAMYELOCYTE #: 0.05 K/UL — HIGH (ref 0–0)
MANUAL MYELOCYTE #: 0.07 K/UL — HIGH (ref 0–0)
MANUAL NEUTROPHIL BANDS #: 0.33 K/UL — SIGNIFICANT CHANGE UP (ref 0–0.84)
MANUAL PROMYELOCYTE #: 0.03 K/UL — HIGH (ref 0–0)
MCHC RBC-ENTMCNC: 27.6 PG — SIGNIFICANT CHANGE UP (ref 27–34)
MCHC RBC-ENTMCNC: 32.1 G/DL — SIGNIFICANT CHANGE UP (ref 32–36)
MCV RBC AUTO: 85.8 FL — SIGNIFICANT CHANGE UP (ref 80–100)
METAMYELOCYTES # FLD: 1.7 % — HIGH (ref 0–0)
METAMYELOCYTES NFR BLD: 1.7 % — HIGH (ref 0–0)
MONOCYTES # BLD AUTO: 0.65 K/UL — SIGNIFICANT CHANGE UP (ref 0–0.9)
MONOCYTES # BLD MANUAL: 0.09 K/UL — SIGNIFICANT CHANGE UP (ref 0–0.9)
MONOCYTES NFR BLD AUTO: 23.4 % — HIGH (ref 2–14)
MONOCYTES NFR BLD MANUAL: 3.4 % — SIGNIFICANT CHANGE UP (ref 2–14)
MYELOCYTES NFR BLD: 2.6 % — HIGH (ref 0–0)
NEUTROPHILS # BLD AUTO: 1.85 K/UL — SIGNIFICANT CHANGE UP (ref 1.8–7.4)
NEUTROPHILS # BLD MANUAL: 2.18 K/UL — SIGNIFICANT CHANGE UP (ref 1.8–7.4)
NEUTROPHILS NFR BLD AUTO: 66.6 % — SIGNIFICANT CHANGE UP (ref 43–77)
NEUTROPHILS NFR BLD MANUAL: 78.5 % — HIGH (ref 43–77)
NEUTS BAND # BLD: 12 % — HIGH (ref 0–8)
NEUTS BAND NFR BLD: 12 % — HIGH (ref 0–8)
NRBC # BLD AUTO: 0 K/UL — SIGNIFICANT CHANGE UP (ref 0–0)
NRBC # FLD: 0 K/UL — SIGNIFICANT CHANGE UP (ref 0–0)
PHOSPHATE SERPL-MCNC: 3.4 MG/DL — SIGNIFICANT CHANGE UP (ref 2.5–4.5)
PLAT MORPH BLD: NORMAL — SIGNIFICANT CHANGE UP
PLATELET # BLD AUTO: 45 K/UL — LOW (ref 150–400)
PMV BLD: SIGNIFICANT CHANGE UP FL (ref 7–13)
POTASSIUM SERPL-MCNC: 2.9 MMOL/L — CRITICAL LOW (ref 3.5–5.3)
POTASSIUM SERPL-SCNC: 2.9 MMOL/L — CRITICAL LOW (ref 3.5–5.3)
PROMYELOCYTES # FLD: 0.9 % — HIGH (ref 0–0)
PROMYELOCYTES NFR BLD: 0.9 % — HIGH (ref 0–0)
PROT SERPL-MCNC: 4.9 G/DL — LOW (ref 6–8.3)
RBC # BLD: 2.61 M/UL — LOW (ref 3.8–5.2)
RBC # FLD: 14.7 % — HIGH (ref 10.3–14.5)
RBC BLD AUTO: SIGNIFICANT CHANGE UP
RH IG SCN BLD-IMP: POSITIVE — SIGNIFICANT CHANGE UP
SODIUM SERPL-SCNC: 136 MMOL/L — SIGNIFICANT CHANGE UP (ref 135–145)
WBC # BLD: 2.78 K/UL — LOW (ref 3.8–10.5)
WBC # FLD AUTO: 2.78 K/UL — LOW (ref 3.8–10.5)

## 2025-07-15 PROCEDURE — P9037: CPT

## 2025-07-15 PROCEDURE — 87086 URINE CULTURE/COLONY COUNT: CPT

## 2025-07-15 PROCEDURE — 82272 OCCULT BLD FECES 1-3 TESTS: CPT

## 2025-07-15 PROCEDURE — 97161 PT EVAL LOW COMPLEX 20 MIN: CPT

## 2025-07-15 PROCEDURE — 99238 HOSP IP/OBS DSCHRG MGMT 30/<: CPT

## 2025-07-15 PROCEDURE — C1751: CPT

## 2025-07-15 PROCEDURE — 85049 AUTOMATED PLATELET COUNT: CPT

## 2025-07-15 PROCEDURE — 87077 CULTURE AEROBIC IDENTIFY: CPT

## 2025-07-15 PROCEDURE — 83735 ASSAY OF MAGNESIUM: CPT

## 2025-07-15 PROCEDURE — 82955 ASSAY OF G6PD ENZYME: CPT

## 2025-07-15 PROCEDURE — C1894: CPT

## 2025-07-15 PROCEDURE — 93005 ELECTROCARDIOGRAM TRACING: CPT

## 2025-07-15 PROCEDURE — 86901 BLOOD TYPING SEROLOGIC RH(D): CPT

## 2025-07-15 PROCEDURE — 36415 COLL VENOUS BLD VENIPUNCTURE: CPT

## 2025-07-15 PROCEDURE — P9100: CPT

## 2025-07-15 PROCEDURE — 0225U NFCT DS DNA&RNA 21 SARSCOV2: CPT

## 2025-07-15 PROCEDURE — 97530 THERAPEUTIC ACTIVITIES: CPT

## 2025-07-15 PROCEDURE — 85025 COMPLETE CBC W/AUTO DIFF WBC: CPT

## 2025-07-15 PROCEDURE — 93970 EXTREMITY STUDY: CPT

## 2025-07-15 PROCEDURE — 87799 DETECT AGENT NOS DNA QUANT: CPT

## 2025-07-15 PROCEDURE — 38214 VOLUME DEPLETE OF HARVEST: CPT

## 2025-07-15 PROCEDURE — 86900 BLOOD TYPING SEROLOGIC ABO: CPT

## 2025-07-15 PROCEDURE — P9011: CPT

## 2025-07-15 PROCEDURE — 84100 ASSAY OF PHOSPHORUS: CPT

## 2025-07-15 PROCEDURE — C1887: CPT

## 2025-07-15 PROCEDURE — 86985 SPLIT BLOOD OR PRODUCTS: CPT

## 2025-07-15 PROCEDURE — 80048 BASIC METABOLIC PNL TOTAL CA: CPT

## 2025-07-15 PROCEDURE — 85730 THROMBOPLASTIN TIME PARTIAL: CPT

## 2025-07-15 PROCEDURE — 87640 STAPH A DNA AMP PROBE: CPT

## 2025-07-15 PROCEDURE — P9040: CPT

## 2025-07-15 PROCEDURE — 77001 FLUOROGUIDE FOR VEIN DEVICE: CPT

## 2025-07-15 PROCEDURE — 86923 COMPATIBILITY TEST ELECTRIC: CPT

## 2025-07-15 PROCEDURE — 87637 SARSCOV2&INF A&B&RSV AMP PRB: CPT

## 2025-07-15 PROCEDURE — 97110 THERAPEUTIC EXERCISES: CPT

## 2025-07-15 PROCEDURE — 36556 INSERT NON-TUNNEL CV CATH: CPT

## 2025-07-15 PROCEDURE — 38207 CRYOPRESERVE STEM CELLS: CPT

## 2025-07-15 PROCEDURE — C1769: CPT

## 2025-07-15 PROCEDURE — 87641 MR-STAPH DNA AMP PROBE: CPT

## 2025-07-15 PROCEDURE — 97116 GAIT TRAINING THERAPY: CPT

## 2025-07-15 PROCEDURE — 82248 BILIRUBIN DIRECT: CPT

## 2025-07-15 PROCEDURE — 87040 BLOOD CULTURE FOR BACTERIA: CPT

## 2025-07-15 PROCEDURE — 71045 X-RAY EXAM CHEST 1 VIEW: CPT

## 2025-07-15 PROCEDURE — 36430 TRANSFUSION BLD/BLD COMPNT: CPT

## 2025-07-15 PROCEDURE — 36569 INSJ PICC 5 YR+ W/O IMAGING: CPT

## 2025-07-15 PROCEDURE — 81001 URINALYSIS AUTO W/SCOPE: CPT

## 2025-07-15 PROCEDURE — 86850 RBC ANTIBODY SCREEN: CPT

## 2025-07-15 PROCEDURE — 80053 COMPREHEN METABOLIC PANEL: CPT

## 2025-07-15 PROCEDURE — P9047: CPT

## 2025-07-15 PROCEDURE — 71045 X-RAY EXAM CHEST 1 VIEW: CPT | Mod: 26

## 2025-07-15 PROCEDURE — 81267 CHIMERISM ANAL NO CELL SELEC: CPT

## 2025-07-15 PROCEDURE — 76937 US GUIDE VASCULAR ACCESS: CPT

## 2025-07-15 PROCEDURE — 83615 LACTATE (LD) (LDH) ENZYME: CPT

## 2025-07-15 PROCEDURE — 85610 PROTHROMBIN TIME: CPT

## 2025-07-15 RX ORDER — ATOVAQUONE 750 MG/5ML
10 SUSPENSION ORAL
Qty: 0 | Refills: 0 | DISCHARGE
Start: 2025-07-15

## 2025-07-15 RX ORDER — SULFAMETHOXAZOLE/TRIMETHOPRIM 800-160 MG
1 TABLET ORAL
Qty: 0 | Refills: 0 | DISCHARGE

## 2025-07-15 RX ORDER — OXYCODONE HYDROCHLORIDE 30 MG/1
1 TABLET ORAL
Qty: 0 | Refills: 0 | DISCHARGE
Start: 2025-07-15

## 2025-07-15 RX ADMIN — POSACONAZOLE 300 MILLIGRAM(S): 100 TABLET, DELAYED RELEASE ORAL at 13:04

## 2025-07-15 RX ADMIN — Medication 50 MILLIEQUIVALENT(S): at 10:53

## 2025-07-15 RX ADMIN — OXYCODONE HYDROCHLORIDE 5 MILLIGRAM(S): 30 TABLET ORAL at 04:45

## 2025-07-15 RX ADMIN — Medication 5 MILLILITER(S): at 08:43

## 2025-07-15 RX ADMIN — Medication 1 DROP(S): at 06:04

## 2025-07-15 RX ADMIN — OXYCODONE HYDROCHLORIDE 5 MILLIGRAM(S): 30 TABLET ORAL at 10:30

## 2025-07-15 RX ADMIN — Medication 1 PATCH: at 08:43

## 2025-07-15 RX ADMIN — LISINOPRIL 20 MILLIGRAM(S): 5 TABLET ORAL at 06:08

## 2025-07-15 RX ADMIN — TOUCHLESS CARE ZINC OXIDE PROTECTANT 1 APPLICATION(S): 20; 25 SPRAY TOPICAL at 06:09

## 2025-07-15 RX ADMIN — URSODIOL 300 MILLIGRAM(S): 300 CAPSULE ORAL at 06:05

## 2025-07-15 RX ADMIN — METOPROLOL SUCCINATE 25 MILLIGRAM(S): 50 TABLET, EXTENDED RELEASE ORAL at 06:05

## 2025-07-15 RX ADMIN — TOUCHLESS CARE ZINC OXIDE PROTECTANT 1 APPLICATION(S): 20; 25 SPRAY TOPICAL at 13:05

## 2025-07-15 RX ADMIN — OXYCODONE HYDROCHLORIDE 5 MILLIGRAM(S): 30 TABLET ORAL at 09:49

## 2025-07-15 RX ADMIN — Medication 88 MICROGRAM(S): at 06:05

## 2025-07-15 RX ADMIN — Medication 15 MILLILITER(S): at 06:05

## 2025-07-15 RX ADMIN — Medication 40 MILLIEQUIVALENT(S): at 13:03

## 2025-07-15 RX ADMIN — Medication 5 MILLILITER(S): at 13:04

## 2025-07-15 RX ADMIN — Medication 10 MILLILITER(S): at 13:05

## 2025-07-15 RX ADMIN — Medication 1 PATCH: at 08:40

## 2025-07-15 RX ADMIN — Medication 1 APPLICATION(S): at 08:44

## 2025-07-15 RX ADMIN — Medication 800 MILLIGRAM(S): at 06:08

## 2025-07-15 RX ADMIN — OXYCODONE HYDROCHLORIDE 5 MILLIGRAM(S): 30 TABLET ORAL at 06:00

## 2025-07-15 RX ADMIN — Medication 50 MILLIEQUIVALENT(S): at 08:44

## 2025-07-15 RX ADMIN — Medication 1 DROP(S): at 13:03

## 2025-07-15 RX ADMIN — Medication 1 PATCH: at 08:15

## 2025-07-15 RX ADMIN — Medication 40 MILLIGRAM(S): at 06:05

## 2025-07-15 RX ADMIN — ATOVAQUONE 1500 MILLIGRAM(S): 750 SUSPENSION ORAL at 13:03

## 2025-07-15 RX ADMIN — Medication 10 MILLILITER(S): at 08:44

## 2025-07-15 RX ADMIN — Medication 40 MILLIEQUIVALENT(S): at 08:43

## 2025-07-15 RX ADMIN — LETERMOVIR 480 MILLIGRAM(S): 480 TABLET, FILM COATED ORAL at 13:03

## 2025-07-15 NOTE — ADVANCED PRACTICE NURSE CONSULT - REASON FOR CONSULT
Thiotepa
Vascular Access Team    Evaluation for: Bedside PICC placement  Requested by name: Yesenia Dejesus  Date/Time: 7/15/2025 @1139    Indication: IV fluid and electrolites  Allergy to CHG or Heparin or Lidocaine: no    Platelets(>20): 45  INR(<3): 1.25  eGFR(>40): 108  Blood cultures sent: 6/29//25  Blood culture negative in 48hrs: yes  Anticoagulants/Antiplatelets: none  Arms DVT/Mastectomy/Fistula/PPM/Defib: no    IR or Nephrology or ID clearance needed: no    Consent obtained: no    Pending: consent    Plan: Bedside picc order evaluated. Please contact [facility] RN #[id number] with any questions.  
64yoF, myelofibrosis, day -3 allo SCT  flu/bu/thiotepa/rATG  consent on file
64yoF, myelofibrosis, day -4 allo SCT  flu/bu/thiotepa/rATG  consent on file
Bedside picc order placed.   Indication: SL picc placement for iv fluid and electrolites  
Thiotepa
64yoF, myelofibrosis, day -2 allo SCT  flu/bu/thiotepa/rATG  consent on file

## 2025-07-15 NOTE — DISCHARGE NOTE NURSING/CASE MANAGEMENT/SOCIAL WORK - PATIENT PORTAL LINK FT
You can access the FollowMyHealth Patient Portal offered by [facility] by registering at the following website: [email or web address]. By joining [employer]’s FollowMyHealth portal, you will also be able to view your health information using other applications (apps) compatible with our system.

## 2025-07-15 NOTE — PROVIDER CONTACT NOTE (CRITICAL VALUE NOTIFICATION) - NAME OF MD/NP/PA/DO NOTIFIED:
MORENO Barriga
Don Wild
Junior MAYER
MORENO Vallejo
Irene MAYER
Irene MAYER
MORENO Dejesus
Gem Coleman
Robyn Bryan NP
MORENO Barriga
shahbaz manrique
ARNAV Lema
crissy rosa np
Marry Buchanan, NP
NP Marry Tenorio
MORENO Kay

## 2025-07-15 NOTE — PROVIDER CONTACT NOTE (CRITICAL VALUE NOTIFICATION) - ASSESSMENT
NAD VSS
Pt is AOx4, vital signs stable except BP elevated; providers aware. No chest pain, SOB, N/V/D. No distress noted at this time.
Pt A&Ox4, no acute distress noted. No signs/symptoms of active bleeding noted.
no s/s bleeding
Patient A&Ox4, VS within range of baseline. Pt febrile. Pt  denies, chest pain, SOB, N/V/D. No acute distress noted. pt denies dizziness and headache.
afebrile w vital signs stable  no acute distress
no s/s bleeding
NAD
Pt is AOx4, vital signs stable, afebrile. Pt denies pain/discomfort, chest pain, SOB, N/V/D. No distress noted at this time.
VSS, NAD, Afebrile, Asymptomatic, No S/S of bleeding
NAD VSS
Pt is AOx4, vital signs stable, afebrile. Pt c/o mucositis pain, providers made aware. No other distress noted at this time.
no s/s bleeding
no s/s bleeding
A&Ox4 afebrile w vital signs stable
NAD

## 2025-07-15 NOTE — PROGRESS NOTE ADULT - NS ATTEST RISK PROBLEM GEN_ALL_CORE FT
allo-hsct complex care

## 2025-07-15 NOTE — ADVANCED PRACTICE NURSE CONSULT - ASSESSMENT
Central Line Catheter Insertion Note  Patient or patient representative educated about central line associated blood stream infection prevention practices.  Catheter type: 4F,  SOLO SL Picc  : Bard  Power injectable: Yes  LOT#[id number]  EXP DATE 2026-06-30    Informed consent obtained by covering floor team.  Procedure assisted by: ISIS Layne RN  Time out was preformed, confirming the patient's first and last name, date of birth, MR #, procedure, and correct site prior to start of procedure.    Patient was placed with HOB 30 degrees. Patient placement site was prepped with chlorhexidine solution, then draped using maximum sterile barrier protection. The area was injected with 2ml of 1% lidocaine. Using the Bard Site Rite 8, the catheter was placed using the Modified Seldinger Technique. Strict adherence to outline aseptic technique including handwashing, glove and gown, utilizing mask and cap, plus draping the patient with a sterile drape was observed. Upon completion of line placement, the insertion site was covered with a sterile CHG dressing. Pt tolerated procedure well. V/S 168/[id number]-99% RA    All materials used for catheter insertion, including the intact guide wires, were accounted for at the end of the procedure.  Number of attempts: 1  Complications/Comments: Preexisting skin skin on axilla bilaterally- patient aware    Emergency Placement: No  Site: New   Anatomical Site of insertion: R Basilic vein  Catheter size/length: 4F,   35cm  US guided Bard single lumen power picc placed    Post procedure verification with chest Xray as per orders.  
Pt A/Ox4, Pt denies pain/discomfort, N/V/D, SOB, and chest pain. Labs reviewed by Dr. Godoy and team on AM rounds. Chemotherapy teaching done. Right trifusion in place flushed easily with NS, positive blood return. Dressing intact, no redness, no swelling. Verification done by 2 RNs. Received pre-med of 16mg Zofran IVPB and 10 mg dexamethasone as per order. Fludarabine 50 mg/m2 = 80 mg in NS, initiated via Alaris pump at 1023. At 1102, busulfan 3.2 mg/kg = 175 mg in NS infusion initiated via Alaris pump. At 1445, initiated infusion of antithymocyte globulin 1.5 mg/kg = 75 mg IV infusing over 4 hrs at a rate of 67 ml/hr, connected to the lowest port of R trifusion catheter via alaris pump. Vitals checked as per policy; vital signs stable and afebrile throughout infusion. Call bell in reach, safety maintained. Continuing hourly rounds. Pt educated on safety and fall prevention.
Pt A/Ox4, Pt denies pain/discomfort, N/V/D, SOB, and chest pain. Labs reviewed by Dr. Godoy and team on AM rounds. Hgb = 6.8, 1 unit PRBC ordered to be transfused, OK to proceed with treatment regimen. Chemotherapy teaching done. Right trifusion in place flushed easily with NS, positive blood return. Dressing intact, no redness, no swelling. Verification done by 2 RNs. Received pre-med of 16mg Zofran IVPB and 10 mg dexamethasone as per order. Fludarabine 50 mg/m2 = 80 mg in NS, initiated via Alaris pump at 1011. At 1055, busulfan 3.2 mg/kg = 175 mg in NS infusion initiated via Alaris pump. At 1415, initiated infusion of antithymocyte globulin 2.5 mg/kg = 150 mg IV infusing over 4 hrs at a rate of 133 ml/hr, connected to the lowest port of R trifusion catheter via alaris pump. Vitals checked as per policy; vital signs stable and afebrile throughout infusion. Call bell in reach, safety maintained. Continuing hourly rounds. Pt educated on safety and fall prevention.
Pt. seen in bed A+Ox4; denies discomfort at this time. Labs reviewed by Dr. Gandhi on rounds. Remains afebrile. Chemotherapy teaching done, pt verbalize understanding. Right TLC in place, flushed with NS, positive blood return, site intact. Pre-med of Zofran and Dexamethasone IV given. thiotepa 285mg in 500ml NS IV infused over 3 Hr. up at 12:00 down at 15:05, tolerated well. Chemotherapy precaution maintained.
Pt. seen in bed A+Ox4; denies discomfort at this time. Labs reviewed by Dr. Gandhi on rounds. Remains afebrile. Chemotherapy teaching done, pt verbalize understanding. Right triple lumen cathter in place, flushed with NS, positive blood return, site intact. Pre-med of Zofran and Dexamethasone IV given. 2 RN verification completed.  At 1255, pt received thiotepa IVPB 285 milliGRAM(s) in sodium chloride 0.9% 500 milliLiter(s), every 24 hours, infuse over 3 Hour(s), infuse at 176.17 mL/Hr, y-sited to primary NS connected to brown lumen administered via alaris pump. Chemotherapy precaution maintained. pt resting in bed with call bell in reach.
Pt A/Ox4, Pt denies pain/discomfort, N/V/D, SOB, and chest pain. Labs reviewed by Dr. Godoy and team on AM rounds. Chemotherapy teaching done. Right trifusion in place flushed easily with NS, positive blood return. Dressing intact, no redness, no swelling. Verification done by 2 RNs. Received pre-med of 16mg Zofran IVPB and 10 mg dexamethasone as per order. Fludarabine 50 mg/m2 = 80 mg in NS, initiated via Alaris pump at 1026. At 1108, busulfan 3.2 mg/kg = 175 mg in NS infusion initiated via Alaris pump. At 1440, initiated infusion of antithymocyte globulin 1 mg/kg = 60 mg IV infusing over 6 hrs at a rate of 44 ml/hr, connected to the lowest port of R trifusion catheter via alaris pump. Vitals checked as per policy; provider notified regarding any reaction, see provider contact notes. Patient experienced rigors at 1620, Jalen MAYER notified, 25mg IVP Demerol administered and rate reduced to 22 mL/hr. Vitals monitored, rate returned to 44 mL/hr at 1810 as ordered by Jalen MAYER upon review of vitals and asymptomatic status. Call bell in reach, safety maintained. Continuing hourly rounds. Pt educated on safety and fall prevention.

## 2025-07-15 NOTE — PROVIDER CONTACT NOTE (CRITICAL VALUE NOTIFICATION) - TEST AND RESULT REPORTED:
PLT: 13
PLT: 4
plt=12
Renea Vázquez
hgb 6.8
Hgb: 6.4, Hct: 20.1, PLT: 12
PLT: 18
hgb =  7.0
plts 18
PLT: 4
Platelets= 10
k = 2.9
h/h = 6.5/20.3, plt = 15
Hgb:6.6, Hct=22.4
hbg = 6.9, plt = 3
Plt=8

## 2025-07-15 NOTE — PROVIDER CONTACT NOTE (CRITICAL VALUE NOTIFICATION) - PERSON GIVING RESULT:
plts 6
Lab: Dez Montgomery
Erica Balderas/Naeem
Lorna Hartley
Kirstin Hayes, lab
Anya michele
Dez Montgomery/Naeem
Lab: blaze Ritter
Kirstin Hayes
Renea Vázquez
Renea Vázquez
david michele
Lab: Negro De Leon
Sheryl Harrell from [facility]
Swetha Hayes
rickey hooks

## 2025-07-15 NOTE — DISCHARGE NOTE NURSING/CASE MANAGEMENT/SOCIAL WORK - NSDCPEFALRISK_GEN_ALL_CORE
For information on Fall & Injury Prevention, visit: [email or web address] OR  [email or web address] OR  https://www.cdc.gov/steadi/patient.html

## 2025-07-15 NOTE — DISCHARGE NOTE NURSING/CASE MANAGEMENT/SOCIAL WORK - NSDCVIVACCINE_GEN_ALL_CORE_FT
influenza, injectable, quadrivalent, preservative free; 09-Oct-2021 12:11; Magdalene Foster (RN); Enjoyor; [id number] (Exp. Date: 30-Jun-2022); IntraMuscular; Deltoid Right.; 0.5 milliLiter(s); VIS (VIS Published: 15-Aug-2019, VIS Presented: 09-Oct-2021);   rabies, intradermal injection; 17-Sep-2023 14:26; Dany Lewis (RN); LotLinxine; [id number] (Exp. Date: 17-Feb-2025); IntraMuscular.; Deltoid Left...; 1 milliLiter(s); VIS (VIS Published: 17-Oct-2009, VIS Presented: 17-Sep-2023);

## 2025-07-15 NOTE — PROVIDER CONTACT NOTE (CRITICAL VALUE NOTIFICATION) - BACKGROUND
Myelofibrosis
SP BMT
Day +12 Allo 12/12 MUD
65yoF, MF, day +18 auto SCT
Day +11 s/p stem cell transplant
s/p allo transplant
myleofibrosis
Myleofibrosis, Day +8 s/p allo SCT
SP BMT
SP BMT
Myelofibrosis
65yoF, MF, day -2 allo SCT
Myelofibrosis
s/p allo transplant

## 2025-07-15 NOTE — PROGRESS NOTE ADULT - NS ATTEST RISK GEN_ALL_CORE
Risk Statement (NON-critical care)

## 2025-07-15 NOTE — ADVANCED PRACTICE NURSE CONSULT - RECOMMEDATIONS
Post procedure verbal instructions given to the patient or patient representative. Patient or patient representative  instructed regarding signs and symptoms of infection. Patient instructed to keep dressing dry and clean. Care for catheter as per hospital protocols  
Primary RN aware
continue care per BMT orders
Ongoing monitoring.
continue care per BMT orders
continue care per BMT orders

## 2025-07-15 NOTE — PROVIDER CONTACT NOTE (CRITICAL VALUE NOTIFICATION) - RECOMMENDATIONS
1 unit of PRBC transfused over 3 hours
1 unit of PLTS transfused over 1 hour
1 unit prbc
n/a
1 unit prbc and 1 unit plt
N/A
continue to monitor for s/s bleeding and cbc
transfuse
continue to monitor labs
N/A

## 2025-07-15 NOTE — DISCHARGE NOTE NURSING/CASE MANAGEMENT/SOCIAL WORK - NURSING SECTION COMPLETE
[facility]        NAME: Pawan Denise is a 27 y o  female  : 2000    MRN: [id number]  DATE: January 15, 2021  TIME: 7:12 PM    Assessment and Plan   Exposure to COVID-19 virus [Z20 822]  1  Exposure to COVID-19 virus  Novel Coronavirus (COVID-19), PCR [facility] - Office Collection         Patient Instructions       Follow up with PCP in 3-5 days  Proceed to  ER if symptoms worsen  Chief Complaint     Chief Complaint   Patient presents with    COVID-19     pt exposed to covid positive family member, no symptoms currently         History of Present Illness       Patient for evaluation testing for COVID-19  Patient had exposure to a family member  Review of Systems   Review of Systems   Constitutional: Negative  HENT: Negative  Eyes: Negative  Respiratory: Negative  Cardiovascular: Negative  Gastrointestinal: Negative  Neurological: Negative            Current Medications       Current Outpatient Medications:     Dupilumab (Dupixent) 300 MG/2ML SOPN, Inject under the skin, Disp: , Rfl:     EPINEPHrine (EPIPEN) 0 3 mg/0 3 mL SOAJ, Inject 0 3 mL (0 3 mg total) into a muscle once as needed for anaphylaxis for up to 1 dose, Disp: 0 3 mL, Rfl: 0    EQ ALLERGY RELIEF 180 MG tablet, Take 180 mg by mouth daily as needed, Disp: , Rfl: 1    fluticasone (FLONASE) 50 mcg/act nasal spray, 2 sprays into each nostril as needed  , Disp: , Rfl:     ketotifen (ZADITOR) 0 025 % ophthalmic solution, INSTILL 1 DROP INTO EACH EYE TWICE DAILY AS NEEDED, Disp: , Rfl: 3    loratadine (CLARITIN) 10 mg tablet, Take 1 tablet by mouth as needed  , Disp: , Rfl:     naproxen (NAPROSYN) 250 mg tablet, Take 1 tablet (250 mg total) by mouth 2 (two) times a day with meals for 5 days, Disp: 10 tablet, Rfl: 0    triamcinolone (KENALOG) 0 1 % ointment, Apply topically 2 (two) times a day (Patient not taking: Reported on 1/15/2021), Disp: 30 g, Rfl: 1    Current Allergies     Allergies as of 01/15/2021 - Reviewed 01/15/2021   Allergen Reaction Noted    Shellfish allergy Other (See Comments) 08/23/2013            The following portions of the patient's history were reviewed and updated as appropriate: allergies, current medications, past family history, past medical history, past social history, past surgical history and problem list      Past Medical History:   Diagnosis Date    Allergic     seasonal allergies    Allergic rhinitis     Eczema     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) 96 9 °F (36 1 °C)   Resp 20   LMP  (Within Weeks)   SpO2 98%   Breastfeeding No   No LMP recorded (within weeks)  Physical Exam     Physical Exam  Vitals signs and nursing note reviewed  Constitutional:       General: She is not in acute distress  Appearance: Normal appearance  She is well-developed  She is not ill-appearing, toxic-appearing or diaphoretic  HENT:      Head: Normocephalic and atraumatic  Eyes:      General:         Right eye: No discharge  Left eye: No discharge  Extraocular Movements: Extraocular movements intact  Conjunctiva/sclera: Conjunctivae normal       Pupils: Pupils are equal, round, and reactive to light  Cardiovascular:      Rate and Rhythm: Normal rate and regular rhythm  Heart sounds: Normal heart sounds  No murmur     Pulmonary:      Effort: Pulmonary effort is normal  No respiratory distress  Breath sounds: Normal breath sounds  No stridor  No wheezing, rhonchi or rales  Skin:     General: Skin is warm and dry  Findings: No rash  Neurological:      General: No focal deficit present  Mental Status: She is alert and oriented to person, place, and time  Psychiatric:         Mood and Affect: Mood normal          Behavior: Behavior normal          Thought Content:  Thought content normal          Judgment: Judgment normal  Patient/Caregiver provided printed discharge information.

## 2025-07-15 NOTE — PROGRESS NOTE ADULT - NS_MD_PANP_GEN_ALL_CORE

## 2025-07-15 NOTE — DISCHARGE NOTE NURSING/CASE MANAGEMENT/SOCIAL WORK - FINANCIAL ASSISTANCE
[facility] provides services at a reduced cost to those who are determined to be eligible through [facility]’s financial assistance program. Information regarding [facility]’s financial assistance program can be found by going to [email or web address] or by calling [phone].

## 2025-07-15 NOTE — PROGRESS NOTE ADULT - PROVIDER SPECIALTY LIST ADULT
BMT/SCT
Wound Care
BMT/SCT

## 2025-07-15 NOTE — PROGRESS NOTE ADULT - NS ATTEND AMEND GEN_ALL_CORE FT
I attest my time as PA/NP is greater than 50% of the total combined time spent on qualifying patient care activities by the PA/NP and attending.   Attending comments: I rounded with the team including nurses, ACPs and PharmD. I reviewed the data in depth. I saw and examined the patient myself. I reviewed and confirmed the above note.  The patient is a 64 yo patient, with history of MF, Here for MUD allogeneic HSCT with Flu/Bu/Thiotepa rATG Conditioning for the treatment of MF, day +17.  S: The patient complaints of odynophagia secondary to chemotherapy-induced mucositis, yet states that is improving. The patient is otherwise tolerating  therapy well.   O: The vitals are stable. The oral cavity has pale oral mucosa. The line site is clean. The lungs are clear. There is no LE edema. There is no skin rash but decreased skin turgor. Neuro exam AOX3, no focal deficit.   A/P:  Overall, there patient is improving. The patient is on appropriate therapy at this stage.   We will continue supportive measurements for mucositis.   Pancytopenia secondary to chemotherapy has improved, she has recovery of WBC and platelets, Hb 7.0.   I answered the patient’s questions.  I encouraged po intake and ambulation.  Plan to d/c tomorrow.   EBONIE Norwood MD.  Time-based billing (NON-critical care).   40 minutes spent on total encounter. The necessity of the time spent during the encounter on this date of service was due to: Complex transplant care. I attest my time as PA/NP is greater than 50% of the total combined time spent on qualifying patient care activities by the PA/NP and attending.   Attending comments: I rounded with the team including nurses, ACPs and PharmD. I reviewed the data in depth. I saw and examined the patient myself. I reviewed and confirmed the above note.  The patient is a 66 yo patient, with history of MF, Here for MUD allogeneic HSCT with Flu/Bu/Thiotepa rATG Conditioning, day +18.  S: The patient complaints of odynophagia secondary to chemotherapy-induced mucositis, yet states that is improving and she is able to take pills and have a decent PO intake. The patient is otherwise tolerating therapy well, and states her wish to go home today.   O: The vitals are stable. The oral cavity has pale oral mucosa. The line site is clean. The lungs are clear. There is no LE edema. There is no skin rash. Neuro exam AOX3, no focal deficit.   A/P:  Overall, there patient is improving. The patient is on appropriate therapy at this stage.   We will continue supportive measurements for mucositis.   Pancytopenia secondary to chemotherapy has improved, she has recovery of WBC and platelets, ANc today is > 1.5K, Hb 7.2.   I answered the patient’s questions.  I encouraged po intake and ambulation.  Plan to d/c today.   The patient has a temporary central line, she remains transfusion independent, not need Tacro/Mg monitoring/infusion, her central line will be removed today.   EBONIE Norwood MD.  Time-based billing (NON-critical care).   40 minutes spent on total encounter. The necessity of the time spent during the encounter on this date of service was due to: Complex transplant care.

## 2025-07-15 NOTE — PROVIDER CONTACT NOTE (CRITICAL VALUE NOTIFICATION) - SITUATION
hbg = 6.9, plt = 3
Platelets= 10
h/h = 6.5/20.3, plt = 15
k = 2.9
PLT: 13
PLT: 4
hgb =  7.0
Plt=8
Hgb: 6.4, Hct: 20.1, PLT: 12
hgb 6.8
PLT: 18
plts 18
Plts 6
Pt Hgb:6.6, Hct=22.4
plt=12
PLT: 4

## 2025-07-15 NOTE — PROGRESS NOTE ADULT - SUBJECTIVE AND OBJECTIVE BOX
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 prep regimen for treatment of myelofibrosis    Disease: myelofibrosis   Prep regimen: Flu / Bu / Thiotepa / rATG   GVHD ppx: ABC: PTCy days +3, +4, Bortezomib days 0, +3, PTCy days +5, +14, +28   ABO/CMV:   Recipient:   A+/CMV+   Donor:   B+/CMV+    S: Patient seen and examined with HPC Transplant Team:     O: Vitals:   Vital Signs Last 24 Hrs  T(C): 36.4 (15 Jul 2025 05:15), Max: 37.1 (2025 18:04)  T(F): 97.5 (15 Jul 2025 05:15), Max: 98.7 (2025 18:04)  HR: 77 (15 Jul 2025 05:15) (77 - 86)  BP: 142/68 (15 Jul 2025 05:15) (142/68 - 160/67)  BP(mean): --  RR: 18 (15 Jul 2025 05:15) (18 - 18)  SpO2: 99% (15 Jul 2025 05:15) (98% - 99%)    Parameters below as of 15 Jul 2025 05:15  Patient On (Oxygen Delivery Method): room air        Admit weight:   Daily Weight in k.9 (2025 07:26)    Intake / Output:   07-14 @ 07:01  -  -15 @ 07:00  --------------------------------------------------------  IN: 1984 mL / OUT: 2250 mL / NET: -266 mL        PE:   Oropharynx: + erythema + ulcerations posterior pharynx   Oral Mucositis: +                                                  rdGrdrrdarddrderd:rd3rd CVS: S1, S2 RRR   Lungs: CTA throughout bilaterally   Abdomen: + BS x 4, soft, NT, ND   Extremities: trace edema RUE, 5/5 strenght BUE's  Gastric Mucositis:                 -                              Grade: n/a   Intestinal Mucositis:              -                              Grade: n/a   Skin: no rash, ecchymotic patches BLE's   TLC: CDI   Neuro: A&O x 3        Labs:     07-15    136  |  99  |  4[L]  ----------------------------<  87  2.9[LL]   |  25  |  0.45[L]    Ca    7.9[L]      15 Jul 2025 06:35  Phos  3.4     07-15  Mg     1.7     07-15    TPro  4.9[L]  /  Alb  2.9[L]  /  TBili  0.5  /  DBili  x   /  AST  7[L]  /  ALT  <5[L]  /  AlkPhos  96  07-15    PT/INR - ( 2025 06:52 )   PT: 14.3 sec;   INR: 1.25 ratio         PTT - ( 2025 06:52 )  PTT:28.7 sec  LIVER FUNCTIONS - ( 15 Jul 2025 06:35 )  Alb: 2.9 g/dL / Pro: 4.9 g/dL / ALK PHOS: 96 U/L / ALT: <5 U/L / AST: 7 U/L / GGT: x           Lactate Dehydrogenase, Serum: 196 U/L (07-15 @ 06:35)      Cultures:   Culture Results: <10,000 CFU/mL Normal Urogenital Cha (25 @ 06:31)    Culture Results: No growth at 5 days(25 @ 16:36)    Culture Results: No growth at  5 days (25 @ 16:30)    Respiratory Viral Panel with COVID-19 by SCARLET (25 @ 21:02)   Rapid RVP Result: [id number]: NotDetec:    Culture - Blood (25 @ 18:45)   Specimen Source: Blood Blood-Catheter    Culture Results: No growth at Final  Culture - Blood (25 @ 18:40)   Specimen Source: Blood Blood-Peripheral  Culture Results: No growth     Culture - Urine (25 @ 21:01)   Specimen Source: Clean Catch Clean Catch (Midstream)  Culture Results: 10,000 - 49,000 CFU/mL Gram Positive Cocci in Pairs and Chains   <10,000 CFU/ml Normal Urogenital cha present    Radiology:   ACC: [id number] EXAM:  XR CHEST PORTABLE URGENT 1V   PROCEDURE DATE:  2025    IMPRESSION: No focal consolidation.          Meds:   Antimicrobials:   acyclovir   Oral Tab/Cap 800 milliGRAM(s) Oral every 12 hours  atovaquone  Suspension 1500 milliGRAM(s) Oral daily  letermovir 480 milliGRAM(s) Oral daily  posaconazole DR Tablet 300 milliGRAM(s) Oral daily      Heme / Onc:       GI:  loperamide 2 milliGRAM(s) Oral every 6 hours PRN  pantoprazole    Tablet 40 milliGRAM(s) Oral every 12 hours  polyethylene glycol 3350 17 Gram(s) Oral daily PRN  sodium bicarbonate Mouth Rinse 10 milliLiter(s) Swish and Spit five times a day  ursodiol Suspension 300 milliGRAM(s) Oral two times a day      Cardiovascular:   hydrochlorothiazide 25 milliGRAM(s) Oral two times a day  lisinopril 20 milliGRAM(s) Oral <User Schedule>  lisinopril 20 milliGRAM(s) Oral <User Schedule>  metoprolol succinate ER 25 milliGRAM(s) Oral daily      Immunologic:       Other medications:   artificial tears (preservative free) Ophthalmic Solution 1 Drop(s) Both EYES four times a day  Biotene Dry Mouth Oral Rinse 5 milliLiter(s) Swish and Spit five times a day  buPROPion XL (24-Hour) . 300 milliGRAM(s) Oral daily  chlorhexidine 0.12% Liquid 15 milliLiter(s) Swish and Spit two times a day  chlorhexidine 4% Liquid 1 Application(s) Topical <User Schedule>  fentaNYL   Patch  12 MICROgram(s)/Hr. 1 Patch Transdermal every 48 hours  levothyroxine 88 MICROGram(s) Oral daily  phytonadione   Solution 5 milliGRAM(s) Oral <User Schedule>  potassium chloride   Solution 40 milliEquivalent(s) Oral four times a day  potassium chloride  20 mEq/100 mL IVPB 20 milliEquivalent(s) IV Intermittent every 2 hours  prochlorperazine   Injectable 10 milliGRAM(s) IV Push every 6 hours  zinc oxide 40% Paste 1 Application(s) Topical three times a day      PRN:   acetaminophen     Tablet .. 650 milliGRAM(s) Oral every 6 hours PRN  FIRST- Mouthwash  BLM 15 milliLiter(s) Swish and Swallow five times a day PRN  lidocaine 2% Viscous 15 milliLiter(s) Oral five times a day PRN  loperamide 2 milliGRAM(s) Oral every 6 hours PRN  ondansetron Injectable 8 milliGRAM(s) IV Push every 8 hours PRN  oxyCODONE    IR 5 milliGRAM(s) Oral every 6 hours PRN  polyethylene glycol 3350 17 Gram(s) Oral daily PRN  sodium chloride 0.9% lock flush 10 milliLiter(s) IV Push every 1 hour PRN            A/P: 64 year old female with hx of myelofibrosis presents for a MUD allogeneic SCT (DP Permissive) conditioning with Flu/Bu/Thiotepa/rATG.  Today is day +18  - Thiotepa 1/2. Maintain skin precautions during thiotepa administration and for 48 hours after completion (days .6 through day -3). Chronic pain issues - continue home fentanyl patch. Febrile overnight, tmax 100.9. Cultures  pending. RVP negative. CXR pending official read, no obvious consolidations. Vital signs are stable.    D/C HCTZ. Fludarabine 1/3, Busulfan 1/3, rATG 1/3. No Tylenol on busulfan days. Continue keppra ppx for 24 hours post infusion of last dose of busulfan. No acute events overnight, vital signs are stable.   - overnight patient was febrile during ATG, no cultures/abx as fever likely secondary to ATG. Patient was otherwise stable overnight. Fludarabine 2/3, Busulfan 2/3 and rATG 2/3. NO Tylenol or Azoles until Day 0. Continue with supportive care.  - Fludarabine 3/3, busulfan 3/3, rATG 3/3. No acute events overnight. Vital signs are stable.    Switched Compazine to atc   VSS, afebrile. 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   VSS, persistent fevers likely due to CRS post-SCT, continues on zosyn. Cultures pending. Hypervolemia - lasix 40mg x 1, will continue strict I/Os. Started caspofungin due to persistent thrush.   overnight no acute events noted. VSS, febrile likely due to CRS - continue with supportive care. BCx/UCx NGTD x 24h.  6/30 VSS, intermittently febrile likely due to CRS, infectious workup negative so far. PTCy  today. I&O, daily weights, prn diuresis. Continue to monitor thrush (improving). Changed some meds to PO suspension due to mucositis. Continue with supportive care. Refractory nausea, added trial of zyprexa 5mg po QHS x 3 days. d/c primafit. Change voriconazole to posaconazole tomorrow.   - No acute events overnight, vital signs are stable. Continue supportive care for chemotherapy induced oral mucositis.   - No acute events overnight, vital signs are stable. Completed PTCy, hydration to be discontinued today. I&O, daily weights, prn diuresis. Continue supportive care. Fentanyl patch to be titrated down to 12 mcg / hr tomorrow  7/3 Starting Scopolamine patch to decrease oral secretions. Increased Lisinopril 20mg in am, 10mg PM.    -No acute events; awaiting count recovery   - no acute events overnight. Vital signs are stable. SBP remains elevated, lisinopril / metoprolol doses increased.   : BMT team continues to educate the patient about the importance of medication taking. awaiting count recovery   Increased ppi q12h (+stool guaiac)   D/C'd Scopolamine patch   VSS awaiting count recovery  7/10 VSS WBC at 0.15 today; continue Zarxio. BMT team continues to encourage PO intake getting OOB and medication compliance. NA at 150: restarted DW5(2L free water deficit)    - no acute events overnight. Vital signs are stable. d/c D5W, continue to encourage po intake. Early engraftment, check CMV PCR, VNTR. Transition meds back to po.   - VSS and remains afebrile. Engrafting (ANC 3.24). Mucositis improving, continue to encourage increasing PO intake. Encourage OOB to chair/ambulation with physical therapy. Will D/C Dilaudid and switch to PO oxycodone prn. Discharge planning.   - VSS and remains afebrile. Mucositis improving. Continue to encourage OOB with physical therapy and sitting in chair. Continue to encourage to increase PO intake daily. Started on Atovaquone ppx (sulfa allergy). Discharge planning.    1. Infectious Disease:   acyclovir Suspension 800 milliGRAM(s) Oral every 12 hours  letermovir 480 milliGRAM(s) Oral daily  levoFLOXacin IVPB 500 milliGRAM(s) IV Intermittent every 24 hours  posaconazole DR Tablet 300 milliGRAM(s) Oral daily  vancomycin Solution 125 milliGRAM(s) Oral every 12 hours    2. VOD Prophylaxis:   ursodiol Suspension 300 milliGRAM(s) Oral two times a day    3. GI Prophylaxis:    pantoprazole  Injectable 40 milliGRAM(s) IV q12h    4. Mouthcare - NS / NaHCO3 rinses, Peridex, Biotene; Skin care     5. GVHD prophylaxis   PTCy days +3, +4  Bortezomib days 0, + 3  Abatacept days +5, +14, +28     6. Transfuse & replete electrolytes prn      7. IV hydration, daily weights, strict I&O, prn diuresis     8. PO intake as tolerated, nutrition follow up as needed    9. Antiemetics, anti-diarrhea medications:   loperamide 2 milliGRAM(s) Oral every 6 hours PRN  ondansetron Injectable 8 milliGRAM(s) IV Push every 8 hours PRN  prochlorperazine Injectable 10 milliGRAM(s) IV Push every 6 hours    10. OOB as tolerated, physical therapy consult if needed     11. Monitor coags  2x week, vitamin K BIW   phytonadione Solution 5 milliGRAM(s) Oral <User Schedule>    12. Monitor closely for clinical changes, monitor for fevers     13. Emotional support provided, plan of care discussed and questions addressed     14. Patient education done regarding plan of care, restrictions and discharge planning     15. Continue regular social work input     I have written the above note for Dr. Norwood who performed service with me in the room.   Yesenia Dejesus PA-C ([phone])    I have seen and examined patient with PA, I agree with above note as scribed.                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 prep regimen for treatment of myelofibrosis    Disease: myelofibrosis   Prep regimen: Flu / Bu / Thiotepa / rATG   GVHD ppx: ABC: PTCy days +3, +4, Bortezomib days 0, +3, PTCy days +5, +14, +28   ABO/CMV:   Recipient:   A+/CMV+   Donor:   B+/CMV+    S: Patient seen and examined with HPC Transplant Team:   +fatigue  Over all feels well, OOB tolerating meds and soft diet       O: Vitals:   Vital Signs Last 24 Hrs  T(C): 36.4 (15 Jul 2025 05:15), Max: 37.1 (2025 18:04)  T(F): 97.5 (15 Jul 2025 05:15), Max: 98.7 (2025 18:04)  HR: 77 (15 Jul 2025 05:15) (77 - 86)  BP: 142/68 (15 Jul 2025 05:15) (142/68 - 160/67)  BP(mean): --  RR: 18 (15 Jul 2025 05:15) (18 - 18)  SpO2: 99% (15 Jul 2025 05:15) (98% - 99%)    Parameters below as of 15 Jul 2025 05:15  Patient On (Oxygen Delivery Method): room air        Admit weight: 64.4 kg   Daily Weight in k.9 (2025 07:26)    Intake / Output:   14 @ 07:01  -  07-15 @ 07:00  --------------------------------------------------------  IN: 1984 mL / OUT: 2250 mL / NET: -266 mL        PE:   Oropharynx: + mild erythema nearly completely resolved  no ulcerations   Oral Mucositis: +                                           ndGndrndanddndend:nd2nd CVS: S1, S2 RRR   Lungs: CTA throughout bilaterally   Abdomen: + BS x 4, soft, NT, ND   Extremities: trace edema RUE, 5/5 strenght BUE's  Gastric Mucositis:                 -                              Grade: n/a   Intestinal Mucositis:              -                              Grade: n/a   Skin: no rash, ecchymotic patches BLE's   TLC: CDI   Neuro: A&O x 3        Labs:                         7.2    2.78  )-----------( 45       ( 15 Jul 2025 06:35 )             22.4       07-15    136  |  99  |  4[L]  ----------------------------<  87  2.9[LL]   |  25  |  0.45[L]    Ca    7.9[L]      15 Jul 2025 06:35  Phos  3.4     -15  Mg     1.7     -15    TPro  4.9[L]  /  Alb  2.9[L]  /  TBili  0.5  /  DBili  x   /  AST  7[L]  /  ALT  <5[L]  /  AlkPhos  96  07-15    PT/INR - ( 2025 06:52 )   PT: 14.3 sec;   INR: 1.25 ratio         PTT - ( 2025 06:52 )  PTT:28.7 sec  LIVER FUNCTIONS - ( 15 Jul 2025 06:35 )  Alb: 2.9 g/dL / Pro: 4.9 g/dL / ALK PHOS: 96 U/L / ALT: <5 U/L / AST: 7 U/L / GGT: x           Lactate Dehydrogenase, Serum: 196 U/L (07-15 @ 06:35)      Cultures:   Culture Results: <10,000 CFU/mL Normal Urogenital Cha (25 @ 06:31)    Culture Results: No growth at 5 days(25 @ 16:36)    Culture Results: No growth at  5 days (25 @ 16:30)    Respiratory Viral Panel with COVID-19 by SCARLET (25 @ 21:02)   Rapid RVP Result: [id number]: NotDetec:    Culture - Blood (25 @ 18:45)   Specimen Source: Blood Blood-Catheter    Culture Results: No growth at Final  Culture - Blood (25 @ 18:40)   Specimen Source: Blood Blood-Peripheral  Culture Results: No growth     Culture - Urine (25 @ 21:01)   Specimen Source: Clean Catch Clean Catch (Midstream)  Culture Results: 10,000 - 49,000 CFU/mL Gram Positive Cocci in Pairs and Chains   <10,000 CFU/ml Normal Urogenital cha present    Radiology:   ACC: [id number] EXAM:  XR CHEST PORTABLE URGENT 1V   PROCEDURE DATE:  2025    IMPRESSION: No focal consolidation.          Meds:   Antimicrobials:   acyclovir   Oral Tab/Cap 800 milliGRAM(s) Oral every 12 hours  atovaquone  Suspension 1500 milliGRAM(s) Oral daily  letermovir 480 milliGRAM(s) Oral daily  posaconazole DR Tablet 300 milliGRAM(s) Oral daily      Heme / Onc:       GI:  loperamide 2 milliGRAM(s) Oral every 6 hours PRN  pantoprazole    Tablet 40 milliGRAM(s) Oral every 12 hours  polyethylene glycol 3350 17 Gram(s) Oral daily PRN  sodium bicarbonate Mouth Rinse 10 milliLiter(s) Swish and Spit five times a day  ursodiol Suspension 300 milliGRAM(s) Oral two times a day      Cardiovascular:   hydrochlorothiazide 25 milliGRAM(s) Oral two times a day  lisinopril 20 milliGRAM(s) Oral <User Schedule>  lisinopril 20 milliGRAM(s) Oral <User Schedule>  metoprolol succinate ER 25 milliGRAM(s) Oral daily      Immunologic:       Other medications:   artificial tears (preservative free) Ophthalmic Solution 1 Drop(s) Both EYES four times a day  Biotene Dry Mouth Oral Rinse 5 milliLiter(s) Swish and Spit five times a day  buPROPion XL (24-Hour) . 300 milliGRAM(s) Oral daily  chlorhexidine 0.12% Liquid 15 milliLiter(s) Swish and Spit two times a day  chlorhexidine 4% Liquid 1 Application(s) Topical <User Schedule>  fentaNYL   Patch  12 MICROgram(s)/Hr. 1 Patch Transdermal every 48 hours  levothyroxine 88 MICROGram(s) Oral daily  phytonadione   Solution 5 milliGRAM(s) Oral <User Schedule>  potassium chloride   Solution 40 milliEquivalent(s) Oral four times a day  potassium chloride  20 mEq/100 mL IVPB 20 milliEquivalent(s) IV Intermittent every 2 hours  prochlorperazine   Injectable 10 milliGRAM(s) IV Push every 6 hours  zinc oxide 40% Paste 1 Application(s) Topical three times a day      PRN:   acetaminophen     Tablet .. 650 milliGRAM(s) Oral every 6 hours PRN  FIRST- Mouthwash  BLM 15 milliLiter(s) Swish and Swallow five times a day PRN  lidocaine 2% Viscous 15 milliLiter(s) Oral five times a day PRN  loperamide 2 milliGRAM(s) Oral every 6 hours PRN  ondansetron Injectable 8 milliGRAM(s) IV Push every 8 hours PRN  oxyCODONE    IR 5 milliGRAM(s) Oral every 6 hours PRN  polyethylene glycol 3350 17 Gram(s) Oral daily PRN  sodium chloride 0.9% lock flush 10 milliLiter(s) IV Push every 1 hour PRN            A/P: 64 year old female with hx of myelofibrosis presents for a MUD allogeneic SCT (DP Permissive) conditioning with Flu/Bu/Thiotepa/rATG.  Today is day +18  - Thiotepa 1/2. Maintain skin precautions during thiotepa administration and for 48 hours after completion (days .6 through day -3). Chronic pain issues - continue home fentanyl patch. Febrile overnight, tmax 100.9. Cultures  pending. RVP negative. CXR pending official read, no obvious consolidations. Vital signs are stable.    D/C HCTZ. Fludarabine 13, Busulfan 1/3, rATG 1/3. No Tylenol on busulfan days. Continue keppra ppx for 24 hours post infusion of last dose of busulfan. No acute events overnight, vital signs are stable.   - overnight patient was febrile during ATG, no cultures/abx as fever likely secondary to ATG. Patient was otherwise stable overnight. Fludarabine 2/3, Busulfan 2/3 and rATG 2/3. NO Tylenol or Azoles until Day 0. Continue with supportive care.  - Fludarabine 3/3, busulfan 3/3, rATG 3/3. No acute events overnight. Vital signs are stable.    Switched Compazine to atc   VSS, afebrile. 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   VSS, persistent fevers likely due to CRS post-SCT, continues on zosyn. Cultures pending. Hypervolemia - lasix 40mg x 1, will continue strict I/Os. Started caspofungin due to persistent thrush.   overnight no acute events noted. VSS, febrile likely due to CRS - continue with supportive care. BCx/UCx NGTD x 24h.   VSS, intermittently febrile likely due to CRS, infectious workup negative so far. PTCy / today. I&O, daily weights, prn diuresis. Continue to monitor thrush (improving). Changed some meds to PO suspension due to mucositis. Continue with supportive care. Refractory nausea, added trial of zyprexa 5mg po QHS x 3 days. d/c primafit. Change voriconazole to posaconazole tomorrow.   - No acute events overnight, vital signs are stable. Continue supportive care for chemotherapy induced oral mucositis.   - No acute events overnight, vital signs are stable. Completed PTCy, hydration to be discontinued today. I&O, daily weights, prn diuresis. Continue supportive care. Fentanyl patch to be titrated down to 12 mcg / hr tomorrow  7/3 Starting Scopolamine patch to decrease oral secretions. Increased Lisinopril 20mg in am, 10mg PM.    -No acute events; awaiting count recovery   - no acute events overnight. Vital signs are stable. SBP remains elevated, lisinopril / metoprolol doses increased.   : BMT team continues to educate the patient about the importance of medication taking. awaiting count recovery   Increased ppi q12h (+stool guaiac)   D/C'd Scopolamine patch   VSS awaiting count recovery  7/10 VSS WBC at 0.15 today; continue Zarxio. BMT team continues to encourage PO intake getting OOB and medication compliance. NA at 150: restarted DW5(2L free water deficit)    - no acute events overnight. Vital signs are stable. d/c D5W, continue to encourage po intake. Early engraftment, check CMV PCR, VNTR. Transition meds back to po.   - VSS and remains afebrile. Engrafting (ANC 3.24). Mucositis improving, continue to encourage increasing PO intake. Encourage OOB to chair/ambulation with physical therapy. Will D/C Dilaudid and switch to PO oxycodone prn. Discharge planning.   - VSS and remains afebrile. Mucositis improving. Continue to encourage OOB with physical therapy and sitting in chair. Continue to encourage to increase PO intake daily. Started on Atovaquone ppx (sulfa allergy). Discharge planning.  7/15- VSS: discharge home with follow up at [facility].      1. Infectious Disease:   acyclovir Suspension 800 milliGRAM(s) Oral every 12 hours  letermovir 480 milliGRAM(s) Oral daily  levoFLOXacin IVPB 500 milliGRAM(s) IV Intermittent every 24 hours  posaconazole DR Tablet 300 milliGRAM(s) Oral daily  vancomycin Solution 125 milliGRAM(s) Oral every 12 hours    2. VOD Prophylaxis:   ursodiol Suspension 300 milliGRAM(s) Oral two times a day    3. GI Prophylaxis:    pantoprazole  Injectable 40 milliGRAM(s) IV q12h    4. Mouthcare - NS / NaHCO3 rinses, Peridex, Biotene; Skin care     5. GVHD prophylaxis   PTCy days +3, +4  Bortezomib days 0, + 3  Abatacept days +5, +14, +28     6. Transfuse & replete electrolytes prn      7. IV hydration, daily weights, strict I&O, prn diuresis     8. PO intake as tolerated, nutrition follow up as needed    9. Antiemetics, anti-diarrhea medications:   loperamide 2 milliGRAM(s) Oral every 6 hours PRN  ondansetron Injectable 8 milliGRAM(s) IV Push every 8 hours PRN  prochlorperazine Injectable 10 milliGRAM(s) IV Push every 6 hours    10. OOB as tolerated, physical therapy consult if needed     11. Monitor coags  2x week, vitamin K BIW   phytonadione Solution 5 milliGRAM(s) Oral <User Schedule>    12. Monitor closely for clinical changes, monitor for fevers     13. Emotional support provided, plan of care discussed and questions addressed     14. Patient education done regarding plan of care, restrictions and discharge planning     15. Continue regular social work input     I have written the above note for Dr. Norwood who performed service with me in the room.   Yesenia Dejesus PA-C ([phone])    I have seen and examined patient with PA, I agree with above note as scribed.

## 2025-07-15 NOTE — PROGRESS NOTE ADULT - NUTRITIONAL ASSESSMENT
This patient has been assessed with a concern for Malnutrition and has been determined to have a diagnosis/diagnoses of Severe protein-calorie malnutrition.    This patient is being managed with:   Diet Regular-  Supplement Feeding Modality:  Oral  Ensure Max Cans or Servings Per Day:  1       Frequency:  Three Times a day  Entered: Jun 27 2025  6:01PM  

## 2025-07-16 ENCOUNTER — RESULT REVIEW (OUTPATIENT)
Age: 65
End: 2025-07-16

## 2025-07-16 ENCOUNTER — APPOINTMENT (OUTPATIENT)
Dept: INFUSION THERAPY | Facility: HOSPITAL | Age: 65
End: 2025-07-16

## 2025-07-16 ENCOUNTER — APPOINTMENT (OUTPATIENT)
Dept: HEMATOLOGY ONCOLOGY | Facility: CLINIC | Age: 65
End: 2025-07-16
Payer: COMMERCIAL

## 2025-07-16 LAB
ALBUMIN SERPL ELPH-MCNC: 3.3 G/DL — SIGNIFICANT CHANGE UP (ref 3.3–5)
ALP SERPL-CCNC: 101 U/L — SIGNIFICANT CHANGE UP (ref 40–120)
ALT FLD-CCNC: 7 U/L — LOW (ref 10–45)
ANION GAP SERPL CALC-SCNC: 16 MMOL/L — SIGNIFICANT CHANGE UP (ref 5–17)
AST SERPL-CCNC: 12 U/L — SIGNIFICANT CHANGE UP (ref 10–40)
BASOPHILS # BLD AUTO: 0 K/UL — SIGNIFICANT CHANGE UP (ref 0–0.2)
BASOPHILS NFR BLD AUTO: 0 % — SIGNIFICANT CHANGE UP (ref 0–2)
BILIRUB SERPL-MCNC: 0.6 MG/DL — SIGNIFICANT CHANGE UP (ref 0.2–1.2)
BUN SERPL-MCNC: 5 MG/DL — LOW (ref 7–23)
CALCIUM SERPL-MCNC: 8.6 MG/DL — SIGNIFICANT CHANGE UP (ref 8.4–10.5)
CHLORIDE SERPL-SCNC: 98 MMOL/L — SIGNIFICANT CHANGE UP (ref 96–108)
CO2 SERPL-SCNC: 23 MMOL/L — SIGNIFICANT CHANGE UP (ref 22–31)
CREAT SERPL-MCNC: 0.44 MG/DL — LOW (ref 0.5–1.3)
DACRYOCYTES BLD QL SMEAR: SLIGHT — SIGNIFICANT CHANGE UP
EGFR: 109 ML/MIN/1.73M2 — SIGNIFICANT CHANGE UP
EGFR: 109 ML/MIN/1.73M2 — SIGNIFICANT CHANGE UP
ELLIPTOCYTES BLD QL SMEAR: SLIGHT — SIGNIFICANT CHANGE UP
EOSINOPHIL # BLD AUTO: 0 K/UL — SIGNIFICANT CHANGE UP (ref 0–0.5)
EOSINOPHIL NFR BLD AUTO: 0 % — SIGNIFICANT CHANGE UP (ref 0–6)
GLUCOSE SERPL-MCNC: 82 MG/DL — SIGNIFICANT CHANGE UP (ref 70–99)
HCT VFR BLD CALC: 24.4 % — LOW (ref 34.5–45)
HGB BLD-MCNC: 8.1 G/DL — LOW (ref 11.5–15.5)
LDH SERPL L TO P-CCNC: 232 U/L — SIGNIFICANT CHANGE UP (ref 50–242)
LG PLATELETS BLD QL AUTO: SLIGHT — SIGNIFICANT CHANGE UP
LYMPHOCYTES # BLD AUTO: 0.06 K/UL — LOW (ref 1–3.3)
LYMPHOCYTES # BLD AUTO: 2 % — LOW (ref 13–44)
MAGNESIUM SERPL-MCNC: 1.8 MG/DL — SIGNIFICANT CHANGE UP (ref 1.6–2.6)
MCHC RBC-ENTMCNC: 28.2 PG — SIGNIFICANT CHANGE UP (ref 27–34)
MCHC RBC-ENTMCNC: 33.2 G/DL — SIGNIFICANT CHANGE UP (ref 32–36)
MCV RBC AUTO: 85.3 FL — SIGNIFICANT CHANGE UP (ref 80–100)
METAMYELOCYTES # FLD: 1 % — HIGH (ref 0–0)
METAMYELOCYTES NFR BLD: 1 % — HIGH (ref 0–0)
MONOCYTES # BLD AUTO: 0.75 K/UL — SIGNIFICANT CHANGE UP (ref 0–0.9)
MONOCYTES NFR BLD AUTO: 27 % — HIGH (ref 2–14)
MYELOCYTES NFR BLD: 8.5 % — HIGH (ref 0–0)
NEUTROPHILS # BLD AUTO: 1.7 K/UL — LOW (ref 1.8–7.4)
NEUTROPHILS NFR BLD AUTO: 61.5 % — SIGNIFICANT CHANGE UP (ref 43–77)
NRBC # BLD: 1 /100 WBCS — HIGH (ref 0–0)
NRBC BLD AUTO-RTO: SIGNIFICANT CHANGE UP /100 WBCS (ref 0–0)
NRBC BLD-RTO: 1 /100 WBCS — HIGH (ref 0–0)
PHOSPHATE SERPL-MCNC: 3.1 MG/DL — SIGNIFICANT CHANGE UP (ref 2.5–4.5)
PLAT MORPH BLD: ABNORMAL
PLATELET # BLD AUTO: 68 K/UL — LOW (ref 150–400)
POIKILOCYTOSIS BLD QL AUTO: SLIGHT — SIGNIFICANT CHANGE UP
POTASSIUM SERPL-MCNC: 3.6 MMOL/L — SIGNIFICANT CHANGE UP (ref 3.5–5.3)
POTASSIUM SERPL-SCNC: 3.6 MMOL/L — SIGNIFICANT CHANGE UP (ref 3.5–5.3)
PROT SERPL-MCNC: 5.6 G/DL — LOW (ref 6–8.3)
RBC # BLD: 2.87 M/UL — LOW (ref 3.8–5.2)
RBC # FLD: 14.7 % — HIGH (ref 10.3–14.5)
RBC BLD AUTO: ABNORMAL
SCHISTOCYTES BLD QL AUTO: SLIGHT — SIGNIFICANT CHANGE UP
SODIUM SERPL-SCNC: 137 MMOL/L — SIGNIFICANT CHANGE UP (ref 135–145)
WBC # BLD: 2.76 K/UL — LOW (ref 3.8–10.5)
WBC # FLD AUTO: 2.76 K/UL — LOW (ref 3.8–10.5)

## 2025-07-16 PROCEDURE — 99215 OFFICE O/P EST HI 40 MIN: CPT

## 2025-07-17 ENCOUNTER — APPOINTMENT (OUTPATIENT)
Dept: WOUND CARE | Facility: HOSPITAL | Age: 65
End: 2025-07-17
Payer: COMMERCIAL

## 2025-07-17 VITALS
TEMPERATURE: 99.5 F | DIASTOLIC BLOOD PRESSURE: 53 MMHG | RESPIRATION RATE: 16 BRPM | SYSTOLIC BLOOD PRESSURE: 164 MMHG | HEART RATE: 79 BPM

## 2025-07-17 PROBLEM — S31.109S: Status: ACTIVE | Noted: 2025-07-17

## 2025-07-17 PROBLEM — L98.491: Status: ACTIVE | Noted: 2025-07-17

## 2025-07-17 PROBLEM — T14.8XXA BLISTER: Status: ACTIVE | Noted: 2025-07-17

## 2025-07-17 PROBLEM — M79.629 PAIN IN AXILLA: Status: ACTIVE | Noted: 2025-07-17

## 2025-07-17 PROBLEM — S31.819D WOUND OF RIGHT BUTTOCK, SUBSEQUENT ENCOUNTER: Status: ACTIVE | Noted: 2025-07-17

## 2025-07-17 LAB
CMV DNA CSF QL NAA+PROBE: SIGNIFICANT CHANGE UP IU/ML
CMV DNA SPEC NAA+PROBE-LOG#: SIGNIFICANT CHANGE UP LOG10IU/ML
EBV DNA SERPL NAA+PROBE-ACNC: SIGNIFICANT CHANGE UP IU/ML
EBVPCR LOG: SIGNIFICANT CHANGE UP LOG10IU/ML

## 2025-07-17 PROCEDURE — 99244 OFF/OP CNSLTJ NEW/EST MOD 40: CPT

## 2025-07-17 RX ORDER — LIDOCAINE AND PRILOCAINE 25; 25 MG/G; MG/G
2.5-2.5 CREAM TOPICAL
Qty: 1 | Refills: 5 | Status: ACTIVE | COMMUNITY
Start: 2025-07-17 | End: 1900-01-01

## 2025-07-17 RX ORDER — SILVER SULFADIAZINE 10 MG/G
1 CREAM TOPICAL DAILY
Qty: 1 | Refills: 2 | Status: ACTIVE | COMMUNITY
Start: 2025-07-17 | End: 1900-01-01

## 2025-07-18 ENCOUNTER — NON-APPOINTMENT (OUTPATIENT)
Age: 65
End: 2025-07-18

## 2025-07-18 ENCOUNTER — APPOINTMENT (OUTPATIENT)
Dept: INFUSION THERAPY | Facility: HOSPITAL | Age: 65
End: 2025-07-18

## 2025-07-18 LAB — HADV DNA FLD NAA+PROBE-LOG#: SIGNIFICANT CHANGE UP COPIES/ML

## 2025-07-23 ENCOUNTER — RESULT REVIEW (OUTPATIENT)
Age: 65
End: 2025-07-23

## 2025-07-23 ENCOUNTER — APPOINTMENT (OUTPATIENT)
Dept: HEMATOLOGY ONCOLOGY | Facility: CLINIC | Age: 65
End: 2025-07-23
Payer: COMMERCIAL

## 2025-07-23 ENCOUNTER — APPOINTMENT (OUTPATIENT)
Dept: INFUSION THERAPY | Facility: HOSPITAL | Age: 65
End: 2025-07-23

## 2025-07-23 ENCOUNTER — APPOINTMENT (OUTPATIENT)
Dept: HEMATOLOGY ONCOLOGY | Facility: CLINIC | Age: 65
End: 2025-07-23

## 2025-07-23 ENCOUNTER — NON-APPOINTMENT (OUTPATIENT)
Age: 65
End: 2025-07-23

## 2025-07-23 LAB
ALBUMIN SERPL ELPH-MCNC: 3.8 G/DL — SIGNIFICANT CHANGE UP (ref 3.3–5)
ALP SERPL-CCNC: 94 U/L — SIGNIFICANT CHANGE UP (ref 40–120)
ALT FLD-CCNC: 8 U/L — LOW (ref 10–45)
ANION GAP SERPL CALC-SCNC: 16 MMOL/L — SIGNIFICANT CHANGE UP (ref 5–17)
AST SERPL-CCNC: 17 U/L — SIGNIFICANT CHANGE UP (ref 10–40)
BASOPHILS # BLD AUTO: 0.03 K/UL — SIGNIFICANT CHANGE UP (ref 0–0.2)
BASOPHILS NFR BLD AUTO: 0.4 % — SIGNIFICANT CHANGE UP (ref 0–2)
BILIRUB SERPL-MCNC: 0.6 MG/DL — SIGNIFICANT CHANGE UP (ref 0.2–1.2)
BUN SERPL-MCNC: 23 MG/DL — SIGNIFICANT CHANGE UP (ref 7–23)
CALCIUM SERPL-MCNC: 9.4 MG/DL — SIGNIFICANT CHANGE UP (ref 8.4–10.5)
CHLORIDE SERPL-SCNC: 97 MMOL/L — SIGNIFICANT CHANGE UP (ref 96–108)
CO2 SERPL-SCNC: 25 MMOL/L — SIGNIFICANT CHANGE UP (ref 22–31)
CREAT SERPL-MCNC: 0.97 MG/DL — SIGNIFICANT CHANGE UP (ref 0.5–1.3)
EGFR: 66 ML/MIN/1.73M2 — SIGNIFICANT CHANGE UP
EGFR: 66 ML/MIN/1.73M2 — SIGNIFICANT CHANGE UP
EOSINOPHIL # BLD AUTO: 0 K/UL — SIGNIFICANT CHANGE UP (ref 0–0.5)
EOSINOPHIL NFR BLD AUTO: 0 % — SIGNIFICANT CHANGE UP (ref 0–6)
GLUCOSE SERPL-MCNC: 101 MG/DL — HIGH (ref 70–99)
HCT VFR BLD CALC: 22.6 % — LOW (ref 34.5–45)
HGB BLD-MCNC: 7.2 G/DL — LOW (ref 11.5–15.5)
IMM GRANULOCYTES NFR BLD AUTO: 1 % — HIGH (ref 0–0.9)
LDH SERPL L TO P-CCNC: 209 U/L — SIGNIFICANT CHANGE UP (ref 50–242)
LYMPHOCYTES # BLD AUTO: 0.5 K/UL — LOW (ref 1–3.3)
LYMPHOCYTES # BLD AUTO: 7.4 % — LOW (ref 13–44)
MAGNESIUM SERPL-MCNC: 1.9 MG/DL — SIGNIFICANT CHANGE UP (ref 1.6–2.6)
MCHC RBC-ENTMCNC: 28.1 PG — SIGNIFICANT CHANGE UP (ref 27–34)
MCHC RBC-ENTMCNC: 31.9 G/DL — LOW (ref 32–36)
MCV RBC AUTO: 88.3 FL — SIGNIFICANT CHANGE UP (ref 80–100)
MONOCYTES # BLD AUTO: 0.71 K/UL — SIGNIFICANT CHANGE UP (ref 0–0.9)
MONOCYTES NFR BLD AUTO: 10.5 % — SIGNIFICANT CHANGE UP (ref 2–14)
NEUTROPHILS # BLD AUTO: 5.45 K/UL — SIGNIFICANT CHANGE UP (ref 1.8–7.4)
NEUTROPHILS NFR BLD AUTO: 80.7 % — HIGH (ref 43–77)
NRBC BLD AUTO-RTO: 0 /100 WBCS — SIGNIFICANT CHANGE UP (ref 0–0)
PHOSPHATE SERPL-MCNC: 4.4 MG/DL — SIGNIFICANT CHANGE UP (ref 2.5–4.5)
PLATELET # BLD AUTO: 167 K/UL — SIGNIFICANT CHANGE UP (ref 150–400)
POTASSIUM SERPL-MCNC: 3.3 MMOL/L — LOW (ref 3.5–5.3)
POTASSIUM SERPL-SCNC: 3.3 MMOL/L — LOW (ref 3.5–5.3)
PROT SERPL-MCNC: 6.4 G/DL — SIGNIFICANT CHANGE UP (ref 6–8.3)
RBC # BLD: 2.56 M/UL — LOW (ref 3.8–5.2)
RBC # FLD: 15.9 % — HIGH (ref 10.3–14.5)
SODIUM SERPL-SCNC: 137 MMOL/L — SIGNIFICANT CHANGE UP (ref 135–145)
WBC # BLD: 6.76 K/UL — SIGNIFICANT CHANGE UP (ref 3.8–10.5)
WBC # FLD AUTO: 6.76 K/UL — SIGNIFICANT CHANGE UP (ref 3.8–10.5)

## 2025-07-23 PROCEDURE — 99215 OFFICE O/P EST HI 40 MIN: CPT

## 2025-07-25 ENCOUNTER — RESULT REVIEW (OUTPATIENT)
Age: 65
End: 2025-07-25

## 2025-07-25 ENCOUNTER — APPOINTMENT (OUTPATIENT)
Dept: INFUSION THERAPY | Facility: HOSPITAL | Age: 65
End: 2025-07-25

## 2025-07-25 LAB
ALBUMIN SERPL ELPH-MCNC: 3.7 G/DL — SIGNIFICANT CHANGE UP (ref 3.3–5)
ALP SERPL-CCNC: 84 U/L — SIGNIFICANT CHANGE UP (ref 40–120)
ALT FLD-CCNC: 6 U/L — LOW (ref 10–45)
ANION GAP SERPL CALC-SCNC: 13 MMOL/L — SIGNIFICANT CHANGE UP (ref 5–17)
AST SERPL-CCNC: 14 U/L — SIGNIFICANT CHANGE UP (ref 10–40)
BASOPHILS # BLD AUTO: 0.02 K/UL — SIGNIFICANT CHANGE UP (ref 0–0.2)
BASOPHILS NFR BLD AUTO: 0.6 % — SIGNIFICANT CHANGE UP (ref 0–2)
BILIRUB SERPL-MCNC: 0.5 MG/DL — SIGNIFICANT CHANGE UP (ref 0.2–1.2)
BUN SERPL-MCNC: 9 MG/DL — SIGNIFICANT CHANGE UP (ref 7–23)
CALCIUM SERPL-MCNC: 9.2 MG/DL — SIGNIFICANT CHANGE UP (ref 8.4–10.5)
CHLORIDE SERPL-SCNC: 100 MMOL/L — SIGNIFICANT CHANGE UP (ref 96–108)
CO2 SERPL-SCNC: 27 MMOL/L — SIGNIFICANT CHANGE UP (ref 22–31)
CREAT SERPL-MCNC: 0.65 MG/DL — SIGNIFICANT CHANGE UP (ref 0.5–1.3)
EGFR: 99 ML/MIN/1.73M2 — SIGNIFICANT CHANGE UP
EGFR: 99 ML/MIN/1.73M2 — SIGNIFICANT CHANGE UP
EOSINOPHIL # BLD AUTO: 0 K/UL — SIGNIFICANT CHANGE UP (ref 0–0.5)
EOSINOPHIL NFR BLD AUTO: 0 % — SIGNIFICANT CHANGE UP (ref 0–6)
GLUCOSE SERPL-MCNC: 107 MG/DL — HIGH (ref 70–99)
HADV DNA FLD NAA+PROBE-LOG#: SIGNIFICANT CHANGE UP COPIES/ML
HCT VFR BLD CALC: 22.2 % — LOW (ref 34.5–45)
HGB BLD-MCNC: 7.2 G/DL — LOW (ref 11.5–15.5)
IMM GRANULOCYTES NFR BLD AUTO: 1.3 % — HIGH (ref 0–0.9)
LYMPHOCYTES # BLD AUTO: 0.31 K/UL — LOW (ref 1–3.3)
LYMPHOCYTES # BLD AUTO: 9.8 % — LOW (ref 13–44)
MAGNESIUM SERPL-MCNC: 2 MG/DL — SIGNIFICANT CHANGE UP (ref 1.6–2.6)
MCHC RBC-ENTMCNC: 28.8 PG — SIGNIFICANT CHANGE UP (ref 27–34)
MCHC RBC-ENTMCNC: 32.4 G/DL — SIGNIFICANT CHANGE UP (ref 32–36)
MCV RBC AUTO: 88.8 FL — SIGNIFICANT CHANGE UP (ref 80–100)
MONOCYTES # BLD AUTO: 0.58 K/UL — SIGNIFICANT CHANGE UP (ref 0–0.9)
MONOCYTES NFR BLD AUTO: 18.4 % — HIGH (ref 2–14)
NEUTROPHILS # BLD AUTO: 2.21 K/UL — SIGNIFICANT CHANGE UP (ref 1.8–7.4)
NEUTROPHILS NFR BLD AUTO: 69.9 % — SIGNIFICANT CHANGE UP (ref 43–77)
NRBC BLD AUTO-RTO: 0 /100 WBCS — SIGNIFICANT CHANGE UP (ref 0–0)
PLATELET # BLD AUTO: 176 K/UL — SIGNIFICANT CHANGE UP (ref 150–400)
POTASSIUM SERPL-MCNC: 3.7 MMOL/L — SIGNIFICANT CHANGE UP (ref 3.5–5.3)
POTASSIUM SERPL-SCNC: 3.7 MMOL/L — SIGNIFICANT CHANGE UP (ref 3.5–5.3)
PROT SERPL-MCNC: 6.1 G/DL — SIGNIFICANT CHANGE UP (ref 6–8.3)
RBC # BLD: 2.5 M/UL — LOW (ref 3.8–5.2)
RBC # FLD: 17.1 % — HIGH (ref 10.3–14.5)
SODIUM SERPL-SCNC: 139 MMOL/L — SIGNIFICANT CHANGE UP (ref 135–145)
WBC # BLD: 3.16 K/UL — LOW (ref 3.8–10.5)
WBC # FLD AUTO: 3.16 K/UL — LOW (ref 3.8–10.5)

## 2025-07-26 ENCOUNTER — TRANSCRIPTION ENCOUNTER (OUTPATIENT)
Age: 65
End: 2025-07-26

## 2025-07-26 ENCOUNTER — APPOINTMENT (OUTPATIENT)
Dept: AFTER HOURS CARE | Facility: EMERGENCY ROOM | Age: 65
End: 2025-07-26

## 2025-07-26 ENCOUNTER — APPOINTMENT (OUTPATIENT)
Dept: AFTER HOURS CARE | Facility: EMERGENCY ROOM | Age: 65
End: 2025-07-26
Payer: COMMERCIAL

## 2025-07-26 DIAGNOSIS — K12.30 ORAL MUCOSITIS (ULCERATIVE), UNSPECIFIED: ICD-10-CM

## 2025-07-26 PROCEDURE — 99215 OFFICE O/P EST HI 40 MIN: CPT | Mod: 95

## 2025-07-28 LAB — HERPES-6 (HSV-6) PCR: NOT DETECTED COPIES/ML

## 2025-07-28 RX ORDER — DIPHENHYDRAMINE HYDROCHLORIDE AND LIDOCAINE HYDROCHLORIDE AND ALUMINUM HYDROXIDE AND MAGNESIUM HYDRO
KIT
Qty: 1 | Refills: 0 | Status: ACTIVE | COMMUNITY
Start: 2025-07-26 | End: 1900-01-01

## 2025-07-30 ENCOUNTER — LABORATORY RESULT (OUTPATIENT)
Age: 65
End: 2025-07-30

## 2025-07-30 ENCOUNTER — RESULT REVIEW (OUTPATIENT)
Age: 65
End: 2025-07-30

## 2025-07-30 ENCOUNTER — APPOINTMENT (OUTPATIENT)
Dept: INFUSION THERAPY | Facility: HOSPITAL | Age: 65
End: 2025-07-30

## 2025-07-30 ENCOUNTER — APPOINTMENT (OUTPATIENT)
Dept: HEMATOLOGY ONCOLOGY | Facility: CLINIC | Age: 65
End: 2025-07-30

## 2025-07-30 VITALS
SYSTOLIC BLOOD PRESSURE: 128 MMHG | DIASTOLIC BLOOD PRESSURE: 71 MMHG | HEART RATE: 71 BPM | RESPIRATION RATE: 16 BRPM | OXYGEN SATURATION: 99 % | WEIGHT: 116.82 LBS | TEMPERATURE: 98.8 F | BODY MASS INDEX: 20.55 KG/M2

## 2025-07-30 DIAGNOSIS — D47.3 ESSENTIAL (HEMORRHAGIC) THROMBOCYTHEMIA: ICD-10-CM

## 2025-07-30 DIAGNOSIS — D75.81 MYELOFIBROSIS: ICD-10-CM

## 2025-07-30 DIAGNOSIS — Z94.84 STEM CELLS TRANSPLANT STATUS: ICD-10-CM

## 2025-07-30 LAB
ALBUMIN SERPL ELPH-MCNC: 3.8 G/DL
ALP BLD-CCNC: 77 U/L
ALT SERPL-CCNC: <5 U/L
ANION GAP SERPL CALC-SCNC: 14 MMOL/L
ANISOCYTOSIS BLD QL: SLIGHT — SIGNIFICANT CHANGE UP
AST SERPL-CCNC: 11 U/L
BASOPHILS # BLD AUTO: 0.02 K/UL — SIGNIFICANT CHANGE UP (ref 0–0.2)
BASOPHILS NFR BLD AUTO: 0.5 % — SIGNIFICANT CHANGE UP (ref 0–2)
BILIRUB SERPL-MCNC: 0.4 MG/DL
BUN SERPL-MCNC: 7 MG/DL
CALCIUM SERPL-MCNC: 9.4 MG/DL
CHLORIDE SERPL-SCNC: 101 MMOL/L
CO2 SERPL-SCNC: 22 MMOL/L
CREAT SERPL-MCNC: 0.64 MG/DL
DACRYOCYTES BLD QL SMEAR: SLIGHT — SIGNIFICANT CHANGE UP
EGFRCR SERPLBLD CKD-EPI 2021: 99 ML/MIN/1.73M2
ELLIPTOCYTES BLD QL SMEAR: SLIGHT — SIGNIFICANT CHANGE UP
EOSINOPHIL # BLD AUTO: 0.01 K/UL — SIGNIFICANT CHANGE UP (ref 0–0.5)
EOSINOPHIL NFR BLD AUTO: 0.2 % — SIGNIFICANT CHANGE UP (ref 0–6)
GLUCOSE SERPL-MCNC: 109 MG/DL
HCT VFR BLD CALC: 27 % — LOW (ref 34.5–45)
HGB BLD-MCNC: 8.7 G/DL — LOW (ref 11.5–15.5)
IMM GRANULOCYTES NFR BLD AUTO: 0.7 % — SIGNIFICANT CHANGE UP (ref 0–0.9)
LDH SERPL-CCNC: 183 U/L
LYMPHOCYTES # BLD AUTO: 0.47 K/UL — LOW (ref 1–3.3)
LYMPHOCYTES # BLD AUTO: 11 % — LOW (ref 13–44)
MACROCYTES BLD QL: SLIGHT — SIGNIFICANT CHANGE UP
MAGNESIUM SERPL-MCNC: 1.7 MG/DL
MCHC RBC-ENTMCNC: 29.5 PG — SIGNIFICANT CHANGE UP (ref 27–34)
MCHC RBC-ENTMCNC: 32.2 G/DL — SIGNIFICANT CHANGE UP (ref 32–36)
MCV RBC AUTO: 91.5 FL — SIGNIFICANT CHANGE UP (ref 80–100)
MICROCYTES BLD QL: SLIGHT — SIGNIFICANT CHANGE UP
MONOCYTES # BLD AUTO: 0.86 K/UL — SIGNIFICANT CHANGE UP (ref 0–0.9)
MONOCYTES NFR BLD AUTO: 20.1 % — HIGH (ref 2–14)
NEUTROPHILS # BLD AUTO: 2.89 K/UL — SIGNIFICANT CHANGE UP (ref 1.8–7.4)
NEUTROPHILS NFR BLD AUTO: 67.5 % — SIGNIFICANT CHANGE UP (ref 43–77)
NRBC BLD AUTO-RTO: 0 /100 WBCS — SIGNIFICANT CHANGE UP (ref 0–0)
PLAT MORPH BLD: NORMAL — SIGNIFICANT CHANGE UP
PLATELET # BLD AUTO: 197 K/UL — SIGNIFICANT CHANGE UP (ref 150–400)
POIKILOCYTOSIS BLD QL AUTO: SLIGHT — SIGNIFICANT CHANGE UP
POLYCHROMASIA BLD QL SMEAR: SLIGHT — SIGNIFICANT CHANGE UP
POTASSIUM SERPL-SCNC: 3.5 MMOL/L
PROT SERPL-MCNC: 6.2 G/DL
RBC # BLD: 2.95 M/UL — LOW (ref 3.8–5.2)
RBC # FLD: 20.4 % — HIGH (ref 10.3–14.5)
RBC BLD AUTO: ABNORMAL
SCHISTOCYTES BLD QL AUTO: SLIGHT — SIGNIFICANT CHANGE UP
SODIUM SERPL-SCNC: 137 MMOL/L
WBC # BLD: 4.28 K/UL — SIGNIFICANT CHANGE UP (ref 3.8–10.5)
WBC # FLD AUTO: 4.28 K/UL — SIGNIFICANT CHANGE UP (ref 3.8–10.5)

## 2025-07-30 PROCEDURE — 99215 OFFICE O/P EST HI 40 MIN: CPT | Mod: 25

## 2025-07-30 PROCEDURE — 38222 DX BONE MARROW BX & ASPIR: CPT | Mod: RT

## 2025-07-31 LAB
CMV DNA SPEC QL NAA+PROBE: NOT DETECTED IU/ML
CMVPCR LOG: NOT DETECTED LOG10IU/ML
EBV DNA SERPL NAA+PROBE-ACNC: NOT DETECTED IU/ML
EBVPCR LOG: NOT DETECTED LOG10IU/ML
HADV DNA SERPL QL NAA+PROBE: NOT DETECTED COPIES/ML

## 2025-08-01 ENCOUNTER — NON-APPOINTMENT (OUTPATIENT)
Age: 65
End: 2025-08-01

## 2025-08-01 PROBLEM — Z94.84 HISTORY OF ALLOGENEIC STEM CELL TRANSPLANT: Status: ACTIVE | Noted: 2025-06-14

## 2025-08-06 ENCOUNTER — RESULT REVIEW (OUTPATIENT)
Age: 65
End: 2025-08-06

## 2025-08-06 ENCOUNTER — APPOINTMENT (OUTPATIENT)
Dept: HEMATOLOGY ONCOLOGY | Facility: CLINIC | Age: 65
End: 2025-08-06

## 2025-08-06 VITALS
BODY MASS INDEX: 20.55 KG/M2 | RESPIRATION RATE: 16 BRPM | SYSTOLIC BLOOD PRESSURE: 128 MMHG | TEMPERATURE: 98.3 F | WEIGHT: 115.99 LBS | OXYGEN SATURATION: 99 % | DIASTOLIC BLOOD PRESSURE: 61 MMHG | HEIGHT: 63 IN | HEART RATE: 67 BPM

## 2025-08-06 LAB
ALBUMIN SERPL ELPH-MCNC: 4.2 G/DL
ALP BLD-CCNC: 78 U/L
ALT SERPL-CCNC: <5 U/L
ANION GAP SERPL CALC-SCNC: 16 MMOL/L
AST SERPL-CCNC: 12 U/L
BASOPHILS # BLD AUTO: 0.03 K/UL — SIGNIFICANT CHANGE UP (ref 0–0.2)
BASOPHILS NFR BLD AUTO: 0.6 % — SIGNIFICANT CHANGE UP (ref 0–2)
BILIRUB SERPL-MCNC: 0.5 MG/DL
BUN SERPL-MCNC: 9 MG/DL
CALCIUM SERPL-MCNC: 9.8 MG/DL
CD16+CD56+ CELLS # BLD: 368 CELLS/UL
CD16+CD56+ CELLS NFR BLD: 70 %
CD19 CELLS NFR BLD: 1 CELLS/UL
CD3 CELLS # BLD: 146 CELLS/UL
CD3 CELLS NFR BLD: 26 %
CD3+CD4+ CELLS # BLD: 48 CELLS/UL
CD3+CD4+ CELLS NFR BLD: 8 %
CD3+CD4+ CELLS/CD3+CD8+ CLL SPEC: 0.52 RATIO
CD3+CD8+ CELLS # SPEC: 94 CELLS/UL
CD3+CD8+ CELLS NFR BLD: 16 %
CELLS.CD3-CD19+/CELLS IN BLOOD: <1 %
CHLORIDE SERPL-SCNC: 102 MMOL/L
CO2 SERPL-SCNC: 23 MMOL/L
CREAT SERPL-MCNC: 0.71 MG/DL
EGFRCR SERPLBLD CKD-EPI 2021: 95 ML/MIN/1.73M2
EOSINOPHIL # BLD AUTO: 0.04 K/UL — SIGNIFICANT CHANGE UP (ref 0–0.5)
EOSINOPHIL NFR BLD AUTO: 0.7 % — SIGNIFICANT CHANGE UP (ref 0–6)
GLUCOSE SERPL-MCNC: 102 MG/DL
HCT VFR BLD CALC: 31.1 % — LOW (ref 34.5–45)
HGB BLD-MCNC: 9.9 G/DL — LOW (ref 11.5–15.5)
IMM GRANULOCYTES NFR BLD AUTO: 0.6 % — SIGNIFICANT CHANGE UP (ref 0–0.9)
LDH SERPL-CCNC: 174 U/L
LYMPHOCYTES # BLD AUTO: 0.55 K/UL — LOW (ref 1–3.3)
LYMPHOCYTES # BLD AUTO: 10.2 % — LOW (ref 13–44)
MAGNESIUM SERPL-MCNC: 1.9 MG/DL
MCHC RBC-ENTMCNC: 29.2 PG — SIGNIFICANT CHANGE UP (ref 27–34)
MCHC RBC-ENTMCNC: 31.8 G/DL — LOW (ref 32–36)
MCV RBC AUTO: 91.7 FL — SIGNIFICANT CHANGE UP (ref 80–100)
MONOCYTES # BLD AUTO: 0.71 K/UL — SIGNIFICANT CHANGE UP (ref 0–0.9)
MONOCYTES NFR BLD AUTO: 13.2 % — SIGNIFICANT CHANGE UP (ref 2–14)
NEUTROPHILS # BLD AUTO: 4.02 K/UL — SIGNIFICANT CHANGE UP (ref 1.8–7.4)
NEUTROPHILS NFR BLD AUTO: 74.7 % — SIGNIFICANT CHANGE UP (ref 43–77)
NRBC BLD AUTO-RTO: 0 /100 WBCS — SIGNIFICANT CHANGE UP (ref 0–0)
PLATELET # BLD AUTO: 225 K/UL — SIGNIFICANT CHANGE UP (ref 150–400)
POTASSIUM SERPL-SCNC: 3.8 MMOL/L
PROT SERPL-MCNC: 6.6 G/DL
RBC # BLD: 3.39 M/UL — LOW (ref 3.8–5.2)
RBC # FLD: 19.7 % — HIGH (ref 10.3–14.5)
SODIUM SERPL-SCNC: 140 MMOL/L
VIABILITY: NORMAL
WBC # BLD: 5.38 K/UL — SIGNIFICANT CHANGE UP (ref 3.8–10.5)
WBC # FLD AUTO: 5.38 K/UL — SIGNIFICANT CHANGE UP (ref 3.8–10.5)

## 2025-08-06 PROCEDURE — 99215 OFFICE O/P EST HI 40 MIN: CPT

## 2025-08-06 RX ORDER — OXYCODONE 5 MG/1
5 TABLET ORAL
Qty: 10 | Refills: 0 | Status: COMPLETED | COMMUNITY
Start: 2025-08-06 | End: 2025-08-08

## 2025-08-08 ENCOUNTER — NON-APPOINTMENT (OUTPATIENT)
Age: 65
End: 2025-08-08

## 2025-08-13 ENCOUNTER — RESULT REVIEW (OUTPATIENT)
Age: 65
End: 2025-08-13

## 2025-08-13 ENCOUNTER — APPOINTMENT (OUTPATIENT)
Dept: HEMATOLOGY ONCOLOGY | Facility: CLINIC | Age: 65
End: 2025-08-13
Payer: COMMERCIAL

## 2025-08-13 VITALS
WEIGHT: 109.13 LBS | TEMPERATURE: 97.5 F | OXYGEN SATURATION: 98 % | BODY MASS INDEX: 19.34 KG/M2 | HEART RATE: 81 BPM | HEIGHT: 63 IN | DIASTOLIC BLOOD PRESSURE: 63 MMHG | RESPIRATION RATE: 16 BRPM | SYSTOLIC BLOOD PRESSURE: 133 MMHG

## 2025-08-13 LAB
ALBUMIN SERPL ELPH-MCNC: 4.3 G/DL
ALP BLD-CCNC: 75 U/L
ALT SERPL-CCNC: <5 U/L
ANION GAP SERPL CALC-SCNC: 15 MMOL/L
AST SERPL-CCNC: 10 U/L
BILIRUB SERPL-MCNC: 0.6 MG/DL
BUN SERPL-MCNC: 10 MG/DL
CALCIUM SERPL-MCNC: 9.7 MG/DL
CHLORIDE SERPL-SCNC: 101 MMOL/L
CO2 SERPL-SCNC: 22 MMOL/L
CREAT SERPL-MCNC: 0.77 MG/DL
EGFRCR SERPLBLD CKD-EPI 2021: 87 ML/MIN/1.73M2
GLUCOSE SERPL-MCNC: 103 MG/DL
LDH SERPL-CCNC: 131 U/L
MAGNESIUM SERPL-MCNC: 1.7 MG/DL
POTASSIUM SERPL-SCNC: 3.2 MMOL/L
PROT SERPL-MCNC: 6.6 G/DL
SODIUM SERPL-SCNC: 138 MMOL/L

## 2025-08-13 PROCEDURE — 99214 OFFICE O/P EST MOD 30 MIN: CPT

## 2025-08-13 RX ORDER — POTASSIUM CHLORIDE 1500 MG/1
20 TABLET, FILM COATED, EXTENDED RELEASE ORAL DAILY
Qty: 14 | Refills: 0 | Status: DISCONTINUED | COMMUNITY
Start: 2025-07-24 | End: 2025-08-13

## 2025-08-13 RX ORDER — HYDROCORTISONE 0.01 G/G
1 CREAM TOPICAL TWICE DAILY
Qty: 28 | Refills: 0 | Status: DISCONTINUED | COMMUNITY
Start: 2025-08-08 | End: 2025-08-13

## 2025-08-13 RX ORDER — FENTANYL 12 UG/H
12 PATCH, EXTENDED RELEASE TRANSDERMAL
Refills: 0 | Status: DISCONTINUED | COMMUNITY
Start: 2025-07-24 | End: 2025-08-13

## 2025-08-13 RX ORDER — POTASSIUM CHLORIDE 1500 MG/1
20 TABLET, FILM COATED, EXTENDED RELEASE ORAL DAILY
Qty: 30 | Refills: 0 | Status: DISCONTINUED | COMMUNITY
Start: 2025-07-23 | End: 2025-08-13

## 2025-08-14 RX ORDER — CHLORHEXIDINE GLUCONATE, 0.12% ORAL RINSE 1.2 MG/ML
0.12 SOLUTION DENTAL
Qty: 273 | Refills: 0 | Status: ACTIVE | COMMUNITY
Start: 2025-07-26 | End: 1900-01-01

## 2025-08-18 RX ORDER — HYDROXYZINE HYDROCHLORIDE 10 MG/1
10 TABLET ORAL
Qty: 30 | Refills: 0 | Status: ACTIVE | COMMUNITY
Start: 2025-07-30 | End: 1900-01-01

## 2025-08-20 ENCOUNTER — RESULT REVIEW (OUTPATIENT)
Age: 65
End: 2025-08-20

## 2025-08-20 ENCOUNTER — APPOINTMENT (OUTPATIENT)
Dept: HEMATOLOGY ONCOLOGY | Facility: CLINIC | Age: 65
End: 2025-08-20

## 2025-08-20 ENCOUNTER — APPOINTMENT (OUTPATIENT)
Dept: INFUSION THERAPY | Facility: HOSPITAL | Age: 65
End: 2025-08-20

## 2025-08-20 VITALS
RESPIRATION RATE: 16 BRPM | SYSTOLIC BLOOD PRESSURE: 105 MMHG | BODY MASS INDEX: 18.98 KG/M2 | WEIGHT: 107.14 LBS | OXYGEN SATURATION: 97 % | DIASTOLIC BLOOD PRESSURE: 66 MMHG | HEART RATE: 77 BPM

## 2025-08-20 LAB
ALBUMIN SERPL ELPH-MCNC: 3.9 G/DL
ALP BLD-CCNC: 76 U/L
ALT SERPL-CCNC: <5 U/L
ANION GAP SERPL CALC-SCNC: 13 MMOL/L
AST SERPL-CCNC: 11 U/L
BILIRUB SERPL-MCNC: 0.4 MG/DL
BUN SERPL-MCNC: 7 MG/DL
CALCIUM SERPL-MCNC: 9.6 MG/DL
CHLORIDE SERPL-SCNC: 103 MMOL/L
CO2 SERPL-SCNC: 24 MMOL/L
CREAT SERPL-MCNC: 0.73 MG/DL
EGFRCR SERPLBLD CKD-EPI 2021: 92 ML/MIN/1.73M2
GLUCOSE SERPL-MCNC: 114 MG/DL
LDH SERPL-CCNC: 131 U/L
MAGNESIUM SERPL-MCNC: 1.7 MG/DL
POTASSIUM SERPL-SCNC: 3.1 MMOL/L
PROT SERPL-MCNC: 6.2 G/DL
SODIUM SERPL-SCNC: 140 MMOL/L

## 2025-08-20 RX ORDER — POTASSIUM CHLORIDE 750 MG/1
10 CAPSULE, EXTENDED RELEASE ORAL DAILY
Qty: 14 | Refills: 0 | Status: ACTIVE | COMMUNITY
Start: 2025-08-20 | End: 1900-01-01

## 2025-08-27 ENCOUNTER — RESULT REVIEW (OUTPATIENT)
Age: 65
End: 2025-08-27

## 2025-08-27 ENCOUNTER — APPOINTMENT (OUTPATIENT)
Dept: HEMATOLOGY ONCOLOGY | Facility: CLINIC | Age: 65
End: 2025-08-27
Payer: COMMERCIAL

## 2025-08-27 ENCOUNTER — APPOINTMENT (OUTPATIENT)
Dept: INFUSION THERAPY | Facility: HOSPITAL | Age: 65
End: 2025-08-27

## 2025-08-27 ENCOUNTER — NON-APPOINTMENT (OUTPATIENT)
Age: 65
End: 2025-08-27

## 2025-08-27 PROCEDURE — 99215 OFFICE O/P EST HI 40 MIN: CPT

## 2025-08-27 RX ORDER — HYDROCORTISONE CREAM 1% 10 MG/G
1 CREAM TOPICAL TWICE DAILY
Qty: 1 | Refills: 0 | Status: ACTIVE | COMMUNITY
Start: 2025-08-27 | End: 1900-01-01

## 2025-08-27 RX ORDER — PROCHLORPERAZINE MALEATE 10 MG/1
10 TABLET ORAL EVERY 6 HOURS
Qty: 30 | Refills: 0 | Status: ACTIVE | COMMUNITY
Start: 2025-08-20 | End: 1900-01-01

## 2025-08-29 RX ORDER — POTASSIUM CHLORIDE 20 MEQ/15ML
20 MEQ/15ML SOLUTION ORAL DAILY
Qty: 105 | Refills: 0 | Status: ACTIVE | COMMUNITY
Start: 2025-08-29 | End: 1900-01-01

## 2025-09-03 ENCOUNTER — APPOINTMENT (OUTPATIENT)
Dept: HEMATOLOGY ONCOLOGY | Facility: CLINIC | Age: 65
End: 2025-09-03
Payer: COMMERCIAL

## 2025-09-03 ENCOUNTER — RESULT REVIEW (OUTPATIENT)
Age: 65
End: 2025-09-03

## 2025-09-03 ENCOUNTER — APPOINTMENT (OUTPATIENT)
Dept: INFUSION THERAPY | Facility: HOSPITAL | Age: 65
End: 2025-09-03

## 2025-09-03 PROCEDURE — 99215 OFFICE O/P EST HI 40 MIN: CPT

## 2025-09-05 ENCOUNTER — NON-APPOINTMENT (OUTPATIENT)
Age: 65
End: 2025-09-05

## 2025-09-10 ENCOUNTER — RESULT REVIEW (OUTPATIENT)
Age: 65
End: 2025-09-10

## 2025-09-10 ENCOUNTER — APPOINTMENT (OUTPATIENT)
Dept: HEMATOLOGY ONCOLOGY | Facility: CLINIC | Age: 65
End: 2025-09-10
Payer: COMMERCIAL

## 2025-09-10 VITALS
HEART RATE: 60 BPM | OXYGEN SATURATION: 100 % | TEMPERATURE: 97.4 F | DIASTOLIC BLOOD PRESSURE: 72 MMHG | HEIGHT: 62.99 IN | WEIGHT: 105.82 LBS | SYSTOLIC BLOOD PRESSURE: 130 MMHG | RESPIRATION RATE: 16 BRPM | BODY MASS INDEX: 18.75 KG/M2

## 2025-09-10 LAB
ALBUMIN SERPL ELPH-MCNC: 4.2 G/DL
ALP BLD-CCNC: 71 U/L
ALT SERPL-CCNC: 7 U/L
ANION GAP SERPL CALC-SCNC: 12 MMOL/L
AST SERPL-CCNC: 16 U/L
BILIRUB SERPL-MCNC: 0.5 MG/DL
BUN SERPL-MCNC: 6 MG/DL
CALCIUM SERPL-MCNC: 9.6 MG/DL
CHLORIDE SERPL-SCNC: 105 MMOL/L
CO2 SERPL-SCNC: 24 MMOL/L
CREAT SERPL-MCNC: 0.63 MG/DL
EGFRCR SERPLBLD CKD-EPI 2021: 100 ML/MIN/1.73M2
GLUCOSE SERPL-MCNC: 136 MG/DL
LDH SERPL-CCNC: 152 U/L
MAGNESIUM SERPL-MCNC: 1.7 MG/DL
POTASSIUM SERPL-SCNC: 3.4 MMOL/L
PROT SERPL-MCNC: 6.3 G/DL
SODIUM SERPL-SCNC: 141 MMOL/L

## 2025-09-10 PROCEDURE — 99214 OFFICE O/P EST MOD 30 MIN: CPT

## 2025-09-17 ENCOUNTER — APPOINTMENT (OUTPATIENT)
Dept: HEMATOLOGY ONCOLOGY | Facility: CLINIC | Age: 65
End: 2025-09-17
Payer: COMMERCIAL

## 2025-09-17 ENCOUNTER — RESULT REVIEW (OUTPATIENT)
Age: 65
End: 2025-09-17

## 2025-09-17 VITALS
DIASTOLIC BLOOD PRESSURE: 78 MMHG | RESPIRATION RATE: 16 BRPM | OXYGEN SATURATION: 98 % | HEART RATE: 80 BPM | SYSTOLIC BLOOD PRESSURE: 118 MMHG | TEMPERATURE: 97.7 F | WEIGHT: 104.72 LBS | BODY MASS INDEX: 18.55 KG/M2

## 2025-09-17 DIAGNOSIS — D75.81 MYELOFIBROSIS: ICD-10-CM

## 2025-09-17 LAB
ALBUMIN SERPL ELPH-MCNC: 3.9 G/DL
ALP BLD-CCNC: 80 U/L
ALT SERPL-CCNC: 11 U/L
ANION GAP SERPL CALC-SCNC: 10 MMOL/L
AST SERPL-CCNC: 19 U/L
BILIRUB SERPL-MCNC: 0.4 MG/DL
BUN SERPL-MCNC: 8 MG/DL
CALCIUM SERPL-MCNC: 9.5 MG/DL
CHLORIDE SERPL-SCNC: 104 MMOL/L
CO2 SERPL-SCNC: 25 MMOL/L
CREAT SERPL-MCNC: 0.57 MG/DL
EGFRCR SERPLBLD CKD-EPI 2021: 102 ML/MIN/1.73M2
GLUCOSE SERPL-MCNC: 118 MG/DL
LDH SERPL-CCNC: 134 U/L
MAGNESIUM SERPL-MCNC: 1.8 MG/DL
POTASSIUM SERPL-SCNC: 3.9 MMOL/L
PROT SERPL-MCNC: 6.2 G/DL
SODIUM SERPL-SCNC: 140 MMOL/L

## 2025-09-17 PROCEDURE — 99214 OFFICE O/P EST MOD 30 MIN: CPT
